# Patient Record
Sex: FEMALE | Race: WHITE | NOT HISPANIC OR LATINO | Employment: OTHER | URBAN - METROPOLITAN AREA
[De-identification: names, ages, dates, MRNs, and addresses within clinical notes are randomized per-mention and may not be internally consistent; named-entity substitution may affect disease eponyms.]

---

## 2017-01-19 DIAGNOSIS — F43.22 ADJUSTMENT DISORDER WITH ANXIOUS MOOD: Primary | ICD-10-CM

## 2017-01-19 NOTE — TELEPHONE ENCOUNTER
clonazerpam     Last Written Prescription Date:  11/14/2016  Last Fill Quantity: 60,   # refills: 0  Last Office Visit with [facility] or [facility] prescribing provider: 9/7/2016  Future Office visit:    Next 5 appointments (look out 90 days)     Jan 23, 2017  4:00 PM   Office Visit with Irish Fernandes MD   [facility] ([address] [id number] [phone]                   Routing refill request to provider for review/approval because:  Drug not on the [facility], [facility] or [facility] refill protocol or controlled substance    Rx for clonazepam faxed to Randi Hinds,

## 2017-01-20 RX ORDER — CLONAZEPAM 0.5 MG/1
0.5 TABLET, ORALLY DISINTEGRATING ORAL 2 TIMES DAILY
Qty: 60 TABLET | Refills: 0 | Status: SHIPPED | OUTPATIENT
Start: 2017-01-20 | End: 2017-01-23

## 2017-01-23 ENCOUNTER — OFFICE VISIT (OUTPATIENT)
Dept: FAMILY MEDICINE | Facility: CLINIC | Age: 57
End: 2017-01-23
Payer: COMMERCIAL

## 2017-01-23 VITALS
HEART RATE: 75 BPM | TEMPERATURE: 98.6 F | DIASTOLIC BLOOD PRESSURE: 85 MMHG | BODY MASS INDEX: 27.82 KG/M2 | HEIGHT: 63 IN | OXYGEN SATURATION: 100 % | WEIGHT: 157 LBS | SYSTOLIC BLOOD PRESSURE: 117 MMHG

## 2017-01-23 DIAGNOSIS — I10 BENIGN ESSENTIAL HYPERTENSION: ICD-10-CM

## 2017-01-23 DIAGNOSIS — E06.3 HYPOTHYROIDISM DUE TO HASHIMOTO'S THYROIDITIS: ICD-10-CM

## 2017-01-23 DIAGNOSIS — F43.22 ADJUSTMENT DISORDER WITH ANXIOUS MOOD: Primary | ICD-10-CM

## 2017-01-23 PROCEDURE — 99213 OFFICE O/P EST LOW 20 MIN: CPT | Performed by: INTERNAL MEDICINE

## 2017-01-23 PROCEDURE — 84443 ASSAY THYROID STIM HORMONE: CPT | Performed by: INTERNAL MEDICINE

## 2017-01-23 PROCEDURE — 36415 COLL VENOUS BLD VENIPUNCTURE: CPT | Performed by: INTERNAL MEDICINE

## 2017-01-23 PROCEDURE — 80048 BASIC METABOLIC PNL TOTAL CA: CPT | Performed by: INTERNAL MEDICINE

## 2017-01-23 RX ORDER — CLONAZEPAM 0.5 MG/1
0.5 TABLET, ORALLY DISINTEGRATING ORAL 2 TIMES DAILY
Qty: 60 TABLET | Refills: 0 | Status: SHIPPED | OUTPATIENT
Start: 2017-02-20 | End: 2017-03-07

## 2017-01-23 NOTE — NURSING NOTE
"Chief Complaint   Patient presents with     Recheck Medication       Initial /93 mmHg  Pulse 73  Temp(Src) 98.6  F (37  C) (Oral)  Ht 5' 3\" (1.6 m)  Wt 157 lb (71.215 kg)  BMI 27.82 kg/m2 Estimated body mass index is 27.82 kg/(m^2) as calculated from the following:    Height as of this encounter: 5' 3\" (1.6 m).    Weight as of this encounter: 157 lb (71.215 kg).  BP completed using cuff size: sara Garcia MA       "

## 2017-01-23 NOTE — PROGRESS NOTES
SUBJECTIVE:                                                    Irina Hanks is a 56 year old female who presents to clinic today for the following health issues:      Medication Followup of klonopin     Taking Medication as prescribed: yes    Side Effects:  None    Medication Helping Symptoms:  yes     Taking the Clonazepam for anxiety symptoms and doing well. It is helping her sleep at night. Feels that her anxiety is much better controlled. Not feeling as worried about what is going to happen financially.       Problem list and histories reviewed & adjusted, as indicated.  Additional history: as documented    Patient Active Problem List   Diagnosis     Hypothyroidism     Absence of menstruation     IBS (irritable bowel syndrome)     Hyperlipidemia LDL goal <130     Hypertension goal BP (blood pressure) < 130/80     Impaired renal function     Overweight (BMI 25.0-29.9)     Anxiety     Postmenopausal symptoms     Inflammatory arthritis     Osteoarthritis of first carpometacarpal joint     Microscopic colitis     Seasonal allergic rhinitis     Vitamin D deficiency     RA (rheumatoid arthritis) (H)     ASCUS favor benign     Shortness of breath     Essential hypertension, benign     Acquired hypothyroidism     Symptomatic menopausal or female climacteric states     Tricuspid regurgitation     S/P myringotomy with insertion of tube     Vasomotor rhinitis     Controlled substance agreement signed     Abdominal pain, right lower quadrant     Psychophysiological insomnia     Past Surgical History   Procedure Laterality Date     C/section, low transverse       twins     Biopsy breast       Colonoscopy  2013     nl, next one due in 5 years     Leep tx, cervical  1990s     normal since that time     Myringotomy, insert tube, combined Left 4/2015     chronic NORM, ETD       Social History   Substance Use Topics     Smoking status: Former Smoker -- 0.26 packs/day for 10 years     Types: Cigarettes     Quit date:  "04/26/1989     Smokeless tobacco: Never Used     Alcohol Use: 0.0 oz/week     0 Standard drinks or equivalent per week      Comment: 2 drinks per day     Family History   Problem Relation Age of Onset     Cancer - colorectal Father      age 50     Cancer - colorectal Brother      age 50     Hypertension Sister      Hypertension Brother      Arthritis Mother      Thyroid Disease Sister      Thyroid Disease Brother      Arthritis Sister      CANCER Sister 53     Uterine     CANCER Sister 50     Uterine           ROS:  Constitutional, HEENT, cardiovascular, pulmonary, gi and gu systems are negative, except as otherwise noted.    OBJECTIVE:                                                    /85 mmHg  Pulse 75  Temp(Src) 98.6  F (37  C) (Oral)  Ht 5' 3\" (1.6 m)  Wt 157 lb (71.215 kg)  BMI 27.82 kg/m2  SpO2 100%  Body mass index is 27.82 kg/(m^2).  GENERAL: healthy, alert and no distress  NECK: no adenopathy, no asymmetry, masses, or scars and thyroid normal to palpation  RESP: lungs clear to auscultation - no rales, rhonchi or wheezes  CV: regular rate and rhythm, normal S1 S2, no S3 or S4, no murmur, click or rub, no peripheral edema and peripheral pulses strong  PSYCH: mentation appears normal, affect normal/bright    Diagnostic Test Results:  none      ASSESSMENT/PLAN:                                                      1. Adjustment disorder with anxious mood  - clonazePAM (KLONOPIN) 0.5 MG ODT tab; Take 1 tablet (0.5 mg) by mouth 2 times daily  Dispense: 60 tablet; Refill: 0    2. Benign essential hypertension  - Basic metabolic panel    3. Hypothyroidism due to Hashimoto's thyroiditis  - TSH with free T4 reflex      Follow up in 6 month(s) or sooner if needed      Irish Fernandes MD  [facility]    "

## 2017-01-24 DIAGNOSIS — I10 BENIGN ESSENTIAL HYPERTENSION: Primary | ICD-10-CM

## 2017-01-24 LAB
ANION GAP SERPL CALCULATED.3IONS-SCNC: 10 MMOL/L (ref 3–14)
BUN SERPL-MCNC: 11 MG/DL (ref 7–30)
CALCIUM SERPL-MCNC: 9 MG/DL (ref 8.5–10.1)
CHLORIDE SERPL-SCNC: 94 MMOL/L (ref 94–109)
CO2 SERPL-SCNC: 26 MMOL/L (ref 20–32)
CREAT SERPL-MCNC: 0.78 MG/DL (ref 0.52–1.04)
GFR SERPL CREATININE-BSD FRML MDRD: 76 ML/MIN/1.7M2
GLUCOSE SERPL-MCNC: 87 MG/DL (ref 70–99)
POTASSIUM SERPL-SCNC: 3.6 MMOL/L (ref 3.4–5.3)
SODIUM SERPL-SCNC: 130 MMOL/L (ref 133–144)
TSH SERPL DL<=0.005 MIU/L-ACNC: 1.56 MU/L (ref 0.4–4)

## 2017-01-24 RX ORDER — LOSARTAN POTASSIUM 100 MG/1
100 TABLET ORAL DAILY
Qty: 30 TABLET | Refills: 1 | Status: SHIPPED | OUTPATIENT
Start: 2017-01-24 | End: 2017-08-14

## 2017-03-07 DIAGNOSIS — F43.22 ADJUSTMENT DISORDER WITH ANXIOUS MOOD: ICD-10-CM

## 2017-03-07 NOTE — TELEPHONE ENCOUNTER
Clonazepam      Last Written Prescription Date:  2/20/17  Last Fill Quantity: 60,   # refills: 0  Last Office Visit with [facility] provider: 1/23/17  Future Office visit:       Routing refill request to provider for review/approval because:  Drug not on the [facility] or [facility] refill protocol or controlled substance    Florence Mcknight CMA

## 2017-03-08 RX ORDER — CLONAZEPAM 0.5 MG/1
TABLET, ORALLY DISINTEGRATING ORAL
Qty: 60 TABLET | Refills: 0 | Status: SHIPPED | OUTPATIENT
Start: 2017-03-20 | End: 2017-05-12

## 2017-03-08 NOTE — TELEPHONE ENCOUNTER
Patient requesting fill too soon. Rx denied. Routing to PCP to deny as RN's are not able to since epic upgrade.   Keyla Hardin RN   [facility] - Triage

## 2017-03-08 NOTE — TELEPHONE ENCOUNTER
Routing to team to inform and assist in scheduling.   Keyla Hardin RN   [facility] - Triage

## 2017-03-08 NOTE — TELEPHONE ENCOUNTER
Refill dated for March 20th. Patient can  if she would like to and bring to her pharmacy but cannot be filled before that date.

## 2017-05-03 ENCOUNTER — TELEPHONE (OUTPATIENT)
Dept: FAMILY MEDICINE | Facility: CLINIC | Age: 57
End: 2017-05-03

## 2017-05-03 NOTE — TELEPHONE ENCOUNTER
Patient calling to schedule appointment. States she has pain under her right rib cage which started a week ago, hurts to bend or breathe. Denies other symptoms, denies injuring the area. Denies sob, cough, wheeze, chest pain, lightheadedness. Advised okay to wait for her appointment on Friday, but if pain becomes severe, or she develops any of the above symptoms to be seen sooner. Patient/ parent verbalized understanding and agrees with plan.    Keyla Hardin RN   [facility] - Triage

## 2017-05-05 ENCOUNTER — OFFICE VISIT (OUTPATIENT)
Dept: FAMILY MEDICINE | Facility: CLINIC | Age: 57
End: 2017-05-05
Payer: COMMERCIAL

## 2017-05-05 VITALS
TEMPERATURE: 97.2 F | OXYGEN SATURATION: 96 % | SYSTOLIC BLOOD PRESSURE: 100 MMHG | HEART RATE: 82 BPM | DIASTOLIC BLOOD PRESSURE: 70 MMHG | BODY MASS INDEX: 27.46 KG/M2 | WEIGHT: 155 LBS

## 2017-05-05 DIAGNOSIS — R07.89 CHEST WALL PAIN: Primary | ICD-10-CM

## 2017-05-05 PROCEDURE — 99213 OFFICE O/P EST LOW 20 MIN: CPT | Performed by: INTERNAL MEDICINE

## 2017-05-05 NOTE — PROGRESS NOTES
SUBJECTIVE:                                                    Irina Hanks is a 57 year old female who presents to clinic today for the following health issues:      ABDOMINAL PAIN     Onset: a week ago     Description:   Character: Sharp  Location: right upper quadrant  Radiation: None    Intensity: moderate    Progression of Symptoms:  constant    Accompanying Signs & Symptoms:  Fever/Chills?: no   Gas/Bloating: no   Nausea: no   Vomitting: no   Diarrhea?: no   Constipation:no   Dysuria or Hematuria: no    History:   Trauma: no   Previous similar pain: no    Previous tests done: none    Precipitating factors:   Does the pain change with:     Food: no      BM: no     Urination: no     Alleviating factors:  Not moving , worse with movement     Therapies Tried and outcome: tylenol, not helpful     LMP:  not applicable     Peg is here with right sided rib pain. It started about a week ago, no injuries or new activities. The pain is there all the time, gets worse with movement, coughing, or deep breath.  Tylenol does not seem to be helping.  She has no associated cough, SOB, nausea, vomiting, change in bowel movements.         Reviewed and updated as needed this visit by clinical staff  Tobacco  Allergies  Meds         ROS:  Const, pulm, GI, , msk reviewed, negative unless noted above.     OBJECTIVE:                                                    /70 (BP Location: Left arm, Patient Position: Chair, Cuff Size: Adult Regular)  Pulse 82  Temp 97.2  F (36.2  C) (Tympanic)  Wt 155 lb (70.3 kg)  SpO2 96%  BMI 27.46 kg/m2  Body mass index is 27.46 kg/(m^2).    Gen: well appearing, pleasant woman, no distress  Pulm: breathing comfortably, CTAB, no wheezes or rales  CV: RRR, normal S1 and S2, no murmurs  Abd: BS present, soft, nontender, nondistended  Chest: mild TTP over lower anterior right ribs. No overlying bruising or rash.       Diagnostic Test Results:  none      ASSESSMENT/PLAN:                                                           1. Chest wall pain  Advised NSAIDs, ice and/or heat, avoid movements that make it worse. If worsening or develops accompanying respiratory or GI symptoms, consider imaging.  She is in agreement with plan.     F/U as needed for persistent or worsening symptoms.         Keely Adams MD  [facility]

## 2017-05-05 NOTE — NURSING NOTE
"Chief Complaint   Patient presents with     Abdominal Pain       Initial /70 (BP Location: Left arm, Patient Position: Chair, Cuff Size: Adult Regular)  Pulse 82  Temp 97.2  F (36.2  C) (Tympanic)  Wt 155 lb (70.3 kg)  SpO2 96%  BMI 27.46 kg/m2 Estimated body mass index is 27.46 kg/(m^2) as calculated from the following:    Height as of 1/23/17: 5' 3\" (1.6 m).    Weight as of this encounter: 155 lb (70.3 kg).  Medication Reconciliation: complete  "

## 2017-05-05 NOTE — MR AVS SNAPSHOT
After Visit Summary   5/5/2017    Irina Hanks    MRN: [id number]           Patient Information     Date Of Birth          1960        Visit Information        Provider Department      5/5/2017 10:40 AM Keely Adams MD [facility] [place]        Today's Diagnoses     Chest wall pain    -  1       Follow-ups after your visit        Who to contact     If you have questions or need follow up information about today's clinic visit or your schedule please contact [facility] [place] directly at [phone].  Normal or non-critical lab and imaging results will be communicated to you by [employer], letter or phone within 4 business days after the clinic has received the results. If you do not hear from us within 7 days, please contact the clinic through [employer] or phone. If you have a critical or abnormal lab result, we will notify you by phone as soon as possible.  Submit refill requests through [employer] or call your pharmacy and they will forward the refill request to us. Please allow 3 business days for your refill to be completed.          Additional Information About Your Visit        MyChart Information     [employer] gives you secure access to your electronic health record. If you see a primary care provider, you can also send messages to your care team and make appointments. If you have questions, please call your primary care clinic.  If you do not have a primary care provider, please call [phone] and they will assist you.        Care EveryWhere ID     This is your Care EveryWhere ID. This could be used by other organizations to access your [place] medical records  [id number]        Your Vitals Were     Pulse Temperature Pulse Oximetry BMI (Body Mass Index)          82 97.2  F (36.2  C) (Tympanic) 96% 27.46 kg/m2         Blood Pressure from Last 3 Encounters:   05/05/17 100/70   01/23/17 117/85   09/07/16 137/87    Weight from Last 3 Encounters:   05/05/17  155 lb (70.3 kg)   01/23/17 157 lb (71.2 kg)   09/07/16 157 lb (71.2 kg)              Today, you had the following     No orders found for display       Primary Care Provider Office Phone # Fax #    Irish Fernandes [phone]       [facility] [place] [address] [place] [address] [address]  [place] [address]        Thank you!     [facility] your care. Our goal is always to provide you with excellent care. Hearing back from our patients is one way we can continue to improve our services. Please take a few minutes to complete the written survey that you may receive in the mail after your visit with us. Thank you!             Your Updated Medication List - Protect others around you: Learn how to safely use, store and throw away your medicines at www.disposemymeds.org.          This list is accurate as of: 5/5/17 11:59 AM.  Always use your most recent med list.                   Brand Name Dispense Instructions for use    CLARITIN 10 MG tablet   Generic drug:  loratadine     90 tablet    Take 1 tablet (10 mg) by mouth daily       clonazePAM 0.5 MG ODT tab    klonoPIN    60 tablet    TAKE 1 TABLET BY MOUTH TWICE DAILY       hydroxychloroquine 200 MG tablet    PLAQUENIL    90 tablet    Take 2 tablets (400 mg) by mouth daily       IMODIUM A-D 2 MG tablet   Generic drug:  loperamide      1 TABLET AS NEEDED       levothyroxine 75 MCG tablet    SYNTHROID/LEVOTHROID    90 tablet    TAKE 1 TABLET BY MOUTH EVERY MORNING       liothyronine 5 MCG tablet    CYTOMEL    180 tablet    TAKE 2 TABLETS BY MOUTH ONCE DAILY       losartan 100 MG tablet    COZAAR    30 tablet    Take 1 tablet (100 mg) by mouth daily       meclizine 25 MG tablet    ANTIVERT    30 tablet    Take 1 tablet (25 mg) by mouth every 6 hours as needed for dizziness       metoprolol 50 MG 24 hr tablet    TOPROL-XL    180 tablet    TAKE 2 TABLETS BY MOUTH ONCE DAILY       Multi-vitamin Tabs tablet    Generic drug:  multivitamin, therapeutic with minerals      1 TABLET DAILY       VITAMIN D3 PO      Take 2,000 Units by mouth

## 2017-05-12 DIAGNOSIS — F43.22 ADJUSTMENT DISORDER WITH ANXIOUS MOOD: ICD-10-CM

## 2017-05-12 RX ORDER — CLONAZEPAM 0.5 MG/1
TABLET, ORALLY DISINTEGRATING ORAL
Qty: 60 TABLET | Refills: 0 | Status: SHIPPED | OUTPATIENT
Start: 2017-05-12 | End: 2017-07-11

## 2017-05-12 NOTE — TELEPHONE ENCOUNTER
Clonazepam      Last Written Prescription Date:  3/20/17  Last Fill Quantity: 60,   # refills: 0  Last Office Visit with [facility], [facility] or [facility] prescribing provider: 5/5/17  Future Office visit:       Routing refill request to provider for review/approval because:  Drug not on the [facility] refill protocol or controlled substance    PHAN Huntley

## 2017-05-30 DIAGNOSIS — E03.9 HYPOTHYROIDISM: ICD-10-CM

## 2017-05-31 DIAGNOSIS — E03.9 ACQUIRED HYPOTHYROIDISM: ICD-10-CM

## 2017-05-31 RX ORDER — LEVOTHYROXINE SODIUM 75 UG/1
TABLET ORAL
Qty: 90 TABLET | Refills: 1 | Status: SHIPPED | OUTPATIENT
Start: 2017-05-31 | End: 2017-11-24

## 2017-05-31 NOTE — TELEPHONE ENCOUNTER
Prescription approved per [facility] Refill Protocol.  Keyla Hardin RN   [facility] - Triage

## 2017-05-31 NOTE — TELEPHONE ENCOUNTER
synthroid      Last Written Prescription Date: 11/18/2016  Last Quantity: 90 # refills: 1  Last Office Visit with [facility] or [facility] prescribing provider: 5/5/2017       TSH   Date Value Ref Range Status   01/23/2017 1.56 0.40 - 4.00 mU/L Final

## 2017-06-01 RX ORDER — LIOTHYRONINE SODIUM 5 UG/1
TABLET ORAL
Qty: 180 TABLET | Refills: 1 | Status: SHIPPED | OUTPATIENT
Start: 2017-06-01 | End: 2017-11-06

## 2017-06-01 NOTE — TELEPHONE ENCOUNTER
Cytomel      Last Written Prescription Date: 12/2/16  Last Quantity: 180, # refills: 1  Last Office Visit with [facility] or [facility] prescribing provider: 5/5/17        TSH   Date Value Ref Range Status   01/23/2017 1.56 0.40 - 4.00 mU/L Final

## 2017-06-01 NOTE — TELEPHONE ENCOUNTER
Prescription approved per [facility] [place]ill Protocol.  Keyla Hardin RN   [facility] - Triage

## 2017-06-05 ENCOUNTER — TELEPHONE (OUTPATIENT)
Dept: FAMILY MEDICINE | Facility: CLINIC | Age: 57
End: 2017-06-05

## 2017-06-05 NOTE — TELEPHONE ENCOUNTER
Pa was sent to us called insurance no PA needed, called [facility] changed the NDC number and it is covered, they will call pt.

## 2017-07-11 DIAGNOSIS — I10 ESSENTIAL HYPERTENSION, BENIGN: ICD-10-CM

## 2017-07-11 DIAGNOSIS — F43.22 ADJUSTMENT DISORDER WITH ANXIOUS MOOD: ICD-10-CM

## 2017-07-11 NOTE — TELEPHONE ENCOUNTER
Hyzaar      Last Written Prescription Date: 1/5/17  Last Fill Quantity: 90, # refills: 1  Last Office Visit with [facility] or [facility] prescribing provider: 5/5/17       Potassium   Date Value Ref Range Status   01/23/2017 3.6 3.4 - 5.3 mmol/L Final     Creatinine   Date Value Ref Range Status   01/23/2017 0.78 0.52 - 1.04 mg/dL Final     BP Readings from Last 3 Encounters:   05/05/17 100/70   01/23/17 117/85   09/07/16 137/87     Klonopin      Last Written Prescription Date:  5/12/17  Last Fill Quantity: 60,   # refills: 0  Last Office Visit with [facility] or [facility] prescribing provider: 5/5/17  Future Office visit:       Routing refill request to provider for review/approval because:  Drug not on the [facility] or [facility] refill protocol or controlled substance      MAXIMILIANO Yu LPN

## 2017-07-12 RX ORDER — LOSARTAN POTASSIUM AND HYDROCHLOROTHIAZIDE 12.5; 1 MG/1; MG/1
TABLET ORAL
Qty: 90 TABLET | Refills: 1 | Status: SHIPPED | OUTPATIENT
Start: 2017-07-12 | End: 2018-03-02

## 2017-07-12 RX ORDER — CLONAZEPAM 0.5 MG/1
0.5 TABLET, ORALLY DISINTEGRATING ORAL DAILY PRN
Qty: 30 TABLET | Refills: 0 | Status: SHIPPED | OUTPATIENT
Start: 2017-07-12 | End: 2017-08-28

## 2017-07-12 NOTE — TELEPHONE ENCOUNTER
Routing to team to inform and assist in scheduling.   Keyla Hardin RN   [facility] - Triage

## 2017-07-12 NOTE — TELEPHONE ENCOUNTER
It's been over 6 months since I've seen Irina.  I will refill #30 today but I need to see her for a med follow up before further refills on the Clonazepam can be given. She should not be taking this every day indefinitely and if she is needing to for her anxiety then we need to find a different medication to control anxiety.

## 2017-07-12 NOTE — TELEPHONE ENCOUNTER
Routing refill request to provider for review/approval because:  Drug not on the [facility] refill protocol   Naomie Ford RN  [facility]  [phone]

## 2017-08-10 DIAGNOSIS — E03.9 ACQUIRED HYPOTHYROIDISM: ICD-10-CM

## 2017-08-10 NOTE — TELEPHONE ENCOUNTER
Cytomel     Last Written Prescription Date: 6/1/17  Last Quantity: 180, # refills: 1  Last Office Visit with [facility] or [facility] prescribing provider: 5/5/17   Next 5 appointments (look out 90 days)     Aug 14, 2017 10:40 AM CDT   Office Visit with Irish Fernandes MD   [facility]    [address] [place] [id number]   [phone]                   TSH   Date Value Ref Range Status   01/23/2017 1.56 0.40 - 4.00 mU/L Final     MAXIMILIANO Yu LPN

## 2017-08-11 RX ORDER — LIOTHYRONINE SODIUM 5 UG/1
TABLET ORAL
Qty: 180 TABLET | Refills: 1 | Status: SHIPPED | OUTPATIENT
Start: 2017-08-11 | End: 2017-08-14

## 2017-08-11 NOTE — TELEPHONE ENCOUNTER
Refill approved through [facility] protocol.  Naomie Ford RN  [facility]  [phone]

## 2017-08-14 ENCOUNTER — OFFICE VISIT (OUTPATIENT)
Dept: FAMILY MEDICINE | Facility: CLINIC | Age: 57
End: 2017-08-14
Payer: COMMERCIAL

## 2017-08-14 VITALS
BODY MASS INDEX: 27.28 KG/M2 | DIASTOLIC BLOOD PRESSURE: 86 MMHG | HEART RATE: 57 BPM | SYSTOLIC BLOOD PRESSURE: 124 MMHG | WEIGHT: 154 LBS | OXYGEN SATURATION: 96 % | TEMPERATURE: 99.1 F

## 2017-08-14 DIAGNOSIS — N95.1 SYMPTOMATIC MENOPAUSAL OR FEMALE CLIMACTERIC STATES: ICD-10-CM

## 2017-08-14 DIAGNOSIS — M05.79 RHEUMATOID ARTHRITIS INVOLVING MULTIPLE SITES WITH POSITIVE RHEUMATOID FACTOR (H): ICD-10-CM

## 2017-08-14 DIAGNOSIS — E03.9 ACQUIRED HYPOTHYROIDISM: ICD-10-CM

## 2017-08-14 DIAGNOSIS — I10 ESSENTIAL HYPERTENSION, BENIGN: ICD-10-CM

## 2017-08-14 DIAGNOSIS — F43.22 ADJUSTMENT DISORDER WITH ANXIOUS MOOD: ICD-10-CM

## 2017-08-14 DIAGNOSIS — Z13.220 SCREENING FOR HYPERLIPIDEMIA: Primary | ICD-10-CM

## 2017-08-14 LAB
ERYTHROCYTE [DISTWIDTH] IN BLOOD BY AUTOMATED COUNT: 11.6 % (ref 10–15)
HCT VFR BLD AUTO: 40.2 % (ref 35–47)
HGB BLD-MCNC: 13.9 G/DL (ref 11.7–15.7)
MCH RBC QN AUTO: 32.4 PG (ref 26.5–33)
MCHC RBC AUTO-ENTMCNC: 34.6 G/DL (ref 31.5–36.5)
MCV RBC AUTO: 94 FL (ref 78–100)
PLATELET # BLD AUTO: 233 10E9/L (ref 150–450)
RBC # BLD AUTO: 4.29 10E12/L (ref 3.8–5.2)
WBC # BLD AUTO: 4.4 10E9/L (ref 4–11)

## 2017-08-14 PROCEDURE — 85027 COMPLETE CBC AUTOMATED: CPT | Performed by: INTERNAL MEDICINE

## 2017-08-14 PROCEDURE — 36415 COLL VENOUS BLD VENIPUNCTURE: CPT | Performed by: INTERNAL MEDICINE

## 2017-08-14 PROCEDURE — 84443 ASSAY THYROID STIM HORMONE: CPT | Performed by: INTERNAL MEDICINE

## 2017-08-14 PROCEDURE — 80061 LIPID PANEL: CPT | Performed by: INTERNAL MEDICINE

## 2017-08-14 PROCEDURE — 99213 OFFICE O/P EST LOW 20 MIN: CPT | Performed by: INTERNAL MEDICINE

## 2017-08-14 RX ORDER — METOPROLOL SUCCINATE 50 MG/1
TABLET, EXTENDED RELEASE ORAL
Qty: 180 TABLET | Refills: 2 | Status: CANCELLED | OUTPATIENT
Start: 2017-08-14

## 2017-08-14 RX ORDER — CLONAZEPAM 0.5 MG/1
0.5 TABLET, ORALLY DISINTEGRATING ORAL DAILY PRN
Qty: 30 TABLET | Refills: 0 | Status: CANCELLED | OUTPATIENT
Start: 2017-08-14

## 2017-08-14 RX ORDER — LIOTHYRONINE SODIUM 5 UG/1
TABLET ORAL
Qty: 180 TABLET | Refills: 1 | Status: CANCELLED | OUTPATIENT
Start: 2017-08-14

## 2017-08-14 RX ORDER — HYDROXYCHLOROQUINE SULFATE 200 MG/1
400 TABLET, FILM COATED ORAL DAILY
Qty: 90 TABLET | Refills: 3 | Status: CANCELLED | OUTPATIENT
Start: 2017-08-14

## 2017-08-14 RX ORDER — LEVOTHYROXINE SODIUM 75 UG/1
75 TABLET ORAL EVERY MORNING
Qty: 90 TABLET | Refills: 1 | Status: CANCELLED | OUTPATIENT
Start: 2017-08-14

## 2017-08-14 NOTE — MR AVS SNAPSHOT
After Visit Summary   8/14/2017    Irina Hanks    MRN: [id number]           Patient Information     Date Of Birth          1960        Visit Information        Provider Department      8/14/2017 10:40 AM Irish Fernandes MD [facility] [place]        Today's Diagnoses     Screening for hyperlipidemia    -  1    Rheumatoid arthritis involving multiple sites with positive rheumatoid factor (H)        Essential hypertension, benign        Acquired hypothyroidism        Adjustment disorder with anxious mood        Symptomatic menopausal or female climacteric states           Follow-ups after your visit        Future tests that were ordered for you today     Open Future Orders        Priority Expected Expires Ordered    DX Hip/Pelvis/Spine Routine  8/14/2018 8/14/2017            Who to contact     If you have questions or need follow up information about today's clinic visit or your schedule please contact [facility] [place] directly at [phone].  Normal or non-critical lab and imaging results will be communicated to you by [employer], letter or phone within 4 business days after the clinic has received the results. If you do not hear from us within 7 days, please contact the clinic through [employer] or phone. If you have a critical or abnormal lab result, we will notify you by phone as soon as possible.  Submit refill requests through [employer] or call your pharmacy and they will forward the refill request to us. Please allow 3 business days for your refill to be completed.          Additional Information About Your Visit        MyChart Information     [employer] gives you secure access to your electronic health record. If you see a primary care provider, you can also send messages to your care team and make appointments. If you have questions, please call your primary care clinic.  If you do not have a primary care provider, please call [phone] and they will assist you.         Care EveryWhere ID     This is your Care EveryWhere ID. This could be used by other organizations to access your [place] medical records  [id number]        Your Vitals Were     Pulse Temperature Pulse Oximetry BMI (Body Mass Index)          57 99.1  F (37.3  C) (Oral) 96% 27.28 kg/m2         Blood Pressure from Last 3 Encounters:   08/14/17 124/86   05/05/17 100/70   01/23/17 117/85    Weight from Last 3 Encounters:   08/14/17 154 lb (69.9 kg)   05/05/17 155 lb (70.3 kg)   01/23/17 157 lb (71.2 kg)              We Performed the Following     CBC with platelets     Lipid panel reflex to direct LDL     TSH with free T4 reflex        Primary Care Provider Office Phone # Fax #    Irish Fernandes [phone] [address]  [place] [address]        Equal Access to Services     [facility]: Hadii aad ku hadasho Soomaali, waaxda luqadaha, qaybta kaalmada adeegyada, waxay floydin hayjohann leon rainey . So [facility] [phone].    ATENCIÓN: Si habla español, tiene a jenkins disposición servicios gratuitos de asistencia lingüística. Llame al [phone].    We comply with applicable federal civil rights laws and [place] laws. We do not discriminate on the basis of race, color, national origin, age, disability sex, sexual orientation or gender identity.            Thank you!     Thank you for choosing [facility] [place]  for your care. Our goal is always to provide you with excellent care. Hearing back from our patients is one way we can continue to improve our services. Please take a few minutes to complete the written survey that you may receive in the mail after your visit with us. Thank you!             Your Updated Medication List - Protect others around you: Learn how to safely use, store and throw away your medicines at www.disposemymeds.org.          This list is accurate as of: 8/14/17 11:24 AM.  Always use your most recent med list.                   Brand  Name Dispense Instructions for use Diagnosis    CLARITIN 10 MG tablet   Generic drug:  loratadine     90 tablet    Take 1 tablet (10 mg) by mouth daily        clonazePAM 0.5 MG ODT tab    klonoPIN    30 tablet    Take 1 tablet (0.5 mg) by mouth daily as needed for anxiety    Adjustment disorder with anxious mood       hydroxychloroquine 200 MG tablet    PLAQUENIL    90 tablet    Take 2 tablets (400 mg) by mouth daily    Rheumatoid arthritis involving multiple sites with positive rheumatoid factor (H)       IMODIUM A-D 2 MG tablet   Generic drug:  loperamide      1 TABLET AS NEEDED        levothyroxine 75 MCG tablet    SYNTHROID/LEVOTHROID    90 tablet    TAKE 1 TABLET BY MOUTH EVERY MORNING    Acquired hypothyroidism       liothyronine 5 MCG tablet    CYTOMEL    180 tablet    TAKE 2 TABLETS BY MOUTH ONCE DAILY    Hypothyroidism       losartan-hydrochlorothiazide 100-12.5 MG per tablet    HYZAAR    90 tablet    TAKE 1 TABLET BY MOUTH DAILY    Essential hypertension, benign       meclizine 25 MG tablet    ANTIVERT    30 tablet    Take 1 tablet (25 mg) by mouth every 6 hours as needed for dizziness    Vertigo       metoprolol 50 MG 24 hr tablet    TOPROL-XL    180 tablet    TAKE 2 TABLETS BY MOUTH ONCE DAILY    Essential hypertension, benign       Multi-vitamin Tabs tablet   Generic drug:  multivitamin, therapeutic with minerals      1 TABLET DAILY        VITAMIN D3 PO      Take 2,000 Units by mouth

## 2017-08-14 NOTE — PROGRESS NOTES
SUBJECTIVE:                                                    Irina Hanks is a 57 year old female who presents to clinic today for the following health issues:      Medication Followup of  All     Taking Medication as prescribed: yes    Side Effects:  None    Medication Helping Symptoms:  yes     1. Anxiety: Has had an emotional situation this past year with their family cabin needing to be sold due to financial constraints. Irina was taking Clonazepam once daily for awhile to help with this. The cabin was formally sold 2 weeks ago and she feels that things have been going better. She has been cutting her Clonazepam in half and not taking it every day.     2. Taking Losartan/HCTZ for hypertension. Has a BP Cuff at home and checks it every once in awhile. Runs in the 120's/80's usually.     3. Hypothyroidism: Last TSH checked in January of 2016.    4. Sees Rheumatology, Leia Grover, annually for Rheumatoid Arthritis. Takes Plaquenil. Has been feeling well.     5. Health Maintenance: Mammogram done in Nov, 2016. Last pap was 9/2014 - next is due in 5 years. Last DEXA 2006.       Problem list and histories reviewed & adjusted, as indicated.  Additional history: as documented    Patient Active Problem List   Diagnosis     Hypothyroidism     Absence of menstruation     IBS (irritable bowel syndrome)     Hyperlipidemia LDL goal <130     Hypertension goal BP (blood pressure) < 130/80     Impaired renal function     Overweight (BMI 25.0-29.9)     Anxiety     Postmenopausal symptoms     Inflammatory arthritis     Osteoarthritis of first carpometacarpal joint     Microscopic colitis     Seasonal allergic rhinitis     Vitamin D deficiency     RA (rheumatoid arthritis) (H)     ASCUS favor benign     Shortness of breath     Essential hypertension, benign     Acquired hypothyroidism     Symptomatic menopausal or female climacteric states     Tricuspid regurgitation     S/P myringotomy with insertion of tube      Vasomotor rhinitis     Controlled substance agreement signed     Abdominal pain, right lower quadrant     Psychophysiological insomnia     Adjustment disorder with anxious mood     Past Surgical History:   Procedure Laterality Date     BIOPSY BREAST       C/SECTION, LOW TRANSVERSE      twins     COLONOSCOPY  2013    nl, next one due in 5 years     LEEP TX, CERVICAL  1990s    normal since that time     MYRINGOTOMY, INSERT TUBE, COMBINED Left 4/2015    chronic NORM, ETD       Social History   Substance Use Topics     Smoking status: Former Smoker     Packs/day: 0.26     Years: 10.00     Types: Cigarettes     Quit date: 4/26/1989     Smokeless tobacco: Never Used     Alcohol use 0.0 oz/week     0 Standard drinks or equivalent per week      Comment: 2 drinks per day     Family History   Problem Relation Age of Onset     Cancer - colorectal Father      age 50     Cancer - colorectal Brother      age 50     Hypertension Sister      Hypertension Brother      Arthritis Mother      Thyroid Disease Sister      Thyroid Disease Brother      Arthritis Sister      CANCER Sister 53     Uterine     CANCER Sister 50     Uterine             Reviewed and updated as needed this visit by clinical staff     Reviewed and updated as needed this visit by Provider         ROS:  Constitutional, HEENT, cardiovascular, pulmonary, GI, , musculoskeletal, neuro, skin, endocrine and psych systems are negative, except as otherwise noted.      OBJECTIVE:   /86  Pulse 57  Temp 99.1  F (37.3  C) (Oral)  Wt 154 lb (69.9 kg)  SpO2 96%  BMI 27.28 kg/m2  Body mass index is 27.28 kg/(m^2).  GENERAL: healthy, alert and no distress  NECK: thyromegaly approximately 2 times normal  RESP: lungs clear to auscultation - no rales, rhonchi or wheezes  CV: regular rate and rhythm, normal S1 S2, no S3 or S4, no murmur, click or rub, no peripheral edema and peripheral pulses strong  MS: no gross musculoskeletal defects noted, no edema  PSYCH: mentation  appears normal, affect normal/bright    Diagnostic Test Results:  none     ASSESSMENT/PLAN:       1. Rheumatoid arthritis involving multiple sites with positive rheumatoid factor (H)  Continue to follow with rheumatology.     2. Essential hypertension, benign  Controlled on current Losartan/HCTZ. Continue for now.     3. Acquired hypothyroidism  Continue Levothyroxine and Liothyronine.  - CBC with platelets  - TSH with free T4 reflex    4. Adjustment disorder with anxious mood  Discussed that as Irina's anxiety symptoms continue to improve I would like her to keep weaning off her Clonazepam since this is not a long term medication. She will keep cutting her tablets in half and I encouraged her to skip more days in between doses.     6. Screening for hyperlipidemia  - Lipid panel reflex to direct LDL    7. Symptomatic menopausal or female climacteric states  Due for a bone scan.   - DX Hip/Pelvis/Spine; Future    Follow up in 6 months to follow up on anxiety, or sooner if needed.       Irish Fernandes MD  [facility] [place]

## 2017-08-15 LAB
CHOLEST SERPL-MCNC: 155 MG/DL
HDLC SERPL-MCNC: 77 MG/DL
LDLC SERPL CALC-MCNC: 57 MG/DL
NONHDLC SERPL-MCNC: 78 MG/DL
TRIGL SERPL-MCNC: 103 MG/DL
TSH SERPL DL<=0.005 MIU/L-ACNC: 0.82 MU/L (ref 0.4–4)

## 2017-08-28 DIAGNOSIS — F43.22 ADJUSTMENT DISORDER WITH ANXIOUS MOOD: ICD-10-CM

## 2017-08-28 RX ORDER — CLONAZEPAM 0.5 MG/1
0.5 TABLET, ORALLY DISINTEGRATING ORAL
Qty: 30 TABLET | Refills: 0 | Status: SHIPPED | OUTPATIENT
Start: 2017-08-28 | End: 2017-10-24

## 2017-08-28 NOTE — TELEPHONE ENCOUNTER
Routing refill request to provider for review/approval because:  Drug not on the [facility] refill protocol     Selam Barreto RN

## 2017-08-28 NOTE — TELEPHONE ENCOUNTER
Klonopin      Last Written Prescription Date: 7/21/17  Last Fill Quantity: 30,  # refills: 0   Last Office Visit with [facility], P or [facility] prescribing provider: 8/14/17                                             MAXIMILIANO Yu LPN

## 2017-09-17 DIAGNOSIS — I10 ESSENTIAL HYPERTENSION, BENIGN: ICD-10-CM

## 2017-09-18 RX ORDER — METOPROLOL SUCCINATE 50 MG/1
TABLET, EXTENDED RELEASE ORAL
Qty: 180 TABLET | Refills: 3 | Status: SHIPPED | OUTPATIENT
Start: 2017-09-18 | End: 2018-09-21

## 2017-09-18 NOTE — TELEPHONE ENCOUNTER
Prescription approved per [facility], [facility] or [facility] refill protocol.  Chelsie Dent RN - Triage  [facility]

## 2017-09-18 NOTE — TELEPHONE ENCOUNTER
Metoprolol   Last Written Prescription Date: 12/8/2016  Last Fill Quantity:180, # refills: 2  Last Office Visit with [facility], P or [facility] prescribing provider:  8/14/2017  Future Office Visit:        BP Readings from Last 3 Encounters:   08/14/17 124/86   05/05/17 100/70   01/23/17 117/85

## 2017-10-06 ENCOUNTER — TRANSFERRED RECORDS (OUTPATIENT)
Dept: HEALTH INFORMATION MANAGEMENT | Facility: CLINIC | Age: 57
End: 2017-10-06

## 2017-10-06 LAB
ALT SERPL-CCNC: 19 IU/L (ref 5–35)
AST SERPL-CCNC: 22 U/L (ref 5–34)
CREAT SERPL-MCNC: 0.6 MG/DL (ref 0.5–1.3)
GFR SERPL CREATININE-BSD FRML MDRD: 108.5 ML/MIN/1.73M2

## 2017-10-24 DIAGNOSIS — F43.22 ADJUSTMENT DISORDER WITH ANXIOUS MOOD: ICD-10-CM

## 2017-10-24 RX ORDER — CLONAZEPAM 0.5 MG/1
TABLET, ORALLY DISINTEGRATING ORAL
Qty: 30 TABLET | Refills: 0 | Status: SHIPPED | OUTPATIENT
Start: 2017-10-24 | End: 2018-01-05

## 2017-10-24 NOTE — TELEPHONE ENCOUNTER
Clonazepam      Last Written Prescription Date:  8/28/17  Last Fill Quantity: 30,   # refills: 0  Future Office visit:       Routing refill request to provider for review/approval because:  Drug not on the [facility], [facility] or [facility] refill protocol or controlled substance    Florence Mcknight CMA

## 2017-11-06 ENCOUNTER — TRANSFERRED RECORDS (OUTPATIENT)
Dept: HEALTH INFORMATION MANAGEMENT | Facility: CLINIC | Age: 57
End: 2017-11-06

## 2017-11-06 DIAGNOSIS — E03.9 HYPOTHYROIDISM: ICD-10-CM

## 2017-11-06 RX ORDER — LIOTHYRONINE SODIUM 5 UG/1
TABLET ORAL
Qty: 180 TABLET | Refills: 2 | Status: SHIPPED | OUTPATIENT
Start: 2017-11-06 | End: 2018-08-01

## 2017-11-06 NOTE — TELEPHONE ENCOUNTER
Requested Prescriptions   Pending Prescriptions Disp Refills     liothyronine (CYTOMEL) 5 MCG tablet [Pharmacy Med Name: LIOTHYRONINE 5MCG TABLETS] 180 tablet 0     Sig: TAKE 2 TABLETS BY MOUTH EVERY DAY    Thyroid Protocol Passed    11/6/2017  4:08 AM       Passed - Patient is 12 years or older       Passed - Recent or future visit with authorizing provider's specialty    Patient had office visit in the last year or has a visit in the next 30 days with authorizing provider.  See chart review.              Passed - Normal TSH on file in past 12 months    Recent Labs   Lab Test  08/14/17   1116   TSH  0.82             Passed - No active pregnancy on record    If patient is pregnant or has had a positive pregnancy test, please check TSH.         Passed - No positive pregnancy test in past 12 months    If patient is pregnant or has had a positive pregnancy test, please check TSH.          Prescription approved per [facility] Refill Protocol.  Keyla Hardin RN   [facility] - Triage

## 2017-11-13 ENCOUNTER — TRANSFERRED RECORDS (OUTPATIENT)
Dept: HEALTH INFORMATION MANAGEMENT | Facility: CLINIC | Age: 57
End: 2017-11-13

## 2017-11-15 ENCOUNTER — HOSPITAL ENCOUNTER (OUTPATIENT)
Dept: MAMMOGRAPHY | Facility: CLINIC | Age: 57
Discharge: HOME | End: 2017-11-15
Attending: INTERNAL MEDICINE | Admitting: INTERNAL MEDICINE
Payer: COMMERCIAL

## 2017-11-15 ENCOUNTER — HOSPITAL ENCOUNTER (OUTPATIENT)
Dept: BONE DENSITY | Facility: CLINIC | Age: 57
End: 2017-11-15
Attending: INTERNAL MEDICINE
Payer: COMMERCIAL

## 2017-11-15 DIAGNOSIS — N95.1 SYMPTOMATIC MENOPAUSAL OR FEMALE CLIMACTERIC STATES: ICD-10-CM

## 2017-11-15 PROCEDURE — 77080 DXA BONE DENSITY AXIAL: CPT

## 2017-11-15 PROCEDURE — G0202 SCR MAMMO BI INCL CAD: HCPCS

## 2017-11-15 PROCEDURE — 77063 BREAST TOMOSYNTHESIS BI: CPT

## 2017-11-24 DIAGNOSIS — E03.9 ACQUIRED HYPOTHYROIDISM: ICD-10-CM

## 2017-11-24 RX ORDER — LEVOTHYROXINE SODIUM 75 UG/1
TABLET ORAL
Qty: 90 TABLET | Refills: 3 | Status: SHIPPED | OUTPATIENT
Start: 2017-11-24 | End: 2018-12-04

## 2018-01-05 DIAGNOSIS — F43.22 ADJUSTMENT DISORDER WITH ANXIOUS MOOD: Primary | ICD-10-CM

## 2018-01-05 DIAGNOSIS — F43.22 ADJUSTMENT DISORDER WITH ANXIOUS MOOD: ICD-10-CM

## 2018-01-05 RX ORDER — CLONAZEPAM 0.5 MG/1
TABLET, ORALLY DISINTEGRATING ORAL
Qty: 30 TABLET | Refills: 0 | Status: SHIPPED | OUTPATIENT
Start: 2018-01-05 | End: 2018-03-12

## 2018-01-05 NOTE — TELEPHONE ENCOUNTER
Requested Prescriptions   Pending Prescriptions Disp Refills     clonazePAM (KLONOPIN) 0.5 MG ODT tab 30 tablet 0    There is no refill protocol information for this order        Naomie Ford RN  [facility]  [phone]

## 2018-01-05 NOTE — TELEPHONE ENCOUNTER
Name of caller: Irina  Relationship of Patient: Self    Reason for Call: Patient called to get a refill on her clonazePAM (KLONOPIN) 0.5 MG ODT tab    Best phone number to reach pt at is: [phone]  Ok to leave a message with medical info? Yes    Pharmacy preferred (if calling for a refill): [facility]    Radha Haage  [facility]-Patient Representative

## 2018-01-05 NOTE — TELEPHONE ENCOUNTER
clonazePAM (KLONOPIN) 0.5 MG ODT tab      Last Written Prescription Date:  1/5/18  Last Fill Quantity: 30,   # refills: 0  Last Office Visit: 8/14/17  Future Office visit:       Routing refill request to provider for review/approval because:  Drug not on the [facility], [facility] or [facility] refill protocol or controlled substance

## 2018-01-08 RX ORDER — CLONAZEPAM 0.5 MG/1
TABLET, ORALLY DISINTEGRATING ORAL
Qty: 30 TABLET | Refills: 0 | OUTPATIENT
Start: 2018-01-08

## 2018-03-02 DIAGNOSIS — I10 ESSENTIAL HYPERTENSION, BENIGN: ICD-10-CM

## 2018-03-05 RX ORDER — LOSARTAN POTASSIUM AND HYDROCHLOROTHIAZIDE 12.5; 1 MG/1; MG/1
TABLET ORAL
Qty: 90 TABLET | Refills: 0 | Status: SHIPPED | OUTPATIENT
Start: 2018-03-05 | End: 2018-06-07

## 2018-03-05 NOTE — TELEPHONE ENCOUNTER
Refill on the medication ordered.  Patient is due for a BMP recheck.  Future lab ordered.  Please have her make a lab only appointment for that.    Rommel Becerra MD  [facility], [place]

## 2018-03-05 NOTE — TELEPHONE ENCOUNTER
"Last Written Prescription Date:  7/12/17  Last Fill Quantity: 90,  # refills: 1   Last office visit: 8/14/2017 with prescribing provider:  Dena   Future Office Visit:      Requested Prescriptions   Pending Prescriptions Disp Refills     losartan-hydrochlorothiazide (HYZAAR) 100-12.5 MG per tablet [Pharmacy Med Name: LOSARTAN/HCTZ 100/12.5MG TABLETS] 90 tablet 0     Sig: TAKE 1 TABLET BY MOUTH DAILY    Angiotensin-II Receptors Failed    3/2/2018  3:58 PM       Failed - Normal serum creatinine on file in past 12 months    Recent Labs   Lab Test  01/23/17   1634   CR  0.78            Failed - Normal serum potassium on file in past 12 months    Recent Labs   Lab Test  01/23/17   1634   POTASSIUM  3.6                   Passed - Blood pressure under 140/90 in past 12 months    BP Readings from Last 3 Encounters:   08/14/17 124/86   05/05/17 100/70   01/23/17 117/85                Passed - Recent (12 mo) or future (30 days) visit within the authorizing provider's specialty    Patient had office visit in the last year or has a visit in the next 30 days with authorizing provider.  See \"Patient Info\" tab in inbasket, or \"Choose Columns\" in Meds & Orders section of the refill encounter.            Passed - Patient is age 18 or older       Passed - No active pregnancy on record       Passed - No positive pregnancy test in past 12 months        Routing refill request to provider for review/approval because:  Labs not current:  Potassium, creatinine    Kyela Hardin RN   [facility] - Triage           "

## 2018-03-08 DIAGNOSIS — I10 ESSENTIAL HYPERTENSION, BENIGN: ICD-10-CM

## 2018-03-08 PROCEDURE — 80048 BASIC METABOLIC PNL TOTAL CA: CPT | Performed by: FAMILY MEDICINE

## 2018-03-08 PROCEDURE — 36415 COLL VENOUS BLD VENIPUNCTURE: CPT | Performed by: FAMILY MEDICINE

## 2018-03-09 LAB
ANION GAP SERPL CALCULATED.3IONS-SCNC: 8 MMOL/L (ref 3–14)
BUN SERPL-MCNC: 13 MG/DL (ref 7–30)
CALCIUM SERPL-MCNC: 9.1 MG/DL (ref 8.5–10.1)
CHLORIDE SERPL-SCNC: 95 MMOL/L (ref 94–109)
CO2 SERPL-SCNC: 28 MMOL/L (ref 20–32)
CREAT SERPL-MCNC: 0.65 MG/DL (ref 0.52–1.04)
GFR SERPL CREATININE-BSD FRML MDRD: >90 ML/MIN/1.7M2
GLUCOSE SERPL-MCNC: 78 MG/DL (ref 70–99)
POTASSIUM SERPL-SCNC: 3.8 MMOL/L (ref 3.4–5.3)
SODIUM SERPL-SCNC: 131 MMOL/L (ref 133–144)

## 2018-03-12 DIAGNOSIS — F43.22 ADJUSTMENT DISORDER WITH ANXIOUS MOOD: ICD-10-CM

## 2018-03-12 RX ORDER — CLONAZEPAM 0.5 MG/1
TABLET, ORALLY DISINTEGRATING ORAL
Qty: 30 TABLET | Refills: 0 | Status: SHIPPED | OUTPATIENT
Start: 2018-03-12 | End: 2018-05-30

## 2018-03-12 NOTE — TELEPHONE ENCOUNTER
clonazePAM (KLONOPIN) 0.5 MG ODT tab      Last Written Prescription Date:  1/5/2018  Last Fill Quantity: 30,   # refills: 0  Last Office Visit: 8/14/2017  Future Office visit:       Routing refill request to provider for review/approval because:  Drug not on the [facility], [facility] or [facility] refill protocol or controlled substance

## 2018-05-30 DIAGNOSIS — F43.22 ADJUSTMENT DISORDER WITH ANXIOUS MOOD: ICD-10-CM

## 2018-05-30 RX ORDER — CLONAZEPAM 0.5 MG/1
TABLET, ORALLY DISINTEGRATING ORAL
Qty: 30 TABLET | Refills: 0 | Status: SHIPPED | OUTPATIENT
Start: 2018-05-30 | End: 2018-07-09

## 2018-05-30 NOTE — TELEPHONE ENCOUNTER
Clonazepam 0.5mg      Last Written Prescription Date:  3/12/18  Last Fill Quantity: 30,   # refills: 0  Last Office Visit: 8/14/17  Future Office visit:       Routing refill request to provider for review/approval because:  Drug not on the [facility], [facility] or [facility] refill protocol or controlled substance    Florence Mcknight CMA

## 2018-06-07 DIAGNOSIS — I10 ESSENTIAL HYPERTENSION, BENIGN: ICD-10-CM

## 2018-06-07 RX ORDER — LOSARTAN POTASSIUM AND HYDROCHLOROTHIAZIDE 12.5; 1 MG/1; MG/1
TABLET ORAL
Qty: 90 TABLET | Refills: 0 | Status: SHIPPED | OUTPATIENT
Start: 2018-06-07 | End: 2018-08-30

## 2018-06-07 NOTE — TELEPHONE ENCOUNTER
"Requested Prescriptions   Pending Prescriptions Disp Refills     losartan-hydrochlorothiazide (HYZAAR) 100-12.5 MG per tablet [Pharmacy Med Name: LOSARTAN/HCTZ 100/12.5MG TABLETS]  Last Written Prescription Date:  3/5/18  Last Fill Quantity: 90,  # refills: 0   Last office visit: 8/14/2017 with prescribing provider:  Dena   Future Office Visit:     90 tablet 0     Sig: TAKE 1 TABLET BY MOUTH DAILY    Angiotensin-II Receptors Passed    6/7/2018  2:51 PM       Passed - Blood pressure under 140/90 in past 12 months    BP Readings from Last 3 Encounters:   08/14/17 124/86   05/05/17 100/70   01/23/17 117/85                Passed - Recent (12 mo) or future (30 days) visit within the authorizing provider's specialty    Patient had office visit in the last 12 months or has a visit in the next 30 days with authorizing provider or within the authorizing provider's specialty.  See \"Patient Info\" tab in inbasket, or \"Choose Columns\" in Meds & Orders section of the refill encounter.           Passed - Patient is age 18 or older       Passed - No active pregnancy on record       Passed - Normal serum creatinine on file in past 12 months    Recent Labs   Lab Test  03/08/18   1533   CR  0.65            Passed - Normal serum potassium on file in past 12 months    Recent Labs   Lab Test  03/08/18   1533   POTASSIUM  3.8                   Passed - No positive pregnancy test in past 12 months          "

## 2018-06-07 NOTE — TELEPHONE ENCOUNTER
Prescription approved per [facility], [facility] or [facility] refill protocol.  Chelsie Dent RN - Triage  [facility]

## 2018-06-11 ENCOUNTER — TELEPHONE (OUTPATIENT)
Dept: FAMILY MEDICINE | Facility: CLINIC | Age: 58
End: 2018-06-11

## 2018-06-11 NOTE — TELEPHONE ENCOUNTER
Reason for Call:  Same Day Appointment, Requested Provider:  Rebecca Fernandes MD    PCP: Irish Fernandes    Reason for visit: ovarian      Duration of symptoms: 1 week    Have you been treated for this in the past? No    Additional comments: Please work her in this week if possible.     Can we leave a detailed message on this number? YES    Phone number patient can be reached at: Cell number on file:    Telephone Information:   Mobile [phone]       Best Time: any    Call taken on 6/11/2018 at 3:53 PM by Jennifer Rodriguez

## 2018-06-11 NOTE — TELEPHONE ENCOUNTER
Spoke with patient who states she has a hx of ovarian cysts and thinks she may have developed more. Offered OV tomorrow 6/12/18 which she scheduled.   Keyla Hardin RN   [facility] - Triage

## 2018-06-12 ENCOUNTER — OFFICE VISIT (OUTPATIENT)
Dept: FAMILY MEDICINE | Facility: CLINIC | Age: 58
End: 2018-06-12
Payer: COMMERCIAL

## 2018-06-12 ENCOUNTER — RADIANT APPOINTMENT (OUTPATIENT)
Dept: GENERAL RADIOLOGY | Facility: CLINIC | Age: 58
End: 2018-06-12
Attending: INTERNAL MEDICINE
Payer: COMMERCIAL

## 2018-06-12 VITALS
BODY MASS INDEX: 27.75 KG/M2 | HEIGHT: 63 IN | SYSTOLIC BLOOD PRESSURE: 130 MMHG | OXYGEN SATURATION: 98 % | HEART RATE: 71 BPM | DIASTOLIC BLOOD PRESSURE: 110 MMHG | TEMPERATURE: 97.7 F | WEIGHT: 156.6 LBS

## 2018-06-12 DIAGNOSIS — R10.32 ABDOMINAL PAIN, LEFT LOWER QUADRANT: ICD-10-CM

## 2018-06-12 DIAGNOSIS — I10 BENIGN ESSENTIAL HYPERTENSION: ICD-10-CM

## 2018-06-12 DIAGNOSIS — E83.19 IRON OVERLOAD: ICD-10-CM

## 2018-06-12 DIAGNOSIS — R10.31 ABDOMINAL PAIN, RIGHT LOWER QUADRANT: Primary | ICD-10-CM

## 2018-06-12 DIAGNOSIS — R10.31 ABDOMINAL PAIN, RIGHT LOWER QUADRANT: ICD-10-CM

## 2018-06-12 DIAGNOSIS — E87.1 HYPONATREMIA: ICD-10-CM

## 2018-06-12 LAB
ALBUMIN UR-MCNC: NEGATIVE MG/DL
APPEARANCE UR: CLEAR
BILIRUB UR QL STRIP: NEGATIVE
COLOR UR AUTO: YELLOW
ERYTHROCYTE [DISTWIDTH] IN BLOOD BY AUTOMATED COUNT: 12.6 % (ref 10–15)
GLUCOSE UR STRIP-MCNC: NEGATIVE MG/DL
HCT VFR BLD AUTO: 40.2 % (ref 35–47)
HGB BLD-MCNC: 13.8 G/DL (ref 11.7–15.7)
HGB UR QL STRIP: NEGATIVE
KETONES UR STRIP-MCNC: NEGATIVE MG/DL
LEUKOCYTE ESTERASE UR QL STRIP: NEGATIVE
MCH RBC QN AUTO: 32.1 PG (ref 26.5–33)
MCHC RBC AUTO-ENTMCNC: 34.3 G/DL (ref 31.5–36.5)
MCV RBC AUTO: 94 FL (ref 78–100)
NITRATE UR QL: NEGATIVE
PH UR STRIP: 6 PH (ref 5–7)
PLATELET # BLD AUTO: 201 10E9/L (ref 150–450)
RBC # BLD AUTO: 4.3 10E12/L (ref 3.8–5.2)
SOURCE: NORMAL
SP GR UR STRIP: 1.01 (ref 1–1.03)
UROBILINOGEN UR STRIP-ACNC: 0.2 EU/DL (ref 0.2–1)
WBC # BLD AUTO: 5.1 10E9/L (ref 4–11)

## 2018-06-12 PROCEDURE — 36415 COLL VENOUS BLD VENIPUNCTURE: CPT | Performed by: INTERNAL MEDICINE

## 2018-06-12 PROCEDURE — 81003 URINALYSIS AUTO W/O SCOPE: CPT | Performed by: INTERNAL MEDICINE

## 2018-06-12 PROCEDURE — 99214 OFFICE O/P EST MOD 30 MIN: CPT | Performed by: INTERNAL MEDICINE

## 2018-06-12 PROCEDURE — 74019 RADEX ABDOMEN 2 VIEWS: CPT | Mod: FY

## 2018-06-12 PROCEDURE — 85027 COMPLETE CBC AUTOMATED: CPT | Performed by: INTERNAL MEDICINE

## 2018-06-12 PROCEDURE — 80053 COMPREHEN METABOLIC PANEL: CPT | Performed by: INTERNAL MEDICINE

## 2018-06-12 NOTE — MR AVS SNAPSHOT
After Visit Summary   6/12/2018    Irina Hanks    MRN: [id number]           Patient Information     Date Of Birth          1960        Visit Information        Provider Department      6/12/2018 2:20 PM Irish Fernandes MD [facility] [place]        Today's Diagnoses     Abdominal pain, right lower quadrant    -  1    Abdominal pain, left lower quadrant        Hyponatremia        Benign essential hypertension           Follow-ups after your visit        Follow-up notes from your care team     Return in about 2 weeks (around 6/26/2018) for BP Recheck - PROVIDER.      Future tests that were ordered for you today     Open Future Orders        Priority Expected Expires Ordered    US Pelvic Complete w Transvaginal STAT  6/12/2019 6/12/2018            Who to contact     If you have questions or need follow up information about today's clinic visit or your schedule please contact [facility] [place] directly at [phone].  Normal or non-critical lab and imaging results will be communicated to you by MyChart, letter or phone within 4 business days after the clinic has received the results. If you do not hear from us within 7 days, please contact the clinic through [facility] or phone. If you have a critical or abnormal lab result, we will notify you by phone as soon as possible.  Submit refill requests through [employer] or call your pharmacy and they will forward the refill request to us. Please allow 3 business days for your refill to be completed.          Additional Information About Your Visit        MyChart Information     [employer] gives you secure access to your electronic health record. If you see a primary care provider, you can also send messages to your care team and make appointments. If you have questions, please call your primary care clinic.  If you do not have a primary care provider, please call [phone] and they will assist you.        Care EveryWhere ID      "This is your Care EveryWhere ID. This could be used by other organizations to access your [facility] medical records  [id number]        Your Vitals Were     Pulse Temperature Height Pulse Oximetry BMI (Body Mass Index)       71 97.7  F (36.5  C) (Tympanic) 5' 3\" (1.6 m) 98% 27.74 kg/m2        Blood Pressure from Last 3 Encounters:   06/12/18 (!) 130/110   08/14/17 124/86   05/05/17 100/70    Weight from Last 3 Encounters:   06/12/18 156 lb 9.6 oz (71 kg)   08/14/17 154 lb (69.9 kg)   05/05/17 155 lb (70.3 kg)              We Performed the Following     *UA reflex to Microscopic and Culture ([place] and [facility] (except [place] and [place])     CBC with platelets     Comprehensive metabolic panel        Primary Care Provider Office Phone # Fax #    Irish Fernandes [phone]        [address]  [place] [address]        Equal Access to Services     [facility]: Hadii aad ku hadasho Soomaali, waaxda luqadaha, qaybta kaalmada adeegyada, waxay amanda hayefren rainey . So [facility] [phone].    ATENCIÓN: Si habla español, tiene a jenkins disposición servicios gratuitos de asistencia lingüística. Llame al [phone].    We comply with applicable federal civil rights laws and [place] laws. We do not discriminate on the basis of race, color, national origin, age, disability, sex, sexual orientation, or gender identity.            Thank you!     Thank you for choosing [facility] [place]  for your care. Our goal is always to provide you with excellent care. Hearing back from our patients is one way we can continue to improve our services. Please take a few minutes to complete the written survey that you may receive in the mail after your visit with us. Thank you!             Your Updated Medication List - Protect others around you: Learn how to safely use, store and throw away your medicines at www.disposemymeds.org.          This list is accurate as of 6/12/18  " 3:56 PM.  Always use your most recent med list.                   Brand Name Dispense Instructions for use Diagnosis    CLARITIN 10 MG tablet   Generic drug:  loratadine     90 tablet    Take 1 tablet (10 mg) by mouth daily        clonazePAM 0.5 MG ODT tab    klonoPIN    30 tablet    DISSOLVE ONE TABLET BY MOUTH NIGHTLY AS NEEDED FOR ANXIETY    Adjustment disorder with anxious mood       hydroxychloroquine 200 MG tablet    PLAQUENIL    90 tablet    Take 2 tablets (400 mg) by mouth daily    Rheumatoid arthritis involving multiple sites with positive rheumatoid factor (H)       IMODIUM A-D 2 MG tablet   Generic drug:  loperamide      1 TABLET AS NEEDED        levothyroxine 75 MCG tablet    SYNTHROID/LEVOTHROID    90 tablet    TAKE 1 TABLET BY MOUTH EVERY MORNING    Acquired hypothyroidism       liothyronine 5 MCG tablet    CYTOMEL    180 tablet    TAKE 2 TABLETS BY MOUTH EVERY DAY    Hypothyroidism       losartan-hydrochlorothiazide 100-12.5 MG per tablet    HYZAAR    90 tablet    TAKE 1 TABLET BY MOUTH DAILY    Essential hypertension, benign       meclizine 25 MG tablet    ANTIVERT    30 tablet    Take 1 tablet (25 mg) by mouth every 6 hours as needed for dizziness    Vertigo       metoprolol succinate 50 MG 24 hr tablet    TOPROL-XL    180 tablet    TAKE 2 TABLETS BY MOUTH EVERY DAY    Essential hypertension, benign       Multi-vitamin Tabs tablet   Generic drug:  multivitamin, therapeutic with minerals      1 TABLET DAILY        TURMERIC PO           VITAMIN D3 PO      Take 2,000 Units by mouth

## 2018-06-12 NOTE — PROGRESS NOTES
Labs concerning for transaminitis.  Additional workup revealed an elevated ferritin and iron saturation index.  This is concerning for iron overload and hemochromatosis.  Referral has been placed to hematology.    SUBJECTIVE:   Irina Hanks is a 58 year old female who presents to clinic today for the following health issues:    Concern - Ovarian cysts  Onset: a week ago    Description:     Pt reported to have ovarian cyst burst on the left and right ride and it has been more consistent. Right side is more sharp and left side is throbbing ain every 5 secends    Intensity: mild    Progression of Symptoms:  Morning is worst. Pain level is 5/10    Accompanying Signs & Symptoms:  None    Previous history of similar problem:   None    Precipitating factors:   Worsened by: None    Alleviating factors:  Improved by: None    Therapies Tried and outcome: pt takes one tylenol    Sharp right sided pain in her RLQ that started yesterday. The pain was intermittent. Today se woke up with pain in the left lower quadrant that is also sharp and happening every 5-10 seconds. Normal bowel movements, no nausea, no fevers. She used to have ovarian cysts so thought that's what this was.    Problem list and histories reviewed & adjusted, as indicated.  Additional history: as documented    Patient Active Problem List   Diagnosis     Hypothyroidism     Absence of menstruation     IBS (irritable bowel syndrome)     Hyperlipidemia LDL goal <130     Hypertension goal BP (blood pressure) < 130/80     Impaired renal function     Overweight (BMI 25.0-29.9)     Anxiety     Postmenopausal symptoms     Inflammatory arthritis     Osteoarthritis of first carpometacarpal joint     Microscopic colitis     Seasonal allergic rhinitis     Vitamin D deficiency     RA (rheumatoid arthritis) (H)     ASCUS favor benign     Shortness of breath     Essential hypertension, benign     Acquired hypothyroidism     Symptomatic menopausal or female climacteric  "states     Tricuspid regurgitation     S/P myringotomy with insertion of tube     Vasomotor rhinitis     Controlled substance agreement signed     Abdominal pain, right lower quadrant     Psychophysiological insomnia     Adjustment disorder with anxious mood     Past Surgical History:   Procedure Laterality Date     BIOPSY BREAST       C/SECTION, LOW TRANSVERSE      twins     COLONOSCOPY  2013    nl, next one due in 5 years     LEEP TX, CERVICAL  1990s    normal since that time     MYRINGOTOMY, INSERT TUBE, COMBINED Left 4/2015    chronic NORM, ETD       Social History   Substance Use Topics     Smoking status: Former Smoker     Packs/day: 0.26     Years: 10.00     Types: Cigarettes     Quit date: 4/26/1989     Smokeless tobacco: Never Used     Alcohol use 0.0 oz/week     0 Standard drinks or equivalent per week      Comment: 2 drinks per day     Family History   Problem Relation Age of Onset     Cancer - colorectal Father      age 50     Cancer - colorectal Brother      age 50     Hypertension Sister      Hypertension Brother      Arthritis Mother      Thyroid Disease Sister      Thyroid Disease Brother      Arthritis Sister      CANCER Sister 53     Uterine     CANCER Sister 50     Uterine           Reviewed and updated as needed this visit by clinical staff  Allergies       Reviewed and updated as needed this visit by Provider         ROS:  Constitutional, HEENT, cardiovascular, pulmonary, gi and gu systems are negative, except as otherwise noted.    OBJECTIVE:     BP (!) 130/110 (BP Location: Left arm)  Pulse 71  Temp 97.7  F (36.5  C) (Tympanic)  Ht 5' 3\" (1.6 m)  Wt 156 lb 9.6 oz (71 kg)  SpO2 98%  BMI 27.74 kg/m2  Body mass index is 27.74 kg/(m^2).  GENERAL: healthy, alert and no distress  RESP: lungs clear to auscultation - no rales, rhonchi or wheezes  CV: regular rate and rhythm, normal S1 S2, no S3 or S4, no murmur, click or rub, no peripheral edema and peripheral pulses strong  ABDOMEN: soft, " mildly tender in both right and left lower quadrants, no hepatosplenomegaly, no masses and bowel sounds normal    Diagnostic Test Results:  Results for orders placed or performed in visit on 06/12/18 (from the past 24 hour(s))   *UA reflex to Microscopic and Culture ([facility] (except [place] and [place])   Result Value Ref Range    Color Urine Yellow     Appearance Urine Clear     Glucose Urine Negative NEG^Negative mg/dL    Bilirubin Urine Negative NEG^Negative    Ketones Urine Negative NEG^Negative mg/dL    Specific Gravity Urine 1.010 1.003 - 1.035    Blood Urine Negative NEG^Negative    pH Urine 6.0 5.0 - 7.0 pH    Protein Albumin Urine Negative NEG^Negative mg/dL    Urobilinogen Urine 0.2 0.2 - 1.0 EU/dL    Nitrite Urine Negative NEG^Negative    Leukocyte Esterase Urine Negative NEG^Negative    Source Midstream Urine    CBC with platelets   Result Value Ref Range    WBC 5.1 4.0 - 11.0 10e9/L    RBC Count 4.30 3.8 - 5.2 10e12/L    Hemoglobin 13.8 11.7 - 15.7 g/dL    Hematocrit 40.2 35.0 - 47.0 %    MCV 94 78 - 100 fl    MCH 32.1 26.5 - 33.0 pg    MCHC 34.3 31.5 - 36.5 g/dL    RDW 12.6 10.0 - 15.0 %    Platelet Count 201 150 - 450 10e9/L     Abdomen x-ray: Normal bowel gas pattern, moderate to large stool burden  ASSESSMENT/PLAN:   59 y/o female with hypertension presents with intermittent right and left lower quadrant pain. Her history does not suggest a particular cause but her x-ray does show a moderate amount of stool, suggesting that this may be gas pain related to constipation. She is worried about her ovaries, though I wouldn't expect ovarian cysts in a post-menopausal woman and her sudden onset of intermittent sharp pain is not typical of ovarian pain. Recommending miralax to clean out her colon over the next 24-48 hours. If no improvement in her pain she can go ahead and schedule her pelvic ultrasound.      1. Abdominal pain, right lower quadrant  - Comprehensive metabolic panel  -  CBC with platelets  - US Pelvic Complete w Transvaginal; Future  - XR Abdomen 2 Views; Future  - *UA reflex to Microscopic and Culture ([facility] (except [place] and [place])    2. Abdominal pain, left lower quadrant  - US Pelvic Complete w Transvaginal; Future  - XR Abdomen 2 Views; Future  - *UA reflex to Microscopic and Culture ([facility] (except [place] and [place])    3. Hyponatremia: last sodium 131 mg/dl. Likely due to HCTZ. Rechecking today but may need alternative BP treatment.     4. Hypertension: BP quite high, though patient is very anxious and in some pain. She just found out she has gluacoma and is very worried about this. Will have her come back for BP check in 2 weeks.     Follow up in 2 weeks, or sooner if needed.    Irish Fernandes MD  [facility] [place]

## 2018-06-14 ENCOUNTER — TELEPHONE (OUTPATIENT)
Dept: FAMILY MEDICINE | Facility: CLINIC | Age: 58
End: 2018-06-14

## 2018-06-14 DIAGNOSIS — R74.01 TRANSAMINITIS: Primary | ICD-10-CM

## 2018-06-14 LAB
ALBUMIN SERPL-MCNC: 4.3 G/DL (ref 3.4–5)
ALP SERPL-CCNC: 78 U/L (ref 40–150)
ALT SERPL W P-5'-P-CCNC: 240 U/L (ref 0–50)
ANION GAP SERPL CALCULATED.3IONS-SCNC: 9 MMOL/L (ref 3–14)
AST SERPL W P-5'-P-CCNC: 136 U/L (ref 0–45)
BILIRUB SERPL-MCNC: 0.8 MG/DL (ref 0.2–1.3)
BUN SERPL-MCNC: 12 MG/DL (ref 7–30)
CALCIUM SERPL-MCNC: 9.3 MG/DL (ref 8.5–10.1)
CHLORIDE SERPL-SCNC: 94 MMOL/L (ref 94–109)
CO2 SERPL-SCNC: 27 MMOL/L (ref 20–32)
CREAT SERPL-MCNC: 0.76 MG/DL (ref 0.52–1.04)
GFR SERPL CREATININE-BSD FRML MDRD: 78 ML/MIN/1.7M2
GLUCOSE SERPL-MCNC: 87 MG/DL (ref 70–99)
POTASSIUM SERPL-SCNC: 3.6 MMOL/L (ref 3.4–5.3)
PROT SERPL-MCNC: 8.6 G/DL (ref 6.8–8.8)
SODIUM SERPL-SCNC: 130 MMOL/L (ref 133–144)

## 2018-06-14 NOTE — TELEPHONE ENCOUNTER
Triage - Please let Peg know that I received her lab results and her liver enzymes are elevated. Her ALT is 240 (Normal is up to 50) and her AST is 136 (normal is up to 45). These were last checked two years ago and were normal. I would like to do some additional laboratory testing and also a liver ultrasound. I've ordered the ultrasound to be done at [facility].     Routing to team to help schedule Ultrasound at [facility] and schedule patient for a lab only appointment.

## 2018-06-14 NOTE — TELEPHONE ENCOUNTER
ODALYSI:  TC did conference call to [facility] with patient on the phone  Patient is scheduled as a read and call for US Abdomen Limited (nothing to eat/drink for 8 hrs)  Monday June 18th @ 9:05 AM    @ 11:15AM for the US Pelvic Complete w Transvaginal (has to drink so much water)  Patient wanted to do them both    Jess REINOSO

## 2018-06-14 NOTE — TELEPHONE ENCOUNTER
Spoke with patient and informed of below. Lab appointment scheduled for tomorrow. Routing to team to assist with [facility] US appointment.   Keyla Hardin RN   [facility] - Triage

## 2018-06-15 ENCOUNTER — TELEPHONE (OUTPATIENT)
Dept: FAMILY MEDICINE | Facility: CLINIC | Age: 58
End: 2018-06-15

## 2018-06-15 DIAGNOSIS — R74.01 TRANSAMINITIS: ICD-10-CM

## 2018-06-15 PROCEDURE — 86708 HEPATITIS A ANTIBODY: CPT | Performed by: INTERNAL MEDICINE

## 2018-06-15 PROCEDURE — 83540 ASSAY OF IRON: CPT | Performed by: INTERNAL MEDICINE

## 2018-06-15 PROCEDURE — 82728 ASSAY OF FERRITIN: CPT | Performed by: INTERNAL MEDICINE

## 2018-06-15 PROCEDURE — 86704 HEP B CORE ANTIBODY TOTAL: CPT | Performed by: INTERNAL MEDICINE

## 2018-06-15 PROCEDURE — 83550 IRON BINDING TEST: CPT | Performed by: INTERNAL MEDICINE

## 2018-06-15 PROCEDURE — 36415 COLL VENOUS BLD VENIPUNCTURE: CPT | Performed by: INTERNAL MEDICINE

## 2018-06-15 PROCEDURE — 86706 HEP B SURFACE ANTIBODY: CPT | Performed by: INTERNAL MEDICINE

## 2018-06-15 PROCEDURE — 87340 HEPATITIS B SURFACE AG IA: CPT | Performed by: INTERNAL MEDICINE

## 2018-06-15 PROCEDURE — 86803 HEPATITIS C AB TEST: CPT | Performed by: INTERNAL MEDICINE

## 2018-06-15 PROCEDURE — 80076 HEPATIC FUNCTION PANEL: CPT | Performed by: INTERNAL MEDICINE

## 2018-06-16 LAB
ALBUMIN SERPL-MCNC: 4 G/DL (ref 3.4–5)
ALP SERPL-CCNC: 82 U/L (ref 40–150)
ALT SERPL W P-5'-P-CCNC: 203 U/L (ref 0–50)
AST SERPL W P-5'-P-CCNC: 114 U/L (ref 0–45)
BILIRUB DIRECT SERPL-MCNC: 0.3 MG/DL (ref 0–0.2)
BILIRUB SERPL-MCNC: 0.9 MG/DL (ref 0.2–1.3)
FERRITIN SERPL-MCNC: 1140 NG/ML (ref 8–252)
IRON SATN MFR SERPL: 66 % (ref 15–46)
IRON SERPL-MCNC: 138 UG/DL (ref 35–180)
PROT SERPL-MCNC: 8.6 G/DL (ref 6.8–8.8)
TIBC SERPL-MCNC: 208 UG/DL (ref 240–430)

## 2018-06-18 ENCOUNTER — TRANSFERRED RECORDS (OUTPATIENT)
Dept: HEALTH INFORMATION MANAGEMENT | Facility: CLINIC | Age: 58
End: 2018-06-18

## 2018-06-18 ENCOUNTER — MYC MEDICAL ADVICE (OUTPATIENT)
Dept: FAMILY MEDICINE | Facility: CLINIC | Age: 58
End: 2018-06-18

## 2018-06-18 ENCOUNTER — TELEPHONE (OUTPATIENT)
Dept: FAMILY MEDICINE | Facility: CLINIC | Age: 58
End: 2018-06-18

## 2018-06-18 DIAGNOSIS — R76.8 HEPATITIS A ANTIBODY POSITIVE: Primary | ICD-10-CM

## 2018-06-18 LAB
HAV IGG SER QL IA: REACTIVE
HBV CORE AB SERPL QL IA: NONREACTIVE
HBV SURFACE AB SERPL IA-ACNC: 0.01 M[IU]/ML
HBV SURFACE AG SERPL QL IA: NONREACTIVE
HCV AB SERPL QL IA: NONREACTIVE

## 2018-06-18 PROCEDURE — 84450 TRANSFERASE (AST) (SGOT): CPT | Performed by: INTERNAL MEDICINE

## 2018-06-18 PROCEDURE — 86708 HEPATITIS A ANTIBODY: CPT | Performed by: INTERNAL MEDICINE

## 2018-06-18 PROCEDURE — 36415 COLL VENOUS BLD VENIPUNCTURE: CPT | Performed by: INTERNAL MEDICINE

## 2018-06-18 PROCEDURE — 84460 ALANINE AMINO (ALT) (SGPT): CPT | Performed by: INTERNAL MEDICINE

## 2018-06-18 PROCEDURE — 86709 HEPATITIS A IGM ANTIBODY: CPT | Performed by: INTERNAL MEDICINE

## 2018-06-18 NOTE — TELEPHONE ENCOUNTER
Spoke with patient regarding her lab results.  There is evidence of iron overload with an elevated ferritin and iron saturation index.  However, patient's hepatitis A antibody has also come back positive indicating the presence of an infection in the past 3-6 months.  She does not recall any GI illness but will come back in today for testing of a hepatitis A antibody IgM.  I have already referred her to hematology for evaluation of iron overload.  She is completing her liver ultrasound today and I will call her with those results.

## 2018-06-18 NOTE — TELEPHONE ENCOUNTER
Results received from [facility] and given to provider to review.  abdominal US, Limited & Pelvis US, Complete and Transvaginal  Jess REINOSO

## 2018-06-19 LAB
ALT SERPL W P-5'-P-CCNC: 179 U/L (ref 0–50)
AST SERPL W P-5'-P-CCNC: 114 U/L (ref 0–45)
HAV IGG SER QL IA: REACTIVE
HAV IGM SERPL QL IA: NONREACTIVE

## 2018-06-20 ENCOUNTER — TELEPHONE (OUTPATIENT)
Dept: ONCOLOGY | Facility: CLINIC | Age: 58
End: 2018-06-20

## 2018-06-20 ENCOUNTER — TELEPHONE (OUTPATIENT)
Dept: FAMILY MEDICINE | Facility: CLINIC | Age: 58
End: 2018-06-20

## 2018-06-20 NOTE — TELEPHONE ENCOUNTER
Patient states that someone called her from the clinic. I could not find who had called her. Patient has read all My Chart messages related to her lab work.   US results have been scanned into patient's chart. Informed the patient that they are normal.    Anything else, please advise.  Carmen Lopez RN

## 2018-06-20 NOTE — TELEPHONE ENCOUNTER
Patient called this afternoon c/o of some stabbing abdominal pain wanting to be advised. The pain is intermittent shooting/stabbing pain that is actually on both sides of abdomen/ flank area without nausea or vomiting. I spoke with Dr. Rossi and have added patient onto his schedule for tomorrow.    Patient appreciated the appointment and is aware of ouir location. Danielle Newman

## 2018-06-21 ENCOUNTER — ONCOLOGY VISIT (OUTPATIENT)
Dept: ONCOLOGY | Facility: CLINIC | Age: 58
End: 2018-06-21
Attending: INTERNAL MEDICINE
Payer: COMMERCIAL

## 2018-06-21 ENCOUNTER — HOSPITAL ENCOUNTER (OUTPATIENT)
Facility: CLINIC | Age: 58
Setting detail: SPECIMEN
Discharge: HOME | End: 2018-06-21
Attending: INTERNAL MEDICINE | Admitting: INTERNAL MEDICINE
Payer: COMMERCIAL

## 2018-06-21 ENCOUNTER — INFUSION THERAPY VISIT (OUTPATIENT)
Dept: INFUSION THERAPY | Facility: CLINIC | Age: 58
End: 2018-06-21
Attending: INTERNAL MEDICINE
Payer: COMMERCIAL

## 2018-06-21 VITALS
WEIGHT: 154.6 LBS | BODY MASS INDEX: 27.39 KG/M2 | SYSTOLIC BLOOD PRESSURE: 129 MMHG | OXYGEN SATURATION: 96 % | DIASTOLIC BLOOD PRESSURE: 90 MMHG | TEMPERATURE: 97.5 F | HEART RATE: 75 BPM | RESPIRATION RATE: 16 BRPM

## 2018-06-21 VITALS
HEART RATE: 70 BPM | TEMPERATURE: 98.2 F | RESPIRATION RATE: 16 BRPM | DIASTOLIC BLOOD PRESSURE: 82 MMHG | SYSTOLIC BLOOD PRESSURE: 111 MMHG

## 2018-06-21 DIAGNOSIS — E83.19 IRON OVERLOAD: Primary | ICD-10-CM

## 2018-06-21 DIAGNOSIS — R10.84 ABDOMINAL PAIN, GENERALIZED: ICD-10-CM

## 2018-06-21 LAB
BASOPHILS # BLD AUTO: 0.1 10E9/L (ref 0–0.2)
BASOPHILS NFR BLD AUTO: 1 %
DIFFERENTIAL METHOD BLD: NORMAL
EOSINOPHIL # BLD AUTO: 0.2 10E9/L (ref 0–0.7)
EOSINOPHIL NFR BLD AUTO: 4.6 %
ERYTHROCYTE [DISTWIDTH] IN BLOOD BY AUTOMATED COUNT: 12.5 % (ref 10–15)
FERRITIN SERPL-MCNC: 1028 NG/ML (ref 8–252)
HCT VFR BLD AUTO: 36 % (ref 35–47)
HGB BLD-MCNC: 12.8 G/DL (ref 11.7–15.7)
IMM GRANULOCYTES # BLD: 0 10E9/L (ref 0–0.4)
IMM GRANULOCYTES NFR BLD: 0.2 %
IRON SATN MFR SERPL: 37 % (ref 15–46)
IRON SERPL-MCNC: 78 UG/DL (ref 35–180)
LYMPHOCYTES # BLD AUTO: 1.7 10E9/L (ref 0.8–5.3)
LYMPHOCYTES NFR BLD AUTO: 32.9 %
MCH RBC QN AUTO: 32.7 PG (ref 26.5–33)
MCHC RBC AUTO-ENTMCNC: 35.6 G/DL (ref 31.5–36.5)
MCV RBC AUTO: 92 FL (ref 78–100)
MONOCYTES # BLD AUTO: 0.8 10E9/L (ref 0–1.3)
MONOCYTES NFR BLD AUTO: 16.1 %
NEUTROPHILS # BLD AUTO: 2.3 10E9/L (ref 1.6–8.3)
NEUTROPHILS NFR BLD AUTO: 45.2 %
NRBC # BLD AUTO: 0 10*3/UL
NRBC BLD AUTO-RTO: 0 /100
PLATELET # BLD AUTO: 204 10E9/L (ref 150–450)
RBC # BLD AUTO: 3.91 10E12/L (ref 3.8–5.2)
RETICS # AUTO: 93.4 10E9/L (ref 25–95)
RETICS/RBC NFR AUTO: 2.4 % (ref 0.5–2)
TIBC SERPL-MCNC: 209 UG/DL (ref 240–430)
WBC # BLD AUTO: 5.2 10E9/L (ref 4–11)

## 2018-06-21 PROCEDURE — G0463 HOSPITAL OUTPT CLINIC VISIT: HCPCS

## 2018-06-21 PROCEDURE — 82728 ASSAY OF FERRITIN: CPT | Performed by: INTERNAL MEDICINE

## 2018-06-21 PROCEDURE — 99195 PHLEBOTOMY: CPT

## 2018-06-21 PROCEDURE — 85045 AUTOMATED RETICULOCYTE COUNT: CPT | Performed by: INTERNAL MEDICINE

## 2018-06-21 PROCEDURE — 81256 HFE GENE: CPT | Performed by: INTERNAL MEDICINE

## 2018-06-21 PROCEDURE — 99204 OFFICE O/P NEW MOD 45 MIN: CPT | Performed by: INTERNAL MEDICINE

## 2018-06-21 PROCEDURE — 85025 COMPLETE CBC W/AUTO DIFF WBC: CPT | Performed by: INTERNAL MEDICINE

## 2018-06-21 PROCEDURE — 83540 ASSAY OF IRON: CPT | Performed by: INTERNAL MEDICINE

## 2018-06-21 PROCEDURE — 83550 IRON BINDING TEST: CPT | Performed by: INTERNAL MEDICINE

## 2018-06-21 ASSESSMENT — PAIN SCALES - GENERAL
PAINLEVEL: NO PAIN (0)
PAINLEVEL: MILD PAIN (2)

## 2018-06-21 NOTE — MR AVS SNAPSHOT
After Visit Summary   6/21/2018    Irina Hanks    MRN: [id number]           Patient Information     Date Of Birth          1960        Visit Information        Provider Department      6/21/2018 11:00 AM Eloisa Rossi MD [facility]        Today's Diagnoses     Iron overload    -  1    Abdominal pain, generalized          Care Instructions    Labs today.-done DT  Phlebotomy of one unit of blood today.  CT abdomen and pelvis.   Scheduled for  6/25/18   Follow up after above.  Scheduled - Rhiannon  Recheck BP.-Done DT    AVS given to patient - Rhiannon          Follow-ups after your visit        Your next 10 appointments already scheduled     Jul 02, 2018 11:00 AM CDT   Level 1 with  INFUSION CHAIR 11   [facility] ([facility])    [facility] [place] [id number]  [place] [address]   [phone]            Jul 16, 2018 11:30 AM CDT   Level 1 with  INFUSION CHAIR 5   [facility] ([facility])    [facility]  [address]  [place] [address]   [phone]            Jul 30, 2018  1:00 PM CDT   Level 1 with  INFUSION CHAIR 11   [facility] ([facility])    [facility]  [address]  [place] [address]   [phone]            Aug 13, 2018  1:00 PM CDT   Level 1 with SH INFUSION CHAIR 1   [facility] ([facility])    [facility]  [address]  [place] [address]   [phone]            Aug 27, 2018  1:40 PM CDT   Return Visit with Eloisa Rossi MD   [facility] ([facility])    [facility]  [address]  [place] [address]   [phone]              Who to contact     If you have questions or need  follow up information about today's clinic visit or your schedule please contact [facility] directly at [phone].  Normal or non-critical lab and imaging results will be communicated to you by BabyBushart, letter or phone within 4 business days after the clinic has received the results. If you do not hear from us within 7 days, please contact the clinic through BabyBushart or phone. If you have a critical or abnormal lab result, we will notify you by phone as soon as possible.  Submit refill requests through EasyCopay or call your pharmacy and they will forward the refill request to us. Please allow 3 business days for your refill to be completed.          Additional Information About Your Visit        BabyBusharWaveDeck Information     EasyCopay gives you secure access to your electronic health record. If you see a primary care provider, you can also send messages to your care team and make appointments. If you have questions, please call your primary care clinic.  If you do not have a primary care provider, please call [phone] and they will assist you.        Care EveryWhere ID     This is your Care EveryWhere ID. This could be used by other organizations to access your [facility] medical records  [id number]        Your Vitals Were     Pulse Temperature Respirations Pulse Oximetry BMI (Body Mass Index)       75 97.5  F (36.4  C) (Oral) 16 96% 27.39 kg/m2        Blood Pressure from Last 3 Encounters:   No data found for BP    Weight from Last 3 Encounters:   No data found for Wt              Today, you had the following     No orders found for display         Today's Medication Changes          These changes are accurate as of 6/21/18 11:59 PM.  If you have any questions, ask your nurse or doctor.               Stop taking these medicines if you haven't already. Please contact your care team if you have questions.     IMODIUM A-D 2 MG tablet   Generic drug:  loperamide   Stopped by:  Eloisa Rossi MD            Multi-vitamin Tabs tablet   Generic drug:  multivitamin, therapeutic with minerals   Stopped by:  Eloisa Rossi MD                    Primary Care Provider Office Phone # Fax #    Irish Fernandes [phone]       [address]  [place] [address]        Equal Access to Services     [facility]: Hadii aad ku hadasho Soomaali, waaxda luqadaha, qaybta kaalmada adeegyada, waxay idiin hayaan adeeg kharash la'aan . So [facility] [phone].    ATENCIÓN: Si habla español, tiene a jenkins disposición servicios gratuitos de asistencia lingüística. Llame al [phone].    We comply with applicable federal civil rights laws and [place] laws. We do not discriminate on the basis of race, color, national origin, age, disability, sex, sexual orientation, or gender identity.            Thank you!     Thank you for choosing [facility]  for your care. Our goal is always to provide you with excellent care. Hearing back from our patients is one way we can continue to improve our services. Please take a few minutes to complete the written survey that you may receive in the mail after your visit with us. Thank you!             Your Updated Medication List - Protect others around you: Learn how to safely use, store and throw away your medicines at www.disposemymeds.org.          This list is accurate as of 6/21/18 11:59 PM.  Always use your most recent med list.                   Brand Name Dispense Instructions for use Diagnosis    CLARITIN 10 MG tablet   Generic drug:  loratadine     90 tablet    Take 1 tablet (10 mg) by mouth daily        clonazePAM 0.5 MG ODT tab    klonoPIN    30 tablet    DISSOLVE ONE TABLET BY MOUTH NIGHTLY AS NEEDED FOR ANXIETY    Adjustment disorder with anxious mood       hydroxychloroquine 200 MG tablet    PLAQUENIL    90 tablet    Take 2 tablets (400 mg) by mouth daily    Rheumatoid arthritis involving multiple sites with positive rheumatoid factor (H)        levothyroxine 75 MCG tablet    SYNTHROID/LEVOTHROID    90 tablet    TAKE 1 TABLET BY MOUTH EVERY MORNING    Acquired hypothyroidism       liothyronine 5 MCG tablet    CYTOMEL    180 tablet    TAKE 2 TABLETS BY MOUTH EVERY DAY    Hypothyroidism       losartan-hydrochlorothiazide 100-12.5 MG per tablet    HYZAAR    90 tablet    TAKE 1 TABLET BY MOUTH DAILY    Essential hypertension, benign       meclizine 25 MG tablet    ANTIVERT    30 tablet    Take 1 tablet (25 mg) by mouth every 6 hours as needed for dizziness    Vertigo       metoprolol succinate 50 MG 24 hr tablet    TOPROL-XL    180 tablet    TAKE 2 TABLETS BY MOUTH EVERY DAY    Essential hypertension, benign       psyllium 0.52 g capsule      Take 1 capsule by mouth daily    Iron overload       TURMERIC PO           VITAMIN D3 PO      Take 2,000 Units by mouth

## 2018-06-21 NOTE — Clinical Note
"    6/21/2018         RE: Irina Hanks  [address]  [place] [address]        Dear Colleague,    Thank you for referring your patient, Irina Hanks, to the [facility]. Please see a copy of my visit note below.    Oncology Rooming Note    June 21, 2018 11:38 AM   Irina Hanks is a 58 year old female who presents for:    Chief Complaint   Patient presents with     Oncology Clinic Visit     Iron Overload      Initial Vitals: BP (!) 126/91 (BP Location: Left arm, Patient Position: Sitting, Cuff Size: Adult Regular)  Pulse 75  Temp 97.5  F (36.4  C) (Oral)  Resp 16  Wt 70.1 kg (154 lb 9.6 oz)  SpO2 96%  BMI 27.39 kg/m2 Estimated body mass index is 27.39 kg/(m^2) as calculated from the following:    Height as of 6/12/18: 1.6 m (5' 3\").    Weight as of this encounter: 70.1 kg (154 lb 9.6 oz). Body surface area is 1.77 meters squared.  Mild Pain (2) Comment: abdominal pain   No LMP recorded. Patient is postmenopausal.  Allergies reviewed: Yes  Medications reviewed: Yes    Medications: Medication refills not needed today.  Pharmacy name entered into Livestream:    [facility] [place] - [address] [id number] - [place] [address] [facility] [address] [place] [address] [address]  [place] - [address] [facility] OF [address] [place] [address]    Clinical concerns: None                    4 minutes for nursing intake (face to face time)     Jenny Hurst MA              Visit Date:   06/21/2018      REQUESTING PHYSICIAN:  Dr. Irish Fernandes MD       REASON FOR CONSULTATION:  Iron overload.      SUBJECTIVE:  Ms. Zhao is a 58-year-old female with multiple medical problems including colitis, rheumatoid arthritis and hypothyroidism. The patient recently was found to have elevated LFT.  That led to multiple investigations and she was found to have iron overload.  The patient has been " referred for iron overload.  Other labs are reviewed and summarized below.   1.  On 2015, CMP was normal with normal LFT.   2.  On 2016, ferritin of 506.   3.  Multiple labs were done on 2018.   -- CBC is normal.   -- CMP revealed elevated AST and ALT.  Normal bilirubin and alkaline phosphatase.  ALT of 214, AST of 136.   4.  Multiple labs were done on 06/15/2018     -- Iron of 138, ferritin of 1140 and saturation of 66%.   -- Hepatitis C antibody negative.   -- Hepatitis B negative.   -- Positive for hepatitis A IgG antibody.  Negative for hepatitis A IgM antibody.     5.  Ultrasound of abdomen and pelvis on 2018 is normal.      The patient has abdominal discomfort.  She points to the lower abdomen bilaterally as the area of discomfort.  Sometimes it goes up also.      The patient says that she never has been diagnosed to have any blood disorder.  She denies taking extra iron.        REVIEW OF SYSTEMS:  No headache.  No dizziness.  No chest pain.  She does get some shortness of breath on exertion.  She has been evaluated by Cardiology and pulmonologist before.  No nausea or vomiting.  No abdominal distention.  She gets intermittent abdominal pain.  No abdominal distention.  No urinary complaint.  Because of colitis is some bowel problem.  No blood in the urine or stool.  No fever, chills or night sweats.      All other review of systems negative.      ALLERGIES:  Reviewed.      MEDICATIONS:  Reviewed.      PAST MEDICAL HISTORY:   1.  Rheumatoid arthritis.   2.  Hypothyroidism.   3.  Microscopic colitis.   4.  Anxiety.   5.  Hypertension.   6.  Osteoarthritis.   7.  Seasonal allergic.   8.  Irritable bowel syndrome.   9.  Hyperlipidemia.   10.   twice.   11.  Ear tube placement.      SOCIAL HISTORY:   -- She drinks alcohol occasionally.   --  She quit smoking 30 years ago.  Before that, smoked occasionally.      FAMILY HISTORY:   -- Father  at age 76.  He had colon cancer.     --  Mother is 98 and living.  She has mild anemia.   --  The patient has 4 brothers and 2 sisters.  No family history of Singh syndrome.  The patient says that she does not have Singh syndrome.  There are multiple cancers in the siblings including colon cancer, kidney cancer and uterine cancer.      PHYSICAL EXAMINATION:   GENERAL:  The patient was alert, oriented x 3.   VITAL SIGNS:  Reviewed.   EYES:  No icterus.   THROAT:  No ulcer or thrush.   NECK:  Supple, no lymphadenopathy or thyromegaly.   AXILLAE:  Axilla no lymphadenopathy.   LUNGS:  Good air entry bilaterally.  No crackles.   HEART:  Regular.  No murmur.   ABDOMEN:  Soft.  Nontender.  No mass.   EXTREMITIES:  No edema.  No calf swelling or tenderness.   SKIN:  No rash.      LABORATORY DATA:  Reviewed.      ASSESSMENT:   1.  A 58-year-old female with iron overload.   2.  Elevated AST and ALT.   3.  Other medical problems including hypothyroidism, rheumatoid arthritis and colitis.      RECOMMENDATIONS:   1.  I had a long discussion with the patient.  Labs were all reviewed.  Iron studies are reviewed.  Iron metabolism was reviewed.  I explained to her that she has iron overload.  She may have hereditary hemochromatosis.      I also told the patient that ferritin can be an inflammatory marker.  It can be elevated in multiple conditions.  She does have rheumatoid arthritis which can also cause elevation.      For further workup, we will get some labs including repeat iron studies, CBC, retic and HFE  gene analysis.      2.  The patient has been having abdominal pain.  For further evaluation, we will get CT abdomen and pelvis.      3.  The patient's LFTs are elevated.  This could be due to iron overload.  Because of that, I am going to get phlebotomy of 1 unit of blood.  She may need further phlebotomy in future depending on labs including HFE gene analysis.        4.  She had multiple questions, which were all answered.  I will see her after the above  investigations are done.      Thanks for the consult.         RACHEL CHAPMAN MD             D: 2018   T: 2018   MT: CAROLINA      Name:     RO GARCÍA   MRN:      [id number]        Account:      [id number]   :      1960           Visit Date:   2018      Document: [id number]       Again, thank you for allowing me to participate in the care of your patient.        Sincerely,        Rachel Chapman MD

## 2018-06-21 NOTE — MR AVS SNAPSHOT
After Visit Summary   6/21/2018    Irina Hanks    MRN: [id number]           Patient Information     Date Of Birth          1960        Visit Information        Provider Department      6/21/2018 1:00 PM  INFUSION CHAIR 6 [facility]         Follow-ups after your visit        Your next 10 appointments already scheduled     Jun 25, 2018 10:00 AM CDT   CT ABDOMEN PELVIS W CONTRAST with SHCT1   [facility] CT ([facility])    [address] [place] [id number] [phone]           Please bring any scans or X-rays taken at other hospitals, if similar tests were done. Also bring a list of your medicines, including vitamins, minerals and over-the-counter drugs. It is safest to leave personal items at home.  Be sure to tell your doctor:   If you have any allergies.   If there s any chance you are pregnant.   If you are breastfeeding.  How to prepare:   Do not eat or drink for 2 hours before your exam. If you need to take medicine, you may take it with small sips of water. (We may ask you to take liquid medicine as well.)   Please wear loose clothing, such as a sweat suit or jogging clothes. Avoid snaps, zippers and other metal. We may ask you to undress and put on a hospital gown.  Please arrive 30 minutes early for your CT. Once in the department you might be asked to drink water 15-20 minutes prior to your exam.  If indicated you may be asked to drink an oral contrast in advance of your CT.  If this is the case, the imaging team will let you know or be in contact with you prior to your appointment  Patients over 70 or patients with diabetes or kidney problems:   If you haven t had a blood test (creatinine test) within the last 30 days, the Cardiologist/Radiologist may require you to get this test prior to your exam.  If you have diabetes:   Continue to take your metformin medication on the day of your exam  If you have any  questions, please call the Imaging Department where you will have your exam.            Jun 27, 2018 10:00 AM CDT   Return Visit with Eloisa Rossi MD   [facility] ([facility])    [facility] [place]  [address] [address] [phone]              Future tests that were ordered for you today     Open Future Orders        Priority Expected Expires Ordered    CT Abdomen Pelvis w Contrast Routine  6/21/2019 6/21/2018            Who to contact     If you have questions or need follow up information about today's clinic visit or your schedule please contact [facility] AND [facility] directly at [phone].  Normal or non-critical lab and imaging results will be communicated to you by Pathflowhart, letter or phone within 4 business days after the clinic has received the results. If you do not hear from us within 7 days, please contact the clinic through Pathflowhart or phone. If you have a critical or abnormal lab result, we will notify you by phone as soon as possible.  Submit refill requests through Guest of a Guest or call your pharmacy and they will forward the refill request to us. Please allow 3 business days for your refill to be completed.          Additional Information About Your Visit        PathflowharBallooning Nest Eggs Information     Guest of a Guest gives you secure access to your electronic health record. If you see a primary care provider, you can also send messages to your care team and make appointments. If you have questions, please call your primary care clinic.  If you do not have a primary care provider, please call [phone] and they will assist you.        Care EveryWhere ID     This is your Care EveryWhere ID. This could be used by other organizations to access your [facility] medical records  [id number]        Your Vitals Were     Pulse Temperature Respirations             70 98.2  F (36.8  C) (Oral) 16          Blood Pressure from Last 3 Encounters:    06/21/18 111/82   06/21/18 129/90   06/12/18 (!) 130/110    Weight from Last 3 Encounters:   06/21/18 70.1 kg (154 lb 9.6 oz)   06/12/18 71 kg (156 lb 9.6 oz)   08/14/17 69.9 kg (154 lb)              Today, you had the following     No orders found for display         Today's Medication Changes          These changes are accurate as of 6/21/18  1:49 PM.  If you have any questions, ask your nurse or doctor.               Stop taking these medicines if you haven't already. Please contact your care team if you have questions.     IMODIUM A-D 2 MG tablet   Generic drug:  loperamide   Stopped by:  Eloisa Rossi MD           Multi-vitamin Tabs tablet   Generic drug:  multivitamin, therapeutic with minerals   Stopped by:  Eloisa Rossi MD                    Primary Care Provider Office Phone # Fax #    Irish Shaista Fernandes [phone]       [address] [place] [facility] [place] [address]        Equal Access to Services     [facility]: Hadii aad ku hadasho Soomaali, waaxda luqadaha, qaybta kaalmada adeegyada, waxay amanda hayefren rainey . So [facility] [phone].    ATENCIÓN: Si habla español, tiene a jenkins disposición servicios gratuitos de asistencia lingüística. Llame al [phone].    We comply with applicable federal civil rights laws and [place] laws. We do not discriminate on the basis of race, color, national origin, age, disability, sex, sexual orientation, or gender identity.            Thank you!     Thank you for choosing [facility] AND [facility]  for your care. Our goal is always to provide you with excellent care. Hearing back from our patients is one way we can continue to improve our services. Please take a few minutes to complete the written survey that you may receive in the mail after your visit with us. Thank you!             Your Updated Medication List - Protect others around you: Learn how to safely use, store and throw away your medicines at  www.disposemymeds.org.          This list is accurate as of 6/21/18  1:49 PM.  Always use your most recent med list.                   Brand Name Dispense Instructions for use Diagnosis    CLARITIN 10 MG tablet   Generic drug:  loratadine     90 tablet    Take 1 tablet (10 mg) by mouth daily        clonazePAM 0.5 MG ODT tab    klonoPIN    30 tablet    DISSOLVE ONE TABLET BY MOUTH NIGHTLY AS NEEDED FOR ANXIETY    Adjustment disorder with anxious mood       hydroxychloroquine 200 MG tablet    PLAQUENIL    90 tablet    Take 2 tablets (400 mg) by mouth daily    Rheumatoid arthritis involving multiple sites with positive rheumatoid factor (H)       levothyroxine 75 MCG tablet    SYNTHROID/LEVOTHROID    90 tablet    TAKE 1 TABLET BY MOUTH EVERY MORNING    Acquired hypothyroidism       liothyronine 5 MCG tablet    CYTOMEL    180 tablet    TAKE 2 TABLETS BY MOUTH EVERY DAY    Hypothyroidism       losartan-hydrochlorothiazide 100-12.5 MG per tablet    HYZAAR    90 tablet    TAKE 1 TABLET BY MOUTH DAILY    Essential hypertension, benign       meclizine 25 MG tablet    ANTIVERT    30 tablet    Take 1 tablet (25 mg) by mouth every 6 hours as needed for dizziness    Vertigo       metoprolol succinate 50 MG 24 hr tablet    TOPROL-XL    180 tablet    TAKE 2 TABLETS BY MOUTH EVERY DAY    Essential hypertension, benign       psyllium 0.52 g capsule      Take 1 capsule by mouth daily    Iron overload       TURMERIC PO           VITAMIN D3 PO      Take 2,000 Units by mouth

## 2018-06-21 NOTE — PROGRESS NOTES
Infusion Nursing Note:  Irina Hanks presents today for labs, phlebotomy.    Patient seen by provider today: Yes: Dr. Rossi   present during visit today: Not Applicable.    Note: N/A.    Intravenous Access:  Labs drawn without difficulty.  Phlebotomy performed with 3 attempts.  500cc whole blood removed from left FA with 17G straight needle.    Treatment Conditions:  Not Applicable.      Post Infusion Assessment:  Patient tolerated infusion without incident.  Site patent and intact, free from redness, edema or discomfort.  Access discontinued per protocol.    Discharge Plan:   Patient declined prescription refills.  Discharge instructions reviewed with: Patient.  Patient and/or family verbalized understanding of discharge instructions and all questions answered.  AVS to patient via TeachBoostT.  Patient will return 6/27/18 for next appointment.   Patient discharged in stable condition accompanied by: self.  Departure Mode: Ambulatory.    Luci Bruno RN

## 2018-06-21 NOTE — PROGRESS NOTES
"Oncology Rooming Note    June 21, 2018 11:38 AM   Irina Hanks is a 58 year old female who presents for:    Chief Complaint   Patient presents with     Oncology Clinic Visit     Iron Overload      Initial Vitals: BP (!) 126/91 (BP Location: Left arm, Patient Position: Sitting, Cuff Size: Adult Regular)  Pulse 75  Temp 97.5  F (36.4  C) (Oral)  Resp 16  Wt 70.1 kg (154 lb 9.6 oz)  SpO2 96%  BMI 27.39 kg/m2 Estimated body mass index is 27.39 kg/(m^2) as calculated from the following:    Height as of 6/12/18: 1.6 m (5' 3\").    Weight as of this encounter: 70.1 kg (154 lb 9.6 oz). Body surface area is 1.77 meters squared.  Mild Pain (2) Comment: abdominal pain   No LMP recorded. Patient is postmenopausal.  Allergies reviewed: Yes  Medications reviewed: Yes    Medications: Medication refills not needed today.  Pharmacy name entered into NEURONIX:    [facility] [place] [address] [id number] - [place] [address] AT [facility] [place] [address] [place] [address] DRUG STORE [id number] - [facility]ILLER - [address] [facility] AT [facility] OF [address] [place] [address]    Clinical concerns: None                    4 minutes for nursing intake (face to face time)     Jenny Hurst MA            "

## 2018-06-21 NOTE — PATIENT INSTRUCTIONS
Labs today.-done DT  Phlebotomy of one unit of blood today.  CT abdomen and pelvis.   Scheduled for  6/25/18   Follow up after above.  Scheduled - Rhiannon  Recheck BP.-Done DT    AVS given to patient - Rhiannon

## 2018-06-22 NOTE — PROGRESS NOTES
Visit Date:   06/21/2018      REQUESTING PHYSICIAN:  Dr. Irish Fernandes MD       REASON FOR CONSULTATION:  Iron overload.      Ms. Zhao is a 58-year-old female with multiple medical problems including colitis, rheumatoid arthritis and hypothyroidism. Patient recently was found to have elevated LFT.  That led to multiple investigations and she was found to have iron overload.  Labs are reviewed and summarized below.   1.  On 11/13/2015, CMP was normal with normal LFT.   2.  On 06/15/2016, ferritin of 506.   3.  Multiple labs were done on 06/12/2018.   -CBC is normal.   -CMP revealed elevated AST and ALT.  Normal bilirubin and alkaline phosphatase.  ALT of 240 and AST of 136.   4.  Multiple labs were done on 06/15/2018     -Iron of 138, ferritin of 1140 and saturation of 66%.   -Hepatitis C antibody negative.   -Hepatitis B negative.   -Positive for hepatitis A IgG antibody.  Negative for hepatitis A IgM antibody.     5.  Ultrasound of abdomen and pelvis on 06/18/2018 is normal.      Patient has abdominal discomfort.  She points to the lower abdomen bilaterally as the area of discomfort.       Patient denies any history of blood disorder.  She denies taking extra iron.        REVIEW OF SYSTEMS:   No headache.  No dizziness.  No chest pain.  She gets some shortness of breath on exertion.  She has been evaluated by Cardiology and pulmonologist.  No nausea or vomiting.  No abdominal distention.  She gets intermittent abdominal pain.  No abdominal distention.  No urinary complaint.  Because of colitis, she has some bowel problem.  No blood in the urine or stool.  No fever, chills or night sweats.      All other review of systems negative.      ALLERGIES:  Reviewed.      MEDICATIONS:  Reviewed.      PAST MEDICAL HISTORY:   1.  Rheumatoid arthritis.   2.  Hypothyroidism.   3.  Microscopic colitis.   4.  Anxiety.   5.  Hypertension.   6.  Osteoarthritis.   7.  Seasonal allergic.   8.  Irritable bowel syndrome.   9.   Hyperlipidemia.   10.   twice.   11.  Ear tube placement.      SOCIAL HISTORY:   -She drinks alcohol occasionally.   -She quit smoking 30 years ago.  Before that, smoked occasionally.      FAMILY HISTORY:   -Father  at age 76.  He had colon cancer.     -Mother is 98 and living.  She has mild anemia.   -Patient has 4 brothers and 2 sisters.  There is family history of Singh syndrome.  Patient says that she does not have Singh syndrome.  There are multiple cancers in the siblings including colon cancer, kidney cancer and uterine cancer.      PHYSICAL EXAMINATION:   GENERAL:  Patient was alert and oriented x 3.   VITAL SIGNS:  Reviewed.   EYES:  No icterus.   THROAT:  No ulcer or thrush.   NECK:  Supple. No lymphadenopathy or thyromegaly.   AXILLAE:  No lymphadenopathy.   LUNGS:  Good air entry bilaterally.  No crackles.   HEART:  Regular.  No murmur.   ABDOMEN:  Soft.  Nontender.  No mass.   EXTREMITIES:  No edema.  No calf swelling or tenderness.   SKIN:  No rash.      LABORATORY DATA:  Reviewed.      ASSESSMENT:   1.  A 58-year-old female with iron overload.   2.  Elevated AST and ALT.   3.  Other medical problems including hypothyroidism, rheumatoid arthritis and colitis.      RECOMMENDATIONS:   1.  I had a long discussion with the patient.  Labs were all reviewed.  Iron studies are reviewed.  Iron metabolism was reviewed.  I explained to her that she has iron overload.  She may have hereditary hemochromatosis. I also told the patient that ferritin can be an inflammatory marker.  It can be elevated in multiple conditions.  She does have rheumatoid arthritis which can also cause elevation.      For further workup, we will get some labs including repeat iron studies, CBC, retic and HFE  gene analysis.      2.  Patient has been having abdominal pain.  For further evaluation, we will get CT abdomen and pelvis.      3.  Patient's LFTs are elevated.  This could be due to iron overload.  Because of that, I am  going to get phlebotomy of 1 unit of blood.  She may need further phlebotomy in future depending on labs including HFE gene analysis.        4.  She had multiple questions, which were all answered.  I will see her after the above investigations are done.      Thanks for the consult.         RACHEL CHAPMAN MD             D: 2018   T: 2018   MT: CAROLINA      Name:     RO GARCÍA   MRN:      [id number]        Account:      [id number]   :      1960           Visit Date:   2018      Document: [id number]

## 2018-06-25 ENCOUNTER — HOSPITAL ENCOUNTER (OUTPATIENT)
Dept: CT IMAGING | Facility: CLINIC | Age: 58
Discharge: HOME | End: 2018-06-25
Attending: INTERNAL MEDICINE | Admitting: INTERNAL MEDICINE
Payer: COMMERCIAL

## 2018-06-25 DIAGNOSIS — R10.84 ABDOMINAL PAIN, GENERALIZED: ICD-10-CM

## 2018-06-25 LAB — COPATH REPORT: NORMAL

## 2018-06-25 PROCEDURE — 40000141 ZZH STATISTIC PERIPHERAL IV START W/O US GUIDANCE

## 2018-06-25 PROCEDURE — 25000125 ZZHC RX 250: Performed by: INTERNAL MEDICINE

## 2018-06-25 PROCEDURE — 74177 CT ABD & PELVIS W/CONTRAST: CPT

## 2018-06-25 PROCEDURE — 25000128 H RX IP 250 OP 636: Performed by: INTERNAL MEDICINE

## 2018-06-25 RX ORDER — IOPAMIDOL 755 MG/ML
76 INJECTION, SOLUTION INTRAVASCULAR ONCE
Status: COMPLETED | OUTPATIENT
Start: 2018-06-25 | End: 2018-06-25

## 2018-06-25 RX ADMIN — SODIUM CHLORIDE 62 ML: 9 INJECTION, SOLUTION INTRAVENOUS at 11:24

## 2018-06-25 RX ADMIN — IOPAMIDOL 76 ML: 755 INJECTION, SOLUTION INTRAVENOUS at 11:23

## 2018-06-26 ENCOUNTER — ONCOLOGY VISIT (OUTPATIENT)
Dept: ONCOLOGY | Facility: CLINIC | Age: 58
End: 2018-06-26
Attending: INTERNAL MEDICINE
Payer: COMMERCIAL

## 2018-06-26 VITALS
OXYGEN SATURATION: 100 % | BODY MASS INDEX: 27.35 KG/M2 | SYSTOLIC BLOOD PRESSURE: 136 MMHG | HEART RATE: 70 BPM | TEMPERATURE: 97.8 F | RESPIRATION RATE: 16 BRPM | DIASTOLIC BLOOD PRESSURE: 86 MMHG | WEIGHT: 154.4 LBS

## 2018-06-26 DIAGNOSIS — E83.19 IRON OVERLOAD: Primary | ICD-10-CM

## 2018-06-26 PROCEDURE — G0463 HOSPITAL OUTPT CLINIC VISIT: HCPCS

## 2018-06-26 PROCEDURE — 99214 OFFICE O/P EST MOD 30 MIN: CPT | Performed by: INTERNAL MEDICINE

## 2018-06-26 ASSESSMENT — PAIN SCALES - GENERAL: PAINLEVEL: NO PAIN (0)

## 2018-06-26 NOTE — PROGRESS NOTES
Visit Date:   06/26/2018     HEMATOLOGY HISTORY: Mrs. Zhao is a female with iron ovreload. Homozygous for H63D mutation.   1.  On 11/13/2015, normal LFT.   2.  On 06/15/2016, ferritin of 506.   3.  Multiple labs were done on 06/12/2018.   -CBC is normal.   -CMP revealed elevated AST and ALT.  Normal bilirubin and alkaline phosphatase.  ALT of 240 and AST of 136.   4.  Multiple labs were done on 06/15/2018     -Iron of 138, ferritin of 1140 and saturation of 66%.   -Hepatitis C antibody negative.   -Hepatitis B negative.   -Positive for hepatitis A IgG antibody.  Negative for hepatitis A IgM antibody.     5.  Ultrasound of abdomen and pelvis on 06/18/2018 is normal.   6. Multiple labs were done on 06/21/2018.   -CBC is normal.   -Iron 78, saturation of 37 and ferritin of 1028.   -HFE gene analysis reveals patient to be homozygous for H63D mutation.   7. CT abdomen and pelvis on 06/25/2018 is normal.  Liver looks normal.   8. Because of elevated LFT, phlebotomy started on 06/21/2018.        SUBJECTIVE:  Mrs. Zhao is here for followup on the result of investigation.  She is a 58-year-old female who was recently seen for iron overload.  She also has elevated AST and ALT and some abdominal pain.  Multiple investigations were ordered.  Because of elevated LFT, patient had phlebotomy on 06/21/2018.   1.  Multiple labs were done on 06/21/2018.   -CBC is normal.   -Iron 78, saturation of 37 and ferritin of 1028.   -HFE gene analysis reveals patient to be homozygous for H63D mutation.   2.  CT abdomen and pelvis on 06/25/2018 is normal.  Liver looks normal.      Patient overall is doing well.  She has no new complaints.  No severe abdominal pain.  No nausea or vomiting.  No jaundice.  She is tolerated phlebotomy well.      ASSESSMENT:   1.  A 58-year-old female with iron overload.   2.  Homozygous for H63D mutation.   3.  Elevated AST and ALT secondary to iron overload.    4.  Other medical problems including  hypothyroidism, rheumatoid arthritis and colitis.      PLAN:   1.  I had a long discussion with the patient and her .  Labs were all reviewed.  CBC is normal.  Her ferritin is elevated but better.  Iron and percent saturation has decreased on its own. HFE gene analysis was reviewed.  She is homozygous for H63D.  This generally does not cause hemochromatosis.  It can lead to iron overload.  I told her that iron overload is secondary to this mutation. CT scan was reviewed.  It essentially looks normal.  Cause of abdominal pain from this is not clear.     2.  Discussed regarding iron overload. Patient has elevated AST and ALT secondary to it.  She got started on phlebotomy.  I told her we will continue phlebotomy every 2 weeks for the next 4 times.  Once her ferritin and LFTs are back to normal, we will do it about every 1-3 months depending on the ferritin level. Patient agreeable for this. I told the patient that she should do well from iron overload.  Main aim is to do phlebectomy to keep her ferritin level between .     3.  We discussed regarding liver biopsy to evaluate for iron overload.  I would not recommend liver biopsy.  We discussed regarding MRI of the liver.  After discussion, the plan is to do MRI of the liver if her LFT does not improve.  If her LFT, ferritin and iron all improves, we do not need to do an MRI of the liver.     4.  Patient had a few questions, which were all answered.  She will continue with phlebotomy.  I will see her back in about 2 months' time.  She will call with any questions or concerns.      Total time spent 25 minutes, most of time was spent in counseling and coordination of care.         RACHEL CHAPMAN MD             D: 2018   T: 2018   MT: KHALIDA      Name:     RO GARCÍA   MRN:      [id number]        Account:      [id number]   :      1960           Visit Date:   2018      Document: [id number]

## 2018-06-26 NOTE — Clinical Note
"    6/26/2018         RE: Irina aHnks  [address]  [place] [address]        Dear Colleague,    Thank you for referring your patient, Irina Hanks, to the [facility]. Please see a copy of my visit note below.    Oncology Rooming Note    June 26, 2018 10:11 AM   Irina Hanks is a 58 year old female who presents for:    Chief Complaint   Patient presents with     Oncology Clinic Visit     Iron overload     Initial Vitals: /86 (BP Location: Left arm, Patient Position: Sitting, Cuff Size: Adult Regular)  Pulse 70  Temp 97.8  F (36.6  C) (Oral)  Resp 16  Wt 70 kg (154 lb 6.4 oz)  SpO2 100%  BMI 27.35 kg/m2 Estimated body mass index is 27.35 kg/(m^2) as calculated from the following:    Height as of 6/12/18: 1.6 m (5' 3\").    Weight as of this encounter: 70 kg (154 lb 6.4 oz). Body surface area is 1.76 meters squared.  No Pain (0) Comment: Data Unavailable   No LMP recorded. Patient is postmenopausal.  Allergies reviewed: Yes  Medications reviewed: Yes    Medications: Medication refills not needed today.  Pharmacy name entered into [employer]:    [facility] [place] - [address] [id number] - [place] - [address] [facility] [place] & [address] DR[facility] STORE [id number]  [facility] MILLER - [address] [facility] OF [address] [place] [address]    Clinical concerns: None                4 minutes for nursing intake (face to face time)     Jenny Hurst AM              Visit Date:   06/26/2018      SUBJECTIVE:  Mrs. Zhao is here for followup on the result of investigation.  She is a 58-year-old female who was recently seen for iron overload.  She also has elevated AST and ALT and some abdominal pain.  Multiple investigations were ordered.  Because of elevated LFT, patient had phlebotomy on 06/21/2018.   1.  Multiple labs were done on 0621/2018.   -- CBC is normal.   -- Iron 78, saturation " of 37 and ferritin of 1028.   -- HFE gene analysis reveals patient to be homozygous for H63D mutation.   2.  CT abdomen and pelvis on 06/25/2018 is normal.  Liver looks normal.      The patient overall is doing well.  She has no new complaints.  No severe abdominal pain.  No nausea or vomiting.  No jaundice.  She is tolerated phlebotomy well.      ASSESSMENT:   1.  A 58-year-old female with iron overload.   2.  Homozygous for H63D mutation.   3.  Elevated AST and ALT secondary to iron overload.  Other medical problems including hypothyroidism, rheumatoid arthritis and colitis.      PLAN:   1.  I had a long discussion with the patient and her .  Labs were all reviewed.  CBC is normal.  Her ferritin is elevated but better.  Iron and percent saturation has decreased on its own.      HFE gene analysis was reviewed.  She is homozygous for H63D.  This generally does not cause hemochromatosis.  It can lead to iron overload.  I told her that iron overload is secondary to this mutation.      CT scan was reviewed.  It essentially looks normal.  Cause of abdominal pain from this is not clear.   2.  Discussed regarding iron overload.  The patient has elevated AST and ALT secondary to it.  She got started on phlebotomy.  I told her we will continue phlebotomy every 2 weeks for the next 4 times.  Once her ferritin and LFTs are back to normal, we will do it about every 1-3 months depending on the ferritin level.  The patient agreeable for this.      I told the patient that she should do well from iron overload.  Main aim is to do phlebectomy to keep her ferritin level between .   3.  We discussed regarding liver biopsy and MRI of the liver to evaluate for iron overload.  I would not recommend liver biopsy.  We discussed regarding MRI of the liver.  After discussion, the plan is to do MRI of the liver if her LFT does not improve.  If her LFT, ferritin and iron all improves, we do not need to do an MRI of the liver.    4.  The patient had a few questions, which were all answered.  She will continue with phlebotomy.  I will see her back in about 2 months' time.  She will call with any questions or concerns.      Total time spent 25 minutes, most of time was spent in counseling and coordination of care.         RACHEL CHAPMAN MD             D: 2018   T: 2018   MT: KHALIDA      Name:     RO GARCÍA   MRN:      [id number]        Account:      [id number]   :      1960           Visit Date:   2018      Document: [id number]       Again, thank you for allowing me to participate in the care of your patient.        Sincerely,        Rachel Chapman MD

## 2018-06-26 NOTE — MR AVS SNAPSHOT
After Visit Summary   6/26/2018    Irina Hanks    MRN: [id number]           Patient Information     Date Of Birth          1960        Visit Information        Provider Department      6/26/2018 10:00 AM Eloisa Rossi MD [facility]        Care Instructions    Phlebotomy every 2 weeks for 4 times starting next week. Labs during each phlebotomy.  Follow up in 2 months.          Follow-ups after your visit        Your next 10 appointments already scheduled     Jul 02, 2018 11:00 AM CDT   Level 1 with SH INFUSION CHAIR 11   [facility] ([facility])    [facility]  [address]  [place] [address]   [phone]            Jul 16, 2018 11:30 AM CDT   Level 1 with SH INFUSION CHAIR 5   [facility] ([facility])    [facility]  [address]  [place] [address]   [phone]            Jul 30, 2018  1:00 PM CDT   Level 1 with SH INFUSION CHAIR 11   [facility] ([facility])    [facility]  [address]  [place] [address]   [phone]            Aug 13, 2018  1:00 PM CDT   Level 1 with SH INFUSION CHAIR 1   [facility] ([facility])    [facility]  [address]  [place] [address]   [phone]            Aug 27, 2018  1:40 PM CDT   Return Visit with Eloisa Rossi MD   [facility] ([facility])    [facility]  [address]  [place] [address]   [phone]              Who to contact     If you have questions or need follow up information about today's clinic visit or your schedule please contact [facility] directly at [phone].  Normal or non-critical lab and imaging results will  be communicated to you by [facility], letter or phone within 4 business days after the clinic has received the results. If you do not hear from us within 7 days, please contact the clinic through [employer] or phone. If you have a critical or abnormal lab result, we will notify you by phone as soon as possible.  Submit refill requests through [employer] or call your pharmacy and they will forward the refill request to us. Please allow 3 business days for your refill to be completed.          Additional Information About Your Visit        [employer] Information     [employer] gives you secure access to your electronic health record. If you see a primary care provider, you can also send messages to your care team and make appointments. If you have questions, please call your primary care clinic.  If you do not have a primary care provider, please call [phone] and they will assist you.        Care EveryWhere ID     This is your Care EveryWhere ID. This could be used by other organizations to access your [place] medical records  [id number]        Your Vitals Were     Pulse Temperature Respirations Pulse Oximetry BMI (Body Mass Index)       70 97.8  F (36.6  C) (Oral) 16 100% 27.35 kg/m2        Blood Pressure from Last 3 Encounters:   06/26/18 136/86   06/21/18 111/82   06/21/18 129/90    Weight from Last 3 Encounters:   06/26/18 70 kg (154 lb 6.4 oz)   06/21/18 70.1 kg (154 lb 9.6 oz)   06/12/18 71 kg (156 lb 9.6 oz)              Today, you had the following     No orders found for display         Today's Medication Changes          These changes are accurate as of 6/26/18 11:12 AM.  If you have any questions, ask your nurse or doctor.               These medicines have changed or have updated prescriptions.        Dose/Directions    psyllium 58.6 % Powd   Commonly known as:  METAMUCIL   This may have changed:  Another medication with the same name was removed. Continue taking this medication, and follow the directions you see  here.        Take by mouth daily   Refills:  0                Primary Care Provider Office Phone # Fax #    Irish Fernandes [phone]       [address]  [place] [address]        Equal Access to Services     DELISA FERNANDES : Hadii aad ku hadadrianao Soomaali, waaxda luqadaha, qaybta kaalmada adeegyada, waxay idiin hayjohann adecarmen boggs laluisrula denice. So [facility] [phone].    ATENCIÓN: Si habla español, tiene a jenkins disposición servicios gratuitos de asistencia lingüística. Llame al [phone].    We comply with applicable federal civil rights laws and [place] laws. We do not discriminate on the basis of race, color, national origin, age, disability, sex, sexual orientation, or gender identity.            Thank you!     Thank you for choosing [facility]  for your care. Our goal is always to provide you with excellent care. Hearing back from our patients is one way we can continue to improve our services. Please take a few minutes to complete the written survey that you may receive in the mail after your visit with us. Thank you!             Your Updated Medication List - Protect others around you: Learn how to safely use, store and throw away your medicines at www.disposemymeds.org.          This list is accurate as of 6/26/18 11:12 AM.  Always use your most recent med list.                   Brand Name Dispense Instructions for use Diagnosis    CLARITIN 10 MG tablet   Generic drug:  loratadine     90 tablet    Take 1 tablet (10 mg) by mouth daily        clonazePAM 0.5 MG ODT tab    klonoPIN    30 tablet    DISSOLVE ONE TABLET BY MOUTH NIGHTLY AS NEEDED FOR ANXIETY    Adjustment disorder with anxious mood       hydroxychloroquine 200 MG tablet    PLAQUENIL    90 tablet    Take 2 tablets (400 mg) by mouth daily    Rheumatoid arthritis involving multiple sites with positive rheumatoid factor (H)       levothyroxine 75 MCG tablet    SYNTHROID/LEVOTHROID    90 tablet    TAKE 1 TABLET  BY MOUTH EVERY MORNING    Acquired hypothyroidism       liothyronine 5 MCG tablet    CYTOMEL    180 tablet    TAKE 2 TABLETS BY MOUTH EVERY DAY    Hypothyroidism       losartan-hydrochlorothiazide 100-12.5 MG per tablet    HYZAAR    90 tablet    TAKE 1 TABLET BY MOUTH DAILY    Essential hypertension, benign       meclizine 25 MG tablet    ANTIVERT    30 tablet    Take 1 tablet (25 mg) by mouth every 6 hours as needed for dizziness    Vertigo       metoprolol succinate 50 MG 24 hr tablet    TOPROL-XL    180 tablet    TAKE 2 TABLETS BY MOUTH EVERY DAY    Essential hypertension, benign       psyllium 58.6 % Powd    METAMUCIL     Take by mouth daily        TURMERIC PO           VITAMIN D3 PO      Take 2,000 Units by mouth

## 2018-06-26 NOTE — PATIENT INSTRUCTIONS
Phlebotomy every 2 weeks for 4 times starting next week. Labs during each phlebotomy.   Scheduled/brandyn   Follow up in 2 months.  Scheduled/brandyn      AVS printed & given to patient/brandyn

## 2018-06-26 NOTE — PROGRESS NOTES
"Oncology Rooming Note    June 26, 2018 10:11 AM   Irina Hanks is a 58 year old female who presents for:    Chief Complaint   Patient presents with     Oncology Clinic Visit     Iron overload     Initial Vitals: /86 (BP Location: Left arm, Patient Position: Sitting, Cuff Size: Adult Regular)  Pulse 70  Temp 97.8  F (36.6  C) (Oral)  Resp 16  Wt 70 kg (154 lb 6.4 oz)  SpO2 100%  BMI 27.35 kg/m2 Estimated body mass index is 27.35 kg/(m^2) as calculated from the following:    Height as of 6/12/18: 1.6 m (5' 3\").    Weight as of this encounter: 70 kg (154 lb 6.4 oz). Body surface area is 1.76 meters squared.  No Pain (0) Comment: Data Unavailable   No LMP recorded. Patient is postmenopausal.  Allergies reviewed: Yes  Medications reviewed: Yes    Medications: Medication refills not needed today.  Pharmacy name entered into [employer]:    [facility] [place] - [address] [id number] - [place] - [address] AT [facility] [place] [address] [place] [address] DRUG STORE [id number] - DEANN MILLER - [address] AT [address]    Clinical concerns: None                4 minutes for nursing intake (face to face time)     Jenny Hurst AM            "

## 2018-06-27 ENCOUNTER — HOSPITAL ENCOUNTER (OUTPATIENT)
Facility: CLINIC | Age: 58
Setting detail: SPECIMEN
End: 2018-06-27
Attending: INTERNAL MEDICINE
Payer: COMMERCIAL

## 2018-07-02 ENCOUNTER — HOSPITAL ENCOUNTER (OUTPATIENT)
Facility: CLINIC | Age: 58
Setting detail: SPECIMEN
Discharge: HOME | End: 2018-07-02
Attending: INTERNAL MEDICINE | Admitting: INTERNAL MEDICINE
Payer: COMMERCIAL

## 2018-07-02 ENCOUNTER — INFUSION THERAPY VISIT (OUTPATIENT)
Dept: INFUSION THERAPY | Facility: CLINIC | Age: 58
End: 2018-07-02
Attending: INTERNAL MEDICINE
Payer: COMMERCIAL

## 2018-07-02 VITALS
HEART RATE: 62 BPM | OXYGEN SATURATION: 100 % | RESPIRATION RATE: 16 BRPM | DIASTOLIC BLOOD PRESSURE: 81 MMHG | TEMPERATURE: 97.9 F | SYSTOLIC BLOOD PRESSURE: 124 MMHG

## 2018-07-02 DIAGNOSIS — E83.19 IRON OVERLOAD: ICD-10-CM

## 2018-07-02 LAB
ALBUMIN SERPL-MCNC: 3.9 G/DL (ref 3.4–5)
ALP SERPL-CCNC: 82 U/L (ref 40–150)
ALT SERPL W P-5'-P-CCNC: 85 U/L (ref 0–50)
AST SERPL W P-5'-P-CCNC: 53 U/L (ref 0–45)
BILIRUB DIRECT SERPL-MCNC: 0.1 MG/DL (ref 0–0.2)
BILIRUB SERPL-MCNC: 0.5 MG/DL (ref 0.2–1.3)
ERYTHROCYTE [DISTWIDTH] IN BLOOD BY AUTOMATED COUNT: 12.8 % (ref 10–15)
FERRITIN SERPL-MCNC: 596 NG/ML (ref 8–252)
HCT VFR BLD AUTO: 32.3 % (ref 35–47)
HGB BLD-MCNC: 11.4 G/DL (ref 11.7–15.7)
MCH RBC QN AUTO: 32.4 PG (ref 26.5–33)
MCHC RBC AUTO-ENTMCNC: 35.3 G/DL (ref 31.5–36.5)
MCV RBC AUTO: 92 FL (ref 78–100)
PLATELET # BLD AUTO: 222 10E9/L (ref 150–450)
PROT SERPL-MCNC: 7.4 G/DL (ref 6.8–8.8)
RBC # BLD AUTO: 3.52 10E12/L (ref 3.8–5.2)
WBC # BLD AUTO: 4.9 10E9/L (ref 4–11)

## 2018-07-02 PROCEDURE — 36415 COLL VENOUS BLD VENIPUNCTURE: CPT

## 2018-07-02 PROCEDURE — 85027 COMPLETE CBC AUTOMATED: CPT | Performed by: INTERNAL MEDICINE

## 2018-07-02 PROCEDURE — 82728 ASSAY OF FERRITIN: CPT | Performed by: INTERNAL MEDICINE

## 2018-07-02 PROCEDURE — 80076 HEPATIC FUNCTION PANEL: CPT | Performed by: INTERNAL MEDICINE

## 2018-07-02 ASSESSMENT — PAIN SCALES - GENERAL: PAINLEVEL: NO PAIN (0)

## 2018-07-02 NOTE — MR AVS SNAPSHOT
After Visit Summary   7/2/2018    Irina Hanks    MRN: [id number]           Patient Information     Date Of Birth          1960        Visit Information        Provider Department      7/2/2018 11:00 AM SH INFUSION CHAIR 11 [facility]        Today's Diagnoses     Iron overload           Follow-ups after your visit        Your next 10 appointments already scheduled     Jul 16, 2018 11:30 AM CDT   Level 1 with SH INFUSION CHAIR 5   [facility] ([facility])    [facility]  [address]  [place] [address]   [phone]            Jul 30, 2018  1:00 PM CDT   Level 1 with SH INFUSION CHAIR 11   [facility] ([facility])    [facility]  [address]  [place] [address]   [phone]            Aug 13, 2018  1:00 PM CDT   Level 1 with SH INFUSION CHAIR 1   [facility] ([facility])    [facility]  [address]  [place] [address]   [phone]            Aug 27, 2018  1:40 PM CDT   Return Visit with Eloisa Rossi MD   [facility] ([facility])    [facility]  [address]  [place] [address]   [phone]              Who to contact     If you have questions or need follow up information about today's clinic visit or your schedule please contact [facility] directly at [phone].  Normal or non-critical lab and imaging results will be communicated to you by MyChart, letter or phone within 4 business days after the clinic has received the results. If you do not hear from us within 7 days, please contact the clinic through MyChart or phone. If you have a critical or abnormal lab result, we will notify you by phone as soon as  possible.  Submit refill requests through [employer] or call your pharmacy and they will forward the refill request to us. Please allow 3 business days for your refill to be completed.          Additional Information About Your Visit        [facility] Information     [facility] gives you secure access to your electronic health record. If you see a primary care provider, you can also send messages to your care team and make appointments. If you have questions, please call your primary care clinic.  If you do not have a primary care provider, please call [phone] and they will assist you.        Care EveryWhere ID     This is your Care EveryWhere ID. This could be used by other organizations to access your [place] medical records  [id number]        Your Vitals Were     Pulse Temperature Respirations Pulse Oximetry          62 97.9  F (36.6  C) (Oral) 16 100%         Blood Pressure from Last 3 Encounters:   07/02/18 124/81   06/26/18 136/86   06/21/18 111/82    Weight from Last 3 Encounters:   06/26/18 70 kg (154 lb 6.4 oz)   06/21/18 70.1 kg (154 lb 9.6 oz)   06/12/18 71 kg (156 lb 9.6 oz)              We Performed the Following     CBC with platelets     Ferritin     Hepatic panel (Albumin, ALT, AST, Bili, Alk Phos, TP)        Primary Care Provider Office Phone # Fax #    Irish Fernandes [phone]       [address]  [place] [address]        Equal Access to Services     [facility] AH: Hadii aad ku hadasho Sojarredali, waaxda luqadaha, qaybta kaalmada adeegyada, jes galdamez. So [facility] [phone].    ATENCIÓN: Si habla español, tiene a jenkins disposición servicios gratuitos de asistencia lingüística. Llame al [phone].    We comply with applicable federal civil rights laws and [place] laws. We do not discriminate on the basis of race, color, national origin, age, disability, sex, sexual orientation, or gender identity.            Thank you!     Thank you for  choosing [facility]  for your care. Our goal is always to provide you with excellent care. Hearing back from our patients is one way we can continue to improve our services. Please take a few minutes to complete the written survey that you may receive in the mail after your visit with us. Thank you!             Your Updated Medication List - Protect others around you: Learn how to safely use, store and throw away your medicines at www.disposemymeds.org.          This list is accurate as of 7/2/18 11:40 AM.  Always use your most recent med list.                   Brand Name Dispense Instructions for use Diagnosis    CLARITIN 10 MG tablet   Generic drug:  loratadine     90 tablet    Take 1 tablet (10 mg) by mouth daily        clonazePAM 0.5 MG ODT tab    klonoPIN    30 tablet    DISSOLVE ONE TABLET BY MOUTH NIGHTLY AS NEEDED FOR ANXIETY    Adjustment disorder with anxious mood       hydroxychloroquine 200 MG tablet    PLAQUENIL    90 tablet    Take 2 tablets (400 mg) by mouth daily    Rheumatoid arthritis involving multiple sites with positive rheumatoid factor (H)       levothyroxine 75 MCG tablet    SYNTHROID/LEVOTHROID    90 tablet    TAKE 1 TABLET BY MOUTH EVERY MORNING    Acquired hypothyroidism       liothyronine 5 MCG tablet    CYTOMEL    180 tablet    TAKE 2 TABLETS BY MOUTH EVERY DAY    Hypothyroidism       losartan-hydrochlorothiazide 100-12.5 MG per tablet    HYZAAR    90 tablet    TAKE 1 TABLET BY MOUTH DAILY    Essential hypertension, benign       meclizine 25 MG tablet    ANTIVERT    30 tablet    Take 1 tablet (25 mg) by mouth every 6 hours as needed for dizziness    Vertigo       metoprolol succinate 50 MG 24 hr tablet    TOPROL-XL    180 tablet    TAKE 2 TABLETS BY MOUTH EVERY DAY    Essential hypertension, benign       psyllium 58.6 % Powd    METAMUCIL     Take by mouth daily    Iron overload       TURMERIC PO           VITAMIN D3 PO      Take 2,000 Units by mouth

## 2018-07-02 NOTE — PROGRESS NOTES
Infusion Nursing Note:  Irina Hanks presents today for labs, possible phlebotomy.    Patient seen by provider today: No   present during visit today: Not Applicable.    Note: No concerns or changes to report.    Intravenous Access:  Labs drawn without difficulty.  Peripheral IV placed.    Treatment Conditions:  Lab Results   Component Value Date    HGB 11.4 07/02/2018     Lab Results   Component Value Date    WBC 4.9 07/02/2018      Lab Results   Component Value Date    ANEU 2.3 06/21/2018     Lab Results   Component Value Date     07/02/2018      Lab Results   Component Value Date     06/12/2018                   Lab Results   Component Value Date    POTASSIUM 3.6 06/12/2018           No results found for: MAG         Lab Results   Component Value Date    CR 0.76 06/12/2018                   Lab Results   Component Value Date    LUCA 9.3 06/12/2018                Lab Results   Component Value Date    BILITOTAL 0.5 07/02/2018           Lab Results   Component Value Date    ALBUMIN 3.9 07/02/2018                    Lab Results   Component Value Date    ALT 85 07/02/2018           Lab Results   Component Value Date    AST 53 07/02/2018       Results reviewed, labs did NOT meet treatment parameters: Hct under 42.  Does NOT meet criteria for phlebotomy today..      Post Infusion Assessment:  Site patent and intact, free from redness, edema or discomfort.  No evidence of extravasations.  Access discontinued per protocol.    Discharge Plan:   Discharge instructions reviewed with: Patient.  Patient and/or family verbalized understanding of discharge instructions and all questions answered.  Copy of AVS reviewed with patient and/or family.  Patient will return 7/16 for next appointment.  Patient discharged in stable condition accompanied by: self.  Departure Mode: Ambulatory.    Hilary Toscano RN

## 2018-07-03 ENCOUNTER — NURSE TRIAGE (OUTPATIENT)
Dept: NURSING | Facility: CLINIC | Age: 58
End: 2018-07-03

## 2018-07-03 NOTE — TELEPHONE ENCOUNTER
Patient reports that she had blood drawn yesterday at her clinic. The needle was inserted in the antecubital space of her left arm. Reports there is a pea-size lump now where the needle was inserted. The area is also bruised. Said the lump has decreased in size since yesterday. No pain at the site. No fever. Patient said she feels fine today. No bleeding or drainage from the site.     Protocol and care advice reviewed.   Caller states understanding of the recommended disposition.  Advised to call back if further questions or concerns.     Mary Ann Resendiz RN/FNA    Additional Information    Negative: Severe difficulty breathing (e.g., struggling for each breath, speaks in single words)    Negative: Shock suspected (e.g., cold/pale/clammy skin, too weak to stand, low BP, rapid pulse)    Negative: Sounds like a life-threatening emergency to the triager    Negative: IV not running or running slowly    Negative: [1] Difficulty breathing AND [2] not severe    Negative: Arm is swollen, new onset (or leg swelling if IV in lower extremity)    Negative: Fever > 100.5 F (38.1 C)    Negative: Patient sounds very sick or weak to the triager    Negative: Pus or cloudy fluid from IV site    Negative: [1] Red streak at IV site AND [2] palpable cord    Negative: [1] Red streak at IV site AND [2] longer than 1 inch (2.5 cm)    Negative: [1] Skin redness AND [2] extends > 1 inch (2.5 cm) from IV site    Negative: Skin swelling at IV site    Negative: [1] Raised bruise at IV site AND [2] size > 2 inches (5 cm) AND [3] expanding    Negative: [1] Pain at IV site AND [2] IV running slowly    Negative: [1] Mild bleeding at IV site AND [2] not stopped after following CARE ADVICE    Negative: [1] Dressing needs to be changed (e.g., wet, soiled, loose, or open to air) AND [2] unable or unwilling to perform dressing change    Negative: [1] Small area of skin redness at IV site (<1 inch or 2.5 cm) AND [2] no fever, swelling    Negative: [1] Pain  at IV site or shooting up arm AND [2] NO redness or swelling AND [3]  IV running normally    Small painless lump at prior IV site  (all triage questions negative)    Protocols used: IV SITE (SKIN) SYMPTOMS-ADULT-AH

## 2018-07-09 DIAGNOSIS — F43.22 ADJUSTMENT DISORDER WITH ANXIOUS MOOD: ICD-10-CM

## 2018-07-09 NOTE — TELEPHONE ENCOUNTER
Requested Prescriptions   Pending Prescriptions Disp Refills     clonazePAM (KLONOPIN) 0.5 MG ODT tab [Pharmacy Med Name: CLONAZEPAM  Last Written Prescription Date:  5/30/18  Last Fill Quantity: 30,  # refills: 0   Last office visit: 6/12/2018 with prescribing provider:  6/12/18   Future Office Visit:   Next 5 appointments (look out 90 days)     Aug 27, 2018  1:40 PM CDT   Return Visit with Eloisa Rossi MD   [facility]    [facility]  [address] [facility] [address]  [facility] [address]   [phone]                  ODT 0.5MG TABLETS] 30 tablet 0     Sig: DISSOLVE 1 TABLET BY MOUTH NIGHTLY AS NEEDED FOR ANIXETY    There is no refill protocol information for this order

## 2018-07-10 RX ORDER — CLONAZEPAM 0.5 MG/1
TABLET, ORALLY DISINTEGRATING ORAL
Qty: 30 TABLET | Refills: 0 | Status: SHIPPED | OUTPATIENT
Start: 2018-07-10 | End: 2018-08-29

## 2018-07-10 NOTE — TELEPHONE ENCOUNTER
Prescription of clonazepam was faxed to [facility]  Date:July 10, 2018  Signature:Jess REINOSO

## 2018-07-15 ENCOUNTER — MYC MEDICAL ADVICE (OUTPATIENT)
Dept: FAMILY MEDICINE | Facility: CLINIC | Age: 58
End: 2018-07-15

## 2018-07-15 DIAGNOSIS — K58.9 IRRITABLE BOWEL SYNDROME, UNSPECIFIED TYPE: ICD-10-CM

## 2018-07-15 DIAGNOSIS — E83.19 IRON OVERLOAD SYNDROME: Primary | ICD-10-CM

## 2018-07-15 DIAGNOSIS — E06.3 HYPOTHYROIDISM DUE TO HASHIMOTO'S THYROIDITIS: ICD-10-CM

## 2018-07-16 ENCOUNTER — INFUSION THERAPY VISIT (OUTPATIENT)
Dept: INFUSION THERAPY | Facility: CLINIC | Age: 58
End: 2018-07-16
Attending: INTERNAL MEDICINE
Payer: COMMERCIAL

## 2018-07-16 ENCOUNTER — HOSPITAL ENCOUNTER (OUTPATIENT)
Facility: CLINIC | Age: 58
Setting detail: SPECIMEN
Discharge: HOME | End: 2018-07-16
Attending: INTERNAL MEDICINE | Admitting: INTERNAL MEDICINE
Payer: COMMERCIAL

## 2018-07-16 VITALS
TEMPERATURE: 97.7 F | RESPIRATION RATE: 16 BRPM | DIASTOLIC BLOOD PRESSURE: 73 MMHG | HEART RATE: 67 BPM | SYSTOLIC BLOOD PRESSURE: 115 MMHG | OXYGEN SATURATION: 97 %

## 2018-07-16 DIAGNOSIS — E83.19 IRON OVERLOAD: Primary | ICD-10-CM

## 2018-07-16 LAB
ALBUMIN SERPL-MCNC: 3.8 G/DL (ref 3.4–5)
ALP SERPL-CCNC: 88 U/L (ref 40–150)
ALT SERPL W P-5'-P-CCNC: 46 U/L (ref 0–50)
AST SERPL W P-5'-P-CCNC: 35 U/L (ref 0–45)
BILIRUB DIRECT SERPL-MCNC: 0.3 MG/DL (ref 0–0.2)
BILIRUB SERPL-MCNC: 1.1 MG/DL (ref 0.2–1.3)
ERYTHROCYTE [DISTWIDTH] IN BLOOD BY AUTOMATED COUNT: 12.5 % (ref 10–15)
FERRITIN SERPL-MCNC: 498 NG/ML (ref 8–252)
HCT VFR BLD AUTO: 36.5 % (ref 35–47)
HGB BLD-MCNC: 13 G/DL (ref 11.7–15.7)
MCH RBC QN AUTO: 32.9 PG (ref 26.5–33)
MCHC RBC AUTO-ENTMCNC: 35.6 G/DL (ref 31.5–36.5)
MCV RBC AUTO: 92 FL (ref 78–100)
PLATELET # BLD AUTO: 188 10E9/L (ref 150–450)
PROT SERPL-MCNC: 8.1 G/DL (ref 6.8–8.8)
RBC # BLD AUTO: 3.95 10E12/L (ref 3.8–5.2)
WBC # BLD AUTO: 3.8 10E9/L (ref 4–11)

## 2018-07-16 PROCEDURE — 82728 ASSAY OF FERRITIN: CPT | Performed by: INTERNAL MEDICINE

## 2018-07-16 PROCEDURE — 99195 PHLEBOTOMY: CPT

## 2018-07-16 PROCEDURE — 80076 HEPATIC FUNCTION PANEL: CPT | Performed by: INTERNAL MEDICINE

## 2018-07-16 PROCEDURE — 85027 COMPLETE CBC AUTOMATED: CPT | Performed by: INTERNAL MEDICINE

## 2018-07-16 ASSESSMENT — PAIN SCALES - GENERAL: PAINLEVEL: NO PAIN (0)

## 2018-07-16 NOTE — PROGRESS NOTES
Jerald Mrs. Zhao,    Your ferritin is 498. This will go down further. Liver blood tests are better.    Eloisa Rossi

## 2018-07-16 NOTE — PATIENT INSTRUCTIONS
Phlebotomy done today per  request and pt. May delay next phlebotomy for 3-4 weeks per her judgment.

## 2018-07-16 NOTE — MR AVS SNAPSHOT
After Visit Summary   7/16/2018    Irina Hanks    MRN: [id number]           Patient Information     Date Of Birth          1960        Visit Information        Provider Department      7/16/2018 11:30 AM  INFUSION CHAIR 5 [facility]        Today's Diagnoses     Iron overload           Follow-ups after your visit        Your next 10 appointments already scheduled     Jul 30, 2018  1:00 PM CDT   Level 1 with  INFUSION CHAIR 11   [facility] ([facility])    [facility]  [address]  [place] [address]   [phone]            Aug 13, 2018  1:00 PM CDT   Level 1 with  INFUSION CHAIR 1   [facility] ([facility])    [facility]  [address]  [place] [address]   [phone]            Aug 27, 2018  1:40 PM CDT   Return Visit with Eloisa Rossi MD   [facility] ([facility])    [facility]  [address]  [place] [address]   [phone]              Who to contact     If you have questions or need follow up information about today's clinic visit or your schedule please contact [facility] [place] directly at [phone].  Normal or non-critical lab and imaging results will be communicated to you by MyChart, letter or phone within 4 business days after the clinic has received the results. If you do not hear from us within 7 days, please contact the clinic through [employer] or phone. If you have a critical or abnormal lab result, we will notify you by phone as soon as possible.  Submit refill requests through [employer] or call your pharmacy and they will forward the refill request to us. Please allow 3 business days for your refill to be completed.          Additional Information About Your Visit        [facility]  Information     [employer] gives you secure access to your electronic health record. If you see a primary care provider, you can also send messages to your care team and make appointments. If you have questions, please call your primary care clinic.  If you do not have a primary care provider, please call [phone] and they will assist you.        Care EveryWhere ID     This is your Care EveryWhere ID. This could be used by other organizations to access your [place] medical records  [id number]        Your Vitals Were     Pulse Temperature Respirations Pulse Oximetry          67 97.7  F (36.5  C) (Oral) 16 97%         Blood Pressure from Last 3 Encounters:   07/16/18 115/73   07/02/18 124/81   06/26/18 136/86    Weight from Last 3 Encounters:   06/26/18 70 kg (154 lb 6.4 oz)   06/21/18 70.1 kg (154 lb 9.6 oz)   06/12/18 71 kg (156 lb 9.6 oz)              We Performed the Following     CBC with platelets     Ferritin     Hepatic panel (Albumin, ALT, AST, Bili, Alk Phos, TP)        Primary Care Provider Office Phone # Fax #    Irish Fernandes [phone]       [address] [place] [address]        Equal Access to Services     [facility] : Hadii aad ku hadasho Soomaali, waaxda luqadaha, qaybta kaalmada adeegyada, waxay idiin hayaan leon khchristiano la'efren ah. So [facility] [phone].    ATENCIÓN: Si habla español, tiene a jenkins disposición servicios gratuitos de asistencia lingüística. Llame al [phone].    We comply with applicable federal civil rights laws and [place] laws. We do not discriminate on the basis of race, color, national origin, age, disability, sex, sexual orientation, or gender identity.            Thank you!     Thank you for choosing [facility]  for your care. Our goal is always to provide you with excellent care. Hearing back from our patients is one way we can continue to improve our services. Please take a few minutes to complete  the written survey that you may receive in the mail after your visit with us. Thank you!             Your Updated Medication List - Protect others around you: Learn how to safely use, store and throw away your medicines at www.disposemymeds.org.          This list is accurate as of 7/16/18  1:04 PM.  Always use your most recent med list.                   Brand Name Dispense Instructions for use Diagnosis    CLARITIN 10 MG tablet   Generic drug:  loratadine     90 tablet    Take 1 tablet (10 mg) by mouth daily        clonazePAM 0.5 MG ODT tab    klonoPIN    30 tablet    DISSOLVE 1 TABLET BY MOUTH NIGHTLY AS NEEDED FOR ANIXETY    Adjustment disorder with anxious mood       hydroxychloroquine 200 MG tablet    PLAQUENIL    90 tablet    Take 2 tablets (400 mg) by mouth daily    Rheumatoid arthritis involving multiple sites with positive rheumatoid factor (H)       levothyroxine 75 MCG tablet    SYNTHROID/LEVOTHROID    90 tablet    TAKE 1 TABLET BY MOUTH EVERY MORNING    Acquired hypothyroidism       liothyronine 5 MCG tablet    CYTOMEL    180 tablet    TAKE 2 TABLETS BY MOUTH EVERY DAY    Hypothyroidism       losartan-hydrochlorothiazide 100-12.5 MG per tablet    HYZAAR    90 tablet    TAKE 1 TABLET BY MOUTH DAILY    Essential hypertension, benign       meclizine 25 MG tablet    ANTIVERT    30 tablet    Take 1 tablet (25 mg) by mouth every 6 hours as needed for dizziness    Vertigo       metoprolol succinate 50 MG 24 hr tablet    TOPROL-XL    180 tablet    TAKE 2 TABLETS BY MOUTH EVERY DAY    Essential hypertension, benign       psyllium 58.6 % Powd    METAMUCIL     Take by mouth daily    Iron overload       TURMERIC PO           VITAMIN D3 PO      Take 2,000 Units by mouth

## 2018-07-16 NOTE — TELEPHONE ENCOUNTER
Please see My Chart message below and advise as appropriate.  Abbi Cortez RN  [employer] Triage

## 2018-07-16 NOTE — PROGRESS NOTES
Infusion Nursing Note:  Irina Hanks presents today for labs, possible phlebotomy.    Patient seen by provider today: No   present during visit today: Not Applicable.    Note: No concerns or changes to report..    Intravenous Access:  Labs drawn without difficulty.  Peripheral IV placed.    Treatment Conditions:  Lab Results   Component Value Date    HGB 13.0 07/16/2018     Lab Results   Component Value Date    WBC 3.8 07/16/2018      Lab Results   Component Value Date    ANEU 2.3 06/21/2018     Lab Results   Component Value Date     07/16/2018      Lab Results   Component Value Date     06/12/2018                   Lab Results   Component Value Date    POTASSIUM 3.6 06/12/2018           No results found for: MAG         Lab Results   Component Value Date    CR 0.76 06/12/2018                   Lab Results   Component Value Date    LUCA 9.3 06/12/2018                Lab Results   Component Value Date    BILITOTAL 1.1 07/16/2018           Lab Results   Component Value Date    ALBUMIN 3.8 07/16/2018                    Lab Results   Component Value Date    ALT 46 07/16/2018           Lab Results   Component Value Date    AST 35 07/16/2018       Does not meet criteria for phlebotomy but  stopped in as said we should proceed today with it regardless of Hct being under 42 criteria and then patient may change interval for next time from 2 weeks to 3-4 weeks for return visit.  18 gauge IV catheter placed and 500 cc (530 gm) blood removed with scale/bag system.      Post Infusion Assessment:  Blood return noted pre and post infusion.  Site patent and intact, free from redness, edema or discomfort.  No evidence of extravasations.  Access discontinued per protocol.    Discharge Plan:   Discharge instructions reviewed with: Patient.  Patient and/or family verbalized understanding of discharge instructions and all questions answered.  Copy of AVS reviewed with patient and/or family.  Patient  will make appt on the way out today  Patient discharged in stable condition accompanied by: self.  Departure Mode: Ambulatory.    Hilary Toscano RN

## 2018-07-30 ENCOUNTER — ALLIED HEALTH/NURSE VISIT (OUTPATIENT)
Dept: EDUCATION SERVICES | Facility: CLINIC | Age: 58
End: 2018-07-30
Payer: COMMERCIAL

## 2018-07-30 DIAGNOSIS — E83.19 IRON OVERLOAD: ICD-10-CM

## 2018-07-30 DIAGNOSIS — E06.3 HYPOTHYROIDISM DUE TO HASHIMOTO'S THYROIDITIS: Primary | ICD-10-CM

## 2018-07-30 DIAGNOSIS — K58.9 IRRITABLE BOWEL SYNDROME, UNSPECIFIED TYPE: ICD-10-CM

## 2018-07-30 PROCEDURE — 97802 MEDICAL NUTRITION INDIV IN: CPT

## 2018-07-30 NOTE — MR AVS SNAPSHOT
After Visit Summary   7/30/2018    Irina Hanks    MRN: [id number]           Patient Information     Date Of Birth          1960        Visit Information        Provider Department      7/30/2018 10:30 AM  DIABETIC ED RESOURCE [place] [facility] [place]        Care Instructions    Aim for 10,000 steps per day.     Try to do the barre class 3 days per week.               Follow-ups after your visit        Your next 10 appointments already scheduled     Aug 06, 2018  2:00 PM CDT   Level 2 with  INFUSION CHAIR 7   [facility] ([facility])    [facility]  [address]  [place] [address]   [phone]            Aug 27, 2018  1:00 PM CDT   Level 2 with  INFUSION CHAIR 7   [facility] ([facility])    [facility]  [address]  [place] [address]   [phone]            Aug 27, 2018  2:00 PM CDT   Return Visit with Eloisa Rossi MD   [facility] ([facility])    [facility]  [address]  [place] [address]   [phone]              Who to contact     If you have questions or need follow up information about today's clinic visit or your schedule please contact [place] [facility] [place] directly at [phone].  Normal or non-critical lab and imaging results will be communicated to you by MyChart, letter or phone within 4 business days after the clinic has received the results. If you do not hear from us within 7 days, please contact the clinic through [employer] or phone. If you have a critical or abnormal lab result, we will notify you by phone as soon as possible.  Submit refill requests through [employer] or call your pharmacy and they will forward the refill request to us. Please allow 3 business days for your refill to be completed.           Additional Information About Your Visit        [employer] Information     [employer] gives you secure access to your electronic health record. If you see a primary care provider, you can also send messages to your care team and make appointments. If you have questions, please call your primary care clinic.  If you do not have a primary care provider, please call [phone] and they will assist you.        Care EveryWhere ID     This is your Care EveryWhere ID. This could be used by other organizations to access your [place] medical records  [id number]         Blood Pressure from Last 3 Encounters:   07/16/18 115/73   07/02/18 124/81   06/26/18 136/86    Weight from Last 3 Encounters:   06/26/18 70 kg (154 lb 6.4 oz)   06/21/18 70.1 kg (154 lb 9.6 oz)   06/12/18 71 kg (156 lb 9.6 oz)              Today, you had the following     No orders found for display       Primary Care Provider Office Phone # Fax #    Irish Fernandes [phone]        [address]  [place] [address]        Equal Access to Services     [facility]: Hadii aad ku hadasho Soomaali, waaxda luqadaha, qaybta kaalmada adeegyada, jes rainey . So [facility] [phone].    ATENCIÓN: Si habla español, tiene a jenkins disposición servicios gratvitoros de asistencia lingüística. [facility] [phone].    We comply with applicable federal civil rights laws and [place] laws. We do not discriminate on the basis of race, color, national origin, age, disability, sex, sexual orientation, or gender identity.            Thank you!     Thank you for choosing [place] [facility] [place]  for your care. Our goal is always to provide you with excellent care. Hearing back from our patients is one way we can continue to improve our services. Please take a few minutes to complete the written survey that you may receive in the mail after your visit with us. Thank you!             Your Updated  Medication List - Protect others around you: Learn how to safely use, store and throw away your medicines at www.disposemymeds.org.          This list is accurate as of 7/30/18 11:15 AM.  Always use your most recent med list.                   Brand Name Dispense Instructions for use Diagnosis    CLARITIN 10 MG tablet   Generic drug:  loratadine     90 tablet    Take 1 tablet (10 mg) by mouth daily        clonazePAM 0.5 MG ODT tab    klonoPIN    30 tablet    DISSOLVE 1 TABLET BY MOUTH NIGHTLY AS NEEDED FOR ANIXETY    Adjustment disorder with anxious mood       hydroxychloroquine 200 MG tablet    PLAQUENIL    90 tablet    Take 2 tablets (400 mg) by mouth daily    Rheumatoid arthritis involving multiple sites with positive rheumatoid factor (H)       levothyroxine 75 MCG tablet    SYNTHROID/LEVOTHROID    90 tablet    TAKE 1 TABLET BY MOUTH EVERY MORNING    Acquired hypothyroidism       liothyronine 5 MCG tablet    CYTOMEL    180 tablet    TAKE 2 TABLETS BY MOUTH EVERY DAY    Hypothyroidism       losartan-hydrochlorothiazide 100-12.5 MG per tablet    HYZAAR    90 tablet    TAKE 1 TABLET BY MOUTH DAILY    Essential hypertension, benign       meclizine 25 MG tablet    ANTIVERT    30 tablet    Take 1 tablet (25 mg) by mouth every 6 hours as needed for dizziness    Vertigo       metoprolol succinate 50 MG 24 hr tablet    TOPROL-XL    180 tablet    TAKE 2 TABLETS BY MOUTH EVERY DAY    Essential hypertension, benign       psyllium 58.6 % Powd    METAMUCIL     Take by mouth daily    Iron overload       TURMERIC PO           VITAMIN D3 PO      Take 2,000 Units by mouth

## 2018-07-30 NOTE — PROGRESS NOTES
Medical Nutrition Therapy  Visit Type:Initial assessment and intervention    Irina Hanks presents today for MNT and education related to hemachromatosis, colitis, RA, HTN.  She is accompanied by self.     ASSESSMENT:   Patient comments/concerns relating to nutrition:  Has been researching iron overload and diet. Is also on weight watchers the past couple months but has been struggling with it the past few weeks as her [place]  [place]. Knows calcium containing foods can help reduce iron absorption but wondering if they are okay in general. Has started back at the gym and doing a class. Would like to work on weight loss and increasing her exercise.     Reports she takes a Turmeric supplement and also Metamucil (already listed in her med list).    NUTRITION HISTORY:  Aims for 24 points per day for Weight Watchers. Struggles with weekends mostly.   Breakfast: 1/2 c cottage cheese and grapefruit  Lunch: usually snacks as listed below  Dinner: 1 c veggies with tilapia with tomatoes and onions  Snacks: string cheese, almond thins with moon de mcgowan or homemade salsa, 1/2 avocado  Beverages: diet iced tea, water, Premier protein drink with coffee, alcohol on occasion    Misses meals? Not usually  Eats out:  Had Mary last week but tried to have small portions    Previous diet education:  No     Food allergies/intolerances: none    EXERCISE: Wants to get back to 10 k steps/d. Is also doing a barre class 2-4 days per week. Would like to aim for 3-4 days/week.     SOCIO/ECONOMIC:   Lives with: spouse    MEDICATIONS:  Current Outpatient Prescriptions   Medication     Cholecalciferol (VITAMIN D3 PO)     clonazePAM (KLONOPIN) 0.5 MG ODT tab     hydroxychloroquine (PLAQUENIL) 200 MG tablet     levothyroxine (SYNTHROID/LEVOTHROID) 75 MCG tablet     liothyronine (CYTOMEL) 5 MCG tablet     loratadine (CLARITIN) 10 MG tablet     losartan-hydrochlorothiazide (HYZAAR) 100-12.5 MG per tablet      meclizine (ANTIVERT) 25 MG tablet     metoprolol (TOPROL-XL) 50 MG 24 hr tablet     psyllium (METAMUCIL) 58.6 % POWD     TURMERIC PO     No current facility-administered medications for this visit.        LABS:  Last Basic Metabolic Panel:  Lab Results   Component Value Date     06/12/2018      Lab Results   Component Value Date    POTASSIUM 3.6 06/12/2018     Lab Results   Component Value Date    CHLORIDE 94 06/12/2018     Lab Results   Component Value Date    LUCA 9.3 06/12/2018     Lab Results   Component Value Date    CO2 27 06/12/2018     Lab Results   Component Value Date    BUN 12 06/12/2018     Lab Results   Component Value Date    CR 0.76 06/12/2018     Lab Results   Component Value Date    GLC 87 06/12/2018       ANTHROPOMETRICS:  Vitals: There were no vitals taken for this visit.  There is no height or weight on file to calculate BMI.      Wt Readings from Last 5 Encounters:   06/26/18 70 kg (154 lb 6.4 oz)   06/21/18 70.1 kg (154 lb 9.6 oz)   06/12/18 71 kg (156 lb 9.6 oz)   08/14/17 69.9 kg (154 lb)   05/05/17 70.3 kg (155 lb)       Weight Change: reports recent weight loss of 8-10# with joining weight watchers but thinks she has re-gained some now    NUTRITION DIAGNOSIS: Food- and nutrition-related knowledge deficit related to no previous diet education, recent dx of iron overload as evidenced by pt with many questions on what to eat for her RA, iron overload, colitis, and weight loss.    NUTRITION INTERVENTION:  Education given to support: general nutrition guidelines, sodium, exercise, fiber, behavior modification and portion control  Educated on what to avoid for iron overload: raw fish, iron supplements, and alcohol. Discussed what can help reduce iron absorption: calcium, tannens in coffee/tea, turmeric.  Also, what can increase iron absorption: vitamin C. Briefly reviewed heme vs non-heme iron (heme is in animal products) and that heme is better absorbed.  She is aware of most of these  and abiding by them. Focused on anti-inflammatory diet guidelines for her RA and discussed how by being less processed this will help reduce her sodium intake for her BP. Educated on having 2/3 her plate of color (fruits and vegetables), 1/3 her plate repairing protein (cottage cheese, cooked fish, less red meat, nuts, etc), and a little healthy fat at each meal (avocado, seeds, nuts, etc). Discussed helpful spices such as turmeric, cloves, nutmeg.  Educated that when she has IBD sx that she should follow a low fiber diet but when she is not having sx it is recommended to follow a higher fiber diet. Discussed that for a low fiber diet she can do well cooked vegetables and fruits or peeled fruits/blended fruits. Showed her some salt free seasoning ideas as she reports her weakness is salt. Answered her questions.     Education Materials Provided: My Plate Planner for Anti-Inflammatory Diet and IBD Nutrition Therapy  Motivational Interviewing    PATIENT'S BEHAVIOR CHANGE GOALS:   See Patient Instructions for patient stated behavior change goals. AVS was printed and given to patient at today's appointment.    MONITOR / EVALUATE:  RD will monitor/evaluate:  Progress toward meeting stated nutrition-related goals  Weight change    FOLLOW-UP:  Follow up with RD as needed.  Call RD with questions/concerns.     AAYUSH Gomez CDE    Time spent in minutes: 50  Encounter: Individual

## 2018-08-01 DIAGNOSIS — E03.9 HYPOTHYROIDISM: ICD-10-CM

## 2018-08-01 DIAGNOSIS — E03.9 HYPOTHYROIDISM: Primary | ICD-10-CM

## 2018-08-01 RX ORDER — LIOTHYRONINE SODIUM 5 UG/1
TABLET ORAL
Qty: 180 TABLET | Refills: 0 | OUTPATIENT
Start: 2018-08-01

## 2018-08-01 RX ORDER — LIOTHYRONINE SODIUM 5 UG/1
TABLET ORAL
Qty: 60 TABLET | Refills: 0 | Status: SHIPPED | OUTPATIENT
Start: 2018-08-01 | End: 2018-08-30

## 2018-08-01 NOTE — TELEPHONE ENCOUNTER
"Requested Prescriptions   Pending Prescriptions Disp Refills     liothyronine (CYTOMEL) 5 MCG tablet  Last Written Prescription Date:  11/6/17  Last Fill Quantity: 180,  # refills: 2   Last office visit: 6/12/2018 with prescribing provider:  Dena   Future Office Visit:   Next 5 appointments (look out 90 days)     Aug 27, 2018  2:00 PM CDT   Return Visit with Eloisa Rossi MD   [facility]    [facility] [place]  [address]  [facility] [address]   [phone]                  180 tablet 2    Thyroid Protocol Passed    8/1/2018 11:58 AM       Passed - Patient is 12 years or older       Passed - Recent (12 mo) or future (30 days) visit within the authorizing provider's specialty    Patient had office visit in the last 12 months or has a visit in the next 30 days with authorizing provider or within the authorizing provider's specialty.  See \"Patient Info\" tab in inbasket, or \"Choose Columns\" in Meds & Orders section of the refill encounter.           Passed - Normal TSH on file in past 12 months    Recent Labs   Lab Test  08/14/17   1116   TSH  0.82             Passed - No active pregnancy on record    If patient is pregnant or has had a positive pregnancy test, please check TSH.         Passed - No positive pregnancy test in past 12 months    If patient is pregnant or has had a positive pregnancy test, please check TSH.            "

## 2018-08-01 NOTE — TELEPHONE ENCOUNTER
A 30 day supply is given, patient is due for an office visit.  Please call to  assist the patient in scheduling an appointment.  Patient was to follow up for a BP check per providers notes from 6/12/2018.    Abbi Cortez RN  [employer] Triage

## 2018-08-01 NOTE — TELEPHONE ENCOUNTER
Duplicate RX script for Cytomel 5mcg filled on 8/1/18 for 60 with 0 refills.    Florence Mcknight CMA

## 2018-08-02 ENCOUNTER — MYC MEDICAL ADVICE (OUTPATIENT)
Dept: ONCOLOGY | Facility: CLINIC | Age: 58
End: 2018-08-02

## 2018-08-06 ENCOUNTER — INFUSION THERAPY VISIT (OUTPATIENT)
Dept: INFUSION THERAPY | Facility: CLINIC | Age: 58
End: 2018-08-06
Attending: INTERNAL MEDICINE
Payer: COMMERCIAL

## 2018-08-06 ENCOUNTER — HOSPITAL ENCOUNTER (OUTPATIENT)
Facility: CLINIC | Age: 58
Setting detail: SPECIMEN
Discharge: HOME | End: 2018-08-06
Attending: INTERNAL MEDICINE | Admitting: INTERNAL MEDICINE
Payer: COMMERCIAL

## 2018-08-06 VITALS
DIASTOLIC BLOOD PRESSURE: 82 MMHG | RESPIRATION RATE: 20 BRPM | SYSTOLIC BLOOD PRESSURE: 121 MMHG | OXYGEN SATURATION: 100 % | TEMPERATURE: 97.7 F | HEART RATE: 68 BPM

## 2018-08-06 DIAGNOSIS — E83.19 IRON OVERLOAD: Primary | ICD-10-CM

## 2018-08-06 LAB
ALBUMIN SERPL-MCNC: 3.9 G/DL (ref 3.4–5)
ALP SERPL-CCNC: 89 U/L (ref 40–150)
ALT SERPL W P-5'-P-CCNC: 37 U/L (ref 0–50)
AST SERPL W P-5'-P-CCNC: 34 U/L (ref 0–45)
BILIRUB DIRECT SERPL-MCNC: 0.2 MG/DL (ref 0–0.2)
BILIRUB SERPL-MCNC: 0.6 MG/DL (ref 0.2–1.3)
ERYTHROCYTE [DISTWIDTH] IN BLOOD BY AUTOMATED COUNT: 12.3 % (ref 10–15)
FERRITIN SERPL-MCNC: 326 NG/ML (ref 8–252)
HCT VFR BLD AUTO: 37.3 % (ref 35–47)
HGB BLD-MCNC: 13.2 G/DL (ref 11.7–15.7)
MCH RBC QN AUTO: 33 PG (ref 26.5–33)
MCHC RBC AUTO-ENTMCNC: 35.4 G/DL (ref 31.5–36.5)
MCV RBC AUTO: 93 FL (ref 78–100)
PLATELET # BLD AUTO: 213 10E9/L (ref 150–450)
PROT SERPL-MCNC: 8.1 G/DL (ref 6.8–8.8)
RBC # BLD AUTO: 4 10E12/L (ref 3.8–5.2)
WBC # BLD AUTO: 4.7 10E9/L (ref 4–11)

## 2018-08-06 PROCEDURE — 85027 COMPLETE CBC AUTOMATED: CPT | Performed by: INTERNAL MEDICINE

## 2018-08-06 PROCEDURE — 82728 ASSAY OF FERRITIN: CPT | Performed by: INTERNAL MEDICINE

## 2018-08-06 PROCEDURE — 99195 PHLEBOTOMY: CPT

## 2018-08-06 PROCEDURE — 80076 HEPATIC FUNCTION PANEL: CPT | Performed by: INTERNAL MEDICINE

## 2018-08-06 ASSESSMENT — PAIN SCALES - GENERAL: PAINLEVEL: NO PAIN (0)

## 2018-08-06 NOTE — MR AVS SNAPSHOT
After Visit Summary   8/6/2018    Irina Hanks    MRN: [id number]           Patient Information     Date Of Birth          1960        Visit Information        Provider Department      8/6/2018 2:00 PM  INFUSION CHAIR 7 [facility]        Today's Diagnoses     Iron overload    -  1       Follow-ups after your visit        Your next 10 appointments already scheduled     Aug 27, 2018  1:00 PM CDT   Level 2 with  INFUSION CHAIR 7   [facility] ([facility])    [facility]  [address]  [place] [address]   [phone]            Aug 27, 2018  2:00 PM CDT   Return Visit with Eloisa Rossi MD   [facility] ([facility])    [facility]  [address]  [place] [address] [phone]              Future tests that were ordered for you today     Open Standing Orders        Priority Remaining Interval Expires Ordered    Phlebotomy therapeutic Routine 83148/70349 DAILY PRN  8/6/2018            Who to contact     If you have questions or need follow up information about today's clinic visit or your schedule please contact [facility] directly at [phone].  Normal or non-critical lab and imaging results will be communicated to you by Contactualhart, letter or phone within 4 business days after the clinic has received the results. If you do not hear from us within 7 days, please contact the clinic through Contactualhart or phone. If you have a critical or abnormal lab result, we will notify you by phone as soon as possible.  Submit refill requests through MiMedia or call your pharmacy and they will forward the refill request to us. Please allow 3 business days for your refill to be completed.          Additional Information About Your Visit        MiMedia Information     MiMedia gives you secure access  to your electronic health record. If you see a primary care provider, you can also send messages to your care team and make appointments. If you have questions, please call your primary care clinic.  If you do not have a primary care provider, please call [phone] and they will assist you.        Care EveryWhere ID     This is your Care EveryWhere ID. This could be used by other organizations to access your [place] medical records  [id number]        Your Vitals Were     Pulse Temperature Respirations Pulse Oximetry          68 97.7  F (36.5  C) (Oral) 20 100%         Blood Pressure from Last 3 Encounters:   08/06/18 121/82   07/16/18 115/73   07/02/18 124/81    Weight from Last 3 Encounters:   06/26/18 70 kg (154 lb 6.4 oz)   06/21/18 70.1 kg (154 lb 9.6 oz)   06/12/18 71 kg (156 lb 9.6 oz)              We Performed the Following     CBC with platelets     Ferritin     Hepatic panel (Albumin, ALT, AST, Bili, Alk Phos, TP)        Primary Care Provider Office Phone # Fax #    Irish Fernandes [phone]        [facility] [address] [facility] [place] [address]        Equal Access to Services     VICTOR MANUEL FERNANDES AH: Hadii aad ku hadasho Soomaali, waaxda luqadaha, qaybta kaalmada adeegyada, waxay idiin hayaan mistieg flakita lanel ah. So [facility] [phone].    ATENCIÓN: Si habla español, tiene a jenkins disposición servicios gratuitos de asistencia lingüística. Llame al [phone].    We comply with applicable federal civil rights laws and [place] laws. We do not discriminate on the basis of race, color, national origin, age, disability, sex, sexual orientation, or gender identity.            Thank you!     Thank you for choosing [facility]  for your care. Our goal is always to provide you with excellent care. Hearing back from our patients is one way we can continue to improve our services. Please take a few minutes to complete the written survey that you may receive in the  mail after your visit with us. Thank you!             Your Updated Medication List - Protect others around you: Learn how to safely use, store and throw away your medicines at www.disposemymeds.org.          This list is accurate as of 8/6/18  3:44 PM.  Always use your most recent med list.                   Brand Name Dispense Instructions for use Diagnosis    CLARITIN 10 MG tablet   Generic drug:  loratadine     90 tablet    Take 1 tablet (10 mg) by mouth daily        clonazePAM 0.5 MG ODT tab    klonoPIN    30 tablet    DISSOLVE 1 TABLET BY MOUTH NIGHTLY AS NEEDED FOR ANIXETY    Adjustment disorder with anxious mood       hydroxychloroquine 200 MG tablet    PLAQUENIL    90 tablet    Take 2 tablets (400 mg) by mouth daily    Rheumatoid arthritis involving multiple sites with positive rheumatoid factor (H)       levothyroxine 75 MCG tablet    SYNTHROID/LEVOTHROID    90 tablet    TAKE 1 TABLET BY MOUTH EVERY MORNING    Acquired hypothyroidism       liothyronine 5 MCG tablet    CYTOMEL    60 tablet    TAKE 2 TABLETS BY MOUTH EVERY DAY    Hypothyroidism       losartan-hydrochlorothiazide 100-12.5 MG per tablet    HYZAAR    90 tablet    TAKE 1 TABLET BY MOUTH DAILY    Essential hypertension, benign       meclizine 25 MG tablet    ANTIVERT    30 tablet    Take 1 tablet (25 mg) by mouth every 6 hours as needed for dizziness    Vertigo       metoprolol succinate 50 MG 24 hr tablet    TOPROL-XL    180 tablet    TAKE 2 TABLETS BY MOUTH EVERY DAY    Essential hypertension, benign       psyllium 58.6 % Powd    METAMUCIL     Take by mouth daily    Iron overload       TURMERIC PO           VITAMIN D3 PO      Take 2,000 Units by mouth

## 2018-08-06 NOTE — PROGRESS NOTES
Infusion Nursing Note:  Irina Hanks presents today for Phlebotomy.    Patient seen by provider today: No   present during visit today: Not Applicable.    Note: VORYENI Rossi/Gloria Cobos RN Ok to proceed with phlebotomy today before lab results return. Patient's last Ferritin was 498 and goal rate is .     Intravenous Access:  Peripheral IV placed.    Treatment Conditions:  Lab Results   Component Value Date    HGB 13.2 08/06/2018     Lab Results   Component Value Date    WBC 4.7 08/06/2018      Lab Results   Component Value Date    ANEU 2.3 06/21/2018     Lab Results   Component Value Date     08/06/2018      Lab Results   Component Value Date     06/12/2018                   Lab Results   Component Value Date    POTASSIUM 3.6 06/12/2018           No results found for: MAG         Lab Results   Component Value Date    CR 0.76 06/12/2018                   Lab Results   Component Value Date    LUCA 9.3 06/12/2018                Lab Results   Component Value Date    BILITOTAL 0.6 08/06/2018           Lab Results   Component Value Date    ALBUMIN 3.9 08/06/2018                    Lab Results   Component Value Date    ALT 37 08/06/2018           Lab Results   Component Value Date    AST 34 08/06/2018     Ferritin today is 326.    Post Infusion Assessment:  Patient tolerated phlebotomy without incident.  Blood return noted pre and post infusion.  Site patent and intact, free from redness, edema or discomfort.  No evidence of extravasations.  Access discontinued per protocol.    Discharge Plan:   Patient declined prescription refills.  Discharge instructions reviewed with: Patient.  Patient verbalized understanding of discharge instructions and all questions answered.  AVS to patient via [email or web address].  Patient will return 8/27/18 for next appointment.   Patient discharged in stable condition accompanied by: self.  Departure Mode: Ambulatory.    Abbi Cobos, PHAN

## 2018-08-07 NOTE — TELEPHONE ENCOUNTER
Spoke to patient on 08/06/2018 when she was here for phlebotomy. Explained to her that she has hereditary hemochromatosis.

## 2018-08-27 ENCOUNTER — HOSPITAL ENCOUNTER (OUTPATIENT)
Facility: CLINIC | Age: 58
Setting detail: SPECIMEN
Discharge: HOME | End: 2018-08-27
Attending: INTERNAL MEDICINE | Admitting: INTERNAL MEDICINE
Payer: COMMERCIAL

## 2018-08-27 ENCOUNTER — ONCOLOGY VISIT (OUTPATIENT)
Dept: ONCOLOGY | Facility: CLINIC | Age: 58
End: 2018-08-27
Attending: INTERNAL MEDICINE
Payer: COMMERCIAL

## 2018-08-27 ENCOUNTER — INFUSION THERAPY VISIT (OUTPATIENT)
Dept: INFUSION THERAPY | Facility: CLINIC | Age: 58
End: 2018-08-27
Attending: INTERNAL MEDICINE
Payer: COMMERCIAL

## 2018-08-27 VITALS
WEIGHT: 159.2 LBS | DIASTOLIC BLOOD PRESSURE: 99 MMHG | BODY MASS INDEX: 28.2 KG/M2 | SYSTOLIC BLOOD PRESSURE: 150 MMHG | TEMPERATURE: 98.3 F | HEART RATE: 68 BPM

## 2018-08-27 VITALS
DIASTOLIC BLOOD PRESSURE: 100 MMHG | RESPIRATION RATE: 16 BRPM | HEART RATE: 64 BPM | SYSTOLIC BLOOD PRESSURE: 158 MMHG | TEMPERATURE: 98.3 F

## 2018-08-27 DIAGNOSIS — E83.110 HEREDITARY HEMOCHROMATOSIS (H): Primary | ICD-10-CM

## 2018-08-27 DIAGNOSIS — E83.19 IRON OVERLOAD: Primary | ICD-10-CM

## 2018-08-27 LAB
ALBUMIN SERPL-MCNC: 4 G/DL (ref 3.4–5)
ALP SERPL-CCNC: 93 U/L (ref 40–150)
ALT SERPL W P-5'-P-CCNC: 31 U/L (ref 0–50)
AST SERPL W P-5'-P-CCNC: 31 U/L (ref 0–45)
BILIRUB DIRECT SERPL-MCNC: 0.2 MG/DL (ref 0–0.2)
BILIRUB SERPL-MCNC: 0.8 MG/DL (ref 0.2–1.3)
ERYTHROCYTE [DISTWIDTH] IN BLOOD BY AUTOMATED COUNT: 12.1 % (ref 10–15)
FERRITIN SERPL-MCNC: 249 NG/ML (ref 8–252)
HCT VFR BLD AUTO: 37.2 % (ref 35–47)
HGB BLD-MCNC: 13.1 G/DL (ref 11.7–15.7)
MCH RBC QN AUTO: 33 PG (ref 26.5–33)
MCHC RBC AUTO-ENTMCNC: 35.2 G/DL (ref 31.5–36.5)
MCV RBC AUTO: 94 FL (ref 78–100)
PLATELET # BLD AUTO: 204 10E9/L (ref 150–450)
PROT SERPL-MCNC: 7.9 G/DL (ref 6.8–8.8)
RBC # BLD AUTO: 3.97 10E12/L (ref 3.8–5.2)
WBC # BLD AUTO: 4.6 10E9/L (ref 4–11)

## 2018-08-27 PROCEDURE — 99195 PHLEBOTOMY: CPT

## 2018-08-27 PROCEDURE — 85027 COMPLETE CBC AUTOMATED: CPT | Performed by: INTERNAL MEDICINE

## 2018-08-27 PROCEDURE — 80076 HEPATIC FUNCTION PANEL: CPT | Performed by: INTERNAL MEDICINE

## 2018-08-27 PROCEDURE — 82728 ASSAY OF FERRITIN: CPT | Performed by: INTERNAL MEDICINE

## 2018-08-27 PROCEDURE — 99214 OFFICE O/P EST MOD 30 MIN: CPT | Performed by: INTERNAL MEDICINE

## 2018-08-27 ASSESSMENT — PAIN SCALES - GENERAL
PAINLEVEL: MILD PAIN (3)
PAINLEVEL: NO PAIN (0)

## 2018-08-27 NOTE — PROGRESS NOTES
Infusion Nursing Note:  Irina Hanks presents today for labs/poss phlebotomy.    Patient seen by provider today: Yes: Dr. Rossi   present during visit today: Not Applicable.    Note: N/A.    Intravenous Access:  Labs drawn without difficulty.  Peripheral IV placed.    Treatment Conditions:  Lab Results   Component Value Date    HGB 13.1 08/27/2018     Lab Results   Component Value Date    WBC 4.6 08/27/2018      Lab Results   Component Value Date    ANEU 2.3 06/21/2018     Lab Results   Component Value Date     08/27/2018      Lab Results   Component Value Date     06/12/2018                   Lab Results   Component Value Date    POTASSIUM 3.6 06/12/2018           No results found for: MAG         Lab Results   Component Value Date    CR 0.76 06/12/2018                   Lab Results   Component Value Date    LUCA 9.3 06/12/2018                Lab Results   Component Value Date    BILITOTAL 0.8 08/27/2018           Lab Results   Component Value Date    ALBUMIN 4.0 08/27/2018                    Lab Results   Component Value Date    ALT 31 08/27/2018           Lab Results   Component Value Date    AST 31 08/27/2018       Results reviewed, labs MET treatment parameters, ok to proceed with treatment.      Post Infusion Assessment:  Patient tolerated phlebotomy without incident.  Site patent and intact, free from redness, edema or discomfort.  No evidence of extravasations.  Access discontinued per protocol.    Discharge Plan:   Copy of AVS reviewed with patient and/or family.  Patient will return as [facility] for next appointment.  Patient discharged in stable condition accompanied by: self.  Departure Mode: Ambulatory.    Rory Rivera RN

## 2018-08-27 NOTE — MR AVS SNAPSHOT
After Visit Summary   8/27/2018    Irina Hanks    MRN: [id number]           Patient Information     Date Of Birth          1960        Visit Information        Provider Department      8/27/2018 1:00 PM  INFUSION CHAIR 7 [facility]        Today's Diagnoses     Iron overload    -  1       Follow-ups after your visit        Your next 10 appointments already scheduled     Sep 24, 2018  1:30 PM CDT   Level 2 with SH INFUSION CHAIR 1   [facility] ([facility])    [facility]  [address]  [place] [address]   [phone]            Oct 22, 2018  1:00 PM CDT   Level 2 with SH INFUSION CHAIR 9   [facility] ([facility])    [facility]  [address]  [place] [address]   [phone]            Nov 19, 2018 12:30 PM CST   Level 2 with SH INFUSION CHAIR 10   [facility] ([facility])    [facility]  [address]  [place] [address]   [phone]            Nov 19, 2018  1:30 PM CST   Return Visit with Eloisa Rossi MD   [facility] ([facility])    [facility]  [address]  [place] [address]   [phone]              Future tests that were ordered for you today     Open Standing Orders        Priority Remaining Interval Expires Ordered    Phlebotomy therapeutic Routine 48226/15055 DAILY PRN  8/27/2018    CBC with platelets Routine 6/6 4 weeks 8/27/2019 8/27/2018    AST Routine 6/6 4 weeks 8/27/2019 8/27/2018    ALT Routine 6/6 4 weeks 8/27/2019 8/27/2018    Ferritin Routine 6/6 4 weeks 8/27/2019 8/27/2018            Who to contact     If you have questions or need follow up information about today's clinic visit or your schedule please contact [facility] directly at [phone].  Normal or non-critical lab and imaging results will be communicated to you by MyChart, letter or phone within 4 business days after the clinic has received the results. If you do not hear from us within 7 days, please contact the clinic through 5 Star Quarterbackhart or phone. If you have a critical or abnormal lab result, we will notify you by phone as soon as possible.  Submit refill requests through InforSense or call your pharmacy and they will forward the refill request to us. Please allow 3 business days for your refill to be completed.          Additional Information About Your Visit        5 Star QuarterbackharAdAdapted Information     InforSense gives you secure access to your electronic health record. If you see a primary care provider, you can also send messages to your care team and make appointments. If you have questions, please call your primary care clinic.  If you do not have a primary care provider, please call [phone] and they will assist you.        Care EveryWhere ID     This is your Care EveryWhere ID. This could be used by other organizations to access your [place] medical records  [id number]        Your Vitals Were     Pulse Temperature Respirations             64 98.3  F (36.8  C) (Oral) 16          Blood Pressure from Last 3 Encounters:   08/27/18 (!) 150/99   08/27/18 (!) 158/100   08/06/18 121/82    Weight from Last 3 Encounters:   08/27/18 72.2 kg (159 lb 3.2 oz)   06/26/18 70 kg (154 lb 6.4 oz)   06/21/18 70.1 kg (154 lb 9.6 oz)              We Performed the Following     CBC with platelets     Ferritin     Hepatic panel (Albumin, ALT, AST, Bili, Alk Phos, TP)        Primary Care Provider Office Phone # Fax #    Irish Fernandes [phone]       [address]  [place] [address]        Equal Access to Services     DELISA FERNANDES AH: Hadii zander lipscombo Soomaali, waaxda luqadaha, qaybta kaalmajanna lawler, jes quintero  flakita rainey ah. So [facility] [phone].    ATENCIÓN: Si mikel larkin, tiene a jenkins disposición servicios gratuitos de asistencia lingüística. Tracy barba [phone].    We comply with applicable federal civil rights laws and [place] laws. We do not discriminate on the basis of race, color, national origin, age, disability, sex, sexual orientation, or gender identity.            Thank you!     Thank you for choosing [facility]  for your care. Our goal is always to provide you with excellent care. Hearing back from our patients is one way we can continue to improve our services. Please take a few minutes to complete the written survey that you may receive in the mail after your visit with us. Thank you!             Your Updated Medication List - Protect others around you: Learn how to safely use, store and throw away your medicines at www.disposemymeds.org.          This list is accurate as of 8/27/18  4:05 PM.  Always use your most recent med list.                   Brand Name Dispense Instructions for use Diagnosis    CLARITIN 10 MG tablet   Generic drug:  loratadine     90 tablet    Take 1 tablet (10 mg) by mouth daily        clonazePAM 0.5 MG ODT tab    klonoPIN    30 tablet    DISSOLVE 1 TABLET BY MOUTH NIGHTLY AS NEEDED FOR ANIXETY    Adjustment disorder with anxious mood       hydroxychloroquine 200 MG tablet    PLAQUENIL    90 tablet    Take 2 tablets (400 mg) by mouth daily    Rheumatoid arthritis involving multiple sites with positive rheumatoid factor (H)       levothyroxine 75 MCG tablet    SYNTHROID/LEVOTHROID    90 tablet    TAKE 1 TABLET BY MOUTH EVERY MORNING    Acquired hypothyroidism       liothyronine 5 MCG tablet    CYTOMEL    60 tablet    TAKE 2 TABLETS BY MOUTH EVERY DAY    Hypothyroidism       losartan-hydrochlorothiazide 100-12.5 MG per tablet    HYZAAR    90 tablet    TAKE 1 TABLET BY MOUTH DAILY    Essential hypertension, benign       meclizine 25 MG tablet     ANTIVERT    30 tablet    Take 1 tablet (25 mg) by mouth every 6 hours as needed for dizziness    Vertigo       metoprolol succinate 50 MG 24 hr tablet    TOPROL-XL    180 tablet    TAKE 2 TABLETS BY MOUTH EVERY DAY    Essential hypertension, benign       psyllium 58.6 % Powd    METAMUCIL     Take by mouth daily    Iron overload       TURMERIC PO           VITAMIN D3 PO      Take 2,000 Units by mouth

## 2018-08-27 NOTE — PROGRESS NOTES
"Oncology Rooming Note    August 27, 2018 1:59 PM   Irina Hanks is a 58 year old female who presents for:    Chief Complaint   Patient presents with     Oncology Clinic Visit     Initial Vitals: BP (!) 150/99  Pulse 68  Temp 98.3  F (36.8  C) (Oral)  Wt 72.2 kg (159 lb 3.2 oz)  BMI 28.2 kg/m2 Estimated body mass index is 28.2 kg/(m^2) as calculated from the following:    Height as of 6/12/18: 1.6 m (5' 3\").    Weight as of this encounter: 72.2 kg (159 lb 3.2 oz). Body surface area is 1.79 meters squared.  No Pain (0) Comment: intermittent abdominal pain   No LMP recorded. Patient is postmenopausal.  Allergies reviewed: Yes  Medications reviewed: Yes    Medications: Medication refills not needed today.  Pharmacy name entered into PinMyPet:    [facility] [place] [address] [id number] [place] [address] [facility] OF [place] [address] [place] [address] DRUG STORE [id number] - DEANN MILLER - [address] [facility] AT [facility] [address]    Clinical concerns: None     2 minutes for nursing intake (face to face time)     Anita Zavala CMA              "

## 2018-08-27 NOTE — PATIENT INSTRUCTIONS
CBC once a month and phlebotomy as needed.  Scheduled/gerry  See me in 3 months with LFT and ferritin.  Scheduled/Gerry  Recheck BP.    AVS given to patient/Gerry  Patient in [facility]

## 2018-08-27 NOTE — MR AVS SNAPSHOT
After Visit Summary   8/27/2018    Irina Hanks    MRN: [id number]           Patient Information     Date Of Birth          1960        Visit Information        Provider Department      8/27/2018 2:00 PM Eloisa Rossi MD [facility]        Today's Diagnoses     Hereditary hemochromatosis (H)    -  1      Care Instructions    CBC once a month and phlebotomy as needed.  See me in 3 months with LFT and ferritin.  Recheck BP.      Patient in [facility] infusion          Follow-ups after your visit        Your next 10 appointments already scheduled     Sep 24, 2018  1:30 PM CDT   Level 2 with SH INFUSION CHAIR 1   [facility]fusion [facility] ([facility])    [facility]  [address]  [place] [address]   [phone]            Oct 22, 2018  1:00 PM CDT   Level 2 with SH INFUSION CHAIR 9   [facility] ([facility])    [facility]  [address]  [place] [address]   [phone]            Nov 19, 2018 12:30 PM CST   Level 2 with SH INFUSION CHAIR 10   [facility] ([facility])    [facility] [place]  [address]  [place] [address]   [phone]            Nov 19, 2018  1:00 PM CST   Return Visit with Eloisa Rossi MD   [facility] ([facility])    [facility]  [address]  [place] [address]   [phone]              Future tests that were ordered for you today     Open Standing Orders        Priority Remaining Interval Expires Ordered    Phlebotomy therapeutic Routine [id number] DAILY PRN  8/27/2018    CBC with platelets Routine 6/6 4 weeks 8/27/2019 8/27/2018    AST Routine 6/6 4 weeks 8/27/2019 8/27/2018    ALT Routine 6/6 4 weeks 8/27/2019 8/27/2018    Ferritin Routine 6/6 4 weeks 8/27/2019  8/27/2018            Who to contact     If you have questions or need follow up information about today's clinic visit or your schedule please contact [facility] directly at [phone].  Normal or non-critical lab and imaging results will be communicated to you by MyChart, letter or phone within 4 business days after the clinic has received the results. If you do not hear from us within 7 days, please contact the clinic through MyChart or phone. If you have a critical or abnormal lab result, we will notify you by phone as soon as possible.  Submit refill requests through [email or web address] or call your pharmacy and they will forward the refill request to us. Please allow 3 business days for your refill to be completed.          Additional Information About Your Visit        [employer] Information     [email or web address] gives you secure access to your electronic health record. If you see a primary care provider, you can also send messages to your care team and make appointments. If you have questions, please call your primary care clinic.  If you do not have a primary care provider, please call [phone] and they will assist you.        Care EveryWhere ID     This is your Care EveryWhere ID. This could be used by other organizations to access your [place] medical records  [id number]        Your Vitals Were     Pulse Temperature BMI (Body Mass Index)             68 98.3  F (36.8  C) (Oral) 28.2 kg/m2          Blood Pressure from Last 3 Encounters:   08/27/18 (!) 150/99   08/27/18 (!) 150/99   08/06/18 121/82    Weight from Last 3 Encounters:   08/27/18 72.2 kg (159 lb 3.2 oz)   06/26/18 70 kg (154 lb 6.4 oz)   06/21/18 70.1 kg (154 lb 9.6 oz)               Primary Care Provider Office Phone # Fax #    Irish Fernandes [phone]       [address]  [place] [address]        Equal Access to Services     VICTOR MANUEL FERNANDES AH: Hadii aad ku hadasho Sorudolph, waaxda luqadaha, qaybta justen lawler,  jes chappellcarmen garnica'aan ah. So [facility] [phone].    ATENCIÓN: Si mikel larkin, tiene a jenkins disposición servicios gratuitos de asistencia lingüística. Tracy barba [phone].    We comply with applicable federal civil rights laws and [place] laws. We do not discriminate on the basis of race, color, national origin, age, disability, sex, sexual orientation, or gender identity.            Thank you!     Thank you for choosing [facility]  for your care. Our goal is always to provide you with excellent care. Hearing back from our patients is one way we can continue to improve our services. Please take a few minutes to complete the written survey that you may receive in the mail after your visit with us. Thank you!             Your Updated Medication List - Protect others around you: Learn how to safely use, store and throw away your medicines at www.disposemymeds.org.          This list is accurate as of 8/27/18  3:13 PM.  Always use your most recent med list.                   Brand Name Dispense Instructions for use Diagnosis    CLARITIN 10 MG tablet   Generic drug:  loratadine     90 tablet    Take 1 tablet (10 mg) by mouth daily        clonazePAM 0.5 MG ODT tab    klonoPIN    30 tablet    DISSOLVE 1 TABLET BY MOUTH NIGHTLY AS NEEDED FOR ANIXETY    Adjustment disorder with anxious mood       hydroxychloroquine 200 MG tablet    PLAQUENIL    90 tablet    Take 2 tablets (400 mg) by mouth daily    Rheumatoid arthritis involving multiple sites with positive rheumatoid factor (H)       levothyroxine 75 MCG tablet    SYNTHROID/LEVOTHROID    90 tablet    TAKE 1 TABLET BY MOUTH EVERY MORNING    Acquired hypothyroidism       liothyronine 5 MCG tablet    CYTOMEL    60 tablet    TAKE 2 TABLETS BY MOUTH EVERY DAY    Hypothyroidism       losartan-hydrochlorothiazide 100-12.5 MG per tablet    HYZAAR    90 tablet    TAKE 1 TABLET BY MOUTH DAILY    Essential hypertension, benign       meclizine 25 MG  tablet    ANTIVERT    30 tablet    Take 1 tablet (25 mg) by mouth every 6 hours as needed for dizziness    Vertigo       metoprolol succinate 50 MG 24 hr tablet    TOPROL-XL    180 tablet    TAKE 2 TABLETS BY MOUTH EVERY DAY    Essential hypertension, benign       psyllium 58.6 % Powd    METAMUCIL     Take by mouth daily    Iron overload       TURMERIC PO           VITAMIN D3 PO      Take 2,000 Units by mouth

## 2018-08-27 NOTE — LETTER
"    8/27/2018         RE: Irina Hanks  [address]  [place] [address]        Dear Colleague,    Thank you for referring your patient, Irina Hanks, to the [facility]. Please see a copy of my visit note below.    Oncology Rooming Note    August 27, 2018 1:59 PM   Irina Hanks is a 58 year old female who presents for:    Chief Complaint   Patient presents with     Oncology Clinic Visit     Initial Vitals: BP (!) 150/99  Pulse 68  Temp 98.3  F (36.8  C) (Oral)  Wt 72.2 kg (159 lb 3.2 oz)  BMI 28.2 kg/m2 Estimated body mass index is 28.2 kg/(m^2) as calculated from the following:    Height as of 6/12/18: 1.6 m (5' 3\").    Weight as of this encounter: 72.2 kg (159 lb 3.2 oz). Body surface area is 1.79 meters squared.  No Pain (0) Comment: intermittent abdominal pain   No LMP recorded. Patient is postmenopausal.  Allergies reviewed: Yes  Medications reviewed: Yes    Medications: Medication refills not needed today.  Pharmacy name entered into [facility]:    [facility] [place] [address] [facility] [address] - [place] - [address] [facility] [place] [address] [facility] [facility] [address] - DEANN MILLER - [address] [facility] OF [address] [place] [address]    Clinical concerns: None     2 minutes for nursing intake (face to face time)     Anita Zavala [facility]                Visit Date:   08/27/2018      SUBJECTIVE:  Ms. Zhao is a 58-year-old female with hereditary hemochromatosis.  She is homozygous for H63D.  She has iron overload.  She is on phlebotomy.  With that, her ferritin and LFTs have improved.  In fact, they are normal today.      Overall, she is doing good.  The patient said that she used to have symptoms of colitis.  Since she has been on phlebotomy that has improved.      REVIEW OF SYSTEMS:  No headache.  No dizziness.  No chest pain.  No difficulty breathing.  No abdominal pain, " nausea or vomiting.  Appetite good.  No urinary or bowel complaints.  No bleeding.  No fever, chills or night sweats.      PHYSICAL EXAMINATION:   GENERAL:  The patient is alert, oriented x 3.   VITAL SIGNS: ECOG PS of 0.    The rest of the system not examined.      LABORATORY DATA:  Reviewed.  CBC is normal.  LFTs normal.  Ferritin has decreased to 249.      ASSESSMENT: A 58-year-old female with hereditary hemochromatosis on phlebotomy.      PLAN:   1.  The patient is doing well with phlebotomy.  Her LFTs have normalized.  Ferritin has been decreasing.  Most importantly, she feels better.  She has abdominal symptoms which are improved.      Hemochromatosis, will continue her on phlebotomy.  Aim is to get her ferritin below 100.  We will try to maintain her ferritin level between .      The patient will get phlebotomy about once a month.  Once ferritin is below 100, will decrease the frequency of phlebotomy.  She is agreeable for it.      2.  Complication of hemochromatosis discussed.  With phlebotomy, she should not get any long-term complication.   3.  The patient had a questions, which were all answered.  I will see her in 3 months' time with labs.  I advised her to call us with any questions or concerns.      Total face-to-face time spent 25 minutes, most of time spent in counseling and coordination of care.         RACHEL CHAPMAN MD             D: 2018   T: 2018   MT: CAROLINA      Name:     RO GARCÍA   MRN:      [id number]        Account:      [id number]   :      1960           Visit Date:   2018      Document: [id number]       Again, thank you for allowing me to participate in the care of your patient.        Sincerely,        Rachel Chapman MD

## 2018-08-28 NOTE — PROGRESS NOTES
Visit Date:   08/27/2018     HEMATOLOGY HISTORY: Mrs. Zhao is a female with hereditary hemochromatosis. Homozygous for H63D mutation.   1.  On 11/13/2015, normal LFT.   2.  On 06/15/2016, ferritin of 506.   3.  Multiple labs were done on 06/12/2018.   -CBC is normal.   -CMP revealed elevated AST and ALT.  Normal bilirubin and alkaline phosphatase.  ALT of 240 and AST of 136.   4.  Multiple labs were done on 06/15/2018     -Iron of 138, ferritin of 1140 and saturation of 66%.   -Hepatitis C antibody negative.   -Hepatitis B negative.   -Positive for hepatitis A IgG antibody.  Negative for hepatitis A IgM antibody.     5.  Ultrasound of abdomen and pelvis on 06/18/2018 is normal.   6. Multiple labs were done on 06/21/2018.   -CBC is normal.   -Iron 78, saturation of 37 and ferritin of 1028.   -HFE gene analysis reveals patient to be homozygous for H63D mutation.   7. CT abdomen and pelvis on 06/25/2018 is normal.  Liver looks normal.   8. Because of elevated LFT, phlebotomy started on 06/21/2018.      SUBJECTIVE:  Ms. Zhao is a 58-year-old female with hereditary hemochromatosis.  She is homozygous for H63D.  She has iron overload.  She is on phlebotomy.  With that, her ferritin and LFTs have improved.  In fact, they are normal today.      Overall, she is doing good.  Patient said that she used to have symptoms of colitis.  Since she has been on phlebotomy that has improved.      REVIEW OF SYSTEMS:  No headache.  No dizziness.  No chest pain.  No difficulty breathing.  No abdominal pain, nausea or vomiting.  Appetite is good.  No urinary or bowel complaints.  No bleeding.  No fever, chills or night sweats.      PHYSICAL EXAMINATION:   GENERAL:  Patient is alert, oriented x 3.   VITAL SIGNS: ECOG PS of 0.    Rest of the system not examined.      LABORATORY DATA:  Reviewed.  CBC is normal.  LFTs normal.  Ferritin has decreased to 249.      ASSESSMENT: A 58-year-old female with hereditary hemochromatosis on  phlebotomy.      PLAN:   1.  Patient is doing well with phlebotomy.  Her LFT has normalized.  Ferritin has been decreasing.  Most importantly, she feels better.  She has abdominal symptoms which are improved.      For hemochromatosis, she will continue her on phlebotomy.  Aim is to get her ferritin below 100.  We will try to maintain her ferritin level between .      Patient will get phlebotomy about once a month.  Once ferritin is below 100, will decrease the frequency of phlebotomy.  She is agreeable for it.      2.  Complications of hemochromatosis discussed.  With regular phlebotomy, she should not get any long-term complication.     3.  Patient had a questions, which were all answered.  I will see her in 3 months' time with labs.  I advised her to call us with any questions or concerns.      Total face-to-face time spent 25 minutes, most of time spent in counseling and coordination of care.         RACHEL CHAPMAN MD             D: 2018   T: 2018   MT: CAROLINA      Name:     RO GARCÍA   MRN:      [id number]        Account:      [id number]   :      1960           Visit Date:   2018      Document: [id number]

## 2018-08-29 ENCOUNTER — TELEPHONE (OUTPATIENT)
Dept: FAMILY MEDICINE | Facility: CLINIC | Age: 58
End: 2018-08-29

## 2018-08-29 DIAGNOSIS — F43.22 ADJUSTMENT DISORDER WITH ANXIOUS MOOD: ICD-10-CM

## 2018-08-29 NOTE — TELEPHONE ENCOUNTER
clonazepam      Last Written Prescription Date:  7/10/18  Last Fill Quantity: 30,   # refills: 0  Last Office Visit: 6/12/18  Future Office visit:    Next 5 appointments (look out 90 days)     Nov 19, 2018  1:30 PM CST   Return Visit with Eloisa Rossi MD   [facility] ([facility])    [facility]  [address] [facility] [address]  [place] [facility] [address]   [phone]                   Routing refill request to provider for review/approval because:  Drug not on the FMG, UMP or  Health refill protocol or controlled substance    RX [place] reviewed:  not reviewed/not due - last done on 7/10/18    [place]:  [email or web address]    Keyla Hardin RN   [facility] - Triage

## 2018-08-30 DIAGNOSIS — I10 ESSENTIAL HYPERTENSION, BENIGN: ICD-10-CM

## 2018-08-30 DIAGNOSIS — E03.4 HYPOTHYROIDISM DUE TO ACQUIRED ATROPHY OF THYROID: ICD-10-CM

## 2018-08-30 RX ORDER — LIOTHYRONINE SODIUM 5 UG/1
TABLET ORAL
Qty: 60 TABLET | Refills: 0 | Status: SHIPPED | OUTPATIENT
Start: 2018-08-30 | End: 2019-01-28

## 2018-08-30 NOTE — TELEPHONE ENCOUNTER
"Requested Prescriptions   Pending Prescriptions Disp Refills     losartan-hydrochlorothiazide (HYZAAR) 100-12.5 MG per tablet 90 tablet 0     Sig: Take 1 tablet by mouth daily  Last Written Prescription Date:  6/7/18  Last Fill Quantity: 90,  # refills: 0   Last office visit: 6/12/2018 with prescribing provider:  Dena  Future Office Visit:   Next 5 appointments (look out 90 days)     Nov 19, 2018  1:30 PM CST   Return Visit with Eloisa Rossi MD   [facility] ([facility])    [facility]  [address]  [facility] [address]   [phone]                     Angiotensin-II Receptors Failed    8/30/2018  2:28 PM       Failed - Blood pressure under 140/90 in past 12 months    BP Readings from Last 3 Encounters:   08/27/18 (!) 150/99   08/27/18 (!) 158/100   08/06/18 121/82                Passed - Recent (12 mo) or future (30 days) visit within the authorizing provider's specialty    Patient had office visit in the last 12 months or has a visit in the next 30 days with authorizing provider or within the authorizing provider's specialty.  See \"Patient Info\" tab in inbasket, or \"Choose Columns\" in Meds & Orders section of the refill encounter.           Passed - Patient is age 18 or older       Passed - No active pregnancy on record       Passed - Normal serum creatinine on file in past 12 months    Recent Labs   Lab Test  06/12/18   1509   CR  0.76            Passed - Normal serum potassium on file in past 12 months    Recent Labs   Lab Test  06/12/18   1509   POTASSIUM  3.6                   Passed - No positive pregnancy test in past 12 months        Routing refill request to provider for review/approval because:  BP >140/90    Patsy Ahumada RN  EP Triage                "

## 2018-08-30 NOTE — TELEPHONE ENCOUNTER
"Requested Prescriptions   Pending Prescriptions Disp Refills     liothyronine (CYTOMEL) 5 MCG tablet [Pharmacy Med Name: LIOTHYRONINE 5MCG TABLETS] 60 tablet 0    Last Written Prescription Date:  8/1/18  Last Fill Quantity: 60,  # refills: 0   Last office visit: 6/12/2018 with prescribing provider:     Future Office Visit:   Next 5 appointments (look out 90 days)     Nov 19, 2018  1:30 PM CST   Return Visit with Eloisa Rossi MD   [facility] ([facility])    [facility]  [address] [facility] [address]  [facility] [address]   [phone]                    Sig: TAKE 2 TABLETS BY MOUTH EVERY DAY    Thyroid Protocol Failed    8/30/2018 10:52 AM       Failed - Normal TSH on file in past 12 months    Recent Labs   Lab Test  08/14/17   1116   TSH  0.82             Passed - Patient is 12 years or older       Passed - Recent (12 mo) or future (30 days) visit within the authorizing provider's specialty    Patient had office visit in the last 12 months or has a visit in the next 30 days with authorizing provider or within the authorizing provider's specialty.  See \"Patient Info\" tab in inbasket, or \"Choose Columns\" in Meds & Orders section of the refill encounter.           Passed - No active pregnancy on record    If patient is pregnant or has had a positive pregnancy test, please check TSH.         Passed - No positive pregnancy test in past 12 months    If patient is pregnant or has had a positive pregnancy test, please check TSH.            "

## 2018-08-30 NOTE — TELEPHONE ENCOUNTER
Routing refill request to provider for review/approval because:  Lulú given x1 and patient did not follow up, please advise  Labs not current:  TSH  Chelsie Dent RN - Triage  [facility]

## 2018-08-30 NOTE — TELEPHONE ENCOUNTER
Orders placed for TSH and 30-day supply given. Notify patient that next time she has labs drawn for hematology she should request that the TSH I just ordered be drawn as well. THx.

## 2018-08-31 ENCOUNTER — TELEPHONE (OUTPATIENT)
Dept: FAMILY MEDICINE | Facility: CLINIC | Age: 58
End: 2018-08-31

## 2018-08-31 RX ORDER — CLONAZEPAM 0.5 MG/1
TABLET, ORALLY DISINTEGRATING ORAL
Qty: 30 TABLET | Refills: 0 | Status: SHIPPED | OUTPATIENT
Start: 2018-08-31 | End: 2018-11-06

## 2018-08-31 RX ORDER — LOSARTAN POTASSIUM AND HYDROCHLOROTHIAZIDE 12.5; 1 MG/1; MG/1
1 TABLET ORAL DAILY
Qty: 90 TABLET | Refills: 1 | Status: SHIPPED | OUTPATIENT
Start: 2018-08-31 | End: 2019-01-28

## 2018-08-31 NOTE — TELEPHONE ENCOUNTER
S/w David at [facility] pharmacy who states he received a denial on our end.    Gave verbal order for clonazepam per med list to pharmacist.    S/w pt and advised of verbal order given to pharmacist and they are working on rx at this time.  Pt will go to pharmacy for medication.    Patsy Ahumada RN  EP Triage

## 2018-08-31 NOTE — TELEPHONE ENCOUNTER
S/w pt and gave md reply below.  Pt states Randi has not gotten the clonazepam rx yet.    Advised rx was faxed to Randi [address] at 1040am today.  Pt will call pharmacy back.    Patsy Ahumada RN  EP Triage

## 2018-08-31 NOTE — TELEPHONE ENCOUNTER
Looks like all these elevated BP's were on the same day (during an infusion visit). Please call to ask Peg to come in for a nurse only BP check to ensure that her blood pressures are still well controlled on this medication. I will refill in the meantime.

## 2018-08-31 NOTE — TELEPHONE ENCOUNTER
S/w who states she forgot to take her hyzaar the morning of her infusion on 8/27.    Has been taking BP at home which has been running around 125/85.    Dr. Fernandes did you still want pt to come back in for nurse visit?    Patsy Ahumada RN  EP Triage

## 2018-08-31 NOTE — TELEPHONE ENCOUNTER
Randi saying fax wont go through due to 'controlled' substance -Randi still does not have -can we try again? And confirm it was received?

## 2018-09-21 DIAGNOSIS — I10 ESSENTIAL HYPERTENSION, BENIGN: ICD-10-CM

## 2018-09-21 RX ORDER — METOPROLOL SUCCINATE 50 MG/1
TABLET, EXTENDED RELEASE ORAL
Qty: 180 TABLET | Refills: 0 | Status: SHIPPED | OUTPATIENT
Start: 2018-09-21 | End: 2018-12-21

## 2018-09-21 NOTE — TELEPHONE ENCOUNTER
"Requested Prescriptions   Pending Prescriptions Disp Refills     metoprolol succinate (TOPROL-XL) 50 MG 24 hr tablet [Pharmacy Med Name: METOPROLOL ER SUCCINATE 50MG TABS] 180 tablet 0    Last Written Prescription Date:  9/18/17  Last Fill Quantity: 180,  # refills: 3   Last office visit: 6/12/2018 with prescribing provider:  YES    Future Office Visit:   Next 5 appointments (look out 90 days)     Nov 19, 2018  1:30 PM CST   Return Visit with Eloisa Rossi MD   [facility] ([facility])    [facility]  [address]  [facility] [address]   [phone]                  Sig: TAKE 2 TABLETS BY MOUTH EVERY DAY    Beta-Blockers Protocol Failed    9/21/2018  4:09 AM       Failed - Blood pressure under 140/90 in past 12 months    BP Readings from Last 3 Encounters:   08/27/18 (!) 150/99   08/27/18 (!) 158/100   08/06/18 121/82                Passed - Patient is age 6 or older       Passed - Recent (12 mo) or future (30 days) visit within the authorizing provider's specialty    Patient had office visit in the last 12 months or has a visit in the next 30 days with authorizing provider or within the authorizing provider's specialty.  See \"Patient Info\" tab in inbasket, or \"Choose Columns\" in Meds & Orders section of the refill encounter.              "

## 2018-09-21 NOTE — TELEPHONE ENCOUNTER
Routing refill request to provider for review/approval because:  bp out of range.  Naomie FordRN BSN  [facility]  [phone]

## 2018-09-25 ENCOUNTER — TRANSFERRED RECORDS (OUTPATIENT)
Dept: HEALTH INFORMATION MANAGEMENT | Facility: CLINIC | Age: 58
End: 2018-09-25

## 2018-09-26 ENCOUNTER — HOSPITAL ENCOUNTER (OUTPATIENT)
Facility: CLINIC | Age: 58
Setting detail: SPECIMEN
Discharge: HOME | End: 2018-09-26
Attending: INTERNAL MEDICINE | Admitting: INTERNAL MEDICINE
Payer: COMMERCIAL

## 2018-09-26 ENCOUNTER — INFUSION THERAPY VISIT (OUTPATIENT)
Dept: INFUSION THERAPY | Facility: CLINIC | Age: 58
End: 2018-09-26
Attending: INTERNAL MEDICINE
Payer: COMMERCIAL

## 2018-09-26 VITALS
DIASTOLIC BLOOD PRESSURE: 82 MMHG | SYSTOLIC BLOOD PRESSURE: 129 MMHG | HEART RATE: 68 BPM | TEMPERATURE: 97.5 F | OXYGEN SATURATION: 96 % | RESPIRATION RATE: 16 BRPM

## 2018-09-26 DIAGNOSIS — E83.110 HEREDITARY HEMOCHROMATOSIS (H): Primary | ICD-10-CM

## 2018-09-26 DIAGNOSIS — E83.19 IRON OVERLOAD: ICD-10-CM

## 2018-09-26 LAB
ALT SERPL W P-5'-P-CCNC: 27 U/L (ref 0–50)
AST SERPL W P-5'-P-CCNC: 37 U/L (ref 0–45)
ERYTHROCYTE [DISTWIDTH] IN BLOOD BY AUTOMATED COUNT: 11.8 % (ref 10–15)
FERRITIN SERPL-MCNC: 200 NG/ML (ref 8–252)
HCT VFR BLD AUTO: 38 % (ref 35–47)
HGB BLD-MCNC: 13.7 G/DL (ref 11.7–15.7)
MCH RBC QN AUTO: 33.2 PG (ref 26.5–33)
MCHC RBC AUTO-ENTMCNC: 36.1 G/DL (ref 31.5–36.5)
MCV RBC AUTO: 92 FL (ref 78–100)
PLATELET # BLD AUTO: 191 10E9/L (ref 150–450)
RBC # BLD AUTO: 4.13 10E12/L (ref 3.8–5.2)
WBC # BLD AUTO: 3.4 10E9/L (ref 4–11)

## 2018-09-26 PROCEDURE — 84460 ALANINE AMINO (ALT) (SGPT): CPT | Performed by: INTERNAL MEDICINE

## 2018-09-26 PROCEDURE — 82728 ASSAY OF FERRITIN: CPT | Performed by: INTERNAL MEDICINE

## 2018-09-26 PROCEDURE — 84450 TRANSFERASE (AST) (SGOT): CPT | Performed by: INTERNAL MEDICINE

## 2018-09-26 PROCEDURE — 99195 PHLEBOTOMY: CPT

## 2018-09-26 PROCEDURE — 85027 COMPLETE CBC AUTOMATED: CPT | Performed by: INTERNAL MEDICINE

## 2018-09-26 PROCEDURE — 36415 COLL VENOUS BLD VENIPUNCTURE: CPT

## 2018-09-26 ASSESSMENT — PAIN SCALES - GENERAL: PAINLEVEL: NO PAIN (0)

## 2018-09-26 NOTE — MR AVS SNAPSHOT
After Visit Summary   9/26/2018    Irina Hanks    MRN: [id number]           Patient Information     Date Of Birth          1960        Visit Information        Provider Department      9/26/2018 10:00 AM  INFUSION CHAIR 18 [facility]        Today's Diagnoses     Hereditary hemochromatosis (H)    -  1    Iron overload           Follow-ups after your visit        Your next 10 appointments already scheduled     Oct 22, 2018  1:00 PM CDT   Level 2 with  INFUSION CHAIR 18   [facility] ([facility])    [facility]  [address]  [place] [address]   [phone]            Nov 19, 2018 12:30 PM CST   Level 2 with  INFUSION CHAIR 10   [facility] ([facility])    [facility]  [address]  [place] [address]   [phone]            Nov 19, 2018  1:30 PM CST   Return Visit with Eloisa Rossi MD   [facility] ([facility])    [facility]  [address]  [place] [address]   [phone]              Future tests that were ordered for you today     Open Standing Orders        Priority Remaining Interval Expires Ordered    Phlebotomy therapeutic Routine 67622/36057 DAILY PRN  9/26/2018            Who to contact     If you have questions or need follow up information about today's clinic visit or your schedule please contact [facility] directly at [phone].  Normal or non-critical lab and imaging results will be communicated to you by MyChart, letter or phone within 4 business days after the clinic has received the results. If you do not hear from us within 7 days, please contact the clinic through MyChart or phone. If you have a critical or abnormal lab result, we will notify you by phone as soon as  possible.  Submit refill requests through [employer] or call your pharmacy and they will forward the refill request to us. Please allow 3 business days for your refill to be completed.          Additional Information About Your Visit        [employer] Information     [employer] gives you secure access to your electronic health record. If you see a primary care provider, you can also send messages to your care team and make appointments. If you have questions, please call your primary care clinic.  If you do not have a primary care provider, please call [phone] and they will assist you.        Care EveryWhere ID     This is your Care EveryWhere ID. This could be used by other organizations to access your [place] medical records  [id number]        Your Vitals Were     Pulse Temperature Respirations Pulse Oximetry          68 97.5  F (36.4  C) (Oral) 16 96%         Blood Pressure from Last 3 Encounters:   09/26/18 129/82   08/27/18 (!) 150/99   08/27/18 (!) 158/100    Weight from Last 3 Encounters:   08/27/18 72.2 kg (159 lb 3.2 oz)   06/26/18 70 kg (154 lb 6.4 oz)   06/21/18 70.1 kg (154 lb 9.6 oz)              We Performed the Following     ALT     AST     CBC with platelets     Ferritin        Primary Care Provider Office Phone # Fax #    Irish Fernandes [phone]       [address] [id number]        Equal Access to Services     [facility]: Hadii aad ku hadasho Soomaali, waaxda luqadaha, qaybta kaalmada adeegyada, jes rainey . So [facility] [phone].    ATENCIÓN: Si habla español, tiene a jenkins disposición servicios gratuitos de asistencia lingüística. Llame al [phone].    We comply with applicable federal civil rights laws and [place] laws. We do not discriminate on the basis of race, color, national origin, age, disability, sex, sexual orientation, or gender identity.            Thank you!     Thank you for choosing [facility]  CLINIC AND INFUSION CENTER  for your care. Our goal is always to provide you with excellent care. Hearing back from our patients is one way we can continue to improve our services. Please take a few minutes to complete the written survey that you may receive in the mail after your visit with us. Thank you!             Your Updated Medication List - Protect others around you: Learn how to safely use, store and throw away your medicines at www.disposemymeds.org.          This list is accurate as of 9/26/18 11:35 AM.  Always use your most recent med list.                   Brand Name Dispense Instructions for use Diagnosis    CLARITIN 10 MG tablet   Generic drug:  loratadine     90 tablet    Take 1 tablet (10 mg) by mouth daily        clonazePAM 0.5 MG ODT tab    klonoPIN    30 tablet    DISSOLVE 1 TABLET BY MOUTH NIGHTLY AS NEEDED FOR ANXIETY    Adjustment disorder with anxious mood       hydroxychloroquine 200 MG tablet    PLAQUENIL    90 tablet    Take 2 tablets (400 mg) by mouth daily    Rheumatoid arthritis involving multiple sites with positive rheumatoid factor (H)       levothyroxine 75 MCG tablet    SYNTHROID/LEVOTHROID    90 tablet    TAKE 1 TABLET BY MOUTH EVERY MORNING    Acquired hypothyroidism       liothyronine 5 MCG tablet    CYTOMEL    60 tablet    TAKE 2 TABLETS BY MOUTH EVERY DAY    Hypothyroidism due to acquired atrophy of thyroid       losartan-hydrochlorothiazide 100-12.5 MG per tablet    HYZAAR    90 tablet    Take 1 tablet by mouth daily    Essential hypertension, benign       meclizine 25 MG tablet    ANTIVERT    30 tablet    Take 1 tablet (25 mg) by mouth every 6 hours as needed for dizziness    Vertigo       metoprolol succinate 50 MG 24 hr tablet    TOPROL-XL    180 tablet    TAKE 2 TABLETS BY MOUTH EVERY DAY    Essential hypertension, benign       psyllium 58.6 % Powd    METAMUCIL     Take by mouth daily    Iron overload       TURMERIC PO           VITAMIN D3 PO      Take 2,000 Units by  mouth

## 2018-09-26 NOTE — PROGRESS NOTES
Infusion Nursing Note:  Irina Hanks presents today for labs, possible phlebotomy.    Patient seen by provider today: No   present during visit today: Not Applicable.    Note: No changes or concerns to report..    Intravenous Access:  Lab draw site left fa, Needle type butterfly, Gauge 23.  Labs drawn without difficulty.    Treatment Conditions:  Lab Results   Component Value Date    HGB 13.7 09/26/2018     Lab Results   Component Value Date    WBC 3.4 09/26/2018      Lab Results   Component Value Date    ANEU 2.3 06/21/2018     Lab Results   Component Value Date     09/26/2018      Lab Results   Component Value Date     06/12/2018                   Lab Results   Component Value Date    POTASSIUM 3.6 06/12/2018           No results found for: MAG         Lab Results   Component Value Date    CR 0.76 06/12/2018                   Lab Results   Component Value Date    LUCA 9.3 06/12/2018                Lab Results   Component Value Date    BILITOTAL 0.8 08/27/2018           Lab Results   Component Value Date    ALBUMIN 4.0 08/27/2018                    Lab Results   Component Value Date    ALT 27 09/26/2018           Lab Results   Component Value Date    AST 37 09/26/2018       Results reviewed, labs MET treatment parameters, ok to proceed with treatment.  17 gauge needle inserted left AC with one attempt. 500 ml (530 gm) blood removed easily with bag/scale method. Tolerated well. I will message MD reg. Orders for next phlebotomy.      Post Infusion Assessment:  Blood return noted pre and post infusion.  Site patent and intact, free from redness, edema or discomfort.  No evidence of extravasations.  Access discontinued per protocol.    Discharge Plan:   Discharge instructions reviewed with: Patient.  Patient and/or family verbalized understanding of discharge instructions and all questions answered.  Copy of AVS reviewed with patient and/or family.  Patient will return 10/22 for next  appointment.  Patient discharged in stable condition accompanied by: self.  Departure Mode: Ambulatory.    Hilary Toscano RN

## 2018-10-02 ENCOUNTER — MYC MEDICAL ADVICE (OUTPATIENT)
Dept: FAMILY MEDICINE | Facility: CLINIC | Age: 58
End: 2018-10-02

## 2018-10-02 DIAGNOSIS — H40.003 GLAUCOMA SUSPECT, BILATERAL: Primary | ICD-10-CM

## 2018-10-02 NOTE — TELEPHONE ENCOUNTER
Please see My Chart message below and advise as appropriate.  Chelsie Dent RN - Triage  [facility]

## 2018-10-02 NOTE — TELEPHONE ENCOUNTER
Message to Dr. Fernandes see my chart message below and advise.  Where did you want pt to be seen: [facility], [facility] or [facility]?    Please clarify.    Patsy Ahumada RN  EP Triage

## 2018-10-03 NOTE — TELEPHONE ENCOUNTER
Called patient had to leave a . Let her information about [facility] and gave her the number  To their locations. She does not need a referral she can just call and schedule an appt. Let my direct number is she would have questions.    Pamela Bonilla  Referral Coordinator

## 2018-10-03 NOTE — TELEPHONE ENCOUNTER
Patient herself requested [facility]. I'm not familiar with them.    Pamela - can you call patient to clarify her request? Thanks.

## 2018-10-23 ENCOUNTER — HOSPITAL ENCOUNTER (OUTPATIENT)
Facility: CLINIC | Age: 58
Setting detail: SPECIMEN
Discharge: HOME | End: 2018-10-23
Attending: INTERNAL MEDICINE | Admitting: INTERNAL MEDICINE
Payer: COMMERCIAL

## 2018-10-23 ENCOUNTER — INFUSION THERAPY VISIT (OUTPATIENT)
Dept: INFUSION THERAPY | Facility: CLINIC | Age: 58
End: 2018-10-23
Attending: INTERNAL MEDICINE
Payer: COMMERCIAL

## 2018-10-23 VITALS
DIASTOLIC BLOOD PRESSURE: 106 MMHG | HEART RATE: 61 BPM | OXYGEN SATURATION: 97 % | SYSTOLIC BLOOD PRESSURE: 164 MMHG | TEMPERATURE: 98.1 F | RESPIRATION RATE: 16 BRPM

## 2018-10-23 DIAGNOSIS — E83.110 HEREDITARY HEMOCHROMATOSIS (H): Primary | ICD-10-CM

## 2018-10-23 DIAGNOSIS — E83.19 IRON OVERLOAD: ICD-10-CM

## 2018-10-23 LAB
ALT SERPL W P-5'-P-CCNC: 21 U/L (ref 0–50)
AST SERPL W P-5'-P-CCNC: 23 U/L (ref 0–45)
ERYTHROCYTE [DISTWIDTH] IN BLOOD BY AUTOMATED COUNT: 12.2 % (ref 10–15)
FERRITIN SERPL-MCNC: 88 NG/ML (ref 8–252)
HCT VFR BLD AUTO: 35 % (ref 35–47)
HGB BLD-MCNC: 11.9 G/DL (ref 11.7–15.7)
MCH RBC QN AUTO: 31.4 PG (ref 26.5–33)
MCHC RBC AUTO-ENTMCNC: 34 G/DL (ref 31.5–36.5)
MCV RBC AUTO: 92 FL (ref 78–100)
PLATELET # BLD AUTO: 209 10E9/L (ref 150–450)
RBC # BLD AUTO: 3.79 10E12/L (ref 3.8–5.2)
WBC # BLD AUTO: 4.7 10E9/L (ref 4–11)

## 2018-10-23 PROCEDURE — 36415 COLL VENOUS BLD VENIPUNCTURE: CPT

## 2018-10-23 PROCEDURE — 82728 ASSAY OF FERRITIN: CPT | Performed by: INTERNAL MEDICINE

## 2018-10-23 PROCEDURE — 84460 ALANINE AMINO (ALT) (SGPT): CPT | Performed by: INTERNAL MEDICINE

## 2018-10-23 PROCEDURE — 84450 TRANSFERASE (AST) (SGOT): CPT | Performed by: INTERNAL MEDICINE

## 2018-10-23 PROCEDURE — 85027 COMPLETE CBC AUTOMATED: CPT | Performed by: INTERNAL MEDICINE

## 2018-10-23 ASSESSMENT — PAIN SCALES - GENERAL: PAINLEVEL: NO PAIN (0)

## 2018-10-23 NOTE — MR AVS SNAPSHOT
After Visit Summary   10/23/2018    Irina Hanks    MRN: [id number]           Patient Information     Date Of Birth          1960        Visit Information        Provider Department      10/23/2018 2:00 PM  INFUSION CHAIR 16 [facility] [place]        Today's Diagnoses     Hereditary hemochromatosis (H)    -  1    Iron overload           Follow-ups after your visit        Your next 10 appointments already scheduled     Nov 19, 2018 11:40 AM CST   MA SCREENING BILATERAL W/ ANA with [id number]   [facility] ([facility])    [address] [place] [address], [address]  [facility] [id number] [phone]           How do I prepare for my exam? (Food and drink instructions) No Food and Drink Restrictions.  How do I prepare for my exam? (Other instructions) Do not use any powder, lotion or deodorant under your arms or on your breast. If you do, we will ask you to remove it before your exam.  What should I wear: Wear comfortable, two-piece clothing.  How long does the exam take: Most scans will take 15 minutes.  What should I bring: Bring any previous mammograms from other facilities or have them mailed to the breast center.  Do I need a :  No  is needed.  What do I need to tell my doctor: If you have any allergies, tell your care team.  What should I do after the exam: No restrictions, You may resume normal activities.  What is this test: This test is an x-ray of the breast to look for breast disease. The breast is pressed between two plates to flatten and spread the tissue. An X-ray is taken of the breast from different angles.  Who should I call with questions: If you have any questions, please call the Imaging Department where you will have your exam. Directions, parking instructions, and other information is available on our website, [email or web address].  Other information about my exam Three-dimensional (3D) mammograms are  "available at [place] locations in [place], [place], [place], [place], [facility], [place], and [place].  Health locations include [place] and the [facility] in [place].  Benefits of 3D mammograms include: * Improved rate of cancer detection * Decreases your chance of having to go back for more tests, which means fewer: * \"False-positive\" results (This means that there is an abnormal area but it isn't cancer.) * Invasive testing procedures, such as a biopsy or surgery * Can provide clearer images of the breast if you have dense breast tissue.  *3D mammography is an optional exam that anyone can have with a 2D mammogram. It doesn't replace or take the place of a 2D mammogram. 2D mammograms remain an effective screening test for all women.  Not all insurance companies cover the cost of a 3D mammogram. Check with your insurance.            Nov 19, 2018 12:30 PM CST   Level 2 with  INFUSION CHAIR 10   [facility] ([facility])    [facility]  [address]  [facility] [address]   [phone]            Nov 19, 2018  1:30 PM CST   Return Visit with Eloisa Rossi MD   [facility] ([facility])    [facility]  [address]  [facility] [address]   [phone]              Future tests that were ordered for you today     Open Standing Orders        Priority Remaining Interval Expires Ordered    Phlebotomy therapeutic Routine 45077/[id number] DAILY PRN  10/23/2018            Who to contact     If you have questions or need follow up information about today's clinic visit or your schedule please contact [facility]ION [place] directly at [phone].  Normal or non-critical lab and imaging results will be communicated to you by MyChart, letter or phone within 4 business days after the clinic has received the results. If you do not hear " from us within 7 days, please contact the clinic through [employer] or phone. If you have a critical or abnormal lab result, we will notify you by phone as soon as possible.  Submit refill requests through [employer] or call your pharmacy and they will forward the refill request to us. Please allow 3 business days for your refill to be completed.          Additional Information About Your Visit        [employer] Information     [employer] gives you secure access to your electronic health record. If you see a primary care provider, you can also send messages to your care team and make appointments. If you have questions, please call your primary care clinic.  If you do not have a primary care provider, please call [phone] and they will assist you.        Care EveryWhere ID     This is your Care EveryWhere ID. This could be used by other organizations to access your [place] medical records  [id number]        Your Vitals Were     Pulse Temperature Respirations Pulse Oximetry          69 98.1  F (36.7  C) (Oral) 16 97%         Blood Pressure from Last 3 Encounters:   10/23/18 (!) 144/101   09/26/18 129/82   08/27/18 (!) 150/99    Weight from Last 3 Encounters:   08/27/18 72.2 kg (159 lb 3.2 oz)   06/26/18 70 kg (154 lb 6.4 oz)   06/21/18 70.1 kg (154 lb 9.6 oz)              We Performed the Following     ALT     AST     CBC with platelets     Ferritin        Primary Care Provider Office Phone # Fax #    Irish Fernandes [phone]       [address] [facility] [address]        Equal Access to Services     [facility] : Hadii aad ku hadasho Soomaali, waaxda luqadaha, qaybta kaalmada adeegyajanna, waxay amanda rainey . So [facility] [phone].    ATENCIÓN: Si habla español, tiene a jenkins disposición servicios gratuitos de asistencia lingüística. Llame al [phone].    We comply with applicable federal civil rights laws and [place] laws. We do not discriminate on the basis of  race, color, national origin, age, disability, sex, sexual orientation, or gender identity.            Thank you!     Thank you for choosing [facility]  for your care. Our goal is always to provide you with excellent care. Hearing back from our patients is one way we can continue to improve our services. Please take a few minutes to complete the written survey that you may receive in the mail after your visit with us. Thank you!             Your Updated Medication List - Protect others around you: Learn how to safely use, store and throw away your medicines at www.disposemymeds.org.          This list is accurate as of 10/23/18  3:51 PM.  Always use your most recent med list.                   Brand Name Dispense Instructions for use Diagnosis    CLARITIN 10 MG tablet   Generic drug:  loratadine     90 tablet    Take 1 tablet (10 mg) by mouth daily        clonazePAM 0.5 MG ODT tab    klonoPIN    30 tablet    DISSOLVE 1 TABLET BY MOUTH NIGHTLY AS NEEDED FOR ANXIETY    Adjustment disorder with anxious mood       hydroxychloroquine 200 MG tablet    PLAQUENIL    90 tablet    Take 2 tablets (400 mg) by mouth daily    Rheumatoid arthritis involving multiple sites with positive rheumatoid factor (H)       levothyroxine 75 MCG tablet    SYNTHROID/LEVOTHROID    90 tablet    TAKE 1 TABLET BY MOUTH EVERY MORNING    Acquired hypothyroidism       liothyronine 5 MCG tablet    CYTOMEL    60 tablet    TAKE 2 TABLETS BY MOUTH EVERY DAY    Hypothyroidism due to acquired atrophy of thyroid       losartan-hydrochlorothiazide 100-12.5 MG per tablet    HYZAAR    90 tablet    Take 1 tablet by mouth daily    Essential hypertension, benign       meclizine 25 MG tablet    ANTIVERT    30 tablet    Take 1 tablet (25 mg) by mouth every 6 hours as needed for dizziness    Vertigo       metoprolol succinate 50 MG 24 hr tablet    TOPROL-XL    180 tablet    TAKE 2 TABLETS BY MOUTH EVERY DAY    Essential hypertension,  benign       psyllium 58.6 % Powd    METAMUCIL     Take by mouth daily    Iron overload       TURMERIC PO           VITAMIN D3 PO      Take 2,000 Units by mouth

## 2018-10-23 NOTE — PROGRESS NOTES
Infusion Nursing Note:  Irina Hanks presents today for labs, possible phlebotomy.    Patient seen by provider today: No   present during visit today: Not Applicable.    Note: No concerns or changes to repor. .BP running high today and patient admits she forgot her BP meds yesterday.    Intravenous Access:  17 gauge phlebotomy needle placed left AC with one attempt.Labs drawn and needle left in place til results back if we proceed or not with phlebotomy.    Treatment Conditions:  Lab Results   Component Value Date    HGB 11.9 10/23/2018     Lab Results   Component Value Date    WBC 4.7 10/23/2018      Lab Results   Component Value Date    ANEU 2.3 06/21/2018     Lab Results   Component Value Date     10/23/2018      Lab Results   Component Value Date     06/12/2018                   Lab Results   Component Value Date    POTASSIUM 3.6 06/12/2018           No results found for: MAG         Lab Results   Component Value Date    CR 0.76 06/12/2018                   Lab Results   Component Value Date    LUCA 9.3 06/12/2018                Lab Results   Component Value Date    BILITOTAL 0.8 08/27/2018           Lab Results   Component Value Date    ALBUMIN 4.0 08/27/2018                    Lab Results   Component Value Date    ALT 21 10/23/2018           Lab Results   Component Value Date    AST 23 10/23/2018       Results reviewed, labs did NOT meet treatment parameters: Labs do not meet criteria but pt. Would like to proceed if possible as she says she is a hard stick.  I contacted Dr. Rossi with lab results and he is ok to proceed with phlebotomy today,  but patient may start increasing interval to every 5-6 weeks per her discretion. 500 ml (530 gm) blood removed easily with bag/scale method.      Post Infusion Assessment:  Blood return noted pre and post infusion.  Site patent and intact, free from redness, edema or discomfort.  No evidence of extravasations.  Access discontinued per  protocol.  Says she feels fine. Denies lightheadedness or problems. BP still high and pt. Will recheck at home again and maybe take second dose of BP med tonight if still high.    Discharge Plan:   Discharge instructions reviewed with: Patient.  Patient and/or family verbalized understanding of discharge instructions and all questions answered.  Copy of AVS reviewed with patient and/or family.  Patient will change return appt. On the way out today.   Patient discharged in stable condition accompanied by: self.  Departure Mode: Ambulatory.    Hilary Toscano RN

## 2018-10-24 ENCOUNTER — TRANSFERRED RECORDS (OUTPATIENT)
Dept: HEALTH INFORMATION MANAGEMENT | Facility: CLINIC | Age: 58
End: 2018-10-24

## 2018-10-24 ENCOUNTER — TELEPHONE (OUTPATIENT)
Dept: ONCOLOGY | Facility: CLINIC | Age: 58
End: 2018-10-24

## 2018-10-24 NOTE — TELEPHONE ENCOUNTER
I received a call from the patient with an update that she had forgotten to take her blood pressure medications for 3 days and her blood pressure yesterday was notable elevated.     Patient has taken her medications and rechecked her blood pressure this morning and it is B/P 117/81 HR 65.    Danielle Newman

## 2018-11-05 ENCOUNTER — TRANSFERRED RECORDS (OUTPATIENT)
Dept: HEALTH INFORMATION MANAGEMENT | Facility: CLINIC | Age: 58
End: 2018-11-05

## 2018-11-06 DIAGNOSIS — F43.22 ADJUSTMENT DISORDER WITH ANXIOUS MOOD: ICD-10-CM

## 2018-11-06 RX ORDER — CLONAZEPAM 0.5 MG/1
TABLET, ORALLY DISINTEGRATING ORAL
Qty: 30 TABLET | Refills: 0 | Status: SHIPPED | OUTPATIENT
Start: 2018-11-06 | End: 2018-12-21

## 2018-11-06 NOTE — TELEPHONE ENCOUNTER
clonazepam      Last Written Prescription Date:  8/31/18  Last Fill Quantity: 30,   # refills: 0  Last Office Visit: 6/12/18  Future Office visit:    Next 5 appointments (look out 90 days)     Nov 19, 2018  1:30 PM CST   Return Visit with Eloisa Rossi MD   [facility] ([facility])    [facility]  [address]  [facility] [address]   [phone]            Dec 04, 2018  1:20 PM CST   Return Visit with Eloisa Rossi MD   [facility] ([facility])    [facility]  [address]  [facility] [address]   [phone]                   Routing refill request to provider for review/approval because:  Drug not on the FMG, UMP or  Health refill protocol or controlled substance    [place] reviewed:  not reviewed/not due - last done on 7/10/18    [place]:  [email or web address]    Keyla Hardin RN   [facility] - Triage

## 2018-11-19 ENCOUNTER — INFUSION THERAPY VISIT (OUTPATIENT)
Dept: INFUSION THERAPY | Facility: CLINIC | Age: 58
End: 2018-11-19
Attending: INTERNAL MEDICINE
Payer: COMMERCIAL

## 2018-11-19 ENCOUNTER — HOSPITAL ENCOUNTER (OUTPATIENT)
Facility: CLINIC | Age: 58
Setting detail: SPECIMEN
End: 2018-11-19
Attending: INTERNAL MEDICINE
Payer: COMMERCIAL

## 2018-11-19 VITALS
HEART RATE: 73 BPM | OXYGEN SATURATION: 99 % | TEMPERATURE: 97.3 F | DIASTOLIC BLOOD PRESSURE: 79 MMHG | SYSTOLIC BLOOD PRESSURE: 115 MMHG | RESPIRATION RATE: 16 BRPM

## 2018-11-19 DIAGNOSIS — E83.19 IRON OVERLOAD: ICD-10-CM

## 2018-11-19 DIAGNOSIS — E83.110 HEREDITARY HEMOCHROMATOSIS (H): Primary | ICD-10-CM

## 2018-11-19 LAB
ALT SERPL W P-5'-P-CCNC: 24 U/L (ref 0–50)
AST SERPL W P-5'-P-CCNC: 22 U/L (ref 0–45)
ERYTHROCYTE [DISTWIDTH] IN BLOOD BY AUTOMATED COUNT: 12.6 % (ref 10–15)
FERRITIN SERPL-MCNC: 78 NG/ML (ref 8–252)
HCT VFR BLD AUTO: 37.3 % (ref 35–47)
HGB BLD-MCNC: 13.3 G/DL (ref 11.7–15.7)
MCH RBC QN AUTO: 32.2 PG (ref 26.5–33)
MCHC RBC AUTO-ENTMCNC: 35.7 G/DL (ref 31.5–36.5)
MCV RBC AUTO: 90 FL (ref 78–100)
PLATELET # BLD AUTO: 227 10E9/L (ref 150–450)
RBC # BLD AUTO: 4.13 10E12/L (ref 3.8–5.2)
WBC # BLD AUTO: 6 10E9/L (ref 4–11)

## 2018-11-19 PROCEDURE — 36415 COLL VENOUS BLD VENIPUNCTURE: CPT

## 2018-11-19 PROCEDURE — 82728 ASSAY OF FERRITIN: CPT | Performed by: INTERNAL MEDICINE

## 2018-11-19 PROCEDURE — 99195 PHLEBOTOMY: CPT

## 2018-11-19 PROCEDURE — 84460 ALANINE AMINO (ALT) (SGPT): CPT | Performed by: INTERNAL MEDICINE

## 2018-11-19 PROCEDURE — 84450 TRANSFERASE (AST) (SGOT): CPT | Performed by: INTERNAL MEDICINE

## 2018-11-19 PROCEDURE — 85027 COMPLETE CBC AUTOMATED: CPT | Performed by: INTERNAL MEDICINE

## 2018-11-19 ASSESSMENT — PAIN SCALES - GENERAL: PAINLEVEL: NO PAIN (0)

## 2018-11-19 NOTE — MR AVS SNAPSHOT
After Visit Summary   11/19/2018    Irina Hanks    MRN: [id number]           Patient Information     Date Of Birth          1960        Visit Information        Provider Department      11/19/2018 3:30 PM  INFUSION CHAIR 1 [facility]        Today's Diagnoses     Hereditary hemochromatosis (H)    -  1    Iron overload           Follow-ups after your visit        Your next 10 appointments already scheduled     Nov 20, 2018  1:30 PM CST   MA SCREENING BILATERAL W/ ANA with SHBCMA2   [facility] ([facility])    [address] [place] [address], [address]  [facility] [address] [phone]           How do I prepare for my exam? (Food and drink instructions) No Food and Drink Restrictions.  How do I prepare for my exam? (Other instructions) Do not use any powder, lotion or deodorant under your arms or on your breast. If you do, we will ask you to remove it before your exam.  What should I wear: Wear comfortable, two-piece clothing.  How long does the exam take: Most scans will take 15 minutes.  What should I bring: Bring any previous mammograms from other facilities or have them mailed to the breast center.  Do I need a :  No  is needed.  What do I need to tell my doctor: If you have any allergies, tell your care team.  What should I do after the exam: No restrictions, You may resume normal activities.  What is this test: This test is an x-ray of the breast to look for breast disease. The breast is pressed between two plates to flatten and spread the tissue. An X-ray is taken of the breast from different angles.  Who should I call with questions: If you have any questions, please call the Imaging Department where you will have your exam. Directions, parking instructions, and other information is available on our website, [email or web address].  Other information about my exam Three-dimensional (3D) mammograms are  "available at [place] locations in [place], [place], [place], [place], [facility], [place], and [place].  Health locations include [place] and the [facility] in [place].  Benefits of 3D mammograms include: * Improved rate of cancer detection * Decreases your chance of having to go back for more tests, which means fewer: * \"False-positive\" results (This means that there is an abnormal area but it isn't cancer.) * Invasive testing procedures, such as a biopsy or surgery * Can provide clearer images of the breast if you have dense breast tissue.  *3D mammography is an optional exam that anyone can have with a 2D mammogram. It doesn't replace or take the place of a 2D mammogram. 2D mammograms remain an effective screening test for all women.  Not all insurance companies cover the cost of a 3D mammogram. Check with your insurance.              Future tests that were ordered for you today     Open Standing Orders        Priority Remaining Interval Expires Ordered    Phlebotomy therapeutic Routine [id number] DAILY PRN  11/19/2018            Who to contact     If you have questions or need follow up information about today's clinic visit or your schedule please contact [facility] directly at [phone].  Normal or non-critical lab and imaging results will be communicated to you by [employer], letter or phone within 4 business days after the clinic has received the results. If you do not hear from us within 7 days, please contact the clinic through [employer] or phone. If you have a critical or abnormal lab result, we will notify you by phone as soon as possible.  Submit refill requests through [employer] or call your pharmacy and they will forward the refill request to us. Please allow 3 business days for your refill to be completed.          Additional Information About Your Visit        [employer] Information     [employer] gives you secure access to your electronic " health record. If you see a primary care provider, you can also send messages to your care team and make appointments. If you have questions, please call your primary care clinic.  If you do not have a primary care provider, please call [phone] and they will assist you.        Care EveryWhere ID     This is your Care EveryWhere ID. This could be used by other organizations to access your [place] medical records  [id number]        Your Vitals Were     Pulse Temperature Respirations Pulse Oximetry          73 97.3  F (36.3  C) (Oral) 16 99%         Blood Pressure from Last 3 Encounters:   11/19/18 115/79   10/23/18 (!) 164/106   09/26/18 129/82    Weight from Last 3 Encounters:   08/27/18 72.2 kg (159 lb 3.2 oz)   06/26/18 70 kg (154 lb 6.4 oz)   06/21/18 70.1 kg (154 lb 9.6 oz)              We Performed the Following     ALT     AST     CBC with platelets     Ferritin        Primary Care Provider Office Phone # Fax #    Irish Fernandes [phone]       [address] [place] [id number]        Equal Access to Services     [facility] : Hadii aad ku hadasho Soomaali, waaxda luqadaha, qaybta kaalmada adeegyada, jes carrillon leon rainey . So [facility] [phone].    ATENCIÓN: Si habla español, tiene a jenkins disposición servicios gratuitos de asistencia lingüística. [facility] [phone].    We comply with applicable federal civil rights laws and [place] laws. We do not discriminate on the basis of race, color, national origin, age, disability, sex, sexual orientation, or gender identity.            Thank you!     Thank you for choosing [facility]  for your care. Our goal is always to provide you with excellent care. Hearing back from our patients is one way we can continue to improve our services. Please take a few minutes to complete the written survey that you may receive in the mail after your visit with us. Thank you!              Your Updated Medication List - Protect others around you: Learn how to safely use, store and throw away your medicines at www.disposemymeds.org.          This list is accurate as of 11/19/18  4:40 PM.  Always use your most recent med list.                   Brand Name Dispense Instructions for use Diagnosis    CLARITIN 10 MG tablet   Generic drug:  loratadine     90 tablet    Take 1 tablet (10 mg) by mouth daily        clonazePAM 0.5 MG ODT tab    klonoPIN    30 tablet    DISSOLVE 1 TABLET BY MOUTH NIGHTLY AS NEEDED FOR ANXIETY    Adjustment disorder with anxious mood       hydroxychloroquine 200 MG tablet    PLAQUENIL    90 tablet    Take 2 tablets (400 mg) by mouth daily    Rheumatoid arthritis involving multiple sites with positive rheumatoid factor (H)       levothyroxine 75 MCG tablet    SYNTHROID/LEVOTHROID    90 tablet    TAKE 1 TABLET BY MOUTH EVERY MORNING    Acquired hypothyroidism       liothyronine 5 MCG tablet    CYTOMEL    60 tablet    TAKE 2 TABLETS BY MOUTH EVERY DAY    Hypothyroidism due to acquired atrophy of thyroid       losartan-hydrochlorothiazide 100-12.5 MG per tablet    HYZAAR    90 tablet    Take 1 tablet by mouth daily    Essential hypertension, benign       meclizine 25 MG tablet    ANTIVERT    30 tablet    Take 1 tablet (25 mg) by mouth every 6 hours as needed for dizziness    Vertigo       metoprolol succinate 50 MG 24 hr tablet    TOPROL-XL    180 tablet    TAKE 2 TABLETS BY MOUTH EVERY DAY    Essential hypertension, benign       psyllium 58.6 % Powd    METAMUCIL     Take by mouth daily    Iron overload       TURMERIC PO           VITAMIN D3 PO      Take 2,000 Units by mouth

## 2018-11-19 NOTE — PROGRESS NOTES
Dear Ms. Hanks,    Your blood tests are good.    Please, call me with any questions.    Eloisa Rossi

## 2018-11-19 NOTE — PROGRESS NOTES
Infusion Nursing Note:  Irina Hanks presents today for labs, possible phlebotomy  Patient seen by provider today: No   present during visit today: Not Applicable.    Note: NO concerns or changes to report..    Intravenous Access:  Lab draw site right hand, Needle type butterfly, Gauge 23.  Labs drawn without difficulty.    Treatment Conditions:  Lab Results   Component Value Date    HGB 13.3 11/19/2018     Lab Results   Component Value Date    WBC 6.0 11/19/2018      Lab Results   Component Value Date    ANEU 2.3 06/21/2018     Lab Results   Component Value Date     11/19/2018      Lab Results   Component Value Date     06/12/2018                   Lab Results   Component Value Date    POTASSIUM 3.6 06/12/2018           No results found for: MAG         Lab Results   Component Value Date    CR 0.76 06/12/2018                   Lab Results   Component Value Date    LUCA 9.3 06/12/2018                Lab Results   Component Value Date    BILITOTAL 0.8 08/27/2018           Lab Results   Component Value Date    ALBUMIN 4.0 08/27/2018                    Lab Results   Component Value Date    ALT 24 11/19/2018           Lab Results   Component Value Date    AST 22 11/19/2018       Results reviewed, labs MET treatment parameters, ok to proceed with treatment.  Meets criteria for phlebotomy.  17 gauge needle placed with one attempt left AC. 500 ml (530 gm) removed easily.    Post Infusion Assessment:  Patient tolerated phlebotomy without incident.  Site patent and intact, free from redness, edema or discomfort.    Discharge Plan:   Patient and/or family verbalized understanding of discharge instructions and all questions answered.  AVS to patient via [facility] [employer] [facility].  Patient discharged in stable condition accompanied by: self.  Departure Mode: Ambulatory.    Hilary Toscano RN

## 2018-11-20 ENCOUNTER — HOSPITAL ENCOUNTER (OUTPATIENT)
Facility: CLINIC | Age: 58
Setting detail: SPECIMEN
Discharge: HOME | End: 2018-11-20
Attending: INTERNAL MEDICINE | Admitting: INTERNAL MEDICINE
Payer: COMMERCIAL

## 2018-11-26 ENCOUNTER — HOSPITAL ENCOUNTER (OUTPATIENT)
Dept: MAMMOGRAPHY | Facility: CLINIC | Age: 58
Discharge: HOME | End: 2018-11-26
Attending: INTERNAL MEDICINE | Admitting: INTERNAL MEDICINE
Payer: COMMERCIAL

## 2018-11-26 DIAGNOSIS — Z12.39 BREAST CANCER SCREENING: ICD-10-CM

## 2018-11-26 PROCEDURE — 77063 BREAST TOMOSYNTHESIS BI: CPT

## 2018-12-05 DIAGNOSIS — E03.9 ACQUIRED HYPOTHYROIDISM: ICD-10-CM

## 2018-12-05 RX ORDER — LEVOTHYROXINE SODIUM 75 UG/1
TABLET ORAL
Qty: 90 TABLET | Refills: 0 | OUTPATIENT
Start: 2018-12-05

## 2018-12-05 NOTE — TELEPHONE ENCOUNTER
Patient requesting 90 day supply, this is not appropriate - patient needs OV. 30 day supply previously sent.   Keyla Hardin RN   [facility] - Triage

## 2018-12-05 NOTE — TELEPHONE ENCOUNTER
"Requested Prescriptions   Pending Prescriptions Disp Refills     levothyroxine (SYNTHROID/LEVOTHROID) 75 MCG tablet [Pharmacy Med Name: LEVOTHYROXINE 0.075MG (75MCG) TABS]  Last Written Prescription Date:  12-5-2018  Last Fill Quantity: 30 tablet,  # refills: 0   Last office visit: 6/12/2018 with prescribing provider:     Future Office Visit:   Next 5 appointments (look out 90 days)     Dec 18, 2018  2:40 PM CST   Return Visit with Eloisa Rossi MD   [facility] ([facility])    [facility]  [address]  [place] [address]   [phone]                  90 tablet 0     Sig: TAKE 1 TABLET BY MOUTH EVERY MORNING    Thyroid Protocol Failed    12/5/2018 10:22 AM       Failed - Normal TSH on file in past 12 months    Recent Labs   Lab Test  08/14/17   1116   TSH  0.82             Passed - Patient is 12 years or older       Passed - Recent (12 mo) or future (30 days) visit within the authorizing provider's specialty    Patient had office visit in the last 12 months or has a visit in the next 30 days with authorizing provider or within the authorizing provider's specialty.  See \"Patient Info\" tab in inbasket, or \"Choose Columns\" in Meds & Orders section of the refill encounter.             Passed - No active pregnancy on record    If patient is pregnant or has had a positive pregnancy test, please check TSH.         Passed - No positive pregnancy test in past 12 months    If patient is pregnant or has had a positive pregnancy test, please check TSH.            "

## 2018-12-14 ENCOUNTER — TRANSFERRED RECORDS (OUTPATIENT)
Dept: HEALTH INFORMATION MANAGEMENT | Facility: CLINIC | Age: 58
End: 2018-12-14

## 2018-12-17 NOTE — TELEPHONE ENCOUNTER
PT called back and scheduled for 01/09 with Dr. Dena Liu  Patient Representative - [facility]

## 2018-12-18 ENCOUNTER — ONCOLOGY VISIT (OUTPATIENT)
Dept: ONCOLOGY | Facility: CLINIC | Age: 58
End: 2018-12-18
Attending: INTERNAL MEDICINE
Payer: COMMERCIAL

## 2018-12-18 ENCOUNTER — INFUSION THERAPY VISIT (OUTPATIENT)
Dept: INFUSION THERAPY | Facility: CLINIC | Age: 58
End: 2018-12-18
Attending: INTERNAL MEDICINE
Payer: COMMERCIAL

## 2018-12-18 ENCOUNTER — HOSPITAL ENCOUNTER (OUTPATIENT)
Facility: CLINIC | Age: 58
Setting detail: SPECIMEN
Discharge: HOME | End: 2018-12-18
Attending: INTERNAL MEDICINE | Admitting: INTERNAL MEDICINE
Payer: COMMERCIAL

## 2018-12-18 VITALS
HEIGHT: 63 IN | OXYGEN SATURATION: 99 % | HEART RATE: 60 BPM | TEMPERATURE: 97.5 F | SYSTOLIC BLOOD PRESSURE: 136 MMHG | BODY MASS INDEX: 28.2 KG/M2 | DIASTOLIC BLOOD PRESSURE: 86 MMHG

## 2018-12-18 DIAGNOSIS — E83.110 HEREDITARY HEMOCHROMATOSIS (H): Primary | ICD-10-CM

## 2018-12-18 DIAGNOSIS — B00.1 COLD SORE: ICD-10-CM

## 2018-12-18 DIAGNOSIS — E03.4 HYPOTHYROIDISM DUE TO ACQUIRED ATROPHY OF THYROID: ICD-10-CM

## 2018-12-18 DIAGNOSIS — E83.19 IRON OVERLOAD: Primary | ICD-10-CM

## 2018-12-18 DIAGNOSIS — E83.110 HEREDITARY HEMOCHROMATOSIS (H): ICD-10-CM

## 2018-12-18 LAB
ALT SERPL W P-5'-P-CCNC: 24 U/L (ref 0–50)
AST SERPL W P-5'-P-CCNC: 28 U/L (ref 0–45)
ERYTHROCYTE [DISTWIDTH] IN BLOOD BY AUTOMATED COUNT: 12.1 % (ref 10–15)
FERRITIN SERPL-MCNC: 53 NG/ML (ref 8–252)
HCT VFR BLD AUTO: 36 % (ref 35–47)
HGB BLD-MCNC: 12.7 G/DL (ref 11.7–15.7)
MCH RBC QN AUTO: 31.8 PG (ref 26.5–33)
MCHC RBC AUTO-ENTMCNC: 35.3 G/DL (ref 31.5–36.5)
MCV RBC AUTO: 90 FL (ref 78–100)
PLATELET # BLD AUTO: 259 10E9/L (ref 150–450)
RBC # BLD AUTO: 3.99 10E12/L (ref 3.8–5.2)
TSH SERPL DL<=0.005 MIU/L-ACNC: 0.75 MU/L (ref 0.4–4)
WBC # BLD AUTO: 4.6 10E9/L (ref 4–11)

## 2018-12-18 PROCEDURE — 99214 OFFICE O/P EST MOD 30 MIN: CPT | Performed by: INTERNAL MEDICINE

## 2018-12-18 PROCEDURE — 84443 ASSAY THYROID STIM HORMONE: CPT | Performed by: INTERNAL MEDICINE

## 2018-12-18 PROCEDURE — 84450 TRANSFERASE (AST) (SGOT): CPT | Performed by: INTERNAL MEDICINE

## 2018-12-18 PROCEDURE — 82728 ASSAY OF FERRITIN: CPT | Performed by: INTERNAL MEDICINE

## 2018-12-18 PROCEDURE — 85027 COMPLETE CBC AUTOMATED: CPT | Performed by: INTERNAL MEDICINE

## 2018-12-18 PROCEDURE — G0463 HOSPITAL OUTPT CLINIC VISIT: HCPCS

## 2018-12-18 PROCEDURE — 99195 PHLEBOTOMY: CPT

## 2018-12-18 PROCEDURE — 84460 ALANINE AMINO (ALT) (SGPT): CPT | Performed by: INTERNAL MEDICINE

## 2018-12-18 RX ORDER — VALACYCLOVIR HYDROCHLORIDE 1 G/1
1000 TABLET, FILM COATED ORAL 2 TIMES DAILY
Qty: 14 TABLET | Refills: 0 | Status: SHIPPED | OUTPATIENT
Start: 2018-12-18 | End: 2019-03-12

## 2018-12-18 NOTE — LETTER
12/18/2018         RE: Irina Hanks  [address]  [place] [address]        Dear Colleague,    Thank you for referring your patient, Irina Hanks, to the [facility]. Please see a copy of my visit note below.    Visit Date:   12/18/2018      SUBJECTIVE:  Ms. Zhao is a 58-year-old female with heredity hemochromatosis.  She is homozygous for H63D.  The patient has iron overload.  Had elevated LFTs.  Because of that, she has been getting phlebotomy.  With phlebotomy, her liver function tests have all normalized.      The patient's main problem is a sensitive mouth.  Since beginning of 11/2018, she has been having some oral ulcer.  Her mouth has been very sensitive.  She has been evaluated by ENT and dermatologist.  She has tried multiple things that are not helping.  She has been treated with valacyclovir for 1 day.  She has used Nystatin.  She is on a steroid oral cream.  In spite of all this, her pain has not improved.  Because of sensitive mouth, she is not able to eat much.      No headache.  No dizziness.  No upper respiratory infection.  No chest pain or difficulty breathing.  No abdominal pain, nausea or vomiting.  No urinary or bowel complaints.  No bleeding.  No fever or chills.  The patient says that otherwise she has been doing well.  She has rheumatoid arthritis.  It is under control.  She is on Plaquenil.      The patient denies taking any new medication recently.      PHYSICAL EXAMINATION:   GENERAL:  She is alert and oriented x 3.   VITAL SIGNS:  Reviewed.  ECOG PS of zero.   THROAT:  There is no ulcer or thrush now.  There is some superficial ulcer on her lips.  Oral mucosa is mildly dry.     SKIN:  No rash.      LABORATORY DATA:  Reviewed.      ASSESSMENT:   1.  A 58-year-old female with hereditary hemochromatosis, on phlebotomy.  Her ferritin has been decreasing nicely.   2.  Oral ulcer and throat pain secondary to it.     3.  Other medical problems  including rheumatoid arthritis.      PLAN:   1.  Labs were all reviewed with her.  The patient is doing very well from iron overload.  Her ferritin is around 50.  She will get a phlebotomy today.  We will recheck her labs in 3 months.  The aim is to keep her ferritin between 50 and 100.  I will see her in 6 months' time for followup.   2.  Discussed regarding oral ulcer.  Her mouth is very sensitive.  The reason for this is not clear.  I told the patient that one reason could be the recurrent viral infection.  I am going to treat her with Valtrex 1 gram twice a day for 7 days.  I told the patient to follow up with ENT.  She may need a biopsy.   3.  She had a few questions, which were all answered.  I will see her in 6 months.      Total face-to-face time spent was 25 minutes.  Most of time was spent in counseling and coordination of care.         RACHEL CHAPMAN MD             D: 2018   T: 2018   MT:       Name:     RO GARCÍA   MRN:      [id number]        Account:      [id number]   :      1960           Visit Date:   2018      Document: [id number]       Again, thank you for allowing me to participate in the care of your patient.        Sincerely,        Rachel Chapman MD

## 2018-12-18 NOTE — PATIENT INSTRUCTIONS
Labs in 3 months. Scheduled/Tia  See me in 6 months with labs. Scheduled/ Tia  Sent staff message-AG        Avs printed and send to patient / Tia

## 2018-12-18 NOTE — PROGRESS NOTES
Infusion Nursing Note:  Irina Hanks presents today for poss phlebotomy.    Patient seen by provider today: No   present during visit today: Not Applicable.    Note: phlebotomy 500ccs Blood cells removed today    Intravenous Access:  Peripheral IV placed.    Treatment Conditions:  Lab Results   Component Value Date    HGB 12.7 12/18/2018     Lab Results   Component Value Date    WBC 4.6 12/18/2018      Lab Results   Component Value Date    ANEU 2.3 06/21/2018     Lab Results   Component Value Date     12/18/2018      Results reviewed, labs MET treatment parameters, ok to proceed with treatment.      Post Infusion Assessment:  Patient tolerated infusion without incident.  Blood return noted pre and post infusion.  Site patent and intact, free from redness, edema or discomfort.  No evidence of extravasations.  Access discontinued per protocol.    Discharge Plan:   Discharge instructions reviewed with: Patient.  Patient and/or family verbalized understanding of discharge instructions and all questions answered.  AVS to patient via [employer].  Patient will return in 3-4 months  for next appointment.   Patient discharged in stable condition accompanied by: self.  Departure Mode: Ambulatory.    Declan Corrales RN

## 2018-12-20 NOTE — PROGRESS NOTES
Visit Date:   12/18/2018     HEMATOLOGY HISTORY: Ms. Zhao is a female with hereditary hemochromatosis. Homozygous for H63D mutation.   1.  On 11/13/2015, normal LFT.   2.  On 06/15/2016, ferritin of 506.   3.  Multiple labs were done on 06/12/2018.   -CBC is normal.   -CMP revealed elevated AST and ALT.  Normal bilirubin and alkaline phosphatase.  ALT of 240 and AST of 136.   4.  Multiple labs were done on 06/15/2018     -Iron of 138, ferritin of 1140 and saturation of 66%.   -Hepatitis C antibody negative.   -Hepatitis B negative.   -Positive for hepatitis A IgG antibody.  Negative for hepatitis A IgM antibody.     5.  Ultrasound of abdomen and pelvis on 06/18/2018 is normal.   6. Multiple labs were done on 06/21/2018.   -CBC is normal.   -Iron 78, saturation of 37 and ferritin of 1028.   -HFE gene analysis reveals patient to be homozygous for H63D mutation.   7. CT abdomen and pelvis on 06/25/2018 is normal.  Liver looks normal.   8. Because of elevated LFT, phlebotomy started on 06/21/2018.      SUBJECTIVE:  Ms. Zhao is a 58-year-old female with heredity hemochromatosis.  She is homozygous for H63D.  Patient has iron overload. She had elevated LFT.  Because of that, she has been getting phlebotomy.  With phlebotomy, her liver function tests have all normalized.      Patient's main problem is sensitive mouth.  Since beginning of 11/2018, she has been having some oral ulcer.  Her mouth has been very sensitive.  She has been evaluated by ENT and dermatologist.  She has tried multiple things that are not helping.  She has been treated with valacyclovir for 1 day.  She has used Nystatin.  She is on a steroid oral cream.  In spite of all this, her pain has not improved.  Because of sensitive mouth, she is not able to eat much.      No headache.  No dizziness.  No upper respiratory infection.  No chest pain or difficulty breathing.  No abdominal pain, nausea or vomiting.  No urinary or bowel complaints.  No  bleeding.  No fever or chills. She has rheumatoid arthritis.  It is under control.  She is on Plaquenil.      PHYSICAL EXAMINATION:   GENERAL:  She is alert and oriented x 3.   VITAL SIGNS:  Reviewed.  ECOG PS of 0.   THROAT:  There is no thrush now.  There is some superficial ulcer on her lips.  Oral mucosa is mildly dry.     SKIN:  No rash.      LABORATORY DATA:  Reviewed.      ASSESSMENT:   1.  A 58-year-old female with hereditary hemochromatosis on phlebotomy.  Her ferritin has been decreasing.   2.  Oral ulcer and throat pain.     3.  Other medical problems including rheumatoid arthritis.      PLAN:   1.  Labs were all reviewed with her.  Her ferritin is 53.  She will get a phlebotomy today.  We will recheck her labs in 3 months. Aim is to keep her ferritin between 50 and 100.  I will see her in 6 months' time for followup.   2.  Discussed regarding oral ulcer.  Her mouth is very sensitive.  Reason for this is not clear.  I told the patient that one reason could be the recurrent viral infection.  I am going to treat her with Valtrex 1 gram twice a day for 7 days.  I told the patient to follow up with ENT.  She may need a biopsy.   3.  She had a few questions, which were all answered.  I will see her in 6 months.      Total face-to-face time spent was 25 minutes.  Most of time was spent in counseling and coordination of care.         RACHEL CHAPMAN MD             D: 2018   T: 2018   MT: CHARLA      Name:     RO GARCÍA   MRN:      [id number]        Account:      [id number]   :      1960           Visit Date:   2018      Document: [id number]

## 2018-12-21 ENCOUNTER — TELEPHONE (OUTPATIENT)
Dept: ONCOLOGY | Facility: CLINIC | Age: 58
End: 2018-12-21

## 2018-12-21 ENCOUNTER — MYC REFILL (OUTPATIENT)
Dept: FAMILY MEDICINE | Facility: CLINIC | Age: 58
End: 2018-12-21

## 2018-12-21 DIAGNOSIS — F43.22 ADJUSTMENT DISORDER WITH ANXIOUS MOOD: ICD-10-CM

## 2018-12-21 DIAGNOSIS — E03.9 ACQUIRED HYPOTHYROIDISM: ICD-10-CM

## 2018-12-21 DIAGNOSIS — I10 ESSENTIAL HYPERTENSION, BENIGN: ICD-10-CM

## 2018-12-21 RX ORDER — CLONAZEPAM 0.5 MG/1
TABLET, ORALLY DISINTEGRATING ORAL
Qty: 30 TABLET | Refills: 0 | Status: SHIPPED | OUTPATIENT
Start: 2018-12-21 | End: 2019-02-07

## 2018-12-21 RX ORDER — METOPROLOL SUCCINATE 50 MG/1
TABLET, EXTENDED RELEASE ORAL
Qty: 180 TABLET | Refills: 3 | Status: SHIPPED | OUTPATIENT
Start: 2018-12-21 | End: 2019-02-04

## 2018-12-21 NOTE — TELEPHONE ENCOUNTER
Metoprolol - Prescription approved per [facility] Refill Protocol.    Cytomel last refilled 8/1/18 #30 rf 0.  12/18/18 labs normal.  Carmen Lopez RN

## 2018-12-21 NOTE — TELEPHONE ENCOUNTER
Reason for Call:  Medication or medication refill:    Do you use a [facility]?  Name of the pharmacy and phone number for the current request:  [address] - [phone]    Name of the medication requested:   clonazePAM (KLONOPIN) 0.5 MG ODT tab 30 tablet         Other request: na    Can we leave a detailed message on this number? YES    Phone number patient can be reached at: Home number on file [phone] (home)    Best Time: anytime,  Pt states she is out of meds  Would like asap,  Pt has appt scheduled for 1-9 w/ Dena    Call taken on 12/21/2018 at 8:50 AM by Lida Bean

## 2018-12-21 NOTE — TELEPHONE ENCOUNTER
"Patient called concerned that she is feeling weak after her phlebotomy. She is wondering if it is the phlebotomy and if \"too much\" was taken. She states it's the first time that she has really felt this way after a phlebotomy.     We discussed other symptoms she is having and didn't  have any except occasional dizziness. I informed her that we phlebotomize a controlled amount (500mls) and that her labs are similar to previous phlebotomy. She is concerned that she is too anemic. I informed her even if she rechecked the labs we wouldn't give a unit of blood for a hgb of 11.5 or really anything above 8.    So we discussed other possibilities is she coming down with a virus that is causing weakness, is it her blood pressure. She stated she would monitor her blood pressure at home. She is on  Metoprolol, this medication is not new. WE also discussed that she has cold sores in her mouth and just started acyclovir. She has been on this before and she didn't feel this way while taking the medication.     Patient agreed to rest, drink fluids, monitor blood pressure and to call if symptoms persist or if she develops new symptoms such as fever, sore throat, nausea, or vomiting.    Will route message to follow up as needed.  Danielle Newman    "

## 2018-12-21 NOTE — TELEPHONE ENCOUNTER
Clonazepam       Last Written Prescription Date:  12/21/18  Last Fill Quantity: 30,   # refills: 0  Last Office Visit: 6/12/18 [place]  Future Office visit:    Next 5 appointments (look out 90 days)    Jan 09, 2019  1:40 PM CST  Office Visit with Irish Fernandes MD  [facility] [address] [place] [address]  [phone]           Routing refill request to provider for review/approval because:  Drug not on the [facility], [facility] or  Health refill protocol or controlled substance    MD filled med on 12/21/18.  Routing to TC fax to pharmacy.    Patsy VIERA RN  EP Triage

## 2018-12-21 NOTE — TELEPHONE ENCOUNTER
clonazePAM (KLONOPIN) 0.5 MG ODT tab      Last Written Prescription Date:  11/6/18  Last Fill Quantity: 30,   # refills: 0  Last Office Visit: 6/12/18  Future Office visit:    Next 5 appointments (look out 90 days)    Jan 09, 2019  1:40 PM CST  Office Visit with Irish Fernandes MD  [facility] [address] [place] [id number]  [phone]           Routing refill request to provider for review/approval because:  Drug not on the [facility], [facility] or [facility] refill protocol or controlled substance     I have reviewed and confirmed nurses' notes...

## 2018-12-21 NOTE — TELEPHONE ENCOUNTER
"Requested Prescriptions   Pending Prescriptions Disp Refills     liothyronine (CYTOMEL) 5 MCG tablet [Pharmacy Med Name: LIOTHYRONINE 5MCG TABLETS]  Last Written Prescription Date:  12-5-2018  Last Fill Quantity: 30 tablet,  # refills: 0   Last office visit: 6/12/2018 with prescribing provider:     Future Office Visit:   Next 5 appointments (look out 90 days)    Jan 09, 2019  1:40 PM CST  Office Visit with Irish Fernandes [place]  [facility] [place] [address]  [phone]          180 tablet 0     Sig: TAKE 2 TABLETS BY MOUTH EVERY DAY    Thyroid Protocol Passed - 12/21/2018 12:48 PM       Passed - Patient is 12 years or older       Passed - Recent (12 mo) or future (30 days) visit within the authorizing provider's specialty    Patient had office visit in the last 12 months or has a visit in the next 30 days with authorizing provider or within the authorizing provider's specialty.  See \"Patient Info\" tab in inbasket, or \"Choose Columns\" in Meds & Orders section of the refill encounter.             Passed - Normal TSH on file in past 12 months    Recent Labs   Lab Test 12/18/18  1340   TSH 0.75             Passed - No active pregnancy on record    If patient is pregnant or has had a positive pregnancy test, please check TSH.         Passed - No positive pregnancy test in past 12 months    If patient is pregnant or has had a positive pregnancy test, please check TSH.                  metoprolol succinate ER (TOPROL-XL) 50 MG 24 hr tablet [Pharmacy Med Name: METOPROLOL ER SUCCINATE 50MG TABS]  Last Written Prescription Date:  9-  Last Fill Quantity: 180 tablet,  # refills: 0   Last office visit: 6/12/2018 with prescribing provider:     Future Office Visit:   Next 5 appointments (look out 90 days)    Jan 09, 2019  1:40 PM CST  Office Visit with Irish Fernandes MD  [facility] [place] [address] "[facility] [address]  [phone]          180 tablet 0     Sig: TAKE 2 TABLETS BY MOUTH EVERY DAY    Beta-Blockers Protocol Passed - 12/21/2018 12:48 PM       Passed - Blood pressure under 140/90 in past 12 months    BP Readings from Last 3 Encounters:   12/18/18 136/86   11/19/18 115/79   10/23/18 (!) 164/106                Passed - Patient is age 6 or older       Passed - Recent (12 mo) or future (30 days) visit within the authorizing provider's specialty    Patient had office visit in the last 12 months or has a visit in the next 30 days with authorizing provider or within the authorizing provider's specialty.  See \"Patient Info\" tab in inbasket, or \"Choose Columns\" in Meds & Orders section of the refill encounter.                "

## 2018-12-23 RX ORDER — LIOTHYRONINE SODIUM 5 UG/1
TABLET ORAL
Qty: 180 TABLET | Refills: 1 | Status: SHIPPED | OUTPATIENT
Start: 2018-12-23 | End: 2019-07-03

## 2018-12-28 DIAGNOSIS — E03.9 ACQUIRED HYPOTHYROIDISM: ICD-10-CM

## 2018-12-28 RX ORDER — LEVOTHYROXINE SODIUM 75 UG/1
TABLET ORAL
Qty: 30 TABLET | Refills: 11 | Status: SHIPPED | OUTPATIENT
Start: 2018-12-28 | End: 2020-01-06

## 2018-12-28 NOTE — TELEPHONE ENCOUNTER
"Requested Prescriptions   Pending Prescriptions Disp Refills     levothyroxine (SYNTHROID/LEVOTHROID) 75 MCG tablet [Pharmacy Med Name: LEVOTHYROXINE 0.075MG (75MCG) TABS] 30 tablet 0    Last Written Prescription Date:  12/5/18  Last Fill Quantity: 30,  # refills: 0   Last office visit: 6/12/2018 with prescribing provider:     Future Office Visit:   Next 5 appointments (look out 90 days)    Jan 09, 2019  1:40 PM CST  Office Visit with Irish Fernandes [place]  [facility] [place] [address] [facility] [address]  [phone]   Mar 18, 2019 11:15 AM CDT  Return Visit with  LAB DRAW 1  [facility] ([facility]) [facility] [place]  [address]  [facility] [address]  [phone]          Sig: TAKE 1 TABLET BY MOUTH EVERY MORNING    Thyroid Protocol Passed - 12/28/2018  1:14 PM       Passed - Patient is 12 years or older       Passed - Recent (12 mo) or future (30 days) visit within the authorizing provider's specialty    Patient had office visit in the last 12 months or has a visit in the next 30 days with authorizing provider or within the authorizing provider's specialty.  See \"Patient Info\" tab in inbasket, or \"Choose Columns\" in Meds & Orders section of the refill encounter.             Passed - Normal TSH on file in past 12 months    Recent Labs   Lab Test 12/18/18  1340   TSH 0.75             Passed - No active pregnancy on record    If patient is pregnant or has had a positive pregnancy test, please check TSH.         Passed - No positive pregnancy test in past 12 months    If patient is pregnant or has had a positive pregnancy test, please check TSH.            "

## 2018-12-31 RX ORDER — CLONAZEPAM 0.5 MG/1
TABLET, ORALLY DISINTEGRATING ORAL
Qty: 30 TABLET | Refills: 0 | Status: CANCELLED | OUTPATIENT
Start: 2018-12-31

## 2019-01-08 ENCOUNTER — TRANSFERRED RECORDS (OUTPATIENT)
Dept: HEALTH INFORMATION MANAGEMENT | Facility: CLINIC | Age: 59
End: 2019-01-08

## 2019-01-11 ENCOUNTER — TRANSFERRED RECORDS (OUTPATIENT)
Dept: HEALTH INFORMATION MANAGEMENT | Facility: CLINIC | Age: 59
End: 2019-01-11

## 2019-01-21 PROBLEM — L43.9 LICHEN PLANUS: Status: ACTIVE | Noted: 2019-01-21

## 2019-01-28 ENCOUNTER — OFFICE VISIT (OUTPATIENT)
Dept: FAMILY MEDICINE | Facility: CLINIC | Age: 59
End: 2019-01-28
Payer: COMMERCIAL

## 2019-01-28 VITALS
HEART RATE: 72 BPM | BODY MASS INDEX: 27.29 KG/M2 | WEIGHT: 154 LBS | TEMPERATURE: 97 F | HEIGHT: 63 IN | SYSTOLIC BLOOD PRESSURE: 110 MMHG | OXYGEN SATURATION: 98 % | DIASTOLIC BLOOD PRESSURE: 80 MMHG

## 2019-01-28 DIAGNOSIS — R87.610 ASCUS OF CERVIX WITH NEGATIVE HIGH RISK HPV: ICD-10-CM

## 2019-01-28 DIAGNOSIS — F51.04 PSYCHOPHYSIOLOGICAL INSOMNIA: ICD-10-CM

## 2019-01-28 DIAGNOSIS — E83.110 HEREDITARY HEMOCHROMATOSIS (H): ICD-10-CM

## 2019-01-28 DIAGNOSIS — Z79.899 CONTROLLED SUBSTANCE AGREEMENT SIGNED: ICD-10-CM

## 2019-01-28 DIAGNOSIS — Z12.11 SPECIAL SCREENING FOR MALIGNANT NEOPLASMS, COLON: ICD-10-CM

## 2019-01-28 DIAGNOSIS — F41.9 ANXIETY: ICD-10-CM

## 2019-01-28 DIAGNOSIS — E55.9 VITAMIN D DEFICIENCY: ICD-10-CM

## 2019-01-28 DIAGNOSIS — E03.9 ACQUIRED HYPOTHYROIDISM: ICD-10-CM

## 2019-01-28 DIAGNOSIS — L43.9 LICHEN PLANUS: ICD-10-CM

## 2019-01-28 DIAGNOSIS — M06.9 RHEUMATOID ARTHRITIS INVOLVING MULTIPLE SITES, UNSPECIFIED RHEUMATOID FACTOR PRESENCE: ICD-10-CM

## 2019-01-28 DIAGNOSIS — Z00.00 ANNUAL VISIT FOR GENERAL ADULT MEDICAL EXAMINATION WITHOUT ABNORMAL FINDINGS: Primary | ICD-10-CM

## 2019-01-28 DIAGNOSIS — I10 BENIGN ESSENTIAL HYPERTENSION: ICD-10-CM

## 2019-01-28 DIAGNOSIS — I10 ESSENTIAL HYPERTENSION, BENIGN: ICD-10-CM

## 2019-01-28 DIAGNOSIS — R06.09 DYSPNEA ON EXERTION: ICD-10-CM

## 2019-01-28 LAB — LIPASE SERPL-CCNC: 196 U/L (ref 73–393)

## 2019-01-28 PROCEDURE — 82947 ASSAY GLUCOSE BLOOD QUANT: CPT | Performed by: INTERNAL MEDICINE

## 2019-01-28 PROCEDURE — 82728 ASSAY OF FERRITIN: CPT | Performed by: INTERNAL MEDICINE

## 2019-01-28 PROCEDURE — 99396 PREV VISIT EST AGE 40-64: CPT | Performed by: INTERNAL MEDICINE

## 2019-01-28 PROCEDURE — 83690 ASSAY OF LIPASE: CPT | Performed by: INTERNAL MEDICINE

## 2019-01-28 PROCEDURE — 82043 UR ALBUMIN QUANTITATIVE: CPT | Performed by: INTERNAL MEDICINE

## 2019-01-28 PROCEDURE — 80061 LIPID PANEL: CPT | Performed by: INTERNAL MEDICINE

## 2019-01-28 PROCEDURE — 83550 IRON BINDING TEST: CPT | Performed by: INTERNAL MEDICINE

## 2019-01-28 PROCEDURE — 99213 OFFICE O/P EST LOW 20 MIN: CPT | Mod: 25 | Performed by: INTERNAL MEDICINE

## 2019-01-28 PROCEDURE — 83540 ASSAY OF IRON: CPT | Performed by: INTERNAL MEDICINE

## 2019-01-28 PROCEDURE — 36415 COLL VENOUS BLD VENIPUNCTURE: CPT | Performed by: INTERNAL MEDICINE

## 2019-01-28 RX ORDER — LOSARTAN POTASSIUM AND HYDROCHLOROTHIAZIDE 12.5; 1 MG/1; MG/1
1 TABLET ORAL DAILY
Qty: 90 TABLET | Refills: 1 | Status: SHIPPED | OUTPATIENT
Start: 2019-01-28 | End: 2019-09-04

## 2019-01-28 RX ORDER — NYSTATIN 100000/ML
500000 SUSPENSION, ORAL (FINAL DOSE FORM) ORAL 4 TIMES DAILY
Qty: 200 ML | Refills: 0 | Status: SHIPPED | OUTPATIENT
Start: 2019-01-28 | End: 2019-08-08

## 2019-01-28 RX ORDER — NYSTATIN 100000/ML
500000 SUSPENSION, ORAL (FINAL DOSE FORM) ORAL 4 TIMES DAILY
Qty: 200 ML | Refills: 0 | Status: SHIPPED | OUTPATIENT
Start: 2019-01-28 | End: 2019-01-28

## 2019-01-28 ASSESSMENT — MIFFLIN-ST. JEOR: SCORE: 1237.67

## 2019-01-28 NOTE — PROGRESS NOTES
SUBJECTIVE:   CC: Irina Hanks is an 58 year old woman who presents for preventive health visit.     Healthy Habits:    Do you get at least three servings of calcium containing foods daily (dairy, green leafy vegetables, etc.)? yes    Amount of exercise or daily activities, outside of work: 2 day(s) per week    Problems taking medications regularly No    Medication side effects: No    Have you had an eye exam in the past two years? yes    Do you see a dentist twice per year? yes    Do you have sleep apnea, excessive snoring or daytime drowsiness?no      Cherise has been experiencing dyspnea on exertion for many years. In fact, she was referred to pulmonology back in  and ended up with a stress test at that time which was normal. Last year she saw Pulmonology again and underwent pulmonary function testing which was normal. She was referred for a chest CT at that time which showed multiple small mediastinal lymph nodes.     Today she feels that her shortness of breath has worsened. She denies orthopnea, PND, or chest pain.     Cherise has been seeing hematology for a recent diagnosis of hereditary hemochromatosis. She was initially getting weekly phlebotomies but now is down to every 2 months. She is nervous about going so long and would like her ferritin, iron, and liver function tests checked today.     Recently diagnosed with lichen planus by an ENT       Today's PHQ-2 Score:   PHQ-2 (  [employer]) 2018   Q1: Little interest or pleasure in doing things 0 0   Q2: Feeling down, depressed or hopeless 0 0   PHQ-2 Score 0 0       Abuse: Current or Past(Physical, Sexual or Emotional)- No  Do you feel safe in your environment? Yes    Social History     Tobacco Use     Smoking status: Former Smoker     Packs/day: 0.26     Years: 10.00     Pack years: 2.60     Types: Cigarettes     Last attempt to quit: 1989     Years since quittin.7     Smokeless tobacco: Never Used   Substance Use Topics      Alcohol use: Yes     Alcohol/week: 0.0 oz     Comment: 2 drinks per day     If you drink alcohol do you typically have >3 drinks per day or >7 drinks per week? No                     Reviewed orders with patient.  Reviewed health maintenance and updated orders accordingly - Yes  BP Readings from Last 3 Encounters:   01/28/19 110/80   12/18/18 136/86   11/19/18 115/79    Wt Readings from Last 3 Encounters:   01/28/19 69.9 kg (154 lb)   08/27/18 72.2 kg (159 lb 3.2 oz)   06/26/18 70 kg (154 lb 6.4 oz)                  Recent Labs   Lab Test 12/18/18  1340 11/19/18  1537 10/23/18  1427  06/12/18  1509 03/08/18  1533  08/14/17  1116  11/13/15  1108 09/29/14  1032   LDL  --   --   --   --   --   --   --  57  --  87 75   HDL  --   --   --   --   --   --   --  77  --  63 66   TRIG  --   --   --   --   --   --   --  103  --  90 110   ALT 24 24 21   < > 240*  --    < >  --   --  29 25   CR  --   --   --   --  0.76 0.65   < >  --    < > 0.70 0.66   GFRESTIMATED  --   --   --   --  78 >90   < >  --    < > 86 >90  Non  GFR Calc     GFRESTBLACK  --   --   --   --  >90 >90  --   --    < > >90   GFR Calc   >90   GFR Calc     POTASSIUM  --   --   --   --  3.6 3.8  --   --    < > 3.6 3.9   TSH 0.75  --   --   --   --   --   --  0.82   < > 0.88 1.52    < > = values in this interval not displayed.        Mammogram Screening: Patient over age 50, mutual decision to screen reflected in health maintenance.    Pertinent mammograms are reviewed under the imaging tab.  History of abnormal Pap smear: NO - age 30-65 PAP every 5 years with negative HPV co-testing recommended  PAP / HPV 9/29/2014 9/4/2012 9/16/2011   PAP ASC-US(A) NIL NIL     Reviewed and updated as needed this visit by clinical staff  Tobacco  Allergies  Meds  Med Hx  Surg Hx  Fam Hx  Soc Hx        Reviewed and updated as needed this visit by Provider        ROS:   ROS: 10 point ROS neg other than the symptoms noted above  "in the HPI.    OBJECTIVE:   /80   Pulse 72   Temp 97  F (36.1  C) (Tympanic)   Ht 1.6 m (5' 3\")   Wt 69.9 kg (154 lb)   SpO2 98%   Breastfeeding? No   BMI 27.28 kg/m    EXAM:  GENERAL: healthy, alert and no distress  EYES: Eyes grossly normal to inspection, PERRL and conjunctivae and sclerae normal  HENT: ear canals and TM's normal, nose and mouth without ulcers or lesions  NECK: no adenopathy, no asymmetry, masses, or scars and thyroid normal to palpation  RESP: lungs clear to auscultation - no rales, rhonchi or wheezes  BREAST: normal without masses, tenderness or nipple discharge and no palpable axillary masses or adenopathy  CV: regular rate and rhythm, normal S1 S2, no S3 or S4, no murmur, click or rub, no peripheral edema and peripheral pulses strong  ABDOMEN: soft, nontender, no hepatosplenomegaly, no masses and bowel sounds normal  MS: no gross musculoskeletal defects noted, no edema  SKIN: no suspicious lesions or rashes  NEURO: Normal strength and tone, mentation intact and speech normal  PSYCH: mentation appears normal, affect normal/bright    Diagnostic Test Results:  Results for orders placed or performed in visit on 01/28/19 (from the past 24 hour(s))   Lipase   Result Value Ref Range    Lipase 196 73 - 393 U/L       ASSESSMENT/PLAN:   1. Annual visit for general adult medical examination without abnormal findings    2. Hereditary hemochromatosis (H)  Following now with hematology. Liver function tests have normalized and ferritin levels low.   - Lipid Profile  - Glucose  - Lipase  - Hepatic panel; Future  - Iron and iron binding capacity  - Ferritin    3. Rheumatoid arthritis involving multiple sites, unspecified rheumatoid factor presence (H)  Plaquenil per rheumatology.     4. Anxiety  Peg uses Clonazepam daily as needed. She tries to not use this daily but does struggle with falling to sleep without it.     5. Vitamin D deficiency    6. Acquired hypothyroidism  TSH today.     7. " "Controlled substance agreement signed  Contract renewed today.     8. Psychophysiological insomnia  Clonazepam.    9. Lichen planus  - nystatin (MYCOSTATIN) 569859 UNIT/ML suspension; Take 5 mLs (500,000 Units) by mouth 4 times daily  Dispense: 200 mL; Refill: 0    10. ASCUS of cervix with negative high risk HPV  Due for pap in September 11. Dyspnea on exertion  These symptoms have been ongoing for many years and Peg has had work up including a stress test, Chest CT, and PFT's. However, her recent diagnosis of HH makes her concerned for pulmonary involvement. I do think it reasonable to repeat testing with an echocardiogram and a chest CT. She had mediastinal lymphadenopathy on her Chest CT from November of 2017. I would like to evaluate this to see if there has been any change.   - CT Chest w/o Contrast; Future  - Echocardiogram Complete; Future    12. Benign essential hypertension  Well controlled.  - Albumin Random Urine Quantitative with Creat Ratio    13. Special screening for malignant neoplasms, colon  - GASTROENTEROLOGY ADULT REF PROCEDURE ONLY    14. Essential hypertension, benign  - losartan-hydrochlorothiazide (HYZAAR) 100-12.5 MG tablet; Take 1 tablet by mouth daily  Dispense: 90 tablet; Refill: 1    COUNSELING:   Reviewed preventive health counseling, as reflected in patient instructions       Regular exercise       Healthy diet/nutrition       Vision screening       Hearing screening       Osteoporosis Prevention/Bone Health       Colon cancer screening    BP Readings from Last 1 Encounters:   12/18/18 136/86     Estimated body mass index is 28.2 kg/m  as calculated from the following:    Height as of 12/18/18: 1.6 m (5' 3\").    Weight as of 8/27/18: 72.2 kg (159 lb 3.2 oz).           reports that she quit smoking about 29 years ago. Her smoking use included cigarettes. She has a 2.60 pack-year smoking history. she has never used smokeless tobacco.      Counseling Resources:  ATP IV " Guidelines  Pooled Cohorts Equation Calculator  Breast Cancer Risk Calculator  FRAX Risk Assessment  ICSI Preventive Guidelines  Dietary Guidelines for Americans, 2010  AT Internet's MyPlate  ASA Prophylaxis  Lung CA Screening    Irish Fernandes MD  [facility]

## 2019-01-28 NOTE — NURSING NOTE
"Chief Complaint   Patient presents with     Physical     Recheck Medication       Initial /80   Pulse 72   Temp 97  F (36.1  C) (Tympanic)   Ht 1.6 m (5' 3\")   Wt 69.9 kg (154 lb)   SpO2 98%   Breastfeeding? No   BMI 27.28 kg/m   Estimated body mass index is 27.28 kg/m  as calculated from the following:    Height as of this encounter: 1.6 m (5' 3\").    Weight as of this encounter: 69.9 kg (154 lb).  BP completed using cuff size: regular    Health Maintenance Due   Topic Date Due     HIV SCREEN (SYSTEM ASSIGNED)  05/04/1976     ZOSTER IMMUNIZATION (1 of 2) 05/04/2008     MICROALBUMIN Q1 YEAR  10/24/2012     WELLNESS VISIT Q1 YR  11/13/2016     ADVANCE DIRECTIVE PLANNING Q5 YRS  08/27/2018     INFLUENZA VACCINE (1) 09/01/2018     COLONOSCOPY Q5 YR  12/11/2018         Bridgette Rodriguez MA 1/28/19        "

## 2019-01-28 NOTE — LETTER
[facility]  [place]19    Patient: Irina Hanks  YOB: 1960  Medical Record Number: [id number]  CSN: [id number]                                                                              Non-opioid Controlled Substance Agreement    I understand that my care provider has prescribed a controlled substance to help manage my condition(s). I am taking this medicine to help me function or work. I know this is strong medicine, and that it can cause serious side effects. Controlled substances can be sedating, addicting and may cause a dependency on the drug. They can affect my ability to drive or think, and cause depression. They need to be taken exactly as prescribed. Combining controlled substances with certain medicines or chemicals (such as cocaine, sedatives and tranquilizers, sleeping pills, meth) can be dangerous or even fatal. Also, if I stop controlled substances suddenly, I may have severe withdrawal symptoms.  If not helpful, I may be asked to stop them.    The risks, benefits, and side effects of these medicine(s) were explained to me. I agree that:    1. I will take part in other treatments as advised by my care team. This may be psychiatry or counseling, physical therapy, behavioral therapy, group treatment or a referral to a pain clinic. I will reduce or stop my medicine when my care team tells me to do so.  2. I will take my medicines as prescribed. I will not change the dose or schedule unless my care team tells me to. There will be no refills if I  run out early.   I may be contactedwithout warning and asked to complete a urine drug test or pill count at any time.   3. I will keep all my appointments, and understand this is part of the monitoring of controlled substances. My care team may require an office visit for EVERY controlled substance refill. If I miss appointments or don t follow instructions, my care team may stop my medicine.  4. I will not ask other  providers to prescribe controlled substances, and I will not accept controlled substances from other people. If I need another prescribed controlled substance for a new reason, I will tell my care team within 1 business day.  5. I will use one pharmacy to fill all of my controlled substance prescriptions, and it is up to me to make sure that I do not run out of my medicines on weekends or holidays. If my care team is willing to refill my controlled substance prescription without a visit, I must request refills only during office hours, refills may take up to 3 days to process, and it may take up to 5 to 7 days for my medicine to be mailed and ready at my pharmacy. Prescriptions will not be mailed anywhere except my pharmacy.    6. I am responsible for my prescriptions. If the medicine/prescription is lost or stolen, it will not be replaced. I also agree not to share controlled substance medicines with anyone.              [facility]  01/28/19  Patient:  Irina Hanks  YOB: 1960  Medical Record Number: [id number]  [facility]: [id number]    7. I agree to not use ANY illegal or recreational drugs. This includes marijuana, cocaine, bath salts or other drugs. I agree not to use alcohol unless my care team says I may. I agree to give urine samples whenever asked. If I don t give a urine sample, the care team may stop my medicine.    8. If I enroll in the [place] Medical Marijuana program, I will tell my care team. I will also sign an agreement to share my medical records with my care team.    9. I will bring in my list of medicines (or my medicine bottles) each time I come to the clinic.   10. I will tell my care team right away if I become pregnant or have a new medical problem treated outside of my regular clinic.  11. I understand that this medicine can affect my thinking and judgment. It may be unsafe for me to drive, use machinery and do dangerous tasks. I will not do any of these things  until I know how the medicine affects me. If my dose changes, I will wait to see how it affects me. I will contact my care team if I have concerns about medicine side effects.    I understand that if I do not follow any of the conditions above, my prescriptions or treatment may be stopped.      I agree that my provider, clinic care team, and pharmacy may work with any city, state or federal law enforcement agency that investigates the misuse, sale, or other diversion of my controlled medicine. I will allow my provider to discuss my care with or share a copy of this agreement with any other treating provider, pharmacy or emergency room where I receive care. I agree to give up (waive) any right of privacy or confidentiality with respect to these consents.   I have read this agreement and have asked questions about anything I did not understand.    ____________________________________________________    ____________  ________  Patient signature                                                         Date      Time    ____________________________________________________     ____________  ________  Witness                                                          Date      Time    ____________________________________________________  Provider signature

## 2019-01-29 ENCOUNTER — TELEPHONE (OUTPATIENT)
Dept: FAMILY MEDICINE | Facility: CLINIC | Age: 59
End: 2019-01-29

## 2019-01-29 LAB
CHOLEST SERPL-MCNC: 197 MG/DL
CREAT UR-MCNC: 55 MG/DL
FERRITIN SERPL-MCNC: 46 NG/ML (ref 8–252)
GLUCOSE SERPL-MCNC: 87 MG/DL (ref 70–99)
HDLC SERPL-MCNC: 93 MG/DL
IRON SATN MFR SERPL: 41 % (ref 15–46)
IRON SERPL-MCNC: 133 UG/DL (ref 35–180)
LDLC SERPL CALC-MCNC: 83 MG/DL
MICROALBUMIN UR-MCNC: <5 MG/L
MICROALBUMIN/CREAT UR: NORMAL MG/G CR (ref 0–25)
NONHDLC SERPL-MCNC: 104 MG/DL
TIBC SERPL-MCNC: 321 UG/DL (ref 240–430)
TRIGL SERPL-MCNC: 105 MG/DL

## 2019-01-29 NOTE — TELEPHONE ENCOUNTER
Pt saw her Rheumatologist today and they are wondering if the metoprolol er 50 mg is causing her sob.  Pt states she has been on the metoprolol for awhile.  Trying to figure things out.    Pharmacy pended.    Pt can be reached at [phone] ok to leave message.    Patsy VIERA RN  EP Triage

## 2019-01-30 NOTE — TELEPHONE ENCOUNTER
This would be unlikely. I still want to repeat the CT of the chest and also the echocardiogram that was ordered at our visit on Monday. We can go from there.

## 2019-01-30 NOTE — TELEPHONE ENCOUNTER
Detailed message left with info from provider and return number to call with any questions or concerns.    Patsy VIERA RN  EP Triage

## 2019-01-31 ENCOUNTER — HOSPITAL ENCOUNTER (OUTPATIENT)
Dept: CARDIOLOGY | Facility: CLINIC | Age: 59
End: 2019-01-31
Attending: INTERNAL MEDICINE
Payer: COMMERCIAL

## 2019-01-31 ENCOUNTER — HOSPITAL ENCOUNTER (OUTPATIENT)
Dept: CARDIOLOGY | Facility: CLINIC | Age: 59
Discharge: HOME | End: 2019-01-31
Attending: INTERNAL MEDICINE | Admitting: INTERNAL MEDICINE
Payer: COMMERCIAL

## 2019-01-31 DIAGNOSIS — R06.09 DYSPNEA ON EXERTION: ICD-10-CM

## 2019-01-31 PROCEDURE — 71250 CT THORAX DX C-: CPT

## 2019-01-31 PROCEDURE — 93306 TTE W/DOPPLER COMPLETE: CPT

## 2019-01-31 PROCEDURE — 93306 TTE W/DOPPLER COMPLETE: CPT | Mod: 26 | Performed by: INTERNAL MEDICINE

## 2019-02-04 ENCOUNTER — MYC MEDICAL ADVICE (OUTPATIENT)
Dept: FAMILY MEDICINE | Facility: CLINIC | Age: 59
End: 2019-02-04

## 2019-02-04 DIAGNOSIS — I10 BENIGN ESSENTIAL HYPERTENSION: Primary | ICD-10-CM

## 2019-02-04 RX ORDER — AMLODIPINE BESYLATE 5 MG/1
5 TABLET ORAL DAILY
Qty: 90 TABLET | Refills: 3 | Status: SHIPPED | OUTPATIENT
Start: 2019-02-04 | End: 2019-08-08

## 2019-02-06 ENCOUNTER — TRANSFERRED RECORDS (OUTPATIENT)
Dept: HEALTH INFORMATION MANAGEMENT | Facility: CLINIC | Age: 59
End: 2019-02-06

## 2019-02-06 ENCOUNTER — MEDICAL CORRESPONDENCE (OUTPATIENT)
Dept: HEALTH INFORMATION MANAGEMENT | Facility: CLINIC | Age: 59
End: 2019-02-06

## 2019-02-07 ENCOUNTER — TELEPHONE (OUTPATIENT)
Dept: ONCOLOGY | Facility: CLINIC | Age: 59
End: 2019-02-07

## 2019-02-07 ENCOUNTER — MYC REFILL (OUTPATIENT)
Dept: FAMILY MEDICINE | Facility: CLINIC | Age: 59
End: 2019-02-07

## 2019-02-07 DIAGNOSIS — F43.22 ADJUSTMENT DISORDER WITH ANXIOUS MOOD: ICD-10-CM

## 2019-02-07 RX ORDER — CLONAZEPAM 0.5 MG/1
TABLET, ORALLY DISINTEGRATING ORAL
Qty: 30 TABLET | Refills: 0 | Status: SHIPPED | OUTPATIENT
Start: 2019-02-07 | End: 2019-03-25

## 2019-02-07 RX ORDER — CLONAZEPAM 0.5 MG/1
TABLET, ORALLY DISINTEGRATING ORAL
Qty: 30 TABLET | Refills: 0 | Status: CANCELLED | OUTPATIENT
Start: 2019-02-07

## 2019-02-07 NOTE — TELEPHONE ENCOUNTER
clonazePAM (KLONOPIN) 0.5 MG ODT    Last Written Prescription Date:  12/21/18  Last Fill Quantity: 30,  # refills: 0   Last office visit: 1/28/2019 with prescribing provider:  Dena    Future Office Visit:   Next 5 appointments (look out 90 days)    Feb 14, 2019 11:00 AM CST  Return Visit with  LAB DRAW 1  [facility] ([facility]) [facility]  [address] [facility] [address]  [facility] [address] [phone]           Requested Prescriptions   Pending Prescriptions Disp Refills     clonazePAM (KLONOPIN) 0.5 MG ODT 30 tablet 0     Sig: DISSOLVE 1 TABLET BY MOUTH NIGHTLY AS NEEDED FOR ANXIETY    There is no refill protocol information for this order        Routing refill request to provider for review/approval because:  Drug not on the [facility] refill protocol     Miranda GARCIAN, RN   [facility]

## 2019-02-07 NOTE — TELEPHONE ENCOUNTER
Patient called and asked if Rivka could call her back. She was not sure she needs the lab/possible phlebotomy on 2/12/19.

## 2019-02-08 ENCOUNTER — TRANSFERRED RECORDS (OUTPATIENT)
Dept: MULTI SPECIALTY CLINIC | Facility: CLINIC | Age: 59
End: 2019-02-08

## 2019-02-08 NOTE — TELEPHONE ENCOUNTER
clonazePAM (KLONOPIN) 0.5 MG ODT    Last Written Prescription Date:  2/7/19  Last Fill Quantity: 30,  # refills: 0   Last office visit: 1/28/2019 with prescribing provider:  Marleni ace    Future Office Visit:   Next 5 appointments (look out 90 days)    Feb 12, 2019  1:30 PM CST  Return Visit with  LAB DRAW 1  [facility] ([facility]) [facility]  [address] [facility] [address]  [facility] [address] [phone]           Requested Prescriptions   Pending Prescriptions Disp Refills     clonazePAM (KLONOPIN) 0.5 MG ODT 30 tablet 0     Sig: DISSOLVE 1 TABLET BY MOUTH NIGHTLY AS NEEDED FOR ANXIETY    There is no refill protocol information for this order        Rx denied patient was just given refill yesterday 2/7/19.    Miranda Mcgrath BSN, RN   [facility]

## 2019-02-11 ENCOUNTER — TELEPHONE (OUTPATIENT)
Dept: ONCOLOGY | Facility: CLINIC | Age: 59
End: 2019-02-11

## 2019-02-11 NOTE — TELEPHONE ENCOUNTER
Contacted patient on her Lipperhey message that she sent to Dr. Rossi regarding her labs/phlebotomy. Consulted with Dr. Rossi who reviewed labs that she had drawn 1/28/19 at her primary care clinic. Per Dr. Rossi patient does not need a phlebotomy based on her labs from 1/28/19. Called Peg and she is aware that her labs and phlebotomy will be canceled for 2/12/19 and rescheduled to 3/12/19 per Dr. Rossi.Patient questioned Lichen Planus in her mouth and writer instructed her to call her primary care physician.  Adilene Judge RN,BSN,OCN

## 2019-02-20 ENCOUNTER — TELEPHONE (OUTPATIENT)
Dept: FAMILY MEDICINE | Facility: CLINIC | Age: 59
End: 2019-02-20

## 2019-02-20 DIAGNOSIS — I10 HYPERTENSION GOAL BP (BLOOD PRESSURE) < 130/80: Primary | ICD-10-CM

## 2019-02-20 NOTE — TELEPHONE ENCOUNTER
Started amlodipine past week. Patient states that she is down in [place]. Ever since started amlodipine her feet and ankles have been swollen.   She went off amlodipine 2 days and feet and ankles are better.   124/82, pulse 92    Has old BP med with her - metoprolol. Should she go back to that?     Please advise. Triage to call the patient back.  Carmen Lopez RN

## 2019-02-21 RX ORDER — METOPROLOL SUCCINATE 50 MG/1
50 TABLET, EXTENDED RELEASE ORAL DAILY
Qty: 7 TABLET | Refills: 3 | Status: SHIPPED | OUTPATIENT
Start: 2019-02-21 | End: 2020-01-17

## 2019-02-21 NOTE — TELEPHONE ENCOUNTER
Patient was notified with information noted by provider and agreed with plan.  Patient stated she forgot medication, short supply sent to local pharmacy in [place].    JOSE LoganN, RN  [employer] Triage

## 2019-02-23 DIAGNOSIS — I10 ESSENTIAL HYPERTENSION, BENIGN: ICD-10-CM

## 2019-02-25 RX ORDER — LOSARTAN POTASSIUM AND HYDROCHLOROTHIAZIDE 12.5; 1 MG/1; MG/1
1 TABLET ORAL DAILY
Qty: 90 TABLET | Refills: 0 | OUTPATIENT
Start: 2019-02-25

## 2019-02-25 NOTE — TELEPHONE ENCOUNTER
"Requested Prescriptions   Pending Prescriptions Disp Refills     losartan-hydrochlorothiazide (HYZAAR) 100-12.5 MG tablet [Pharmacy Med Name: LOSARTAN/HCTZ 100/12.5MG TABLETS]  Last Written Prescription Date:  1/28/19  Last Fill Quantity: 90,  # refills: 1   Last office visit: 1/28/2019 with prescribing provider:  Dena   Future Office Visit:   Next 5 appointments (look out 90 days)    Mar 12, 2019  1:30 PM CDT  Return Visit with  LAB DRAW 1  [facility] ([facility]) [facility] [place]  [address]  [place] [address]  [phone]          90 tablet 0     Sig: TAKE 1 TABLET BY MOUTH DAILY    Angiotensin-II Receptors Passed - 2/23/2019  4:08 AM       Passed - Blood pressure under 140/90 in past 12 months    BP Readings from Last 3 Encounters:   01/28/19 110/80   12/18/18 136/86   11/19/18 115/79                Passed - Recent (12 mo) or future (30 days) visit within the authorizing provider's specialty    Patient had office visit in the last 12 months or has a visit in the next 30 days with authorizing provider or within the authorizing provider's specialty.  See \"Patient Info\" tab in inbasket, or \"Choose Columns\" in Meds & Orders section of the refill encounter.             Passed - Medication is active on med list       Passed - Patient is age 18 or older       Passed - No active pregnancy on record       Passed - Normal serum creatinine on file in past 12 months    Recent Labs   Lab Test 06/12/18  1509   CR 0.76            Passed - Normal serum potassium on file in past 12 months    Recent Labs   Lab Test 06/12/18  1509   POTASSIUM 3.6                   Passed - No positive pregnancy test in past 12 months          "

## 2019-03-12 ENCOUNTER — HOSPITAL ENCOUNTER (OUTPATIENT)
Facility: CLINIC | Age: 59
Setting detail: SPECIMEN
Discharge: HOME | End: 2019-03-12
Attending: INTERNAL MEDICINE | Admitting: INTERNAL MEDICINE
Payer: COMMERCIAL

## 2019-03-12 ENCOUNTER — INFUSION THERAPY VISIT (OUTPATIENT)
Dept: INFUSION THERAPY | Facility: CLINIC | Age: 59
End: 2019-03-12
Attending: INTERNAL MEDICINE
Payer: COMMERCIAL

## 2019-03-12 VITALS
TEMPERATURE: 97.5 F | HEART RATE: 66 BPM | DIASTOLIC BLOOD PRESSURE: 86 MMHG | SYSTOLIC BLOOD PRESSURE: 128 MMHG | RESPIRATION RATE: 16 BRPM | OXYGEN SATURATION: 98 %

## 2019-03-12 DIAGNOSIS — L43.9 LICHEN PLANUS: ICD-10-CM

## 2019-03-12 DIAGNOSIS — E83.110 HEREDITARY HEMOCHROMATOSIS (H): ICD-10-CM

## 2019-03-12 DIAGNOSIS — E83.19 IRON OVERLOAD: ICD-10-CM

## 2019-03-12 DIAGNOSIS — L43.9 LICHEN PLANUS: Primary | ICD-10-CM

## 2019-03-12 DIAGNOSIS — E83.110 HEREDITARY HEMOCHROMATOSIS (H): Primary | ICD-10-CM

## 2019-03-12 LAB
ALT SERPL W P-5'-P-CCNC: 25 U/L (ref 0–50)
AST SERPL W P-5'-P-CCNC: 26 U/L (ref 0–45)
ERYTHROCYTE [DISTWIDTH] IN BLOOD BY AUTOMATED COUNT: 12.9 % (ref 10–15)
FERRITIN SERPL-MCNC: 58 NG/ML (ref 8–252)
HCT VFR BLD AUTO: 38.7 % (ref 35–47)
HGB BLD-MCNC: 13.9 G/DL (ref 11.7–15.7)
MCH RBC QN AUTO: 32.4 PG (ref 26.5–33)
MCHC RBC AUTO-ENTMCNC: 35.9 G/DL (ref 31.5–36.5)
MCV RBC AUTO: 90 FL (ref 78–100)
PLATELET # BLD AUTO: 228 10E9/L (ref 150–450)
RBC # BLD AUTO: 4.29 10E12/L (ref 3.8–5.2)
WBC # BLD AUTO: 6.1 10E9/L (ref 4–11)

## 2019-03-12 PROCEDURE — 82728 ASSAY OF FERRITIN: CPT | Performed by: INTERNAL MEDICINE

## 2019-03-12 PROCEDURE — 83516 IMMUNOASSAY NONANTIBODY: CPT | Performed by: INTERNAL MEDICINE

## 2019-03-12 PROCEDURE — 85027 COMPLETE CBC AUTOMATED: CPT | Performed by: INTERNAL MEDICINE

## 2019-03-12 PROCEDURE — 83516 IMMUNOASSAY NONANTIBODY: CPT | Mod: 91 | Performed by: INTERNAL MEDICINE

## 2019-03-12 PROCEDURE — 99195 PHLEBOTOMY: CPT

## 2019-03-12 PROCEDURE — 84460 ALANINE AMINO (ALT) (SGPT): CPT | Performed by: INTERNAL MEDICINE

## 2019-03-12 PROCEDURE — 84450 TRANSFERASE (AST) (SGOT): CPT | Performed by: INTERNAL MEDICINE

## 2019-03-12 NOTE — PROGRESS NOTES
Infusion Nursing Note:  Irina Hanks presents today for phlebotomy.    Patient seen by provider today: No   present during visit today: Not Applicable.    Note:  Phlebotomy 530cc removed.    Intravenous Access:  Labs drawn without difficulty.  Peripheral IV placed.    Treatment Conditions:  Lab Results   Component Value Date    HGB 13.9 03/12/2019     Lab Results   Component Value Date    WBC 6.1 03/12/2019      Lab Results   Component Value Date    ANEU 2.3 06/21/2018     Lab Results   Component Value Date     03/12/2019   Ferritin 58   Results reviewed, labs MET treatment parameters, ok to proceed with treatment..      Post Infusion Assessment:  Access discontinued per protocol.  VSS, pt tolerated phlebotomy well.    Discharge Plan:   Discharge instructions reviewed with: Patient.  Patient and/or family verbalized understanding of discharge instructions and all questions answered.  AVS to patient via [facility].  Patient will return 6/17/19 for next appointment. Patient discharged in stable condition accompanied by: self.  Departure Mode: Ambulatory.    Kathryn Murphy RN

## 2019-03-12 NOTE — RESULT ENCOUNTER NOTE
Dear Ms. Hanks,    Blood test are good.    Please, call me with any questions.    Eloisa Rossi

## 2019-03-13 LAB
TTG IGA SER-ACNC: 1 U/ML
TTG IGG SER-ACNC: 1 U/ML

## 2019-03-25 DIAGNOSIS — F43.22 ADJUSTMENT DISORDER WITH ANXIOUS MOOD: ICD-10-CM

## 2019-03-25 RX ORDER — CLONAZEPAM 0.5 MG/1
TABLET, ORALLY DISINTEGRATING ORAL
Qty: 30 TABLET | Refills: 0 | Status: SHIPPED | OUTPATIENT
Start: 2019-03-25 | End: 2019-05-08

## 2019-03-25 NOTE — TELEPHONE ENCOUNTER
Requested Prescriptions   Pending Prescriptions Disp Refills     clonazePAM (KLONOPIN) 0.5 MG ODT [Pharmacy Med Name: CLONAZEPAM ODT 0.5MG TABLETS] 30 tablet 0     Sig: DISSOLVE 1 TABLET BY MOUTH NIGHLY AS NEEDED FOR ANXIETY    There is no refill protocol information for this order   Last Written Prescription Date:  2/7/2019  Last Fill Quantity: 30,  # refills: 0   Last office visit: 1/28/2019 with prescribing provider:  SHAZIA Fernandes  Future Office Visit:   Next 5 appointments (look out 90 days)    Jun 17, 2019 10:40 AM CDT  Return Visit with Eloisa Rossi MD  [facility] ([facility]) [facility]  [address] [facility] [address]  [facility] [address]  [phone]

## 2019-03-25 NOTE — TELEPHONE ENCOUNTER
RX signed by Dr. Fernandes and faxed to      [id number] - [place], [place] - [address] AT [facility] OF [place] [address]      .Radha MCCRAY    [facility]

## 2019-04-16 ENCOUNTER — TRANSFERRED RECORDS (OUTPATIENT)
Dept: HEALTH INFORMATION MANAGEMENT | Facility: CLINIC | Age: 59
End: 2019-04-16

## 2019-05-07 DIAGNOSIS — F43.22 ADJUSTMENT DISORDER WITH ANXIOUS MOOD: ICD-10-CM

## 2019-05-08 RX ORDER — CLONAZEPAM 0.5 MG/1
TABLET, ORALLY DISINTEGRATING ORAL
Qty: 30 TABLET | Refills: 0 | Status: SHIPPED | OUTPATIENT
Start: 2019-05-08 | End: 2019-06-27

## 2019-06-04 ENCOUNTER — TRANSFERRED RECORDS (OUTPATIENT)
Dept: HEALTH INFORMATION MANAGEMENT | Facility: CLINIC | Age: 59
End: 2019-06-04

## 2019-06-11 ENCOUNTER — TELEPHONE (OUTPATIENT)
Dept: FAMILY MEDICINE | Facility: CLINIC | Age: 59
End: 2019-06-11

## 2019-06-11 NOTE — TELEPHONE ENCOUNTER
Sunday awoke with UTI. Was started on Nitrofurantoin.    States that she feels worse since starting Nitrofurantoin. Feels like nauseated, tired, aching all over.     Supposed to take through Thursday.    UTI symptoms better. Pain and bleeding gone. Still having urgency.     Patient was prescribed Nitrofurantion 100 mg BID fpr 5 days (started Sunday).     Asking Dr. Fernandes if she could have a different antibiotic or finish the Nitrofurantion.     Advised the patient to check with Urgent Care ([facility] in [place]) since they would have the culture results.    Patient will call back if UC is unable to help.   Carmen Lopez RN

## 2019-06-11 NOTE — TELEPHONE ENCOUNTER
Patient called back to report that she spoke with [facility]. She was informed that her urine ulture has not grown anything. Informed that it was maybe not a good catch. She was informed to   stop medication (Nitrofurantoin).  Patient scheduled for follow up tomorrow 10 am.  Advised to drink fluids. If fever or back pain to seek immediate medical attention.  Advised of 24/7 nurse line.  Carmen Lopez RN

## 2019-06-18 ENCOUNTER — ONCOLOGY VISIT (OUTPATIENT)
Dept: ONCOLOGY | Facility: CLINIC | Age: 59
End: 2019-06-18
Attending: INTERNAL MEDICINE
Payer: COMMERCIAL

## 2019-06-18 ENCOUNTER — HOSPITAL ENCOUNTER (OUTPATIENT)
Facility: CLINIC | Age: 59
Setting detail: SPECIMEN
Discharge: HOME | End: 2019-06-18
Attending: INTERNAL MEDICINE | Admitting: INTERNAL MEDICINE
Payer: COMMERCIAL

## 2019-06-18 ENCOUNTER — INFUSION THERAPY VISIT (OUTPATIENT)
Dept: INFUSION THERAPY | Facility: CLINIC | Age: 59
End: 2019-06-18
Attending: INTERNAL MEDICINE
Payer: COMMERCIAL

## 2019-06-18 VITALS
OXYGEN SATURATION: 98 % | WEIGHT: 152.8 LBS | HEIGHT: 63 IN | HEART RATE: 73 BPM | BODY MASS INDEX: 27.07 KG/M2 | SYSTOLIC BLOOD PRESSURE: 128 MMHG | DIASTOLIC BLOOD PRESSURE: 88 MMHG | RESPIRATION RATE: 16 BRPM | TEMPERATURE: 97.3 F

## 2019-06-18 VITALS
HEART RATE: 73 BPM | SYSTOLIC BLOOD PRESSURE: 121 MMHG | TEMPERATURE: 97.3 F | DIASTOLIC BLOOD PRESSURE: 84 MMHG | RESPIRATION RATE: 16 BRPM

## 2019-06-18 DIAGNOSIS — E83.110 HEREDITARY HEMOCHROMATOSIS (H): Primary | ICD-10-CM

## 2019-06-18 DIAGNOSIS — E83.19 IRON OVERLOAD: ICD-10-CM

## 2019-06-18 LAB
ALBUMIN SERPL-MCNC: 4 G/DL (ref 3.4–5)
ALP SERPL-CCNC: 88 U/L (ref 40–150)
ALT SERPL W P-5'-P-CCNC: 24 U/L (ref 0–50)
AST SERPL W P-5'-P-CCNC: 20 U/L (ref 0–45)
BILIRUB DIRECT SERPL-MCNC: 0.2 MG/DL (ref 0–0.2)
BILIRUB SERPL-MCNC: 0.9 MG/DL (ref 0.2–1.3)
ERYTHROCYTE [DISTWIDTH] IN BLOOD BY AUTOMATED COUNT: 12.3 % (ref 10–15)
FERRITIN SERPL-MCNC: 92 NG/ML (ref 8–252)
HCT VFR BLD AUTO: 37.9 % (ref 35–47)
HGB BLD-MCNC: 13.6 G/DL (ref 11.7–15.7)
MCH RBC QN AUTO: 31.5 PG (ref 26.5–33)
MCHC RBC AUTO-ENTMCNC: 35.9 G/DL (ref 31.5–36.5)
MCV RBC AUTO: 88 FL (ref 78–100)
PLATELET # BLD AUTO: 263 10E9/L (ref 150–450)
PROT SERPL-MCNC: 8.2 G/DL (ref 6.8–8.8)
RBC # BLD AUTO: 4.32 10E12/L (ref 3.8–5.2)
WBC # BLD AUTO: 3.6 10E9/L (ref 4–11)

## 2019-06-18 PROCEDURE — 82728 ASSAY OF FERRITIN: CPT | Performed by: INTERNAL MEDICINE

## 2019-06-18 PROCEDURE — 80076 HEPATIC FUNCTION PANEL: CPT | Performed by: INTERNAL MEDICINE

## 2019-06-18 PROCEDURE — 85027 COMPLETE CBC AUTOMATED: CPT | Performed by: INTERNAL MEDICINE

## 2019-06-18 PROCEDURE — 99214 OFFICE O/P EST MOD 30 MIN: CPT | Performed by: INTERNAL MEDICINE

## 2019-06-18 ASSESSMENT — MIFFLIN-ST. JEOR: SCORE: 1227.23

## 2019-06-18 ASSESSMENT — PAIN SCALES - GENERAL
PAINLEVEL: NO PAIN (0)
PAINLEVEL: NO PAIN (0)

## 2019-06-18 NOTE — PROGRESS NOTES
Visit Date:   06/18/2019     HEMATOLOGY HISTORY: Ms. Zhao is a female with hereditary hemochromatosis. Homozygous for H63D mutation.   1.  On 11/13/2015, normal LFT.   2.  On 06/15/2016, ferritin of 506.   3.  On 06/12/2018.   -CBC is normal.   -Elevated AST and ALT.  Normal bilirubin and alkaline phosphatase.  ALT of 240 and AST of 136.   4.  On 06/15/2018     -Iron of 138, ferritin of 1140 and saturation of 66%.   -Hepatitis C antibody negative.   -Hepatitis B negative.   -Positive for hepatitis A IgG antibody.  Negative for hepatitis A IgM antibody.     5.  Ultrasound of abdomen and pelvis on 06/18/2018 is normal.   6. On 06/21/2018.   -CBC is normal.   -Iron 78, saturation of 37 and ferritin of 1028.   -HFE gene analysis reveals patient to be homozygous for H63D mutation.   7. CT abdomen and pelvis on 06/25/2018 is normal.  Liver looks normal.   8. Because of elevated LFT, phlebotomy started on 06/21/2018.      SUBJECTIVE:  Ms. Zhao is a 59-year-old female with hereditary hemochromatosis.  She gets phlebotomy.  Her LFTs and ferritin used to be high.  They have been normal with phlebotomy.      The patient had sensitiveness in mouth and throat.  She was seen by ENT and had a biopsy done.  She has lichen planus.      The patient recently had UTI.  INITIALLY, SHE WAS ON NITROFURANTOIN; SHE HAD REACTION and later was on cephalexin.  She has completed the antibiotics and UTI symptoms have resolved.      REVIEW OF SYSTEMS:  No headache.  No dizziness.  No chest pain or difficulty breathing.  No abdominal pain, nausea or vomiting.  Appetite is good.  No urinary or bowel complaints.  No bleeding.  No fever, chills or night sweats.      PHYSICAL EXAMINATION:   GENERAL:  She is alert, oriented x3. ECOG PS of 1.   The rest of the systems not examined.      LABORATORY DATA:  Reviewed.      ASSESSMENT:   1.  A 59-year-old female with hereditary hemochromatosis on phlebotomy.   2.  Lichen planus.   3.  Recent  urinary tract infection.   4.  Mild leukopenia secondary to recent UTI and antibiotics.      PLAN:   1.  I discussed regarding hemochromatosis.  Labs from today reveal normal hemoglobin and hematocrit and LFTs.  Her ferritin is 92; in March was 58 and in January was 46.    The patient will have phlebotomy done today.  I told her that our aim is to keep the ferritin around 50 or below.  She is agreeable for it. At this time, we plan to do phlebotomy every 3 months.    2.  Her white count is mildly low secondary to recent UTI and antibiotics.  We will recheck a CBC in 3 months.   3.  She will continue to follow up with ENT regarding lichen planus.   4.  She had a few questions, which were all answered.  I told the patient that she should do very well from hemochromatosis as long as she maintains on phlebotomy.  With phlebotomy, we will keep her ferritin under control and she should not get any complications.      Total time spent was 25 minutes, more than 50% of the time was spent in counseling and coordination of care.         RACHEL CHAPMAN MD             D: 2019   T: 2019   MT: CHANDLER      Name:     RO GARCÍA   MRN:      [id number]        Account:      [id number]   :      1960           Visit Date:   2019      Document: [id number]

## 2019-06-18 NOTE — PROGRESS NOTES
"Oncology Rooming Note    June 18, 2019 10:25 AM   Irina Hanks is a 59 year old female who presents for:    Chief Complaint   Patient presents with     Oncology Clinic Visit     Initial Vitals: /88   Pulse 73   Temp 97.3  F (36.3  C) (Oral)   Resp 16   Ht 1.6 m (5' 3\")   Wt 69.3 kg (152 lb 12.8 oz)   SpO2 98%   BMI 27.07 kg/m   Estimated body mass index is 27.07 kg/m  as calculated from the following:    Height as of this encounter: 1.6 m (5' 3\").    Weight as of this encounter: 69.3 kg (152 lb 12.8 oz). Body surface area is 1.76 meters squared.  No Pain (0) Comment: Data Unavailable   No LMP recorded. Patient is postmenopausal.  Allergies reviewed: Yes  Medications reviewed: Yes    Medications: Medication refills not needed today.  Pharmacy name entered into [employer]:      [id number] - [place], [place] - [address] AT [facility] OF [place] [address]    Clinical concerns: no      Tanesha Ram CMA            "

## 2019-06-18 NOTE — PATIENT INSTRUCTIONS
Labs and possible phlebotomy in 3 months. See Rishi Kaufman on that day.  See me in 6 months with labs.      Patient back in [facility]

## 2019-06-18 NOTE — PROGRESS NOTES
Infusion Nursing Note:  Irina Hanks presents today for labs/phlebotomy.    Patient seen by provider today: Yes: Dr Rossi   present during visit today: Not Applicable.    Note: N/A.    Intravenous Access:  Peripheral IV placed.    Treatment Conditions:  Lab Results   Component Value Date    HGB 13.6 06/18/2019     Lab Results   Component Value Date    WBC 3.6 06/18/2019      Lab Results   Component Value Date    ANEU 2.3 06/21/2018     Lab Results   Component Value Date     06/18/2019      Results reviewed, labs MET treatment parameters, ok to proceed with treatment.      Post Infusion Assessment:  Patient tolerated phlebotomy without incident. 500ml blood removed as ordered  Site patent and intact, free from redness, edema or discomfort.  Access discontinued per protocol.       Discharge Plan:   Discharge instructions reviewed with: Patient.  Patient and/or family verbalized understanding of discharge instructions and all questions answered.  Copy of AVS reviewed with patient and/or family.  Patient will return 3 months for next appointment.  Patient discharged in stable condition accompanied by: self.  Departure Mode: Ambulatory.    Irish De La Rosa RN

## 2019-06-18 NOTE — LETTER
"    6/18/2019         RE: Irina Hanks  [address]  [place] [address]        Dear Colleague,    Thank you for referring your patient, Irina Hanks, to the [facility]. Please see a copy of my visit note below.    Oncology Rooming Note    June 18, 2019 10:25 AM   Irina Hanks is a 59 year old female who presents for:    Chief Complaint   Patient presents with     Oncology Clinic Visit     Initial Vitals: /88   Pulse 73   Temp 97.3  F (36.3  C) (Oral)   Resp 16   Ht 1.6 m (5' 3\")   Wt 69.3 kg (152 lb 12.8 oz)   SpO2 98%   BMI 27.07 kg/m    Estimated body mass index is 27.07 kg/m  as calculated from the following:    Height as of this encounter: 1.6 m (5' 3\").    Weight as of this encounter: 69.3 kg (152 lb 12.8 oz). Body surface area is 1.76 meters squared.  No Pain (0) Comment: Data Unavailable   No LMP recorded. Patient is postmenopausal.  Allergies reviewed: Yes  Medications reviewed: Yes    Medications: Medication refills not needed today.  Pharmacy name entered into [employer]:      [id number] - [place], [place] - [address] AT [facility] OF [place] [address] [address]    Clinical concerns: no      Tanesha Ram, [facility]              Visit Date:   06/18/2019     HEMATOLOGY HISTORY: Ms. Zhao is a female with hereditary hemochromatosis. Homozygous for H63D mutation.   1.  On 11/13/2015, normal LFT.   2.  On 06/15/2016, ferritin of 506.   3.  On 06/12/2018.   -CBC is normal.   -Elevated AST and ALT.  Normal bilirubin and alkaline phosphatase.  ALT of 240 and AST of 136.   4.  On 06/15/2018     -Iron of 138, ferritin of 1140 and saturation of 66%.   -Hepatitis C antibody negative.   -Hepatitis B negative.   -Positive for hepatitis A IgG antibody.  Negative for hepatitis A IgM antibody.     5.  Ultrasound of abdomen and pelvis on 06/18/2018 is normal.   6. On 06/21/2018.   -CBC is normal.   -Iron 78, saturation of 37 and ferritin of 1028.   -HFE " gene analysis reveals patient to be homozygous for H63D mutation.   7. CT abdomen and pelvis on 06/25/2018 is normal.  Liver looks normal.   8. Because of elevated LFT, phlebotomy started on 06/21/2018.      SUBJECTIVE:  Ms. Zhao is a 59-year-old female with hereditary hemochromatosis.  She gets phlebotomy.  Her LFTs and ferritin used to be high.  They have been normal with phlebotomy.      The patient had sensitiveness in mouth and throat.  She was seen by ENT and had a biopsy done.  She has lichen planus.      The patient recently had UTI.  INITIALLY, SHE WAS ON NITROFURANTOIN; SHE HAD REACTION and later was on cephalexin.  She has completed the antibiotics and UTI symptoms have resolved.      REVIEW OF SYSTEMS:  No headache.  No dizziness.  No chest pain or difficulty breathing.  No abdominal pain, nausea or vomiting.  Appetite is good.  No urinary or bowel complaints.  No bleeding.  No fever, chills or night sweats.      PHYSICAL EXAMINATION:   GENERAL:  She is alert, oriented x3. ECOG PS of 1.   The rest of the systems not examined.      LABORATORY DATA:  Reviewed.      ASSESSMENT:   1.  A 59-year-old female with hereditary hemochromatosis on phlebotomy.   2.  Lichen planus.   3.  Recent urinary tract infection.   4.  Mild leukopenia secondary to recent UTI and antibiotics.      PLAN:   1.  I discussed regarding hemochromatosis.  Labs from today reveal normal hemoglobin and hematocrit and LFTs.  Her ferritin is 92; in March was 58 and in January was 46.    The patient will have phlebotomy done today.  I told her that our aim is to keep the ferritin around 50 or below.  She is agreeable for it. At this time, we plan to do phlebotomy every 3 months.    2.  Her white count is mildly low secondary to recent UTI and antibiotics.  We will recheck a CBC in 3 months.   3.  She will continue to follow up with ENT regarding lichen planus.   4.  She had a few questions, which were all answered.  I told the patient  that she should do very well from hemochromatosis as long as she maintains on phlebotomy.  With phlebotomy, we will keep her ferritin under control and she should not get any complications.      Total time spent was 25 minutes, more than 50% of the time was spent in counseling and coordination of care.         RACHEL CHAPMAN MD             D: 2019   T: 2019   MT: CHANDLER      Name:     RO GARCÍA   MRN:      [id number]        Account:      [id number]   :      1960           Visit Date:   2019      Document: [id number]        Again, thank you for allowing me to participate in the care of your patient.        Sincerely,        Rachel Chapman MD

## 2019-06-27 DIAGNOSIS — F43.22 ADJUSTMENT DISORDER WITH ANXIOUS MOOD: ICD-10-CM

## 2019-06-27 NOTE — TELEPHONE ENCOUNTER
clonazePAM (KLONOPIN) 0.5 MG ODT      Last Written Prescription Date:  5/8/19  Last Fill Quantity: 30 tablet,   # refills: 0  Last Office Visit: 1/28/19  Future Office visit:    Next 5 appointments (look out 90 days)    Sep 17, 2019 10:10 AM CDT  Return Visit with  LAB DRAW 1  [facility] ([facility]) [facility]  [address]  [facility] [address]  [phone]   Sep 17, 2019 12:30 PM CDT  Return Visit with ARLENE Gibson CNP  [facility] ([facility]) [facility]  [address]  [facility] [address]  [phone]           Routing refill request to provider for review/approval because:  Drug not on the [facility], [facility] or [facility] refill protocol or controlled substance

## 2019-06-28 NOTE — TELEPHONE ENCOUNTER
Routing refill request to provider for review/approval because:  Drug not on the [facility] refill protocol     Soledad Virgen RN, BSN  [facility]

## 2019-06-30 RX ORDER — CLONAZEPAM 0.5 MG/1
TABLET, ORALLY DISINTEGRATING ORAL
Qty: 30 TABLET | Refills: 0 | Status: SHIPPED | OUTPATIENT
Start: 2019-06-30 | End: 2019-08-12

## 2019-07-02 ENCOUNTER — TELEPHONE (OUTPATIENT)
Dept: FAMILY MEDICINE | Facility: CLINIC | Age: 59
End: 2019-07-02

## 2019-07-02 ENCOUNTER — OFFICE VISIT (OUTPATIENT)
Dept: FAMILY MEDICINE | Facility: CLINIC | Age: 59
End: 2019-07-02
Payer: COMMERCIAL

## 2019-07-02 VITALS
TEMPERATURE: 97.9 F | BODY MASS INDEX: 26.57 KG/M2 | SYSTOLIC BLOOD PRESSURE: 100 MMHG | WEIGHT: 150 LBS | DIASTOLIC BLOOD PRESSURE: 72 MMHG | HEART RATE: 72 BPM | OXYGEN SATURATION: 98 %

## 2019-07-02 DIAGNOSIS — N30.01 ACUTE CYSTITIS WITH HEMATURIA: Primary | ICD-10-CM

## 2019-07-02 DIAGNOSIS — R82.90 NONSPECIFIC FINDING ON EXAMINATION OF URINE: ICD-10-CM

## 2019-07-02 DIAGNOSIS — R42 DIZZINESS: ICD-10-CM

## 2019-07-02 DIAGNOSIS — Z87.440 RECENT URINARY TRACT INFECTION: ICD-10-CM

## 2019-07-02 DIAGNOSIS — R25.1 SHAKINESS: ICD-10-CM

## 2019-07-02 DIAGNOSIS — E83.110 HEREDITARY HEMOCHROMATOSIS (H): ICD-10-CM

## 2019-07-02 LAB
ALBUMIN UR-MCNC: NEGATIVE MG/DL
APPEARANCE UR: ABNORMAL
BACTERIA #/AREA URNS HPF: ABNORMAL /HPF
BASOPHILS # BLD AUTO: 0 10E9/L (ref 0–0.2)
BASOPHILS NFR BLD AUTO: 0.4 %
BILIRUB UR QL STRIP: NEGATIVE
COLOR UR AUTO: YELLOW
DIFFERENTIAL METHOD BLD: NORMAL
EOSINOPHIL # BLD AUTO: 0.2 10E9/L (ref 0–0.7)
EOSINOPHIL NFR BLD AUTO: 3.7 %
ERYTHROCYTE [DISTWIDTH] IN BLOOD BY AUTOMATED COUNT: 12.4 % (ref 10–15)
GLUCOSE UR STRIP-MCNC: NEGATIVE MG/DL
HCT VFR BLD AUTO: 36.1 % (ref 35–47)
HGB BLD-MCNC: 12.9 G/DL (ref 11.7–15.7)
HGB UR QL STRIP: ABNORMAL
KETONES UR STRIP-MCNC: NEGATIVE MG/DL
LEUKOCYTE ESTERASE UR QL STRIP: ABNORMAL
LYMPHOCYTES # BLD AUTO: 0.8 10E9/L (ref 0.8–5.3)
LYMPHOCYTES NFR BLD AUTO: 16.5 %
MCH RBC QN AUTO: 31.5 PG (ref 26.5–33)
MCHC RBC AUTO-ENTMCNC: 35.7 G/DL (ref 31.5–36.5)
MCV RBC AUTO: 88 FL (ref 78–100)
MONOCYTES # BLD AUTO: 0.7 10E9/L (ref 0–1.3)
MONOCYTES NFR BLD AUTO: 15.3 %
NEUTROPHILS # BLD AUTO: 3.1 10E9/L (ref 1.6–8.3)
NEUTROPHILS NFR BLD AUTO: 64.1 %
NITRATE UR QL: NEGATIVE
NON-SQ EPI CELLS #/AREA URNS LPF: ABNORMAL /LPF
PH UR STRIP: 7.5 PH (ref 5–7)
PLATELET # BLD AUTO: 257 10E9/L (ref 150–450)
RBC # BLD AUTO: 4.1 10E12/L (ref 3.8–5.2)
RBC #/AREA URNS AUTO: ABNORMAL /HPF
SOURCE: ABNORMAL
SP GR UR STRIP: 1.01 (ref 1–1.03)
UROBILINOGEN UR STRIP-ACNC: 0.2 EU/DL (ref 0.2–1)
WBC # BLD AUTO: 4.9 10E9/L (ref 4–11)
WBC #/AREA URNS AUTO: >100 /HPF

## 2019-07-02 PROCEDURE — 83550 IRON BINDING TEST: CPT | Performed by: NURSE PRACTITIONER

## 2019-07-02 PROCEDURE — 82728 ASSAY OF FERRITIN: CPT | Performed by: NURSE PRACTITIONER

## 2019-07-02 PROCEDURE — 87186 SC STD MICRODIL/AGAR DIL: CPT | Performed by: NURSE PRACTITIONER

## 2019-07-02 PROCEDURE — 36415 COLL VENOUS BLD VENIPUNCTURE: CPT | Performed by: NURSE PRACTITIONER

## 2019-07-02 PROCEDURE — 83540 ASSAY OF IRON: CPT | Performed by: NURSE PRACTITIONER

## 2019-07-02 PROCEDURE — 82947 ASSAY GLUCOSE BLOOD QUANT: CPT | Performed by: NURSE PRACTITIONER

## 2019-07-02 PROCEDURE — 85025 COMPLETE CBC W/AUTO DIFF WBC: CPT | Performed by: NURSE PRACTITIONER

## 2019-07-02 PROCEDURE — 87086 URINE CULTURE/COLONY COUNT: CPT | Performed by: NURSE PRACTITIONER

## 2019-07-02 PROCEDURE — 87088 URINE BACTERIA CULTURE: CPT | Performed by: NURSE PRACTITIONER

## 2019-07-02 PROCEDURE — 81001 URINALYSIS AUTO W/SCOPE: CPT | Performed by: NURSE PRACTITIONER

## 2019-07-02 PROCEDURE — 99214 OFFICE O/P EST MOD 30 MIN: CPT | Performed by: NURSE PRACTITIONER

## 2019-07-02 RX ORDER — CIPROFLOXACIN 250 MG/1
250 TABLET, FILM COATED ORAL 2 TIMES DAILY
Qty: 10 TABLET | Refills: 0 | Status: SHIPPED | OUTPATIENT
Start: 2019-07-02 | End: 2019-08-08

## 2019-07-02 NOTE — TELEPHONE ENCOUNTER
Reason for Call:  Other call back    Detailed comments: pt was seen today by Fahad Tatum and she said he is starting her on something for her UTI. She just has some questions for clarification. Please call patient when able. Thank you.    Phone Number Patient can be reached at: Cell number on file:    Telephone Information:   Mobile [phone]       Best Time: any    Can we leave a detailed message on this number? YES    Call taken on 7/2/2019 at 2:04 PM by Pastora Douglas

## 2019-07-02 NOTE — PROGRESS NOTES
Subjective     Irina Hanks is a 59 year old female who presents to clinic today for the following health issues:    HPI   Concern - Pt states that since she had labs drawn on June 18 she has felt lethargic, lack of appetite, nausea  and dizziness. Is fasting today     HPI: Peg presents today with the complaint of two weeks worth of unpleasant symptoms (nausea, lightheadedness, dizziness, decreased appetite). She notes that the only foods that sound appealing to her are sweets / simple carbohydrates. She undergoes phlebotomy treatments every three months (for the past year) for hereditary hemochromatosis. Last treatment session was 6/18 (just before onset of her symptoms). She suspects possible over-treatment as her ferritin was in the 70s just prior. She has never experienced symptoms like this post-phlebotomy. She denies fever, chills, night sweats, and other constitutional symptoms. She had a UTI on 6/9, but she feels like these symptoms have resolved with antibiotic treatment. She also has RA, colitis, hypothyroidism, and lichen planus.      Patient Active Problem List   Diagnosis     Hypothyroidism     Absence of menstruation     IBS (irritable bowel syndrome)     Hyperlipidemia LDL goal <130     Hypertension goal BP (blood pressure) < 130/80     Impaired renal function     Overweight (BMI 25.0-29.9)     Anxiety     Postmenopausal symptoms     Inflammatory arthritis     Osteoarthritis of first carpometacarpal joint     Microscopic colitis     Seasonal allergic rhinitis     Vitamin D deficiency     RA (rheumatoid arthritis) (H)     ASCUS of cervix with negative high risk HPV     Shortness of breath     Benign essential hypertension     Acquired hypothyroidism     Symptomatic menopausal or female climacteric states     Tricuspid regurgitation     S/P myringotomy with insertion of tube     Vasomotor rhinitis     Controlled substance agreement signed     Abdominal pain, right lower quadrant      Psychophysiological insomnia     Adjustment disorder with anxious mood     Hyponatremia     Iron overload     Iron overload syndrome     Hereditary hemochromatosis (H)     Lichen planus     Past Surgical History:   Procedure Laterality Date     BIOPSY BREAST       C/SECTION, LOW TRANSVERSE      twins     COLONOSCOPY      nl, next one due in 5 years     LEEP TX, CERVICAL  1990s    normal since that time     MYRINGOTOMY, INSERT TUBE, COMBINED Left 2015    chronic NORM, ETD       Social History     Tobacco Use     Smoking status: Former Smoker     Packs/day: 0.26     Years: 10.00     Pack years: 2.60     Types: Cigarettes     Last attempt to quit: 1989     Years since quittin.2     Smokeless tobacco: Never Used   Substance Use Topics     Alcohol use: Yes     Alcohol/week: 0.0 oz     Comment: 2 drinks per day     Family History   Problem Relation Age of Onset     Cancer - colorectal Father         age 50     Arthritis Mother      Cancer - colorectal Brother         age 50     Hypertension Sister      Hypertension Brother      Thyroid Disease Sister      Thyroid Disease Brother      Arthritis Sister      Cancer Sister 53        Uterine     Cancer Sister 50        Uterine             Reviewed and updated as needed this visit by Provider  Tobacco  Allergies  Meds  Problems  Med Hx  Surg Hx  Fam Hx         Review of Systems   ROS COMP: Constitutional, HEENT, cardiovascular, GI, , musculoskeletal, neuro systems are negative, except as otherwise noted.      Objective    /72 (BP Location: Left arm, Patient Position: Chair, Cuff Size: Adult Regular)   Pulse 72   Temp 97.9  F (36.6  C) (Tympanic)   Wt 68 kg (150 lb)   SpO2 98%   BMI 26.57 kg/m    Body mass index is 26.57 kg/m .  Physical Exam   GENERAL: healthy, alert and no distress  HENT: ear canals and TM's normal, nose and mouth without ulcers or lesions  NECK: no adenopathy, no asymmetry, masses, or scars and thyroid normal to  palpation  RESP: lungs clear to auscultation - no rales, rhonchi or wheezes  CV: regular rate and rhythm, normal S1 S2, no S3 or S4, no murmur, click or rub, no peripheral edema and peripheral pulses strong  ABDOMEN: soft, nontender, no hepatosplenomegaly, no masses and bowel sounds normal  NEURO: Normal strength and tone, mentation intact and speech normal  BACK: no CVA tenderness, no paralumbar tenderness    Diagnostic Test Results:  Results for orders placed or performed in visit on 07/02/19 (from the past 24 hour(s))   *UA reflex to Microscopic and Culture ([place] [facility] (except [place] and [place])   Result Value Ref Range    Color Urine Yellow     Appearance Urine Slightly Cloudy     Glucose Urine Negative NEG^Negative mg/dL    Bilirubin Urine Negative NEG^Negative    Ketones Urine Negative NEG^Negative mg/dL    Specific Gravity Urine 1.015 1.003 - 1.035    Blood Urine Small (A) NEG^Negative    pH Urine 7.5 (H) 5.0 - 7.0 pH    Protein Albumin Urine Negative NEG^Negative mg/dL    Urobilinogen Urine 0.2 0.2 - 1.0 EU/dL    Nitrite Urine Negative NEG^Negative    Leukocyte Esterase Urine Large (A) NEG^Negative    Source Midstream Urine    Urine Microscopic   Result Value Ref Range    WBC Urine >100 (A) OTO5^0 - 5 /HPF    RBC Urine 2-5 (A) OTO2^O - 2 /HPF    Squamous Epithelial /LPF Urine Few FEW^Few /LPF    Bacteria Urine Moderate (A) NEG^Negative /HPF   CBC with platelets and differential   Result Value Ref Range    WBC 4.9 4.0 - 11.0 10e9/L    RBC Count 4.10 3.8 - 5.2 10e12/L    Hemoglobin 12.9 11.7 - 15.7 g/dL    Hematocrit 36.1 35.0 - 47.0 %    MCV 88 78 - 100 fl    MCH 31.5 26.5 - 33.0 pg    MCHC 35.7 31.5 - 36.5 g/dL    RDW 12.4 10.0 - 15.0 %    Platelet Count 257 150 - 450 10e9/L    % Neutrophils 64.1 %    % Lymphocytes 16.5 %    % Monocytes 15.3 %    % Eosinophils 3.7 %    % Basophils 0.4 %    Absolute Neutrophil 3.1 1.6 - 8.3 10e9/L    Absolute Lymphocytes 0.8 0.8 - 5.3 10e9/L    Absolute  Monocytes 0.7 0.0 - 1.3 10e9/L    Absolute Eosinophils 0.2 0.0 - 0.7 10e9/L    Absolute Basophils 0.0 0.0 - 0.2 10e9/L    Diff Method Automated Method            Assessment & Plan     Irina was seen today for fatigue. Unclear, exactly, what may be driving these symptoms. Repeat UA seems to demonstrate ongoing UTI with hematuria. Will treat with ciprofloxacin (doesn't tolerate nitrofurantoin, allergy to sulfa, failure of Keflex) for 5 days with close follow up planned. This could be causing some of her systemic complaints. It is also entirely possible that her symptoms could be due to low iron after phlebotomy treatment on 6/18. The fact that she is progressively improving seems to corroborate this (slow recovery of iron stores). CBC with differential is unremarkable, but I also ordered ferritin and TIBC studies. I will follow up with her when I have these results. No red flags to prompt transfer to inpatient environment or to prompt urgent imaging or other diagnostics. We will be in close touch in the coming days. Discussed reasons to call or return to clinic. Peg acknowledges and demonstrates understanding of circumstances under which care should be sought urgently or emergently. Follow up as discussed. Discussed risks, benefits, alternatives, potential side effects, and proper administration of new medication / treatment. Agrees with plan of care. All questions answered.     Diagnoses and all orders for this visit:    Acute cystitis with hematuria  -     ciprofloxacin (CIPRO) 250 MG tablet; Take 1 tablet (250 mg) by mouth 2 times daily for 5 days    Recent urinary tract infection  -     *UA reflex to Microscopic and Culture ([place] [facility] (except [place] and [place])    Nonspecific finding on examination of urine  -     Urine Culture Aerobic Bacterial    Hereditary hemochromatosis (H)  -     CBC with platelets and differential  -     Ferritin  -     Iron and iron binding capacity  -     Urine  "Microscopic    Dizziness  -     Glucose    Shakiness  -     Glucose       BMI:   Estimated body mass index is 26.57 kg/m  as calculated from the following:    Height as of 6/18/19: 1.6 m (5' 3\").    Weight as of this encounter: 68 kg (150 lb).   Weight management plan: Discussed healthy diet and exercise guidelines      See Patient Instructions    Return in about 3 days (around 7/5/2019) for persistent or worsening symptoms.    Ismael Tatum NP  [facility] [place]      "

## 2019-07-02 NOTE — TELEPHONE ENCOUNTER
Pt calling states she had a uti in June and was on a medication keflex for 5 days which did not clear up her symptoms.  Wondering if that is a normal course of treatment for that medication?  How many days of the new med has been ordered?    Advised a reason the keflex may not have worked is the bacteria may have been resistant to it.  Advised the new medication cipro that Fahad ordered is to be taken for 5 days.  Will call pt once the urine culture comes back if cipro is not the correct medication to be on.      Pt states understanding.    Patsy VIERA RN  EP Triage

## 2019-07-03 ENCOUNTER — PATIENT OUTREACH (OUTPATIENT)
Dept: ONCOLOGY | Facility: CLINIC | Age: 59
End: 2019-07-03

## 2019-07-03 DIAGNOSIS — E03.9 ACQUIRED HYPOTHYROIDISM: ICD-10-CM

## 2019-07-03 LAB
FERRITIN SERPL-MCNC: 32 NG/ML (ref 8–252)
GLUCOSE SERPL-MCNC: 77 MG/DL (ref 70–99)
IRON SATN MFR SERPL: 37 % (ref 15–46)
IRON SERPL-MCNC: 118 UG/DL (ref 35–180)
TIBC SERPL-MCNC: 315 UG/DL (ref 240–430)

## 2019-07-03 RX ORDER — LIOTHYRONINE SODIUM 5 UG/1
TABLET ORAL
Qty: 180 TABLET | Refills: 1 | Status: SHIPPED | OUTPATIENT
Start: 2019-07-03 | End: 2020-01-06

## 2019-07-03 NOTE — PROGRESS NOTES
I received a message that patient  Has not been feeling well. UTI's are recurrent, light headed, dizzy, and poor appetite.    I reviewed chart and patient saw PCP on 7/2 labs indicated a Ferritin of 32,  Another antibiotic was prescribed called Cipro.     I called patient and we discussed the above concerns. She states that her blood pressure was a bit lower yesterday then her normal and that she was advised to call here with her Ferritin levels. Patient started CIiro yesterday and it is agreeing with her she is eating and feeling better. I advised a good probiotic with being on another antibiotic., increase water intake, change positions slowly.    Since the last 24 hours patient has noticed improvement we agreed that I would check on patient on Friday 7/5.    Danielle Newman

## 2019-07-03 NOTE — TELEPHONE ENCOUNTER
"Requested Prescriptions   Pending Prescriptions Disp Refills     liothyronine (CYTOMEL) 5 MCG tablet [Pharmacy Med Name: LIOTHYRONINE 5MCG TABLETS] 180 tablet 0     Sig: TAKE 2 TABLETS BY MOUTH EVERY DAY       Thyroid Protocol Passed - 7/3/2019  9:40 AM        Passed - Patient is 12 years or older        Passed - Recent (12 mo) or future (30 days) visit within the authorizing provider's specialty     Patient had office visit in the last 12 months or has a visit in the next 30 days with authorizing provider or within the authorizing provider's specialty.  See \"Patient Info\" tab in inbasket, or \"Choose Columns\" in Meds & Orders section of the refill encounter.              Passed - Medication is active on med list        Passed - Normal TSH on file in past 12 months     Recent Labs   Lab Test 12/18/18  1340   TSH 0.75              Passed - No active pregnancy on record     If patient is pregnant or has had a positive pregnancy test, please check TSH.          Passed - No positive pregnancy test in past 12 months     If patient is pregnant or has had a positive pregnancy test, please check TSH.          liothyronine (CYTOMEL) 5 MCG tablet 180 tablet 1 12/23/2018       Last Written Prescription Date:  12/23/2018  Last Fill Quantity: 180,  # refills: 1   Last office visit: 7/2/2019 with prescribing provider:  Dr. Tatum   Future Office Visit: Unknown   Next 5 appointments (look out 90 days)    Sep 17, 2019 10:10 AM CDT  Return Visit with  LAB DRAW 1  [facility]  [address]  [facility] [address]  [phone]   Sep 17, 2019 12:30 PM CDT  Return Visit with ARLENE Gibson CNP  [facility]  [address]  [facility] [id number] [phone]           "

## 2019-07-04 ENCOUNTER — NURSE TRIAGE (OUTPATIENT)
Dept: NURSING | Facility: CLINIC | Age: 59
End: 2019-07-04

## 2019-07-04 ENCOUNTER — TELEPHONE (OUTPATIENT)
Dept: FAMILY MEDICINE | Facility: CLINIC | Age: 59
End: 2019-07-04

## 2019-07-04 DIAGNOSIS — N39.0 URINARY TRACT INFECTION: Primary | ICD-10-CM

## 2019-07-04 LAB
BACTERIA SPEC CULT: ABNORMAL
SPECIMEN SOURCE: ABNORMAL

## 2019-07-04 NOTE — TELEPHONE ENCOUNTER
Patient phones back, stating the pharmacy at the [facility] in [place] is closed today.  The [facility] in [place] pharmacy is open today.     Disp Refills Start End PANCHITO   amoxicillin-clavulanate (AUGMENTIN) 875-125 MG tablet 10 tablet 0 7/4/2019  No   Sig - Route: Take 1 tablet by mouth 2 times daily - Oral   Sent to pharmacy as: amoxicillin-clavulanate (AUGMENTIN) 875-125 MG tablet   Class: E-Prescribe   Order: [id number]   E-Prescribing Status: Receipt confirmed by pharmacy (7/4/2019 11:48 AM CDT)     Betina Wynn RN / [facility]

## 2019-07-04 NOTE — TELEPHONE ENCOUNTER
"Patient was in urgent care 6-9-19 and diagnosed with UTI, they put her on Nitrofurantoin, but that made made her sick.  She was changed to Keflex - started feeling better and symptoms seemed to resolve for a couple weeks.    Startedthis week., she started feeling fatigued, light-headed, and run down; \" I starting crashing this week\" and was diagnosed with a UTI.  They gave her Cipro, but, she is getting headaches, \"I feel like my head is going to explode.\"  Rates that pain as 9/10 within 1-2 hours of taking the antibiotic.  She felt dizzy/ lightheaded and the pounding headache.  RX with aleve.  Still has a headache now 6/10.    Cannot take sulfa drugs    No fever    HX hemochromatosis    PCP is Dr. Fernandes at [facility].  Dr. Mejia is paged at  8:30 AM via Smart web.  She inquires if the sensitivities are pending.  Phoned [facility] microbiology lab to confirm that sensitivities are pending; they will be available after midnight tonight or in the early morning.      In the meantime, Dr. Mejia orders:    Augmentin 875/125 1 tab po bid for 10 tablets until the cultures are back     Disp Refills Start End PANCHITO   amoxicillin-clavulanate (AUGMENTIN) 875-125 MG tablet 10 tablet 0 7/4/2019 7/9/2019 --   Sig - Route: Take 1 tablet by mouth 2 times daily for 10 doses - Oral   Sent to pharmacy as: amoxicillin-clavulanate (AUGMENTIN) 875-125 MG tablet   Class: E-Prescribe       Dr. Mejia also advised that the patient call the [place] clinic tomorrow for follow-up.  She advises that the patient be prepared for diarrhea and take yogrt with active cultures or Align or culturelle probiotic daily.    Phoned patient back and she agrees to this plan.      Routed message to clinic to follow up with patient.    Protocol and care advice reviewed  Caller states understanding of the recommended disposition  Advised to call back if further questions or concerns    Betina Wynn RN / [place] [facility]    Additional Information    Negative: Shock suspected (e.g., cold/pale/clammy skin, too weak to stand, low BP, rapid pulse)    Negative: Sounds like a life-threatening emergency to the triager    Negative: Urinary tract infection suspected, but not taking antibiotics    Negative: [1] Unable to urinate (or only a few drops) > 4 hours AND     [2] bladder feels very full (e.g., palpable bladder or strong urge to urinate)    Negative: Passing pure blood or large blood clots (i.e., size > a dime)  (Exceptions: flecks, small strands, or pinkish-red color)    Negative: Patient sounds very sick or weak to the triager    Negative: [1] SEVERE pain (e.g., excruciating) AND [2] no improvement 2 hours after pain medications    Negative: [1] Fever > 100.0 F (37.8 C) AND [2] new onset since starting antibiotics    Negative: [1] Side (flank) or lower back pain AND [2] new onset since starting antibiotics    Negative: [1] Taking antibiotic > 24 hours for UTI AND [2] flank or lower back pain worsening    Negative: [1] Vomiting 2 or more times AND [2] interferes with taking oral antibiotic    Negative: [1] Taking antibiotic > 24 hours for UTI (urinary tract or bladder infection) AND [2] fever persists    Negative: [1] Taking antibiotic > 72 hours (3 days) for UTI AND [2] painful urination or frequency not improved    Negative: [1] Taking antibiotic > 72 hours (3 days) for UTI AND [2] flank or lower back pain not improved    Negative: Diabetes mellitus or weak immune system (e.g., HIV positive, cancer chemotherapy, transplant patient)    Negative: Sickle cell disease    Negative: [1] Taking antibiotic < 24 hours for UTI AND [2] fever persists    Negative: [1] Taking antibiotic < 72 hours (3 days) for UTI AND [2] painful urination or frequency not improved    Negative: [1] Taking antibiotic < 72 hours (3 days) for UTI AND [2] flank or lower back pain not improved    Protocols used: URINARY TRACT INFECTION ON ANTIBIOTIC  FOLLOW-UP CALL - [facility]

## 2019-07-04 NOTE — TELEPHONE ENCOUNTER
"Clinic Action Needed:  Yes, please follow up with patient regarding UC results/antibiotic    [facility] Triage Call  Presenting Problem:    Patient was in urgent care 6-9-19 and diagnosed with UTI, they put her on Nitrofurantoin, but that made made her sick.  She was changed to Keflex - started feeling better and symptoms seemed to resolve for a couple weeks.    Startedthis week., she started feeling fatigued, light-headed, and run down; I   'starting crashing this week\" and was diagnosed with a UTI.  They gave her Cipro, but, she is getting headaches, \"I feel like my head is going to explode.\"  Rates that pain as 9/10 within 1-2 hours of taking the antibiotic.  She felt dizzy/ lightheaded and the pounding headache.  RX with aleve.  Still has a headache now 6/10.    Cannot take sulfa drugs    No fever    HX hemochromatosis    PCP is Dr. Fernandes at [facility].  Dr. Mejia is paged at  8:30 AM via Smart web.  She inquires if the sensitivities are pending.  Phoned [facility] microbiology lab to confirm that sensitivities are pending; they will be available after midnight tonight or in the early morning.      In the meantime, Dr. Mejia orders:    Augmentin 875/125 1 tab po bid for 10 tablets until the cultures are back    amoxicillin-clavulanate (AUGMENTIN) 875-125 MG tablet 10 tablet 0 7/4/2019 7/9/2019 --   Sig - Route: Take 1 tablet by mouth 2 times daily for 10 doses - Oral   Sent to pharmacy as: amoxicillin-clavulanate (AUGMENTIN) 875-125 MG tablet   Class: E-Prescribe       Dr. Mejia also advised that the paitent call the [facility] tomorrow for follow-up.  She advises that the patient be prepared for diarrhea and take yogrt with active cultures or Align or culturelle probiotic daily.    Phoned patient back and she agrees to this plan.      Routed to:  Dr. Fernandes / Nurse Cristel Wynn RN / [facility] [facility]        "

## 2019-07-15 ENCOUNTER — TELEPHONE (OUTPATIENT)
Dept: FAMILY MEDICINE | Facility: CLINIC | Age: 59
End: 2019-07-15

## 2019-07-15 DIAGNOSIS — B95.2 ENTEROCOCCUS UTI: Primary | ICD-10-CM

## 2019-07-15 DIAGNOSIS — N39.0 ENTEROCOCCUS UTI: Primary | ICD-10-CM

## 2019-07-15 RX ORDER — GRANULES FOR ORAL 3 G/1
3 POWDER ORAL ONCE
Qty: 1 PACKET | Refills: 0 | Status: SHIPPED | OUTPATIENT
Start: 2019-07-15 | End: 2019-08-08

## 2019-07-15 NOTE — TELEPHONE ENCOUNTER
Her last urine culture grew enterococcus which is also sensitive to a few antibiotics - Nitrofurantoin is one of these but it sounds like Peg got sick when taking this? She was Cipro which would not have worked and looks like this was switched to Augmentin. The Augmentin theoretically should have worked but may not have the best urinary penetration. Keflex won't work for Enterococcus either.     There is a drug called Fosfomycin that should work against enterococcus. This would be an option for her. The benefit with this is it is a one-time 3 gram dose. I am going to send this over and recommend we try it. If still no improvement then would consider referral to Urology.

## 2019-07-15 NOTE — TELEPHONE ENCOUNTER
S/w pt and gave Dr. Fernandes's message.  Pt would like rx to go to [facility] [address].  S/w pharmacist at [facility] who states they will have to order fosfomycin and will get medication tomorrow late morning.  Cost will be $60.00 for the one time dosing.  Aleena Bryan does not have medication either and would have to order.      Left message for pt about ordering rx and pharmacy change and cost of med. If questions to call clinic.    Patsy VIERA RN  EP Triage

## 2019-07-15 NOTE — TELEPHONE ENCOUNTER
Reason for Call:  Peg said she was diagnosed with a UTI on 6/9/19. She has been on 4 anti-biotics and now after taking a home test it shows she still has an infection.    She would like a call back from triage or Dr Fernandes to discuss what to do next.    Best phone number to reach pt at is: [phone]  Ok to leave a message with medical info? yes    Laurie Mayes  Patient Representative

## 2019-07-16 ENCOUNTER — TELEPHONE (OUTPATIENT)
Dept: FAMILY MEDICINE | Facility: CLINIC | Age: 59
End: 2019-07-16

## 2019-07-16 DIAGNOSIS — R30.0 DYSURIA: Primary | ICD-10-CM

## 2019-07-16 NOTE — TELEPHONE ENCOUNTER
Reason for Call:  Other call back    Detailed comments: Pt is now thinking she would like to have a lab appt first before getting another medication.  Please call pt to discuss    Phone Number Patient can be reached at: Home number on file [phone] (home)    Best Time: anytime    Can we leave a detailed message on this number? YES    Call taken on 7/16/2019 at 9:35 AM by Lida Bean

## 2019-07-17 ENCOUNTER — MYC MEDICAL ADVICE (OUTPATIENT)
Dept: FAMILY MEDICINE | Facility: CLINIC | Age: 59
End: 2019-07-17

## 2019-07-17 DIAGNOSIS — R30.0 DYSURIA: ICD-10-CM

## 2019-07-17 LAB
ALBUMIN UR-MCNC: NEGATIVE MG/DL
APPEARANCE UR: CLEAR
BACTERIA #/AREA URNS HPF: ABNORMAL /HPF
BILIRUB UR QL STRIP: NEGATIVE
COLOR UR AUTO: YELLOW
GLUCOSE UR STRIP-MCNC: NEGATIVE MG/DL
HGB UR QL STRIP: NEGATIVE
KETONES UR STRIP-MCNC: NEGATIVE MG/DL
LEUKOCYTE ESTERASE UR QL STRIP: NEGATIVE
NITRATE UR QL: NEGATIVE
NON-SQ EPI CELLS #/AREA URNS LPF: ABNORMAL /LPF
PH UR STRIP: 6 PH (ref 5–7)
RBC #/AREA URNS AUTO: ABNORMAL /HPF
SOURCE: ABNORMAL
SP GR UR STRIP: <=1.005 (ref 1–1.03)
UROBILINOGEN UR STRIP-ACNC: 0.2 EU/DL (ref 0.2–1)
WBC #/AREA URNS AUTO: ABNORMAL /HPF

## 2019-07-17 PROCEDURE — 81001 URINALYSIS AUTO W/SCOPE: CPT | Performed by: INTERNAL MEDICINE

## 2019-07-17 NOTE — TELEPHONE ENCOUNTER
Please see [employer] message and advise.   Thank you.    Sola Anthony RN, BSN  [facility] [place]

## 2019-07-17 NOTE — TELEPHONE ENCOUNTER
Called patient to let them know the UA was negative. Informed patient that MD Fernandes is sending for a culture and we will get back to patient once the culture has resulted. Informed patient that in the mean time MD Fernandes does not recommend Tx based on the UA. Patient/parent verbalized understanding and agrees with plan.     Sola Anthony RN, BSN  [facility] [place]

## 2019-07-17 NOTE — TELEPHONE ENCOUNTER
UA negative. Will send for a culture but I would not recommend treatment based on this urinalysis.

## 2019-07-22 NOTE — TELEPHONE ENCOUNTER
Patient calling to check on status of urine cx. Per chart review, UC was never done. Patient is frustrated by this. She is currently asymptomatic but is out of town and cannot leave another sample. Is hoping she doesn't get sick while she is out of town. Would like me to pass onto PCP that the UC that was ordered was never ran.     Keyla Hardin RN   [facility] - Triage

## 2019-08-08 ENCOUNTER — OFFICE VISIT (OUTPATIENT)
Dept: FAMILY MEDICINE | Facility: CLINIC | Age: 59
End: 2019-08-08
Payer: COMMERCIAL

## 2019-08-08 VITALS
HEART RATE: 67 BPM | HEIGHT: 63 IN | BODY MASS INDEX: 26.05 KG/M2 | DIASTOLIC BLOOD PRESSURE: 72 MMHG | SYSTOLIC BLOOD PRESSURE: 124 MMHG | WEIGHT: 147 LBS | OXYGEN SATURATION: 98 % | TEMPERATURE: 97 F

## 2019-08-08 DIAGNOSIS — R31.9 URINARY TRACT INFECTION WITH HEMATURIA, SITE UNSPECIFIED: ICD-10-CM

## 2019-08-08 DIAGNOSIS — R82.90 NONSPECIFIC FINDING ON EXAMINATION OF URINE: ICD-10-CM

## 2019-08-08 DIAGNOSIS — N39.0 URINARY TRACT INFECTION WITH HEMATURIA, SITE UNSPECIFIED: ICD-10-CM

## 2019-08-08 DIAGNOSIS — R30.0 DYSURIA: Primary | ICD-10-CM

## 2019-08-08 LAB
ALBUMIN UR-MCNC: NEGATIVE MG/DL
APPEARANCE UR: CLEAR
BACTERIA #/AREA URNS HPF: ABNORMAL /HPF
BILIRUB UR QL STRIP: NEGATIVE
COLOR UR AUTO: YELLOW
GLUCOSE UR STRIP-MCNC: NEGATIVE MG/DL
HGB UR QL STRIP: ABNORMAL
KETONES UR STRIP-MCNC: NEGATIVE MG/DL
LEUKOCYTE ESTERASE UR QL STRIP: ABNORMAL
NITRATE UR QL: NEGATIVE
NON-SQ EPI CELLS #/AREA URNS LPF: ABNORMAL /LPF
PH UR STRIP: 5.5 PH (ref 5–7)
RBC #/AREA URNS AUTO: ABNORMAL /HPF
SOURCE: ABNORMAL
SP GR UR STRIP: <=1.005 (ref 1–1.03)
UROBILINOGEN UR STRIP-ACNC: 0.2 EU/DL (ref 0.2–1)
WBC #/AREA URNS AUTO: ABNORMAL /HPF

## 2019-08-08 PROCEDURE — 87086 URINE CULTURE/COLONY COUNT: CPT | Performed by: FAMILY MEDICINE

## 2019-08-08 PROCEDURE — 99213 OFFICE O/P EST LOW 20 MIN: CPT | Performed by: FAMILY MEDICINE

## 2019-08-08 PROCEDURE — 81001 URINALYSIS AUTO W/SCOPE: CPT | Performed by: FAMILY MEDICINE

## 2019-08-08 ASSESSMENT — MIFFLIN-ST. JEOR: SCORE: 1200.92

## 2019-08-08 NOTE — PATIENT INSTRUCTIONS
Patient Education     Understanding Urinary Tract Infections (UTIs)  Most UTIs are caused by bacteria, although they may also be caused by viruses or fungi. Bacteria from the bowel are the most common source of infection. The infection may start because of any of the following:    Sexual activity. During sex, bacteria can travel from the penis, vagina, or rectum into the urethra.     Bacteria on the skin outside the rectum may travel into the urethra. This is more common in women since the rectum and urethra are closer to each other than in men. Wiping from front to back after using the toilet and keeping the area clean can help prevent germs from getting to the urethra.    Blockage of urine flow through the urinary tract. If urine sits too long, germs may start to grow out of control.      Parts of the urinary tract  The infection can occur in any part of the urinary tract.    The kidneys collect and store urine.    The ureters carry urine from the kidneys to the bladder.    The bladder holds urine until you are ready to let it out.    The urethra carries urine from the bladder out of the body. It is shorter in women, so bacteria can move through it more easily. The urethra is longer in men, so a UTI is less likely to reach the bladder or kidneys in men.  Date Last Reviewed: 1/1/2017 2000-2018 [employer]. [address] [place] [address], [place] [address]. All rights reserved. This information is not intended as a substitute for professional medical care. Always follow your healthcare professional's instructions.

## 2019-08-08 NOTE — PROGRESS NOTES
Subjective     Irina Hanks is a 59 year old female who presents to clinic today for the following health issues:    HPI   URINARY TRACT SYMPTOMS      Duration: x yesterday recurrent since June, Pcp is  Dena, -advised her to get referral to  Urologist next time around    Description  dysuria, frequency and urgency, since last night     Intensity:  mild, moderate    Accompanying signs and symptoms: fatigue, decreased appetite   Fever/chills: no   Flank pain no   Nausea and vomiting: no   Vaginal symptoms: none  Abdominal/Pelvic Pain: no     History  History of frequent UTI's: YES  History of kidney stones: no   Sexually Active: no   Possibility of pregnancy: No    Precipitating or alleviating factors: None    Therapies tried and outcome: pyridium  , azo     PROBLEMS TO ADD ON...    Patient Active Problem List   Diagnosis     Hypothyroidism     Absence of menstruation     IBS (irritable bowel syndrome)     Hyperlipidemia LDL goal <130     Hypertension goal BP (blood pressure) < 130/80     Impaired renal function     Overweight (BMI 25.0-29.9)     Anxiety     Postmenopausal symptoms     Inflammatory arthritis     Osteoarthritis of first carpometacarpal joint     Microscopic colitis     Seasonal allergic rhinitis     Vitamin D deficiency     RA (rheumatoid arthritis) (H)     ASCUS of cervix with negative high risk HPV     Shortness of breath     Benign essential hypertension     Acquired hypothyroidism     Symptomatic menopausal or female climacteric states     Tricuspid regurgitation     S/P myringotomy with insertion of tube     Vasomotor rhinitis     Controlled substance agreement signed     Abdominal pain, right lower quadrant     Psychophysiological insomnia     Adjustment disorder with anxious mood     Hyponatremia     Iron overload     Iron overload syndrome     Hereditary hemochromatosis (H)     Lichen planus     Past Surgical History:   Procedure Laterality Date     BIOPSY BREAST       C/SECTION, LOW  TRANSVERSE      twins     COLONOSCOPY      nl, next one due in 5 years     LEEP TX, CERVICAL  1990s    normal since that time     MYRINGOTOMY, INSERT TUBE, COMBINED Left 2015    chronic NORM, ETD       Social History     Tobacco Use     Smoking status: Former Smoker     Packs/day: 0.26     Years: 10.00     Pack years: 2.60     Types: Cigarettes     Last attempt to quit: 1989     Years since quittin.3     Smokeless tobacco: Never Used   Substance Use Topics     Alcohol use: Yes     Alcohol/week: 0.0 oz     Comment: 2 drinks per day     Family History   Problem Relation Age of Onset     Cancer - colorectal Father         age 50     Arthritis Mother      Cancer - colorectal Brother         age 50     Hypertension Sister      Hypertension Brother      Thyroid Disease Sister      Thyroid Disease Brother      Arthritis Sister      Cancer Sister 53        Uterine     Cancer Sister 50        Uterine           Reviewed and updated as needed this visit by Provider         Review of Systems   ROS COMP: Constitutional, HEENT, cardiovascular, pulmonary, GI, , musculoskeletal, neuro, skin, endocrine and psych systems are negative, except as otherwise noted.      Objective    There were no vitals taken for this visit.  There is no height or weight on file to calculate BMI.  Physical Exam   GENERAL: healthy, alert and no distress  NECK: no adenopathy, no asymmetry, masses, or scars and thyroid normal to palpation  RESP: lungs clear to auscultation - no rales, rhonchi or wheezes  CV: regular rate and rhythm, normal S1 S2, no S3 or S4, no murmur, click or rub, no peripheral edema and peripheral pulses strong  ABDOMEN: soft, nontender, no hepatosplenomegaly, no masses and bowel sounds normal    Diagnostic Test Results:  Labs reviewed in Epic  Urinalysis - abnormal         Assessment & Plan     (R30.0) Dysuria  (primary encounter diagnosis)  Comment:   Plan: *UA reflex to Microscopic and Culture ([facility] (except [place] and         [place]), Urine Microscopic,         amoxicillin-clavulanate (AUGMENTIN) 875-125 MG         tablet, *UA reflex to Microscopic and Culture         ([facility] (except [place]        and [place]), Urine Culture Aerobic Bacterial            (R82.90) Nonspecific finding on examination of urine  Comment:   Plan: Urine Culture Aerobic Bacterial,         amoxicillin-clavulanate (AUGMENTIN) 875-125 MG         tablet, UROLOGY ADULT REFERRAL, *UA reflex to         Microscopic and Culture ([facility] (except [place] and [place]), Urine        Culture Aerobic Bacterial            (N39.0,  R31.9) Urinary tract infection with hematuria, site unspecified  Comment: recurrent uti x 2-3 months   Plan: amoxicillin-clavulanate (AUGMENTIN) 875-125 MG         tablet, UROLOGY ADULT REFERRAL, *UA reflex to         Microscopic and Culture ([facility] (except [place] and [place]), Urine        Culture Aerobic Bacterial            UA suggest urinary tract infection. Urine culture pending. In the meantime start treatment. . Talked about pushing fluids , hydration etc. She will follow up if no improvement or problem. Also given referral to see the urologist because of recurrent problem.       Cares and  treatment discussed. follow up if problem   Patient expressed understanding and agreement with treatment plan. All patient's questions were answered, will let me know if has more later.  Medications: Rx's: Reviewed the potential side effects/complications of medications prescribed.     Jessica Eduardo MD  [facility]

## 2019-08-09 LAB
BACTERIA SPEC CULT: NORMAL
SPECIMEN SOURCE: NORMAL

## 2019-08-12 DIAGNOSIS — F43.22 ADJUSTMENT DISORDER WITH ANXIOUS MOOD: ICD-10-CM

## 2019-08-12 RX ORDER — CLONAZEPAM 0.5 MG/1
TABLET, ORALLY DISINTEGRATING ORAL
Qty: 30 TABLET | Refills: 0 | Status: SHIPPED | OUTPATIENT
Start: 2019-08-12 | End: 2019-09-20

## 2019-08-12 NOTE — TELEPHONE ENCOUNTER
Controlled Substance Refill Request for clonazePAM (KLONOPIN) 0.5 MG ODT  Problem List Complete:  No     PROVIDER TO CONSIDER COMPLETION OF PROBLEM LIST AND OVERVIEW/CONTROLLED SUBSTANCE AGREEMENT    Last Written Prescription Date:  6-  Last Fill Quantity: 30 tablet,   # refills: 0    THE MOST RECENT OFFICE VISIT MUST BE WITHIN THE PAST 3 MONTHS. AT LEAST ONE FACE TO FACE VISIT MUST OCCUR EVERY 6 MONTHS. ADDITIONAL VISITS CAN BE VIRTUAL.  (THIS STATEMENT SHOULD BE DELETED.)    Last Office Visit with OLGA primary care provider: 8-8-2019  [facility]  Future Office visit:   Next 5 appointments (look out 90 days)    Sep 17, 2019 10:10 AM CDT  Return Visit with  LAB DRAW 1  [facility]  [address]  [place] [address]  [phone]   Sep 17, 2019 12:30 PM CDT  Return Visit with ARLENE Gibson CNP  [facility]  [address]  [place] [address]  [phone]          Controlled substance agreement:   Encounter-Level CSA - 07/27/2015:    Controlled Substance Agreement - Scan on 8/5/2015 11:27 AM: Controlled Substance Agreement 7/27/15 (below)       Patient-Level CSA:    There are no patient-level csa.         Last Urine Drug Screen: No results found for: CDAUT, No results found for: COMDAT, No results found for: THC13, PCP13, COC13, MAMP13, OPI13, AMP13, BZO13, TCA13, MTD13, BAR13, OXY13, PPX13, BUP13     Processing:  Patient will  in clinic     [email or web address]       checked in past 3 months?  No, route to RN

## 2019-08-21 DIAGNOSIS — R31.9 URINARY TRACT INFECTION WITH HEMATURIA, SITE UNSPECIFIED: ICD-10-CM

## 2019-08-21 DIAGNOSIS — R82.90 NONSPECIFIC FINDING ON EXAMINATION OF URINE: ICD-10-CM

## 2019-08-21 DIAGNOSIS — R30.0 DYSURIA: ICD-10-CM

## 2019-08-21 DIAGNOSIS — N39.0 URINARY TRACT INFECTION WITH HEMATURIA, SITE UNSPECIFIED: ICD-10-CM

## 2019-08-21 LAB
ALBUMIN UR-MCNC: NEGATIVE MG/DL
APPEARANCE UR: CLEAR
BILIRUB UR QL STRIP: NEGATIVE
COLOR UR AUTO: YELLOW
GLUCOSE UR STRIP-MCNC: NEGATIVE MG/DL
HGB UR QL STRIP: NEGATIVE
KETONES UR STRIP-MCNC: NEGATIVE MG/DL
LEUKOCYTE ESTERASE UR QL STRIP: NEGATIVE
NITRATE UR QL: NEGATIVE
PH UR STRIP: 7 PH (ref 5–7)
SOURCE: NORMAL
SP GR UR STRIP: 1.02 (ref 1–1.03)
UROBILINOGEN UR STRIP-ACNC: 0.2 EU/DL (ref 0.2–1)

## 2019-08-21 PROCEDURE — 81003 URINALYSIS AUTO W/O SCOPE: CPT | Performed by: FAMILY MEDICINE

## 2019-08-21 PROCEDURE — 87086 URINE CULTURE/COLONY COUNT: CPT | Performed by: FAMILY MEDICINE

## 2019-08-22 ENCOUNTER — TELEPHONE (OUTPATIENT)
Dept: FAMILY MEDICINE | Facility: CLINIC | Age: 59
End: 2019-08-22

## 2019-08-22 LAB
BACTERIA SPEC CULT: NO GROWTH
SPECIMEN SOURCE: NORMAL

## 2019-08-22 NOTE — TELEPHONE ENCOUNTER
Patient calling with UA and culture results, results have not been read yet.     Routing to provider to advise. Thank you.    Sola Anthony RN, BSN  [facility]

## 2019-08-22 NOTE — TELEPHONE ENCOUNTER
Reason for Call:  Request for results:    Name of test or procedure: UA macroscopic and urine culture aerobic bacterial    Date of test of procedure: [id number]    Location of the test or procedure: [facility]    OK to leave the result message on voice mail or with a family member? YES    Phone number Patient can be reached at:  Cell number on file:    Telephone Information:   Mobile [phone]     Call taken on 8/22/2019 at 9:26 AM by Chioma De Los Santos

## 2019-08-22 NOTE — TELEPHONE ENCOUNTER
Left message on named identified VM that lab results are normal. Advised to call to speak with triage nurse if she has questions or concerns. Carmen Lopez RN

## 2019-09-04 DIAGNOSIS — I10 ESSENTIAL HYPERTENSION, BENIGN: ICD-10-CM

## 2019-09-04 RX ORDER — LOSARTAN POTASSIUM AND HYDROCHLOROTHIAZIDE 12.5; 1 MG/1; MG/1
1 TABLET ORAL DAILY
Qty: 90 TABLET | Refills: 0 | Status: SHIPPED | OUTPATIENT
Start: 2019-09-04 | End: 2019-12-04

## 2019-09-04 NOTE — TELEPHONE ENCOUNTER
Routing refill request to provider for review/approval because:  Labs not current:  SHAZIA Gaspar  Please address at upcoming OV on 9/11/2019.    JOSE DialN, RN  [employer] [facility]

## 2019-09-04 NOTE — TELEPHONE ENCOUNTER
"Requested Prescriptions   Pending Prescriptions Disp Refills     losartan-hydrochlorothiazide (HYZAAR) 100-12.5 MG tablet [Pharmacy Med Name: LOSARTAN/HCTZ 100/12.5MG TABLETS] 90 tablet 0     Sig: TAKE 1 TABLET BY MOUTH DAILY  Last Written Prescription Date:  1/28/19  Last Fill Quantity: 90,  # refills: 1   Last office visit: 8/8/2019 with prescribing provider:  Jayce   Future Office Visit:   Next 5 appointments (look out 90 days)    Sep 11, 2019 10:00 AM CDT  PHYSICAL with Irish Fernandes MD  [facility] [place] [address] [address]  [phone]   Sep 17, 2019 10:10 AM CDT  Return Visit with YANIQUE LAB DRAW 1  [facility]  [address]  [facility] [address]  [phone]   Sep 17, 2019 12:30 PM CDT  Return Visit with ARLENE Gibson CNP  [facility]  [address]  [facility] [address]  [phone]                Angiotensin-II Receptors Failed - 9/4/2019 12:32 PM        Failed - Normal serum creatinine on file in past 12 months     Recent Labs   Lab Test 06/12/18  1509   CR 0.76             Failed - Normal serum potassium on file in past 12 months     Recent Labs   Lab Test 06/12/18  1509   POTASSIUM 3.6                    Passed - Last blood pressure under 140/90 in past 12 months     BP Readings from Last 3 Encounters:   08/08/19 124/72   07/02/19 100/72   06/18/19 128/88                 Passed - Recent (12 mo) or future (30 days) visit within the authorizing provider's specialty     Patient had office visit in the last 12 months or has a visit in the next 30 days with authorizing provider or within the authorizing provider's specialty.  See \"Patient Info\" tab in inbasket, or \"Choose Columns\" in Meds & Orders section of the refill encounter.              " Passed - Medication is active on med list        Passed - Patient is age 18 or older        Passed - No active pregnancy on record        Passed - No positive pregnancy test in past 12 months

## 2019-09-09 ASSESSMENT — ENCOUNTER SYMPTOMS
HOT FLASHES: 0
BACK PAIN: 1
NIGHT SWEATS: 0
HEARTBURN: 0
DIFFICULTY URINATING: 0
MUSCLE CRAMPS: 0
POLYPHAGIA: 0
VOMITING: 0
DIARRHEA: 1
WEIGHT GAIN: 0
NECK PAIN: 0
BOWEL INCONTINENCE: 0
CHILLS: 0
JAUNDICE: 0
DECREASED LIBIDO: 0
WEIGHT LOSS: 1
DECREASED APPETITE: 0
FEVER: 0
ALTERED TEMPERATURE REGULATION: 0
POLYDIPSIA: 0
MUSCLE WEAKNESS: 0
NAUSEA: 0
ABDOMINAL PAIN: 1
FLANK PAIN: 0
HEMATURIA: 1
DYSURIA: 1
HALLUCINATIONS: 0
FATIGUE: 0
INCREASED ENERGY: 0
BLOATING: 0
STIFFNESS: 0
ARTHRALGIAS: 0
JOINT SWELLING: 0
MYALGIAS: 0
BLOOD IN STOOL: 0
RECTAL PAIN: 0

## 2019-09-10 ENCOUNTER — TELEPHONE (OUTPATIENT)
Dept: UROLOGY | Facility: CLINIC | Age: 59
End: 2019-09-10

## 2019-09-10 ENCOUNTER — OFFICE VISIT (OUTPATIENT)
Dept: UROLOGY | Facility: CLINIC | Age: 59
End: 2019-09-10
Attending: FAMILY MEDICINE
Payer: COMMERCIAL

## 2019-09-10 VITALS
SYSTOLIC BLOOD PRESSURE: 110 MMHG | BODY MASS INDEX: 26.22 KG/M2 | HEART RATE: 69 BPM | HEIGHT: 63 IN | DIASTOLIC BLOOD PRESSURE: 60 MMHG | OXYGEN SATURATION: 99 % | WEIGHT: 148 LBS

## 2019-09-10 DIAGNOSIS — N30.01 ACUTE CYSTITIS WITH HEMATURIA: ICD-10-CM

## 2019-09-10 DIAGNOSIS — R39.15 URINARY URGENCY: ICD-10-CM

## 2019-09-10 DIAGNOSIS — R31.9 HEMATURIA: Primary | ICD-10-CM

## 2019-09-10 DIAGNOSIS — R31.0 GROSS HEMATURIA: Primary | ICD-10-CM

## 2019-09-10 DIAGNOSIS — R35.0 URINARY FREQUENCY: ICD-10-CM

## 2019-09-10 DIAGNOSIS — N39.0 URINARY TRACT INFECTION: Primary | ICD-10-CM

## 2019-09-10 LAB
ALBUMIN UR-MCNC: 30 MG/DL
APPEARANCE UR: CLEAR
BILIRUB UR QL STRIP: ABNORMAL
COLOR UR AUTO: YELLOW
GLUCOSE UR STRIP-MCNC: NEGATIVE MG/DL
HGB UR QL STRIP: NEGATIVE
KETONES UR STRIP-MCNC: ABNORMAL MG/DL
LEUKOCYTE ESTERASE UR QL STRIP: NEGATIVE
NITRATE UR QL: NEGATIVE
PH UR STRIP: 5.5 PH (ref 5–7)
RESIDUAL VOLUME (RV) (EXTERNAL): 27
SOURCE: ABNORMAL
SP GR UR STRIP: >1.03 (ref 1–1.03)
UROBILINOGEN UR STRIP-ACNC: 0.2 EU/DL (ref 0.2–1)

## 2019-09-10 PROCEDURE — 99204 OFFICE O/P NEW MOD 45 MIN: CPT | Mod: 25 | Performed by: PHYSICIAN ASSISTANT

## 2019-09-10 PROCEDURE — 51798 US URINE CAPACITY MEASURE: CPT | Performed by: PHYSICIAN ASSISTANT

## 2019-09-10 PROCEDURE — 81003 URINALYSIS AUTO W/O SCOPE: CPT | Performed by: PHYSICIAN ASSISTANT

## 2019-09-10 PROCEDURE — 88112 CYTOPATH CELL ENHANCE TECH: CPT | Performed by: PHYSICIAN ASSISTANT

## 2019-09-10 RX ORDER — ESTRADIOL 0.1 MG/G
CREAM VAGINAL
Qty: 42.5 G | Refills: 3 | Status: SHIPPED | OUTPATIENT
Start: 2019-09-10 | End: 2019-09-10

## 2019-09-10 ASSESSMENT — MIFFLIN-ST. JEOR: SCORE: 1205.45

## 2019-09-10 ASSESSMENT — PAIN SCALES - GENERAL: PAINLEVEL: NO PAIN (0)

## 2019-09-10 NOTE — TELEPHONE ENCOUNTER
Called Irina back and gave gave her the information re: [facility]. She would like to go ahead with this. Will send Rx for estriol cream to [facility].      Health Call Center    Phone Message    May a detailed message be left on voicemail: yes    Reason for Call: Other: Patient was seen by Xenia Estrada today.  Patient was prescribed estradiol (ESTRACE VAGINAL) 0.1 MG/GM vaginal cream and was told by Xenia that if the prescription is more than $40.00 to call and let her know and she will mail her something out. The prescription is $75.00..       Action Taken: Message routed to:  Other: Urology Aleena

## 2019-09-10 NOTE — NURSING NOTE
Chief Complaint   Patient presents with     UTI     Patient is here for frequent urinary tract infections, and hematuria.        PVR today is 27 ml.   Patient states she is getting up at night to urinate 2-3 times on average.     Norma Malave, CMA

## 2019-09-10 NOTE — PATIENT INSTRUCTIONS
-Urinalysis and micro analysis.   - Urine cytology to look for abnormal cells.  - CT scan: Please call [phone] to schedule this [facility]   - Cystoscopy with the  urologist to evaluate the interior of the bladder. Follow up as recommended by the urologist.    * can try estrogen cream to the urethra to help with symptoms.   Estrogen cream three times a week near urethra (pea-sized amount): If >$40, let me know and we can get via a compound pharmacy.

## 2019-09-10 NOTE — LETTER
9/10/2019       RE: Irina Hanks  [address]  [place] [address]     Dear Colleague,    Thank you for referring your patient, Irina Hanks, to the [facility] [place] [facility]. Please see a copy of my visit note below.    CC: Hematuria.    HPI: It is a pleasure to see Ms. Irina Hanks, a 59 year old female, asked to be seen in consultation by Dr. Eduardo for evaluation of UTI symptoms.  6/9/19 noted gross hematuria. UC was neg.  UA x 2 also showing micro hematuria (last one on 8/8/19 noting increased RBCs/HPF).  She has UTI-like symptoms including frequency, urgency.      Hx of hemochromatosis, gets phlebotomy q 3 months.      She currently feels well and denies any dysuria, pyuria, hesitancy, intermittency, feelings of incomplete emptying, or any recent history of urinary tract infections or stones.    Hematuria Risk Factors:  Age >40: Yes     Smoking history: Yes, quit in 1989  Occupational exposure to chemicals or dyes (ie, benzenes, aromatic amines): no  History of urologic disorder or disease: no  History of irritative voiding symptoms: no  History of urinary tract infection: no  Analgesic abuse: no  History of pelvic irradiation: no    Past Medical History:   Diagnosis Date     ASCUS favor benign 09/2014    3 yr co-test     Essential hypertension, benign      Impaired renal function      Inflammatory arthritis     Managed by Rheumatology  - q 6 mos     Microscopic colitis      Osteoarthritis of first carpometacarpal joint     bilateral     Other specified acquired hypothyroidism      Seasonal allergic rhinitis      Shortness of breath      Symptomatic menopausal or female climacteric states     age 43, on HRT     Tricuspid regurgitation     +1-2 TR noted on 12/22/14 Echo     Past Surgical History:   Procedure Laterality Date     BIOPSY BREAST       C/SECTION, LOW TRANSVERSE      twins     COLONOSCOPY   2013    nl, next one due in 5 years     LEEP TX, CERVICAL  1990s    normal since that time     MYRINGOTOMY, INSERT TUBE, COMBINED Left 4/2015    chronic NORM, ETD     Current Outpatient Medications   Medication Sig Dispense Refill     Cholecalciferol (VITAMIN D PO) Take 5,000 Units by mouth       clonazePAM (KLONOPIN) 0.5 MG ODT DISSOLVE 1 TABLET ON THE TONGUE EVERY NIGHT AS NEEDED FOR ANXIETY 30 tablet 0     hydroxychloroquine (PLAQUENIL) 200 MG tablet Take 2 tablets (400 mg) by mouth daily 90 tablet 3     levothyroxine (SYNTHROID/LEVOTHROID) 75 MCG tablet TAKE 1 TABLET BY MOUTH EVERY MORNING 30 tablet 11     liothyronine (CYTOMEL) 5 MCG tablet TAKE 2 TABLETS BY MOUTH EVERY  tablet 1     loratadine (CLARITIN) 10 MG tablet Take 1 tablet (10 mg) by mouth daily 90 tablet 3     losartan-hydrochlorothiazide (HYZAAR) 100-12.5 MG tablet TAKE 1 TABLET BY MOUTH DAILY 90 tablet 0     meclizine (ANTIVERT) 25 MG tablet Take 1 tablet (25 mg) by mouth every 6 hours as needed for dizziness 30 tablet 1     metoprolol succinate ER (TOPROL-XL) 50 MG 24 hr tablet Take 1 tablet (50 mg) by mouth daily 7 tablet 3     psyllium (METAMUCIL) 58.6 % POWD Take by mouth daily       TURMERIC PO        Allergies   Allergen Reactions     Ace Inhibitors Cough     lisinopril     Sulfa Drugs      FAMILY HISTORY: There is reported history of genitourinary carcinoma (brother kidney cancer).  There is no history of urolithiasis.      Social History     Socioeconomic History     Marital status:      Spouse name: Bill     Number of children: 3     Years of education: Not on file     Highest education level: Not on file   Occupational History     Occupation: homemaker     Employer: NONE    Social Needs     Financial resource strain: Not on file     Food insecurity:     Worry: Not on file     Inability: Not on file     Transportation needs:     Medical: Not on file     Non-medical: Not on file   Tobacco Use     Smoking status: Former Smoker      "Packs/day: 0.26     Years: 10.00     Pack years: 2.60     Types: Cigarettes     Last attempt to quit: 1989     Years since quittin.3     Smokeless tobacco: Never Used   Substance and Sexual Activity     Alcohol use: Yes     Alcohol/week: 0.0 oz     Comment: 2 drinks per day     Drug use: No     Sexual activity: Yes     Partners: Male   Lifestyle     Physical activity:     Days per week: Not on file     Minutes per session: Not on file     Stress: Not on file   Relationships     Social connections:     Talks on phone: Not on file     Gets together: Not on file     Attends Baptism service: Not on file     Active member of club or organization: Not on file     Attends meetings of clubs or organizations: Not on file     Relationship status: Not on file     Intimate partner violence:     Fear of current or ex partner: Not on file     Emotionally abused: Not on file     Physically abused: Not on file     Forced sexual activity: Not on file   Other Topics Concern      Service No     Blood Transfusions No     Caffeine Concern Yes     Comment: 1 cup/day      Occupational Exposure No     Hobby Hazards No     Sleep Concern No     Stress Concern No     Weight Concern No     Special Diet Yes     Comment: low melyssa and low glucose      Back Care No     Exercise Yes     Comment: 3 x week, walking     Bike Helmet Yes     Seat Belt Yes     Self-Exams No   Social History Narrative     Not on file       ROS: A comprehensive 14 point ROS was obtained and negative except for that outlined above in the HPI.    PHYSICAL EXAM:   /60 (BP Location: Right arm, Patient Position: Sitting, Cuff Size: Adult Regular)   Pulse 69   Ht 1.6 m (5' 3\")   Wt 67.1 kg (148 lb)   SpO2 99%   BMI 26.22 kg/m       PSYCH: NAD  EYES: EOMI  MOUTH: MMM  NECK: Supple, no notable adenopathy  RESP: Unlabored breathing  CARDIAC: No LE edema  SKIN: Warm, no rashes  ABD: soft, Nontender  NEURO: AAO x3    Orders Only on 2019   Component " Date Value Ref Range Status     Specimen Description 08/21/2019 Midstream Urine   Final     Culture Micro 08/21/2019 No growth   Final     Color Urine 08/21/2019 Yellow   Final     Appearance Urine 08/21/2019 Clear   Final     Glucose Urine 08/21/2019 Negative  NEG^Negative mg/dL Final     Bilirubin Urine 08/21/2019 Negative  NEG^Negative Final     Ketones Urine 08/21/2019 Negative  NEG^Negative mg/dL Final     Specific Gravity Urine 08/21/2019 1.020  1.003 - 1.035 Final     Blood Urine 08/21/2019 Negative  NEG^Negative Final     pH Urine 08/21/2019 7.0  5.0 - 7.0 pH Final     Protein Albumin Urine 08/21/2019 Negative  NEG^Negative mg/dL Final     Urobilinogen Urine 08/21/2019 0.2  0.2 - 1.0 EU/dL Final     Nitrite Urine 08/21/2019 Negative  NEG^Negative Final     Leukocyte Esterase Urine 08/21/2019 Negative  NEG^Negative Final     Source 08/21/2019 Midstream Urine   Final       IMAGING: CT ABDOMEN AND PELVIS WITH CONTRAST   6/25/2018 11:42 AM      HISTORY:  Abdominal pain, generalized.     TECHNIQUE:   76 mL Isovue-370. Radiation dose for this scan was  reduced using automated exposure control, adjustment of the mA and/or  kV according to patient size, or iterative reconstruction technique.     COMPARISON: 8/28/2008.     FINDINGS:  Liver, gallbladder, spleen, pancreas, adrenal glands, and  right kidney appear normal. Left kidney is unremarkable other than a  simple cyst. No abdominal or retroperitoneal adenopathy. The appendix  appears normal. No bowel obstruction, free air, or ascites. No pelvic  lymphadenopathy, free fluid, or mass.                                                                      IMPRESSION: Unremarkable CT of the abdomen and pelvis. No cause for  pain demonstrated.       ASSESSMENT and PLAN:    59 year old female with gross and micro hematuria and urgency.  The differential diagnosis at this point includes stone disease, infection, vaginal contaminant, urothelial malignancy, renal disorder  versus another yet unknown diagnosis.    At this time, recommend proceeding with comprehensive hematuria evaluation to include:  - Urinalysis.   - Urine cytology to look for cells concerning for malignancy.  - CT urogram for upper tract imaging.  - Cystoscopy with the first available urologist to evaluate the interior of the bladder. Follow up for hematuria as recommended by urologist performing cystoscopic evaluation.  -Estrogen cream three times a week near urethra (pea-sized amount): If >$40, she will let me know and we can get via a compound pharmacy.    Thank you for allowing me to participate in Ms. Hanks's care. I will keep you updated of her progress, but please do not hesitate to contact me with any questions.    Xenia Estrada PA-C  [facility]  30 minutes were spent with the patient today, > 50% in counseling and coordination of care of hematuria.

## 2019-09-11 ENCOUNTER — OFFICE VISIT (OUTPATIENT)
Dept: FAMILY MEDICINE | Facility: CLINIC | Age: 59
End: 2019-09-11
Payer: COMMERCIAL

## 2019-09-11 VITALS
HEART RATE: 73 BPM | HEIGHT: 63 IN | OXYGEN SATURATION: 98 % | WEIGHT: 146 LBS | SYSTOLIC BLOOD PRESSURE: 108 MMHG | TEMPERATURE: 97.6 F | BODY MASS INDEX: 25.87 KG/M2 | DIASTOLIC BLOOD PRESSURE: 81 MMHG

## 2019-09-11 DIAGNOSIS — Z00.00 ROUTINE HISTORY AND PHYSICAL EXAMINATION OF ADULT: Primary | ICD-10-CM

## 2019-09-11 DIAGNOSIS — Z23 NEED FOR PROPHYLACTIC VACCINATION AND INOCULATION AGAINST INFLUENZA: ICD-10-CM

## 2019-09-11 DIAGNOSIS — I10 HYPERTENSION GOAL BP (BLOOD PRESSURE) < 130/80: ICD-10-CM

## 2019-09-11 DIAGNOSIS — E78.5 HYPERLIPIDEMIA LDL GOAL <130: ICD-10-CM

## 2019-09-11 DIAGNOSIS — L43.9 LICHEN PLANUS: ICD-10-CM

## 2019-09-11 DIAGNOSIS — E83.110 HEREDITARY HEMOCHROMATOSIS (H): ICD-10-CM

## 2019-09-11 DIAGNOSIS — E03.9 ACQUIRED HYPOTHYROIDISM: ICD-10-CM

## 2019-09-11 LAB — COPATH REPORT: NORMAL

## 2019-09-11 PROCEDURE — 99396 PREV VISIT EST AGE 40-64: CPT | Mod: 25 | Performed by: INTERNAL MEDICINE

## 2019-09-11 PROCEDURE — 90471 IMMUNIZATION ADMIN: CPT | Performed by: INTERNAL MEDICINE

## 2019-09-11 PROCEDURE — 90686 IIV4 VACC NO PRSV 0.5 ML IM: CPT | Performed by: INTERNAL MEDICINE

## 2019-09-11 PROCEDURE — G0145 SCR C/V CYTO,THINLAYER,RESCR: HCPCS | Performed by: INTERNAL MEDICINE

## 2019-09-11 PROCEDURE — 87624 HPV HI-RISK TYP POOLED RSLT: CPT | Performed by: INTERNAL MEDICINE

## 2019-09-11 ASSESSMENT — MIFFLIN-ST. JEOR: SCORE: 1196.38

## 2019-09-11 NOTE — PATIENT INSTRUCTIONS
Try 1000 mcg of Vitamin B12 daily.      Preventive Health Recommendations  Female Ages 50 - 64    Yearly exam: See your health care provider every year in order to  o Review health changes.   o Discuss preventive care.    o Review your medicines if your doctor has prescribed any.      Get a Pap test every three years (unless you have an abnormal result and your provider advises testing more often).    If you get Pap tests with HPV test, you only need to test every 5 years, unless you have an abnormal result.     You do not need a Pap test if your uterus was removed (hysterectomy) and you have not had cancer.    You should be tested each year for STDs (sexually transmitted diseases) if you're at risk.     Have a mammogram every 1 to 2 years.    Have a colonoscopy at age 50, or have a yearly FIT test (stool test). These exams screen for colon cancer.      Have a cholesterol test every 5 years, or more often if advised.    Have a diabetes test (fasting glucose) every three years. If you are at risk for diabetes, you should have this test more often.     If you are at risk for osteoporosis (brittle bone disease), think about having a bone density scan (DEXA).    Shots: Get a flu shot each year. Get a tetanus shot every 10 years.    Nutrition:     Eat at least 5 servings of fruits and vegetables each day.    Eat whole-grain bread, whole-wheat pasta and brown rice instead of white grains and rice.    Get adequate Calcium and Vitamin D.     Lifestyle    Exercise at least 150 minutes a week (30 minutes a day, 5 days a week). This will help you control your weight and prevent disease.    Limit alcohol to one drink per day.    No smoking.     Wear sunscreen to prevent skin cancer.     See your dentist every six months for an exam and cleaning.    See your eye doctor every 1 to 2 years.

## 2019-09-11 NOTE — PROGRESS NOTES
SUBJECTIVE:   CC: Irina Hanks is an 59 year old woman who presents for preventive health visit.     Healthy Habits:    Do you get at least three servings of calcium containing foods daily (dairy, green leafy vegetables, etc.)? yes    Amount of exercise or daily activities, outside of work:  Two to three times a week     Problems taking medications regularly No    Medication side effects: No    Have you had an eye exam in the past two years? yes    Do you see a dentist twice per year? yes    Do you have sleep apnea, excessive snoring or daytime drowsiness?no     Peg has had recurrent hematuria so went to see Urology and has an appointment for a cystoscopy and CT urogram.   Still seeing hematology for her hemochromatosis. Has an appointment next week. And this will have been 3 months since her last phlebotomy.     Colonoscopy completed on 2019 at [facility]. Results normal. Every 5 year screening.     Lichen planus on her tongue is acting up. Burning her tongue.        Today's PHQ-2 Score:   PHQ-2 (  [employer]) 9/10/2019 2019   Q1: Little interest or pleasure in doing things 0 0   Q2: Feeling down, depressed or hopeless 0 0   PHQ-2 Score 0 0       Abuse: Current or Past(Physical, Sexual or Emotional)- No  Do you feel safe in your environment? Yes    Social History     Tobacco Use     Smoking status: Former Smoker     Packs/day: 0.26     Years: 10.00     Pack years: 2.60     Types: Cigarettes     Last attempt to quit: 1989     Years since quittin.3     Smokeless tobacco: Never Used   Substance Use Topics     Alcohol use: Yes     Alcohol/week: 0.0 oz     Comment: 2 drinks per day     If you drink alcohol do you typically have >3 drinks per day or >7 drinks per week? No                     Reviewed orders with patient.  Reviewed health maintenance and updated orders accordingly - Yes  BP Readings from Last 3 Encounters:   19 108/81   09/10/19 110/60   19 124/72    Wt Readings  "from Last 3 Encounters:   09/11/19 66.2 kg (146 lb)   09/10/19 67.1 kg (148 lb)   08/08/19 66.7 kg (147 lb)                    Mammogram Screening: Patient over age 50, mutual decision to screen reflected in health maintenance.    Pertinent mammograms are reviewed under the imaging tab.  History of abnormal Pap smear: NO - age 30-65 PAP every 5 years with negative HPV co-testing recommended  PAP / HPV 9/29/2014 9/4/2012 9/16/2011   PAP ASC-US(A) NIL NIL     Reviewed and updated as needed this visit by clinical staff  Tobacco  Allergies         Reviewed and updated as needed this visit by Provider            ROS:   ROS: 10 point ROS neg other than the symptoms noted above in the HPI.      OBJECTIVE:   /81   Pulse 73   Temp 97.6  F (36.4  C) (Tympanic)   Ht 1.6 m (5' 3\")   Wt 66.2 kg (146 lb)   SpO2 98%   BMI 25.86 kg/m    EXAM:  GENERAL: healthy, alert and no distress  EYES: Eyes grossly normal to inspection, PERRL and conjunctivae and sclerae normal  HENT: ear canals and TM's normal, nose and mouth without ulcers or lesions  NECK: no adenopathy, no asymmetry, masses, or scars and thyroid normal to palpation  RESP: lungs clear to auscultation - no rales, rhonchi or wheezes  BREAST: normal without masses, tenderness or nipple discharge and no palpable axillary masses or adenopathy  CV: regular rate and rhythm, normal S1 S2, no S3 or S4, no murmur, click or rub, no peripheral edema and peripheral pulses strong  ABDOMEN: soft, nontender, no hepatosplenomegaly, no masses and bowel sounds normal   (female): normal female external genitalia, normal urethral meatus, vaginal mucosa pink, moist, well rugated, and normal cervix/adnexa/uterus without masses or discharge  MS: no gross musculoskeletal defects noted, no edema  SKIN: no suspicious lesions or rashes  NEURO: Normal strength and tone, mentation intact and speech normal  PSYCH: mentation appears normal, affect normal/bright    Diagnostic Test " "Results:  Labs reviewed in Epic    ASSESSMENT/PLAN:   1. Routine history and physical examination of adult  - Pap imaged thin layer screen with HPV - recommended age 30 - 65 years (select HPV order below)  - HPV High Risk Types DNA Cervical  - **Glucose FUTURE 2mo; Future    2. Hereditary hemochromatosis (H)  Following with hematology; receiving phlebotomy. Liver function tests have returned to normal.    3. Hyperlipidemia LDL goal <130  - Lipid panel reflex to direct LDL Fasting; Future    4. Hypertension goal BP (blood pressure) < 130/80  Well controlled.     6. Acquired hypothyroidism  Due for labs.   - TSH with free T4 reflex; Future    7. Lichen planus  - magic mouthwash suspension (diphenhydrAMINE, lidocaine, aluminum-magnesium & simethicone); Swish and swallow 10 mLs in mouth every 6 hours as needed for mouth sores  Dispense: 120 mL; Refill: 1  - Vitamin B12; Future    8. Need for prophylactic vaccination and inoculation against influenza  - HC FLU VAC PRESRV FREE QUAD SPLIT VIR > 6 MONTHS IM [05793]  - Vaccine Administration, Initial [23927]    COUNSELING:   Reviewed preventive health counseling, as reflected in patient instructions       Regular exercise       Healthy diet/nutrition       Vision screening       Hearing screening       Osteoporosis Prevention/Bone Health       Colon cancer screening    Estimated body mass index is 25.86 kg/m  as calculated from the following:    Height as of this encounter: 1.6 m (5' 3\").    Weight as of this encounter: 66.2 kg (146 lb).         reports that she quit smoking about 30 years ago. Her smoking use included cigarettes. She has a 2.60 pack-year smoking history. She has never used smokeless tobacco.      Counseling Resources:  ATP IV Guidelines  Pooled Cohorts Equation Calculator  Breast Cancer Risk Calculator  FRAX Risk Assessment  ICSI Preventive Guidelines  Dietary Guidelines for Americans, 2010  USDA's MyPlate  ASA Prophylaxis  Lung CA Screening    Irish Noonan " MD Dena  [facility]

## 2019-09-16 LAB
COPATH REPORT: NORMAL
PAP: NORMAL

## 2019-09-17 ENCOUNTER — TRANSFERRED RECORDS (OUTPATIENT)
Dept: HEALTH INFORMATION MANAGEMENT | Facility: CLINIC | Age: 59
End: 2019-09-17

## 2019-09-17 ENCOUNTER — HOSPITAL ENCOUNTER (OUTPATIENT)
Dept: LAB | Facility: CLINIC | Age: 59
End: 2019-09-17
Attending: INTERNAL MEDICINE
Payer: COMMERCIAL

## 2019-09-17 ENCOUNTER — HOSPITAL ENCOUNTER (OUTPATIENT)
Dept: CT IMAGING | Facility: CLINIC | Age: 59
Discharge: HOME | End: 2019-09-17
Attending: PHYSICIAN ASSISTANT | Admitting: PHYSICIAN ASSISTANT
Payer: COMMERCIAL

## 2019-09-17 ENCOUNTER — INFUSION THERAPY VISIT (OUTPATIENT)
Dept: INFUSION THERAPY | Facility: CLINIC | Age: 59
End: 2019-09-17
Attending: NURSE PRACTITIONER
Payer: COMMERCIAL

## 2019-09-17 ENCOUNTER — ONCOLOGY VISIT (OUTPATIENT)
Dept: ONCOLOGY | Facility: CLINIC | Age: 59
End: 2019-09-17
Attending: NURSE PRACTITIONER
Payer: COMMERCIAL

## 2019-09-17 VITALS
DIASTOLIC BLOOD PRESSURE: 92 MMHG | TEMPERATURE: 97.5 F | OXYGEN SATURATION: 99 % | HEART RATE: 64 BPM | SYSTOLIC BLOOD PRESSURE: 138 MMHG | RESPIRATION RATE: 20 BRPM

## 2019-09-17 VITALS
SYSTOLIC BLOOD PRESSURE: 138 MMHG | DIASTOLIC BLOOD PRESSURE: 92 MMHG | HEART RATE: 64 BPM | BODY MASS INDEX: 26.5 KG/M2 | TEMPERATURE: 97.5 F | RESPIRATION RATE: 20 BRPM | WEIGHT: 149.6 LBS | OXYGEN SATURATION: 99 %

## 2019-09-17 DIAGNOSIS — R31.0 GROSS HEMATURIA: ICD-10-CM

## 2019-09-17 DIAGNOSIS — E03.9 ACQUIRED HYPOTHYROIDISM: ICD-10-CM

## 2019-09-17 DIAGNOSIS — E83.19 IRON OVERLOAD: Primary | ICD-10-CM

## 2019-09-17 DIAGNOSIS — L43.9 LICHEN PLANUS: ICD-10-CM

## 2019-09-17 DIAGNOSIS — E78.5 HYPERLIPIDEMIA LDL GOAL <130: ICD-10-CM

## 2019-09-17 DIAGNOSIS — E83.110 HEREDITARY HEMOCHROMATOSIS (H): ICD-10-CM

## 2019-09-17 DIAGNOSIS — E83.110 HEREDITARY HEMOCHROMATOSIS (H): Primary | ICD-10-CM

## 2019-09-17 LAB
ALT SERPL W P-5'-P-CCNC: 16 U/L (ref 0–50)
AST SERPL W P-5'-P-CCNC: 21 U/L (ref 0–45)
BASOPHILS # BLD AUTO: 0.1 10E9/L (ref 0–0.2)
BASOPHILS NFR BLD AUTO: 1.7 %
CHOLEST SERPL-MCNC: 125 MG/DL
DIFFERENTIAL METHOD BLD: NORMAL
EOSINOPHIL # BLD AUTO: 0 10E9/L (ref 0–0.7)
EOSINOPHIL NFR BLD AUTO: 0 %
ERYTHROCYTE [DISTWIDTH] IN BLOOD BY AUTOMATED COUNT: 12.3 % (ref 10–15)
FERRITIN SERPL-MCNC: 45 NG/ML (ref 8–252)
FINAL DIAGNOSIS: NORMAL
HCT VFR BLD AUTO: 35.6 % (ref 35–47)
HDLC SERPL-MCNC: 61 MG/DL
HGB BLD-MCNC: 12.5 G/DL (ref 11.7–15.7)
HPV HR 12 DNA CVX QL NAA+PROBE: NEGATIVE
HPV16 DNA SPEC QL NAA+PROBE: NEGATIVE
HPV18 DNA SPEC QL NAA+PROBE: NEGATIVE
IMM GRANULOCYTES # BLD: 0 10E9/L (ref 0–0.4)
IMM GRANULOCYTES NFR BLD: 0.7 %
LDLC SERPL CALC-MCNC: 43 MG/DL
LYMPHOCYTES # BLD AUTO: 1.4 10E9/L (ref 0.8–5.3)
LYMPHOCYTES NFR BLD AUTO: 34.2 %
MCH RBC QN AUTO: 30.8 PG (ref 26.5–33)
MCHC RBC AUTO-ENTMCNC: 35.1 G/DL (ref 31.5–36.5)
MCV RBC AUTO: 88 FL (ref 78–100)
MONOCYTES # BLD AUTO: 0.6 10E9/L (ref 0–1.3)
MONOCYTES NFR BLD AUTO: 14.5 %
NEUTROPHILS # BLD AUTO: 2 10E9/L (ref 1.6–8.3)
NEUTROPHILS NFR BLD AUTO: 48.9 %
NONHDLC SERPL-MCNC: 64 MG/DL
NRBC # BLD AUTO: 0 10*3/UL
NRBC BLD AUTO-RTO: 0 /100
PLATELET # BLD AUTO: 233 10E9/L (ref 150–450)
RBC # BLD AUTO: 4.06 10E12/L (ref 3.8–5.2)
SPECIMEN DESCRIPTION: NORMAL
SPECIMEN SOURCE CVX/VAG CYTO: NORMAL
TRIGL SERPL-MCNC: 105 MG/DL
TSH SERPL DL<=0.005 MIU/L-ACNC: 1.41 MU/L (ref 0.4–4)
VIT B12 SERPL-MCNC: 652 PG/ML (ref 193–986)
WBC # BLD AUTO: 4.1 10E9/L (ref 4–11)

## 2019-09-17 PROCEDURE — 74178 CT ABD&PLV WO CNTR FLWD CNTR: CPT

## 2019-09-17 PROCEDURE — 82607 VITAMIN B-12: CPT | Performed by: NURSE PRACTITIONER

## 2019-09-17 PROCEDURE — 84443 ASSAY THYROID STIM HORMONE: CPT | Performed by: NURSE PRACTITIONER

## 2019-09-17 PROCEDURE — 99213 OFFICE O/P EST LOW 20 MIN: CPT | Performed by: NURSE PRACTITIONER

## 2019-09-17 PROCEDURE — 25000128 H RX IP 250 OP 636: Performed by: PHYSICIAN ASSISTANT

## 2019-09-17 PROCEDURE — 82728 ASSAY OF FERRITIN: CPT | Performed by: NURSE PRACTITIONER

## 2019-09-17 PROCEDURE — 80061 LIPID PANEL: CPT | Performed by: NURSE PRACTITIONER

## 2019-09-17 PROCEDURE — 84450 TRANSFERASE (AST) (SGOT): CPT | Performed by: NURSE PRACTITIONER

## 2019-09-17 PROCEDURE — 25000125 ZZHC RX 250: Performed by: PHYSICIAN ASSISTANT

## 2019-09-17 PROCEDURE — 85025 COMPLETE CBC W/AUTO DIFF WBC: CPT | Performed by: NURSE PRACTITIONER

## 2019-09-17 PROCEDURE — G0463 HOSPITAL OUTPT CLINIC VISIT: HCPCS

## 2019-09-17 PROCEDURE — 84460 ALANINE AMINO (ALT) (SGPT): CPT | Performed by: NURSE PRACTITIONER

## 2019-09-17 RX ORDER — IOPAMIDOL 755 MG/ML
100 INJECTION, SOLUTION INTRAVASCULAR ONCE
Status: COMPLETED | OUTPATIENT
Start: 2019-09-17 | End: 2019-09-17

## 2019-09-17 RX ADMIN — SODIUM CHLORIDE 60 ML: 9 INJECTION, SOLUTION INTRAVENOUS at 08:43

## 2019-09-17 RX ADMIN — IOPAMIDOL 100 ML: 755 INJECTION, SOLUTION INTRAVENOUS at 08:43

## 2019-09-17 ASSESSMENT — PAIN SCALES - GENERAL
PAINLEVEL: NO PAIN (0)
PAINLEVEL: NO PAIN (0)

## 2019-09-17 NOTE — PROGRESS NOTES
Infusion Nursing Note:  Irina Hanks presents today for possible phlebotomy.    Patient seen by provider today: Yes: Rishi Kaufman NP   present during visit today: Not Applicable.    Note: LEONA Kaufman NP/Gloria Cobos RN 9/17/19 9:45am Pt does not need phlebotomy today as Ferritin is only 45.    Intravenous Access:  Peripheral IV placed previously at imaging appt.    Treatment Conditions:  Lab Results   Component Value Date    HGB 12.5 09/17/2019     Lab Results   Component Value Date    WBC 4.1 09/17/2019      Lab Results   Component Value Date    ANEU 2.0 09/17/2019     Lab Results   Component Value Date     09/17/2019          Post Infusion Assessment:  Patient did not require phlebotomy today.       Discharge Plan:   Patient declined prescription refills.  Discharge instructions reviewed with: Patient.  Patient verbalized understanding of discharge instructions and all questions answered.  AVS to patient via [employer].  Patient will return 12/18/19 for next appointment.   Patient discharged in stable condition accompanied by: self.  Departure Mode: Ambulatory.    Abbi Cobos RN

## 2019-09-17 NOTE — PROGRESS NOTES
Oncology/Hematology Visit Note  Sep 17, 2019    Reason for Visit: follow up of   Hereditary hemochromatosis  Homozygous for H63D mutation  -Patient receiving phlebotomy      Interval History:  Overall patient feels well.  She denies fever chills sweats denies cough no shortness of breath denies abdominal pain denies bleeding denies nausea vomiting.  Patient denies edema or chest pain    Review of Systems:  14 point ROS of systems including Constitutional, Eyes, Respiratory, Cardiovascular, Gastroenterology, Genitourinary, Integumentary, Muscularskeletal, Psychiatric were all negative except for pertinent positives noted in my HPI.      Current Outpatient Medications   Medication Sig Dispense Refill     Cholecalciferol (VITAMIN D PO) Take 5,000 Units by mouth       clonazePAM (KLONOPIN) 0.5 MG ODT DISSOLVE 1 TABLET ON THE TONGUE EVERY NIGHT AS NEEDED FOR ANXIETY 30 tablet 0     COMPOUNDED NON-CONTROLLED SUBSTANCE (CMPD RX) - PHARMACY TO MIX COMPOUNDED MEDICATION Estriol 0.1% cream. Apply pea-sized amount to urethral meatus and vaginal opening Monday, Wednesday, Friday at bedtime. 30 g 4     hydroxychloroquine (PLAQUENIL) 200 MG tablet Take 2 tablets (400 mg) by mouth daily 90 tablet 3     levothyroxine (SYNTHROID/LEVOTHROID) 75 MCG tablet TAKE 1 TABLET BY MOUTH EVERY MORNING 30 tablet 11     liothyronine (CYTOMEL) 5 MCG tablet TAKE 2 TABLETS BY MOUTH EVERY  tablet 1     loratadine (CLARITIN) 10 MG tablet Take 1 tablet (10 mg) by mouth daily 90 tablet 3     losartan-hydrochlorothiazide (HYZAAR) 100-12.5 MG tablet TAKE 1 TABLET BY MOUTH DAILY 90 tablet 0     magic mouthwash suspension (diphenhydrAMINE, lidocaine, aluminum-magnesium & simethicone) Swish and swallow 10 mLs in mouth every 6 hours as needed for mouth sores 120 mL 1     meclizine (ANTIVERT) 25 MG tablet Take 1 tablet (25 mg) by mouth every 6 hours as needed for dizziness 30 tablet 1     metoprolol succinate ER (TOPROL-XL) 50 MG 24 hr tablet Take 1  tablet (50 mg) by mouth daily 7 tablet 3     psyllium (METAMUCIL) 58.6 % POWD Take by mouth daily       TURMERIC PO          Physical Examination:  General: The patient is a pleasant female in no acute distress.  BP (!) 138/92   Pulse 64   Temp 97.5  F (36.4  C) (Oral)   Resp 20   Wt 67.9 kg (149 lb 9.6 oz)   SpO2 99%   BMI 26.50 kg/m    HEENT: EOMI, PERRL. Sclerae are anicteric. Oral mucosa is pink and moist with no lesions or thrush.   Lymph: Neck is supple with no lymphadenopathy in the cervical or supraclavicular areas.   Heart: Regular rate and rhythm.   Lungs: Clear to auscultation bilaterally.   GI: Bowel sounds present, soft, nontender with no palpable hepatosplenomegaly or masses.   Extremities: No lower extremity edema noted bilaterally.   Skin: No rashes, petechiae, or bruising noted on exposed skin.    Laboratory Data:  Results for orders placed or performed during the hospital encounter of 09/17/19 (from the past 24 hour(s))   CBC with platelets differential   Result Value Ref Range    WBC 4.1 4.0 - 11.0 10e9/L    RBC Count 4.06 3.8 - 5.2 10e12/L    Hemoglobin 12.5 11.7 - 15.7 g/dL    Hematocrit 35.6 35.0 - 47.0 %    MCV 88 78 - 100 fl    MCH 30.8 26.5 - 33.0 pg    MCHC 35.1 31.5 - 36.5 g/dL    RDW 12.3 10.0 - 15.0 %    Platelet Count 233 150 - 450 10e9/L    Diff Method Automated Method     % Neutrophils 48.9 %    % Lymphocytes 34.2 %    % Monocytes 14.5 %    % Eosinophils 0.0 %    % Basophils 1.7 %    % Immature Granulocytes 0.7 %    Nucleated RBCs 0 0 /100    Absolute Neutrophil 2.0 1.6 - 8.3 10e9/L    Absolute Lymphocytes 1.4 0.8 - 5.3 10e9/L    Absolute Monocytes 0.6 0.0 - 1.3 10e9/L    Absolute Eosinophils 0.0 0.0 - 0.7 10e9/L    Absolute Basophils 0.1 0.0 - 0.2 10e9/L    Abs Immature Granulocytes 0.0 0 - 0.4 10e9/L    Absolute Nucleated RBC 0.0    ALT   Result Value Ref Range    ALT 16 0 - 50 U/L   AST   Result Value Ref Range    AST 21 0 - 45 U/L   Ferritin   Result Value Ref Range     Ferritin 45 8 - 252 ng/mL   Vitamin B12   Result Value Ref Range    Vitamin B12 652 193 - 986 pg/mL   TSH with free T4 reflex   Result Value Ref Range    TSH 1.41 0.40 - 4.00 mU/L   Lipid panel reflex to direct LDL Fasting   Result Value Ref Range    Cholesterol 125 <200 mg/dL    Triglycerides 105 <150 mg/dL    HDL Cholesterol 61 >49 mg/dL    LDL Cholesterol Calculated 43 <100 mg/dL    Non HDL Cholesterol 64 <130 mg/dL         Assessment and Plan:      This is a 59-year-old female with      ereditary hemochromatosis  Homozygous for H63D mutation  -Patient receiving phlebotomy  -Labs reviewed ferritin less than 50  Need for phlebotomy today  -Patient anxious to wait for 3 months for another phlebotomy we will schedule phlebotomy and labs in 2 months       ARLENE Gibson CNP  Hem/Onc   [facility]     Chart documentation with Dragon Voice recognition Software. Although reviewed after completion, some words and grammatical errors may remain.

## 2019-09-17 NOTE — LETTER
"    9/17/2019         RE: Irina Hanks  [address]  [place] [address]        Dear Colleague,    Thank you for referring your patient, Irina Hanks, to the [facility]. Please see a copy of my visit note below.    Oncology Rooming Note    September 17, 2019 11:34 AM   Irina Hanks is a 59 year old female who presents for:    Chief Complaint   Patient presents with     Oncology Clinic Visit     Initial Vitals: BP (!) 138/92   Pulse 64   Temp 97.5  F (36.4  C) (Oral)   Resp 20   SpO2 99%  Estimated body mass index is 25.86 kg/m  as calculated from the following:    Height as of 9/11/19: 1.6 m (5' 3\").    Weight as of 9/11/19: 66.2 kg (146 lb). There is no height or weight on file to calculate BSA.  No Pain (0) Comment: Data Unavailable   No LMP recorded. Patient is postmenopausal.  Allergies reviewed: Yes  Medications reviewed: Yes    Medications: Medication refills not needed today.  Pharmacy name entered into EPIC:    [facility] [place] [address] [id number] - [place], [place] - [address] AT [facility] OF [place] & [address] DRUG STORE #[id number] - [place], [place] - [address] [facility] AT [facility] OF [facility]ck[facility] & [id number] - [place], [place] - [address] [facility] #[id number] - [place] - [address] AT [address] [place] [address] [facility]  [facility] - [place], [place] - [address]    Clinical concerns: no      Tanesha Ram CMA              Oncology/Hematology Visit Note  Sep 17, 2019    Reason for Visit: follow up of   Hereditary hemochromatosis  Homozygous for H63D mutation  -Patient receiving phlebotomy      Interval History:  Overall patient feels well.  She denies fever chills sweats denies cough no shortness of breath denies abdominal pain denies bleeding denies nausea vomiting.  Patient denies edema or " chest pain    Review of Systems:  14 point ROS of systems including Constitutional, Eyes, Respiratory, Cardiovascular, Gastroenterology, Genitourinary, Integumentary, Muscularskeletal, Psychiatric were all negative except for pertinent positives noted in my HPI.      Current Outpatient Medications   Medication Sig Dispense Refill     Cholecalciferol (VITAMIN D PO) Take 5,000 Units by mouth       clonazePAM (KLONOPIN) 0.5 MG ODT DISSOLVE 1 TABLET ON THE TONGUE EVERY NIGHT AS NEEDED FOR ANXIETY 30 tablet 0     COMPOUNDED NON-CONTROLLED SUBSTANCE (CMPD RX) - PHARMACY TO MIX COMPOUNDED MEDICATION Estriol 0.1% cream. Apply pea-sized amount to urethral meatus and vaginal opening Monday, Wednesday, Friday at bedtime. 30 g 4     hydroxychloroquine (PLAQUENIL) 200 MG tablet Take 2 tablets (400 mg) by mouth daily 90 tablet 3     levothyroxine (SYNTHROID/LEVOTHROID) 75 MCG tablet TAKE 1 TABLET BY MOUTH EVERY MORNING 30 tablet 11     liothyronine (CYTOMEL) 5 MCG tablet TAKE 2 TABLETS BY MOUTH EVERY  tablet 1     loratadine (CLARITIN) 10 MG tablet Take 1 tablet (10 mg) by mouth daily 90 tablet 3     losartan-hydrochlorothiazide (HYZAAR) 100-12.5 MG tablet TAKE 1 TABLET BY MOUTH DAILY 90 tablet 0     magic mouthwash suspension (diphenhydrAMINE, lidocaine, aluminum-magnesium & simethicone) Swish and swallow 10 mLs in mouth every 6 hours as needed for mouth sores 120 mL 1     meclizine (ANTIVERT) 25 MG tablet Take 1 tablet (25 mg) by mouth every 6 hours as needed for dizziness 30 tablet 1     metoprolol succinate ER (TOPROL-XL) 50 MG 24 hr tablet Take 1 tablet (50 mg) by mouth daily 7 tablet 3     psyllium (METAMUCIL) 58.6 % POWD Take by mouth daily       TURMERIC PO          Physical Examination:  General: The patient is a pleasant female in no acute distress.  BP (!) 138/92   Pulse 64   Temp 97.5  F (36.4  C) (Oral)   Resp 20   Wt 67.9 kg (149 lb 9.6 oz)   SpO2 99%   BMI 26.50 kg/m     HEENT: EOMI, PERRL. Sclerae are  anicteric. Oral mucosa is pink and moist with no lesions or thrush.   Lymph: Neck is supple with no lymphadenopathy in the cervical or supraclavicular areas.   Heart: Regular rate and rhythm.   Lungs: Clear to auscultation bilaterally.   GI: Bowel sounds present, soft, nontender with no palpable hepatosplenomegaly or masses.   Extremities: No lower extremity edema noted bilaterally.   Skin: No rashes, petechiae, or bruising noted on exposed skin.    Laboratory Data:  Results for orders placed or performed during the hospital encounter of 09/17/19 (from the past 24 hour(s))   CBC with platelets differential   Result Value Ref Range    WBC 4.1 4.0 - 11.0 10e9/L    RBC Count 4.06 3.8 - 5.2 10e12/L    Hemoglobin 12.5 11.7 - 15.7 g/dL    Hematocrit 35.6 35.0 - 47.0 %    MCV 88 78 - 100 fl    MCH 30.8 26.5 - 33.0 pg    MCHC 35.1 31.5 - 36.5 g/dL    RDW 12.3 10.0 - 15.0 %    Platelet Count 233 150 - 450 10e9/L    Diff Method Automated Method     % Neutrophils 48.9 %    % Lymphocytes 34.2 %    % Monocytes 14.5 %    % Eosinophils 0.0 %    % Basophils 1.7 %    % Immature Granulocytes 0.7 %    Nucleated RBCs 0 0 /100    Absolute Neutrophil 2.0 1.6 - 8.3 10e9/L    Absolute Lymphocytes 1.4 0.8 - 5.3 10e9/L    Absolute Monocytes 0.6 0.0 - 1.3 10e9/L    Absolute Eosinophils 0.0 0.0 - 0.7 10e9/L    Absolute Basophils 0.1 0.0 - 0.2 10e9/L    Abs Immature Granulocytes 0.0 0 - 0.4 10e9/L    Absolute Nucleated RBC 0.0    ALT   Result Value Ref Range    ALT 16 0 - 50 U/L   AST   Result Value Ref Range    AST 21 0 - 45 U/L   Ferritin   Result Value Ref Range    Ferritin 45 8 - 252 ng/mL   Vitamin B12   Result Value Ref Range    Vitamin B12 652 193 - 986 pg/mL   TSH with free T4 reflex   Result Value Ref Range    TSH 1.41 0.40 - 4.00 mU/L   Lipid panel reflex to direct LDL Fasting   Result Value Ref Range    Cholesterol 125 <200 mg/dL    Triglycerides 105 <150 mg/dL    HDL Cholesterol 61 >49 mg/dL    LDL Cholesterol Calculated 43 <100  mg/dL    Non HDL Cholesterol 64 <130 mg/dL         Assessment and Plan:      This is a 59-year-old female with      ereditary hemochromatosis  Homozygous for H63D mutation  -Patient receiving phlebotomy  -Labs reviewed ferritin less than 50  Need for phlebotomy today  -Patient anxious to wait for 3 months for another phlebotomy we will schedule phlebotomy and labs in 2 months       ARLENE Gibson CNP  Hem/Onc   [facility]     Chart documentation with Dragon Voice recognition Software. Although reviewed after completion, some words and grammatical errors may remain.          Again, thank you for allowing me to participate in the care of your patient.        Sincerely,        ARLENE Gibson CNP

## 2019-09-17 NOTE — PROGRESS NOTES
"Oncology Rooming Note    September 17, 2019 11:34 AM   Irina Hanks is a 59 year old female who presents for:    Chief Complaint   Patient presents with     Oncology Clinic Visit     Initial Vitals: BP (!) 138/92   Pulse 64   Temp 97.5  F (36.4  C) (Oral)   Resp 20   SpO2 99%  Estimated body mass index is 25.86 kg/m  as calculated from the following:    Height as of 9/11/19: 1.6 m (5' 3\").    Weight as of 9/11/19: 66.2 kg (146 lb). There is no height or weight on file to calculate BSA.  No Pain (0) Comment: Data Unavailable   No LMP recorded. Patient is postmenopausal.  Allergies reviewed: Yes  Medications reviewed: Yes    Medications: Medication refills not needed today.  Pharmacy name entered into EPIC:    [facility] [place] - [address] [facility] #[id number] - [place] - [address] AT [facility] OF [place] & [address]  [facility] #[id number] - [place] - [address] [facility] AT [facility] OF [facility] & [id number] - [place], [place] - [address] [facility] #[id number] - [place], [place] - [address] AT [address] [place] [address] & [facility]  [facility] - [place], [place] - [address]    Clinical concerns: no      Tanesha Ram CMA            "

## 2019-09-20 DIAGNOSIS — F43.22 ADJUSTMENT DISORDER WITH ANXIOUS MOOD: ICD-10-CM

## 2019-09-20 RX ORDER — CLONAZEPAM 0.5 MG/1
TABLET, ORALLY DISINTEGRATING ORAL
Qty: 30 TABLET | Refills: 0 | Status: SHIPPED | OUTPATIENT
Start: 2019-09-20 | End: 2019-10-23

## 2019-09-20 NOTE — TELEPHONE ENCOUNTER
Controlled Substance Refill Request for clonazepam 0.5 mg  Problem List Complete:  No     PROVIDER TO CONSIDER COMPLETION OF PROBLEM LIST AND OVERVIEW/CONTROLLED SUBSTANCE AGREEMENT    Last Written Prescription Date:  8/12/19  Last Fill Quantity: 30,   # refills: 0    THE MOST RECENT OFFICE VISIT MUST BE WITHIN THE PAST 3 MONTHS. AT LEAST ONE FACE TO FACE VISIT MUST OCCUR EVERY 6 MONTHS. ADDITIONAL VISITS CAN BE VIRTUAL.  (THIS STATEMENT SHOULD BE DELETED.)    Last Office Visit with [facility] primary care provider: 9/11/19 Dena    Future Office visit:   Next 5 appointments (look out 90 days)    Nov 19, 2019  1:30 PM CST  Return Visit with  LAB DRAW 1  [facility]  [address]  [place] [address]  [phone]   Nov 19, 2019  2:20 PM CST  Return Visit with Eloisa Rossi MD  [facility]  [address]  [facility] [address]  [phone]          Controlled substance agreement:   Encounter-Level CSA - 07/27/2015:    Controlled Substance Agreement - Scan on 8/5/2015 11:27 AM: Controlled Substance Agreement 7/27/15 (below)       Patient-Level CSA:    There are no patient-level csa.         Last Urine Drug Screen: No results found for: CDAUT, No results found for: COMDAT, No results found for: THC13, PCP13, COC13, MAMP13, OPI13, AMP13, BZO13, TCA13, MTD13, BAR13, OXY13, PPX13, BUP13     Processing:  [facility] [address]     [email or web address]       checked in past 3 months?  No, route to RN     [place] reviewed:  reviewed- no concerns on 9/20/19    [place]:  [email or web address]    Patsy ALMONTE Triage

## 2019-10-23 DIAGNOSIS — F43.22 ADJUSTMENT DISORDER WITH ANXIOUS MOOD: ICD-10-CM

## 2019-10-23 NOTE — TELEPHONE ENCOUNTER
Requested Prescriptions   Pending Prescriptions Disp Refills     clonazePAM (KLONOPIN) 0.5 MG ODT [Pharmacy Med Name: CLONAZEPAM ODT 0.5MG TABLETS]  Last Written Prescription Date:  09/20/2019  Last Fill Quantity: 30 tablet,  # refills: 0   Last Office Visit: 9/11/2019 Irish Fernandes MD   Future Office Visit:    Next 5 appointments (look out 90 days)    Nov 19, 2019  1:30 PM CST  Return Visit with  LAB DRAW 1  [facility]) [facility] [place] [id number] [facility] [address]  [facility] [address]  [phone]   Nov 19, 2019  2:20 PM CST  Return Visit with Eloisa Rossi MD  [facility]  [address]  [facility] [address]  [phone]          30 tablet 0     Sig: DISSOLVE 1 TABLET ON THE TONGUE EVERY NIGHT AS NEEDED FOR ANXIETY       There is no refill protocol information for this order     Routing refill request to provider for review/approval because:  Drug not on the [facility], [facility] or [facility] refill protocol or controlled substance

## 2019-10-23 NOTE — TELEPHONE ENCOUNTER
Routing refill request to provider for review/approval because:  Drug not on the [facility] refill protocol '      Soledad Virgen RN, BSN  [place] Triage

## 2019-10-24 RX ORDER — CLONAZEPAM 0.5 MG/1
TABLET, ORALLY DISINTEGRATING ORAL
Qty: 30 TABLET | Refills: 0 | Status: SHIPPED | OUTPATIENT
Start: 2019-10-24 | End: 2019-12-01

## 2019-10-29 ENCOUNTER — OFFICE VISIT (OUTPATIENT)
Dept: UROLOGY | Facility: CLINIC | Age: 59
End: 2019-10-29
Payer: COMMERCIAL

## 2019-10-29 VITALS
WEIGHT: 150 LBS | SYSTOLIC BLOOD PRESSURE: 128 MMHG | HEART RATE: 60 BPM | OXYGEN SATURATION: 97 % | HEIGHT: 63 IN | BODY MASS INDEX: 26.58 KG/M2 | DIASTOLIC BLOOD PRESSURE: 80 MMHG

## 2019-10-29 DIAGNOSIS — R31.0 GROSS HEMATURIA: ICD-10-CM

## 2019-10-29 DIAGNOSIS — R31.9 HEMATURIA, UNSPECIFIED TYPE: Primary | ICD-10-CM

## 2019-10-29 LAB
ALBUMIN UR-MCNC: NEGATIVE MG/DL
APPEARANCE UR: CLEAR
BILIRUB UR QL STRIP: NEGATIVE
COLOR UR AUTO: YELLOW
GLUCOSE UR STRIP-MCNC: NEGATIVE MG/DL
HGB UR QL STRIP: NEGATIVE
KETONES UR STRIP-MCNC: NEGATIVE MG/DL
LEUKOCYTE ESTERASE UR QL STRIP: NEGATIVE
NITRATE UR QL: NEGATIVE
PH UR STRIP: 7 PH (ref 5–7)
RBC #/AREA URNS AUTO: 0 /HPF (ref 0–2)
SOURCE: NORMAL
SP GR UR STRIP: 1.01 (ref 1–1.03)
UROBILINOGEN UR STRIP-ACNC: 0.2 EU/DL (ref 0.2–1)
WBC #/AREA URNS AUTO: 0 /HPF (ref 0–5)

## 2019-10-29 PROCEDURE — 52000 CYSTOURETHROSCOPY: CPT | Performed by: UROLOGY

## 2019-10-29 PROCEDURE — 88112 CYTOPATH CELL ENHANCE TECH: CPT | Performed by: UROLOGY

## 2019-10-29 PROCEDURE — 81001 URINALYSIS AUTO W/SCOPE: CPT | Performed by: UROLOGY

## 2019-10-29 RX ORDER — LIDOCAINE HYDROCHLORIDE 20 MG/ML
JELLY TOPICAL ONCE
Status: COMPLETED | OUTPATIENT
Start: 2019-10-29 | End: 2019-10-29

## 2019-10-29 RX ADMIN — LIDOCAINE HYDROCHLORIDE: 20 JELLY TOPICAL at 09:15

## 2019-10-29 ASSESSMENT — PAIN SCALES - GENERAL: PAINLEVEL: NO PAIN (0)

## 2019-10-29 ASSESSMENT — MIFFLIN-ST. JEOR: SCORE: 1214.53

## 2019-10-29 NOTE — LETTER
10/29/2019       RE: Irina Hanks  [address]  [place] [address]     Dear Colleague,    Thank you for referring your patient, Irina Hanks, to the [facility] [place] at [facility]. Please see a copy of my visit note below.    CYSTO FEMALE    Discussed with patient the alternatives, risks, and procedure. Questions were answered. Informed consent was obtained for cystoscopy.    October 29, 2019 CYSTOSCOPY:  The patient was brought to the procedures room where she was placed in the supine position.  She was prepped and draped in a sterile fashion. The urethral was stenotic and required dilation to 20 Latvian in order to accommodate the cystoscope.  A #16-Latvian flexible cystoscope was introduced into the urinary bladder.  The anterior urethra, membranous urethra, and bladder neck were negative.   Within the urinary bladder, there was no evidence of stones, tumors, or growths.  The ureteral orifices were well visualized bilaterally and found to have clear efflux of urine.   The patient had grade1 trabeculation.  On retroflex view, no lesions were seen.    CT scan and cytology were negative   Start topical estrogen   Follow up with JESUS Trujillo in three months         Again, thank you for allowing me to participate in the care of your patient.      Sincerely,    Odessa Zaldivar MD

## 2019-10-29 NOTE — PROGRESS NOTES
CYSTO FEMALE    Discussed with patient the alternatives, risks, and procedure. Questions were answered. Informed consent was obtained for cystoscopy.    October 29, 2019 CYSTOSCOPY:  The patient was brought to the procedures room where she was placed in the supine position.  She was prepped and draped in a sterile fashion. The urethral was stenotic and required dilation to 20 Indonesian in order to accommodate the cystoscope.  A #16-Indonesian flexible cystoscope was introduced into the urinary bladder.  The anterior urethra, membranous urethra, and bladder neck were negative.   Within the urinary bladder, there was no evidence of stones, tumors, or growths.  The ureteral orifices were well visualized bilaterally and found to have clear efflux of urine.   The patient had grade1 trabeculation.  On retroflex view, no lesions were seen.    CT scan and cytology were negative   Start topical estrogen   Follow up with JESUS Trujillo in three months

## 2019-10-29 NOTE — NURSING NOTE
Chief Complaint   Patient presents with     Cystoscopy     patient is here for cysto due to hematuria.       Prior to the start of the procedure and with procedural staff participation, I verbally confirmed the patient s identity using two indicators, relevant allergies, that the procedure was appropriate and matched the consent or emergent situation, and that the correct equipment/implants were available. Immediately prior to starting the procedure I conducted the Time Out with the procedural staff and re-confirmed the patient s name, procedure, and site/side. I have wiped the patient off with the povidone-Iodine solution, draped them,  used Lidocaine hydrochloride jelly, and instilled sterile water into the bladder. (The Joint Commission universal protocol was followed.)  Yes    Sedation (Moderate or Deep): None    5mL 2% lidocaine hydrochloride Urojet instilled into urethra.    NDC# [address]-47  Lot #: [id number]  Expiration Date:  06/2021    Norma Malave CMA

## 2019-10-29 NOTE — PATIENT INSTRUCTIONS
"AFTER YOUR CYSTOSCOPY  ?  ?  You have just completed a cystoscopy, or \"cysto\", which allowed your physician to learn more about your bladder (or to remove a stent placed after surgery). We suggest that you continue to avoid caffeine, fruit juice, and alcohol for the next 24 hours, however, you are encouraged to return to your normal activities.  ?  ?  A few things that are considered normal after your cystoscopy:  ?  * small amount of bleeding (or spotting) that clears within the next 24 hours  ?  * slight burning sensation with urination  ?  * sensation of needing to void (urinate) more frequently  ?  * the feeling of \"air\" in your urine  ?  * mild discomfort that is relieved with Tylenol    * bladder spasms  ?  ?  ?  Please contact our office promptly if you:  ?  * develop a fever above 101 degrees  ?  * are unable to urinate  ?  * develop bright red blood that does not stop  ?  * experience severe pain or swelling  ?  ?  ?  And of course, please contact our office with any concerns or questions [phone]  ?    AFTER YOUR CYSTOSCOPY        You have just completed a cystoscopy, or \"cysto\", which allowed your physician to learn more about your bladder (or to remove a stent placed after surgery). We suggest that you continue to avoid caffeine, fruit juice, and alcohol for the next 24 hours, however, you are encouraged to return to your normal activities.         A few things that are considered normal after your cystoscopy:     * Small amount of bleeding (or spotting) that clears within the next 24 hours     * Slight burning sensation with urination     * Sensation to of needing to avoid more frequently     * The feeling of \"air\" in your urine     * Mild discomfort that is relieved with Tylenol        Please contact our office promptly if you:     * Develop a fever above 101 degrees     * Are unable to urinate     * Develop bright red blood that does not stop     * Severe pain or swelling         Please contact " our office with any concerns or questions @[facility].

## 2019-10-30 LAB — COPATH REPORT: NORMAL

## 2019-11-19 ENCOUNTER — TELEPHONE (OUTPATIENT)
Dept: FAMILY MEDICINE | Facility: CLINIC | Age: 59
End: 2019-11-19

## 2019-11-19 ENCOUNTER — OFFICE VISIT (OUTPATIENT)
Dept: FAMILY MEDICINE | Facility: CLINIC | Age: 59
End: 2019-11-19
Payer: COMMERCIAL

## 2019-11-19 VITALS
OXYGEN SATURATION: 98 % | BODY MASS INDEX: 25.86 KG/M2 | SYSTOLIC BLOOD PRESSURE: 102 MMHG | WEIGHT: 146 LBS | DIASTOLIC BLOOD PRESSURE: 80 MMHG | TEMPERATURE: 97.7 F | HEART RATE: 79 BPM

## 2019-11-19 DIAGNOSIS — I95.9 HYPOTENSION, UNSPECIFIED HYPOTENSION TYPE: Primary | ICD-10-CM

## 2019-11-19 PROCEDURE — 99214 OFFICE O/P EST MOD 30 MIN: CPT | Performed by: FAMILY MEDICINE

## 2019-11-19 RX ORDER — IPRATROPIUM BROMIDE 21 UG/1
2 SPRAY, METERED NASAL DAILY
COMMUNITY

## 2019-11-19 NOTE — PROGRESS NOTES
Subjective     Irina Hanks is a 59 year old female who presents to clinic today for the following health issues:    HPI   Concern -   Onset: x couple of weeks    Description:   Has been sick for the last couple of weeks and has taken a zpak.  Symptoms have improved but she still continues to cough.       Then this weekend started to feel dizzy and took her bp and it was very low.  She is noted systolic blood pressure in the 60s and 90s and 100s.   She is currently on Hyzaar and metoprolol.  She took both medications today as usual.    She tells that recently at the [employer] also gave her gabapentin and prednisone which she never took.  She only took the Tessalon Perles as needed and finished a Z-Lalo.  She tells that she is been eating fine.  Hydrating well.  No nausea vomiting or diarrhea reported.  No other sick contacts.    Reports that she is feeling a little better today.  She is not dizzy.      Patient Active Problem List   Diagnosis     Hypothyroidism     Absence of menstruation     IBS (irritable bowel syndrome)     Hyperlipidemia LDL goal <130     Hypertension goal BP (blood pressure) < 130/80     Impaired renal function     Overweight (BMI 25.0-29.9)     Anxiety     Postmenopausal symptoms     Inflammatory arthritis     Osteoarthritis of first carpometacarpal joint     Microscopic colitis     Seasonal allergic rhinitis     Vitamin D deficiency     RA (rheumatoid arthritis) (H)     ASCUS of cervix with negative high risk HPV     Shortness of breath     Benign essential hypertension     Acquired hypothyroidism     Symptomatic menopausal or female climacteric states     Tricuspid regurgitation     S/P myringotomy with insertion of tube     Vasomotor rhinitis     Controlled substance agreement signed     Abdominal pain, right lower quadrant     Psychophysiological insomnia     Adjustment disorder with anxious mood     Hyponatremia     Iron overload     Iron overload syndrome     Hereditary  hemochromatosis (H)     Lichen planus     Past Surgical History:   Procedure Laterality Date     BIOPSY BREAST       C/SECTION, LOW TRANSVERSE      twins     COLONOSCOPY      nl, next one due in 5 years     LEEP TX, CERVICAL  1990s    normal since that time     MYRINGOTOMY, INSERT TUBE, COMBINED Left 2015    chronic NORM, ETD       Social History     Tobacco Use     Smoking status: Former Smoker     Packs/day: 0.26     Years: 10.00     Pack years: 2.60     Types: Cigarettes     Last attempt to quit: 1989     Years since quittin.5     Smokeless tobacco: Never Used   Substance Use Topics     Alcohol use: Yes     Alcohol/week: 0.0 standard drinks     Comment: 2 drinks per day     Family History   Problem Relation Age of Onset     Cancer - colorectal Father         age 50     Arthritis Mother      Cancer - colorectal Brother         age 50     Hypertension Sister      Hypertension Brother      Thyroid Disease Sister      Thyroid Disease Brother      Arthritis Sister      Cancer Sister 53        Uterine     Cancer Sister 50        Uterine         Current Outpatient Medications   Medication Sig Dispense Refill     Cholecalciferol (VITAMIN D PO) Take 5,000 Units by mouth       clonazePAM (KLONOPIN) 0.5 MG ODT DISSOLVE 1 TABLET ON THE TONGUE EVERY NIGHT AS NEEDED FOR ANXIETY 30 tablet 0     COMPOUNDED NON-CONTROLLED SUBSTANCE (CMPD RX) - PHARMACY TO MIX COMPOUNDED MEDICATION Estriol 0.1% cream. Apply pea-sized amount to urethral meatus and vaginal opening Monday, Wednesday, Friday at bedtime. 30 g 4     hydroxychloroquine (PLAQUENIL) 200 MG tablet Take 2 tablets (400 mg) by mouth daily 90 tablet 3     ipratropium (ATROVENT) 0.03 % nasal spray Spray 2 sprays into both nostrils every 12 hours       levothyroxine (SYNTHROID/LEVOTHROID) 75 MCG tablet TAKE 1 TABLET BY MOUTH EVERY MORNING 30 tablet 11     liothyronine (CYTOMEL) 5 MCG tablet TAKE 2 TABLETS BY MOUTH EVERY  tablet 1     loratadine (CLARITIN) 10  MG tablet Take 1 tablet (10 mg) by mouth daily 90 tablet 3     losartan-hydrochlorothiazide (HYZAAR) 100-12.5 MG tablet TAKE 1 TABLET BY MOUTH DAILY 90 tablet 0     magic mouthwash suspension (diphenhydrAMINE, lidocaine, aluminum-magnesium & simethicone) Swish and swallow 10 mLs in mouth every 6 hours as needed for mouth sores 120 mL 1     metoprolol succinate ER (TOPROL-XL) 50 MG 24 hr tablet Take 1 tablet (50 mg) by mouth daily 7 tablet 3     psyllium (METAMUCIL) 58.6 % POWD Take by mouth daily       TURMERIC PO        Allergies   Allergen Reactions     Ace Inhibitors Cough     lisinopril     Sulfa Drugs          Reviewed and updated as needed this visit by Provider         Review of Systems   ROS COMP: CONSTITUTIONAL: NEGATIVE for fever, chills, change in weight  CV: NEGATIVE for chest pain, palpitations or peripheral edema      Objective    /80   Pulse 79   Temp 97.7  F (36.5  C) (Tympanic)   Wt 66.2 kg (146 lb)   SpO2 98%   BMI 25.86 kg/m    Body mass index is 25.86 kg/m .  Physical Exam   GENERAL: healthy, alert and no distress  HENT: ear canals and TM's normal, nose and mouth without ulcers or lesions  NECK: no adenopathy, no asymmetry, masses, or scars and thyroid normal to palpation  RESP: lungs clear to auscultation - no rales, rhonchi or wheezes  CV: regular rate and rhythm, normal S1 S2, no S3 or S4, no murmur, click or rub, no peripheral edema and peripheral pulses strong  ABDOMEN: soft, nontender, no hepatosplenomegaly, no masses and bowel sounds normal            Assessment & Plan     1. Hypotension, unspecified hypotension type  Blood pressure has been low for the last few days.  Patient has been taking her blood pressure lowering medications regularly.  She is feeling a bit better today.  She is not dizzy.  Recommending to increase hydration.  Eat foods with salt in it,  Which will elevate blood pressure.   Continue to monitor blood pressure regularly.  If blood pressure continues to drop  "later today, instructed to go to the ER.  Instructed to check blood pressure before taking medications tomorrow.  Blood pressure is noted to be low, slow blood pressure medication dose.  She is instructed to call us if she has any questions.    Records from [facility] requested.  I am not sure why gabapentin was ordered for the patient.  Patient never took that or the prednisone.         BMI:   Estimated body mass index is 25.86 kg/m  as calculated from the following:    Height as of 10/29/19: 1.6 m (5' 3\").    Weight as of this encounter: 66.2 kg (146 lb).       Rommel Becerra MD  [facility]      "

## 2019-11-19 NOTE — TELEPHONE ENCOUNTER
Called patient to triage for today's OV note stating - blood pressure very low and dizziness.     States she has been sick with a sinus infection x 1.5 weeks. Took a zpak which finished on Saturday. Since Saturday she reports she has had some increased positional dizziness. States she has a home BP cuff and has gotten the following results - 61/68, 87/68, 94/60, 102/78, 103/76, 95/73, 100/69.     I advised her that minimally the first reading is an error and question the reliability of her machine. Some of the later readings are in line with what her BP's have been in clinic. Regardless, I advised her to bring the cuff with her to todays OV to compare with the manual reading.     She notes that her dizziness is not severe and she has not had tunnel vision or felt faint. She states it comes and goes but is present when changing positions. I advised her to change positions slowly and to make sure she has something to hold onto when she is standing. Also advised her to push fluids as she currently has a URI which could be making her more dehydrated. Patient will come in for today's scheduled OV.    Keyla Hardin RN   [facility] - Triage

## 2019-11-20 ENCOUNTER — TELEPHONE (OUTPATIENT)
Dept: FAMILY MEDICINE | Facility: CLINIC | Age: 59
End: 2019-11-20

## 2019-11-20 NOTE — TELEPHONE ENCOUNTER
I do not think she has vertigo I think she has dizziness/lightheadedness because of low blood pressure.  Meclizine can make cause drowsiness and fatigue.  I would recommend not using meclizine.  I reviewed the med list, and I would recommend not using the Klonopin either.    Hold off on using blood pressure medication until the blood pressure improve.  If any worsening noted, please go to the ER.  Rommel Becerra MD,MD  [facility], [facility]

## 2019-11-20 NOTE — TELEPHONE ENCOUNTER
Patient given message from Dr. Becerra. Patient verbalizes understanding and agrees with plan. Patient states that she has not been taking clonazepam and she will continue to not take. Carmen Lopez RN

## 2019-11-20 NOTE — TELEPHONE ENCOUNTER
LOV 11/19/19  Patient did not take any high blood pressure medication today.   Did take Aleve.   Just took BP: 99/75, 2 hours ago: 104/77.Patient reports still so light headed and dizzy.   Has history of vertigo. Reminds her of vertigo in past.   Used meclizine in the past and it has helped.   Requesting rx for meclizine.   Pharmacy pended.  Please advise. Triage to call the patient back.  Carmen Lopez RN

## 2019-11-27 ENCOUNTER — INFUSION THERAPY VISIT (OUTPATIENT)
Dept: INFUSION THERAPY | Facility: CLINIC | Age: 59
End: 2019-11-27
Attending: INTERNAL MEDICINE
Payer: COMMERCIAL

## 2019-11-27 ENCOUNTER — HOSPITAL ENCOUNTER (OUTPATIENT)
Facility: CLINIC | Age: 59
Setting detail: SPECIMEN
End: 2019-11-27
Attending: INTERNAL MEDICINE
Payer: COMMERCIAL

## 2019-11-27 ENCOUNTER — ONCOLOGY VISIT (OUTPATIENT)
Dept: ONCOLOGY | Facility: CLINIC | Age: 59
End: 2019-11-27
Attending: INTERNAL MEDICINE
Payer: COMMERCIAL

## 2019-11-27 ENCOUNTER — HOSPITAL ENCOUNTER (OUTPATIENT)
Dept: MAMMOGRAPHY | Facility: CLINIC | Age: 59
Discharge: HOME | End: 2019-11-27
Attending: INTERNAL MEDICINE | Admitting: INTERNAL MEDICINE
Payer: COMMERCIAL

## 2019-11-27 VITALS
WEIGHT: 150 LBS | HEART RATE: 78 BPM | TEMPERATURE: 98.2 F | HEIGHT: 63 IN | OXYGEN SATURATION: 96 % | DIASTOLIC BLOOD PRESSURE: 96 MMHG | RESPIRATION RATE: 16 BRPM | SYSTOLIC BLOOD PRESSURE: 145 MMHG | BODY MASS INDEX: 26.58 KG/M2

## 2019-11-27 VITALS
DIASTOLIC BLOOD PRESSURE: 99 MMHG | OXYGEN SATURATION: 99 % | HEART RATE: 71 BPM | RESPIRATION RATE: 18 BRPM | SYSTOLIC BLOOD PRESSURE: 147 MMHG

## 2019-11-27 DIAGNOSIS — E83.110 HEREDITARY HEMOCHROMATOSIS (H): ICD-10-CM

## 2019-11-27 DIAGNOSIS — Z12.31 VISIT FOR SCREENING MAMMOGRAM: ICD-10-CM

## 2019-11-27 DIAGNOSIS — E83.110 HEREDITARY HEMOCHROMATOSIS (H): Primary | ICD-10-CM

## 2019-11-27 DIAGNOSIS — E83.19 IRON OVERLOAD: Primary | ICD-10-CM

## 2019-11-27 LAB
ALT SERPL W P-5'-P-CCNC: 18 U/L (ref 0–50)
AST SERPL W P-5'-P-CCNC: 22 U/L (ref 0–45)
ERYTHROCYTE [DISTWIDTH] IN BLOOD BY AUTOMATED COUNT: 13 % (ref 10–15)
FERRITIN SERPL-MCNC: 97 NG/ML (ref 8–252)
HCT VFR BLD AUTO: 35.3 % (ref 35–47)
HGB BLD-MCNC: 12.3 G/DL (ref 11.7–15.7)
MCH RBC QN AUTO: 31.6 PG (ref 26.5–33)
MCHC RBC AUTO-ENTMCNC: 34.8 G/DL (ref 31.5–36.5)
MCV RBC AUTO: 91 FL (ref 78–100)
PLATELET # BLD AUTO: 299 10E9/L (ref 150–450)
RBC # BLD AUTO: 3.89 10E12/L (ref 3.8–5.2)
WBC # BLD AUTO: 3.8 10E9/L (ref 4–11)

## 2019-11-27 PROCEDURE — 77063 BREAST TOMOSYNTHESIS BI: CPT

## 2019-11-27 PROCEDURE — 99214 OFFICE O/P EST MOD 30 MIN: CPT | Performed by: INTERNAL MEDICINE

## 2019-11-27 PROCEDURE — 84450 TRANSFERASE (AST) (SGOT): CPT | Performed by: INTERNAL MEDICINE

## 2019-11-27 PROCEDURE — 82728 ASSAY OF FERRITIN: CPT | Performed by: INTERNAL MEDICINE

## 2019-11-27 PROCEDURE — G0463 HOSPITAL OUTPT CLINIC VISIT: HCPCS

## 2019-11-27 PROCEDURE — 85027 COMPLETE CBC AUTOMATED: CPT | Performed by: INTERNAL MEDICINE

## 2019-11-27 PROCEDURE — 84460 ALANINE AMINO (ALT) (SGPT): CPT | Performed by: INTERNAL MEDICINE

## 2019-11-27 PROCEDURE — 99195 PHLEBOTOMY: CPT

## 2019-11-27 ASSESSMENT — MIFFLIN-ST. JEOR: SCORE: 1214.53

## 2019-11-27 ASSESSMENT — PAIN SCALES - GENERAL: PAINLEVEL: NO PAIN (0)

## 2019-11-27 NOTE — PROGRESS NOTES
Infusion Nursing Note:  Irina Hanks presents today for phlebotomy.    Patient seen by provider today: Yes: Dr. Rossi    Note: Patient's parameters changed in therapy plan to preform phlebotomies for ferritin levels  >50. Patient's Ferritin at 97 so met parameters.  22 stephan iv inserted prior to phlebotomy appointment for labs and kept in place for phlebotomy. After attempting to release patient's blood for over 30 minutes and only getting 300 ml of whole blood, it was decided by this RN and patient to stop the phlebotomy for today. Patient drank about 250 cc of fluid during procedure.      Patient's BP slightly elevated today prior to and post phlebotomy. Per patient report, she has been checking her blood pressures at home and has been weaning off on her blood pressure meds.  This RN suggested patient monitor her BP more closely at home and call her primary MD for help with better managing her medications.  Patient verbalized understanding of the plan.    Intravenous Access:  Peripheral IV placed.      Treatment Conditions:  Lab Results   Component Value Date    HGB 12.3 11/27/2019     Lab Results   Component Value Date    WBC 3.8 11/27/2019      Lab Results   Component Value Date    ANEU 2.0 09/17/2019     Lab Results   Component Value Date     11/27/2019          Post Infusion Assessment:  Patient tolerated infusion without incident.  Site patent and intact, free from redness, edema or discomfort.  No evidence of extravasations.  Access discontinued per protocol.    Discharge Plan:   Patient declined prescription refills.  Discharge instructions reviewed with: Patient.  Patient and/or family verbalized understanding of discharge instructions and all questions answered.  AVS to patient via [email or web address].  Patient will return 3/10/20 for next appointment.   Patient discharged in stable condition accompanied by: self.  Departure Mode: Ambulatory.    Esha Schwartz, RN, RN

## 2019-11-27 NOTE — PATIENT INSTRUCTIONS
1. Labs every 3 months and phlebotomy as needed.  -Phlebotomy today.  2. See me in 12 months with labs.  3. Recheck BP.        Patient in [facility]

## 2019-11-27 NOTE — PROGRESS NOTES
Infusion Nursing Note:  Irinalulu Huber Clair presents today for peripheral labs.    Patient seen by provider today: Yes: Flo   present during visit today: Not Applicable.    Note: Pt request IV be placed for labs and possible phlebotomy.    Intravenous Access:  Labs drawn without difficulty.  Peripheral IV placed.    Luci Bruno RN

## 2019-11-28 NOTE — PROGRESS NOTES
Visit Date:   11/27/2019     HEMATOLOGY HISTORY: Ms. Zhao is a female with hereditary hemochromatosis. Homozygous for H63D mutation.   1.  On 11/13/2015, normal LFT.   2.  On 06/15/2016, ferritin of 506.   3.  On 06/12/2018.   -CBC is normal.   -Elevated AST and ALT.  Normal bilirubin and alkaline phosphatase.  ALT of 240 and AST of 136.   4.  On 06/15/2018     -Iron of 138, ferritin of 1140 and saturation of 66%.   -Hepatitis C antibody negative.   -Hepatitis B negative.   -Positive for hepatitis A IgG antibody.  Negative for hepatitis A IgM antibody.     5.  Ultrasound of abdomen and pelvis on 06/18/2018 is normal.   6. On 06/21/2018.   -CBC is normal.   -Iron 78, saturation of 37 and ferritin of 1028.   -HFE gene analysis reveals patient to be homozygous for H63D mutation.   7. CT abdomen and pelvis on 06/25/2018 is normal.  Liver looks normal.   8. Because of elevated LFT, phlebotomy started on 06/21/2018.      SUBJECTIVE:  Ms. Zhao is a 59-year-old female with hereditary hemochromatosis.  She periodically gets phlebotomy.      The patient also has lichen planus.  She is doing well from it.      Overall, she is doing good.  She recently had viral infection which has resolved.      No headache.  No dizziness.  No chest pain.  No shortness of breath.  No abdominal pain, nausea, or vomiting.  Appetite overall has been good.  No urinary or bowel complaints.  No bleeding.  No fever, chills or night sweats.      PHYSICAL EXAMINATION:   GENERAL:  Alert and oriented x 3.   VITAL SIGNS:  Reviewed.  ECOG PS of 0.     EYES:  No icterus.   THROAT:  No ulcer. No thrush.   NECK:  Supple. No lymphadenopathy. No thyromegaly.   AXILLAE:  No lymphadenopathy.   LUNGS:  Good air entry bilaterally.  No crackles or wheezing.   HEART:  Regular.  No murmur.   GI:  Soft.  Nontender. No mass.   EXTREMITIES:  No pedal edema.  No calf swelling or tenderness.   SKIN:  No rash.      LABORATORY DATA:  Reviewed.      ASSESSMENT:   1.   A 59-year-old female with hereditary hemochromatosis.   2.  Mild intermittent leukopenia.   3.  Lichen planus.   4.  Recent URI which has resolved.      PLAN:   1.  Labs were all reviewed.  I explained to her ferritin is close to 100.  Our aim is to keep it under 50.  She will get phlebotomy of 1 unit of blood today.  For hemochromatosis, she will have labs checked every 3 months.  Phlebotomy will be done if ferritin is above 50.  She is agreeable for this plan.  I told the patient that she will do very well from hemochromatosis as she is on phlebotomy and maintaining her ferritin around 50.  She should not get any complication.   2.  She has mild intermittent leukopenia.  That will be monitored with CBC every 3 months.   3.  I will see her in a year.  Advised her to see a physician sooner if she has any lump, unexplained pain, weight loss, worsening weakness, shortness of breath, bleeding, or any other concerns.      TOTAL FACE TO FACE TIME SPENT:  25 minutes, more than 50% of the time spent in counseling and coordination of care.         RACHEL CHAPMAN MD             D: 2019   T: 2019   MT: JUAN      Name:     RO GARCÍA   MRN:      [id number]        Account:      [id number]   :      1960           Visit Date:   2019      Document: [id number]

## 2019-12-01 DIAGNOSIS — F43.22 ADJUSTMENT DISORDER WITH ANXIOUS MOOD: ICD-10-CM

## 2019-12-02 RX ORDER — CLONAZEPAM 0.5 MG/1
TABLET, ORALLY DISINTEGRATING ORAL
Qty: 30 TABLET | Refills: 0 | Status: SHIPPED | OUTPATIENT
Start: 2019-12-02 | End: 2020-01-20

## 2019-12-02 NOTE — TELEPHONE ENCOUNTER
clonazepam      Last Written Prescription Date:  10/24/19  Last Fill Quantity: 30,   # refills: 0  Last Office Visit: 11/19/19  Future Office visit:       Routing refill request to provider for review/approval because:  Drug not on the [facility], [facility] or [facility] refill protocol or controlled substance    RX [place]) reviewed:  not reviewed/not due - last done on 9/20/19    [place]  [email or web address]    Keyla Hardin RN   [facility] - Triage

## 2019-12-04 DIAGNOSIS — I10 ESSENTIAL HYPERTENSION, BENIGN: ICD-10-CM

## 2019-12-05 RX ORDER — LOSARTAN POTASSIUM AND HYDROCHLOROTHIAZIDE 12.5; 1 MG/1; MG/1
1 TABLET ORAL DAILY
Qty: 90 TABLET | Refills: 0 | Status: SHIPPED | OUTPATIENT
Start: 2019-12-05 | End: 2020-03-02

## 2019-12-05 NOTE — TELEPHONE ENCOUNTER
"Requested Prescriptions   Pending Prescriptions Disp Refills     losartan-hydrochlorothiazide (HYZAAR) 100-12.5 MG tablet [Pharmacy Med Name:  Last Written Prescription Date:  9-4-2019  Last Fill Quantity: 90 tablet,  # refills: 0   Last office visit: 11/19/2019 with prescribing provider:     Future Office Visit:     LOSARTAN/HCTZ 100/12.5MG TABLETS] 90 tablet 0     Sig: TAKE 1 TABLET BY MOUTH DAILY       Angiotensin-II Receptors Failed - 12/4/2019 10:56 PM        Failed - Last blood pressure under 140/90 in past 12 months     BP Readings from Last 3 Encounters:   11/27/19 (!) 147/99   11/27/19 (!) 145/96   11/19/19 102/80                 Failed - Normal serum creatinine on file in past 12 months     Recent Labs   Lab Test 06/12/18  1509   CR 0.76             Failed - Normal serum potassium on file in past 12 months     Recent Labs   Lab Test 06/12/18  1509   POTASSIUM 3.6                    Passed - Recent (12 mo) or future (30 days) visit within the authorizing provider's specialty     Patient has had an office visit with the authorizing provider or a provider within the authorizing providers department within the previous 12 mos or has a future within next 30 days. See \"Patient Info\" tab in inbasket, or \"Choose Columns\" in Meds & Orders section of the refill encounter.              Passed - Medication is active on med list        Passed - Patient is age 18 or older        Passed - No active pregnancy on record        Passed - No positive pregnancy test in past 12 months          "

## 2019-12-05 NOTE — TELEPHONE ENCOUNTER
Routing refill request to provider for review/approval because:  Labs out of range:  BP  Labs not current:  CR, K+    Sola Anthony RN, BSN  [facility] [place]

## 2020-01-05 DIAGNOSIS — E03.9 ACQUIRED HYPOTHYROIDISM: ICD-10-CM

## 2020-01-06 DIAGNOSIS — E03.9 ACQUIRED HYPOTHYROIDISM: ICD-10-CM

## 2020-01-06 RX ORDER — LEVOTHYROXINE SODIUM 75 UG/1
TABLET ORAL
Qty: 30 TABLET | Refills: 3 | Status: SHIPPED | OUTPATIENT
Start: 2020-01-06 | End: 2020-01-06

## 2020-01-06 RX ORDER — LEVOTHYROXINE SODIUM 75 UG/1
TABLET ORAL
Qty: 90 TABLET | Refills: 0 | Status: SHIPPED | OUTPATIENT
Start: 2020-01-06 | End: 2020-04-01

## 2020-01-06 RX ORDER — LIOTHYRONINE SODIUM 5 UG/1
TABLET ORAL
Qty: 180 TABLET | Refills: 0 | Status: SHIPPED | OUTPATIENT
Start: 2020-01-06 | End: 2020-04-01

## 2020-01-06 NOTE — TELEPHONE ENCOUNTER
"Requested Prescriptions   Pending Prescriptions Disp Refills     levothyroxine (SYNTHROID/LEVOTHROID) 75 MCG tablet [Pharmacy Med Name: LEVOTHYROXINE 0.075MG (75MCG) TABS] 90 tablet      Sig: TAKE 1 TABLET BY MOUTH EVERY MORNING   Last Written Prescription Date:  1/6/2020  Last Fill Quantity: 30 Tablet,  # refills: 3   Last office visit: 11/19/2019 with prescribing provider:  ÁNGEL Becerra   Future Office Visit:   Next 5 appointments (look out 90 days)    Mar 10, 2020  1:10 PM CDT  Return Visit with SH LAB DRAW 1  [facility] ([facility]) [facility]  [address]  [facility] [address]  [phone]             Thyroid Protocol Passed - 1/6/2020  1:15 PM        Passed - Patient is 12 years or older        Passed - Recent (12 mo) or future (30 days) visit within the authorizing provider's specialty     Patient has had an office visit with the authorizing provider or a provider within the authorizing providers department within the previous 12 mos or has a future within next 30 days. See \"Patient Info\" tab in inbasket, or \"Choose Columns\" in Meds & Orders section of the refill encounter.              Passed - Medication is active on med list        Passed - Normal TSH on file in past 12 months     Recent Labs   Lab Test 09/17/19  0835   TSH 1.41              Passed - No active pregnancy on record     If patient is pregnant or has had a positive pregnancy test, please check TSH.          Passed - No positive pregnancy test in past 12 months     If patient is pregnant or has had a positive pregnancy test, please check TSH.            "

## 2020-01-06 NOTE — TELEPHONE ENCOUNTER
"Requested Prescriptions   Pending Prescriptions Disp Refills     levothyroxine (SYNTHROID/LEVOTHROID) 75 MCG tablet [Pharmacy Med Name:  Last Written Prescription Date:  12/28/18  Last Fill Quantity: 30,  # refills: 11   Last Office Visit: 11/19/2019   Future Office Visit:    Next 5 appointments (look out 90 days)    Mar 10, 2020  1:10 PM CDT  Return Visit with  LAB DRAW 1  [facility] [place] ([facility]) [facility] [place]  [address]  [place] [address]  [phone]          LEVOTHYROXINE 0.075MG (75MCG) TABS] 30 tablet 11     Sig: TAKE 1 TABLET BY MOUTH EVERY MORNING       Thyroid Protocol Passed - 1/5/2020  6:12 PM        Passed - Patient is 12 years or older        Passed - Recent (12 mo) or future (30 days) visit within the authorizing provider's specialty     Patient has had an office visit with the authorizing provider or a provider within the authorizing providers department within the previous 12 mos or has a future within next 30 days. See \"Patient Info\" tab in inbasket, or \"Choose Columns\" in Meds & Orders section of the refill encounter.              Passed - Medication is active on med list        Passed - Normal TSH on file in past 12 months     Recent Labs   Lab Test 09/17/19  0835   TSH 1.41            Passed - No active pregnancy on record     If patient is pregnant or has had a positive pregnancy test, please check TSH.        Passed - No positive pregnancy test in past 12 months     If patient is pregnant or has had a positive pregnancy test, please check TSH.        liothyronine (CYTOMEL) 5 MCG tablet [Pharmacy Med Name: LIOTHYRONINE  Last Written Prescription Date:  7/3/19  Last Fill Quantity: 180,  # refills: 1   Last Office Visit: 11/19/2019   Future Office Visit:    Next 5 appointments (look out 90 days)    Mar 10, 2020  1:10 PM CDT  Return Visit with  LAB DRAW 1  [facility] ([facility]" "[facility]) [facility] [place]  [address]  [place] [address] [phone]          5MCG TABLETS] 180 tablet 1     Sig: TAKE 2 TABLETS BY MOUTH EVERY DAY       Thyroid Protocol Passed - 1/5/2020  6:12 PM        Passed - Patient is 12 years or older        Passed - Recent (12 mo) or future (30 days) visit within the authorizing provider's specialty     Patient has had an office visit with the authorizing provider or a provider within the authorizing providers department within the previous 12 mos or has a future within next 30 days. See \"Patient Info\" tab in inbasket, or \"Choose Columns\" in Meds & Orders section of the refill encounter.            Passed - Medication is active on med list        Passed - Normal TSH on file in past 12 months     Recent Labs   Lab Test 09/17/19  0835   TSH 1.41            Passed - No active pregnancy on record     If patient is pregnant or has had a positive pregnancy test, please check TSH.        Passed - No positive pregnancy test in past 12 months     If patient is pregnant or has had a positive pregnancy test, please check TSH.          "

## 2020-01-06 NOTE — TELEPHONE ENCOUNTER
Prescription approved per [facility] Refill Protocol.      Patsy VIERA RN  EP Triage

## 2020-01-16 DIAGNOSIS — I10 HYPERTENSION GOAL BP (BLOOD PRESSURE) < 130/80: ICD-10-CM

## 2020-01-16 NOTE — TELEPHONE ENCOUNTER
"Last Written Prescription Date:  2/21/19  Last Fill Quantity: 7 tablets,  # refills: 3   Last office visit: 11/19/2019 with prescribing provider:  Hernan   Future Office Visit:   Next 5 appointments (look out 90 days)    Mar 10, 2020  1:10 PM CDT  Return Visit with  LAB DRAW 1  [facility] ([facility]) [facility] [place]  [address]  [place] [id number] [phone]           Requested Prescriptions   Pending Prescriptions Disp Refills     metoprolol succinate ER (TOPROL-XL) 50 MG 24 hr tablet [Pharmacy Med Name: METOPROLOL ER SUCCINATE 50MG TABS] 180 tablet      Sig: TAKE 2 TABLETS BY MOUTH EVERY DAY       Beta-Blockers Protocol Failed - 1/16/2020  1:30 PM        Failed - Blood pressure under 140/90 in past 12 months     BP Readings from Last 3 Encounters:   11/27/19 (!) 147/99   11/27/19 (!) 145/96   11/19/19 102/80                 Passed - Patient is age 6 or older        Passed - Recent (12 mo) or future (30 days) visit within the authorizing provider's specialty     Patient has had an office visit with the authorizing provider or a provider within the authorizing providers department within the previous 12 mos or has a future within next 30 days. See \"Patient Info\" tab in inbasket, or \"Choose Columns\" in Meds & Orders section of the refill encounter.              Passed - Medication is active on med list          "

## 2020-01-17 RX ORDER — METOPROLOL SUCCINATE 50 MG/1
TABLET, EXTENDED RELEASE ORAL
Qty: 180 TABLET | Refills: 1 | Status: SHIPPED | OUTPATIENT
Start: 2020-01-17 | End: 2020-07-22

## 2020-01-19 DIAGNOSIS — F43.22 ADJUSTMENT DISORDER WITH ANXIOUS MOOD: ICD-10-CM

## 2020-01-20 RX ORDER — CLONAZEPAM 0.5 MG/1
TABLET, ORALLY DISINTEGRATING ORAL
Qty: 30 TABLET | Refills: 0 | Status: SHIPPED | OUTPATIENT
Start: 2020-01-20 | End: 2020-02-26

## 2020-01-20 NOTE — TELEPHONE ENCOUNTER
[place] reviewed:  not reviewed/not due - last done on 9/20/19    [place] profile:  [email or web address]    Sola Anthony RN, BSN  [facility]

## 2020-01-20 NOTE — TELEPHONE ENCOUNTER
Requested Prescriptions   Pending Prescriptions Disp Refills     clonazePAM (KLONOPIN) 0.5 MG ODT [Pharmacy Med Name: CLONAZEPAM ODT 0.5MG TABLETS] 30 tablet      Sig: DISSOLVE 1 TABLET ON THE TONGUE EVERY NIGHT AS NEEDED FOR ANXIETY       There is no refill protocol information for this order        clonazePAM (KLONOPIN) 0.5 MG ODT 30 tablet 0 12/2/2019       Last Written Prescription Date:  12/2/2019  Last Fill Quantity: 30,  # refills: 0   Last office visit: 11/19/2019 with prescribing provider:  Dr. Becerra   Future Office Visit: Unknown   Next 5 appointments (look out 90 days)    Mar 10, 2020  1:10 PM CDT  Return Visit with  LAB DRAW 1  [facility] ([facility]) [facility] [place]  [address]  [place] [address]  [phone]

## 2020-01-23 ENCOUNTER — INFUSION THERAPY VISIT (OUTPATIENT)
Dept: INFUSION THERAPY | Facility: CLINIC | Age: 60
End: 2020-01-23
Attending: INTERNAL MEDICINE
Payer: COMMERCIAL

## 2020-01-23 ENCOUNTER — NURSE TRIAGE (OUTPATIENT)
Dept: NURSING | Facility: CLINIC | Age: 60
End: 2020-01-23

## 2020-01-23 ENCOUNTER — HOSPITAL ENCOUNTER (OUTPATIENT)
Facility: CLINIC | Age: 60
Setting detail: SPECIMEN
Discharge: HOME | End: 2020-01-23
Attending: INTERNAL MEDICINE | Admitting: INTERNAL MEDICINE
Payer: COMMERCIAL

## 2020-01-23 VITALS
HEART RATE: 73 BPM | OXYGEN SATURATION: 98 % | RESPIRATION RATE: 16 BRPM | TEMPERATURE: 97.4 F | SYSTOLIC BLOOD PRESSURE: 112 MMHG | DIASTOLIC BLOOD PRESSURE: 79 MMHG

## 2020-01-23 DIAGNOSIS — E83.19 IRON OVERLOAD: Primary | ICD-10-CM

## 2020-01-23 DIAGNOSIS — E83.110 HEREDITARY HEMOCHROMATOSIS (H): ICD-10-CM

## 2020-01-23 LAB
ALT SERPL W P-5'-P-CCNC: 23 U/L (ref 0–50)
AST SERPL W P-5'-P-CCNC: 18 U/L (ref 0–45)
ERYTHROCYTE [DISTWIDTH] IN BLOOD BY AUTOMATED COUNT: 12.1 % (ref 10–15)
FERRITIN SERPL-MCNC: 107 NG/ML (ref 8–252)
HCT VFR BLD AUTO: 37.3 % (ref 35–47)
HGB BLD-MCNC: 13.2 G/DL (ref 11.7–15.7)
MCH RBC QN AUTO: 32.1 PG (ref 26.5–33)
MCHC RBC AUTO-ENTMCNC: 35.4 G/DL (ref 31.5–36.5)
MCV RBC AUTO: 91 FL (ref 78–100)
PLATELET # BLD AUTO: 264 10E9/L (ref 150–450)
RBC # BLD AUTO: 4.11 10E12/L (ref 3.8–5.2)
WBC # BLD AUTO: 6.3 10E9/L (ref 4–11)

## 2020-01-23 PROCEDURE — 85027 COMPLETE CBC AUTOMATED: CPT | Performed by: INTERNAL MEDICINE

## 2020-01-23 PROCEDURE — 36415 COLL VENOUS BLD VENIPUNCTURE: CPT

## 2020-01-23 PROCEDURE — 99195 PHLEBOTOMY: CPT

## 2020-01-23 PROCEDURE — 84460 ALANINE AMINO (ALT) (SGPT): CPT | Performed by: INTERNAL MEDICINE

## 2020-01-23 PROCEDURE — 82728 ASSAY OF FERRITIN: CPT | Performed by: INTERNAL MEDICINE

## 2020-01-23 PROCEDURE — 84450 TRANSFERASE (AST) (SGOT): CPT | Performed by: INTERNAL MEDICINE

## 2020-01-23 ASSESSMENT — PAIN SCALES - GENERAL: PAINLEVEL: NO PAIN (0)

## 2020-01-23 NOTE — PROGRESS NOTES
Medical Assistant Note:  Irina Hanks presents today for BLOOD DRAW.    Patient seen by provider today: No.   present during visit today: Not Applicable.    Concerns: No Concerns.    Procedure:  Lab draw site: rac, Needle type: bf, Gauge: 23.    Post Assessment:  Labs drawn without difficulty: Yes.    Discharge Plan:  Departure Mode: Ambulatory.    Face to Face Time: 5 min  [facility] 3[facility]..    Viki Arroyo, CMA

## 2020-01-24 NOTE — TELEPHONE ENCOUNTER
Patient reports blood draw today.  She came home and arm was still bleeding.  She did apply a different bandage on it.  FNA had patient take off the bandage and it is no longer bleeding.  Patient reports some bruising around the puncture ranjeet.  Reviewed care advice with caller per RN triage protocol.  FNA advised to call back with any concerns.   Caller verbalized understanding.        Additional Information    Negative: Shock suspected (e.g., cold/pale/clammy skin, too weak to stand, low BP, rapid pulse)    Negative: Sounds like a life-threatening emergency to the triager    Negative: Bruises with fever    Negative: Tiny bruises (spots or dots) of unknown cause    Negative: Bruise(s) of forehead or head    Negative: Bruise(s) of face or jaw    Negative: Followed an injury, and triager doesn't know which injury guideline to use first    Negative: Post-operative bruising    Negative: [1] Major bleeding (e.g., actively dripping or spurting) AND [2] can't be stopped    Negative: Amputation    Negative: Shock suspected (e.g., cold/pale/clammy skin, too weak to stand, low BP, rapid pulse)    Negative: Sounds like a life-threatening emergency to the triager    Negative: [1] Animal bite AND [2] broken skin    Negative: [1] Human bite AND [2] broken skin    Negative: Injury is a puncture wound    Negative: Foreign body in skin (e.g., splinter, sliver)    Negative: Bruises not from an injury    Negative: Wound looks infected    Negative: Electrical burn    Negative: Chemical burn    Negative: Thermal burn    Negative: [1] Bleeding AND [2] won't stop after 10 minutes of direct pressure (using correct technique)    Negative: Skin is split open or gaping  (or length > 1/2 inch or 12 mm on the skin, 1/4 inch or 6 mm on the face)    Negative: [1] Deep cut AND [2] can see bone or tendons    Negative: [1] Dirt in the wound AND [2] not removed with 15 minutes of scrubbing    Negative: Skin loss involves more than 10% of surface area  (Note: the palm of the hand = 1%)    Negative: High pressure injection injury (e.g., from paint gun, usually work-related)    Negative: Wound causes numbness (i.e., loss of sensation)    Negative: Wound causes weakness (i.e., decreased ability to move hand, finger, toe)    Negative: Sounds like a serious injury to the triager    Negative: [1] SEVERE pain AND [2] not improved 2 hours after pain medicine/ice packs    Negative: [1] Looks infected AND [2] large red area or streak (>2 inches or 5 cm)    Negative: [1] Fever AND [2] bright red area or streak    Negative: Suspicious history for the injury    Negative: [1] Raised bruise AND [2] size > 2 inches (5 cm) AND [3] expanding    Negative: [1] Looks infected (spreading redness, pus) AND [2] no fever    Negative: [1] Last tetanus shot > 5 years ago AND [2] DIRTY cut or scrape    Negative: [1] Last tetanus shot > 10 years ago AND [2] CLEAN cut or scrape    Negative: No prior tetanus shots    Negative: [1] After 14 days AND [2] wound isn't healed    Negative: [1] Has diabetes (diabetes mellitus) AND [2] wound on foot    Minor cut or scratch    Protocols used: BRUISES-A-, SKIN INJURY-A-AH

## 2020-01-27 ENCOUNTER — OFFICE VISIT (OUTPATIENT)
Dept: FAMILY MEDICINE | Facility: CLINIC | Age: 60
End: 2020-01-27
Payer: COMMERCIAL

## 2020-01-27 VITALS
BODY MASS INDEX: 25.87 KG/M2 | SYSTOLIC BLOOD PRESSURE: 98 MMHG | HEIGHT: 63 IN | TEMPERATURE: 97.8 F | DIASTOLIC BLOOD PRESSURE: 62 MMHG | HEART RATE: 76 BPM | WEIGHT: 146 LBS | OXYGEN SATURATION: 97 %

## 2020-01-27 DIAGNOSIS — R22.1 LOCALIZED SWELLING, MASS AND LUMP, NECK: Primary | ICD-10-CM

## 2020-01-27 PROCEDURE — 99213 OFFICE O/P EST LOW 20 MIN: CPT | Performed by: PHYSICIAN ASSISTANT

## 2020-01-27 RX ORDER — DOXYCYCLINE 100 MG/1
CAPSULE ORAL
COMMUNITY
Start: 2020-01-24 | End: 2020-11-24

## 2020-01-27 ASSESSMENT — MIFFLIN-ST. JEOR: SCORE: 1196.38

## 2020-01-27 NOTE — PROGRESS NOTES
Subjective     Irina Hanks is a 59 year old female who presents to clinic today for the following health issues:    HPI   Concern - swelling on both sides of neck  Onset: unsure for how long    Description:   Swelling on both sides of neck    Intensity: mild    Progression of Symptoms:  same    Accompanying Signs & Symptoms:  No pain    Previous history of similar problem:       Precipitating factors:   Worsened by:     Alleviating factors:  Improved by:     Therapies Tried and outcome:       Peg has a hx of hemachromatosis and RA presents to the clinic with bilateral neck fullness and swelling.  This area of her neck is not painful or red but she noticed it when looking in the mirror a few days ago.  She is unsure how long this has been present.  She is unsure if this is baseline for her.  She is not having any symptoms.      Patient Active Problem List   Diagnosis     Hypothyroidism     Absence of menstruation     IBS (irritable bowel syndrome)     Hyperlipidemia LDL goal <130     Hypertension goal BP (blood pressure) < 130/80     Impaired renal function     Overweight (BMI 25.0-29.9)     Anxiety     Postmenopausal symptoms     Inflammatory arthritis     Osteoarthritis of first carpometacarpal joint     Microscopic colitis     Seasonal allergic rhinitis     Vitamin D deficiency     RA (rheumatoid arthritis) (H)     ASCUS of cervix with negative high risk HPV     Shortness of breath     Benign essential hypertension     Acquired hypothyroidism     Symptomatic menopausal or female climacteric states     Tricuspid regurgitation     S/P myringotomy with insertion of tube     Vasomotor rhinitis     Controlled substance agreement signed     Abdominal pain, right lower quadrant     Psychophysiological insomnia     Adjustment disorder with anxious mood     Hyponatremia     Iron overload     Iron overload syndrome     Hereditary hemochromatosis (H)     Lichen planus     Past Surgical History:   Procedure Laterality  Date     BIOPSY BREAST       C/SECTION, LOW TRANSVERSE      twins     COLONOSCOPY      nl, next one due in 5 years     LEEP TX, CERVICAL  1990s    normal since that time     MYRINGOTOMY, INSERT TUBE, COMBINED Left 2015    chronic NORM, ETD       Social History     Tobacco Use     Smoking status: Former Smoker     Packs/day: 0.26     Years: 10.00     Pack years: 2.60     Types: Cigarettes     Last attempt to quit: 1989     Years since quittin.7     Smokeless tobacco: Never Used   Substance Use Topics     Alcohol use: Yes     Alcohol/week: 0.0 standard drinks     Comment: 2 drinks per day     Family History   Problem Relation Age of Onset     Cancer - colorectal Father         age 50     Arthritis Mother      Cancer - colorectal Brother         age 50     Hypertension Sister      Hypertension Brother      Thyroid Disease Sister      Thyroid Disease Brother      Arthritis Sister      Cancer Sister 53        Uterine     Cancer Sister 50        Uterine         Current Outpatient Medications   Medication Sig Dispense Refill     Cholecalciferol (VITAMIN D PO) Take 5,000 Units by mouth       clonazePAM (KLONOPIN) 0.5 MG ODT DISSOLVE 1 TABLET ON THE TONGUE EVERY NIGHT AS NEEDED FOR ANXIETY 30 tablet 0     COMPOUNDED NON-CONTROLLED SUBSTANCE (CMPD RX) - PHARMACY TO MIX COMPOUNDED MEDICATION Estriol 0.1% cream. Apply pea-sized amount to urethral meatus and vaginal opening Monday, Wednesday, Friday at bedtime. 30 g 4     hydroxychloroquine (PLAQUENIL) 200 MG tablet Take 2 tablets (400 mg) by mouth daily 90 tablet 3     ipratropium (ATROVENT) 0.03 % nasal spray Spray 2 sprays into both nostrils every 12 hours       levothyroxine (SYNTHROID/LEVOTHROID) 75 MCG tablet TAKE 1 TABLET BY MOUTH EVERY MORNING 90 tablet 0     liothyronine (CYTOMEL) 5 MCG tablet TAKE 2 TABLETS BY MOUTH EVERY  tablet 0     loratadine (CLARITIN) 10 MG tablet Take 1 tablet (10 mg) by mouth daily 90 tablet 3      "losartan-hydrochlorothiazide (HYZAAR) 100-12.5 MG tablet TAKE 1 TABLET BY MOUTH DAILY 90 tablet 0     magic mouthwash suspension (diphenhydrAMINE, lidocaine, aluminum-magnesium & simethicone) Swish and swallow 10 mLs in mouth every 6 hours as needed for mouth sores 120 mL 1     metoprolol succinate ER (TOPROL-XL) 50 MG 24 hr tablet TAKE 2 TABLETS BY MOUTH EVERY  tablet 1     psyllium (METAMUCIL) 58.6 % POWD Take by mouth daily       TURMERIC PO        doxycycline hyclate (VIBRAMYCIN) 100 MG capsule        Allergies   Allergen Reactions     Ace Inhibitors Cough     lisinopril     Sulfa Drugs          Reviewed and updated as needed this visit by Provider         Review of Systems   ROS COMP: Constitutional, HEENT, cardiovascular, pulmonary, gi and gu systems are negative, except as otherwise noted.      Objective    BP 98/62   Pulse 76   Temp 97.8  F (36.6  C) (Tympanic)   Ht 1.6 m (5' 3\")   Wt 66.2 kg (146 lb)   SpO2 97%   BMI 25.86 kg/m    Body mass index is 25.86 kg/m .  Physical Exam   GENERAL: healthy, alert and no distress  NECK: no adenopathy, no asymmetry, masses, or scars and thyroid normal to palpation, symmetric soft tissue prominence of the SCM muscles bilaterally, no TTP along these musles, FROM of neck without pain    Diagnostic Test Results:  Labs reviewed in Epic        Assessment & Plan     1. Localized swelling, mass and lump, neck  Unclear etiology of her symptoms, this may be be soft tissue change but she is unsure if this is new or old. Will get US to assess these areas for reassurance  - US Head Neck Soft Tissue; Future       Follow up as above    No follow-ups on file.    Paul Santana PA-C  [facility]      "

## 2020-01-28 ENCOUNTER — HOSPITAL ENCOUNTER (OUTPATIENT)
Dept: ULTRASOUND IMAGING | Facility: CLINIC | Age: 60
Discharge: HOME | End: 2020-01-28
Attending: PHYSICIAN ASSISTANT | Admitting: PHYSICIAN ASSISTANT
Payer: COMMERCIAL

## 2020-01-28 DIAGNOSIS — R22.1 LOCALIZED SWELLING, MASS AND LUMP, NECK: ICD-10-CM

## 2020-01-28 PROCEDURE — 76536 US EXAM OF HEAD AND NECK: CPT

## 2020-01-30 NOTE — RESULT ENCOUNTER NOTE
Irina-  Here are your recent results.     Your neck ultrasound is normal appearing and does not show any masses or unusual inflammation. At this time, I would monitor this.  If you are noticing more swelling or other symptoms such as fatigue or weight gain, please let me know.       If you have any questions please do not hesitate to contact our office via phone ([phone]) or you may send me a message via [employer] by clicking the contact my Care Team link.      It was a pleasure meeting you and participating in your care!    Thank you,    Paul Santana MPH, PA-C  [address] [place] [address] [phone]

## 2020-02-03 ENCOUNTER — TELEPHONE (OUTPATIENT)
Dept: FAMILY MEDICINE | Facility: CLINIC | Age: 60
End: 2020-02-03

## 2020-02-03 NOTE — TELEPHONE ENCOUNTER
S/w pt and advised the clonopin should not be any trouble filling as long as pt provides the pharmacy she will be using in [place].      Pt states understanding and just didn't know because this medication was a different class of drug.    Patsy VIERA RN  EP Triage

## 2020-02-03 NOTE — TELEPHONE ENCOUNTER
Reason for Call:  Patient states her Klonopin prescription will be coming due sometime in February.  Patient will be in [place] until mid March and is wanting to make sure Dr Fernandes will be able to fill this for her there.  Please advise.    Best phone number to reach pt at is: [phone]  Ok to leave a message with medical info? yes    Alee Hare

## 2020-02-26 DIAGNOSIS — F43.22 ADJUSTMENT DISORDER WITH ANXIOUS MOOD: ICD-10-CM

## 2020-02-26 RX ORDER — CLONAZEPAM 0.5 MG/1
TABLET, ORALLY DISINTEGRATING ORAL
Qty: 30 TABLET | Refills: 0 | Status: SHIPPED | OUTPATIENT
Start: 2020-02-26 | End: 2020-03-25

## 2020-02-26 NOTE — TELEPHONE ENCOUNTER
Reason for Call:  Medication or medication refill:    Do you use a [facility]?  Name of the pharmacy and phone number for the current request:  [address], [place]    Name of the medication requested: clonazepam    Other request: Pt needs new rx sent to [facility] in [place]    Can we leave a detailed message on this number? YES    Phone number patient can be reached at: Home number on file [phone] (home)    Best Time:     Call taken on 2/26/2020 at 10:31 AM by Leia Duran

## 2020-02-26 NOTE — TELEPHONE ENCOUNTER
Last Written Prescription Date:  1/20/2020  Last Fill Quantity: 30,  # refills: 0   Last office visit: 1/27/2020 with prescribing provider:     Future Office Visit:   Next 5 appointments (look out 90 days)    Mar 10, 2020  1:10 PM CDT  Return Visit with  LAB DRAW 1  [facility] ([facility]) [facility] [place]  [address]  [facility] [id number] [phone]         Requested Prescriptions   Pending Prescriptions Disp Refills     clonazePAM (KLONOPIN) 0.5 MG ODT 30 tablet 0       There is no refill protocol information for this order        Routing refill request to provider for review/approval because:  Drug not on the [facility] refill protocol       RX monitoring program ([place]) reviewed:  reviewed- recommend provider review, placed in PCP inbox.     [place] profile:  [email or web address]    Sola Anthony RN, BSN  [facility]

## 2020-02-29 DIAGNOSIS — I10 ESSENTIAL HYPERTENSION, BENIGN: ICD-10-CM

## 2020-03-02 RX ORDER — LOSARTAN POTASSIUM AND HYDROCHLOROTHIAZIDE 12.5; 1 MG/1; MG/1
1 TABLET ORAL DAILY
Qty: 90 TABLET | Refills: 0 | Status: SHIPPED | OUTPATIENT
Start: 2020-03-02 | End: 2020-06-11

## 2020-03-02 NOTE — TELEPHONE ENCOUNTER
"Requested Prescriptions   Pending Prescriptions Disp Refills     losartan-hydrochlorothiazide (HYZAAR) 100-12.5 MG tablet [Pharmacy Med Name: LOSARTAN/HCTZ 100/12.5MG TABLETS] 90 tablet 0     Sig: TAKE 1 TABLET BY MOUTH DAILY   Last Written Prescription Date:  12/5/2019  Last Fill Quantity: 90 tablet,  # refills: 0   Last office visit: 1/27/2020 with prescribing provider:  LELAND Santana   Future Office Visit:   Next 5 appointments (look out 90 days)    Mar 10, 2020  1:10 PM CDT  Return Visit with  LAB DRAW 1  [facility] ([facility]) [facility] [place]  [address]  [facility] [id number] [phone]             Angiotensin-II Receptors Failed - 2/29/2020  4:09 AM        Failed - Normal serum creatinine on file in past 12 months     Recent Labs   Lab Test 06/12/18  1509   CR 0.76             Failed - Normal serum potassium on file in past 12 months     Recent Labs   Lab Test 06/12/18  1509   POTASSIUM 3.6                    Passed - Last blood pressure under 140/90 in past 12 months     BP Readings from Last 3 Encounters:   01/27/20 98/62   01/23/20 112/79   11/27/19 (!) 147/99                 Passed - Recent (12 mo) or future (30 days) visit within the authorizing provider's specialty     Patient has had an office visit with the authorizing provider or a provider within the authorizing providers department within the previous 12 mos or has a future within next 30 days. See \"Patient Info\" tab in inbasket, or \"Choose Columns\" in Meds & Orders section of the refill encounter.              Passed - Medication is active on med list        Passed - Patient is age 18 or older        Passed - No active pregnancy on record        Passed - No positive pregnancy test in past 12 months          "

## 2020-03-02 NOTE — TELEPHONE ENCOUNTER
Routing refill request to provider for review/approval because:  Labs not current:  - patient does have upcomming lab appointment for other lab

## 2020-03-10 ENCOUNTER — PATIENT OUTREACH (OUTPATIENT)
Dept: ONCOLOGY | Facility: CLINIC | Age: 60
End: 2020-03-10

## 2020-03-10 ENCOUNTER — HOSPITAL ENCOUNTER (OUTPATIENT)
Facility: CLINIC | Age: 60
Setting detail: SPECIMEN
Discharge: HOME | End: 2020-03-10
Attending: INTERNAL MEDICINE | Admitting: INTERNAL MEDICINE
Payer: COMMERCIAL

## 2020-03-10 ENCOUNTER — INFUSION THERAPY VISIT (OUTPATIENT)
Dept: INFUSION THERAPY | Facility: CLINIC | Age: 60
End: 2020-03-10
Attending: INTERNAL MEDICINE
Payer: COMMERCIAL

## 2020-03-10 DIAGNOSIS — E83.110 HEREDITARY HEMOCHROMATOSIS (H): ICD-10-CM

## 2020-03-10 LAB
ALT SERPL W P-5'-P-CCNC: 18 U/L (ref 0–50)
AST SERPL W P-5'-P-CCNC: 16 U/L (ref 0–45)
ERYTHROCYTE [DISTWIDTH] IN BLOOD BY AUTOMATED COUNT: 12.9 % (ref 10–15)
FERRITIN SERPL-MCNC: 20 NG/ML (ref 8–252)
HCT VFR BLD AUTO: 38.4 % (ref 35–47)
HGB BLD-MCNC: 13.2 G/DL (ref 11.7–15.7)
MCH RBC QN AUTO: 31.7 PG (ref 26.5–33)
MCHC RBC AUTO-ENTMCNC: 34.4 G/DL (ref 31.5–36.5)
MCV RBC AUTO: 92 FL (ref 78–100)
PLATELET # BLD AUTO: 249 10E9/L (ref 150–450)
RBC # BLD AUTO: 4.16 10E12/L (ref 3.8–5.2)
WBC # BLD AUTO: 3.7 10E9/L (ref 4–11)

## 2020-03-10 PROCEDURE — 84460 ALANINE AMINO (ALT) (SGPT): CPT | Performed by: INTERNAL MEDICINE

## 2020-03-10 PROCEDURE — 84450 TRANSFERASE (AST) (SGOT): CPT | Performed by: INTERNAL MEDICINE

## 2020-03-10 PROCEDURE — 82728 ASSAY OF FERRITIN: CPT | Performed by: INTERNAL MEDICINE

## 2020-03-10 PROCEDURE — 36415 COLL VENOUS BLD VENIPUNCTURE: CPT

## 2020-03-10 PROCEDURE — 85027 COMPLETE CBC AUTOMATED: CPT | Performed by: INTERNAL MEDICINE

## 2020-03-10 NOTE — PROGRESS NOTES
Medical Assistant Note:  Irina Hanks presents today for blood draw.    Patient seen by provider today: No.   present during visit today: Not Applicable.    Concerns: No Concerns.    Procedure:  Lab draw site: Left Hand, Needle type: bf, Gauge: 23.    Post Assessment:  Labs drawn without difficulty: Yes.    Discharge Plan:  Departure Mode: Ambulatory.    Face to Face Time: 5 min  .    Viki Arroyo, CMA

## 2020-03-10 NOTE — PROGRESS NOTES
Patient waited for labs results. Results printed off, reviewed, and given to patient.     Danielle Newman RN

## 2020-03-11 NOTE — RESULT ENCOUNTER NOTE
Dear Ms. Hanks,    Blood tests are good except for mildly low WBC. Will monitor it.    Please, call me with any questions.    Eloisa Rossi MD

## 2020-03-25 DIAGNOSIS — F43.22 ADJUSTMENT DISORDER WITH ANXIOUS MOOD: ICD-10-CM

## 2020-03-25 RX ORDER — CLONAZEPAM 0.5 MG/1
TABLET, ORALLY DISINTEGRATING ORAL
Qty: 30 TABLET | Refills: 0 | Status: SHIPPED | OUTPATIENT
Start: 2020-03-25 | End: 2020-04-27

## 2020-03-25 NOTE — TELEPHONE ENCOUNTER
clonazePAM (KLONOPIN) 0.5 MG ODT       Last Written Prescription Date:  2/26/20  Last Fill Quantity: 30,   # refills: 0  Last Office Visit: 1/27/20  Future Office visit:    Next 5 appointments (look out 90 days)    Jun 02, 2020  1:10 PM CDT  Return Visit with  LAB DRAW 1  [facility] ([facility]) [facility]  [address]  [facility] [address]  [phone]           Routing refill request to provider for review/approval because:  Drug not on the [facility] or [facility] refill protocol or controlled substance

## 2020-04-01 DIAGNOSIS — E03.9 ACQUIRED HYPOTHYROIDISM: ICD-10-CM

## 2020-04-01 RX ORDER — LIOTHYRONINE SODIUM 5 UG/1
TABLET ORAL
Qty: 180 TABLET | Refills: 1 | Status: SHIPPED | OUTPATIENT
Start: 2020-04-01 | End: 2020-10-12

## 2020-04-01 RX ORDER — LEVOTHYROXINE SODIUM 75 UG/1
TABLET ORAL
Qty: 90 TABLET | Refills: 1 | Status: SHIPPED | OUTPATIENT
Start: 2020-04-01 | End: 2020-10-12

## 2020-04-01 NOTE — TELEPHONE ENCOUNTER
"    Requested Prescriptions   Pending Prescriptions Disp Refills     liothyronine (CYTOMEL) 5 MCG tablet [Pharmacy Med Name: LIOTHYRONINE 5MCG TABLETS] 180 tablet 0     Sig: TAKE 2 TABLETS BY MOUTH EVERY DAY       Thyroid Protocol Passed - 4/1/2020 12:04 PM        Passed - Patient is 12 years or older        Passed - Recent (12 mo) or future (30 days) visit within the authorizing provider's specialty     Patient has had an office visit with the authorizing provider or a provider within the authorizing providers department within the previous 12 mos or has a future within next 30 days. See \"Patient Info\" tab in inbasket, or \"Choose Columns\" in Meds & Orders section of the refill encounter.              Passed - Medication is active on med list        Passed - Normal TSH on file in past 12 months     Recent Labs   Lab Test 09/17/19  0835   TSH 1.41              Passed - No active pregnancy on record     If patient is pregnant or has had a positive pregnancy test, please check TSH.          Passed - No positive pregnancy test in past 12 months     If patient is pregnant or has had a positive pregnancy test, please check TSH.             levothyroxine (SYNTHROID/LEVOTHROID) 75 MCG tablet [Pharmacy Med Name: LEVOTHYROXINE 0.075MG (75MCG) TABS] 90 tablet 0     Sig: TAKE 1 TABLET BY MOUTH EVERY MORNING       Thyroid Protocol Passed - 4/1/2020 12:04 PM        Passed - Patient is 12 years or older        Passed - Recent (12 mo) or future (30 days) visit within the authorizing provider's specialty     Patient has had an office visit with the authorizing provider or a provider within the authorizing providers department within the previous 12 mos or has a future within next 30 days. See \"Patient Info\" tab in inbasket, or \"Choose Columns\" in Meds & Orders section of the refill encounter.              Passed - Medication is active on med list        Passed - Normal TSH on file in past 12 months     Recent Labs   Lab Test " 09/17/19  0835   TSH 1.41              Passed - No active pregnancy on record     If patient is pregnant or has had a positive pregnancy test, please check TSH.          Passed - No positive pregnancy test in past 12 months     If patient is pregnant or has had a positive pregnancy test, please check TSH.

## 2020-04-27 DIAGNOSIS — F43.22 ADJUSTMENT DISORDER WITH ANXIOUS MOOD: ICD-10-CM

## 2020-04-27 RX ORDER — CLONAZEPAM 0.5 MG/1
TABLET, ORALLY DISINTEGRATING ORAL
Qty: 30 TABLET | Refills: 0 | Status: SHIPPED | OUTPATIENT
Start: 2020-04-27 | End: 2020-05-26

## 2020-04-27 NOTE — TELEPHONE ENCOUNTER
clonazepam      Last Written Prescription Date:  3/25/20  Last Fill Quantity: 30,   # refills: 0  Last Office Visit: 1/27/20  Future Office visit:    Next 5 appointments (look out 90 days)    Jun 02, 2020  1:10 PM CDT  Return Visit with  LAB DRAW 1  [facility] ([facility]) [facility]  [address]  [facility] [address]  [phone]           Routing refill request to provider for review/approval because:  Drug not on the [facility], P or [facility] refill protocol or controlled substance    RX monitoring program ([place]NPMP) reviewed:  not reviewed/not due - last done on 2/26/20    MNPMP profile:  [email or web address]    Keyla Hardin RN   [facility] - Triage

## 2020-05-15 ENCOUNTER — TELEPHONE (OUTPATIENT)
Dept: FAMILY MEDICINE | Facility: CLINIC | Age: 60
End: 2020-05-15

## 2020-05-15 NOTE — TELEPHONE ENCOUNTER
Pt calling has been having itchy scalp and hair loss.  Wondering if should have iron or thyroid levels checked.  Scheduled appt with rheumatologist for 5/26 and is thinking will wait for that appt because rheumatologist may order other labs also.    Patsy VIERA RN  EP Triage

## 2020-05-15 NOTE — TELEPHONE ENCOUNTER
Reason for Call:  Other call back    Detailed comments: Pt is wanting to have lab work done.  Pt states is having hair loss and itchy scalp.  Also maybe is wanting iron checked.     Phone Number Patient can be reached at: Home number on file [phone] (home)    Best Time: anytime    Can we leave a detailed message on this number? YES    Call taken on 5/15/2020 at 9:27 AM by Lida Bean

## 2020-05-25 DIAGNOSIS — F43.22 ADJUSTMENT DISORDER WITH ANXIOUS MOOD: ICD-10-CM

## 2020-05-26 ENCOUNTER — TRANSFERRED RECORDS (OUTPATIENT)
Dept: HEALTH INFORMATION MANAGEMENT | Facility: CLINIC | Age: 60
End: 2020-05-26

## 2020-05-26 LAB
ALT SERPL-CCNC: 19 U/L (ref 6–29)
AST SERPL-CCNC: 21 U/L (ref 10–35)
CREAT SERPL-MCNC: 0.85 MG/DL (ref 0.5–1.3)

## 2020-05-26 RX ORDER — CLONAZEPAM 0.5 MG/1
TABLET, ORALLY DISINTEGRATING ORAL
Qty: 30 TABLET | Refills: 0 | Status: SHIPPED | OUTPATIENT
Start: 2020-05-26 | End: 2020-06-24

## 2020-05-26 NOTE — TELEPHONE ENCOUNTER
Last Written Prescription Date:  4/27/2020  Last Fill Quantity: 30,  # refills: 0   Last office visit: 1/27/2020 with prescribing provider:    Future Office Visit:   Next 5 appointments (look out 90 days)    Jun 02, 2020  1:10 PM CDT  Return Visit with  LAB DRAW 1  [facility] ([facility]) [facility] [place]  [address]  [facility] [id number] [phone]             Requested Prescriptions   Pending Prescriptions Disp Refills     clonazePAM (KLONOPIN) 0.5 MG ODT [Pharmacy Med Name: CLONAZEPAM ODT 0.5MG TABLETS] 30 tablet      Sig: DISSOLVE 1 TABLET ON THE TONGUE EVERY NIGHT AS NEEDED FOR ANXIETY       There is no refill protocol information for this order        Routing refill request to provider for review/approval because:  Drug not on the [facility] refill protocol         RX monitoring program (MNPMP) reviewed:  not reviewed/not due - last done on 2/26/2020    MNPMP profile:  [email or web address]    Sola Anthony RN, BSN  [facility] [place]

## 2020-05-27 ENCOUNTER — TELEPHONE (OUTPATIENT)
Dept: FAMILY MEDICINE | Facility: CLINIC | Age: 60
End: 2020-05-27

## 2020-05-27 NOTE — TELEPHONE ENCOUNTER
Pt calling states she has another uti.  This is the 2nd UTI in 2 weeks.  Is currently at  and wanting to know what were the names of the medications she was on for utis last summer.    Advised pt she was on macrobid which made her sick.  Then she tried cipro, fosfomycin and augmentin.    Pt states understanding.    Patsy VIERA RN  EP Triage

## 2020-05-27 NOTE — TELEPHONE ENCOUNTER
Reason for call:  Patient reporting a symptom    Symptom or request: Peg is calling saying she keeps having off and on UTIs. She said she keeps going to urgent care and they put her on a med which did not help at all. She said she went back and they put her on another one which did help. She is wondering why she keeps getting these.    Have you been treated for this before? Yes, at urgent cares    Phone Number patient can be reached at:  Cell number on file:    Telephone Information:   Mobile [phone]     Best Time:  Anytime    Can we leave a detailed message on this number:  YES    Call taken on 5/27/2020 at 12:26 PM by Chioma De Los Santos

## 2020-06-02 ENCOUNTER — HOSPITAL ENCOUNTER (OUTPATIENT)
Facility: CLINIC | Age: 60
Setting detail: SPECIMEN
End: 2020-06-02
Attending: INTERNAL MEDICINE
Payer: COMMERCIAL

## 2020-06-02 ENCOUNTER — INFUSION THERAPY VISIT (OUTPATIENT)
Dept: INFUSION THERAPY | Facility: CLINIC | Age: 60
End: 2020-06-02
Attending: INTERNAL MEDICINE
Payer: COMMERCIAL

## 2020-06-02 ENCOUNTER — TELEPHONE (OUTPATIENT)
Dept: FAMILY MEDICINE | Facility: CLINIC | Age: 60
End: 2020-06-02

## 2020-06-02 DIAGNOSIS — E83.19 IRON OVERLOAD: ICD-10-CM

## 2020-06-02 DIAGNOSIS — E03.9 ACQUIRED HYPOTHYROIDISM: Primary | ICD-10-CM

## 2020-06-02 DIAGNOSIS — E83.110 HEREDITARY HEMOCHROMATOSIS (H): Primary | ICD-10-CM

## 2020-06-02 PROCEDURE — 99195 PHLEBOTOMY: CPT

## 2020-06-02 NOTE — TELEPHONE ENCOUNTER
Cancer center is calling about these orders again - she is waiting for them     Please review and advise     Soledad Virgen RN, BSN  [place] Triage

## 2020-06-02 NOTE — TELEPHONE ENCOUNTER
Reason for Call: Request for an order or referral:    Order or referral being requested: Thyroid check     Date needed: as soon as possible    Has the patient been seen by the PCP for this problem? YES    Additional comments: per pt she has laure at [facility] [place] 2 pm today and she is wondering if Thyroid lab order can be fax there so she can do it at the same time.(per pt it is also required by Hematologist)    Fax # [phone]    If you have any question contact pt, pt advised it might be a short notice     Phone number Patient can be reached at:  Cell number on file:    Telephone Information:   Mobile [phone]       Best Time:  Anytime     Can we leave a detailed message on this number?  YES    Call taken on 6/2/2020 at 12:50 PM by NÉSTOR CABRAL

## 2020-06-02 NOTE — PROGRESS NOTES
Infusion Nursing Note:  Irina Hanks presents today for phlebotomy.    Patient seen by provider today: No   present during visit today: Not Applicable.    Note: patient stayed in infusion 20 minutes post phlebotomy.  500ccs blood removed for ferritin level of 81.    Intravenous Access:  Peripheral IV placed 18g for lab draw and phlebotomy.    Treatment Conditions:  Results reviewed, labs MET treatment parameters, ok to proceed with treatment.      Post Infusion Assessment:  Patient tolerated procedure without incident.  Access discontinued per protocol.       Discharge Plan:   Discharge instructions reviewed with: Patient.  Patient and/or family verbalized understanding of discharge instructions and all questions answered.  Copy of AVS reviewed with patient and/or family. Patient discharged in stable condition accompanied by: self.  Departure Mode: Ambulatory.    Declan Corrales RN

## 2020-06-11 DIAGNOSIS — I10 ESSENTIAL HYPERTENSION, BENIGN: ICD-10-CM

## 2020-06-11 RX ORDER — LOSARTAN POTASSIUM AND HYDROCHLOROTHIAZIDE 12.5; 1 MG/1; MG/1
1 TABLET ORAL DAILY
Qty: 90 TABLET | Refills: 0 | Status: SHIPPED | OUTPATIENT
Start: 2020-06-11 | End: 2020-09-10

## 2020-06-11 NOTE — TELEPHONE ENCOUNTER
Left message for patient to call back and speak with TC.    .Radha MCCRAY    [facility] [place]

## 2020-06-11 NOTE — TELEPHONE ENCOUNTER
Routing refill request to provider for review/approval because:  Labs not current:  K+     Potassium   Date Value Ref Range Status   06/12/2018 3.6 3.4 - 5.3 mmol/L Final       Sola Anthony RN, BSN  [facility]

## 2020-06-11 NOTE — TELEPHONE ENCOUNTER
Patient is due for labs. Does she have anything scheduled with her hematologist? If so I can add labs to this. Otherwise, I would recommend coming in for labs within the next 1-2 months. I will refill today as well.

## 2020-06-17 NOTE — TELEPHONE ENCOUNTER
Patient states that she had a lot of blood work done by her Rheumatologist and sent the results to Hemotologist TC will contact clinic to have labs sent over. Patient states they did not draw for her TSH if Dr. Fernandes still wants that completed.    .Radha MCCRAY    [facility] [place]

## 2020-06-17 NOTE — TELEPHONE ENCOUNTER
Called Dr. Rossi's office in [place] at [phone] and left a vm requesting patients most recent labs be faxed to the  EP office.    .Radha MCCRAY    [facility] [place]

## 2020-06-24 DIAGNOSIS — F43.22 ADJUSTMENT DISORDER WITH ANXIOUS MOOD: ICD-10-CM

## 2020-06-24 RX ORDER — CLONAZEPAM 0.5 MG/1
TABLET, ORALLY DISINTEGRATING ORAL
Qty: 30 TABLET | Refills: 0 | Status: SHIPPED | OUTPATIENT
Start: 2020-06-24 | End: 2020-07-22

## 2020-06-24 NOTE — TELEPHONE ENCOUNTER
Last Written Prescription Date:  5/26/2020  Last Fill Quantity: 30,  # refills: 0   Last office visit: 1/27/2020 with prescribing provider:    Future Office Visit:   Next 5 appointments (look out 90 days)    Sep 01, 2020  1:30 PM CDT  Return Visit with  LAB DRAW 1  [facility] ([facility]) [facility] [place]  [address]  [facility] [id number] [phone]           Requested Prescriptions   Pending Prescriptions Disp Refills     clonazePAM (KLONOPIN) 0.5 MG ODT [Pharmacy Med Name: CLONAZEPAM ODT 0.5MG TABLETS] 30 tablet      Sig: DISSOLVE 1 TABLET ON THE TONGUE EVERY NIGHT AS NEEDED FOR ANXIETY       There is no refill protocol information for this order        Routing refill request to provider for review/approval because:  Drug not on the [facility] refill protocol       RX monitoring program (MNPMP) reviewed:  not reviewed/not due - last done on 2/26/2020    MNPMP profile:  [email or web address]    Sola Anthony RN, BSN  [facility] [place]

## 2020-07-22 DIAGNOSIS — I10 HYPERTENSION GOAL BP (BLOOD PRESSURE) < 130/80: ICD-10-CM

## 2020-07-22 DIAGNOSIS — F43.22 ADJUSTMENT DISORDER WITH ANXIOUS MOOD: ICD-10-CM

## 2020-07-22 RX ORDER — METOPROLOL SUCCINATE 50 MG/1
TABLET, EXTENDED RELEASE ORAL
Qty: 180 TABLET | Refills: 1 | Status: SHIPPED | OUTPATIENT
Start: 2020-07-22 | End: 2021-01-05

## 2020-07-22 RX ORDER — CLONAZEPAM 0.5 MG/1
TABLET, ORALLY DISINTEGRATING ORAL
Qty: 30 TABLET | Refills: 0 | Status: SHIPPED | OUTPATIENT
Start: 2020-07-22 | End: 2020-08-20

## 2020-07-22 NOTE — TELEPHONE ENCOUNTER
Routing refill request to provider for review/approval because:  Drug not on the [facility] refill protocol   RN unable to access  under this provider.    JOSE DialN, RN  [facility] Triage

## 2020-08-20 DIAGNOSIS — F43.22 ADJUSTMENT DISORDER WITH ANXIOUS MOOD: ICD-10-CM

## 2020-08-20 RX ORDER — CLONAZEPAM 0.5 MG/1
TABLET, ORALLY DISINTEGRATING ORAL
Qty: 30 TABLET | Refills: 0 | Status: SHIPPED | OUTPATIENT
Start: 2020-08-20 | End: 2020-09-16

## 2020-08-20 NOTE — TELEPHONE ENCOUNTER
Clonazepam 0.5 mg      Last Written Prescription Date:  7/22/20  Last Fill Quantity: 30,   # refills: 0  Last Office Visit: 1/27/20 Ra[place]  Future Office visit:    Next 5 appointments (look out 90 days)    Sep 01, 2020  1:30 PM CDT  Return Visit with  LAB DRAW 1  [facility] ([facility]) [facility]  [address]  [facility] [address]  [phone]           Routing refill request to provider for review/approval because:  Drug not on the [facility], P or [facility] refill protocol or controlled substance    RX monitoring program (MNPMP) reviewed:  not reviewed/not due - last done on 2/26/20    MNPMP profile:  [email or web address]    Patsy VIERA RN  EP Triage

## 2020-09-01 ENCOUNTER — INFUSION THERAPY VISIT (OUTPATIENT)
Dept: INFUSION THERAPY | Facility: CLINIC | Age: 60
End: 2020-09-01
Attending: INTERNAL MEDICINE
Payer: COMMERCIAL

## 2020-09-01 ENCOUNTER — TELEPHONE (OUTPATIENT)
Dept: ONCOLOGY | Facility: CLINIC | Age: 60
End: 2020-09-01

## 2020-09-01 ENCOUNTER — HOSPITAL ENCOUNTER (OUTPATIENT)
Facility: CLINIC | Age: 60
Setting detail: SPECIMEN
Discharge: HOME | End: 2020-09-01
Attending: INTERNAL MEDICINE | Admitting: INTERNAL MEDICINE
Payer: COMMERCIAL

## 2020-09-01 DIAGNOSIS — E83.110 HEREDITARY HEMOCHROMATOSIS (H): ICD-10-CM

## 2020-09-01 LAB
ALT SERPL W P-5'-P-CCNC: 18 U/L (ref 0–50)
AST SERPL W P-5'-P-CCNC: 18 U/L (ref 0–45)
ERYTHROCYTE [DISTWIDTH] IN BLOOD BY AUTOMATED COUNT: 12.1 % (ref 10–15)
FERRITIN SERPL-MCNC: 45 NG/ML (ref 8–252)
HCT VFR BLD AUTO: 36.4 % (ref 35–47)
HGB BLD-MCNC: 12.9 G/DL (ref 11.7–15.7)
MCH RBC QN AUTO: 32.5 PG (ref 26.5–33)
MCHC RBC AUTO-ENTMCNC: 35.4 G/DL (ref 31.5–36.5)
MCV RBC AUTO: 92 FL (ref 78–100)
PLATELET # BLD AUTO: 322 10E9/L (ref 150–450)
RBC # BLD AUTO: 3.97 10E12/L (ref 3.8–5.2)
WBC # BLD AUTO: 5.4 10E9/L (ref 4–11)

## 2020-09-01 PROCEDURE — 36415 COLL VENOUS BLD VENIPUNCTURE: CPT

## 2020-09-01 PROCEDURE — 84460 ALANINE AMINO (ALT) (SGPT): CPT | Performed by: INTERNAL MEDICINE

## 2020-09-01 PROCEDURE — 84450 TRANSFERASE (AST) (SGOT): CPT | Performed by: INTERNAL MEDICINE

## 2020-09-01 PROCEDURE — 82728 ASSAY OF FERRITIN: CPT | Performed by: INTERNAL MEDICINE

## 2020-09-01 PROCEDURE — 85027 COMPLETE CBC AUTOMATED: CPT | Performed by: INTERNAL MEDICINE

## 2020-09-01 NOTE — PROGRESS NOTES
Medical Assistant Note:  Irina Hanks presents today for blood draw.    Patient seen by provider today: No.   present during visit today: Not Applicable.    Concerns: No Concerns.    Procedure:  Lab draw site: lac, Needle type: bf, Gauge: 23.    Post Assessment:  Labs drawn without difficulty: Yes.    Discharge Plan:  Departure Mode: Ambulatory.    Face to Face Time: 5 min  .    Viki Arroyo, CMA

## 2020-09-02 NOTE — TELEPHONE ENCOUNTER
Called Peg, reviewed lab results (Ferritin, AST/ALT, and Hgb).  She verbalized understanding and denied questions.    Next follow-up scheduled for November.    JOSE HuertasN, RN, OCN  Oncology Care Coordinator  [facility]

## 2020-09-08 DIAGNOSIS — I10 ESSENTIAL HYPERTENSION, BENIGN: ICD-10-CM

## 2020-09-09 NOTE — TELEPHONE ENCOUNTER
Routing refill request to provider for review/approval because:  Labs not current:  Cr and K+    Patsy VIERA RN  EP Triage

## 2020-09-10 RX ORDER — LOSARTAN POTASSIUM AND HYDROCHLOROTHIAZIDE 12.5; 1 MG/1; MG/1
1 TABLET ORAL DAILY
Qty: 90 TABLET | Refills: 0 | Status: SHIPPED | OUTPATIENT
Start: 2020-09-10 | End: 2020-10-15

## 2020-09-16 DIAGNOSIS — F43.22 ADJUSTMENT DISORDER WITH ANXIOUS MOOD: ICD-10-CM

## 2020-09-16 RX ORDER — CLONAZEPAM 0.5 MG/1
TABLET, ORALLY DISINTEGRATING ORAL
Qty: 30 TABLET | Refills: 0 | Status: SHIPPED | OUTPATIENT
Start: 2020-09-16 | End: 2020-10-15

## 2020-09-16 NOTE — TELEPHONE ENCOUNTER
Controlled Substance Refill Request for Clonazepam 0.5 mg  Problem List Complete:  No     PROVIDER TO CONSIDER COMPLETION OF PROBLEM LIST AND OVERVIEW/CONTROLLED SUBSTANCE AGREEMENT    Last Written Prescription Date:  8/20/2020  Last Fill Quantity: 30,   # refills: 0    THE MOST RECENT OFFICE VISIT MUST BE WITHIN THE PAST 3 MONTHS. AT LEAST ONE FACE TO FACE VISIT MUST OCCUR EVERY 6 MONTHS. ADDITIONAL VISITS CAN BE VIRTUAL.  (THIS STATEMENT SHOULD BE DELETED.)    Last Office Visit with [facility] primary care provider: 1/27/2020    Future Office visit:   Next 5 appointments (look out 90 days)    Nov 24, 2020  1:10 PM CST  Return Visit with  LAB DRAW 1  [facility] ([facility]) [facility]  [address]  [facility] [address]  [phone]   Nov 24, 2020  2:00 PM CST  Return Visit with Eloisa Rossi MD  [facility] ([facility]) [address], [address]  [facility] [address]  [phone]          Controlled substance agreement:   Encounter-Level CSA - 07/27/2015:    Controlled Substance Agreement - Scan on 8/5/2015 11:27 AM: Controlled Substance Agreement 7/27/15     Patient-Level CSA:    There are no patient-level csa.         Last Urine Drug Screen: No results found for: CDAUT, No results found for: COMDAT, No results found for: THC13, PCP13, COC13, MAMP13, OPI13, AMP13, BZO13, TCA13, MTD13, BAR13, OXY13, PPX13, BUP13     Processing:  Rx to be electronically transmitted to pharmacy by provider      [email or web address]       checked in past 3 months?  No, route to RN RN unable to access  under this provider.  Routing refill request to provider for review/approval because:  Drug not on the [facility] refill protocol         SIM Dial, RN  [facility] Triage

## 2020-09-18 ENCOUNTER — OFFICE VISIT (OUTPATIENT)
Dept: FAMILY MEDICINE | Facility: CLINIC | Age: 60
End: 2020-09-18
Payer: COMMERCIAL

## 2020-09-18 VITALS
OXYGEN SATURATION: 99 % | DIASTOLIC BLOOD PRESSURE: 72 MMHG | RESPIRATION RATE: 15 BRPM | BODY MASS INDEX: 26.82 KG/M2 | TEMPERATURE: 97 F | HEIGHT: 63 IN | WEIGHT: 151.4 LBS | SYSTOLIC BLOOD PRESSURE: 126 MMHG | HEART RATE: 68 BPM

## 2020-09-18 DIAGNOSIS — R22.1 NECK MASS: Primary | ICD-10-CM

## 2020-09-18 PROCEDURE — 99213 OFFICE O/P EST LOW 20 MIN: CPT | Performed by: FAMILY MEDICINE

## 2020-09-18 ASSESSMENT — MIFFLIN-ST. JEOR: SCORE: 1215.88

## 2020-09-18 NOTE — PROGRESS NOTES
"Subjective     Irina Hanks is a 60 year old female who presents to clinic today for the following health issues:    HPI       Concern - Mass  Onset: last  Looked at in January   Description: mass on right side is worse but is on both sides   Intensity: n/a   Progression of Symptoms:  worsening  Accompanying Signs & Symptoms: n/a   Previous history of similar problem: Last January   Precipitating factors:        Worsened by: n/a   Alleviating factors:        Improved by: n/a   Therapies tried and outcome:  none       Review of Systems   Constitutional, HEENT, cardiovascular, pulmonary, gi and gu systems are negative, except as otherwise noted.      Objective    /72   Pulse 68   Temp 97  F (36.1  C) (Tympanic)   Resp 15   Ht 1.6 m (5' 3\")   Wt 68.7 kg (151 lb 6.4 oz)   SpO2 99%   BMI 26.82 kg/m    Body mass index is 26.82 kg/m .  Physical Exam   GENERAL: healthy, alert and no distress  NECK: no adenopathy, no asymmetry, masses, or scars and thyroid normal to palpation  RESP: lungs clear to auscultation - no rales, rhonchi or wheezes  CV: regular rate and rhythm, normal S1 S2, no S3 or S4, no murmur, click or rub, no peripheral edema and peripheral pulses strong  ABDOMEN: soft, nontender, no hepatosplenomegaly, no masses and bowel sounds normal  MS: no gross musculoskeletal defects noted, no edema  SKIN: prominent contour on bilateral supraclavicular area without palpable mass,             Assessment & Plan     Neck mass  Was seen by provider earlier this year and did US, found no abnormal finding,   Has no metabolic nor genetic condition for the physical feature  Has no underlying health condition concerning mass on neck   Will have her to check neck CT for further evaluation   - CT Soft Tissue Neck w Contrast; Future     BMI:   Estimated body mass index is 26.82 kg/m  as calculated from the following:    Height as of this encounter: 1.6 m (5' 3\").    Weight as of this encounter: 68.7 kg (151 lb " 6.4 oz).           FUTURE APPOINTMENTS:       - Follow-up visit after CT done     No follow-ups on file.    Stanislaw Dupree MD  [facility]

## 2020-09-21 ENCOUNTER — TRANSFERRED RECORDS (OUTPATIENT)
Dept: HEALTH INFORMATION MANAGEMENT | Facility: CLINIC | Age: 60
End: 2020-09-21

## 2020-09-22 ENCOUNTER — HOSPITAL ENCOUNTER (OUTPATIENT)
Dept: CT IMAGING | Facility: CLINIC | Age: 60
Discharge: HOME | End: 2020-09-22
Attending: FAMILY MEDICINE | Admitting: FAMILY MEDICINE
Payer: COMMERCIAL

## 2020-09-22 DIAGNOSIS — R22.1 NECK MASS: ICD-10-CM

## 2020-09-22 LAB
CREAT BLD-MCNC: 0.9 MG/DL (ref 0.52–1.04)
GFR SERPL CREATININE-BSD FRML MDRD: 63 ML/MIN/{1.73_M2}

## 2020-09-22 PROCEDURE — 70491 CT SOFT TISSUE NECK W/DYE: CPT

## 2020-09-22 PROCEDURE — 82565 ASSAY OF CREATININE: CPT

## 2020-09-22 PROCEDURE — 25000128 H RX IP 250 OP 636: Performed by: FAMILY MEDICINE

## 2020-09-22 PROCEDURE — 25000125 ZZHC RX 250: Performed by: FAMILY MEDICINE

## 2020-09-22 RX ORDER — IOPAMIDOL 755 MG/ML
100 INJECTION, SOLUTION INTRAVASCULAR ONCE
Status: COMPLETED | OUTPATIENT
Start: 2020-09-22 | End: 2020-09-22

## 2020-09-22 RX ADMIN — IOPAMIDOL 100 ML: 755 INJECTION, SOLUTION INTRAVENOUS at 11:27

## 2020-09-22 RX ADMIN — SODIUM CHLORIDE 80 ML: 9 INJECTION, SOLUTION INTRAVENOUS at 11:27

## 2020-10-01 ENCOUNTER — TELEPHONE (OUTPATIENT)
Dept: FAMILY MEDICINE | Facility: CLINIC | Age: 60
End: 2020-10-01

## 2020-10-01 DIAGNOSIS — I10 BENIGN ESSENTIAL HYPERTENSION: ICD-10-CM

## 2020-10-01 DIAGNOSIS — E53.8 VITAMIN B12 DEFICIENCY (NON ANEMIC): ICD-10-CM

## 2020-10-01 DIAGNOSIS — E83.110 HEREDITARY HEMOCHROMATOSIS (H): ICD-10-CM

## 2020-10-01 DIAGNOSIS — E03.9 ACQUIRED HYPOTHYROIDISM: Primary | ICD-10-CM

## 2020-10-01 NOTE — TELEPHONE ENCOUNTER
Spoke with the pt and she would also like to have her vitamin B levels checked. Lab appt scheduled for 10/14  Cindy Hinds,

## 2020-10-01 NOTE — TELEPHONE ENCOUNTER
Reason for Call: Request for an order or referral:    Order or referral being requested: Thyroid test    Date needed: anytime     Has the patient been seen by the PCP for this problem? YES    Additional comments: patient feels that she should be tested for her thyroid can she just have a test or does she need to be seen please    Phone number Patient can be reached at:  Cell number on file:    Telephone Information:   Mobile [phone]       Best Time:  anytime    Can we leave a detailed message on this number?  YES    Call taken on 10/1/2020 at 11:23 AM by Nurys Wiggins

## 2020-10-01 NOTE — TELEPHONE ENCOUNTER
Routing to provider for review and advise as appropriate.  Patient's last TSH w/free T4 was 9/17/2019 and patient is on cytomel.  Lab pended.    JOSE DialN, RN  [facility] Triage

## 2020-10-02 ENCOUNTER — TRANSFERRED RECORDS (OUTPATIENT)
Dept: HEALTH INFORMATION MANAGEMENT | Facility: CLINIC | Age: 60
End: 2020-10-02

## 2020-10-11 DIAGNOSIS — E03.9 ACQUIRED HYPOTHYROIDISM: ICD-10-CM

## 2020-10-12 RX ORDER — LEVOTHYROXINE SODIUM 75 UG/1
TABLET ORAL
Qty: 90 TABLET | Refills: 1 | Status: SHIPPED | OUTPATIENT
Start: 2020-10-12 | End: 2021-04-19

## 2020-10-12 RX ORDER — LIOTHYRONINE SODIUM 5 UG/1
TABLET ORAL
Qty: 180 TABLET | Refills: 1 | Status: SHIPPED | OUTPATIENT
Start: 2020-10-12 | End: 2021-04-19

## 2020-10-12 NOTE — TELEPHONE ENCOUNTER
Routing refill request to provider for review/approval because:  Labs not current:  TSH    JOSE DialN, RN  [facility] Triage

## 2020-10-14 DIAGNOSIS — E53.8 VITAMIN B12 DEFICIENCY (NON ANEMIC): ICD-10-CM

## 2020-10-14 DIAGNOSIS — E03.9 ACQUIRED HYPOTHYROIDISM: ICD-10-CM

## 2020-10-14 DIAGNOSIS — E83.110 HEREDITARY HEMOCHROMATOSIS (H): ICD-10-CM

## 2020-10-14 DIAGNOSIS — I10 BENIGN ESSENTIAL HYPERTENSION: ICD-10-CM

## 2020-10-14 LAB
ANION GAP SERPL CALCULATED.3IONS-SCNC: 7 MMOL/L (ref 3–14)
BUN SERPL-MCNC: 9 MG/DL (ref 7–30)
CALCIUM SERPL-MCNC: 8.9 MG/DL (ref 8.5–10.1)
CHLORIDE SERPL-SCNC: 93 MMOL/L (ref 94–109)
CHOLEST SERPL-MCNC: 181 MG/DL
CO2 SERPL-SCNC: 27 MMOL/L (ref 20–32)
CREAT SERPL-MCNC: 0.83 MG/DL (ref 0.52–1.04)
CREAT UR-MCNC: 65 MG/DL
GFR SERPL CREATININE-BSD FRML MDRD: 76 ML/MIN/{1.73_M2}
GLUCOSE SERPL-MCNC: 78 MG/DL (ref 70–99)
HDLC SERPL-MCNC: 73 MG/DL
LDLC SERPL CALC-MCNC: 69 MG/DL
MICROALBUMIN UR-MCNC: 7 MG/L
MICROALBUMIN/CREAT UR: 10.09 MG/G CR (ref 0–25)
NONHDLC SERPL-MCNC: 108 MG/DL
POTASSIUM SERPL-SCNC: 3.7 MMOL/L (ref 3.4–5.3)
SODIUM SERPL-SCNC: 127 MMOL/L (ref 133–144)
TRIGL SERPL-MCNC: 195 MG/DL
TSH SERPL DL<=0.005 MIU/L-ACNC: 1.71 MU/L (ref 0.4–4)
VIT B12 SERPL-MCNC: 703 PG/ML (ref 193–986)

## 2020-10-14 PROCEDURE — 80061 LIPID PANEL: CPT | Performed by: INTERNAL MEDICINE

## 2020-10-14 PROCEDURE — 36415 COLL VENOUS BLD VENIPUNCTURE: CPT | Performed by: INTERNAL MEDICINE

## 2020-10-14 PROCEDURE — 80048 BASIC METABOLIC PNL TOTAL CA: CPT | Performed by: INTERNAL MEDICINE

## 2020-10-14 PROCEDURE — 84443 ASSAY THYROID STIM HORMONE: CPT | Performed by: INTERNAL MEDICINE

## 2020-10-14 PROCEDURE — 82607 VITAMIN B-12: CPT | Performed by: INTERNAL MEDICINE

## 2020-10-14 PROCEDURE — 82043 UR ALBUMIN QUANTITATIVE: CPT | Performed by: INTERNAL MEDICINE

## 2020-10-15 DIAGNOSIS — F43.22 ADJUSTMENT DISORDER WITH ANXIOUS MOOD: ICD-10-CM

## 2020-10-15 DIAGNOSIS — I10 BENIGN ESSENTIAL HYPERTENSION: Primary | ICD-10-CM

## 2020-10-15 RX ORDER — LOSARTAN POTASSIUM 100 MG/1
100 TABLET ORAL DAILY
Qty: 90 TABLET | Refills: 0 | Status: SHIPPED | OUTPATIENT
Start: 2020-10-15 | End: 2021-01-05

## 2020-10-15 RX ORDER — CLONAZEPAM 0.5 MG/1
TABLET, ORALLY DISINTEGRATING ORAL
Qty: 30 TABLET | Refills: 0 | Status: SHIPPED | OUTPATIENT
Start: 2020-10-15 | End: 2020-11-12

## 2020-10-15 NOTE — TELEPHONE ENCOUNTER
Clonazepam 0.5 mg      Last Written Prescription Date:  9/16/20  Last Fill Quantity: 30,   # refills: 0  Last Office Visit: 9/18/20 Dup[place]  Future Office visit:    Next 5 appointments (look out 90 days)    Nov 24, 2020  1:10 PM  Return Visit with  LAB DRAW 1  [facility]) [facility]  [address]  [facility] [address]  [phone]   Nov 24, 2020  2:00 PM  Return Visit with Eloisa Rossi MD  [facility]) [address]  [facility] [address]  [phone]           Routing refill request to provider for review/approval because:  Drug not on the [facility], [facility] or [facility] refill protocol or controlled substance  RX monitoring program (MNPMP) reviewed:  not reviewed/not due - last done on 9/16/20    MNPMP profile:  [email or web address]    Patsy VIERA RN  EP Triage

## 2020-10-16 ENCOUNTER — MYC MEDICAL ADVICE (OUTPATIENT)
Dept: FAMILY MEDICINE | Facility: CLINIC | Age: 60
End: 2020-10-16

## 2020-10-30 ENCOUNTER — VIRTUAL VISIT (OUTPATIENT)
Dept: FAMILY MEDICINE | Facility: CLINIC | Age: 60
End: 2020-10-30
Payer: COMMERCIAL

## 2020-10-30 ENCOUNTER — NURSE TRIAGE (OUTPATIENT)
Dept: NURSING | Facility: CLINIC | Age: 60
End: 2020-10-30

## 2020-10-30 DIAGNOSIS — R11.2 NAUSEA AND VOMITING, INTRACTABILITY OF VOMITING NOT SPECIFIED, UNSPECIFIED VOMITING TYPE: ICD-10-CM

## 2020-10-30 DIAGNOSIS — K52.9 ACUTE GASTROENTERITIS: Primary | ICD-10-CM

## 2020-10-30 PROCEDURE — 99213 OFFICE O/P EST LOW 20 MIN: CPT | Mod: TEL | Performed by: INTERNAL MEDICINE

## 2020-10-30 RX ORDER — ONDANSETRON 4 MG/1
4 TABLET, FILM COATED ORAL EVERY 8 HOURS PRN
Qty: 12 TABLET | Refills: 0 | Status: SHIPPED | OUTPATIENT
Start: 2020-10-30 | End: 2021-01-05

## 2020-10-30 RX ORDER — ANTIARTHRITIC COMBINATION NO.2 900 MG
1 TABLET ORAL DAILY
COMMUNITY

## 2020-10-30 NOTE — PROGRESS NOTES
"Irina Hanks is a 60 year old female who is being evaluated via a billable telephone visit.      The patient has been notified of following:     \"This telephone visit will be conducted via a call between you and your physician/provider. We have found that certain health care needs can be provided without the need for a physical exam.  This service lets us provide the care you need with a short phone conversation.  If a prescription is necessary we can send it directly to your pharmacy.  If lab work is needed we can place an order for that and you can then stop by our lab to have the test done at a later time.    Telephone visits are billed at different rates depending on your insurance coverage. During this emergency period, for some insurers they may be billed the same as an in-person visit.  Please reach out to your insurance provider with any questions.    If during the course of the call the physician/provider feels a telephone visit is not appropriate, you will not be charged for this service.\"    Patient has given verbal consent for Telephone visit?  Yes    What phone number would you like to be contacted at? [phone]    How would you like to obtain your AVS? Deb Lyon     Irina Hanks is a 60 year old female who presents via phone visit today for the following health issues:    HPI     Acute Illness  Acute illness concerns: Nausea, dizziness, vomiting.  Dr. Fernandes is monitoring her BP, and yesterday it was 157/104, and today 156/91.  Onset/Duration: 2 days  Symptoms:  Fever: no  Chills/Sweats: YES- chills  Headache (location?): YES  Sinus Pressure: no  Conjunctivitis:  no  Ear Pain: no  Rhinorrhea: no  Congestion: no  Sore Throat: no  Cough: YES  Wheeze: no  Decreased Appetite: YES  Nausea: YES  Vomiting: YES  Diarrhea: YES  Dysuria/Freq.: no  Dysuria or Hematuria: no  Fatigue/Achiness: YES  Sick/Strep Exposure: no  Therapies tried and outcome: Emetrol, and Imodium    Peg got home " "[place]. After she got home got \"hit\" with nausea, headache, lightheadedness, diarrhea.  She had numerous episodes of vomiting and diarrhea.  She stayed mostly in bed until early this afternoon.  Thankfully now the nausea is improved and the vomiting and diarrhea seems to have subsided.  She tried over-the-counter Emetrol for the nausea, but that made her gag.  Her  bought her some Gatorade, but she does not really like that either.  Certainly does not have an appetite.    As far as she knows her 2 friends are not sick, but she has not talked to them since she got home.  She did order [employer] yesterday, but sounds like she only ate a couple of bites before deciding she did not want it.  No other out of the ordinary food.  No known sick contacts.    She has a history of hyponatremia, last blood work on 10/14 showed a sodium of 127.  Her hydrochlorothiazide was stopped at that time and exchanged for losartan.  She is concerned that the symptoms might be due to hyponatremia.      Review of Systems   Constitutional, HEENT, cardiovascular, pulmonary, gi and gu systems are negative, except as otherwise noted.       Objective          Vitals:  No vitals were obtained today due to virtual visit.    healthy, alert and no distress  PSYCH: Alert and oriented times 3; coherent speech, normal   rate and volume, able to articulate logical thoughts, able   to abstract reason, no tangential thoughts, no hallucinations   or delusions  Her affect is normal  RESP: No cough, no audible wheezing, able to talk in full sentences  Remainder of exam unable to be completed due to telephone visits            Assessment/Plan:    Assessment & Plan     Acute gastroenteritis  Acute onset of symptoms with improvement in < 24hrs. Do not suspect COVID.  She is concerned about her sodium level, since that has been low previously.  She was taken off hydrochlorothiazide a couple weeks ago and switched to " losartan, so presumably her sodium going into this illness was more in the normal range.  Recommended that since the vomiting and diarrhea have slowed significantly if she is able to keep down fluids, it would be okay to keep her lab appointment that was already scheduled next week.  She can do broth or juice in addition to Gatorade if she does not like that.  If she starts to have more vomiting and/or diarrhea and is unable to keep up with fluids over the weekend, recommended she go to the ER.    Nausea and vomiting, intractability of vomiting not specified, unspecified vomiting type  - ondansetron (ZOFRAN) 4 MG tablet; Take 1 tablet (4 mg) by mouth every 8 hours as needed for nausea            Return in about 1 week (around 11/6/2020) for Lab Work.    Keely Adams MD  [facility]    Phone call duration:  11 minutes

## 2020-10-30 NOTE — TELEPHONE ENCOUNTER
"Pt calling back after Virtual Visit today. Pt states she currently has a fever which she did not have at time of visit. Oral temp within past 15 minutes 101.0. Pt reports \"still very dizzy and lightheaded\". Pt reports she does have some immunosuppression. Pt had visit today for vomiting/diarrhea. Pt reports she has been drinking Gatorade and eating saltines.    Advised pt to be seen within the next four hours per protocol. Continue with Gatorade and saltines, perhaps add some juice.     Pt verbalizes understanding and agrees to plan.      Additional Information    [1] Fever > 101 F (38.3 C) AND [2] age > 60    Protocols used: VOMITING-A-AH      "

## 2020-10-31 ENCOUNTER — NURSE TRIAGE (OUTPATIENT)
Dept: NURSING | Facility: CLINIC | Age: 60
End: 2020-10-31

## 2020-10-31 NOTE — TELEPHONE ENCOUNTER
Peg is calling and spoke with MD Adams yesterday about symptoms.  Last night a fever of 101 started and diarrhea and nausea and faintness.  Today Peg is requesting to get tested for covid 19.  FNA advised to go to on care and Peg agreed.  Denies chest pain.    COVID 19 Nurse Triage Plan/Patient Instructions    Please be aware that novel coronavirus (COVID-19) may be circulating in the community. If you develop symptoms such as fever, cough, or SOB or if you have concerns about the presence of another infection including coronavirus (COVID-19), please contact your health care provider or visit [email or web address].     Disposition/Instructions    Virtual Visit with provider recommended. Reference Visit Selection Guide.    Thank you for taking steps to prevent the spread of this virus.  o Limit your contact with others.  o Wear a simple mask to cover your cough.  o Wash your hands well and often.    Resources    [facility]: About COVID-19: [email or web address]    CDC: What to Do If You're Sick: www.cdc.gov/coronavirus/2019-ncov/about/steps-when-sick.html    CDC: Ending Home Isolation: www.cdc.gov/coronavirus/2019-ncov/hcp/disposition-in-home-patients.html     CDC: Caring for Someone: www.cdc.gov/coronavirus/2019-ncov/if-you-are-sick/care-for-someone.html     [facility]: Interim Guidance for Hospital Discharge to Home: www.health.[facility].mn.us/diseases/coronavirus/hcp/hospdischarge.pdf    [facility] clinical trials (COVID-19 research studies): clinicalaffairs.[facility]-clinical-trials     Below are the COVID-19 hotlines at the [place] Department of Health ([facility]). Interpreters are available.   o For health questions: Call 997-450-2846 or 1-170.863.6920 (7 a.m. to 7 p.m.)  o For questions about schools and childcare: Call 937-965-2403 or 1-171.501.1219 (7 a.m. to 7 p.m.)                         Reason for Disposition    [1] COVID-19 infection suspected by caller or triager AND [2] mild symptoms  (cough, fever, or others) AND [3] no complications or SOB    Additional Information    Negative: SEVERE difficulty breathing (e.g., struggling for each breath, speaks in single words)    Negative: Difficult to awaken or acting confused (e.g., disoriented, slurred speech)    Negative: Bluish (or gray) lips or face now    Negative: Shock suspected (e.g., cold/pale/clammy skin, too weak to stand, low BP, rapid pulse)    Negative: Sounds like a life-threatening emergency to the triager    Negative: Patient sounds very sick or weak to the triager    Negative: MILD difficulty breathing (e.g., minimal/no SOB at rest, SOB with walking, pulse <100)    Negative: Chest pain or pressure    Negative: Fever > 103 F (39.4 C)    Negative: [1] Fever > 101 F (38.3 C) AND [2] age > 60    Negative: [1] Fever > 100.0 F (37.8 C) AND [2] bedridden (e.g., nursing home patient, CVA, chronic illness, recovering from surgery)    Negative: HIGH RISK patient (e.g., age > 64 years, diabetes, heart or lung disease, weak immune system) (Exception: Has already been evaluated by healthcare provider and has no new or worsening symptoms)    Negative: Fever present > 3 days (72 hours)    Negative: [1] Fever returns after gone for over 24 hours AND [2] symptoms worse or not improved    Negative: [1] Continuous (nonstop) coughing interferes with work or school AND [2] no improvement using cough treatment per protocol    Protocols used: CORONAVIRUS (COVID-19) DIAGNOSED OR [id number]-HR 8.4.20

## 2020-11-01 ENCOUNTER — VIRTUAL VISIT (OUTPATIENT)
Dept: FAMILY MEDICINE | Facility: OTHER | Age: 60
End: 2020-11-01

## 2020-11-01 NOTE — PROGRESS NOTES
"Date: 2020 15:02:14  Clinician: Agustina Dobson  Clinician NPI: [id number]  Patient: Irina Zhao  Patient : 1960  Patient Address: [address], [place] [address]  Patient Phone: [phone]  Visit Protocol: URI  Patient Summary:  Irina is a 60 year old ( : 1960 ) female who initiated a [facility] Visit for COVID-19 (Coronavirus) evaluation and screening. When asked the question \"Please sign me up to receive news, health information and promotions from [facility].\", Irina responded \"No\".    Irina states her symptoms started suddenly 3-4 days ago.   Her symptoms consist of a headache, a cough, nausea, myalgia, chills, malaise, a sore throat, diarrhea, and vomiting. She is experiencing mild difficulty breathing with activities but can speak normally in full sentences. Irina also feels feverish.   Symptom details     Cough: Irina coughs a few times an hour and her cough is more bothersome at night. Phlegm does not come into her throat when she coughs. She does not believe her cough is caused by post-nasal drip.     Sore throat: Irina reports having mild throat pain (1-3 on a 10 point pain scale), does not have exudate on her tonsils, and can swallow liquids. The lymph nodes in her neck are not enlarged. A rash has not appeared on the skin since the sore throat started.     Temperature: Her current temperature is 99 degrees Fahrenheit.     Headache: She states the headache is moderate (4-6 on a 10 point pain scale).      Irina denies having ear pain, wheezing, enlarged lymph nodes, nasal congestion, facial pain or pressure, teeth pain, ageusia, anosmia, and rhinitis. She also denies having recent facial or sinus surgery in the past 60 days and double sickening (worsening symptoms after initial improvement).   Precipitating events  Irina is not sure if she has been exposed to someone with strep throat. She has not recently been exposed to someone with influenza. " Irina has been in close contact with the following high risk individuals: immunocompromised people, adults 65 or older, and people with asthma, heart disease or diabetes.   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 In the past 14 days, Irina has worked or volunteered at an assisted living. Additional job details as reported by the patient (free text): I work at [employer] and help take care of my 100 year old mother and I have 5 autoimmune diseases.   In the past 14 days, she has not lived in a congregate living setting.   Irina has not had a close contact with a laboratory-confirmed COVID-19 patient within 14 days of symptom onset.    Since December 2019, Irina has not been diagnosed with lab-confirmed COVID-19 test and has had upper respiratory infection (URI) or influenza-like illness.      Date(s) of previous URI or influenza-like illness (free-text): Related to in the last 4 days. Not sure if it is flu or covid     Symptoms Irina experienced during previous URI or influenza-like illness as reported by the patient (free-text): Fever,vomiting, headache ,dizziness , diarrhea        Pertinent medical history  Irina had 2 sinus infections within the past year.   Irina has taken an antibiotic medication in the past month. Antibiotic details as reported by the patient (free text): Ondansetron 4 mg for nausea along with my daily antibiotics   Irina typically gets a yeast infection when she takes antibiotics. She has used fluconazole (Diflucan) to treat previous yeast infections. 2 doses of fluconazole (Diflucan) has typically been needed for symptoms to resolve in the past.  Irina does not need a return to work/school note.   Weight: 150 lbs   Irina does not smoke or use smokeless tobacco.   Additional information as reported by the patient (free text): Please note  that I will not be returning to work at the [employer] or to my Mothers care untill I get a covid test. I also have my Mother in laws  to go to on  but need to know I am negative first.  Thank you!  Cherise Zhao   Weight: 150 lbs  Reason for repeat visit for the same protocol within 24 hours:  Because it would not let me proceed as it was asking for a picture of a rash, which I do not have.   I just want a doctor to call me to set up an apt for a covid test.   I believe I am a valid candidate!    And have many reasons to get tested. As it is noted in my [facility] visit  minutes ago.   Thank you, Cherise Zhao. [phone]  See the History of referred by protocol and completed visits section for details on previous visits (visits currently in queue to be diagnosed will not appear in this section).    MEDICATIONS: clobetasol propionate (bulk), ipratropium bromide inhalation, liothyronine oral, levothyroxine oral, losartan oral, hydroxychloroquine oral, metoprolol succinate oral, ALLERGIES: NKDA  Clinician Response:  Dear Irina,   Your symptoms show that you may have coronavirus (COVID-19). This illness can cause fever, cough and trouble breathing. Many people get a mild case and get better on their own. Some people can get very sick.  What should I do?  We would like to test you for this virus.   1. Please call [phone] to schedule your visit. Explain that you were referred by [facility] to have a COVID-19 test. Be ready to share your [facility] visit ID number.  The following will serve as your written order for this COVID Test, ordered by me, for the indication of suspected COVID [Z20.828]: The test will be ordered in [employer], our electronic health record, after you are scheduled. It will show as ordered and authorized by Mario Khan MD.  Order: COVID-19 (Coronavirus) PCR for SYMPTOMATIC testing from [facility].   2. When it's time for your COVID test:  Stay at least 6 feet away from others. (If someone will  "drive you to your test, stay in the backseat, as far away from the  as you can.)   Cover your mouth and nose with a mask, tissue or washcloth.  Go straight to the testing site. Don't make any stops on the way there or back.      3.Starting now: Stay home and away from others (self-isolate) until:   You've had no fever---and no medicine that reduces fever---for one full day (24 hours). And...   Your other symptoms have gotten better. For example, your cough or breathing has improved. And...   At least 10 days have passed since your symptoms started.       During this time, don't leave the house except for testing or medical care.   Stay in your own room, even for meals. Use your own bathroom if you can.   Stay away from others in your home. No hugging, kissing or shaking hands. No visitors.  Don't go to work, school or anywhere else.    Clean \"high touch\" surfaces often (doorknobs, counters, handles, etc.). Use a household cleaning spray or wipes. You'll find a full list of  on the EPA website: www.epa.gov/pesticide-registration/list-n-disinfectants-use-against-sars-cov-2.   Cover your mouth and nose with a mask, tissue or washcloth to avoid spreading germs.  Wash your hands and face often. Use soap and water.  Caregivers in these groups are at risk for severe illness due to COVID-19:  o People 65 years and older  o People who live in a nursing home or long-term care facility  o People with chronic disease (lung, heart, cancer, diabetes, kidney, liver, immunologic)  o People who have a weakened immune system, including those who:   Are in cancer treatment  Take medicine that weakens the immune system, such as corticosteroids  Had a bone marrow or organ transplant  Have an immune deficiency  Have poorly controlled HIV or AIDS  Are obese (body mass index of 40 or higher)  Smoke regularly   o Caregivers should wear gloves while washing dishes, handling laundry and cleaning bedrooms and bathrooms.  o Use " caution when washing and drying laundry: Don't shake dirty laundry, and use the warmest water setting that you can.  o For more tips, go to www.cdc.gov/coronavirus/2019-ncov/downloads/10Things.pdf.       How can I take care of myself?   Get lots of rest. Drink extra fluids (unless a doctor has told you not to).   Take Tylenol (acetaminophen) for fever or pain. If you have liver or kidney problems, ask your family doctor if it's okay to take Tylenol.   Adults can take either:    650 mg (two 325 mg pills) every 4 to 6 hours, or...   1,000 mg (two 500 mg pills) every 8 hours as needed.    Note: Don't take more than 3,000 mg in one day. Acetaminophen is found in many medicines (both prescribed and over-the-counter medicines). Read all labels to be sure you don't take too much.   For children, check the Tylenol bottle for the right dose. The dose is based on the child's age or weight.    If you have other health problems (like cancer, heart failure, an organ transplant or severe kidney disease): Call your specialty clinic if you don't feel better in the next 2 days.       Know when to call 911. Emergency warning signs include:    Trouble breathing or shortness of breath Pain or pressure in the chest that doesn't go away Feeling confused like you haven't felt before, or not being able to wake up Bluish-colored lips or face.  Where can I get more information?   [facility] -- About COVID-19: [email or web address]   CDC -- What to Do If You're Sick: www.cdc.gov/coronavirus/2019-ncov/about/steps-when-sick.html   CDC -- Ending Home Isolation: www.cdc.gov/coronavirus/2019-ncov/hcp/disposition-in-home-patients.html   CDC -- Caring for Someone: www.cdc.gov/coronavirus/2019-ncov/if-you-are-sick/care-for-someone.html   [facility] -- Interim Guidance for Hospital Discharge to Home: www.health.[facility].mn.us/diseases/coronavirus/hcp/hospdischarge.pdf   [facility] clinical trials (COVID-19 research studies):  clinicalaffairs.[facility]-clinical-trials    Below are the COVID-19 hotlines at the [place] Department of Health ([facility]). Interpreters are available.    For health questions: Call 085-516-2375 or 1-784.590.8065 (7 a.m. to 7 p.m.) For questions about schools and childcare: Call 072-202-2775 or 1-482.918.5451 (7 a.m. to 7 p.m.)    Diagnosis: Contact with and (suspected) exposure to other viral communicable diseases  Diagnosis ICD: Z20.828

## 2020-11-12 DIAGNOSIS — F43.22 ADJUSTMENT DISORDER WITH ANXIOUS MOOD: ICD-10-CM

## 2020-11-12 RX ORDER — CLONAZEPAM 0.5 MG/1
TABLET, ORALLY DISINTEGRATING ORAL
Qty: 30 TABLET | Refills: 5 | Status: SHIPPED | OUTPATIENT
Start: 2020-11-12 | End: 2021-04-05

## 2020-11-12 NOTE — TELEPHONE ENCOUNTER
Clonazepam 0.5 mg      Last Written Prescription Date:  10/15/20  Last Fill Quantity: 30,   # refills: 0  Last Office Visit: 10/30/20 virtual D[place]  Future Office visit:    Next 5 appointments (look out 90 days)    Nov 24, 2020  1:10 PM  Return Visit with  LAB DRAW 1  [facility]) [facility]  [address]  [facility] [address]  [phone]   Nov 24, 2020  2:00 PM  Return Visit with Eloisa Rossi MD  [facility]) [address], [address]  [facility] [address]  [phone]           Routing refill request to provider for review/approval because:  Drug not on the [facility], [facility] or [facility] refill protocol or controlled substance    [place] ([place] reviewed:  not reviewed/not due - last done on 9/16/20    [place]:  [email or web address]    Patsy VIERA RN  EP Triage

## 2020-11-18 DIAGNOSIS — I10 BENIGN ESSENTIAL HYPERTENSION: ICD-10-CM

## 2020-11-18 PROCEDURE — 84295 ASSAY OF SERUM SODIUM: CPT | Performed by: INTERNAL MEDICINE

## 2020-11-18 PROCEDURE — 36415 COLL VENOUS BLD VENIPUNCTURE: CPT | Performed by: INTERNAL MEDICINE

## 2020-11-19 LAB — SODIUM SERPL-SCNC: 131 MMOL/L (ref 133–144)

## 2020-11-24 ENCOUNTER — HOSPITAL ENCOUNTER (OUTPATIENT)
Facility: CLINIC | Age: 60
Setting detail: SPECIMEN
Discharge: HOME | End: 2020-11-24
Attending: INTERNAL MEDICINE | Admitting: INTERNAL MEDICINE
Payer: COMMERCIAL

## 2020-11-24 ENCOUNTER — INFUSION THERAPY VISIT (OUTPATIENT)
Dept: INFUSION THERAPY | Facility: CLINIC | Age: 60
End: 2020-11-24
Attending: INTERNAL MEDICINE
Payer: COMMERCIAL

## 2020-11-24 ENCOUNTER — ONCOLOGY VISIT (OUTPATIENT)
Dept: ONCOLOGY | Facility: CLINIC | Age: 60
End: 2020-11-24
Attending: INTERNAL MEDICINE
Payer: COMMERCIAL

## 2020-11-24 VITALS — OXYGEN SATURATION: 99 % | SYSTOLIC BLOOD PRESSURE: 157 MMHG | DIASTOLIC BLOOD PRESSURE: 92 MMHG | HEART RATE: 75 BPM

## 2020-11-24 VITALS — SYSTOLIC BLOOD PRESSURE: 148 MMHG | DIASTOLIC BLOOD PRESSURE: 96 MMHG

## 2020-11-24 DIAGNOSIS — E83.19 IRON OVERLOAD: Primary | ICD-10-CM

## 2020-11-24 DIAGNOSIS — E83.110 HEREDITARY HEMOCHROMATOSIS (H): ICD-10-CM

## 2020-11-24 DIAGNOSIS — E83.110 HEREDITARY HEMOCHROMATOSIS (H): Primary | ICD-10-CM

## 2020-11-24 LAB
ALT SERPL W P-5'-P-CCNC: 21 U/L (ref 0–50)
AST SERPL W P-5'-P-CCNC: 19 U/L (ref 0–45)
ERYTHROCYTE [DISTWIDTH] IN BLOOD BY AUTOMATED COUNT: 12.6 % (ref 10–15)
FERRITIN SERPL-MCNC: 98 NG/ML (ref 8–252)
HCT VFR BLD AUTO: 36.4 % (ref 35–47)
HGB BLD-MCNC: 12.8 G/DL (ref 11.7–15.7)
MCH RBC QN AUTO: 33.2 PG (ref 26.5–33)
MCHC RBC AUTO-ENTMCNC: 35.2 G/DL (ref 31.5–36.5)
MCV RBC AUTO: 94 FL (ref 78–100)
PLATELET # BLD AUTO: 276 10E9/L (ref 150–450)
RBC # BLD AUTO: 3.86 10E12/L (ref 3.8–5.2)
WBC # BLD AUTO: 6.2 10E9/L (ref 4–11)

## 2020-11-24 PROCEDURE — 82728 ASSAY OF FERRITIN: CPT | Performed by: INTERNAL MEDICINE

## 2020-11-24 PROCEDURE — 85027 COMPLETE CBC AUTOMATED: CPT | Performed by: INTERNAL MEDICINE

## 2020-11-24 PROCEDURE — 84450 TRANSFERASE (AST) (SGOT): CPT | Performed by: INTERNAL MEDICINE

## 2020-11-24 PROCEDURE — 99213 OFFICE O/P EST LOW 20 MIN: CPT | Performed by: INTERNAL MEDICINE

## 2020-11-24 PROCEDURE — 36415 COLL VENOUS BLD VENIPUNCTURE: CPT

## 2020-11-24 PROCEDURE — 84460 ALANINE AMINO (ALT) (SGPT): CPT | Performed by: INTERNAL MEDICINE

## 2020-11-24 PROCEDURE — G0463 HOSPITAL OUTPT CLINIC VISIT: HCPCS

## 2020-11-24 RX ORDER — METOPROLOL SUCCINATE 50 MG/1
TABLET, EXTENDED RELEASE ORAL EVERY 24 HOURS
COMMUNITY
End: 2020-11-24

## 2020-11-24 RX ORDER — CLOBETASOL PROPIONATE 0.5 MG/ML
SOLUTION TOPICAL DAILY
Status: ON HOLD | COMMUNITY
Start: 2020-11-18 | End: 2023-10-21

## 2020-11-24 ASSESSMENT — MIFFLIN-ST. JEOR: SCORE: 1215.88

## 2020-11-24 ASSESSMENT — PAIN SCALES - GENERAL: PAINLEVEL: NO PAIN (0)

## 2020-11-24 NOTE — PROGRESS NOTES
Infusion Nursing Note:  Irina Hanks presents today for Phlebotomy.    Patient seen by provider today: Yes: Dr. Rossi   present during visit today: Not Applicable.    Note: Had Phlebotomy with 300 ml off.    Intravenous Access:  N/A.    Treatment Conditions:  Lab Results   Component Value Date    HGB 12.8 11/24/2020     Lab Results   Component Value Date    WBC 6.2 11/24/2020      Lab Results   Component Value Date    ANEU 2.0 09/17/2019     Lab Results   Component Value Date     11/24/2020      Lab Results   Component Value Date     11/18/2020                   Lab Results   Component Value Date    POTASSIUM 3.7 10/14/2020           No results found for: MAG         Lab Results   Component Value Date    CR 0.83 10/14/2020                   Lab Results   Component Value Date    LUCA 8.9 10/14/2020                Lab Results   Component Value Date    BILITOTAL 0.9 06/18/2019           Lab Results   Component Value Date    ALBUMIN 4.0 06/18/2019                    Lab Results   Component Value Date    ALT 21 11/24/2020           Lab Results   Component Value Date    AST 19 11/24/2020           Post Infusion Assessment:  Patient tolerated phlebotomy without incident.  Site patent and intact, free from redness, edema or discomfort.  No evidence of extravasations.  Access discontinued per protocol.       Discharge Plan:   Patient declined prescription refills.  Discharge instructions reviewed with: Patient.  Patient and/or family verbalized understanding of discharge instructions and all questions answered.  AVS to patient via [employer].  Patient will return 11/30 for next appointment.   Patient discharged in stable condition accompanied by: self.  Departure Mode: Ambulatory.    Dante Hernandez RN

## 2020-11-24 NOTE — PROGRESS NOTES
"Oncology Rooming Note    November 24, 2020 1:26 PM   Irina Hanks is a 60 year old female who presents for:    No chief complaint on file.    Initial Vitals: There were no vitals taken for this visit. Estimated body mass index is 26.82 kg/m  as calculated from the following:    Height as of 9/18/20: 1.6 m (5' 3\").    Weight as of 9/18/20: 68.7 kg (151 lb 6.4 oz). There is no height or weight on file to calculate BSA.  Data Unavailable Comment: Data Unavailable   No LMP recorded. Patient is postmenopausal.  Allergies reviewed: Yes  Medications reviewed: Yes    Medications: Medication refills not needed today.  Pharmacy name entered into [facility]:    [facility] [place] - [address] [facility][id number] - [place] [address] [facility] [place] [address] [facility] [address]  [id number] - [place] - [address] [facility] [address] [facility] [address][id number] - [place], [place] - [address] [id number] - [place], [place] - [address] AT [address]  [facility] - [place], [place] - [address]  [id number] [place] - [address] AT [address]    Clinical concerns: No       Irina Vee MA              "

## 2020-11-24 NOTE — PATIENT INSTRUCTIONS
1. Labs every 3 months and phlebotomy as needed.  2. See me in 1 year with labs.  3. Recheck BP. Rechecked MG    Patient in [facility].  Please call to schedule.

## 2020-11-24 NOTE — LETTER
"    11/24/2020         RE: Irina Hanks  [address]  [place] [address]        Dear Colleague,    Thank you for referring your patient, Irina Hanks, to the [facility] [place]. Please see a copy of my visit note below.    Oncology Rooming Note    November 24, 2020 1:26 PM   Irina Hanks is a 60 year old female who presents for:    No chief complaint on file.    Initial Vitals: There were no vitals taken for this visit. Estimated body mass index is 26.82 kg/m  as calculated from the following:    Height as of 9/18/20: 1.6 m (5' 3\").    Weight as of 9/18/20: 68.7 kg (151 lb 6.4 oz). There is no height or weight on file to calculate BSA.  Data Unavailable Comment: Data Unavailable   No LMP recorded. Patient is postmenopausal.  Allergies reviewed: Yes  Medications reviewed: Yes    Medications: Medication refills not needed today.  Pharmacy name entered into [employer]:    [facility] [place] - [address] [facility] #[id number] - [place] - [address] [facility] [address] [place] [address] [facility] [address]  [facility][id number] - [place] - [address] [facility] [address] [facility] OF [address] [place] [address] [address]0729 - [place], [place] - [address] [id number] - [place], [place] - [address] AT [address]  [place] - [place], [place] - [address]  [id number] - [place], [place] - [address] AT [address] [place] [address]    Clinical concerns: No       Irina Vee MA                Visit Date:   11/24/2020     HEMATOLOGY HISTORY: Ms. Zhao is a female with hereditary hemochromatosis. Homozygous for H63D mutation.   1.  On 06/15/2016, ferritin of 506.   -  On 06/12/2018,  ALT of 240 and AST of 136.   2.  On 06/15/2018, Iron of 138, ferritin of 1140 and saturation of 66%.   3.  Ultrasound of abdomen and " pelvis on 2018 is normal.   4. On 2018, HFE gene analysis reveals patient to be homozygous for H63D mutation.   5. CT abdomen and pelvis on 2018 is normal.  Liver looks normal.   6. Because of elevated LFT, phlebotomy started on 2018.      SUBJECTIVE:  Ms. 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  The patient gets phlebotomy when ferritin is above 50.      The patient has lichen planus.  She has been having lesions on the scalp.  She follows up with dermatologist.  That has been bothering her.      She has mild fatigue. No headache.  No dizziness.  No chest pain. No shortness of breath.  No nausea or vomiting.  No bleeding.  No fever or chills.      All other review of systems negative.      PHYSICAL EXAMINATION:   GENERAL:  She is alert, oriented x 3.   VITAL SIGNS:  Reviewed.  She is not in distress.   The rest of systems not examined.      LABORATORY DATA:  Reviewed.      ASSESSMENT:   1.  Hereditary hemochromatosis.   2.  Lichen planus.     PLAN:  1.  The patient is doing well from iron overload.  The patient's ferritin is 98.  She will get phlebotomy.   2.  CBC will be checked every 3 months, and phlebotomy will be done for ferritin above 50.   3.  I will see her in 1 year's time.  I advised her to call us with any questions or concerns.   4.  Her blood pressure is elevated.  She is asymptomatic from it.  We will recheck it before she leaves.         RACHEL CHAPMAN MD             D: 2020   T: 2020   MT: JENSEN      Name:     RO GARCÍA   MRN:      [id number]        Account:      [id number]   :      1960           Visit Date:   2020      Document: [id number]      This office note has been dictated.          Again, thank you for allowing me to participate in the care of your patient.        Sincerely,        Rachel Chapman MD

## 2020-11-24 NOTE — PROGRESS NOTES
Medical Assistant Note:  Irina Hanks presents today for blood draw.    Patient seen by provider today: Yes: sadia.   present during visit today: Not Applicable.    Concerns: No Concerns.    Procedure:  Lab draw site: left hand, Needle type: bf, Gauge: 23.    Post Assessment:  Labs drawn without difficulty: Yes.    Discharge Plan:  Departure Mode: Ambulatory.    Face to Face Time: 5 min  .    Viki Arroyo CMA

## 2020-11-25 ENCOUNTER — TELEPHONE (OUTPATIENT)
Dept: ONCOLOGY | Facility: CLINIC | Age: 60
End: 2020-11-25

## 2020-11-25 NOTE — TELEPHONE ENCOUNTER
Patient called to inform clinic that she will have labs drawn at her primary [facility] in 3 months.

## 2020-11-27 ENCOUNTER — TELEPHONE (OUTPATIENT)
Dept: ONCOLOGY | Facility: CLINIC | Age: 60
End: 2020-11-27

## 2020-11-27 NOTE — TELEPHONE ENCOUNTER
Message left for patient to schedule return visits as per Dr Rossi 11-24-20 discharge notes:  1. Labs every 3 months and phlebotomy as needed.  2. See me in 1 year with labs.

## 2020-11-28 NOTE — PROGRESS NOTES
Visit Date:   2020     HEMATOLOGY HISTORY: Ms. Zhao is a female with hereditary hemochromatosis. Homozygous for H63D mutation.   1.  On 06/15/2016, ferritin of 506.   -  On 2018,  ALT of 240 and AST of 136.   2.  On 06/15/2018, Iron of 138, ferritin of 1140 and saturation of 66%.   3.  Ultrasound of abdomen and pelvis on 2018 is normal.   4. On 2018, HFE gene analysis reveals patient to be homozygous for H63D mutation.   5. CT abdomen and pelvis on 2018 is normal.  Liver looks normal.   6. Because of elevated LFT, phlebotomy started on 2018.      SUBJECTIVE:  Ms. 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  The patient gets phlebotomy when ferritin is above 50.      The patient has lichen planus.  She has been having lesions on the scalp.  She follows up with dermatologist.  That has been bothering her.      She has mild fatigue. No headache.  No dizziness.  No chest pain. No shortness of breath.  No nausea or vomiting.  No bleeding.  No fever or chills.      All other review of systems negative.      PHYSICAL EXAMINATION:   GENERAL:  She is alert, oriented x 3.   VITAL SIGNS:  Reviewed.  She is not in distress.   The rest of systems not examined.      LABORATORY DATA:  Reviewed.      ASSESSMENT:   1.  Hereditary hemochromatosis.   2.  Lichen planus.     PLAN:  1.  The patient is doing well from iron overload.  The patient's ferritin is 98.  She will get phlebotomy.   2.  CBC will be checked every 3 months, and phlebotomy will be done for ferritin above 50.   3.  I will see her in 1 year's time.  I advised her to call us with any questions or concerns.   4.  Her blood pressure is elevated.  She is asymptomatic from it.  We will recheck it before she leaves.         RACHEL CHAPMAN MD             D: 2020   T: 2020   MT: JENSEN      Name:     RO GARCÍA   MRN:      [id number]        Account:      [id number]   :       1960           Visit Date:   11/24/2020      Document: [id number]

## 2020-11-30 ENCOUNTER — HOSPITAL ENCOUNTER (OUTPATIENT)
Dept: MAMMOGRAPHY | Facility: CLINIC | Age: 60
Discharge: HOME | End: 2020-11-30
Attending: INTERNAL MEDICINE | Admitting: INTERNAL MEDICINE
Payer: COMMERCIAL

## 2020-11-30 ENCOUNTER — PATIENT OUTREACH (OUTPATIENT)
Dept: ONCOLOGY | Facility: CLINIC | Age: 60
End: 2020-11-30

## 2020-11-30 DIAGNOSIS — Z12.31 VISIT FOR SCREENING MAMMOGRAM: ICD-10-CM

## 2020-11-30 PROCEDURE — 77063 BREAST TOMOSYNTHESIS BI: CPT

## 2020-11-30 NOTE — PROGRESS NOTES
Writer received a call that patient would like to start ASA because she feels her blood is so thick.    Dr. Rossi to advise.    Dr. Rossi aware of patient's wishes and said it was ok for patient to start a baby ASA, but it was not indicated.     Writer to call patient in the morning.     Danielle Newman RN

## 2021-01-05 ENCOUNTER — VIRTUAL VISIT (OUTPATIENT)
Dept: FAMILY MEDICINE | Facility: CLINIC | Age: 61
End: 2021-01-05
Payer: COMMERCIAL

## 2021-01-05 DIAGNOSIS — E83.110 HEREDITARY HEMOCHROMATOSIS (H): ICD-10-CM

## 2021-01-05 DIAGNOSIS — M06.9 RHEUMATOID ARTHRITIS INVOLVING MULTIPLE SITES, UNSPECIFIED WHETHER RHEUMATOID FACTOR PRESENT (H): ICD-10-CM

## 2021-01-05 DIAGNOSIS — N93.9 VAGINAL BLEEDING: ICD-10-CM

## 2021-01-05 DIAGNOSIS — N93.9 VAGINAL BLEEDING: Primary | ICD-10-CM

## 2021-01-05 DIAGNOSIS — K64.4 EXTERNAL HEMORRHOIDS: ICD-10-CM

## 2021-01-05 DIAGNOSIS — I10 BENIGN ESSENTIAL HYPERTENSION: ICD-10-CM

## 2021-01-05 LAB
ALBUMIN UR-MCNC: NEGATIVE MG/DL
APPEARANCE UR: CLEAR
BILIRUB UR QL STRIP: NEGATIVE
COLOR UR AUTO: YELLOW
GLUCOSE UR STRIP-MCNC: NEGATIVE MG/DL
HGB UR QL STRIP: NEGATIVE
KETONES UR STRIP-MCNC: NEGATIVE MG/DL
LEUKOCYTE ESTERASE UR QL STRIP: ABNORMAL
NITRATE UR QL: NEGATIVE
NON-SQ EPI CELLS #/AREA URNS LPF: ABNORMAL /LPF
PH UR STRIP: 7 PH (ref 5–7)
RBC #/AREA URNS AUTO: ABNORMAL /HPF
SOURCE: ABNORMAL
SP GR UR STRIP: 1.01 (ref 1–1.03)
UROBILINOGEN UR STRIP-ACNC: 0.2 EU/DL (ref 0.2–1)
WBC #/AREA URNS AUTO: ABNORMAL /HPF

## 2021-01-05 PROCEDURE — 81001 URINALYSIS AUTO W/SCOPE: CPT | Performed by: INTERNAL MEDICINE

## 2021-01-05 PROCEDURE — 99214 OFFICE O/P EST MOD 30 MIN: CPT | Mod: TEL | Performed by: INTERNAL MEDICINE

## 2021-01-05 RX ORDER — ASPIRIN 81 MG/1
81 TABLET ORAL DAILY
COMMUNITY
End: 2022-12-30

## 2021-01-05 RX ORDER — HYDROCORTISONE ACETATE 25 MG/1
25 SUPPOSITORY RECTAL 2 TIMES DAILY
Qty: 48 SUPPOSITORY | Refills: 1 | Status: SHIPPED | OUTPATIENT
Start: 2021-01-05 | End: 2021-08-13

## 2021-01-05 RX ORDER — LOSARTAN POTASSIUM 100 MG/1
100 TABLET ORAL DAILY
Qty: 90 TABLET | Refills: 3 | Status: SHIPPED | OUTPATIENT
Start: 2021-01-05 | End: 2021-06-15

## 2021-01-05 RX ORDER — METOPROLOL SUCCINATE 50 MG/1
TABLET, EXTENDED RELEASE ORAL
Qty: 180 TABLET | Refills: 3 | Status: ON HOLD | OUTPATIENT
Start: 2021-01-05 | End: 2021-08-15

## 2021-01-05 NOTE — PROGRESS NOTES
Irina Hanks is a 60 year old female who is being evaluated via a billable telephone visit.      What phone number would you like to be contacted at?   How would you like to obtain your AVS? MyChart  Assessment & Plan     Vaginal bleeding  I cannot say for sure if her bleeding episode the other day was vaginal or from the bladder. Her urine does not have any blood in it which makes me think it is coming vaginally. I have ordered a Transvaginal Pelvic Ultrasound for her to evaluate her endometrium more closely.   - UA with Microscopic reflex to Culture; Future  - Transvaginal Pelvic US    External hemorrhoids  Rectal bleeding sounds like hemorrhoids but Peg does have a significant family history of colon cancer. She is getting colonoscopies every 5 years and last one was 3 years ago. I have recommend we try treatment for her hemorrhoids but if no improvement in her rectal bleeding in the next 4 weeks I will refer her for a colonoscopy.   - hydrocortisone (ANUSOL-HC) 25 MG suppository; Place 1 suppository (25 mg) rectally 2 times daily    Benign essential hypertension  At goal. Refill given.   - metoprolol succinate ER (TOPROL-XL) 50 MG 24 hr tablet; TAKE 2 TABLETS BY MOUTH EVERY DAY  - losartan (COZAAR) 100 MG tablet; Take 1 tablet (100 mg) by mouth daily    Rheumatoid arthritis involving multiple sites, unspecified whether rheumatoid factor present (H)  Continue Plaquenil per rheumatology.     Hereditary hemochromatosis (H)  Regular phlebotomy, most recently every 6 months.       Review of external note from Hematology  Review of prior external note(s) from - Outside records from [facility] and dermatology      26 minutes spent on the date of the encounter doing chart review, history and exam, documentation and further activities as noted above        Return today (on 1/5/2021) for Lab Work - Not fasting.    Irish Fernandes MD  [facility] [place]     Irina Hanks is a 60  year old female who presents to clinic today for the following health issues     HPI       Hemorrhoids  Onset/Duration: over a week   Description:   Stormy-anal lump: no  Pain: YES lower right back pain   Itching: no  Accompanying Signs & Symptoms:  Blood in stool: YES  Changes in stool pattern: no  History:   Any previous GI studies done:none  Family History of colon cancer: YES father and 4 brothers   Precipitating factors:   None  Alleviating factors:  None  Therapies tried and outcome: preparation H    Peg describes bright red bleeding per rectum for the past 10 days. Last night she had a large amount of bright blood after urinating (she did not have a bowel movement so seems to her that it came from her vaginal area or bladder). Peg has had a number of bladder infections over the years. She has been noticing that she is urinating very frequently and having a fair amount of urgency.     ADDITIONAL PROBLEMS:    Diagnosed with a torn gluteus medius this year based on an MRI. She is going back to [facility] to do another evaluation because she is still unable to walk without a cane.     Lichen Planus has extended to her scalp so she has lost a lot of hair this past year. Has been getting numerous cortisone shots from dermatology.     Hereditary Hemochromatosis: Recently started Aspirin 81 mg. Going in for phlebotomy every 3-6 months. Hematocrit around 36.    Rheumatoid Arthritis: Doing well. Still following with rheumatology. On plaquenil. No recent flare ups.       Review of Systems   Constitutional, HEENT, cardiovascular, pulmonary, GI, , musculoskeletal, neuro, skin, endocrine and psych systems are negative, except as otherwise noted.      Objective           Vitals:  No vitals were obtained today due to virtual visit.    Physical Exam   healthy, alert and no distress  PSYCH: Alert and oriented times 3; coherent speech, normal   rate and volume, able to articulate logical thoughts, able   to abstract reason, no  tangential thoughts, no hallucinations   or delusions  Her affect is normal  RESP: No cough, no audible wheezing, able to talk in full sentences  Remainder of exam unable to be completed due to telephone visits    Results for orders placed or performed in visit on 01/05/21   UA with Microscopic reflex to Culture     Status: Abnormal    Specimen: Midstream Urine   Result Value Ref Range    Color Urine Yellow     Appearance Urine Clear     Glucose Urine Negative NEG^Negative mg/dL    Bilirubin Urine Negative NEG^Negative    Ketones Urine Negative NEG^Negative mg/dL    Specific Gravity Urine 1.015 1.003 - 1.035    pH Urine 7.0 5.0 - 7.0 pH    Protein Albumin Urine Negative NEG^Negative mg/dL    Urobilinogen Urine 0.2 0.2 - 1.0 EU/dL    Nitrite Urine Negative NEG^Negative    Blood Urine Negative NEG^Negative    Leukocyte Esterase Urine Trace (A) NEG^Negative    Source Midstream Urine     WBC Urine 0 - 5 OTO5^0 - 5 /HPF    RBC Urine O - 2 OTO2^O - 2 /HPF    Squamous Epithelial /LPF Urine Few FEW^Few /LPF               Phone call duration: 21 minutes

## 2021-01-06 DIAGNOSIS — N95.0 POST-MENOPAUSAL BLEEDING: Primary | ICD-10-CM

## 2021-01-11 ENCOUNTER — HOSPITAL ENCOUNTER (OUTPATIENT)
Dept: ULTRASOUND IMAGING | Facility: CLINIC | Age: 61
Discharge: HOME | End: 2021-01-11
Attending: INTERNAL MEDICINE | Admitting: INTERNAL MEDICINE
Payer: COMMERCIAL

## 2021-01-11 DIAGNOSIS — N95.0 POST-MENOPAUSAL BLEEDING: ICD-10-CM

## 2021-01-11 PROCEDURE — 76830 TRANSVAGINAL US NON-OB: CPT

## 2021-01-15 ENCOUNTER — HEALTH MAINTENANCE LETTER (OUTPATIENT)
Age: 61
End: 2021-01-15

## 2021-01-15 ENCOUNTER — TELEPHONE (OUTPATIENT)
Dept: FAMILY MEDICINE | Facility: CLINIC | Age: 61
End: 2021-01-15

## 2021-01-15 DIAGNOSIS — Z20.822 EXPOSURE TO 2019 NOVEL CORONAVIRUS: ICD-10-CM

## 2021-01-15 DIAGNOSIS — Z20.822 EXPOSURE TO COVID-19 VIRUS: Primary | ICD-10-CM

## 2021-01-15 NOTE — TELEPHONE ENCOUNTER
Patient states that her adult children had COVID following the [place] holiday. They were home visiting. Patient states that she has been tested 2-3 times for the COVID 19 virus and has not had any symptoms. It has been over 14 days since onset of symptoms for her .  Patient is requesting serology testing. States that she has 5 auto immune diseases.  She can't believe that she did not get it.    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 Yes or No.  No     2) Do you currently have a cough, fever, body aches, shortness of breath, or difficulty breathing? Yes or No    no    3) Have you been exposed to (or come into close contact with) someone who tested POSITIVE for COVID-19 or someone who had a possible case of COVID-19?   -If yes, was this a confirmed case or possible exposure?   - How many days ago were you exposed?  12/27, about 21 days ago     Carmen Lopez RN

## 2021-01-19 DIAGNOSIS — Z20.822 EXPOSURE TO 2019 NOVEL CORONAVIRUS: ICD-10-CM

## 2021-01-19 PROCEDURE — 86769 SARS-COV-2 COVID-19 ANTIBODY: CPT | Performed by: INTERNAL MEDICINE

## 2021-01-19 PROCEDURE — 36415 COLL VENOUS BLD VENIPUNCTURE: CPT | Performed by: INTERNAL MEDICINE

## 2021-01-21 LAB
SARS-COV-2 AB PNL SERPL IA: NEGATIVE
SARS-COV-2 IGG SERPL IA-ACNC: NORMAL

## 2021-01-29 ENCOUNTER — TRANSFERRED RECORDS (OUTPATIENT)
Dept: HEALTH INFORMATION MANAGEMENT | Facility: CLINIC | Age: 61
End: 2021-01-29

## 2021-02-04 ENCOUNTER — MYC MEDICAL ADVICE (OUTPATIENT)
Dept: FAMILY MEDICINE | Facility: CLINIC | Age: 61
End: 2021-02-04

## 2021-02-04 DIAGNOSIS — L60.0 INGROWN TOENAIL: Primary | ICD-10-CM

## 2021-02-09 ENCOUNTER — OFFICE VISIT (OUTPATIENT)
Dept: INFUSION THERAPY | Facility: CLINIC | Age: 61
End: 2021-02-09
Attending: INTERNAL MEDICINE
Payer: COMMERCIAL

## 2021-02-09 ENCOUNTER — HOSPITAL ENCOUNTER (OUTPATIENT)
Facility: CLINIC | Age: 61
Setting detail: SPECIMEN
Discharge: HOME | End: 2021-02-09
Attending: INTERNAL MEDICINE | Admitting: INTERNAL MEDICINE
Payer: COMMERCIAL

## 2021-02-09 DIAGNOSIS — E83.110 HEREDITARY HEMOCHROMATOSIS (H): ICD-10-CM

## 2021-02-09 LAB
ALBUMIN SERPL-MCNC: 3.9 G/DL (ref 3.4–5)
ALP SERPL-CCNC: 90 U/L (ref 40–150)
ALT SERPL W P-5'-P-CCNC: 27 U/L (ref 0–50)
AST SERPL W P-5'-P-CCNC: 23 U/L (ref 0–45)
BILIRUB DIRECT SERPL-MCNC: 0.1 MG/DL (ref 0–0.2)
BILIRUB SERPL-MCNC: 0.3 MG/DL (ref 0.2–1.3)
ERYTHROCYTE [DISTWIDTH] IN BLOOD BY AUTOMATED COUNT: 11.6 % (ref 10–15)
FERRITIN SERPL-MCNC: 44 NG/ML (ref 8–252)
HCT VFR BLD AUTO: 38.6 % (ref 35–47)
HGB BLD-MCNC: 13.5 G/DL (ref 11.7–15.7)
MCH RBC QN AUTO: 32.2 PG (ref 26.5–33)
MCHC RBC AUTO-ENTMCNC: 35 G/DL (ref 31.5–36.5)
MCV RBC AUTO: 92 FL (ref 78–100)
PLATELET # BLD AUTO: 258 10E9/L (ref 150–450)
PROT SERPL-MCNC: 8.2 G/DL (ref 6.8–8.8)
RBC # BLD AUTO: 4.19 10E12/L (ref 3.8–5.2)
WBC # BLD AUTO: 4.2 10E9/L (ref 4–11)

## 2021-02-09 PROCEDURE — 80076 HEPATIC FUNCTION PANEL: CPT | Performed by: INTERNAL MEDICINE

## 2021-02-09 PROCEDURE — 36415 COLL VENOUS BLD VENIPUNCTURE: CPT

## 2021-02-09 PROCEDURE — 85027 COMPLETE CBC AUTOMATED: CPT | Performed by: INTERNAL MEDICINE

## 2021-02-09 PROCEDURE — 82728 ASSAY OF FERRITIN: CPT | Performed by: INTERNAL MEDICINE

## 2021-02-09 NOTE — LETTER
2/9/2021         RE: Irina Hanks  [address]  [place] [address]        Dear Colleague,    Thank you for referring your patient, Irina Hanks, to the [facility]. Please see a copy of my visit note below.    Medical Assistant Note:  Irina Hanks presents today for blood draw.    Patient seen by provider today: No.   present during visit today: Not Applicable.    Concerns: No Concerns.    Procedure:  Lab draw site: right hand, Needle type: bf, Gauge: 21.    Post Assessment:  Labs drawn without difficulty: Yes.    Discharge Plan:  Departure Mode: Ambulatory.    Face to Face Time: 5 min.    Viki Arroyo [facility]              Again, thank you for allowing me to participate in the care of your patient.        Sincerely,         Lab Draw

## 2021-02-09 NOTE — PROGRESS NOTES
Medical Assistant Note:  Irina Hanks presents today for blood draw.    Patient seen by provider today: No.   present during visit today: Not Applicable.    Concerns: No Concerns.    Procedure:  Lab draw site: right hand, Needle type: bf, Gauge: 21.    Post Assessment:  Labs drawn without difficulty: Yes.    Discharge Plan:  Departure Mode: Ambulatory.    Face to Face Time: 5 min.    Viki Arroyo, CMA

## 2021-02-10 NOTE — RESULT ENCOUNTER NOTE
Dear Ms. Hanks,    Blood tests are good.    Please, call me with any questions.    Eloisa Rossi MD

## 2021-03-15 ENCOUNTER — NURSE TRIAGE (OUTPATIENT)
Dept: FAMILY MEDICINE | Facility: CLINIC | Age: 61
End: 2021-03-15

## 2021-03-15 DIAGNOSIS — Z80.0 FAMILY HISTORY OF COLON CANCER: ICD-10-CM

## 2021-03-15 DIAGNOSIS — K62.5 RECTAL BLEEDING: Primary | ICD-10-CM

## 2021-03-15 NOTE — TELEPHONE ENCOUNTER
Patient sent the following Smart[employer] message so called the patient to triage.     Jerald Fernandes. I am continuing to have very red bloody stools in spite  of using the suppositories.   I definitely feel I need to be seen at [facility].  It is most likely due to hemorrhoids, but I may need a colonoscopy.    I will be back in [place] next week.    I can call & set something up but not sure if I needed a referral or if you have a recommendation.  I prefer a female.     Thanks, Peg    Patient states that the toilet water turns bright red with her daily bowel movement. States that she has been using the suppositories since recommended by Dr. Fernandes during her telephone visit. States that for February they suppositories were helping and she was not having bleeding. States that she has been having bright red blood with each bowel movement. States that she has 1 BM most days. Metamucil has really been helping with that. Patient did start a baby aspirin a day back in January.     Patient denies weakness, dizziness, N/V. Infrequently has a loose stool cue to her history of colitis.    Patient is in [place] and will be returning to [place] one week from today. Patient does not think that she needs to go to the ED due to no other symptoms.  Prefers to wait for follow up of symptoms until she returns to [place].     Advised patient to see emergency care for any worsening of symptoms. Routing to Dr. Fernandes for second triage. Please route back if patient to go to ED.     Reason for Disposition    Bloody, black, or tarry bowel movements    Additional Information    Negative: Passed out (i.e., fainted, collapsed and was not responding)    Negative: Shock suspected (e.g., cold/pale/clammy skin, too weak to stand, low BP, rapid pulse)    Negative: Vomiting red blood or black (coffee ground) material    Negative: Sounds like a life-threatening emergency to the triager    Negative: SEVERE rectal bleeding (large blood clots; on and off, or constant bleeding)     "Negative: SEVERE dizziness (e.g., unable to stand, requires support to walk, feels like passing out now)    Negative: MODERATE rectal bleeding (small blood clots, passing blood without stool, or toilet water turns red) more than once a day    Negative: Diarrhea is the main symptom    Rectal symptoms    Answer Assessment - Initial Assessment Questions  1. APPEARANCE of BLOOD: \"What color is it?\" \"Is it passed separately, on the surface of the stool, or mixed in with the stool?\"       Bright blood with every BM, in the toilet water so thinks that it is on the surface of the stool  2. AMOUNT: \"How much blood was passed?\"       Has blood with every bowel movement. No clots, just bright red water. No bleeding after BM.  3. FREQUENCY: \"How many times has blood been passed with the stools?\"       Has been using rectal suppositories. Has used a box and a half of suppositories. Had phone visit about the beginning of February.   4. ONSET: \"When was the blood first seen in the stools?\" (Days or weeks)       First part of February first start of this amount of bleeding. Has had colonoscopies. Patient has family history of colon cancer.   5. DIARRHEA: \"Is there also some diarrhea?\" If so, ask: \"How many diarrhea stools were passed in past 24 hours?\"       Patient states that she has colitis. Metamucil helps some days, but other days colitis kicks in and need Imodium.   6. CONSTIPATION: \"Do you have constipation?\" If so, \"How bad is it?\"      Generally not  7. RECURRENT SYMPTOMS: \"Have you had blood in your stools before?\" If so, ask: \"When was the last time?\" and \"What happened that time?\"       This Jan/Feb is the first time to this extreme.  8. BLOOD THINNERS: \"Do you take any blood thinners?\" (e.g., Coumadin/warfarin, Pradaxa/dabigatran, aspirin)      Recently started baby aspirin due to hemachromatosis.   9. OTHER SYMPTOMS: \"Do you have any other symptoms?\"  (e.g., abdominal pain, vomiting, dizziness, fever)      Some " "nausea. Patient recently had double UTI. No fever, some dizziness. Feel fine now. Vaginal cream was causing side effects.   10. PREGNANCY: \"Is there any chance you are pregnant?\" \"When was your last menstrual period?\"        NA    Protocols used: RECTAL BLEEDING-A-[place]    Carmen Lopez RN    "

## 2021-03-15 NOTE — TELEPHONE ENCOUNTER
Patient given message from Dr. Fernandes. Referral information sent to the patient through [employer].  Patient agrees to be seen in [place] if any new or worsening symptoms. Carmen Lopez RN

## 2021-03-15 NOTE — TELEPHONE ENCOUNTER
I've placed a referral for [facility] for colonoscopy. Peg may need to call there herself to schedule this.

## 2021-03-23 ENCOUNTER — TELEPHONE (OUTPATIENT)
Dept: FAMILY MEDICINE | Facility: CLINIC | Age: 61
End: 2021-03-23

## 2021-03-23 NOTE — TELEPHONE ENCOUNTER
Pt was diagnosed with Influenza B last week on March 11. She would like to sign up for the COVID vaccine but wondering how long she should wait. Please advise. TC call pt back at [phone].  Cindy Hinds,

## 2021-03-25 NOTE — TELEPHONE ENCOUNTER
As long as the patient is afebrile and feels recovered I don't see any reason to delay the COVID vaccine.

## 2021-03-25 NOTE — TELEPHONE ENCOUNTER
S/w pt and gave Dr. Fernandes's message below.    Pt states understanding.    Patsy VIERA RN  EP Triage

## 2021-03-31 ENCOUNTER — TELEPHONE (OUTPATIENT)
Dept: FAMILY MEDICINE | Facility: CLINIC | Age: 61
End: 2021-03-31

## 2021-03-31 ENCOUNTER — TRANSFERRED RECORDS (OUTPATIENT)
Dept: HEALTH INFORMATION MANAGEMENT | Facility: CLINIC | Age: 61
End: 2021-03-31

## 2021-03-31 DIAGNOSIS — K62.5 RECTAL BLEEDING: Primary | ICD-10-CM

## 2021-03-31 NOTE — TELEPHONE ENCOUNTER
Dr Fernandes-You did an order for patient to [facility] for a colonoscopy. You put on the order it was for screening. Patient had scheduled the appt before she got the order and she told them she had rectal bleeding and Hemochromatosis. They need the order change to Diagnostic not screening. Please enter a new order and I will fax to [facility]. On the order were is says procedure It needs to say diagnostic not screening. Lena Alberts from [facility] is calling to get this change. Thanks    Pamela Bonilla  Referral Coordiantor

## 2021-03-31 NOTE — TELEPHONE ENCOUNTER
New referral faxed to [facility].    Pamela Bonilla  Referral Coordiantor

## 2021-04-05 DIAGNOSIS — F43.22 ADJUSTMENT DISORDER WITH ANXIOUS MOOD: ICD-10-CM

## 2021-04-05 NOTE — TELEPHONE ENCOUNTER
Reason for Call:  Medication or medication refill:    Do you use a [facility]?  Name of the pharmacy and phone number for the current request:     [id number] - [place], [place] - [address] AT [facility] [address] [place]    Name of the medication requested: clonazePAM (KLONOPIN) 0.5 MG ODT     Other request:     Can we leave a detailed message on this number? YES    Phone number patient can be reached at: Cell number on file:    Telephone Information:   Mobile [phone]     Best Time: any    Call taken on 4/5/2021 at 3:53 PM by Lor Basurto

## 2021-04-06 ENCOUNTER — MYC MEDICAL ADVICE (OUTPATIENT)
Dept: FAMILY MEDICINE | Facility: CLINIC | Age: 61
End: 2021-04-06

## 2021-04-06 RX ORDER — CLONAZEPAM 0.5 MG/1
TABLET, ORALLY DISINTEGRATING ORAL
Qty: 30 TABLET | Refills: 5 | Status: SHIPPED | OUTPATIENT
Start: 2021-04-06 | End: 2021-09-07

## 2021-04-06 NOTE — TELEPHONE ENCOUNTER
Controlled Substance Refill Request forCLONAZEPAM 0.5mg   Problem List Complete:  No     PROVIDER TO CONSIDER COMPLETION OF PROBLEM LIST AND OVERVIEW/CONTROLLED SUBSTANCE AGREEMENT    Last Written Prescription Date:  11-12-20  Last Fill Quantity: 30,   # refills: 5      Last Office Visit with [facility] primary care provider: 1-5-21    Future Office visit:     Controlled substance agreement:   Encounter-Level CSA - 07/27/2015:    Controlled Substance Agreement - Scan on 8/5/2015 11:27 AM: Controlled Substance Agreement 7/27/15     Patient-Level CSA:    There are no patient-level csa.         Last Urine Drug Screen: No results found for: CDAUT, No results found for: COMDAT, No results found for: THC13, PCP13, COC13, MAMP13, OPI13, AMP13, BZO13, TCA13, MTD13, BAR13, OXY13, PPX13, BUP13     Processing:  Rx to be electronically transmitted to pharmacy by provider      [email or web address]       checked in past 3 months?  No, route to RN    Griselda De Dios RN

## 2021-04-14 ENCOUNTER — TRANSFERRED RECORDS (OUTPATIENT)
Dept: HEALTH INFORMATION MANAGEMENT | Facility: CLINIC | Age: 61
End: 2021-04-14

## 2021-04-18 DIAGNOSIS — E03.9 ACQUIRED HYPOTHYROIDISM: ICD-10-CM

## 2021-04-19 RX ORDER — LEVOTHYROXINE SODIUM 75 UG/1
TABLET ORAL
Qty: 90 TABLET | Refills: 2 | Status: SHIPPED | OUTPATIENT
Start: 2021-04-19 | End: 2022-01-17

## 2021-04-19 RX ORDER — LIOTHYRONINE SODIUM 5 UG/1
TABLET ORAL
Qty: 180 TABLET | Refills: 2 | Status: SHIPPED | OUTPATIENT
Start: 2021-04-19 | End: 2022-01-17

## 2021-05-11 ENCOUNTER — INFUSION THERAPY VISIT (OUTPATIENT)
Dept: INFUSION THERAPY | Facility: CLINIC | Age: 61
End: 2021-05-11
Attending: INTERNAL MEDICINE
Payer: COMMERCIAL

## 2021-05-11 ENCOUNTER — HOSPITAL ENCOUNTER (OUTPATIENT)
Facility: CLINIC | Age: 61
Setting detail: SPECIMEN
Discharge: HOME | End: 2021-05-11
Attending: INTERNAL MEDICINE | Admitting: INTERNAL MEDICINE
Payer: COMMERCIAL

## 2021-05-11 VITALS — SYSTOLIC BLOOD PRESSURE: 134 MMHG | HEART RATE: 71 BPM | DIASTOLIC BLOOD PRESSURE: 93 MMHG | TEMPERATURE: 98.1 F

## 2021-05-11 DIAGNOSIS — E83.19 IRON OVERLOAD: Primary | ICD-10-CM

## 2021-05-11 DIAGNOSIS — E83.110 HEREDITARY HEMOCHROMATOSIS (H): Primary | ICD-10-CM

## 2021-05-11 DIAGNOSIS — E83.19 IRON OVERLOAD: ICD-10-CM

## 2021-05-11 LAB
ALBUMIN SERPL-MCNC: 3.6 G/DL (ref 3.4–5)
ALP SERPL-CCNC: 72 U/L (ref 40–150)
ALT SERPL W P-5'-P-CCNC: 28 U/L (ref 0–50)
AST SERPL W P-5'-P-CCNC: 21 U/L (ref 0–45)
BILIRUB DIRECT SERPL-MCNC: 0.2 MG/DL (ref 0–0.2)
BILIRUB SERPL-MCNC: 0.8 MG/DL (ref 0.2–1.3)
ERYTHROCYTE [DISTWIDTH] IN BLOOD BY AUTOMATED COUNT: 14.1 % (ref 10–15)
FERRITIN SERPL-MCNC: 59 NG/ML (ref 8–252)
HCT VFR BLD AUTO: 36.1 % (ref 35–47)
HGB BLD-MCNC: 11.8 G/DL (ref 11.7–15.7)
MCH RBC QN AUTO: 31 PG (ref 26.5–33)
MCHC RBC AUTO-ENTMCNC: 32.7 G/DL (ref 31.5–36.5)
MCV RBC AUTO: 95 FL (ref 78–100)
PLATELET # BLD AUTO: 217 10E9/L (ref 150–450)
PROT SERPL-MCNC: 7.5 G/DL (ref 6.8–8.8)
RBC # BLD AUTO: 3.81 10E12/L (ref 3.8–5.2)
WBC # BLD AUTO: 7.8 10E9/L (ref 4–11)

## 2021-05-11 PROCEDURE — 85027 COMPLETE CBC AUTOMATED: CPT | Performed by: INTERNAL MEDICINE

## 2021-05-11 PROCEDURE — 36415 COLL VENOUS BLD VENIPUNCTURE: CPT

## 2021-05-11 PROCEDURE — 80076 HEPATIC FUNCTION PANEL: CPT | Performed by: INTERNAL MEDICINE

## 2021-05-11 PROCEDURE — 82728 ASSAY OF FERRITIN: CPT | Performed by: INTERNAL MEDICINE

## 2021-05-11 PROCEDURE — 99195 PHLEBOTOMY: CPT

## 2021-05-11 RX ORDER — GABAPENTIN 100 MG/1
100 CAPSULE ORAL 3 TIMES DAILY
COMMUNITY
Start: 2021-04-19 | End: 2022-06-08

## 2021-05-11 RX ORDER — BUDESONIDE 3 MG/1
CAPSULE, COATED PELLETS ORAL
COMMUNITY
Start: 2021-04-01 | End: 2021-08-13

## 2021-05-11 NOTE — PROGRESS NOTES
Infusion Nursing Note:  Irina Hanks presents today for phlebotomy.    Patient seen by provider today: No   present during visit today: Not Applicable.    Note: N/A.  Patient did not meet criteria for an asymptomatic covid-19 PCR test in infusion today. Patient  accepted/declined the covid-19 test.    Intravenous Access:  17g phlebotomy needle.    Treatment Conditions:  Ferritin 59.      Post Infusion Assessment:  Patient tolerated 500cc of whole blood removed from L AC. Observed for 15 min and drank 300 ml of water .       Discharge Plan:   Discharge instructions reviewed with: Patient.  Patient and/or family verbalized understanding of discharge instructions and all questions answered.  Patient discharged in stable condition accompanied by: self.  Departure Mode: Ambulatory.    Giulia Cisse RN

## 2021-05-24 ENCOUNTER — TRANSFERRED RECORDS (OUTPATIENT)
Dept: HEALTH INFORMATION MANAGEMENT | Facility: CLINIC | Age: 61
End: 2021-05-24

## 2021-06-14 ENCOUNTER — TELEPHONE (OUTPATIENT)
Dept: FAMILY MEDICINE | Facility: CLINIC | Age: 61
End: 2021-06-14

## 2021-06-14 NOTE — TELEPHONE ENCOUNTER
S/w pt who states she did see the neck scan results via my chart and scheduled an appt with Dr. Britton for tomorrow morning.  States her neck is still swollen and people notice it.    Pt does not have any further questions for the nurse at this time.    Patsy VIERA RN  EP Triage

## 2021-06-14 NOTE — TELEPHONE ENCOUNTER
Pt called wondering about scan of neck from 9/22/2020 she is wondering about results and can not seem to find in the chart she is hoping someone can discuss the lumps in her lymph nodes and if she should proceed. Ph. [phone] and it is okay to leave detailed message.   Philly Magaña

## 2021-06-15 ENCOUNTER — OFFICE VISIT (OUTPATIENT)
Dept: FAMILY MEDICINE | Facility: CLINIC | Age: 61
End: 2021-06-15
Payer: COMMERCIAL

## 2021-06-15 VITALS
DIASTOLIC BLOOD PRESSURE: 90 MMHG | HEART RATE: 82 BPM | WEIGHT: 149.4 LBS | OXYGEN SATURATION: 95 % | SYSTOLIC BLOOD PRESSURE: 136 MMHG | BODY MASS INDEX: 26.47 KG/M2 | TEMPERATURE: 97.7 F

## 2021-06-15 DIAGNOSIS — E83.110 HEREDITARY HEMOCHROMATOSIS (H): ICD-10-CM

## 2021-06-15 DIAGNOSIS — E55.9 VITAMIN D DEFICIENCY: ICD-10-CM

## 2021-06-15 DIAGNOSIS — Z79.899 CHRONIC PRESCRIPTION BENZODIAZEPINE USE: ICD-10-CM

## 2021-06-15 DIAGNOSIS — Z23 NEED FOR VACCINATION: ICD-10-CM

## 2021-06-15 DIAGNOSIS — K52.832 LYMPHOCYTIC COLITIS: ICD-10-CM

## 2021-06-15 DIAGNOSIS — I10 BENIGN ESSENTIAL HYPERTENSION: ICD-10-CM

## 2021-06-15 DIAGNOSIS — E87.1 HYPONATREMIA: ICD-10-CM

## 2021-06-15 DIAGNOSIS — F43.22 ADJUSTMENT DISORDER WITH ANXIOUS MOOD: Primary | ICD-10-CM

## 2021-06-15 DIAGNOSIS — M06.9 RHEUMATOID ARTHRITIS INVOLVING MULTIPLE SITES, UNSPECIFIED WHETHER RHEUMATOID FACTOR PRESENT (H): ICD-10-CM

## 2021-06-15 PROBLEM — M19.90 DJD (DEGENERATIVE JOINT DISEASE): Status: ACTIVE | Noted: 2021-04-14

## 2021-06-15 PROBLEM — S73.191D ACETABULAR LABRUM TEAR, RIGHT, SUBSEQUENT ENCOUNTER: Status: ACTIVE | Noted: 2021-04-14

## 2021-06-15 LAB
ANION GAP SERPL CALCULATED.3IONS-SCNC: 5 MMOL/L (ref 3–14)
BUN SERPL-MCNC: 10 MG/DL (ref 7–30)
CALCIUM SERPL-MCNC: 9.6 MG/DL (ref 8.5–10.1)
CHLORIDE SERPL-SCNC: 98 MMOL/L (ref 94–109)
CO2 SERPL-SCNC: 27 MMOL/L (ref 20–32)
CREAT SERPL-MCNC: 0.87 MG/DL (ref 0.52–1.04)
DEPRECATED CALCIDIOL+CALCIFEROL SERPL-MC: 51 UG/L (ref 20–75)
GFR SERPL CREATININE-BSD FRML MDRD: 70 ML/MIN/{1.73_M2}
GLUCOSE SERPL-MCNC: 81 MG/DL (ref 70–99)
POTASSIUM SERPL-SCNC: 3.9 MMOL/L (ref 3.4–5.3)
SODIUM SERPL-SCNC: 130 MMOL/L (ref 133–144)

## 2021-06-15 PROCEDURE — 82306 VITAMIN D 25 HYDROXY: CPT | Performed by: INTERNAL MEDICINE

## 2021-06-15 PROCEDURE — 90714 TD VACC NO PRESV 7 YRS+ IM: CPT | Performed by: INTERNAL MEDICINE

## 2021-06-15 PROCEDURE — 36415 COLL VENOUS BLD VENIPUNCTURE: CPT | Performed by: INTERNAL MEDICINE

## 2021-06-15 PROCEDURE — 99215 OFFICE O/P EST HI 40 MIN: CPT | Mod: 25 | Performed by: INTERNAL MEDICINE

## 2021-06-15 PROCEDURE — 90471 IMMUNIZATION ADMIN: CPT | Performed by: INTERNAL MEDICINE

## 2021-06-15 PROCEDURE — 80048 BASIC METABOLIC PNL TOTAL CA: CPT | Performed by: INTERNAL MEDICINE

## 2021-06-15 RX ORDER — VALACYCLOVIR HYDROCHLORIDE 1 G/1
TABLET, FILM COATED ORAL
COMMUNITY
End: 2021-08-13

## 2021-06-15 RX ORDER — NYSTATIN 100000/ML
SUSPENSION, ORAL (FINAL DOSE FORM) ORAL
COMMUNITY
End: 2021-08-13

## 2021-06-15 RX ORDER — METRONIDAZOLE 7.5 MG/G
GEL VAGINAL
COMMUNITY
Start: 2021-03-09 | End: 2021-08-13

## 2021-06-15 RX ORDER — INFLUENZA A VIRUS A/VICTORIA/2454/2019 IVR-207 (H1N1) ANTIGEN (PROPIOLACTONE INACTIVATED), INFLUENZA A VIRUS A/HONG KONG/2671/2019 IVR-208 (H3N2) ANTIGEN (PROPIOLACTONE INACTIVATED), INFLUENZA B VIRUS B/VICTORIA/705/2018 BVR-11 ANTIGEN (PROPIOLACTONE INACTIVATED), INFLUENZA B VIRUS B/PHUKET/3073/2013 BVR-1B ANTIGEN (PROPIOLACTONE INACTIVATED) 15; 15; 15; 15 UG/.5ML; UG/.5ML; UG/.5ML; UG/.5ML
INJECTION, SUSPENSION INTRAMUSCULAR
COMMUNITY
Start: 2020-10-13 | End: 2021-08-13

## 2021-06-15 RX ORDER — LOSARTAN POTASSIUM AND HYDROCHLOROTHIAZIDE 25; 100 MG/1; MG/1
TABLET ORAL
COMMUNITY
End: 2021-07-23

## 2021-06-15 RX ORDER — TRIAMCINOLONE ACETONIDE 0.1 %
PASTE (GRAM) DENTAL
COMMUNITY
Start: 2020-10-02 | End: 2021-08-13

## 2021-06-15 RX ORDER — ONDANSETRON 4 MG/1
TABLET, FILM COATED ORAL
COMMUNITY
Start: 2020-10-30 | End: 2021-08-13

## 2021-06-15 RX ORDER — SUCRALFATE ORAL 1 G/10ML
SUSPENSION ORAL
COMMUNITY
End: 2021-08-13

## 2021-06-15 ASSESSMENT — PAIN SCALES - GENERAL: PAINLEVEL: NO PAIN (0)

## 2021-06-15 NOTE — PATIENT INSTRUCTIONS
As discussed, your neck CT scan was negative for any masses and its subcutaneous fat. Whenever you decide on options of cosmetic surgery we can plan on it.     Placed referral to psychiatry as per shared decision for getting this Klonipin prescription addressed as you were not tried on any non controlled medications before you started getting Benzo prescriptions from previous providers.       ===============================          Patient Education     Neck Clock (Flexibility)    1. Lie on your back on the floor, with your knees bent and your feet flat on the floor. Place a rolled-up towel or neck roll under your neck.  2. Close your eyes and imagine a clock face. With your nose, slowly trace the outer edge of the clock in a clockwise direction. Move your neck smoothly. Don t force your head or neck.  3. Repeat 5 times, or as instructed.  4. Then switch to a counterclockwise direction, and repeat the exercise 5 times, or as instructed.  Challenge yourself   You can also do this exercise while sitting at your work desk. Sit up straight with your back supported firmly against your chair. You can do the exercise several times throughout your day.   Tip: Don't shrug your shoulders during this exercise.   [employer] last reviewed this educational content on 3/1/2020    6204-9425 [employer]. All rights reserved. This information is not intended as a substitute for professional medical care. Always follow your healthcare professional's instructions.

## 2021-06-15 NOTE — PROGRESS NOTES
Assessment and Plan  1. Adjustment disorder with anxious mood  2. Chronic prescription benzodiazepine use  Stable, she has been getting Klonipin for more than 3 years as per the patient. Never tried on alternative medications. Pt optingto transfer care to me. Discussed on CSA and need for psychiatry before we continue the Benzo indefinitely which she understood and agreed.  - Pt gets refills of Clonazepam 30 tabs / month as per  check last gotten refill on 6/4/21.    - MENTAL HEALTH REFERRAL  - Adult; Psychiatry; Psychiatry; [facility]: Psychiatry Clinic [phone]; We will contact you to schedule the appointment or please call with any questions    3. Vitamin D deficiency  - Vitamin D Deficiency    4. Hyponatremia  - Basic metabolic panel    5. Need for vaccination  - TD PRESERV FREE, IM (7+ YRS)    6. Hereditary hemochromatosis (H)  Stable, regular phlebotomies at infusion center and following Oncology dr.Birendra mccullough. Labs as managed by Oncology.Last CBC in 5/2021 was normal.     7. Benign essential hypertension  Uncontrolled, explained on changes of dosages needed which pt states she is unaware of the right medications she is taking She will mychart message to us before further changes. All risks understood.      8. Rheumatoid arthritis involving multiple sites, unspecified whether rheumatoid factor present (H)  Pt does have risk factors of RA on plaquenil following Rheumatology. Ongoing Rt hop isues -  Pt follows Orthopedics for Rt gluteus medius tear on MRI,Rt hip OA with RA risk factors  -  last seen [facility] on 4/14/21 and was recommended Rt hip arthroplasty.  - Pt was also seeing [facility] pain clinic on 2/2021 with Rt hip intra articular injection.     9. Lymphocytic colitis  Recent Colonoscopy records from [facility] in 3/2021 reviwed POSITIVE for lymphocytic colitis and Internal hemorrhoids, on Entocort 2 weeks taper as managed by GI on the last note reviewed on 5/24/21.     Over 60 minutes spent on reviewing  patient chart,  face to face encounter, greater than 50% time spent with plan/cordination of care and documentation as above in my A/P.          Patient Instructions   As discussed, your neck CT scan was negative for any masses and its subcutaneous fat. Whenever you decide on options of cosmetic surgery we can plan on it.     Placed referral to psychiatry as per shared decision for getting this Klonipin prescription addressed as you were not tried on any non controlled medications before you started getting Benzo prescriptions from previous providers.       ===============================          Patient Education     Neck Clock (Flexibility)    1. Lie on your back on the floor, with your knees bent and your feet flat on the floor. Place a rolled-up towel or neck roll under your neck.  2. Close your eyes and imagine a clock face. With your nose, slowly trace the outer edge of the clock in a clockwise direction. Move your neck smoothly. Don t force your head or neck.  3. Repeat 5 times, or as instructed.  4. Then switch to a counterclockwise direction, and repeat the exercise 5 times, or as instructed.  Challenge yourself   You can also do this exercise while sitting at your work desk. Sit up straight with your back supported firmly against your chair. You can do the exercise several times throughout your day.   Tip: Don't shrug your shoulders during this exercise.   [employer] last reviewed this educational content on 3/1/2020    3110-6189 [employer]. All rights reserved. This information is not intended as a substitute for professional medical care. Always follow your healthcare professional's instructions.               Return in about 3 months (around 9/15/2021), or if symptoms worsen or fail to improve, for BP Recheck, Preventative Visit.    Mercedez Britton MD  [facility] ROMULO Luong is a 61 year old who presents for the following health issues     HPI      Patient here for a consult on her neck  Swelling on both sides of neck  Feels firm  Ongoing about 8 months to 1 year  Seems to get bigger then smaller  hasent gone away since it started    Pt is new to me, last seen by  on 1/5/21 for virtual visit due to vaginal bleeding and was worked up by transvaginal ultrasound which was normal and it was hemorrhoidal bleeding.   - Pt has recent Imaging of CT scan done for Neck mass -  in 9/2020 was benign with only mild sinusitis. Pt is coming for follow up of this stating that her neck swellings are noticeable   - Discussed most part of the appointment that its benign.        Allergies   Allergen Reactions     Codeine Nausea and Nausea and Vomiting     Nitrofurantoin Unknown and Other (See Comments)     Other reaction(s): Gastrointestinal       Ace Inhibitors Cough     lisinopril  lisinopril     Sulfa Drugs      Sulfur         Past Medical History:   Diagnosis Date     ASCUS favor benign 09/2014    3 yr co-test     Essential hypertension, benign      Impaired renal function      Inflammatory arthritis     Managed by Rheumatology  - q 6 mos     Microscopic colitis      Osteoarthritis of first carpometacarpal joint     bilateral     Other specified acquired hypothyroidism      Seasonal allergic rhinitis      Shortness of breath      Spider veins      Symptomatic menopausal or female climacteric states     age 43, on HRT     Tricuspid regurgitation     +1-2 TR noted on 12/22/14 Echo       Past Surgical History:   Procedure Laterality Date     BIOPSY BREAST       C/SECTION, LOW TRANSVERSE      twins     COLONOSCOPY  2013    nl, next one due in 5 years     LEEP TX, CERVICAL  1990s    normal since that time     MYRINGOTOMY, INSERT TUBE, COMBINED Left 4/2015    chronic NORM, ETD       Family History   Problem Relation Age of Onset     Cancer - colorectal Father         age 50     Arthritis Mother      Cancer - colorectal Brother         age 50     Hypertension Sister       Hypertension Brother      Thyroid Disease Sister      Thyroid Disease Brother      Kidney Cancer Brother      Arthritis Sister      Cancer Sister 53        Uterine     Cancer Sister 50        Uterine     Testicular cancer Nephew        Social History     Tobacco Use     Smoking status: Former Smoker     Packs/day: 0.26     Years: 10.00     Pack years: 2.60     Types: Cigarettes     Quit date: 1989     Years since quittin.1     Smokeless tobacco: Never Used   Substance Use Topics     Alcohol use: Yes     Alcohol/week: 0.0 standard drinks     Comment: 2 drinks per day        Current Outpatient Medications   Medication     aspirin 81 MG EC tablet     Biotin 5000 MCG TABS     budesonide (ENTOCORT EC) 3 MG EC capsule     Cholecalciferol (VITAMIN D PO)     clobetasol (TEMOVATE) 0.05 % external solution     clonazePAM (KLONOPIN) 0.5 MG ODT     COMPOUNDED NON-CONTROLLED SUBSTANCE (CMPD RX) - PHARMACY TO MIX COMPOUNDED MEDICATION     gabapentin (NEURONTIN) 100 MG capsule     hydrocortisone (ANUSOL-HC) 25 MG suppository     hydroxychloroquine (PLAQUENIL) 200 MG tablet     ipratropium (ATROVENT) 0.03 % nasal spray     levothyroxine (SYNTHROID/LEVOTHROID) 75 MCG tablet     liothyronine (CYTOMEL) 5 MCG tablet     loratadine (CLARITIN) 10 MG tablet     metoprolol succinate ER (TOPROL-XL) 50 MG 24 hr tablet     Omega-3 Fatty Acids (FISH OIL ADULT GUMMIES PO)     psyllium (METAMUCIL) 58.6 % POWD     triamcinolone (KENALOG) 0.1 % paste     TURMERIC PO     AFLURIA QUADRIVALENT 0.5 ML injection     losartan-hydrochlorothiazide (HYZAAR) 100-25 MG tablet     metroNIDAZOLE (METROGEL) 0.75 % vaginal gel     nystatin (MYCOSTATIN) 394137 UNIT/ML suspension     ondansetron (ZOFRAN) 4 MG tablet     sucralfate (CARAFATE) 1 GM/10ML suspension     valACYclovir (VALTREX) 1000 mg tablet     No current facility-administered medications for this visit.         Review of Systems   Constitutional, HEENT, cardiovascular, pulmonary, GI, ,  musculoskeletal, neuro, skin, endocrine and psych systems are negative, except as otherwise noted.      Objective    BP (!) 136/90 (Cuff Size: Adult Regular)   Pulse 82   Temp 97.7  F (36.5  C) (Tympanic)   Wt 67.8 kg (149 lb 6.4 oz)   SpO2 95%   BMI (P) 26.47 kg/m    Body mass index is 26.47 kg/m  (pended).  Physical Exam   GENERAL: healthy, alert and no distress  NECK: no adenopathy, no asymmetry, masses, or scars and thyroid normal to palpation. POSITIVE for supraclavicular pad of fats on bilateral root of the neck.   RESP: lungs clear to auscultation - no rales, rhonchi or wheezes  CV: regular rate and rhythm, normal S1 S2, no S3 or S4, no murmur, click or rub, no peripheral edema and peripheral pulses strong  ABDOMEN: soft, nontender, no hepatosplenomegaly, no masses and bowel sounds normal  MS: no gross musculoskeletal defects noted, no edema

## 2021-06-16 ENCOUNTER — TELEPHONE (OUTPATIENT)
Dept: FAMILY MEDICINE | Facility: CLINIC | Age: 61
End: 2021-06-16

## 2021-06-16 NOTE — TELEPHONE ENCOUNTER
----- Message from Mercedez Britton MD sent at 6/15/2021 10:54 PM CDT -----  Your Sodium levels remaining stable at baseline as in the past though remaining mildly low.   Vitamin D levels are within normal limts.   Mercedez Britton MD on 6/15/2021 at 10:54 PM

## 2021-06-16 NOTE — TELEPHONE ENCOUNTER
Detailed message left with info from provider and return number to call with any questions or concerns.  Advised message is viewable via my chart also    Patsy VIERA RN  EP Triage

## 2021-07-23 DIAGNOSIS — I10 BENIGN ESSENTIAL HYPERTENSION: ICD-10-CM

## 2021-07-23 DIAGNOSIS — I10 BENIGN ESSENTIAL HYPERTENSION: Primary | ICD-10-CM

## 2021-07-23 RX ORDER — LOSARTAN POTASSIUM AND HYDROCHLOROTHIAZIDE 25; 100 MG/1; MG/1
TABLET ORAL
Qty: 90 TABLET | Refills: 0 | Status: ON HOLD | OUTPATIENT
Start: 2021-07-23 | End: 2021-08-15

## 2021-07-23 RX ORDER — LOSARTAN POTASSIUM 100 MG/1
TABLET ORAL
Qty: 90 TABLET | Refills: 0 | OUTPATIENT
Start: 2021-07-23

## 2021-07-23 NOTE — TELEPHONE ENCOUNTER
PCP, new medication stated on 6- office visit Losartan-hydrochlorothiazide.  There is no sig for this RX.  Could you please provide the directions and send the new RX to the pharmacy?    Medication is pended.     Georgina Stone RN  [facility]

## 2021-07-23 NOTE — TELEPHONE ENCOUNTER
Routing refill request to provider for review/approval because:  Drug not active on patient's medication list  Medication is reported/historical for lisinopril/hydrochlorothiazide    Patsy VIERA RN  EP Triage

## 2021-08-11 ENCOUNTER — TELEPHONE (OUTPATIENT)
Dept: FAMILY MEDICINE | Facility: CLINIC | Age: 61
End: 2021-08-11

## 2021-08-11 NOTE — TELEPHONE ENCOUNTER
Pt was in recently and saw Dr. Britton. She recently had her dosage of BP medication change and ever since the dosage was changed she has been having headaches. She is wondering if there is a correlation between the two. Please advise if appropriate.   Thank you,   Philly Magaña

## 2021-08-11 NOTE — TELEPHONE ENCOUNTER
S/w pt who states she has been having headaches since the change in medication.  States pt was started on losartan/hctz by Dr. Britton.  Pt is not sure if the headaches are related to med change or her eyes.  States she has an eye appointment on Thursday 8/19 and is being fit in.    Scheduled appt with Dr. Britton for Thursday 8/12 at 2:40 for headaches.    Patsy VIERA RN  EP Triage

## 2021-08-12 ENCOUNTER — OFFICE VISIT (OUTPATIENT)
Dept: FAMILY MEDICINE | Facility: CLINIC | Age: 61
End: 2021-08-12
Payer: COMMERCIAL

## 2021-08-12 DIAGNOSIS — I10 ESSENTIAL HYPERTENSION: ICD-10-CM

## 2021-08-12 DIAGNOSIS — Z20.822 ENCOUNTER FOR LABORATORY TESTING FOR COVID-19 VIRUS: ICD-10-CM

## 2021-08-12 DIAGNOSIS — E83.110 HEREDITARY HEMOCHROMATOSIS (H): ICD-10-CM

## 2021-08-12 DIAGNOSIS — E87.1 HYPONATREMIA: ICD-10-CM

## 2021-08-12 DIAGNOSIS — G44.209 TENSION HEADACHE: Primary | ICD-10-CM

## 2021-08-12 DIAGNOSIS — M16.11 OSTEOARTHRITIS OF RIGHT HIP, UNSPECIFIED OSTEOARTHRITIS TYPE: ICD-10-CM

## 2021-08-12 PROCEDURE — 99214 OFFICE O/P EST MOD 30 MIN: CPT | Performed by: INTERNAL MEDICINE

## 2021-08-13 ENCOUNTER — TELEPHONE (OUTPATIENT)
Dept: FAMILY MEDICINE | Facility: CLINIC | Age: 61
End: 2021-08-13

## 2021-08-13 ENCOUNTER — LAB (OUTPATIENT)
Dept: INFUSION THERAPY | Facility: CLINIC | Age: 61
End: 2021-08-13
Attending: INTERNAL MEDICINE
Payer: COMMERCIAL

## 2021-08-13 ENCOUNTER — HOSPITAL ENCOUNTER (OUTPATIENT)
Facility: CLINIC | Age: 61
Discharge: HOME | End: 2021-08-13
Attending: INTERNAL MEDICINE | Admitting: INTERNAL MEDICINE
Payer: COMMERCIAL

## 2021-08-13 ENCOUNTER — HOSPITAL ENCOUNTER (INPATIENT)
Facility: CLINIC | Age: 61
LOS: 2 days | Discharge: HOME | DRG: 641 | End: 2021-08-15
Attending: EMERGENCY MEDICINE | Admitting: INTERNAL MEDICINE
Payer: COMMERCIAL

## 2021-08-13 ENCOUNTER — TELEPHONE (OUTPATIENT)
Dept: ONCOLOGY | Facility: CLINIC | Age: 61
End: 2021-08-13

## 2021-08-13 VITALS
HEART RATE: 62 BPM | DIASTOLIC BLOOD PRESSURE: 79 MMHG | RESPIRATION RATE: 16 BRPM | TEMPERATURE: 98.4 F | SYSTOLIC BLOOD PRESSURE: 121 MMHG

## 2021-08-13 VITALS
WEIGHT: 153 LBS | BODY MASS INDEX: 27.11 KG/M2 | HEART RATE: 66 BPM | HEIGHT: 63 IN | RESPIRATION RATE: 16 BRPM | DIASTOLIC BLOOD PRESSURE: 82 MMHG | SYSTOLIC BLOOD PRESSURE: 126 MMHG | TEMPERATURE: 95.4 F

## 2021-08-13 DIAGNOSIS — E87.1 HYPONATREMIA: ICD-10-CM

## 2021-08-13 DIAGNOSIS — E87.6 HYPOKALEMIA: ICD-10-CM

## 2021-08-13 DIAGNOSIS — E83.19 IRON OVERLOAD: ICD-10-CM

## 2021-08-13 DIAGNOSIS — Z20.822 ENCOUNTER FOR LABORATORY TESTING FOR COVID-19 VIRUS: Primary | ICD-10-CM

## 2021-08-13 DIAGNOSIS — E83.110 HEREDITARY HEMOCHROMATOSIS (H): ICD-10-CM

## 2021-08-13 DIAGNOSIS — E83.19 IRON OVERLOAD SYNDROME: Primary | ICD-10-CM

## 2021-08-13 LAB
ALBUMIN SERPL-MCNC: 3.9 G/DL (ref 3.4–5)
ALBUMIN UR-MCNC: NEGATIVE MG/DL
ALP SERPL-CCNC: 96 U/L (ref 40–150)
ALT SERPL W P-5'-P-CCNC: 22 U/L (ref 0–50)
ANION GAP SERPL CALCULATED.3IONS-SCNC: 7 MMOL/L (ref 3–14)
ANION GAP SERPL CALCULATED.3IONS-SCNC: 8 MMOL/L (ref 3–14)
APPEARANCE UR: CLEAR
AST SERPL W P-5'-P-CCNC: 22 U/L (ref 0–45)
ATRIAL RATE - MUSE: 65 BPM
BASOPHILS # BLD MANUAL: 0 10E3/UL (ref 0–0.2)
BASOPHILS NFR BLD MANUAL: 0 %
BILIRUB DIRECT SERPL-MCNC: 0.2 MG/DL (ref 0–0.2)
BILIRUB SERPL-MCNC: 0.7 MG/DL (ref 0.2–1.3)
BILIRUB UR QL STRIP: NEGATIVE
BUN SERPL-MCNC: 13 MG/DL (ref 7–30)
BUN SERPL-MCNC: 16 MG/DL (ref 7–30)
CALCIUM SERPL-MCNC: 8.7 MG/DL (ref 8.5–10.1)
CALCIUM SERPL-MCNC: 9 MG/DL (ref 8.5–10.1)
CHLORIDE BLD-SCNC: 81 MMOL/L (ref 94–109)
CHLORIDE BLD-SCNC: 84 MMOL/L (ref 94–109)
CO2 SERPL-SCNC: 27 MMOL/L (ref 20–32)
CO2 SERPL-SCNC: 28 MMOL/L (ref 20–32)
COLOR UR AUTO: ABNORMAL
CREAT SERPL-MCNC: 0.91 MG/DL (ref 0.52–1.04)
CREAT SERPL-MCNC: 0.94 MG/DL (ref 0.52–1.04)
CREAT UR-MCNC: 55 MG/DL
DIASTOLIC BLOOD PRESSURE - MUSE: NORMAL MMHG
EOSINOPHIL # BLD MANUAL: 0.1 10E3/UL (ref 0–0.7)
EOSINOPHIL NFR BLD MANUAL: 1 %
ERYTHROCYTE [DISTWIDTH] IN BLOOD BY AUTOMATED COUNT: 11.3 % (ref 10–15)
ERYTHROCYTE [DISTWIDTH] IN BLOOD BY AUTOMATED COUNT: 11.4 % (ref 10–15)
FERRITIN SERPL-MCNC: 107 NG/ML (ref 8–252)
GFR SERPL CREATININE-BSD FRML MDRD: 65 ML/MIN/1.73M2
GFR SERPL CREATININE-BSD FRML MDRD: 67 ML/MIN/1.73M2
GLUCOSE BLD-MCNC: 136 MG/DL (ref 70–99)
GLUCOSE BLD-MCNC: 80 MG/DL (ref 70–99)
GLUCOSE UR STRIP-MCNC: NEGATIVE MG/DL
HCT VFR BLD AUTO: 32.5 % (ref 35–47)
HCT VFR BLD AUTO: 35.5 % (ref 35–47)
HGB BLD-MCNC: 11.9 G/DL (ref 11.7–15.7)
HGB BLD-MCNC: 12.8 G/DL (ref 11.7–15.7)
HGB UR QL STRIP: NEGATIVE
INTERPRETATION ECG - MUSE: NORMAL
KETONES UR STRIP-MCNC: NEGATIVE MG/DL
LEUKOCYTE ESTERASE UR QL STRIP: NEGATIVE
LYMPHOCYTES # BLD MANUAL: 1 10E3/UL (ref 0.8–5.3)
LYMPHOCYTES NFR BLD MANUAL: 12 %
MAGNESIUM SERPL-MCNC: 1.6 MG/DL (ref 1.6–2.3)
MCH RBC QN AUTO: 30 PG (ref 26.5–33)
MCH RBC QN AUTO: 30.4 PG (ref 26.5–33)
MCHC RBC AUTO-ENTMCNC: 36.1 G/DL (ref 31.5–36.5)
MCHC RBC AUTO-ENTMCNC: 36.6 G/DL (ref 31.5–36.5)
MCV RBC AUTO: 83 FL (ref 78–100)
MCV RBC AUTO: 83 FL (ref 78–100)
MONOCYTES # BLD MANUAL: 0.7 10E3/UL (ref 0–1.3)
MONOCYTES NFR BLD MANUAL: 9 %
MUCOUS THREADS #/AREA URNS LPF: PRESENT /LPF
NEUTROPHILS # BLD MANUAL: 6.4 10E3/UL (ref 1.6–8.3)
NEUTROPHILS NFR BLD MANUAL: 78 %
NITRATE UR QL: NEGATIVE
P AXIS - MUSE: 38 DEGREES
PH UR STRIP: 6.5 [PH] (ref 5–7)
PLAT MORPH BLD: NORMAL
PLATELET # BLD AUTO: 282 10E3/UL (ref 150–450)
PLATELET # BLD AUTO: 305 10E3/UL (ref 150–450)
POTASSIUM BLD-SCNC: 3 MMOL/L (ref 3.4–5.3)
POTASSIUM BLD-SCNC: 3.1 MMOL/L (ref 3.4–5.3)
PR INTERVAL - MUSE: 184 MS
PROT SERPL-MCNC: 7.8 G/DL (ref 6.8–8.8)
QRS DURATION - MUSE: 82 MS
QT - MUSE: 450 MS
QTC - MUSE: 468 MS
R AXIS - MUSE: 50 DEGREES
RBC # BLD AUTO: 3.91 10E6/UL (ref 3.8–5.2)
RBC # BLD AUTO: 4.27 10E6/UL (ref 3.8–5.2)
RBC MORPH BLD: NORMAL
RBC URINE: 0 /HPF
SARS-COV-2 RNA RESP QL NAA+PROBE: NEGATIVE
SODIUM SERPL-SCNC: 117 MMOL/L (ref 133–144)
SODIUM SERPL-SCNC: 118 MMOL/L (ref 133–144)
SODIUM UR-SCNC: 31 MMOL/L
SP GR UR STRIP: 1.01 (ref 1–1.03)
SYSTOLIC BLOOD PRESSURE - MUSE: NORMAL MMHG
T AXIS - MUSE: 34 DEGREES
UROBILINOGEN UR STRIP-MCNC: NORMAL MG/DL
VENTRICULAR RATE- MUSE: 65 BPM
WBC # BLD AUTO: 5.4 10E3/UL (ref 4–11)
WBC # BLD AUTO: 8.2 10E3/UL (ref 4–11)
WBC URINE: 0 /HPF

## 2021-08-13 PROCEDURE — 85027 COMPLETE CBC AUTOMATED: CPT | Performed by: INTERNAL MEDICINE

## 2021-08-13 PROCEDURE — 84300 ASSAY OF URINE SODIUM: CPT | Performed by: EMERGENCY MEDICINE

## 2021-08-13 PROCEDURE — 86769 SARS-COV-2 COVID-19 ANTIBODY: CPT | Performed by: INTERNAL MEDICINE

## 2021-08-13 PROCEDURE — 99223 1ST HOSP IP/OBS HIGH 75: CPT | Mod: AI | Performed by: INTERNAL MEDICINE

## 2021-08-13 PROCEDURE — 99195 PHLEBOTOMY: CPT

## 2021-08-13 PROCEDURE — 93005 ELECTROCARDIOGRAM TRACING: CPT

## 2021-08-13 PROCEDURE — 96366 THER/PROPH/DIAG IV INF ADDON: CPT

## 2021-08-13 PROCEDURE — C9803 HOPD COVID-19 SPEC COLLECT: HCPCS

## 2021-08-13 PROCEDURE — 99285 EMERGENCY DEPT VISIT HI MDM: CPT | Mod: 25

## 2021-08-13 PROCEDURE — 258N000003 HC RX IP 258 OP 636: Performed by: EMERGENCY MEDICINE

## 2021-08-13 PROCEDURE — 36415 COLL VENOUS BLD VENIPUNCTURE: CPT | Performed by: EMERGENCY MEDICINE

## 2021-08-13 PROCEDURE — 120N000001 HC R&B MED SURG/OB

## 2021-08-13 PROCEDURE — 83735 ASSAY OF MAGNESIUM: CPT | Performed by: EMERGENCY MEDICINE

## 2021-08-13 PROCEDURE — 82728 ASSAY OF FERRITIN: CPT | Performed by: INTERNAL MEDICINE

## 2021-08-13 PROCEDURE — 250N000011 HC RX IP 250 OP 636: Performed by: EMERGENCY MEDICINE

## 2021-08-13 PROCEDURE — 85027 COMPLETE CBC AUTOMATED: CPT | Performed by: EMERGENCY MEDICINE

## 2021-08-13 PROCEDURE — 36415 COLL VENOUS BLD VENIPUNCTURE: CPT | Performed by: INTERNAL MEDICINE

## 2021-08-13 PROCEDURE — 82248 BILIRUBIN DIRECT: CPT | Performed by: INTERNAL MEDICINE

## 2021-08-13 PROCEDURE — 87635 SARS-COV-2 COVID-19 AMP PRB: CPT | Performed by: EMERGENCY MEDICINE

## 2021-08-13 PROCEDURE — 250N000013 HC RX MED GY IP 250 OP 250 PS 637: Performed by: EMERGENCY MEDICINE

## 2021-08-13 PROCEDURE — 96365 THER/PROPH/DIAG IV INF INIT: CPT

## 2021-08-13 PROCEDURE — 82570 ASSAY OF URINE CREATININE: CPT | Performed by: EMERGENCY MEDICINE

## 2021-08-13 PROCEDURE — 81001 URINALYSIS AUTO W/SCOPE: CPT | Performed by: EMERGENCY MEDICINE

## 2021-08-13 RX ORDER — POTASSIUM CHLORIDE 1500 MG/1
40 TABLET, EXTENDED RELEASE ORAL ONCE
Status: COMPLETED | OUTPATIENT
Start: 2021-08-13 | End: 2021-08-13

## 2021-08-13 RX ORDER — MAGNESIUM SULFATE HEPTAHYDRATE 40 MG/ML
2 INJECTION, SOLUTION INTRAVENOUS ONCE
Status: COMPLETED | OUTPATIENT
Start: 2021-08-13 | End: 2021-08-14

## 2021-08-13 RX ORDER — SODIUM CHLORIDE 9 MG/ML
INJECTION, SOLUTION INTRAVENOUS ONCE
Status: DISCONTINUED | OUTPATIENT
Start: 2021-08-13 | End: 2021-08-14

## 2021-08-13 RX ADMIN — POTASSIUM CHLORIDE 40 MEQ: 1500 TABLET, EXTENDED RELEASE ORAL at 21:36

## 2021-08-13 RX ADMIN — MAGNESIUM SULFATE HEPTAHYDRATE 2 G: 40 INJECTION, SOLUTION INTRAVENOUS at 21:37

## 2021-08-13 ASSESSMENT — ENCOUNTER SYMPTOMS
ABDOMINAL PAIN: 0
FATIGUE: 1
FEVER: 0
LIGHT-HEADEDNESS: 1
NAUSEA: 1
VOMITING: 1
HEADACHES: 1

## 2021-08-13 ASSESSMENT — PAIN SCALES - GENERAL
PAINLEVEL: NO PAIN (0)
PAINLEVEL: NO PAIN (0)

## 2021-08-13 ASSESSMENT — MIFFLIN-ST. JEOR
SCORE: 1204.53
SCORE: 1218.13

## 2021-08-13 NOTE — ED PROVIDER NOTES
History   Chief Complaint:  Abnormal Labs      HPI   Irina Hanks is a 61 year old female with history of hereditary hemochromatosis, hypertension, and rheumatoid arthritis who presents for evaluation of abnormal labs. Recently the patient has felt lightheaded and fatigued with frequent headaches, and today she had labs drawn notably for a low sodium of 118 and a low potassium of 3.1. Due to concern for these abnormal labs the patient was sent into the ED for evaluation. Here in the ED she also had one episode of vomiting prior to evaluation. She notes that she started Losartan-hydrochlorothiazide on . She notes that she has had some occasionally low sodiums in the past but has never had it be this low before. She denies any chest pain or abdominal pain.     Labs 2021:   Chemistry: Sodium 118 low, Potassium 3.1 low, Chloride 84 low, o/w WNL (Creatinine 0.94)      Review of Systems   Constitutional: Positive for fatigue. Negative for fever.   Cardiovascular: Negative for chest pain.   Gastrointestinal: Positive for nausea and vomiting. Negative for abdominal pain.   Neurological: Positive for light-headedness and headaches.   All other systems reviewed and are negative.    Allergies:  Codeine  Nitrofurantoin  Ace Inhibitors  Sulfa Drugs     Medications:  Afluria quadrivalent   Aspirin   Excedrin   Entocort   Klonopin   Gabapentin  Plaquenil   Levothyroxine   Cytomel   Claritin   Hyzaar   Toprol-XL   Zofran   Carafate   Valtrex     Past Medical History:    Hypertension   Impaired renal function  Inflammatory arthritis   Hypothyroidism    Tricuspid regurgitation   Hereditary hemochromatosis   Degenerative joint disease   Iron overload syndrome   Rheumatoid arthritis   Anxiety  Irritable bowel syndrome   Hyperlipidemia     Past Surgical History:    Breast biopsy   section  Colonoscopy  Leep tx cervical   Myringotomy, insert tube, combined      Family History:    Colorectal cancer - Father and  "brother   Arthritis - Mother and sister   Uterine cancer - Sister   Hypertension - Brother and sister   Thyroid disease - Brother and sister   Kidney cancer - Brother     Social History:  The patient presents to the ED alone.     Physical Exam     Patient Vitals for the past 24 hrs:   BP Temp Temp src Pulse Resp SpO2 Height Weight   08/13/21 2215 -- -- -- 60 12 93 % -- --   08/13/21 2200 -- -- -- 64 13 99 % -- --   08/13/21 2145 -- -- -- 63 13 97 % -- --   08/13/21 2130 134/83 -- -- 61 11 100 % -- --   08/13/21 2115 -- -- -- 63 14 -- -- --   08/13/21 2100 -- -- -- 63 11 -- -- --   08/13/21 2045 -- -- -- 62 12 -- -- --   08/13/21 2030 -- -- -- 63 13 -- -- --   08/13/21 2015 -- -- -- 65 12 -- -- --   08/13/21 2000 -- -- -- 64 16 99 % -- --   08/13/21 1945 -- -- -- 59 9 99 % -- --   08/13/21 1930 111/79 -- -- 62 13 (!) 88 % -- --   08/13/21 1915 -- -- -- 59 10 99 % -- --   08/13/21 1900 109/75 -- -- 60 14 92 % -- --   08/13/21 1845 120/83 -- -- 61 9 98 % -- --   08/13/21 1830 (!) 69/44 -- -- 61 28 100 % -- --   08/13/21 1829 122/75 -- -- 61 -- 99 % -- --   08/13/21 1827 120/75 -- -- 62 -- 100 % -- --   08/13/21 1755 91/60 97.5  F (36.4  C) Oral 65 18 100 % 1.6 m (5' 3\") 68 kg (150 lb)       Physical Exam  General: alert, pleasant  HENT: mucous membranes moist  CV: regular rate, regular rhythm  Resp: normal effort, clear throughout, no crackles or wheezing  GI: abdomen soft and nontender, no guarding  MSK: no bony tenderness  Skin: appropriately warm and dry  Neuro: alert, clear speech, oriented  Psych: normal mood and affect     Emergency Department Course   ECG  ECG taken at 18:14:06, ECG read at 1822  Normal sinus rhythm, Septal infarct age undetermined, Abnormal ECG   Rate 65 bpm. MI interval 184 ms. QRS duration 82 ms. QT/QTc 450/468 ms. P-R-T axes 38 50 34.     Laboratory:   CBC: WBC 8.2, HGB 11.9,    BMP: Sodium 117 low, Potassium 3.0 low, Chloride 81 low, Glucose 136 high, o/w WNL (Creatinine 0.91) "   Magnesium: 1.6   UA with Microscopic reflex to Culture: Mucous present, o/w WNL   Sodium random urine: 31   Asymptomatic COVID19 Virus PCR by nasopharyngeal swab: Negative       Emergency Department Course:  Reviewed:  I reviewed nursing notes, vitals and past medical history    Assessments:  1835: I obtained history and examined the patient as noted above.      Consults:   2200: I spoke with Dr. Rosales of the hospitalist service regarding patient's presentation, findings, and plan of care.     Interventions:  2136 Potassium chloride ER 40 mEq PO  2137 Magnesium sulfate 2 g IV    mL/hr IV     Disposition:  The patient was admitted to the hospital under the care of Dr. Rosales.       Impression & Plan   Medical Decision Making:  Irina Hanks is a 61 year old female with a history of hemochromatosis, hypertension, and rheumatoid arthritis who presents today with hyponatremia and hypokalemia. On exam, the patient is alert and responsive, though states that her mentation seems slowed from baseline. She is having headache and generally feeling unwell. Otherwise, here in the ED vitals have been normal except for one isolated blood pressure in which she was hypotensive. EKG shows a normal QT interval. Repeat labs confirm hyponatremia, mild hypokalemia, and mild hypomagnesemia. This is likely related to starting hydrochlorothiazide 7/23/21. She was started on gentle fluids with normal saline and potassium and magnesium were repleted. She will be admitted for continued slow correction of electrolytes. The patient agrees with this plan.      Covid-19  Irina aHnks was evaluated during a global COVID-19 pandemic, which necessitated consideration that the patient might be at risk for infection with the SARS-CoV-2 virus that causes COVID-19.   Applicable protocols for evaluation were followed during the patient's care.   COVID-19 was considered as part of the patient's evaluation. The plan for testing is:  a  test was obtained during this visit.     Diagnosis:    ICD-10-CM   1. Hyponatremia  E87.1   2. Hypokalemia  E87.6         Scribe Disclosure:  I, Tapan Guzman, am serving as a scribe at 6:35 PM on 8/13/2021 to document services personally performed by Myriam Palomino MD based on my observations and the provider's statements to me.         Myriam Palomino MD  08/13/21 3255

## 2021-08-13 NOTE — PATIENT INSTRUCTIONS
As discussed your symptoms of headache is tension headache , medication sent to your pharmacy. Please keep up your appointment also with your Ophthalmology so that if any changes in glasses can be done.    Keep up your appointment with Hematology on management of your hemochromatosis and as needed Phlebotomies.   Will need to get the records from [facility] regarding your upcoming Total Hip Arthroplasty which you will need to coordinate the timing of phlebotomies just before surgery to be avoided.     ===================    Patient Education     Migraine Headache   A migraine headache is an often severe type of headache. It's different from other types of headaches in that symptoms other than pain occur with the it. For instance, a classic migraine headache means visual symptoms (or aura) such as flashes of light, blind spots or other vision changes, warns you a headache is coming on. Nausea and vomiting, lightheadedness, sensitivity to light or sound, and other visual disturbances are common migraine symptoms. The pain may last from a few hours to several days. It's not clear why migraines occur, but certain factors called triggers can raise the risk of having a migraine attack. A migraine may be triggered by emotional stress or depression, or by hormone changes during the menstrual cycle. Other triggers include certain birth control pills, overuse of migraine medicines, alcohol or caffeine, foods with tyramine such as aged cheese and wine, eyestrain, weather changes, missed meals, or too little or too much sleep.  Home care  Follow these tips when taking care of yourself at home:    Don t drive yourself home if you were given pain medicine for your headache or are having visual symptoms. Instead, have someone else drive you home. Try to sleep when you get home. You should feel much better when you wake up.    Cold can help ease migraine symptoms. Put an ice pack wrapped in a thin towel on your forehead or at the base of  your skull. Put heat on the back of your neck to help ease any neck spasm.    Drink only clear liquids or eat a light diet until your symptoms get better. This will help you prevent nausea and vomiting.  How to prevent migraines  Pay attention to what seems to trigger your headache. Try to stay away from the triggers when you can. If you have headaches often, consider keeping a headache diary. In it, write down what you were doing, feeling, or eating in the hours before each headache. Show this to your healthcare provider to help find the cause of your headaches.  If stress seems to be a trigger for your headaches, figure out what is causing stress in your life. Learn new ways to handle your stress. Ideas include regular exercise, biofeedback, self-hypnosis, yoga, and meditation. Talk with your healthcare provider to find out more information about managing stress. Many books and digital media are also available on this subject.  Tyramine is a substance found in many foods. It can trigger a migraine in some people. These foods contain tyramine:    Chocolate    Yogurt    All cheeses, but especially aged cheeses    Smoked or pickled fish and meat, including herring, caviar, bologna, pepperoni, and salami    Liver    Avocados    Bananas    Figs    Raisins    Red wine  Try staying away from these foods for 1 to 2 months to see if you have fewer headaches.  How to treat future headaches    Take time out at the first sign of a headache, if possible. Find a quiet, dark, comfortable place to sit or lie down. Let yourself relax or sleep.    Put an ice pack wrapped in a thin towel on your forehead or on the area of greatest pain. A heating pad and massage may help if you are having a muscle spasm and tightness in your neck.    If you have been prescribed a medicine to stop a migraine headache, use this at the first warning sign of the headache for best results. First signs may be an aura or pain.    If you have been  prescribed a medicine to prevent the headaches, it's important to take the medicine as directed. Many of these medicines may take a few weeks to start preventing headaches, so it's important to not give up on them right away. If you continue to have just as many headaches after taking these medicines for a while, talk with your doctor to see if the dose needs to be changed or if a different medicine is advised.    If you need to take medicine often for your migraine, talk with your healthcare provider about other ways to prevent your headaches.    Follow-up care  Follow up with your healthcare provider, or as advised. Talk with your provider if you have frequent headaches. He or she can figure out a treatment plan. Ask if you can have medicine to take at home the next time you get a bad headache. This may keep you from having to visit the emergency department in the future. You may need to see a headache specialist (neurologist) if you continue to have headaches.  When to seek medical advice  Call your healthcare provider right away if any of these occur:    Your head pain gets worse, or doesn t get better within 24 hours    You can t keep liquids down (repeated vomiting)    Pain in your sinuses, ears, or throat    Fever of 100.4  F (38  C) or higher, or as directed by your healthcare provider    Stiff neck    Extreme drowsiness, confusion, or fainting    Dizziness, or dizziness with spinning sensation (vertigo)    Weakness or trouble feeling in an arm or leg, or on one side of your face    Trouble talking or seeing  [employer] last reviewed this educational content on 9/1/2019 2000-2021 [employer]. All rights reserved. This information is not intended as a substitute for professional medical care. Always follow your healthcare professional's instructions.

## 2021-08-13 NOTE — TELEPHONE ENCOUNTER
DATE:  8/13/2021   TIME OF RECEIPT FROM LAB:  0403 PM   LAB TEST:  Sodium   LAB VALUE:  118   RESULTS GIVEN WITH READ-BACK to: Lab 0404PM   TIME LAB VALUE REPORTED TO PROVIDER:   0405PM  , sent critical  Urgent staff message to Dr. Britton. Also called clinic at [phone]  Spoke with triage RN  Griselda who will relay information to Dr. Britton.     Adilene Judge RN,BSN,OCN

## 2021-08-13 NOTE — PROGRESS NOTES
Assessment and Plan  1. Tension headache  New problem, with location on the vertex 8/10 severity which is not responding to Ibuprofen. Pt attributes this to Shingrix vaccine two weeks back which I do not suspect for so long and unlikely. Will give Excedrin migraine and do further recommendations if no improvement. AVS given with instructions.  - aspirin-acetaminophen-caffeine (EXCEDRIN MIGRAINE) 250-250-65 MG tablet; Take 1 tablet by mouth every 8 hours as needed for headaches  Dispense: 30 tablet; Refill: 1    2. Essential hypertension  Well controlled, continue current Losartan/HCTZ.and the headache will check labs and do further recommendations.      3. Hereditary hemochromatosis (H)  Stable, requiring phlebotomies Q 6 monthly currently compared to previous Q3 monthly. Has upcoming labs by hematology to decide on the     4. Osteoarthritis of right hip, unspecified osteoarthritis type  Uncontrolled, pt does follows [facility] and plans of [facility] in 10/2021. Discussed on follow up with Oncology to plan regarding phlebotomy decisions and timing of surgery to be taken into consideration.     5. Hyponatremia  - Basic metabolic panel  (Ca, Cl, CO2, Creat, Gluc, K, Na, BUN); Future    6. Encounter for laboratory testing for COVID-19 virus  - COVID-19 Mina RBD Jahaira & Titer Reflex; Future       Patient Instructions   As discussed your symptoms of headache is tension headache , medication sent to your pharmacy. Please keep up your appointment also with your Ophthalmology so that if any changes in glasses can be done.    Keep up your appointment with Hematology on management of your hemochromatosis and as needed Phlebotomies.   Will need to get the records from [facility] regarding your upcoming Total Hip Arthroplasty which you will need to coordinate the timing of phlebotomies just before surgery to be avoided.     ===================    Patient Education     Migraine Headache   A migraine headache is an often severe type of headache.  It's different from other types of headaches in that symptoms other than pain occur with the it. For instance, a classic migraine headache means visual symptoms (or aura) such as flashes of light, blind spots or other vision changes, warns you a headache is coming on. Nausea and vomiting, lightheadedness, sensitivity to light or sound, and other visual disturbances are common migraine symptoms. The pain may last from a few hours to several days. It's not clear why migraines occur, but certain factors called triggers can raise the risk of having a migraine attack. A migraine may be triggered by emotional stress or depression, or by hormone changes during the menstrual cycle. Other triggers include certain birth control pills, overuse of migraine medicines, alcohol or caffeine, foods with tyramine such as aged cheese and wine, eyestrain, weather changes, missed meals, or too little or too much sleep.  Home care  Follow these tips when taking care of yourself at home:    Don t drive yourself home if you were given pain medicine for your headache or are having visual symptoms. Instead, have someone else drive you home. Try to sleep when you get home. You should feel much better when you wake up.    Cold can help ease migraine symptoms. Put an ice pack wrapped in a thin towel on your forehead or at the base of your skull. Put heat on the back of your neck to help ease any neck spasm.    Drink only clear liquids or eat a light diet until your symptoms get better. This will help you prevent nausea and vomiting.  How to prevent migraines  Pay attention to what seems to trigger your headache. Try to stay away from the triggers when you can. If you have headaches often, consider keeping a headache diary. In it, write down what you were doing, feeling, or eating in the hours before each headache. Show this to your healthcare provider to help find the cause of your headaches.  If stress seems to be a trigger for your headaches,  figure out what is causing stress in your life. Learn new ways to handle your stress. Ideas include regular exercise, biofeedback, self-hypnosis, yoga, and meditation. Talk with your healthcare provider to find out more information about managing stress. Many books and digital media are also available on this subject.  Tyramine is a substance found in many foods. It can trigger a migraine in some people. These foods contain tyramine:    Chocolate    Yogurt    All cheeses, but especially aged cheeses    Smoked or pickled fish and meat, including herring, caviar, bologna, pepperoni, and salami    Liver    Avocados    Bananas    Figs    Raisins    Red wine  Try staying away from these foods for 1 to 2 months to see if you have fewer headaches.  How to treat future headaches    Take time out at the first sign of a headache, if possible. Find a quiet, dark, comfortable place to sit or lie down. Let yourself relax or sleep.    Put an ice pack wrapped in a thin towel on your forehead or on the area of greatest pain. A heating pad and massage may help if you are having a muscle spasm and tightness in your neck.    If you have been prescribed a medicine to stop a migraine headache, use this at the first warning sign of the headache for best results. First signs may be an aura or pain.    If you have been prescribed a medicine to prevent the headaches, it's important to take the medicine as directed. Many of these medicines may take a few weeks to start preventing headaches, so it's important to not give up on them right away. If you continue to have just as many headaches after taking these medicines for a while, talk with your doctor to see if the dose needs to be changed or if a different medicine is advised.    If you need to take medicine often for your migraine, talk with your healthcare provider about other ways to prevent your headaches.    Follow-up care  Follow up with your healthcare provider, or as advised. Talk  with your provider if you have frequent headaches. He or she can figure out a treatment plan. Ask if you can have medicine to take at home the next time you get a bad headache. This may keep you from having to visit the emergency department in the future. You may need to see a headache specialist (neurologist) if you continue to have headaches.  When to seek medical advice  Call your healthcare provider right away if any of these occur:    Your head pain gets worse, or doesn t get better within 24 hours    You can t keep liquids down (repeated vomiting)    Pain in your sinuses, ears, or throat    Fever of 100.4  F (38  C) or higher, or as directed by your healthcare provider    Stiff neck    Extreme drowsiness, confusion, or fainting    Dizziness, or dizziness with spinning sensation (vertigo)    Weakness or trouble feeling in an arm or leg, or on one side of your face    Trouble talking or seeing  [email or web address] last reviewed this educational content on 9/1/2019 2000-2021 [employer]. All rights reserved. This information is not intended as a substitute for professional medical care. Always follow your healthcare professional's instructions.                 Return in about 3 months (around 11/12/2021), or if symptoms worsen or fail to improve, for Preventative Visit.    Mercedez Britton MD  [facility] ROMULO Luong is a 61 year old who presents for the following health issues     HPI     Patient presents with:  Headache  Also requesting for Covid antibody testing.        Allergies   Allergen Reactions     Codeine Nausea and Nausea and Vomiting     Nitrofurantoin Unknown and Other (See Comments)     Other reaction(s): Gastrointestinal       Ace Inhibitors Cough     lisinopril  lisinopril     Sulfa Drugs      Sulfur         Past Medical History:   Diagnosis Date     ASCUS favor benign 09/2014    3 yr co-test     Essential hypertension, benign      Impaired renal function       Inflammatory arthritis     Managed by Rheumatology  - q 6 mos     Microscopic colitis      Osteoarthritis of first carpometacarpal joint     bilateral     Other specified acquired hypothyroidism      Seasonal allergic rhinitis      Shortness of breath      Spider veins      Symptomatic menopausal or female climacteric states     age 43, on HRT     Tricuspid regurgitation     +1-2 TR noted on 14 Echo       Past Surgical History:   Procedure Laterality Date     BIOPSY BREAST       C/SECTION, LOW TRANSVERSE      twins     COLONOSCOPY      nl, next one due in 5 years     LEEP TX, CERVICAL  1990s    normal since that time     MYRINGOTOMY, INSERT TUBE, COMBINED Left 2015    chronic NORM, ETD       Family History   Problem Relation Age of Onset     Cancer - colorectal Father         age 50     Arthritis Mother      Cancer - colorectal Brother         age 50     Hypertension Sister      Hypertension Brother      Thyroid Disease Sister      Thyroid Disease Brother      Kidney Cancer Brother      Arthritis Sister      Cancer Sister 53        Uterine     Cancer Sister 50        Uterine     Testicular cancer Nephew        Social History     Tobacco Use     Smoking status: Former Smoker     Packs/day: 0.26     Years: 10.00     Pack years: 2.60     Types: Cigarettes     Quit date: 1989     Years since quittin.3     Smokeless tobacco: Never Used   Substance Use Topics     Alcohol use: Yes     Alcohol/week: 0.0 standard drinks     Comment: 2 drinks per day        Current Outpatient Medications   Medication     AFLURIA QUADRIVALENT 0.5 ML injection     aspirin 81 MG EC tablet     aspirin-acetaminophen-caffeine (EXCEDRIN MIGRAINE) 250-250-65 MG tablet     Biotin 5000 MCG TABS     budesonide (ENTOCORT EC) 3 MG EC capsule     Cholecalciferol (VITAMIN D PO)     clobetasol (TEMOVATE) 0.05 % external solution     clonazePAM (KLONOPIN) 0.5 MG ODT     COMPOUNDED NON-CONTROLLED SUBSTANCE (CMPD RX) - PHARMACY TO MIX  "COMPOUNDED MEDICATION     gabapentin (NEURONTIN) 100 MG capsule     hydrocortisone (ANUSOL-HC) 25 MG suppository     hydroxychloroquine (PLAQUENIL) 200 MG tablet     ipratropium (ATROVENT) 0.03 % nasal spray     levothyroxine (SYNTHROID/LEVOTHROID) 75 MCG tablet     liothyronine (CYTOMEL) 5 MCG tablet     loratadine (CLARITIN) 10 MG tablet     losartan-hydrochlorothiazide (HYZAAR) 100-25 MG tablet     metoprolol succinate ER (TOPROL-XL) 50 MG 24 hr tablet     metroNIDAZOLE (METROGEL) 0.75 % vaginal gel     nystatin (MYCOSTATIN) 597238 UNIT/ML suspension     Omega-3 Fatty Acids (FISH OIL ADULT GUMMIES PO)     ondansetron (ZOFRAN) 4 MG tablet     psyllium (METAMUCIL) 58.6 % POWD     sucralfate (CARAFATE) 1 GM/10ML suspension     triamcinolone (KENALOG) 0.1 % paste     TURMERIC PO     valACYclovir (VALTREX) 1000 mg tablet     No current facility-administered medications for this visit.        Review of Systems   Constitutional, HEENT, cardiovascular, pulmonary, GI, , musculoskeletal, neuro, skin, endocrine and psych systems are negative, except as otherwise noted.      Objective    /82   Pulse 66   Temp (!) 95.4  F (35.2  C)   Resp 16   Ht 1.6 m (5' 3\")   Wt 69.4 kg (153 lb)   BMI 27.10 kg/m    Body mass index is 27.1 kg/m .  Physical Exam   GENERAL: healthy, alert and no distress  NECK: no adenopathy, no asymmetry, masses, or scars and thyroid normal to palpation  RESP: lungs clear to auscultation - no rales, rhonchi or wheezes  CV: regular rate and rhythm, normal S1 S2, no S3 or S4, no murmur, click or rub, no peripheral edema and peripheral pulses strong  ABDOMEN: soft, nontender, no hepatosplenomegaly, no masses and bowel sounds normal  MS: no gross musculoskeletal defects noted, no edema  NEURO : CN 2-12 intact, no motor or sensory deficits.   "

## 2021-08-13 NOTE — PROGRESS NOTES
Medical Assistant Note:  Irina Hanks presents today for Blood Draw.    Patient seen by provider today: No.   present during visit today: Not Applicable.    Concerns: No Concerns.    Procedure:  Lab draw site: Right Hand, Needle type: bf, Gauge: 23.    Post Assessment:  Labs drawn without difficulty: Yes.    Discharge Plan:  Departure Mode: Ambulatory.    Face to Face Time: 5 min.    Irina Vee MA

## 2021-08-13 NOTE — TELEPHONE ENCOUNTER
Pt called     Had OV yesterday     Had a Shingles vaccine last week and since then has  had excruciating headaches, states she reported this to PCP     Tried Excedrin migraine/didn't touch her headache. Got 3.5 hours of sleep last night     States PCP was going to Rx a prescription strength if no improvement. Pt does not want to wait until Monday     Pharmacy is [facility] on [address] in [place]     Headache is about the same 8/10.     States she cannot wait over the weekend, asking to get a prescription strength medication     Detailed message okay on call back     Sosa ANDRES RN

## 2021-08-13 NOTE — PROGRESS NOTES
Infusion Nursing Note:  Irina Hanks presents today for therapeutic phlebotomy.    Patient seen by provider today: No   present during visit today: Not Applicable.    Note: 535g blood removed without difficulty. Pt to ER on advisement of Dr Britton  Due to low sodium. Pt stable here throughout. Phleb order finished. Msg sent to Flo regarding this.      Intravenous Access:  Phleb draw site right AC, Needle type straight, Gauge 17.5. Blood drawn without difficulty.    Treatment Conditions:  Lab Results   Component Value Date    HGB 12.8 08/13/2021    HGB 11.8 05/11/2021     Lab Results   Component Value Date    WBC 5.4 08/13/2021    WBC 7.8 05/11/2021      Lab Results   Component Value Date    ANEU 2.0 09/17/2019     Lab Results   Component Value Date     08/13/2021     05/11/2021      Lab Results   Component Value Date     08/13/2021     06/15/2021                   Lab Results   Component Value Date    POTASSIUM 3.1 08/13/2021    POTASSIUM 3.9 06/15/2021           No results found for: MAG         Lab Results   Component Value Date    CR 0.94 08/13/2021    CR 0.87 06/15/2021                   Lab Results   Component Value Date    LUCA 9.0 08/13/2021    LUCA 9.6 06/15/2021                Lab Results   Component Value Date    BILITOTAL 0.7 08/13/2021    BILITOTAL 0.8 05/11/2021           Lab Results   Component Value Date    ALBUMIN 3.9 08/13/2021    ALBUMIN 3.6 05/11/2021                    Lab Results   Component Value Date    ALT 22 08/13/2021    ALT 28 05/11/2021           Lab Results   Component Value Date    AST 22 08/13/2021    AST 21 05/11/2021       Results reviewed, labs MET treatment parameters, ok to proceed with treatment.      Post Infusion Assessment:  Patient tolerated infusion without incident.  Site patent and intact, free from redness, edema or discomfort.  No evidence of extravasations.  Access discontinued per protocol.       Discharge Plan:   Patient  and/or family verbalized understanding of discharge instructions and all questions answered.  AVS to patient via D[employer].  Patient has no further appointments. Order completed. Patient discharged in stable condition accompanied by: self.  Departure Mode: Ambulatory.      Florence Rose RN

## 2021-08-13 NOTE — TELEPHONE ENCOUNTER
Called and spoke to the patient , regarding Abnormally low sodium at 118. Discussed with the patient that this needs to be repleted gradually and monitored as inpatient which she understood and agreed to go to ER. All complications explained and risks discussed.     Mercedez Britton MD on 8/13/2021 at 4:35 PM

## 2021-08-14 DIAGNOSIS — E83.19 IRON OVERLOAD: ICD-10-CM

## 2021-08-14 DIAGNOSIS — E83.110 HEREDITARY HEMOCHROMATOSIS (H): Primary | ICD-10-CM

## 2021-08-14 LAB
MAGNESIUM SERPL-MCNC: 2.1 MG/DL (ref 1.6–2.3)
POTASSIUM BLD-SCNC: 3 MMOL/L (ref 3.4–5.3)
POTASSIUM BLD-SCNC: 4.1 MMOL/L (ref 3.4–5.3)
SARS-COV-2 AB SERPL IA-ACNC: 163 U/ML
SARS-COV-2 AB SERPL QL IA: POSITIVE
SODIUM SERPL-SCNC: 122 MMOL/L (ref 133–144)
SODIUM SERPL-SCNC: 125 MMOL/L (ref 133–144)
SODIUM SERPL-SCNC: 126 MMOL/L (ref 133–144)
SODIUM SERPL-SCNC: 127 MMOL/L (ref 133–144)

## 2021-08-14 PROCEDURE — 250N000013 HC RX MED GY IP 250 OP 250 PS 637: Performed by: INTERNAL MEDICINE

## 2021-08-14 PROCEDURE — 99233 SBSQ HOSP IP/OBS HIGH 50: CPT | Performed by: INTERNAL MEDICINE

## 2021-08-14 PROCEDURE — 120N000001 HC R&B MED SURG/OB

## 2021-08-14 PROCEDURE — 84295 ASSAY OF SERUM SODIUM: CPT | Performed by: INTERNAL MEDICINE

## 2021-08-14 PROCEDURE — 258N000001 HC RX 258: Performed by: INTERNAL MEDICINE

## 2021-08-14 PROCEDURE — 258N000003 HC RX IP 258 OP 636: Performed by: INTERNAL MEDICINE

## 2021-08-14 PROCEDURE — 36415 COLL VENOUS BLD VENIPUNCTURE: CPT | Performed by: INTERNAL MEDICINE

## 2021-08-14 PROCEDURE — 83735 ASSAY OF MAGNESIUM: CPT | Performed by: INTERNAL MEDICINE

## 2021-08-14 PROCEDURE — 84132 ASSAY OF SERUM POTASSIUM: CPT | Performed by: INTERNAL MEDICINE

## 2021-08-14 RX ORDER — CLONAZEPAM 0.5 MG/1
0.5 TABLET, ORALLY DISINTEGRATING ORAL
Status: DISCONTINUED | OUTPATIENT
Start: 2021-08-14 | End: 2021-08-14 | Stop reason: CLARIF

## 2021-08-14 RX ORDER — SODIUM CHLORIDE 9 MG/ML
INJECTION, SOLUTION INTRAVENOUS CONTINUOUS
Status: DISCONTINUED | OUTPATIENT
Start: 2021-08-14 | End: 2021-08-14

## 2021-08-14 RX ORDER — GABAPENTIN 100 MG/1
100 CAPSULE ORAL 3 TIMES DAILY
Status: DISCONTINUED | OUTPATIENT
Start: 2021-08-14 | End: 2021-08-15 | Stop reason: HOSPADM

## 2021-08-14 RX ORDER — POTASSIUM CHLORIDE 7.45 MG/ML
10 INJECTION INTRAVENOUS
Status: DISCONTINUED | OUTPATIENT
Start: 2021-08-14 | End: 2021-08-14

## 2021-08-14 RX ORDER — IPRATROPIUM BROMIDE 21 UG/1
2 SPRAY, METERED NASAL DAILY
Status: DISCONTINUED | OUTPATIENT
Start: 2021-08-14 | End: 2021-08-15 | Stop reason: HOSPADM

## 2021-08-14 RX ORDER — HEPARIN SODIUM,PORCINE 10 UNIT/ML
5 VIAL (ML) INTRAVENOUS
Status: CANCELLED | OUTPATIENT
Start: 2021-08-14

## 2021-08-14 RX ORDER — POTASSIUM CHLORIDE 1500 MG/1
20 TABLET, EXTENDED RELEASE ORAL ONCE
Status: DISCONTINUED | OUTPATIENT
Start: 2021-08-14 | End: 2021-08-15 | Stop reason: HOSPADM

## 2021-08-14 RX ORDER — LIOTHYRONINE SODIUM 5 UG/1
10 TABLET ORAL DAILY
Status: DISCONTINUED | OUTPATIENT
Start: 2021-08-14 | End: 2021-08-15 | Stop reason: HOSPADM

## 2021-08-14 RX ORDER — LEVOTHYROXINE SODIUM 75 UG/1
75 TABLET ORAL
Status: DISCONTINUED | OUTPATIENT
Start: 2021-08-14 | End: 2021-08-15 | Stop reason: HOSPADM

## 2021-08-14 RX ORDER — ONDANSETRON 4 MG/1
4 TABLET, ORALLY DISINTEGRATING ORAL EVERY 6 HOURS PRN
Status: DISCONTINUED | OUTPATIENT
Start: 2021-08-14 | End: 2021-08-15 | Stop reason: HOSPADM

## 2021-08-14 RX ORDER — HYDROXYCHLOROQUINE SULFATE 200 MG/1
400 TABLET, FILM COATED ORAL DAILY
Status: DISCONTINUED | OUTPATIENT
Start: 2021-08-14 | End: 2021-08-15 | Stop reason: HOSPADM

## 2021-08-14 RX ORDER — LORATADINE 10 MG/1
10 TABLET ORAL DAILY
Status: DISCONTINUED | OUTPATIENT
Start: 2021-08-14 | End: 2021-08-15 | Stop reason: HOSPADM

## 2021-08-14 RX ORDER — PROCHLORPERAZINE MALEATE 10 MG
10 TABLET ORAL EVERY 6 HOURS PRN
Status: DISCONTINUED | OUTPATIENT
Start: 2021-08-14 | End: 2021-08-15 | Stop reason: HOSPADM

## 2021-08-14 RX ORDER — ONDANSETRON 2 MG/ML
4 INJECTION INTRAMUSCULAR; INTRAVENOUS EVERY 6 HOURS PRN
Status: DISCONTINUED | OUTPATIENT
Start: 2021-08-14 | End: 2021-08-15 | Stop reason: HOSPADM

## 2021-08-14 RX ORDER — HEPARIN SODIUM (PORCINE) LOCK FLUSH IV SOLN 100 UNIT/ML 100 UNIT/ML
5 SOLUTION INTRAVENOUS
Status: CANCELLED | OUTPATIENT
Start: 2021-08-14

## 2021-08-14 RX ORDER — ACETAMINOPHEN 650 MG/1
650 SUPPOSITORY RECTAL EVERY 6 HOURS PRN
Status: DISCONTINUED | OUTPATIENT
Start: 2021-08-14 | End: 2021-08-15 | Stop reason: HOSPADM

## 2021-08-14 RX ORDER — POTASSIUM CHLORIDE 1500 MG/1
40 TABLET, EXTENDED RELEASE ORAL ONCE
Status: COMPLETED | OUTPATIENT
Start: 2021-08-14 | End: 2021-08-14

## 2021-08-14 RX ORDER — CLOBETASOL PROPIONATE 0.5 MG/ML
SOLUTION TOPICAL DAILY
Status: DISCONTINUED | OUTPATIENT
Start: 2021-08-14 | End: 2021-08-15 | Stop reason: HOSPADM

## 2021-08-14 RX ORDER — ACETAMINOPHEN 325 MG/1
650 TABLET ORAL EVERY 6 HOURS PRN
Status: DISCONTINUED | OUTPATIENT
Start: 2021-08-14 | End: 2021-08-15 | Stop reason: HOSPADM

## 2021-08-14 RX ORDER — CLONAZEPAM 0.5 MG/1
0.5 TABLET ORAL
Status: DISCONTINUED | OUTPATIENT
Start: 2021-08-14 | End: 2021-08-15 | Stop reason: HOSPADM

## 2021-08-14 RX ORDER — LIDOCAINE 40 MG/G
CREAM TOPICAL
Status: DISCONTINUED | OUTPATIENT
Start: 2021-08-14 | End: 2021-08-15 | Stop reason: HOSPADM

## 2021-08-14 RX ORDER — PROCHLORPERAZINE 25 MG
25 SUPPOSITORY, RECTAL RECTAL EVERY 12 HOURS PRN
Status: DISCONTINUED | OUTPATIENT
Start: 2021-08-14 | End: 2021-08-15 | Stop reason: HOSPADM

## 2021-08-14 RX ADMIN — Medication 1 MG: at 03:30

## 2021-08-14 RX ADMIN — Medication 125 MCG: at 13:02

## 2021-08-14 RX ADMIN — HYDROXYCHLOROQUINE SULFATE 400 MG: 200 TABLET ORAL at 13:02

## 2021-08-14 RX ADMIN — LEVOTHYROXINE SODIUM 75 MCG: 75 TABLET ORAL at 13:02

## 2021-08-14 RX ADMIN — LORATADINE 10 MG: 10 TABLET ORAL at 13:02

## 2021-08-14 RX ADMIN — CLOBETASOL PROPIONATE: 0.5 SOLUTION TOPICAL at 13:01

## 2021-08-14 RX ADMIN — LIOTHYRONINE SODIUM 10 MCG: 5 TABLET ORAL at 13:02

## 2021-08-14 RX ADMIN — DEXTROSE AND SODIUM CHLORIDE: 5; 450 INJECTION, SOLUTION INTRAVENOUS at 17:39

## 2021-08-14 RX ADMIN — ACETAMINOPHEN 650 MG: 325 TABLET, FILM COATED ORAL at 17:46

## 2021-08-14 RX ADMIN — CLONAZEPAM 0.5 MG: 0.5 TABLET ORAL at 22:47

## 2021-08-14 RX ADMIN — GABAPENTIN 100 MG: 100 CAPSULE ORAL at 22:47

## 2021-08-14 RX ADMIN — GABAPENTIN 100 MG: 100 CAPSULE ORAL at 17:39

## 2021-08-14 RX ADMIN — POTASSIUM CHLORIDE 40 MEQ: 1500 TABLET, EXTENDED RELEASE ORAL at 01:46

## 2021-08-14 RX ADMIN — IPRATROPIUM BROMIDE 2 SPRAY: 21 SPRAY, METERED NASAL at 13:01

## 2021-08-14 RX ADMIN — SODIUM CHLORIDE: 9 INJECTION, SOLUTION INTRAVENOUS at 06:16

## 2021-08-14 ASSESSMENT — ACTIVITIES OF DAILY LIVING (ADL)
EQUIPMENT_CURRENTLY_USED_AT_HOME: CANE, STRAIGHT
ADLS_ACUITY_SCORE: 14
ADLS_ACUITY_SCORE: 14
WEAR_GLASSES_OR_BLIND: NO
WHICH_OF_THE_ABOVE_FUNCTIONAL_RISKS_HAD_A_RECENT_ONSET_OR_CHANGE?: AMBULATION
ADLS_ACUITY_SCORE: 14
DIFFICULTY_COMMUNICATING: NO
CONCENTRATING,_REMEMBERING_OR_MAKING_DECISIONS_DIFFICULTY: NO
DOING_ERRANDS_INDEPENDENTLY_DIFFICULTY: NO
FALL_HISTORY_WITHIN_LAST_SIX_MONTHS: NO
WALKING_OR_CLIMBING_STAIRS_DIFFICULTY: NO
HEARING_DIFFICULTY_OR_DEAF: NO
DIFFICULTY_EATING/SWALLOWING: NO
DRESSING/BATHING_DIFFICULTY: NO
ADLS_ACUITY_SCORE: 14
PATIENT_/_FAMILY_COMMUNICATION_STYLE: SPOKEN LANGUAGE (ENGLISH OR BILINGUAL)
ADLS_ACUITY_SCORE: 14
TOILETING_ISSUES: NO

## 2021-08-14 ASSESSMENT — MIFFLIN-ST. JEOR: SCORE: 1207.7

## 2021-08-14 NOTE — ED NOTES
[facility]  ED Nurse Handoff Report    ED Chief complaint: Abnormal Labs      ED Diagnosis:   Final diagnoses:   Hyponatremia   Hypokalemia       Code Status: Full Code    Allergies:   Allergies   Allergen Reactions     Codeine Nausea and Nausea and Vomiting     Nitrofurantoin Unknown and Other (See Comments)     Other reaction(s): Gastrointestinal       Ace Inhibitors Cough     lisinopril  lisinopril     Sulfa Drugs      Sulfur        Patient Story:  Patient states not feeling well, some lightheadedness which has resolved.  Sent from clinic for eval of hyponatremia & hypokalemia.  Started on hydrochlorothiazide on 07/23/21.     Focused Assessment:  IV fluids at 125 ml/hr.  Patient is very difficult IV start (IV attempted 5x in ED)  IV via US to left upper arm which has been working well.     Treatments and/or interventions provided:  /ml/hr.  Potassium 40 meq PO.  Magnesium 2 g IV   Patient's response to treatments and/or interventions:     To be done/followed up on inpatient unit:      Does this patient have any cognitive concerns?: Alert & oriented x4    Activity level - Baseline/Home:  Independent  Activity Level - Current:   Stand with Assist    Patient's Preferred language: English   Needed?: No    Isolation: None  Infection: Not Applicable  Patient tested for COVID 19 prior to admission: YES  Bariatric?: No    Vital Signs:   Vitals:    08/13/21 2130 08/13/21 2145 08/13/21 2200 08/13/21 2215   BP: 134/83      Pulse: 61 63 64 60   Resp: 11 13 13 12   Temp:       TempSrc:       SpO2: 100% 97% 99% 93%   Weight:       Height:           Cardiac Rhythm:     Was the PSS-3 completed:   Yes  What interventions are required if any?               Family Comments:   OBS brochure/video discussed/provided to patient/family: Yes              Name of person given brochure if not patient:               Relationship to patient:     For the majority of the shift this patient's behavior was  Green.   Behavioral interventions performed were .    ED NURSE PHONE NUMBER:  *[phone]

## 2021-08-14 NOTE — PLAN OF CARE
A&O x4. VSS on RA. C/o mild headache in evening. PRN Tylenol to manage. Up SBA. Regular diet. Tolerating well. PIV infusing D5 1/2NS @75ml/hr. LS clear. BS active, + Flatus. Voiding AUO in BR. Progressing toward plan of care.

## 2021-08-14 NOTE — H&P
[facility]  History and Physical  Hospitalist       Date of Admission:  8/13/2021    Assessment & Plan   Irina Hanks is a delightful 61 year old female with hemochromatosis, rheumatoid arthritis, hypertension, dyslipidemia, irritable bowel syndrome, anxiety, hypothyroidism who was admitted on 8/13/2021 with hyponatremia, hypokalemia likely secondary to hydrochlorothiazide medication side effect.    Symptomatic Hyponatremia   Hypochloremia  Hypokalemia  Sodium 118 with associated fatigue, mental cloudiness, nausea, vomiting.  This summer she was started on hydrochlorothiazide-losartan combination pill.  This is known to cause electrolyte abnormalities in some patients.  This is the most likely culprit of her low sodium, chloride, potassium findings. About 2 months ago her sodium was 130.   -admit to inpatient  -normal saline 125 ml/hr => decreased to 75 ml/per hour when latest sodium check showed sodium 122  -Q 4 hour sodium checks   -goal correction is 4-6 mEq/L in 24 hours. Max 8 mEq/L.     Hypertension  Dyslipidemia  Managed PTA with hydrochlorothiazide-losartan, metoprolol, fish oil, aspirin.   -discontinue hydrochlorothiazide  -resume losartan, metoprolol when needed    Chronic, stable problems:  Hypothyroidism: Resume PTA levothyroxine and cytomel once verified  Depression/anxiety: Resume PTA klonopin prn  Hemochromatosis     Asymptomatic Rule Out of COVID-19 infection  This patient was evaluated during a global COVID-19 pandemic, which necessitated consideration that the patient might be at risk for infection with the SARS-CoV-2 virus that causes COVID-19. Applicable protocols for evaluation were followed during the patient's care. Low suspicion for infection.   -follow-up COVID-19 PCR test result =>Negative    DVT prophylaxis: Pneumatic Compression Devices and Ambulate every shift  Code Status: Full    Disposition: Expected discharge in 2 days pending correction and  stabilization of electrolytes. If possible, she would like to discharge early on Sunday or even late on Saturday if sodium trend is stable. She is hosting an engagement party for her daughter!    Luci Rosales MD  Text Page    ~~~~~~~~~~~~~~~~~~~~~~~~~~~~~~~~~~~~~~~~~~~~~~~~~~~~~  Primary Care Physician   Mercedez Britton    Chief Complaint   Nausea, fatigue. Abnormal labs.     History is obtained from the patient and medical records.    History of Present Illness   Irina Hanks is a very pleasant 61 year old female with hemochromatosis, rheumatoid arthritis, hypertension, dyslipidemia, irritable bowel syndrome, anxiety, hypothyroidism who presents with fatigue and nausea. She was in clinic earlier today and had labs drawn which showed sodium 118 and potassium 3.0 so was called at home and told to come to the ED. Reports she feels that her thinking is rather clouded and she has felt increasingly fatigued and nauseated for the past 1-2 weeks. Earlier in the summer she was started on a combination antihypertensive with hydrochlorothiazide and losartan. She does not take any potassium supplement. Ongoing nausea and mild confusion in the ED. Case reviewed with Dr. Palomino from the Emergency Department. Labs and available imaging. EKG reviewed.     Past Medical History    I have reviewed this patient's medical history and updated it with pertinent information if needed.   Past Medical History:   Diagnosis Date     ASCUS favor benign 09/2014    3 yr co-test     Essential hypertension, benign      Impaired renal function      Inflammatory arthritis     Managed by Rheumatology  - q 6 mos     Microscopic colitis      Osteoarthritis of first carpometacarpal joint     bilateral     Other specified acquired hypothyroidism      Seasonal allergic rhinitis      Shortness of breath      Spider veins      Symptomatic menopausal or female climacteric states     age 43, on HRT     Tricuspid regurgitation     +1-2 TR noted on  12/22/14 Echo     Past Surgical History   I have reviewed this patient's surgical history and updated it with pertinent information if needed.  Past Surgical History:   Procedure Laterality Date     BIOPSY BREAST       C/SECTION, LOW TRANSVERSE      twins     COLONOSCOPY  2013    nl, next one due in 5 years     LEEP TX, CERVICAL  1990s    normal since that time     MYRINGOTOMY, INSERT TUBE, COMBINED Left 4/2015    chronic NORM, ETD     Prior to Admission Medications   Prior to Admission Medications   Prescriptions Last Dose Informant Patient Reported? Taking?   Biotin 5000 MCG TABS 8/13/2021 at Unknown time  Yes Yes   Sig: Take 1 tablet by mouth daily    Omega-3 Fatty Acids (FISH OIL ADULT GUMMIES PO) 8/13/2021 at Unknown time  Yes Yes   Sig: Take 1 tablet by mouth daily    TURMERIC PO 8/13/2021 at Unknown time  Yes Yes   Sig: Take 1 tablet by mouth daily    Vitamin D3 (CHOLECALCIFEROL) 125 MCG (5000 UT) tablet 8/13/2021 at Unknown time  Yes Yes   Sig: Take 1 tablet by mouth daily   aspirin 81 MG EC tablet 8/13/2021 at Unknown time  Yes Yes   Sig: Take 81 mg by mouth daily   aspirin-acetaminophen-caffeine (EXCEDRIN MIGRAINE) 250-250-65 MG tablet 8/13/2021 at 1200  No Yes   Sig: Take 1 tablet by mouth every 8 hours as needed for headaches   clobetasol (TEMOVATE) 0.05 % external solution 8/12/2021 at Unknown time  Yes Yes   Sig: Apply topically daily To scalp   clonazePAM (KLONOPIN) 0.5 MG ODT Past Week at prn hs  No Yes   Sig: DISSOLVE 1 TABLET ON THE TONGUE EVERY NIGHT AS NEEDED FOR ANXIETY   gabapentin (NEURONTIN) 100 MG capsule 8/13/2021 at x1  Yes Yes   Sig: Take 100 mg by mouth 3 times daily    hydroxychloroquine (PLAQUENIL) 200 MG tablet 8/13/2021 at Unknown time  No Yes   Sig: Take 2 tablets (400 mg) by mouth daily   ipratropium (ATROVENT) 0.03 % nasal spray 8/13/2021 at Unknown time  Yes Yes   Sig: Spray 2 sprays into both nostrils daily    levothyroxine (SYNTHROID/LEVOTHROID) 75 MCG tablet 8/13/2021 at  Unknown time  No Yes   Sig: TAKE 1 TABLET BY MOUTH EVERY MORNING   liothyronine (CYTOMEL) 5 MCG tablet 8/13/2021 at Unknown time  No Yes   Sig: TAKE 2 TABLETS BY MOUTH EVERY DAY   loratadine (CLARITIN) 10 MG tablet 8/13/2021 at Unknown time  Yes Yes   Sig: Take 1 tablet (10 mg) by mouth daily   losartan-hydrochlorothiazide (HYZAAR) 100-25 MG tablet 8/13/2021 at Unknown time  No Yes   Sig: Take 1 tab PO qd   metoprolol succinate ER (TOPROL-XL) 50 MG 24 hr tablet 8/13/2021 at Unknown time  No Yes   Sig: TAKE 2 TABLETS BY MOUTH EVERY DAY      Facility-Administered Medications: None     Allergies   Allergies   Allergen Reactions     Codeine Nausea and Nausea and Vomiting     Nitrofurantoin Unknown and Other (See Comments)     Other reaction(s): Gastrointestinal       Ace Inhibitors Cough     lisinopril  lisinopril     Sulfa Drugs      Sulfur      Social History   I have reviewed this patient's social history and updated it with pertinent information if needed. Irina Hanks  reports that she quit smoking about 32 years ago. Her smoking use included cigarettes. She has a 2.60 pack-year smoking history. She has never used smokeless tobacco. She reports current alcohol use. She reports that she does not use drugs.    Family History   I have reviewed this patient's family history and updated it with pertinent information if needed.   Family History   Problem Relation Age of Onset     Cancer - colorectal Father         age 50     Arthritis Mother      Cancer - colorectal Brother         age 50     Hypertension Sister      Hypertension Brother      Thyroid Disease Sister      Thyroid Disease Brother      Kidney Cancer Brother      Arthritis Sister      Cancer Sister 53        Uterine     Cancer Sister 50        Uterine     Testicular cancer Nephew      Review of Systems   The 10 point Review of Systems is negative other than noted in the HPI or here.    Physical Exam   Temp: 97.5  F (36.4  C) Temp src: Oral BP: 109/75  Pulse: 60   Resp: 14 SpO2: 99 % O2 Device: None (Room air)    Vital Signs with Ranges  Temp:  [95.4  F (35.2  C)-98.4  F (36.9  C)] 97.5  F (36.4  C)  Pulse:  [] 60  Resp:  [9-28] 14  BP: ()/() 109/75  SpO2:  [92 %-100 %] 99 %  150 lbs 0 oz    Constitutional: Alert, NAD, pleasant and interactive  Eyes: PERRL, sclerae anicteric  HEENT: mmm, atraumatic  Respiratory: Lungs CTAB, no wheezes or crackles  Cardiovascular: RRR, no murmurs  no LE edema  GI: soft, non-tender, nondistended  Skin/Integument: warm, dry, no acute rashes  Genitourinary: not examined  Musc: KAPOOR, normal limb strength x 4  Neuro: AOx3, no focal deficits, no tremors  Psych:   affect, not anxious, not confused      Data   Data reviewed today:  I personally reviewed:  Recent Labs   Lab 08/13/21  2019 08/13/21  1458 08/13/21  1457   WBC 8.2  --  5.4   HGB 11.9  --  12.8   MCV 83  --  83     --  305   * 118*  --    POTASSIUM 3.0* 3.1*  --    CHLORIDE 81* 84*  --    CO2 28 27  --    BUN 16 13  --    CR 0.91 0.94  --    ANIONGAP 8 7  --    LUCA 8.7 9.0  --    * 80  --    ALBUMIN  --   --  3.9   PROTTOTAL  --   --  7.8   BILITOTAL  --   --  0.7   ALKPHOS  --   --  96   ALT  --   --  22   AST  --   --  22       Imaging:  No results found for this or any previous visit (from the past 24 hour(s)).

## 2021-08-14 NOTE — PROGRESS NOTES
Admission    Patient arrives to room 601-2 via cart from ED.  Care plan note: oriented, stable on feet and pleasant. Walked to bed and denies all sense of dizziness, nausea, and pain however due to admitting dx and low BP report- she will stay SBA for risk of seizure with low sodium and hypotension.    Inpatient nursing criteria listed below were met:    Did you put disposition on whiteboard and in sticky note: NA  PCD's Documented: No  Skin issues/needs documented :NA  Isolation education started/completed NA  Patient allergies verified with patient: Yes  Verified completion of [place] Risk Assessment Tool:  No  Verified completion of Guardianship screening tool: No  Fall Prevention: Care plan updated, Education given and documented Yes  Care Plan initiated: Yes  Home medications documented in belongings flowsheet: NA  Patient belongings documented in belongings flowsheet: Yes  Reminder note (belongings/ medications) placed in discharge instructions:NA  Admission profile/ required documentation complete: Yes  Bedside Report Letter given and explained to patient No  Visitor Designated? yes  If patient is a 72 hour hold/Commitment are belongings removed from room and locked up? NA

## 2021-08-14 NOTE — TELEPHONE ENCOUNTER
Called and spoke to patient, cause of headaches being her electrolye imbalance and currently hospitalized for critical ab value of low sodium as recommended to go to ER by me.     Thank you,  Mercedez Britton MD on 8/14/2021 at 2:26 PM

## 2021-08-14 NOTE — PROGRESS NOTES
SUMMARY: Hyponatremia, Hypokalemia  DATE & TIME: 8/14/2021 2356-1104  Cognitive Concerns/ Orientation : alert/oriented x4    BEHAVIOR & AGGRESSION TOOL COLOR: green   ABNL VS/O2: VSS, room air  MOBILITY: SBA, using IV pole during ambulation  PAIN MANAGMENT: Denies  DIET: Regular  BOWEL/BLADDER: Up to bathroom, continent  ABNL LAB/BG: Sodium 122 (IV fluids); Potassium 3.0 (replaced with oral Potassium with recheck in am); Covid negative  DRAIN/DEVICES: L PIV infusing Normal Saline at 75 mL/hour  SKIN: WNL  TESTS/PROCEDURES: n/a  D/C DATE: Pending improvement in electrolytes, 2-3 days per MD note  OTHER IMPORTANT INFO: Pt prefers oral potassium replacement versus IV replacement (pain with infusion)

## 2021-08-14 NOTE — TELEPHONE ENCOUNTER
Taken care immediately when patient was getting her phlebotomy at infusion center at that time and she headed to ER soon after that. Currently on inpatient care.     Thank you,  Mercedez Britton MD on 8/14/2021 at 6:51 PM

## 2021-08-14 NOTE — PHARMACY-ADMISSION MEDICATION HISTORY
Pharmacy Medication History  Admission medication history interview status for the 8/13/2021  admission is complete. See EPIC admission navigator for prior to admission medications     Location of Interview: Patient room  Medication history sources: Patient and Surescripts    Significant changes made to the medication list:  None    In the past week, patient estimated taking medication this percent of the time: greater than 90%    Additional medication history information:   Patient had previously been on losartan, this was changed to losartan-hydrochlorothiazide within the last month.     Medication reconciliation completed by provider prior to medication history? No    Time spent in this activity: 15 minutes     Prior to Admission medications    Medication Sig Last Dose Taking? Auth Provider   aspirin 81 MG EC tablet Take 81 mg by mouth daily 8/13/2021 at Unknown time Yes Reported, Patient   aspirin-acetaminophen-caffeine (EXCEDRIN MIGRAINE) 250-250-65 MG tablet Take 1 tablet by mouth every 8 hours as needed for headaches 8/13/2021 at 1200 Yes Mercedez Britton MD   Biotin 5000 MCG TABS Take 1 tablet by mouth daily  8/13/2021 at Unknown time Yes Reported, Patient   clobetasol (TEMOVATE) 0.05 % external solution Apply topically daily To scalp 8/12/2021 at Unknown time Yes Reported, Patient   clonazePAM (KLONOPIN) 0.5 MG ODT DISSOLVE 1 TABLET ON THE TONGUE EVERY NIGHT AS NEEDED FOR ANXIETY Past Week at prn hs Yes Irish Fernandes MD   gabapentin (NEURONTIN) 100 MG capsule Take 100 mg by mouth 3 times daily  8/13/2021 at x1 Yes Reported, Patient   hydroxychloroquine (PLAQUENIL) 200 MG tablet Take 2 tablets (400 mg) by mouth daily 8/13/2021 at Unknown time Yes Irish Fernandes MD   ipratropium (ATROVENT) 0.03 % nasal spray Spray 2 sprays into both nostrils daily  8/13/2021 at Unknown time Yes Rommel Becerra MD   levothyroxine (SYNTHROID/LEVOTHROID) 75 MCG tablet TAKE 1 TABLET BY MOUTH EVERY MORNING 8/13/2021 at  Unknown time Yes Irish Fernandes MD   liothyronine (CYTOMEL) 5 MCG tablet TAKE 2 TABLETS BY MOUTH EVERY DAY 8/13/2021 at Unknown time Yes Irish Fernandes MD   losartan-hydrochlorothiazide (HYZAAR) 100-25 MG tablet Take 1 tab PO qd 8/13/2021 at Unknown time Yes Ml Merrill PA-C   metoprolol succinate ER (TOPROL-XL) 50 MG 24 hr tablet TAKE 2 TABLETS BY MOUTH EVERY DAY 8/13/2021 at Unknown time Yes Irish Fernandes MD   Omega-3 Fatty Acids (FISH OIL ADULT GUMMIES PO) Take 1 tablet by mouth daily  8/13/2021 at Unknown time Yes Reported, Patient   TURMERIC PO Take 1 tablet by mouth daily  8/13/2021 at Unknown time Yes Reported, Patient   Vitamin D3 (CHOLECALCIFEROL) 125 MCG (5000 UT) tablet Take 1 tablet by mouth daily 8/13/2021 at Unknown time Yes Unknown, Entered By History   loratadine (CLARITIN) 10 MG tablet Take 1 tablet (10 mg) by mouth daily 8/13/2021 at Unknown time  Sosa Neville PA-C       The information provided in this note is only as accurate as the sources available at the time of update(s)

## 2021-08-14 NOTE — PROGRESS NOTES
Paged on-call Dr Rosales. 601-2- Peg S. - sodium raising quickly- from 117 to 122, do you want to reduce the NS running at 125ml/hr right now?-Darshan Camacho RN **[phone]

## 2021-08-14 NOTE — PROGRESS NOTES
RECEIVING UNIT ED HANDOFF REVIEW    ED Nurse Handoff Report was reviewed by: Nurys Maradiaga RN on August 14, 2021 at 12:36 AM

## 2021-08-14 NOTE — PROGRESS NOTES
[facility]    Internal Medicine Hospitalist Progress Note  08/14/2021  I evaluated patient on the above date.    David Jarvis Jr., MD  [phone] (p)  Text Page        Assessment & Plan New actions/orders today (08/14/2021) are underlined.    Irina Hanks is a delightful 61 year old female with history including hemochromatosis, rheumatoid arthritis, hypertension, dyslipidemia, irritable bowel syndrome, anxiety and hypothyroidism who was admitted on 8/13/2021 with hyponatremia and hypokalemia likely secondary to hydrochlorothiazide medication side effect.    Symptomatic hyponatremia, suspect related to diuretic.  Hypochloremia and hypokalemia, suspect related to diuretic.  On 8/13, presented with sodium 118 with associated fatigue, mental cloudiness, nausea, vomiting. Of note, this summer she was started on hydrochlorothiazide-losartan combination pill; about 2 months ago her sodium was 130. Started on NS on admit; rate adjusted to avoid overly rapid correction.  Recent Labs   Lab 08/14/21  1023 08/14/21  0638 08/14/21  0212 08/13/21  2019 08/13/21  1458   * 125* 122* 117* 118*   - Continue NS at 75 ml/hr.  - Continue to hold PTA HCTZ.  - Continue q4h sodium checks.  - Goal correction is 4-6 mEq/L in 24 hours. Max 8 mEq/L.     ADDENDUM 16:27:  Follow-up sodium 126 --> change IVF's from NS to D5 1/2 NS at 75 ml/hr.    Hypertension (benign essential).  [PTA: losartan-HCTZ 100-25 mg daily; metoprolol XL 50 mg daily.]  PTA HCTZ held on admit as above. BP's OK off meds.  - Discontinue hydrochlorothiazide as above.  - Continue to hold losartan and metoprolol.    Chronic, stable problems:  Hypothyroidism.  - Continue PTA levothyroxine and liothyronine.    Depression/anxiety.  - Continue PRN clonazepam.    RA.  - Continue hydroxychloroquine.  - Follow-up outpatient.    Hemochromatosis.  - Follow-up outpatient.    DLD.  - Continue fish oil.    COVID-19 testing.  COVID-19 PCR Results   "  COVID-19 PCR Results 12/31/20 8/13/21   COVID-19 Virus by PCR (External Result) Not Detected    SARS CoV2 PCR  Negative      Comments are available for some flowsheets but are not being displayed.         COVID-19 Antibody Results, Testing for Immunity    COVID-19 Antibody Results, Testing for Immunity   No data to display.             Diet: Combination Diet Regular Diet Adult    Prophylaxis: PCD's, ambulation.   Stone Catheter: Not present  Code Status: Full Code    Disposition Plan   Expected discharge: tomorrow recommended to prior living arrangement once sodium levels improved.  Entered: David Jarvis MD 08/14/2021, 11:21 AM         Interval History   Feeling much better.  Tolerating diet.    -Data reviewed today: I reviewed all new labs and imaging over the last 24 hours. I personally reviewed no images or EKG's today.    Physical Exam    , Blood pressure 109/72, pulse 66, temperature 97.9  F (36.6  C), temperature source Oral, resp. rate 16, height 1.6 m (5' 3\"), weight 68.4 kg (150 lb 11.2 oz), SpO2 98 %, not currently breastfeeding.  Vitals:    08/13/21 1755 08/14/21 0100   Weight: 68 kg (150 lb) 68.4 kg (150 lb 11.2 oz)     Vital Signs with Ranges  Temp:  [97.5  F (36.4  C)-98.4  F (36.9  C)] 97.9  F (36.6  C)  Pulse:  [] 66  Resp:  [9-28] 16  BP: ()/() 109/72  SpO2:  [88 %-100 %] 98 %  Patient Vitals for the past 24 hrs:   BP Temp Temp src Pulse Resp SpO2 Height Weight   08/14/21 0742 109/72 97.9  F (36.6  C) Oral 66 16 98 % -- --   08/14/21 0100 109/74 98  F (36.7  C) Oral 65 15 100 % -- 68.4 kg (150 lb 11.2 oz)   08/14/21 0045 -- -- -- 68 14 97 % -- --   08/14/21 0030 -- -- -- 70 15 97 % -- --   08/14/21 0015 110/73 -- -- 67 15 100 % -- --   08/14/21 0000 -- -- -- 65 14 97 % -- --   08/13/21 2345 -- -- -- 65 12 96 % -- --   08/13/21 2330 -- -- -- 68 15 97 % -- --   08/13/21 2315 -- -- -- 63 12 97 % -- --   08/13/21 2300 -- -- -- 61 13 96 % -- --   08/13/21 2245 -- -- -- 61 12 " "98 % -- --   08/13/21 2215 -- -- -- 60 12 93 % -- --   08/13/21 2200 -- -- -- 64 13 99 % -- --   08/13/21 2145 -- -- -- 63 13 97 % -- --   08/13/21 2130 134/83 -- -- 61 11 100 % -- --   08/13/21 2115 -- -- -- 63 14 -- -- --   08/13/21 2100 -- -- -- 63 11 -- -- --   08/13/21 2045 -- -- -- 62 12 -- -- --   08/13/21 2030 -- -- -- 63 13 -- -- --   08/13/21 2015 -- -- -- 65 12 -- -- --   08/13/21 2000 -- -- -- 64 16 99 % -- --   08/13/21 1945 -- -- -- 59 9 99 % -- --   08/13/21 1930 111/79 -- -- 62 13 (!) 88 % -- --   08/13/21 1915 -- -- -- 59 10 99 % -- --   08/13/21 1900 109/75 -- -- 60 14 92 % -- --   08/13/21 1845 120/83 -- -- 61 9 98 % -- --   08/13/21 1830 (!) 69/44 -- -- 61 28 100 % -- --   08/13/21 1829 122/75 -- -- 61 -- 99 % -- --   08/13/21 1827 120/75 -- -- 62 -- 100 % -- --   08/13/21 1755 91/60 97.5  F (36.4  C) Oral 65 18 100 % 1.6 m (5' 3\") 68 kg (150 lb)     I/O's Last 24 hours  I/O last 3 completed shifts:  In: 953 [I.V.:953]  Out: -     Constitutional: Awake, alert, pleasant.  Respiratory: Diminished in bases. No crackles or wheezes.  Cardiovascular: RRR, no m/r/g.  GI: Soft, nt, nd, +BS.  Skin/Integumen:   Other:        Data   Recent Labs   Lab 08/14/21  1023 08/14/21  0638 08/14/21  0212 08/13/21 2019 08/13/21  1458 08/13/21  1457   WBC  --   --   --  8.2  --  5.4   HGB  --   --   --  11.9  --  12.8   MCV  --   --   --  83  --  83   PLT  --   --   --  282  --  305   * 125* 122* 117* 118*  --    POTASSIUM  --  4.1 3.0* 3.0* 3.1*  --    CHLORIDE  --   --   --  81* 84*  --    CO2  --   --   --  28 27  --    BUN  --   --   --  16 13  --    CR  --   --   --  0.91 0.94  --    ANIONGAP  --   --   --  8 7  --    LUCA  --   --   --  8.7 9.0  --    GLC  --   --   --  136* 80  --    ALBUMIN  --   --   --   --   --  3.9   PROTTOTAL  --   --   --   --   --  7.8   BILITOTAL  --   --   --   --   --  0.7   ALKPHOS  --   --   --   --   --  96   ALT  --   --   --   --   --  22   AST  --   --   --   --   --  22 "     Recent Labs   Lab Test 08/13/21  2019 08/13/21  1458 06/15/21  0850 10/14/20  1008 07/02/19  1048   * 80 81 78 77     Recent Labs   Lab 08/13/21  1457   ALLAN 107         No results found for this or any previous visit (from the past 24 hour(s)).    Medications   All medications were reviewed.    sodium chloride 75 mL/hr at 08/14/21 0616       potassium chloride  20 mEq Oral Once     sodium chloride (PF)  3 mL Intracatheter Q8H     acetaminophen **OR** acetaminophen, lidocaine 4%, lidocaine (buffered or not buffered), melatonin, ondansetron **OR** ondansetron, prochlorperazine **OR** prochlorperazine **OR** prochlorperazine, sodium chloride (PF)

## 2021-08-15 VITALS
SYSTOLIC BLOOD PRESSURE: 122 MMHG | TEMPERATURE: 97.7 F | RESPIRATION RATE: 18 BRPM | DIASTOLIC BLOOD PRESSURE: 83 MMHG | BODY MASS INDEX: 26.7 KG/M2 | WEIGHT: 150.7 LBS | HEART RATE: 70 BPM | HEIGHT: 63 IN | OXYGEN SATURATION: 98 %

## 2021-08-15 LAB
MAGNESIUM SERPL-MCNC: 1.6 MG/DL (ref 1.6–2.3)
POTASSIUM BLD-SCNC: 4.1 MMOL/L (ref 3.4–5.3)
SODIUM SERPL-SCNC: 127 MMOL/L (ref 133–144)
SODIUM SERPL-SCNC: 129 MMOL/L (ref 133–144)

## 2021-08-15 PROCEDURE — 258N000001 HC RX 258: Performed by: INTERNAL MEDICINE

## 2021-08-15 PROCEDURE — 84295 ASSAY OF SERUM SODIUM: CPT | Performed by: INTERNAL MEDICINE

## 2021-08-15 PROCEDURE — 250N000013 HC RX MED GY IP 250 OP 250 PS 637: Performed by: INTERNAL MEDICINE

## 2021-08-15 PROCEDURE — 83735 ASSAY OF MAGNESIUM: CPT | Performed by: INTERNAL MEDICINE

## 2021-08-15 PROCEDURE — 99238 HOSP IP/OBS DSCHRG MGMT 30/<: CPT | Performed by: INTERNAL MEDICINE

## 2021-08-15 PROCEDURE — 36415 COLL VENOUS BLD VENIPUNCTURE: CPT | Performed by: INTERNAL MEDICINE

## 2021-08-15 PROCEDURE — 84132 ASSAY OF SERUM POTASSIUM: CPT | Performed by: INTERNAL MEDICINE

## 2021-08-15 RX ADMIN — LEVOTHYROXINE SODIUM 75 MCG: 75 TABLET ORAL at 08:00

## 2021-08-15 RX ADMIN — ACETAMINOPHEN 650 MG: 325 TABLET, FILM COATED ORAL at 05:42

## 2021-08-15 RX ADMIN — IPRATROPIUM BROMIDE 2 SPRAY: 21 SPRAY, METERED NASAL at 08:04

## 2021-08-15 RX ADMIN — HYDROXYCHLOROQUINE SULFATE 400 MG: 200 TABLET ORAL at 08:00

## 2021-08-15 RX ADMIN — DEXTROSE AND SODIUM CHLORIDE: 5; 450 INJECTION, SOLUTION INTRAVENOUS at 05:45

## 2021-08-15 RX ADMIN — CLOBETASOL PROPIONATE: 0.5 SOLUTION TOPICAL at 08:03

## 2021-08-15 RX ADMIN — LORATADINE 10 MG: 10 TABLET ORAL at 08:00

## 2021-08-15 RX ADMIN — Medication 125 MCG: at 08:00

## 2021-08-15 RX ADMIN — LIOTHYRONINE SODIUM 10 MCG: 5 TABLET ORAL at 08:00

## 2021-08-15 RX ADMIN — GABAPENTIN 100 MG: 100 CAPSULE ORAL at 08:00

## 2021-08-15 ASSESSMENT — ACTIVITIES OF DAILY LIVING (ADL)
ADLS_ACUITY_SCORE: 14

## 2021-08-15 NOTE — PLAN OF CARE
SUMMARY: Hyponatremia, Hypokalemia  DATE & TIME: 8/14/2021 5260 - 4799  Cognitive Concerns/ Orientation : alert/oriented x4    BEHAVIOR & AGGRESSION TOOL COLOR: green  ABNL VS/O2: VSS, room air  MOBILITY: SBA  PAIN MANAGMENT: PRN APAP for headache  DIET: Regular  BOWEL/BLADDER: Up to bathroom, continent  ABNL LAB/BG: Na 127/125/127/129, K 4.1  SKIN: WNL  TESTS/PROCEDURES: n/a  D/C DATE: Pending improvement in electrolytes, likely this AM  OTHER IMPORTANT INFO: IV infusing D5 1/2NS @ 75/hr

## 2021-08-15 NOTE — PROGRESS NOTES
[facility]    Internal Medicine Hospitalist Progress Note  08/15/2021  I evaluated patient on the above date.    David Jarvis Jr., MD  [phone] (p)  Text Page        Assessment & Plan New actions/orders today (08/15/2021) are underlined.    Irina Hanks is a delightful 61 year old female with history including hemochromatosis, rheumatoid arthritis, hypertension, dyslipidemia, irritable bowel syndrome, anxiety and hypothyroidism who was admitted on 8/13/2021 with hyponatremia and hypokalemia likely secondary to hydrochlorothiazide medication side effect.    Symptomatic hyponatremia, suspect related to diuretic.  Hypochloremia and hypokalemia, suspect related to diuretic.  On 8/13, presented with sodium 118 with associated fatigue, mental cloudiness, nausea, vomiting. Of note, this summer she was started on hydrochlorothiazide-losartan combination pill; about 2 months ago her sodium was 130. Started on NS on admit; rate adjusted to avoid overly rapid correction. Fluids changed to D5 1/2 NS late afternoon 8/14.  Recent Labs   Lab 08/15/21  0536 08/15/21  0139 08/14/21  2231 08/14/21  1937 08/14/21  1422 08/14/21  1023   * 127* 125* 127* 126* 125*   - Continue D5 1/2 NS at 75 ml/hr.  - Continue to hold PTA HCTZ.  - Continue q4h sodium checks.  - Goal correction is 4-6 mEq/L in 24 hours. Max 8 mEq/L.     Hypertension (benign essential).  [PTA: losartan-HCTZ 100-25 mg daily; metoprolol XL 50 mg daily.]  PTA HCTZ held on admit as above. BP's OK off meds.  - Discontinue hydrochlorothiazide as above.  - Continue to hold losartan and metoprolol; follow-up closely with PCP.    Chronic, stable problems:  Hypothyroidism.  - Continue PTA levothyroxine and liothyronine.    Depression/anxiety.  - Continue PRN clonazepam.    RA.  - Continue hydroxychloroquine.  - Follow-up outpatient.    Hemochromatosis.  - Follow-up outpatient.    DLD.  - Continue fish oil.    COVID-19 testing.  COVID-19  "PCR Results    COVID-19 PCR Results 12/31/20 8/13/21   COVID-19 Virus by PCR (External Result) Not Detected    SARS CoV2 PCR  Negative      Comments are available for some flowsheets but are not being displayed.         COVID-19 Antibody Results, Testing for Immunity    COVID-19 Antibody Results, Testing for Immunity   No data to display.             Diet: Combination Diet Regular Diet Adult    Prophylaxis: PCD's, ambulation.   Stone Catheter: Not present  Code Status: Full Code    Disposition Plan   Expected discharge: today recommended to prior living arrangement.  Entered: David Jarvis MD 08/15/2021, 7:13 AM         Interval History   Doing much better.  Slept relatively well.    -Data reviewed today: I reviewed all new labs and imaging over the last 24 hours. I personally reviewed no images or EKG's today.    Physical Exam    , Blood pressure 99/62, pulse 75, temperature 98.1  F (36.7  C), temperature source Oral, resp. rate 18, height 1.6 m (5' 3\"), weight 68.4 kg (150 lb 11.2 oz), SpO2 99 %, not currently breastfeeding.  Vitals:    08/13/21 1755 08/14/21 0100   Weight: 68 kg (150 lb) 68.4 kg (150 lb 11.2 oz)     Vital Signs with Ranges  Temp:  [97.7  F (36.5  C)-98.1  F (36.7  C)] 98.1  F (36.7  C)  Pulse:  [66-98] 75  Resp:  [16-18] 18  BP: ()/(62-72) 99/62  SpO2:  [98 %-100 %] 99 %  Patient Vitals for the past 24 hrs:   BP Temp Temp src Pulse Resp SpO2   08/14/21 2247 99/62 98.1  F (36.7  C) Oral 75 18 99 %   08/14/21 1540 107/68 97.7  F (36.5  C) Oral 98 16 100 %   08/14/21 0742 109/72 97.9  F (36.6  C) Oral 66 16 98 %     I/O's Last 24 hours  I/O last 3 completed shifts:  In: 120 [P.O.:120]  Out: -     Constitutional: Awake, alert, pleasant, conversant.  Respiratory: Diminished in bases. No crackles or wheezes.  Cardiovascular: RRR, no m/r/g.  GI: Soft, nt, nd, +BS.  Skin/Integumen:   Other:        Data   Recent Labs   Lab 08/15/21  0536 08/15/21  0139 08/14/21  2231 08/14/21  0638 " 08/14/21 0212 08/13/21 2019 08/13/21  1458 08/13/21  1457   WBC  --   --   --   --   --  8.2  --  5.4   HGB  --   --   --   --   --  11.9  --  12.8   MCV  --   --   --   --   --  83  --  83   PLT  --   --   --   --   --  282  --  305   * 127* 125* 125* 122* 117* 118*  --    POTASSIUM 4.1  --   --  4.1 3.0* 3.0* 3.1*  --    CHLORIDE  --   --   --   --   --  81* 84*  --    CO2  --   --   --   --   --  28 27  --    BUN  --   --   --   --   --  16 13  --    CR  --   --   --   --   --  0.91 0.94  --    ANIONGAP  --   --   --   --   --  8 7  --    LUCA  --   --   --   --   --  8.7 9.0  --    GLC  --   --   --   --   --  136* 80  --    ALBUMIN  --   --   --   --   --   --   --  3.9   PROTTOTAL  --   --   --   --   --   --   --  7.8   BILITOTAL  --   --   --   --   --   --   --  0.7   ALKPHOS  --   --   --   --   --   --   --  96   ALT  --   --   --   --   --   --   --  22   AST  --   --   --   --   --   --   --  22     Recent Labs   Lab Test 08/13/21 2019 08/13/21  1458 06/15/21  0850 10/14/20  1008 07/02/19  1048   * 80 81 78 77     Recent Labs   Lab 08/13/21  1457   ALLAN 107         No results found for this or any previous visit (from the past 24 hour(s)).    Medications   All medications were reviewed.    dextrose 5% and 0.45% NaCl 75 mL/hr at 08/15/21 0545       clobetasol   Topical Daily     gabapentin  100 mg Oral TID     hydroxychloroquine  400 mg Oral Daily     ipratropium  2 spray Both Nostrils Daily     levothyroxine  75 mcg Oral QAM AC     liothyronine  10 mcg Oral Daily     loratadine  10 mg Oral Daily     potassium chloride  20 mEq Oral Once     sodium chloride (PF)  3 mL Intracatheter Q8H     Vitamin D3  125 mcg Oral Daily     acetaminophen **OR** acetaminophen, clonazePAM, lidocaine 4%, lidocaine (buffered or not buffered), melatonin, ondansetron **OR** ondansetron, prochlorperazine **OR** prochlorperazine **OR** prochlorperazine, sodium chloride (PF)

## 2021-08-15 NOTE — PLAN OF CARE
Discharge    Patient discharged to home   Care plan note: Patient alert/orient X4, up independently/cane in room/hallway. Lungs clear on RA, vss. Denied pain, Na 129. Patient discharging to home, went over discharge instructions & patient was able to read back instructions, no questions. Follow up appointment to be made by patient per her request.    Listed belongings gathered and returned to patient. Yes  Belongings returned to patient from security/pharmacy Yes  Care Plan and Patient education resolved: Yes  Prescriptions if needed, hard copies sent with patient  NA  Home and hospital acquired medications returned to patient: NA  Medication Bin checked and emptied on discharge Yes  Follow up appointment made for patient: No, patient will make.

## 2021-08-15 NOTE — DISCHARGE SUMMARY
[facility]  Discharge Summary        Irina Hanks MRN# [id number]   YOB: 1960 Age: 61 year old     Date of Admission: 8/13/2021  Date of Discharge: 8/15/2021  Admitting Physician: Luci Rosales MD  Discharge Physician: David Jarvis MD     Primary Provider: Mercedez Britton  Primary Care Physician Phone Number: [phone]         Discharge Diagnoses:   1. Symptomatic hyponatremia, suspect related to diuretic.  2. Hypochloremia and hypokalemia, suspect related to diuretic.        Other Chronic Medical Problems:      1. RA.  2. Hemochromatosis.  3. Hypertension (benign essential).  4. Hypothyroidism.  5. DLD.  6. Depression/anxiety.       Allergies:         Allergies   Allergen Reactions     Codeine Nausea and Nausea and Vomiting     Nitrofurantoin Unknown and Other (See Comments)     Other reaction(s): Gastrointestinal       Ace Inhibitors Cough     lisinopril  lisinopril     Sulfa Drugs      Sulfur            Discharge Medications:        Current Discharge Medication List      CONTINUE these medications which have NOT CHANGED    Details   aspirin 81 MG EC tablet Take 81 mg by mouth daily      aspirin-acetaminophen-caffeine (EXCEDRIN MIGRAINE) 250-250-65 MG tablet Take 1 tablet by mouth every 8 hours as needed for headaches  Qty: 30 tablet, Refills: 1    Associated Diagnoses: Tension headache      Biotin 5000 MCG TABS Take 1 tablet by mouth daily       clobetasol (TEMOVATE) 0.05 % external solution Apply topically daily To scalp      clonazePAM (KLONOPIN) 0.5 MG ODT DISSOLVE 1 TABLET ON THE TONGUE EVERY NIGHT AS NEEDED FOR ANXIETY  Qty: 30 tablet, Refills: 5    Associated Diagnoses: Adjustment disorder with anxious mood      gabapentin (NEURONTIN) 100 MG capsule Take 100 mg by mouth 3 times daily       hydroxychloroquine (PLAQUENIL) 200 MG tablet Take 2 tablets (400 mg) by mouth daily  Qty: 90 tablet, Refills: 3    Associated Diagnoses: Rheumatoid  arthritis involving multiple sites with positive rheumatoid factor (H)      ipratropium (ATROVENT) 0.03 % nasal spray Spray 2 sprays into both nostrils daily       levothyroxine (SYNTHROID/LEVOTHROID) 75 MCG tablet TAKE 1 TABLET BY MOUTH EVERY MORNING  Qty: 90 tablet, Refills: 2    Associated Diagnoses: Acquired hypothyroidism      liothyronine (CYTOMEL) 5 MCG tablet TAKE 2 TABLETS BY MOUTH EVERY DAY  Qty: 180 tablet, Refills: 2    Associated Diagnoses: Acquired hypothyroidism      loratadine (CLARITIN) 10 MG tablet Take 1 tablet (10 mg) by mouth daily  Qty: 90 tablet, Refills: 3      Omega-3 Fatty Acids (FISH OIL ADULT GUMMIES PO) Take 1 tablet by mouth daily       TURMERIC PO Take 1 tablet by mouth daily       Vitamin D3 (CHOLECALCIFEROL) 125 MCG (5000 UT) tablet Take 1 tablet by mouth daily         STOP taking these medications       losartan-hydrochlorothiazide (HYZAAR) 100-25 MG tablet Comments:   Reason for Stopping:         metoprolol succinate ER (TOPROL-XL) 50 MG 24 hr tablet Comments:   Reason for Stopping:                   Discharge Instructions and Follow-Up:      Discharge Orders      Reason for your hospital stay    Hyponatremia suspect from diuretic.     Activity    Your activity upon discharge: activity as tolerated     Follow-up and recommended labs and tests    Follow up with primary care provider, Mercedez Britton or associate on 8/16/2021 for hospital follow- up.  The following labs/tests are recommended: BMP.     Diet    Follow this diet upon discharge: Orders Placed This Encounter      Combination Diet Regular Diet Adult             Consultations This Hospital Stay:      N/A.        Admission History:      Please see the H&P by Luci Rosales MD on 8/13/2021 for complete details. Briefly, Irina Hanks is a delightful 61 year old female with history including hemochromatosis, rheumatoid arthritis, hypertension, dyslipidemia, irritable bowel syndrome, anxiety and hypothyroidism  who was admitted on 8/13/2021 with hyponatremia and hypokalemia likely secondary to hydrochlorothiazide medication side effect.        Problem Oriented Hospital Course:        Symptomatic hyponatremia, suspect related to diuretic.  Hypochloremia and hypokalemia, suspect related to diuretic.  On 8/13, presented with sodium 118 with associated fatigue, mental cloudiness, nausea, vomiting. Of note, this summer she was started on hydrochlorothiazide-losartan combination pill; about 2 months ago her sodium was 130. Started on NS on admit; rate adjusted to avoid overly rapid correction. Fluids changed to D5 1/2 NS late afternoon 8/14.  Recent Labs   Lab 08/15/21  0536 08/15/21  0139 08/14/21  2231 08/14/21  1937 08/14/21  1422 08/14/21  1023   * 127* 125* 127* 126* 125*   - Stop HCTZ.  - Follow-up with PCP closely.    Hypertension (benign essential).  [PTA: losartan-HCTZ 100-25 mg daily; metoprolol XL 50 mg daily.]  PTA HCTZ held on admit as above. BP's OK off meds.  - Discontinue HCTZ as above.  - Continue to hold losartan and metoprolol; follow-up closely with PCP.    Chronic, stable problems:  Hypothyroidism.  - Continue PTA levothyroxine and liothyronine.    Depression/anxiety.  - Continue PRN clonazepam.    RA.  - Continue hydroxychloroquine.  - Follow-up outpatient.    Hemochromatosis.  - Follow-up outpatient.    DLD.  - Continue fish oil.    COVID-19 testing.  COVID-19 PCR Results    COVID-19 PCR Results 12/31/20 8/13/21   COVID-19 Virus by PCR (External Result) Not Detected    SARS CoV2 PCR  Negative      Comments are available for some flowsheets but are not being displayed.         COVID-19 Antibody Results, Testing for Immunity    COVID-19 Antibody Results, Testing for Immunity   No data to display.                   Pending Results:        Unresulted Labs Ordered in the Past 30 Days of this Admission     No orders found from 7/14/2021 to 8/14/2021.                Discharge Disposition:      Discharged to  home.        Discharge Time:      Less than 30 minutes.        Key Imaging Studies, Lab Findings and Procedures/Surgeries:        Results for orders placed or performed during the hospital encounter of 01/11/21   US Pelvic Complete with Transvaginal    Narrative    US PELVIC COMPLETE WITH TRANSVAGINAL 1/11/2021 10:51 AM     HISTORY: Post-menopausal bleeding  COMPARISON: None  TECHNIQUE: Transabdominal scans were performed. Endovaginal ultrasound  was performed to better visualize the adnexa.    FINDINGS:    UTERUS: 4 x 3 x 3 cm. Normal in size and position with no masses.    ENDOMETRIUM: 3 mm. Normal smooth endometrium.    RIGHT OVARY: 1.8 x 0.7 x 0.4 cm. Normal with flow demonstrated.    LEFT OVARY: 1.7 x 1 x 1 cm. Normal with flow demonstrated.    No significant free fluid.      Impression    IMPRESSION:  1.  Normal pelvic ultrasound.        LANI MARTINEZ MD

## 2021-08-16 ENCOUNTER — TELEPHONE (OUTPATIENT)
Dept: FAMILY MEDICINE | Facility: CLINIC | Age: 61
End: 2021-08-16

## 2021-08-16 ENCOUNTER — PATIENT OUTREACH (OUTPATIENT)
Dept: FAMILY MEDICINE | Facility: CLINIC | Age: 61
End: 2021-08-16

## 2021-08-16 NOTE — TELEPHONE ENCOUNTER
S/w pt and gave Dr. Britton's reply below about lab results.    Scheduled for hospital follow up on Tuesday 8/17 at 2:20 with Dr. Britton.    Patsy VIERA RN  EP Triage

## 2021-08-16 NOTE — TELEPHONE ENCOUNTER
----- Message from Mercedez Britton MD sent at 8/14/2021  8:27 PM CDT -----  Your COVID antibody titer is positive depicting that you have immunity for COVID. No concerns.     Regarding low sodium , please see telephone note separately.   Mercedez Britton MD on 8/14/2021 at 8:26 PM

## 2021-08-16 NOTE — TELEPHONE ENCOUNTER
"Hospital/TCU/ED for chronic condition Discharge Protocol    \"Hi, my name is Patsy Ahumada RN, a registered nurse, and I am calling from [facility].  I am calling to follow up and see how things are going for you after your recent emergency visit/hospital/TCU stay.\"    Tell me how you are doing now that you are home?\" I am tired.      Discharge Instructions    \"Let's review your discharge instructions.  What is/are the follow-up recommendations?  Pt. Response: to follow up with Dr. Britton    \"Has an appointment with your primary care provider been scheduled?\"   scheduled for tuesday 8/17     \"When you see the provider, I would recommend that you bring your medications with you.\"    Medications    \"Tell me what changed about your medicines when you discharged?\"    Changes to chronic meds?    2 or more - [facility] MTM referral needed    \"What questions do you have about your medications?\"    I would like to know what blood pressure medication I should be taking? - Questions cannot be easily answered - Epic MTM referral needed     New diagnoses of heart failure, COPD, diabetes, or MI?    No              Post Discharge Medication Reconciliation Status: discharge medications reconciled, continue medications without change.    Was MTM referral placed (*Make sure to put transitions as reason for referral)?   No    Call Summary    \"What questions or concerns do you have about your recent visit and your follow-up care?\"     none    \"If you have questions or things don't continue to improve, we encourage you contact us through the main clinic number (give number).  Even if the clinic is not open, triage nurses are available 24/7 to help you.     We would like you to know that our clinic has extended hours (provide information).  We also have urgent care (provide details on closest location and hours/contact info)\"      \"Thank you for your time and take care!\"               "

## 2021-08-17 ENCOUNTER — OFFICE VISIT (OUTPATIENT)
Dept: FAMILY MEDICINE | Facility: CLINIC | Age: 61
End: 2021-08-17
Payer: COMMERCIAL

## 2021-08-17 VITALS
HEART RATE: 97 BPM | RESPIRATION RATE: 16 BRPM | OXYGEN SATURATION: 98 % | TEMPERATURE: 97.9 F | WEIGHT: 154 LBS | SYSTOLIC BLOOD PRESSURE: 122 MMHG | HEIGHT: 63 IN | BODY MASS INDEX: 27.29 KG/M2 | DIASTOLIC BLOOD PRESSURE: 80 MMHG

## 2021-08-17 DIAGNOSIS — I10 ESSENTIAL HYPERTENSION: ICD-10-CM

## 2021-08-17 DIAGNOSIS — G44.209 TENSION HEADACHE: ICD-10-CM

## 2021-08-17 DIAGNOSIS — E87.1 HYPONATREMIA: Primary | ICD-10-CM

## 2021-08-17 PROCEDURE — 99214 OFFICE O/P EST MOD 30 MIN: CPT | Performed by: INTERNAL MEDICINE

## 2021-08-17 PROCEDURE — 36415 COLL VENOUS BLD VENIPUNCTURE: CPT | Performed by: INTERNAL MEDICINE

## 2021-08-17 PROCEDURE — 80048 BASIC METABOLIC PNL TOTAL CA: CPT | Performed by: INTERNAL MEDICINE

## 2021-08-17 ASSESSMENT — MIFFLIN-ST. JEOR: SCORE: 1222.67

## 2021-08-17 ASSESSMENT — PAIN SCALES - GENERAL: PAINLEVEL: SEVERE PAIN (7)

## 2021-08-17 NOTE — PROGRESS NOTES
Assessment and Plan  1. Hyponatremia  Recent hospitalization , for symptomatic hyponatremia related to diuretic. Na was 118 in her OV with me on 8/12/21 and I have recommended to go to ER immediately at that time. Her Na >>> 129 on last check 8/15/21 . Her USG pelvis for postmenopausal bleeding was normal. - Basic metabolic panel  (Ca, Cl, CO2, Creat, Gluc, K, Na, BUN); Future  - Basic metabolic panel  (Ca, Cl, CO2, Creat, Gluc, K, Na, BUN)    2. Tension headache  Ongoing problem, which pt attributes possibly her eye exam which is due at this time. Emphasized  to keep up her upcoming Ophthalmology appointment. If Sodium levels are normalized in the check today, will treat this as tension headache and to take as needed Tylenol.      3. Essential hypertension  Well controlled. HCTZ stopped ,BP remaining normal Off meds. To hold off on Losartan and Metoprolol and follow up closely. Pt states that she has tolerated hydrochlorothiazide in the past well without any issues and has this low sodium worsened this time. I do see Urine lytes were done in recent hospitalization. Will recheck if needed in next labs as Pt was still on diuretic at that time which could be false results.        Patient Instructions   As discussed , will hold off on all the BP medications given normal blood pressures inspite of not on any BP meds since last 4 days.     Will make sure , that your Sodium levels are normalized at this time. For your tension headaches if sodium is remaining normal you will continue Tylenol as needed and Excedrin if no relief.     ================================    Patient Education     Discharge Instructions for Hyponatremia  You were diagnosed with hyponatremia. This means your blood level of sodium (salt) is too low. Salt is needed for the body and brain to work. Very low blood levels of sodium can be fatal. Symptoms can include headache, confusion, severe tiredness (fatigue), muscle cramps, hallucinations, seizures,  and coma. You have been treated to raise your blood levels of sodium. These instructions will help you care for yourself at home as you have been instructed.   Home care    Limit your intake of fluids. Drink only the amounts directed by your healthcare provider.    Ask your provider what you should use to replace fluids if you are throwing up.    Keep all follow-up appointments. Your provider needs to watch your condition closely.  To help prevent hyponatremia:    Take all medicines exactly as directed. Certain medicines can lower blood sodium levels.    If you have done something that makes you sweat a lot, drink fluids that contain salt and other electrolytes.     Tell all healthcare providers what medicines you take. Mention all prescription and over-the-counter medicines, vitamins, supplements, and herbs.    Have your sodium levels checked often. This is vital if you take a medicine that helps your body get rid of water (diuretic).  Follow-up  Follow up with your healthcare provider, or as advised.   When to call your healthcare provider  Call your provider right away if you have any of the following:    Severe tiredness    Fainting    Dizziness    Loss of appetite    Nausea or vomiting    Confusion or forgetfulness    Muscle spasms, cramping, or twitching    Seizures    Walking abnormally  [employer] last reviewed this educational content on 2/1/2021 2000-2021 [employer]. All rights reserved. This information is not intended as a substitute for professional medical care. Always follow your healthcare professional's instructions.                 Return in about 4 weeks (around 9/14/2021), or if symptoms worsen or fail to improve, for Preventative Visit.    Mercedez Britton MD  [facility] [place]        Sonali Luong is a 61 year old who presents for the following health issues         HPI       Hospital Follow-up Visit:    Hospital/Nursing Home/IP Rehab Facility: [facility]  [facility]  Date of Admission: 08/13/2021  Date of Discharge: 08/15/2021  Reason(s) for Admission:  1. Symptomatic hyponatremia, suspect related to diuretic.  2. Hypochloremia and hypokalemia, suspect related to diuretic.      Was your hospitalization related to COVID-19? No   Problems taking medications regularly:  None  Medication changes since discharge: None  Problems adhering to non-medication therapy:  None    Summary of hospitalization:  [facility] discharge summary reviewed  Diagnostic Tests/Treatments reviewed.  Follow up needed: none  Other Healthcare Providers Involved in Patient s Care:         None  Update since discharge: stable.       Post Discharge Medication Reconciliation: discharge medications reconciled, continue medications without change.  Plan of care communicated with patient                 Allergies   Allergen Reactions     Codeine Nausea and Nausea and Vomiting     Nitrofurantoin Unknown and Other (See Comments)     Other reaction(s): Gastrointestinal       Ace Inhibitors Cough     lisinopril  lisinopril     Hctz [Hydrochlorothiazide]      Severe Hyponatremia less than 120     Sulfa Drugs      Sulfur         Past Medical History:   Diagnosis Date     ASCUS favor benign 09/2014    3 yr co-test     Essential hypertension, benign      Impaired renal function      Inflammatory arthritis     Managed by Rheumatology  - q 6 mos     Microscopic colitis      Osteoarthritis of first carpometacarpal joint     bilateral     Other specified acquired hypothyroidism      Seasonal allergic rhinitis      Shortness of breath      Spider veins      Symptomatic menopausal or female climacteric states     age 43, on HRT     Tricuspid regurgitation     +1-2 TR noted on 12/22/14 Echo       Past Surgical History:   Procedure Laterality Date     BIOPSY BREAST       C/SECTION, LOW TRANSVERSE      twins     COLONOSCOPY  2013    nl, next one due in 5 years     LEEP TX, CERVICAL  1990s  "   normal since that time     MYRINGOTOMY, INSERT TUBE, COMBINED Left 2015    chronic NORM, ETD       Family History   Problem Relation Age of Onset     Cancer - colorectal Father         age 50     Arthritis Mother      Cancer - colorectal Brother         age 50     Hypertension Sister      Hypertension Brother      Thyroid Disease Sister      Thyroid Disease Brother      Kidney Cancer Brother      Arthritis Sister      Cancer Sister 53        Uterine     Cancer Sister 50        Uterine     Testicular cancer Nephew        Social History     Tobacco Use     Smoking status: Former Smoker     Packs/day: 0.26     Years: 10.00     Pack years: 2.60     Types: Cigarettes     Quit date: 1989     Years since quittin.3     Smokeless tobacco: Never Used   Substance Use Topics     Alcohol use: Yes     Alcohol/week: 0.0 standard drinks     Comment: 2 drinks per day        Current Outpatient Medications   Medication     aspirin 81 MG EC tablet     Biotin 5000 MCG TABS     clonazePAM (KLONOPIN) 0.5 MG ODT     gabapentin (NEURONTIN) 100 MG capsule     hydroxychloroquine (PLAQUENIL) 200 MG tablet     ipratropium (ATROVENT) 0.03 % nasal spray     levothyroxine (SYNTHROID/LEVOTHROID) 75 MCG tablet     liothyronine (CYTOMEL) 5 MCG tablet     loratadine (CLARITIN) 10 MG tablet     Omega-3 Fatty Acids (FISH OIL ADULT GUMMIES PO)     TURMERIC PO     Vitamin D3 (CHOLECALCIFEROL) 125 MCG (5000 UT) tablet     aspirin-acetaminophen-caffeine (EXCEDRIN MIGRAINE) 250-250-65 MG tablet     clobetasol (TEMOVATE) 0.05 % external solution     No current facility-administered medications for this visit.        Review of Systems   Constitutional, HEENT, cardiovascular, pulmonary, GI, , musculoskeletal, neuro, skin, endocrine and psych systems are negative, except as otherwise noted.      Objective    /80   Pulse 97   Temp 97.9  F (36.6  C) (Tympanic)   Resp 16   Ht 1.6 m (5' 3\")   Wt 69.9 kg (154 lb)   SpO2 98%   BMI 27.28 " kg/m    Body mass index is 27.28 kg/m .  Physical Exam   GENERAL: healthy, alert and no distress  Eyes : PERRLA , EOMI  NECK: no adenopathy, no asymmetry, masses, or scars and thyroid normal to palpation  RESP: lungs clear to auscultation - no rales, rhonchi or wheezes  CV: regular rate and rhythm, normal S1 S2, no S3 or S4, no murmur, click or rub, no peripheral edema and peripheral pulses strong  ABDOMEN: soft, nontender, no hepatosplenomegaly, no masses and bowel sounds normal  MS: no gross musculoskeletal defects noted, no edema

## 2021-08-17 NOTE — PATIENT INSTRUCTIONS
As discussed , will hold off on all the BP medications given normal blood pressures inspite of not on any BP meds since last 4 days.     Will make sure , that your Sodium levels are normalized at this time. For your tension headaches if sodium is remaining normal you will continue Tylenol as needed and Excedrin if no relief.     ================================    Patient Education     Discharge Instructions for Hyponatremia  You were diagnosed with hyponatremia. This means your blood level of sodium (salt) is too low. Salt is needed for the body and brain to work. Very low blood levels of sodium can be fatal. Symptoms can include headache, confusion, severe tiredness (fatigue), muscle cramps, hallucinations, seizures, and coma. You have been treated to raise your blood levels of sodium. These instructions will help you care for yourself at home as you have been instructed.   Home care    Limit your intake of fluids. Drink only the amounts directed by your healthcare provider.    Ask your provider what you should use to replace fluids if you are throwing up.    Keep all follow-up appointments. Your provider needs to watch your condition closely.  To help prevent hyponatremia:    Take all medicines exactly as directed. Certain medicines can lower blood sodium levels.    If you have done something that makes you sweat a lot, drink fluids that contain salt and other electrolytes.     Tell all healthcare providers what medicines you take. Mention all prescription and over-the-counter medicines, vitamins, supplements, and herbs.    Have your sodium levels checked often. This is vital if you take a medicine that helps your body get rid of water (diuretic).  Follow-up  Follow up with your healthcare provider, or as advised.   When to call your healthcare provider  Call your provider right away if you have any of the following:    Severe tiredness    Fainting    Dizziness    Loss of appetite    Nausea or  vomiting    Confusion or forgetfulness    Muscle spasms, cramping, or twitching    Seizures    Walking abnormally  [employer] last reviewed this educational content on 2/1/2021 2000-2021 [employer]. All rights reserved. This information is not intended as a substitute for professional medical care. Always follow your healthcare professional's instructions.

## 2021-08-18 LAB
ANION GAP SERPL CALCULATED.3IONS-SCNC: 4 MMOL/L (ref 3–14)
BUN SERPL-MCNC: 10 MG/DL (ref 7–30)
CALCIUM SERPL-MCNC: 9.2 MG/DL (ref 8.5–10.1)
CHLORIDE BLD-SCNC: 100 MMOL/L (ref 94–109)
CO2 SERPL-SCNC: 26 MMOL/L (ref 20–32)
CREAT SERPL-MCNC: 0.87 MG/DL (ref 0.52–1.04)
GFR SERPL CREATININE-BSD FRML MDRD: 71 ML/MIN/1.73M2
GLUCOSE BLD-MCNC: 85 MG/DL (ref 70–99)
POTASSIUM BLD-SCNC: 4.3 MMOL/L (ref 3.4–5.3)
SODIUM SERPL-SCNC: 130 MMOL/L (ref 133–144)

## 2021-08-19 ENCOUNTER — TELEPHONE (OUTPATIENT)
Dept: FAMILY MEDICINE | Facility: CLINIC | Age: 61
End: 2021-08-19

## 2021-08-19 NOTE — TELEPHONE ENCOUNTER
----- Message from Mercedez Britton MD sent at 8/18/2021  8:53 PM CDT -----  Good news! Your Sodium levels are improved and at your baseline as in the past. You may take Tylenol and Excedrin migraine which is for your tension headaches and not just low sodium causing it.     Please let me know if you have any questions.  Mercedez Britton MD on 8/18/2021 at 8:53 PM

## 2021-09-07 ENCOUNTER — VIRTUAL VISIT (OUTPATIENT)
Dept: PSYCHIATRY | Facility: CLINIC | Age: 61
End: 2021-09-07
Attending: INTERNAL MEDICINE
Payer: COMMERCIAL

## 2021-09-07 ENCOUNTER — VIRTUAL VISIT (OUTPATIENT)
Dept: BEHAVIORAL HEALTH | Facility: CLINIC | Age: 61
End: 2021-09-07
Payer: COMMERCIAL

## 2021-09-07 DIAGNOSIS — E87.1 HYPONATREMIA: ICD-10-CM

## 2021-09-07 DIAGNOSIS — F41.9 ANXIETY DISORDER, UNSPECIFIED TYPE: Primary | ICD-10-CM

## 2021-09-07 DIAGNOSIS — F41.1 GENERALIZED ANXIETY DISORDER: Primary | ICD-10-CM

## 2021-09-07 DIAGNOSIS — F43.22 ADJUSTMENT DISORDER WITH ANXIOUS MOOD: ICD-10-CM

## 2021-09-07 PROCEDURE — 99204 OFFICE O/P NEW MOD 45 MIN: CPT | Mod: GT | Performed by: NURSE PRACTITIONER

## 2021-09-07 PROCEDURE — 90791 PSYCH DIAGNOSTIC EVALUATION: CPT | Mod: GT | Performed by: SOCIAL WORKER

## 2021-09-07 RX ORDER — CLONAZEPAM 0.5 MG/1
TABLET, ORALLY DISINTEGRATING ORAL
Qty: 30 TABLET | Refills: 2 | Status: SHIPPED | OUTPATIENT
Start: 2021-09-07 | End: 2021-10-02

## 2021-09-07 ASSESSMENT — ANXIETY QUESTIONNAIRES
GAD7 TOTAL SCORE: 2
8. IF YOU CHECKED OFF ANY PROBLEMS, HOW DIFFICULT HAVE THESE MADE IT FOR YOU TO DO YOUR WORK, TAKE CARE OF THINGS AT HOME, OR GET ALONG WITH OTHER PEOPLE?: NOT DIFFICULT AT ALL
7. FEELING AFRAID AS IF SOMETHING AWFUL MIGHT HAPPEN: NOT AT ALL
5. BEING SO RESTLESS THAT IT IS HARD TO SIT STILL: NOT AT ALL
7. FEELING AFRAID AS IF SOMETHING AWFUL MIGHT HAPPEN: NOT AT ALL
1. FEELING NERVOUS, ANXIOUS, OR ON EDGE: SEVERAL DAYS
GAD7 TOTAL SCORE: 2
3. WORRYING TOO MUCH ABOUT DIFFERENT THINGS: SEVERAL DAYS
2. NOT BEING ABLE TO STOP OR CONTROL WORRYING: NOT AT ALL
6. BECOMING EASILY ANNOYED OR IRRITABLE: NOT AT ALL
GAD7 TOTAL SCORE: 2
4. TROUBLE RELAXING: NOT AT ALL

## 2021-09-07 ASSESSMENT — COLUMBIA-SUICIDE SEVERITY RATING SCALE - C-SSRS
6. HAVE YOU EVER DONE ANYTHING, STARTED TO DO ANYTHING, OR PREPARED TO DO ANYTHING TO END YOUR LIFE?: NO
3. HAVE YOU BEEN THINKING ABOUT HOW YOU MIGHT KILL YOURSELF?: NO
5. HAVE YOU STARTED TO WORK OUT OR WORKED OUT THE DETAILS OF HOW TO KILL YOURSELF? DO YOU INTEND TO CARRY OUT THIS PLAN?: NO
TOTAL  NUMBER OF INTERRUPTED ATTEMPTS PAST 3 MONTHS: NO
1. IN THE PAST MONTH, HAVE YOU WISHED YOU WERE DEAD OR WISHED YOU COULD GO TO SLEEP AND NOT WAKE UP?: NO
1. IN THE PAST MONTH, HAVE YOU WISHED YOU WERE DEAD OR WISHED YOU COULD GO TO SLEEP AND NOT WAKE UP?: NO
2. HAVE YOU ACTUALLY HAD ANY THOUGHTS OF KILLING YOURSELF?: NO
2. HAVE YOU ACTUALLY HAD ANY THOUGHTS OF KILLING YOURSELF LIFETIME?: NO
4. HAVE YOU HAD THESE THOUGHTS AND HAD SOME INTENTION OF ACTING ON THEM?: NO
5. HAVE YOU STARTED TO WORK OUT OR WORKED OUT THE DETAILS OF HOW TO KILL YOURSELF? DO YOU INTEND TO CARRY OUT THIS PLAN?: NO
4. HAVE YOU HAD THESE THOUGHTS AND HAD SOME INTENTION OF ACTING ON THEM?: NO
TOTAL  NUMBER OF ABORTED OR SELF INTERRUPTED ATTEMPTS PAST LIFETIME: NO
ATTEMPT PAST THREE MONTHS: NO
TOTAL  NUMBER OF INTERRUPTED ATTEMPTS LIFETIME: NO
TOTAL  NUMBER OF ABORTED OR SELF INTERRUPTED ATTEMPTS PAST 3 MONTHS: NO
6. HAVE YOU EVER DONE ANYTHING, STARTED TO DO ANYTHING, OR PREPARED TO DO ANYTHING TO END YOUR LIFE?: NO
ATTEMPT LIFETIME: NO

## 2021-09-07 ASSESSMENT — ENCOUNTER SYMPTOMS
DIZZINESS: 0
DYSURIA: 0
HEADACHES: 0
HEMATOCHEZIA: 0
JOINT SWELLING: 0
ABDOMINAL PAIN: 0
FEVER: 0
WEAKNESS: 0
HEMATURIA: 0
PALPITATIONS: 0
MYALGIAS: 0
DIARRHEA: 0
NAUSEA: 0
NERVOUS/ANXIOUS: 1
SHORTNESS OF BREATH: 0
ARTHRALGIAS: 0
EYE PAIN: 0
COUGH: 0
PARESTHESIAS: 0
HEARTBURN: 0
CHILLS: 0
FREQUENCY: 0
BREAST MASS: 0
SORE THROAT: 0
CONSTIPATION: 0

## 2021-09-07 ASSESSMENT — PATIENT HEALTH QUESTIONNAIRE - PHQ9
10. IF YOU CHECKED OFF ANY PROBLEMS, HOW DIFFICULT HAVE THESE PROBLEMS MADE IT FOR YOU TO DO YOUR WORK, TAKE CARE OF THINGS AT HOME, OR GET ALONG WITH OTHER PEOPLE: NOT DIFFICULT AT ALL
SUM OF ALL RESPONSES TO PHQ QUESTIONS 1-9: 3
SUM OF ALL RESPONSES TO PHQ QUESTIONS 1-9: 3

## 2021-09-07 NOTE — PROGRESS NOTES
"    PSYCHIATRIC DIAGNOSTIC ASSESSMENT      Name:  Irina Hanks  : 1960    Peg is a 61 year old who is being evaluated via a billable video visit.      How would you like to obtain your AVS? MyChart  If the video visit is dropped, the invitation should be resent by: Text to cell phone: [phone]  Will anyone else be joining your video visit? No     Telemedicine Visit: The patient's condition can be safely assessed and treated via synchronous audio and visual telemedicine encounter.      Reason for Telemedicine Visit: COVID 19 pandemic and the social and physical recommendations by the CDC and [facility].      Originating Site (Patient Location): Patient's home    Distant Site (Provider Location): Provider Remote Setting    Consent:  The patient/guardian has verbally consented to: the potential risks and benefits of telemedicine (video visit or phone) versus in person care; bill my insurance or make self-payment for services provided; and responsibility for payment of non-covered services.     Mode of Communication:  OrdrIt video platform     As the provider I attest to compliance with applicable laws and regulations related to telemedicine.    IDENTIFICATION   Patient prefers to be called: \"Peg\"  Referred by: Mercedez Britton, [facility]   Patient Care Team:  Mercedez Britton MD as PCP - General (Internal Medicine)  Odessa Ga RD as Registered Dietitian (Dietitian, Registered)  Eloisa Rossi MD as Assigned Cancer Care Provider  Mercedez Britton MD as Assigned PCP  Therapist: none    History was provided by patient who were  good historian(s).    Patient attended the session alone    RECORDS AVAILABLE FOR REVIEW: EHR records through [employer] .  In addition, reviewed the assessment completed by Soledad Maciel [facility], dated today                                                 CHIEF COMPLAINT   Patient is a 61 year old,  White Not  or  female  who presents " "for initial psychiatric evaluation. Referred by their Primary Care Provider: Mercedez Britton MD to the [facility] ([facility]) for evaluation of anxiety.  Our psychiatry providers act as a specialty service for Primary Care Providers in the [facility] who seek to optimize medications for unstable patients.  Once medications have been optimized, our providers discharge the patient back to the referring Primary Care Provider for ongoing medication management.  This type of system allows our providers to serve a high volume of patients.      HISTORY OF PRESENT ILLNESS   Per [facility], Soledad Maciel, during today's team-based visit:  \"The reason for seeking services at this time is: \"Continuing medication\". Saw new PCP who wants to review for clonezapam specifically. Use varies according to life stresses. Previous PCP had her on a use agreement on/off for about 5 years.  The problem(s) began 15. Current stress: mother   after being in hospice for a year, Peg was hospitalized because her sodium and potassium were very low and her daughter just got engaged. She has a complex medical hx.\"    Reports past reported diagnosis of anxiety and has used the clonazepam off/on over the last 5 years.  No indication of early refills or misuse of medication noted.  Complex medical history is impacting clinical presentation.  She has increase the use of the clonazepam to approximately 5/7 days a week in the last year due to psychosocial stressors and exacerbations in her autoimmune disease.  Typically taking the 0.5 mg before bed to help calm her mind and be able to sleep     PSYCHIATRIC HISTORY:   Previous psychiatry: denies   Previous therapist: none  History of Psychiatric Hospitalizations:   - Inpatient: None  - IOP/PHP/Day treatment: denies   History of Suicidal Ideation: denies   History of Suicide Attempts:  Denies     History of Self-injurious Behavior: Denies a history of " SIB.    History of Violence/Aggression:  Denies   History of Commitment? Denies   Electroconvulsive Therapy (ECT) or Transcranial Magnetic Stimulation (TMS): denies   PharmacogenomicTesting (such as GeneSight): denies     PSYCHIATRIC REVIEW OF SYSTEMS:   Sleep: states adequate          Depression:  Denies vegetative symptoms of depression.  PHQ9 score is minimal.   PHQ-9 SCORE 7/27/2015 9/7/2016 9/7/2021   PHQ-9 Total Score 3 - -   PHQ-9 Total Score MyChart - - 3 (Minimal depression)   PHQ-9 Total Score - 6 3       Last PHQ-9 9/7/2021   1.  Little interest or pleasure in doing things 0   2.  Feeling down, depressed, or hopeless 1   3.  Trouble falling or staying asleep, or sleeping too much 1   4.  Feeling tired or having little energy 0   5.  Poor appetite or overeating 0   6.  Feeling bad about yourself 0   7.  Trouble concentrating 1   8.  Moving slowly or restless 0   Q9: Thoughts of better off dead/self-harm past 2 weeks 0   PHQ-9 Total Score 3   Difficulty at work, home, or with people -   PHQ9 score is 3 indicating minimal depression.  Suicidal ideation:  Denies  Anxiety: Feels the current medication regimen is effective in targeting anxiety and able to use can use nonpharmacological interventions for residual symptoms   MARIEL-7 SCORE 7/30/2015 9/7/2016 9/7/2021   Total Score 5 - -   Total Score - - 2 (minimal anxiety)   Total Score - 12 2     MARIEL-7   [employer], 2002; Used with Permission) 7/27/2015 9/7/2016 9/7/2021   1. Feeling nervous, anxious, or on edge - 3 1   2. Not being able to stop or control worrying - 2 0   3. Worrying too much about different things - 2 1   4. Trouble relaxing - 2 0   5. Being so restless that it is hard to sit still - 2 0   6. Becoming easily annoyed or irritable - 1 0   7. Feeling afraid, as if something awful might happen - 0 0   MARIEL-7 Total Score - 12 2   If you checked any problems, how difficult have they made it for you to do your work, take care of things at home, or get  along with other people? - Not difficult at all -   Panic: None endorsed   Social anxiety: None endorsed   PTSD: Denies experiencing or witnessing an event considered traumatic.    OCD: Denies hx of obsessions or compulsions irresistible urges to do things repeatedly such as counting, washing hands, checking, etc. Denies hoarding.  No current symptoms  Specific fears: None endorsed   Mood lability:  Could not elicit true manic symptoms, extended periods of decreased need for sleep, extreme high level of energy, or grandiosity. Denies any symptoms consistent with hypomania.     Psychosis: Denies thought disturbance symptoms or hx of AH, VH, TH, or OH. and Denies having periods of feeling others were plotting to harm them, people reading their mind, reading others mind, receiving special messages from TV, computer, etc.   ADD / ADHD: Denies previous dx of ADHD prior to age 10.     Autism symptoms:  None endorsed    Eating Disorder:  Denies concerns with weight or body image beyond normal concern.  Denies restricting or purging behaviors or excessive exercise for weight control.    All other ROS negative.     A 12-item WHODAS 2.0 assessment was completed by the patient today and recorded in EPIC.    WHODAS 2.0 Total Score 9/3/2021   Total Score 25   Total Score MyChart 25       FAMILY, MEDICAL, SURGICAL HISTORY REVIEWED.  MEDICATION HAVE BEEN REVIEWED AND ARE CURRENT TO THE BEST OF MY KNOWLEDGE AND ABILITY.   , 3 adult children  Working part time    MEDICATIONS                                                                                                Current Outpatient Medications   Medication Sig     aspirin 81 MG EC tablet Take 81 mg by mouth daily     aspirin-acetaminophen-caffeine (EXCEDRIN MIGRAINE) 250-250-65 MG tablet Take 1 tablet by mouth every 8 hours as needed for headaches     Biotin 5000 MCG TABS Take 1 tablet by mouth daily      clobetasol (TEMOVATE) 0.05 % external solution Apply topically  "daily To scalp     clonazePAM (KLONOPIN) 0.5 MG ODT DISSOLVE 1 TABLET ON THE TONGUE EVERY NIGHT AS NEEDED FOR ANXIETY     gabapentin (NEURONTIN) 100 MG capsule Take 100 mg by mouth 3 times daily      hydroxychloroquine (PLAQUENIL) 200 MG tablet Take 2 tablets (400 mg) by mouth daily     ipratropium (ATROVENT) 0.03 % nasal spray Spray 2 sprays into both nostrils daily      levothyroxine (SYNTHROID/LEVOTHROID) 75 MCG tablet TAKE 1 TABLET BY MOUTH EVERY MORNING     liothyronine (CYTOMEL) 5 MCG tablet TAKE 2 TABLETS BY MOUTH EVERY DAY     loratadine (CLARITIN) 10 MG tablet Take 1 tablet (10 mg) by mouth daily     Omega-3 Fatty Acids (FISH OIL ADULT GUMMIES PO) Take 1 tablet by mouth daily      TURMERIC PO Take 1 tablet by mouth daily      Vitamin D3 (CHOLECALCIFEROL) 125 MCG (5000 UT) tablet Take 1 tablet by mouth daily     No current facility-administered medications for this visit.     DRUG MONITORING:  [place] Prescription Monitoring Program evaluating controlled substances in the last year in [place]:  [place] Pres[place] [] was checked today:  indicates clonazepam 0.5 mg consistently every month #30 for the year..    CURRENT MEDICATION SIDE EFFECTS REPORTED:  Denies    NOTES ABOUT CURRENT PSYCHOTROPIC MEDICATIONS:   Gabapentin recently started  Clonazepam 0.5 mg as needed daily, taking 5/7 days at night      VITALS   There were no vitals taken for this visit.     BP Readings from Last 1 Encounters:   08/17/21 122/80     Pulse Readings from Last 1 Encounters:   08/17/21 97     Wt Readings from Last 1 Encounters:   08/17/21 69.9 kg (154 lb)     Ht Readings from Last 1 Encounters:   08/17/21 1.6 m (5' 3\")     Estimated body mass index is 27.28 kg/m  as calculated from the following:    Height as of 8/17/21: 1.6 m (5' 3\").    Weight as of 8/17/21: 69.9 kg (154 lb).      MEDICAL / SURGICAL HISTORY      Past Medical History:    Hypertension   Impaired renal function  Inflammatory arthritis   Hypothyroidism "    Tricuspid regurgitation   Hereditary hemochromatosis   Degenerative joint disease   Iron overload syndrome   Rheumatoid arthritis   Anxiety  Irritable bowel syndrome   Hyperlipidemia      Past Surgical History:    Breast biopsy   section  Colonoscopy  Leep tx cervical   Myringotomy, insert tube, combined        Seizures or Head Injury: Denies history of head injury. Denies history of seizures.  History of cardiac disease, rheumatic fever, fainting or dizziness, especially with exercise, seizures, chest pain or shortness of breath with exercise, unexplained change in exercise tolerance, palpitations, high blood pressure, or heart murmur?   No    SURGICAL:  Past Surgical History:   Procedure Laterality Date     BIOPSY BREAST       C/SECTION, LOW TRANSVERSE      twins     COLONOSCOPY      nl, next one due in 5 years     LEEP TX, CERVICAL  1990s    normal since that time     MYRINGOTOMY, INSERT TUBE, COMBINED Left 2015    chronic NORM, ETD        LABS & IMAGING                                                                                                                  Recent Labs   Lab Test 21   WBC 8.2   HGB 11.9   HCT 32.5*   MCV 83      ANEU 6.4     Recent Labs   Lab Test 21  1511 08/15/21  0536 21  2019 21  1458 21  1457   * 129*   < >  --   --    POTASSIUM 4.3 4.1   < >  --   --    CHLORIDE 100  --   --   --   --    CO2 26  --   --   --   --    GLC 85  --   --   --   --    LUCA 9.2  --   --   --   --    MAG  --  1.6  --    < >  --    BUN 10  --   --   --   --    CR 0.87  --   --   --   --    GFRESTIMATED 71  --   --   --   --    ALBUMIN  --   --   --   --  3.9   PROTTOTAL  --   --   --   --  7.8   AST  --   --   --   --  22   ALT  --   --   --   --  22   ALKPHOS  --   --   --   --  96   BILITOTAL  --   --   --   --  0.7    < > = values in this interval not displayed.     Recent Labs   Lab Test 10/14/20  1008   CHOL 181   LDL 69   HDL 73   TRIG 195*      Recent Labs   Lab Test 10/14/20  1008   TSH 1.71     25 OH Vit D2   Date Value Ref Range Status   09/14/2010 <5 ug/L Final     25 OH Vit D3   Date Value Ref Range Status   09/14/2010 41 ug/L Final     25 OH Vit D total   Date Value Ref Range Status   09/14/2010  30 - 75 ug/L Final    <46  Season, race, dietary intake, and treatment affect the concentration of   25-hydroxy-Vitamin D. Values may decrease during winter months and increase   during summer months. Values less than 30 ug/L may indicate Vitamin D   deficiency.        ALLERGY & IMMUNIZATIONS       Allergies   Allergen Reactions     Codeine Nausea and Nausea and Vomiting     Nitrofurantoin Unknown and Other (See Comments)     Other reaction(s): Gastrointestinal       Ace Inhibitors Cough     lisinopril  lisinopril     Hctz [Hydrochlorothiazide]      Severe Hyponatremia less than 120     Sulfa Drugs      Sulfur        FAMILY MEDICAL HISTORY:     Family History     Problem (# of Occurrences) Relation (Name,Age of Onset)    Arthritis (2) Mother, Sister    Cancer (2) Sister (53): Uterine, Sister (50): Uterine    Cancer - colorectal (2) Father: age 50, Brother: age 50    Hypertension (2) Sister, Brother    Kidney Cancer (1) Brother    Testicular cancer (1) Nephew    Thyroid Disease (2) Sister, Brother          Family history of sudden or unexplained death or an event requiring resuscitation in children or young adults, cardiac arrhythmias (eg, Grecia-Parkinson-White syndrome), long QT syndrome, catecholaminergic paroxysmal ventricular tachycardia, Brugada syndrome, arrhythmogenic right ventricular dysplasia, hypertrophic cardiomyopathy, dilated cardiomyopathy, or Marfan syndrome?  No    FAMILY PSYCHIATRIC HISTORY:   Maternal: negative family history of diagnosed psychiatric illnes.     Paternal: negative family history of diagnosed psychiatric illnes.     Siblings: denies   Substance use history in family:  Denies     SIGNIFICANT SOCIAL/FAMILY HISTORY:                                            Patient reported they grew up in [place]  .  They were raised by biological parents  . Both parents , 14 children total Parents were always together.  Patient reported that their childhood was fun.  Patient described their current relationships with family of origin as good .   Highest education level was college graduate.    Service: denies   Employment status: part time    Trauma history: Denies}  ACES (Adverse Childhood Experiences):  None.  Grew up in an intact home with all basic needs being met    LEGAL:  Denies       SUBSTANCE USE HISTORY    Tobacco use: denies   Current alcohol:  Denies      Current substance use: denies   Past use alcohol/substance use: denies     Based on the clinical interview, there  are not indications of drug or alcohol abuse. Continue to monitor.      MEDICAL REVIEW OF SYSTEMS:   Ten system review was completed with pertinent positives noted above    MENTAL STATUS EXAM:   General/Constitutional:  Appearance:   awake, alert, adequately groomed, appeared stated age and no apparent distress  Attitude:    cooperative   Eye Contact:  good  Musculoskeletal:  Psychomotor Behavior:  no evidence of tardive dyskinesia, dystonia, or tics from the head up  Psychiatric:  Speech:  clear, coherent, regular rate, rhythm, and volume,   No pressure speech noted.  Associations:  no loose associations  Thought Process:   logical, linear and goal oriented  Thought Content:    No evidence of suicidal ideation or homicidal ideation, no evidence of psychotic thought, no auditory hallucinations present and no visual hallucinations present  Mood:  good  Affect:  full range/stable (normal variation of emotions during exam) and was congruent to speech content.  Insight:  good  Judgment:  intact, adequate for safety  Impulse Control:  intact  Neurological:  Oriented to:  person, place, time, and situation  Attention Span and Concentration:  normal    Language:  intact     Recent and Remote Memory:  Intact to interview. Not formally assessed. No amnesia.    Fund of Knowledge: appropriate         SAFETY   Feels safe in home: Yes   Suicidal ideation: Denies  History of suicide attempts:  No   Hx of impulsivity: No   Hope for the future: present   Hx of Command hallucinations or current psychosis: No  History of Self-injurious behaviors: No Current:  No  Family member  by suicide:  No    SAFETY ASSESSMENT:   Based on all available evidence including the factors cited above, overall Risk for harm is low and is appropriate for outpatient level of care.   Recommended that patient call 911 or go to the local ED should there be a change in any of these risk factors.     LANGUAGE OR COMMUNICATION BARRIERS   Are there language or communication issues or need for modification in treatment? No   Are there ethnic, cultural or Congregational factors that may be relevant for therapy? No  Client identified their preferred language to be fluent English in conversational context  Does the client need the assistance of an  or other support involved in therapy? No    DSM 5 DIAGNOSIS:   300.09 (F41.8) Other Specified Anxiety Disorder     MEETS CRITERIA PER DSM 5 AS FOLLOWS:  Does not meet criteria for an anxiety disorder. Patients presents with symptoms of anxiety that cause clinically significant distress or impairment in social, occupational, or other important areas of functioning predominate but does not meet the full criteria for any of the disorders in the anxiety disorders diagnostic class. At this time the presentation there is insufficient information to make a more specific diagnosis    MEDICAL COMORBIDITY IMPACTING CLINICAL PICTURE: multiple medical comorbidities. Recently inpnt for low Na at 118    ASSESSMENT AND PLAN    Irina Hanks is a 61 year old White Not  or  female presenting for psychiatric evaluation and medication management through the  [facility].  Information is obtained from patient and available records.  Reports history of anxiety.  Denies prior psychiatric hospitalizations. No history of suicidal thoughts or attempts. No history of self-injurious behaviors. No identified genetic load for psychiatric illnesses. Grew up in an intact home with all basic needs being met.       Problem List Items Addressed This Visit        Endocrine    Hyponatremia       Behavioral     Patient is not currently on psychiatric medications with the exception of clonazepam low dose, 0.5 mg as needed, daily.  She has been on this dose for over 5 years.  Discussed Risks/Side Effects/Benefits of benzodiazepine and goal not to increase or take daily.  Currently taking 5/7 days a week at night.  No symptoms that would warrant an SSRI and in addition, recent drop in Na requiring slow correction in patient.  selective serotonin reuptake inhibitor medications can have a side effect of hyponatremia.  Continue current dose of clonazepam.  3 months provided and will return to PCP after that.  Information on benzodiazepine use sent to patient via [employer]          Anxiety disorder, unspecified type - Primary         CONSULTS/REFERRALS:   None at this time  Coordinate care with therapist as needed    MEDICAL:   None at this time  Coordinate care with PCP (Mercedez Britton) as needed    FOLLOW UP: Schedule an appointment with PCP for further ongoing care, medication management and future refills of medications  Call the psychiatric nurse line with medication questions or concerns at [phone] or [phone]  Follow up with primary care provider as planned or for acute medical concerns.    PSYCHOEDUCATION:  Medication side effects and alternatives reviewed. Health promotion activities recommended and reviewed today. All questions addressed. Education and counseling completed regarding risks and benefits of medications and psychotherapy options.   Consent provided by patient/guardian  Call the psychiatric nurse line with medication questions or concerns at [phone].  MyChart may be used to communicate with your provider, but this is not intended to be used for emergencies.  BENZODIAZEPINE:  discussion on how benzos work and the need to use them short term due to potential of anxiety getting.  This is a controlled substance with risk for abuse, need to keep in a safe keep place and cannot replace lost scripts.    Medlineplus.gov is information for patients.  It is run by the [employer] and it contains information about all disorders, diseases and all medications.      COMMUNITY RESOURCES:    CRISIS NUMBERS: Provided in AVS 2021  National Suicide Prevention Lifeline: 4-288-282-TALK (303-173-6206)  [employer]resources for a list of additional resources (SOS)            [facility] - [phone]   Urgent Care Adult Mental Ogdvtg-459-866-7900 mobile unit/  crisis line  [facility] -[phone]   COPE  [facility] Mobile Team -[phone] (adults)/ [phone] (child)  Poison Control Center - 1-273.695.8291    OR  go to nearest ER  Crisis Text Line for any crisis  send this-   To: 421814   [facility] ([facility]  [phone]  National Suicide Prevention Lifeline: 983.264.1779 (TTY: 673.189.3911). Call anytime for help.  (www.suicidepreventionlifeline.org)  National [place] on Mental Illness (www.leanne.org): 587-502-9808 or 867-307-7309.   Mental Health Association (www.mentalhealth.org): 990.950.7186 or 762-023-6411.  [place] Crisis Text Line: Text [place] to 900261  Suicide LifeLine Chat: suicidePhatNoise.org/chat    ADMINISTRATIVE BILLIN min spent interviewing patient, reviewing referral documents, obtaining and reviewing outside records, communication with other health specialists, and preparing this report on today's date  Video/Phone Start Time:  1:55  Video/Phone End Time: 2:32    Patient Status:  Our psychiatry providers act as a specialty service for Primary Care Providers in the [facility] that seek to optimize medications for unstable patients.  Once medications have been optimized, our providers discharge the patient back to the referring Primary Care Provider for ongoing medication management.  This type of system allows our providers to serve a high volume of patients. At this time  Patient has been stabilized and will be returned to PCP for ongoing care, medication prescribing and future medication refills.    3 months of medications fills provided.  Follow up with @PCP@ in about   3 months. Patient can be re-referred back to this service for further consultation in the future if needed but a new referral will need to be placed.    Signed:   Charmaine White, MSN, APRN, FMHNP-[facility] ([facility])   Chart documentation done in part with Dragon Voice Recognition software.  Although reviewed after completion, some word and grammatical errors may remain.

## 2021-09-07 NOTE — PROGRESS NOTES
"[facility]  [facility]    PATIENT'S NAME: Irina Hanks   PREFERRED NAME: Cherise  PRONOUNS:       MRN: [id number]  : 1960  ADDRESS: [facility] [address] [place] [address]  ACCT. NUMBER:  [id number]  DATE OF SERVICE: 21  START TIME: 130 pm  END TIME: 157pm  PREFERRED PHONE: [phone]  May we leave a program related message: Yes  SERVICE MODALITY:  Video Visit:      Provider verified identity through the following two step process.  Patient provided:  Patient photo    Telemedicine Visit: The patient's condition can be safely assessed and treated via synchronous audio and visual telemedicine encounter.      Reason for Telemedicine Visit: Services only offered telehealth    Originating Site (Patient Location): Patient's home    Distant Site (Provider Location): Provider Remote Setting- Home Office    Consent:  The patient/guardian has verbally consented to: the potential risks and benefits of telemedicine (video visit) versus in person care; bill my insurance or make self-payment for services provided; and responsibility for payment of non-covered services.     Patient would like the video invitation sent by:  My Chart    Mode of Communication:  Video Conference via [employer]    As the provider I attest to compliance with applicable laws and regulations related to telemedicine.    UNIVERSAL ADULT Mental Health DIAGNOSTIC ASSESSMENT    Identifying Information:  Patient is a 61 year old, .  The pronoun use throughout this assessment reflects the patient's chosen pronoun.  [facility]  Patient attended the session alone.     Chief Complaint:   The reason for seeking services at this time is: \"Continuing medication\". Saw new PCP who wants to review for clonezapam specifically. Use varies according to life stresses. Previous PCP had her on a use agreement on/off for " about 5 years.  The problem(s) began 15. Current stress: mother   after being in hospice for a year, Peg was hospitalized because her sodium and potassium were very low and her daughter just got engaged. She has a complex medical hx.     Most important: get refill of clonazepam.     Patient has attempted to resolve these concerns in the past through medication but no hx of counseling.    Social/Family History:  Patient reported they grew up in [facility]  .  They were raised by biological parents  . Both parents , 14 children total Parents were always together.  Patient reported that their childhood was fun.  Patient described their current relationships with family of origin as good .     The patient describes their cultural background as .  Cultural influences and impact on patient's life structure, values, norms, and healthcare: None.  Contextual influences on patient's health include: Health- Seeking Factors complex medical hx.    These factors will be addressed in the Preliminary Treatment plan. Patient identified their preferred language to be English. Patient reported they does not need the assistance of an  or other support involved in therapy.     Patient reported had no significant delays in developmental tasks.   Patient's highest education level was college graduate  .  Patient identified the following learning problems: none reported.  Modifications will not be used to assist communication in therapy.  Patient reports they are  able to understand written materials.    Patient reported the following relationship history  x1.  Patient's current relationship status is  for 33 years.   Patient identified their sexual orientation as heterosexual.  Patient reported having 3 child(antwan). Patient identified siblings;friends;spouse as part of their support system.  Patient identified the quality of these relationships as stable and meaningful,  .       Patient's current living/housing situation involves staying in own home/apartment.  The immediate members of family and household include Bill Zhao, 62, good relationship and they report that housing is stable. She has 3 children.    Patient is currently employed part time.   Patient reports their finances are obtained through employment;spouse. Patient does not identify finances as a current stressor.      Patient reported that they have not been involved with the legal system.   . Patient does not report being under probation/ parole/ jurisdiction. They are not under any current court jurisdiction. .    Patient's Strengths and Limitations:  Patient identified the following strengths or resources that will help them succeed in treatment: family support, insight, intelligence and motivation. Things that may interfere with the patient's success in treatment include: none identified.     Personal and Family Medical History:  Patient does not report a family history of mental health concerns.  Patient reports family history includes Arthritis in her mother and sister; Cancer (age of onset: 50) in her sister; Cancer (age of onset: 53) in her sister; Cancer - colorectal in her brother and father; Hypertension in her brother and sister; Kidney Cancer in her brother; Testicular cancer in her nephew; Thyroid Disease in her brother and sister..     Patient does not report Mental Health Diagnosis or Treatment.      Patient has had a physical exam to rule out medical causes for current symptoms.  Date of last physical exam was within the past year. Client was encouraged to follow up with PCP if symptoms were to develop. The patient has a [facility] Provider, who is named Mercedez Britton..  Patient reports the following current medical concerns: RA, hypothyroid, high blood pressure, hemochromatosis, autoimmune disorder.  Patient reports pain concerns including NA.  Patient does not want help  addressing pain concerns..   There are not significant appetite / nutritional concerns / weight changes.   Patient does not report a history of head injury / trauma / cognitive impairment.      Patient reports current meds as:   Outpatient Medications Marked as Taking for the 9/7/21 encounter (Virtual Visit) with Soledad Maciel LICSW   Medication Sig     aspirin 81 MG EC tablet Take 81 mg by mouth daily     aspirin-acetaminophen-caffeine (EXCEDRIN MIGRAINE) 250-250-65 MG tablet Take 1 tablet by mouth every 8 hours as needed for headaches     Biotin 5000 MCG TABS Take 1 tablet by mouth daily      clobetasol (TEMOVATE) 0.05 % external solution Apply topically daily To scalp     gabapentin (NEURONTIN) 100 MG capsule Take 100 mg by mouth 3 times daily      hydroxychloroquine (PLAQUENIL) 200 MG tablet Take 2 tablets (400 mg) by mouth daily     ipratropium (ATROVENT) 0.03 % nasal spray Spray 2 sprays into both nostrils daily      levothyroxine (SYNTHROID/LEVOTHROID) 75 MCG tablet TAKE 1 TABLET BY MOUTH EVERY MORNING     liothyronine (CYTOMEL) 5 MCG tablet TAKE 2 TABLETS BY MOUTH EVERY DAY     loratadine (CLARITIN) 10 MG tablet Take 1 tablet (10 mg) by mouth daily     Omega-3 Fatty Acids (FISH OIL ADULT GUMMIES PO) Take 1 tablet by mouth daily      TURMERIC PO Take 1 tablet by mouth daily      Vitamin D3 (CHOLECALCIFEROL) 125 MCG (5000 UT) tablet Take 1 tablet by mouth daily     [DISCONTINUED] clonazePAM (KLONOPIN) 0.5 MG ODT DISSOLVE 1 TABLET ON THE TONGUE EVERY NIGHT AS NEEDED FOR ANXIETY       Medication Adherence:  Patient reports taking.  taking prescribed medications as prescribed.    Patient Allergies:    Allergies   Allergen Reactions     Codeine Nausea and Nausea and Vomiting     Nitrofurantoin Unknown and Other (See Comments)     Other reaction(s): Gastrointestinal       Ace Inhibitors Cough     lisinopril  lisinopril     Hctz [Hydrochlorothiazide]      Severe Hyponatremia less than 120     Sulfa Drugs       Sulfur        Medical History:    Past Medical History:   Diagnosis Date     ASCUS favor benign 09/2014    3 yr co-test     Essential hypertension, benign      Impaired renal function      Inflammatory arthritis     Managed by Rheumatology  - q 6 mos     Microscopic colitis      Osteoarthritis of first carpometacarpal joint     bilateral     Other specified acquired hypothyroidism      Seasonal allergic rhinitis      Shortness of breath      Spider veins      Symptomatic menopausal or female climacteric states     age 43, on HRT     Tricuspid regurgitation     +1-2 TR noted on 12/22/14 Echo         Current Mental Status Exam:   Appearance:  Appropriate    Eye Contact:  Good   Psychomotor:  Normal       Gait / station:  no problem  Attitude / Demeanor: Cooperative   Speech      Rate / Production: Normal/ Responsive      Volume:  Normal  volume      Language:  intact  Mood:   Anxious   Affect:   Appropriate    Thought Content: Clear   Thought Process: Coherent  Logical       Associations: No loosening of associations  Insight:   Good   Judgment:  Intact   Orientation:  All  Attention/concentration: Good    Rating Scales:    PHQ9:    PHQ-9 SCORE 7/27/2015 9/7/2016 9/7/2021   PHQ-9 Total Score 3 - -   PHQ-9 Total Score MyChart - - 3 (Minimal depression)   PHQ-9 Total Score - 6 3       GAD7:    MARIEL-7 SCORE 7/30/2015 9/7/2016 9/7/2021   Total Score 5 - -   Total Score - - 2 (minimal anxiety)   Total Score - 12 2     CGI:     First:Considering your total clinical experience with this particular patient population, how severe are the patient's symptoms at this time?: 3 (9/7/2021  3:03 PM)      Most recentNo data recorded    Substance Use:  Patient did not report a family history of substance use concerns; see medical history section for details.  Patient has not received chemical dependency treatment in the past.  Patient has not ever been to detox.      Patient is not currently receiving any chemical dependency treatment.            Substance History of use Age of first use Date of last use     Pattern and duration of use (include amounts and frequency)   Alcohol currently use   18 09/01/21 REPORTS SUBSTANCE USE: N/A   Cannabis   never used     REPORTS SUBSTANCE USE: N/A     Amphetamines   never used     REPORTS SUBSTANCE USE: N/A   Cocaine/crack    never used       REPORTS SUBSTANCE USE: N/A   Hallucinogens never used         REPORTS SUBSTANCE USE: N/A   Inhalants never used         REPORTS SUBSTANCE USE: N/A   Heroin never used         REPORTS SUBSTANCE USE: N/A   Other Opiates never used     REPORTS SUBSTANCE USE: N/A   Benzodiazepine   currently use 53 09/01/21 REPORTS SUBSTANCE USE: N/A   Barbiturates never used     REPORTS SUBSTANCE USE: N/A   Over the counter meds used in the past 48 03/18/21 REPORTS SUBSTANCE USE: N/A   Caffeine currently use 8   REPORTS SUBSTANCE USE: N/A   Nicotine  used in the past Sept. 1977 09/03/87 REPORTS SUBSTANCE USE: N/A   Other substances not listed above:  Identify:  never used     REPORTS SUBSTANCE USE: N/A     Patient reported the following problems as a result of their substance use: no problems, not applicable.     CAGE- AID:    CAGE-AID Total Score 9/7/2021 9/7/2021   Total Score 0 0   Total Score MyChart - 0 (A total score of 2 or greater is considered clinically significant)       Substance Use: No symptoms    Based on the negative CAGE score and clinical interview there  are not indications of drug or alcohol abuse.      Significant Losses / Trauma / Abuse / Neglect Issues:   Patient did not  serve in the .  There are indications or report of significant loss, trauma, abuse or neglect issues related to: death of mother.  Concerns for possible neglect are not present.     Safety Assessment:   Current Safety Concerns:  Coke Suicide Severity Rating Scale (Lifetime/Recent)  Coke Suicide Severity Rating (Lifetime/Recent) 9/7/2021   1. Wish to be Dead (Lifetime) No   1. Wish to  be Dead (Recent) No   2. Non-Specific Active Suicidal Thoughts (Lifetime) No   2. Non-Specific Active Suicidal Thoughts (Recent) No   3. Active Suicidal Ideation with any Methods (Not Plan) Without Intent to Act (Lifetime) No   3. Active Suicidal Ideation with any Methods (Not Plan) Without Intent to Act (Recent) No   4. Active Suicidal Ideation with Some Intent to Act, Without Specific Plan (Lifetime) No   4. Active Suicidal Ideation with Some Intent to Act, Without Specific Plan (Recent) No   5. Active Suicidal Ideation with Specific Plan and Intent (Lifetime) No   5. Active Suicidal Ideation with Specific Plan and Intent (Recent) No   Most Severe Ideation Rating (Lifetime) NA   Frequency (Lifetime) NA   Duration (Lifetime) NA   Controllability (Lifetime) NA   Protective Factors  (Lifetime) NA   Reasons for Ideation (Lifetime) NA   Most Severe Ideation Rating (Past Month) NA   Frequency (Past Month) NA   Duration (Past Month) NA   Controllability (Past Month) NA   Protective Factors (Past Month) NA   Reasons for Ideation (Past Month) NA   Actual Attempt (Lifetime) No   Actual Attempt (Past 3 Months) No   Has subject engaged in non-suicidal self-injurious behavior? (Lifetime) No   Has subject engaged in non-suicidal self-injurious behavior? (Past 3 Months) No   Interrupted Attempts (Lifetime) No   Interrupted Attempts (Past 3 Months) No   Aborted or Self-Interrupted Attempt (Lifetime) No   Aborted or Self-Interrupted Attempt (Past 3 Months) No   Preparatory Acts or Behavior (Lifetime) No   Preparatory Acts or Behavior (Past 3 Months) No   Most Recent Attempt Actual Lethality Code NA   Most Lethal Attempt Actual Lethality Code NA   Initial/First Attempt Actual Lethality Code NA     Patient denies current homicidal ideation and behaviors.  Patient denies current self-injurious ideation and behaviors.    Patient denied risk behaviors associated with substance use.  Patient denies any high risk behaviors associated  with mental health symptoms.  Patient reports the following current concerns for their personal safety: None.  Patient reports there are not firearms in the house.           History of Safety Concerns:  Patient denied a history of homicidal ideation.     Patient denied a history of personal safety concerns.    Patient denied a history of assaultive behaviors.    Patient denied a history of sexual assault behaviors.     Patient denied a history of risk behaviors associated with substance use.  Patient denies any history of high risk behaviors associated with mental health symptoms.  Patient reports the following protective factors: forward or future oriented thinking;dedication to family or friends;safe and stable environment;regular sleep;effectively controls impulses;regular physical activity;sense of belonging;purpose;secure attachment;help seeking behaviors when distressed;abstinence from substances;adherence with prescribed medication;agreement to use safety plan;living with other people;daily obligations;structured day;effective problem solving skills;commitment to well being;sense of meaning;positive social skills;healthy fear of risky behaviors or pain;financial stability;strong sense of self worth or esteem;sense of personal control or determination;access to a variety of clinical interventions and pets    Risk Plan:  See Recommendations for Safety and Risk Management Plan    Review of Symptoms per patient report:  Depression: Change in sleep and Irritability  Zena:  No Symptoms  Psychosis: No Symptoms  Anxiety: Irritability  Panic:  No symptoms  Post Traumatic Stress Disorder:  No Symptoms   Eating Disorder: No Symptoms  ADD / ADHD:  No symptoms  Conduct Disorder: No symptoms  Autism Spectrum Disorder: No symptoms  Obsessive Compulsive Disorder: No Symptoms    Patient reports the following compulsive behaviors and treatment history: none.      Diagnostic Criteria:   A. Excessive anxiety and worry about a  number of events or activities (such as work or school performance).   B. The person finds it difficult to control the worry.  C. Select 3 or more symptoms (required for diagnosis). Only one item is required in children.   - Restlessness or feeling keyed up or on edge.    - Difficulty concentrating or mind going blank.    - Irritability.    - Sleep disturbance (difficulty falling or staying asleep, or restless unsatisfying sleep).   D. The focus of the anxiety and worry is not confined to features of an Axis I disorder.  E. The anxiety, worry, or physical symptoms cause clinically significant distress or impairment in social, occupational, or other important areas of functioning.   F. The disturbance is not due to the direct physiological effects of a substance (e.g., a drug of abuse, a medication) or a general medical condition (e.g., hyperthyroidism) and does not occur exclusively during a Mood Disorder, a Psychotic Disorder, or a Pervasive Developmental Disorder.    Functional Status:  Patient reports the following functional impairments: relationship(s).     WHODAS:   WHODAS 2.0 Total Score 9/3/2021   Total Score 25   Total Score MyChart 25     Nonprogrammatic care:  Patient is requesting basic services to address current mental health concerns.    Clinical Summary:  1. Reason for assessment: anxiety  .  2. Psychosocial, Cultural and Contextual Factors: recent death of mother, ongoing health issues  .  3. Principal DSM5 Diagnoses  (Sustained by DSM5 Criteria Listed Above):   300.02 (F41.1) Generalized Anxiety Disorder.  4. Other Diagnoses that is relevant to services:   NA  5. Provisional Diagnosis:  300.02 (F41.1) Generalized Anxiety Disorder as evidenced by ROS .  6. Prognosis: Return to Normal Functioning.  7. Likely consequences of symptoms if not treated: worsening symptoms.  8. Client strengths include:  educated, empathetic, insightful, intelligent, motivated and support of family, friends and providers .      Recommendations:     1. Plan for Safety and Risk Management:   Recommended that patient call 911 or go to the local ED should there be a change in any of these risk factors..          Report to child / adult protection services was NA.     2. Patient's identified mental health concerns with a cultural influence will be addressed by NA.     3. Initial Treatment will focus on:    Anxiety - MARIEL.     4. Resources/Service Plan:    services are not indicated.   Modifications to assist communication are not indicated.   Additional disability accommodations are not indicated.      5. Collaboration:   Collaboration / coordination of treatment will be initiated with the following  support professionals: communicated with Charmaine White, MSN, APRN, CNP, FMHNP-BC, [facility]      6.  Referrals:   The following referral(s) will be initiated: none. Next Scheduled Appointment: TBD.     A Release of Information has been obtained for the following: NA.    7. ИРИНА:    ИРИНА:  Discussed the general effects of drugs and alcohol on health and well-being. Provider gave patient printed information about the effects of chemical use on their health and well being. Recommendations:  NA .     8. Records:   These were reviewed at time of assessment.   Information in this assessment was obtained from the medical record and  provided by patient who is a good historian.    Patient will have open access to their mental health medical record.      Provider Name/ Credentials:  Soledad [facility]  September 7, 2021

## 2021-09-07 NOTE — ASSESSMENT & PLAN NOTE
Patient is not currently on psychiatric medications with the exception of clonazepam low dose, 0.5 mg as needed, daily.  She has been on this dose for over 5 years.  Discussed Risks/Side Effects/Benefits of benzodiazepine and goal not to increase or take daily.  Currently taking 5/7 days a week at night.  No symptoms that would warrant an SSRI and in addition, recent drop in Na requiring slow correction in patient.  selective serotonin reuptake inhibitor medications can have a side effect of hyponatremia.  Continue current dose of clonazepam.  3 months provided and will return to PCP after that.  Information on benzodiazepine use sent to patient via [employer]

## 2021-09-08 ASSESSMENT — PATIENT HEALTH QUESTIONNAIRE - PHQ9: SUM OF ALL RESPONSES TO PHQ QUESTIONS 1-9: 3

## 2021-09-08 ASSESSMENT — ANXIETY QUESTIONNAIRES: GAD7 TOTAL SCORE: 2

## 2021-09-14 ENCOUNTER — OFFICE VISIT (OUTPATIENT)
Dept: FAMILY MEDICINE | Facility: CLINIC | Age: 61
End: 2021-09-14
Payer: COMMERCIAL

## 2021-09-14 VITALS
BODY MASS INDEX: 26.75 KG/M2 | OXYGEN SATURATION: 98 % | DIASTOLIC BLOOD PRESSURE: 82 MMHG | HEART RATE: 89 BPM | WEIGHT: 151 LBS | TEMPERATURE: 96.9 F | SYSTOLIC BLOOD PRESSURE: 138 MMHG

## 2021-09-14 DIAGNOSIS — F41.9 ANXIETY DISORDER, UNSPECIFIED TYPE: ICD-10-CM

## 2021-09-14 DIAGNOSIS — Z23 NEED FOR PROPHYLACTIC VACCINATION AND INOCULATION AGAINST INFLUENZA: ICD-10-CM

## 2021-09-14 DIAGNOSIS — M06.9 RHEUMATOID ARTHRITIS INVOLVING MULTIPLE SITES, UNSPECIFIED WHETHER RHEUMATOID FACTOR PRESENT (H): ICD-10-CM

## 2021-09-14 DIAGNOSIS — E87.1 HYPONATREMIA: ICD-10-CM

## 2021-09-14 DIAGNOSIS — E03.9 ACQUIRED HYPOTHYROIDISM: ICD-10-CM

## 2021-09-14 DIAGNOSIS — S73.191D ACETABULAR LABRUM TEAR, RIGHT, SUBSEQUENT ENCOUNTER: ICD-10-CM

## 2021-09-14 DIAGNOSIS — Z00.00 ROUTINE GENERAL MEDICAL EXAMINATION AT A HEALTH CARE FACILITY: Primary | ICD-10-CM

## 2021-09-14 DIAGNOSIS — E83.19 IRON OVERLOAD SYNDROME: ICD-10-CM

## 2021-09-14 DIAGNOSIS — Z79.899 CHRONIC USE OF BENZODIAZEPINE FOR THERAPEUTIC PURPOSE: ICD-10-CM

## 2021-09-14 DIAGNOSIS — E83.110 HEREDITARY HEMOCHROMATOSIS (H): ICD-10-CM

## 2021-09-14 DIAGNOSIS — Z13.1 ENCOUNTER FOR SCREENING FOR DIABETES MELLITUS: ICD-10-CM

## 2021-09-14 DIAGNOSIS — I10 ESSENTIAL HYPERTENSION: ICD-10-CM

## 2021-09-14 LAB
ANION GAP SERPL CALCULATED.3IONS-SCNC: 7 MMOL/L (ref 3–14)
BUN SERPL-MCNC: 11 MG/DL (ref 7–30)
CALCIUM SERPL-MCNC: 8.8 MG/DL (ref 8.5–10.1)
CHLORIDE BLD-SCNC: 100 MMOL/L (ref 94–109)
CHOLEST SERPL-MCNC: 218 MG/DL
CO2 SERPL-SCNC: 26 MMOL/L (ref 20–32)
CREAT SERPL-MCNC: 0.9 MG/DL (ref 0.52–1.04)
CREAT UR-MCNC: 161 MG/DL
FASTING STATUS PATIENT QL REPORTED: YES
FERRITIN SERPL-MCNC: 33 NG/ML (ref 8–252)
GFR SERPL CREATININE-BSD FRML MDRD: 68 ML/MIN/1.73M2
GLUCOSE BLD-MCNC: 78 MG/DL (ref 70–99)
HBA1C MFR BLD: 4.4 % (ref 0–5.6)
HDLC SERPL-MCNC: 102 MG/DL
LDLC SERPL CALC-MCNC: 101 MG/DL
NONHDLC SERPL-MCNC: 116 MG/DL
OSMOLALITY UR: 497 MMOL/KG (ref 100–1200)
POTASSIUM BLD-SCNC: 3.5 MMOL/L (ref 3.4–5.3)
SODIUM SERPL-SCNC: 133 MMOL/L (ref 133–144)
SODIUM UR-SCNC: 66 MMOL/L
TRIGL SERPL-MCNC: 74 MG/DL
TSH SERPL DL<=0.005 MIU/L-ACNC: 2.23 MU/L (ref 0.4–4)

## 2021-09-14 PROCEDURE — 84300 ASSAY OF URINE SODIUM: CPT | Performed by: INTERNAL MEDICINE

## 2021-09-14 PROCEDURE — 83036 HEMOGLOBIN GLYCOSYLATED A1C: CPT | Performed by: INTERNAL MEDICINE

## 2021-09-14 PROCEDURE — 83935 ASSAY OF URINE OSMOLALITY: CPT | Performed by: INTERNAL MEDICINE

## 2021-09-14 PROCEDURE — 90682 RIV4 VACC RECOMBINANT DNA IM: CPT | Performed by: INTERNAL MEDICINE

## 2021-09-14 PROCEDURE — 80048 BASIC METABOLIC PNL TOTAL CA: CPT | Performed by: INTERNAL MEDICINE

## 2021-09-14 PROCEDURE — 99396 PREV VISIT EST AGE 40-64: CPT | Mod: 25 | Performed by: INTERNAL MEDICINE

## 2021-09-14 PROCEDURE — 90471 IMMUNIZATION ADMIN: CPT | Performed by: INTERNAL MEDICINE

## 2021-09-14 PROCEDURE — 82728 ASSAY OF FERRITIN: CPT | Performed by: INTERNAL MEDICINE

## 2021-09-14 PROCEDURE — 84443 ASSAY THYROID STIM HORMONE: CPT | Performed by: INTERNAL MEDICINE

## 2021-09-14 PROCEDURE — 82570 ASSAY OF URINE CREATININE: CPT | Performed by: INTERNAL MEDICINE

## 2021-09-14 PROCEDURE — 36415 COLL VENOUS BLD VENIPUNCTURE: CPT | Performed by: INTERNAL MEDICINE

## 2021-09-14 PROCEDURE — 80061 LIPID PANEL: CPT | Performed by: INTERNAL MEDICINE

## 2021-09-14 PROCEDURE — 99213 OFFICE O/P EST LOW 20 MIN: CPT | Mod: 25 | Performed by: INTERNAL MEDICINE

## 2021-09-14 RX ORDER — METOPROLOL SUCCINATE 25 MG/1
25 TABLET, EXTENDED RELEASE ORAL 2 TIMES DAILY
Qty: 60 TABLET | Refills: 0 | Status: SHIPPED | OUTPATIENT
Start: 2021-09-14 | End: 2021-09-16

## 2021-09-14 ASSESSMENT — ENCOUNTER SYMPTOMS
DYSURIA: 0
DIZZINESS: 0
SHORTNESS OF BREATH: 0
NERVOUS/ANXIOUS: 1
FEVER: 0
CONSTIPATION: 0
HEMATOCHEZIA: 0
HEMATURIA: 0
BREAST MASS: 0
COUGH: 0
WEAKNESS: 0
HEADACHES: 0
MYALGIAS: 0
ABDOMINAL PAIN: 0
FREQUENCY: 0
EYE PAIN: 0
CHILLS: 0
NAUSEA: 0
DIARRHEA: 0
HEARTBURN: 0
ARTHRALGIAS: 0
JOINT SWELLING: 0
PALPITATIONS: 0
SORE THROAT: 0
PARESTHESIAS: 0

## 2021-09-14 ASSESSMENT — PAIN SCALES - GENERAL: PAINLEVEL: NO PAIN (0)

## 2021-09-14 NOTE — PROGRESS NOTES
SUBJECTIVE:   CC: Irina Hanks is an 61 year old woman who presents for preventive health visit.       Patient has been advised of split billing requirements and indicates understanding: Yes  Healthy Habits:     Getting at least 3 servings of Calcium per day:  Yes    Bi-annual eye exam:  Yes    Dental care twice a year:  Yes    Sleep apnea or symptoms of sleep apnea:  None    Diet:  Regular (no restrictions)    Frequency of exercise:  4-5 days/week    Duration of exercise:  45-60 minutes    Taking medications regularly:  Yes    Medication side effects:  Not applicable    PHQ-2 Total Score: 0    Additional concerns today:  Yes    Today's PHQ-2 Score:   PHQ-2 (  [employer]) 2021   Q1: Little interest or pleasure in doing things 0   Q2: Feeling down, depressed or hopeless 0   PHQ-2 Score 0   Q1: Little interest or pleasure in doing things Not at all   Q2: Feeling down, depressed or hopeless Not at all   PHQ-2 Score 0       Abuse: Current or Past (Physical, Sexual or Emotional) - No  Do you feel safe in your environment? Yes    Have you ever done Advance Care Planning? (For example, a Health Directive, POLST, or a discussion with a medical provider or your loved ones about your wishes): N/A    Social History     Tobacco Use     Smoking status: Former Smoker     Packs/day: 0.26     Years: 10.00     Pack years: 2.60     Types: Cigarettes     Quit date: 1989     Years since quittin.4     Smokeless tobacco: Never Used   Substance Use Topics     Alcohol use: Yes     Alcohol/week: 0.0 standard drinks     Comment: 2 drinks per day     If you drink alcohol do you typically have >3 drinks per day or >7 drinks per week? Yes    Alcohol Use 2021   Prescreen: >3 drinks/day or >7 drinks/week? No   Prescreen: >3 drinks/day or >7 drinks/week? -   No flowsheet data found.    Reviewed orders with patient.  Reviewed health maintenance and updated orders accordingly - Yes  Lab work is in process  Labs reviewed  in EPIC    Breast Cancer Screening:  Any new diagnosis of family breast, ovarian, or bowel cancer? Yes     FHS-7: No flowsheet data found.  click delete button to remove this line now  Mammogram Screening: Recommended mammography every 1-2 years with patient discussion and risk factor consideration  Pertinent mammograms are reviewed under the imaging tab.    History of abnormal Pap smear: NO - age 30- 65 PAP every 3 years recommended  PAP / HPV Latest Ref Rng & Units 2019   PAP (Historical) - NIL ASC-US(A) NIL   HPV16 NEG:Negative Negative - -   HPV18 NEG:Negative Negative - -   HRHPV NEG:Negative Negative - -     Reviewed and updated as needed this visit by clinical staff  Tobacco  Allergies  Meds              Reviewed and updated as needed this visit by Provider                Past Medical History:   Diagnosis Date     ASCUS favor benign 2014    3 yr co-test     Essential hypertension, benign      Impaired renal function      Inflammatory arthritis     Managed by Rheumatology  - q 6 mos     Microscopic colitis      Osteoarthritis of first carpometacarpal joint     bilateral     Other specified acquired hypothyroidism      Seasonal allergic rhinitis      Shortness of breath      Spider veins      Symptomatic menopausal or female climacteric states     age 43, on HRT     Tricuspid regurgitation     +1-2 TR noted on 14 Echo      Past Surgical History:   Procedure Laterality Date     BIOPSY BREAST       C/SECTION, LOW TRANSVERSE      twins     COLONOSCOPY      nl, next one due in 5 years     LEEP TX, CERVICAL  1990s    normal since that time     MYRINGOTOMY, INSERT TUBE, COMBINED Left 2015    chronic NORM, ETD     OB History    Para Term  AB Living   2 2 2 0 0 3   SAB TAB Ectopic Multiple Live Births   0 0 0 1 0      # Outcome Date GA Lbr [place]/2nd Weight Sex Delivery Anes PTL Lv   2 Term            1 Term               Obstetric Comments   twins       Review of  Systems   Constitutional: Negative for chills and fever.   HENT: Negative for congestion, ear pain, hearing loss and sore throat.    Eyes: Negative for pain and visual disturbance.   Respiratory: Negative for cough and shortness of breath.    Cardiovascular: Negative for chest pain, palpitations and peripheral edema.   Gastrointestinal: Negative for abdominal pain, constipation, diarrhea, heartburn, hematochezia and nausea.   Breasts:  Negative for tenderness, breast mass and discharge.   Genitourinary: Negative for dysuria, frequency, genital sores, hematuria, pelvic pain, urgency, vaginal bleeding and vaginal discharge.   Musculoskeletal: Negative for arthralgias, joint swelling and myalgias.   Skin: Negative for rash.   Neurological: Negative for dizziness, weakness, headaches and paresthesias.   Psychiatric/Behavioral: Negative for mood changes. The patient is nervous/anxious.      CONSTITUTIONAL: NEGATIVE for fever, chills, change in weight  INTEGUMENTARY/SKIN: NEGATIVE for worrisome rashes, moles or lesions  EYES: NEGATIVE for vision changes or irritation  ENT: NEGATIVE for ear, mouth and throat problems  RESP: NEGATIVE for significant cough or SOB  BREAST: NEGATIVE for masses, tenderness or discharge  CV: NEGATIVE for chest pain, palpitations or peripheral edema  GI: NEGATIVE for nausea, abdominal pain, heartburn, or change in bowel habits  : NEGATIVE for unusual urinary or vaginal symptoms. No vaginal bleeding.  MUSCULOSKELETAL: NEGATIVE for significant arthralgias or myalgia  NEURO: NEGATIVE for weakness, dizziness or paresthesias  PSYCHIATRIC: NEGATIVE for changes in mood or affect      OBJECTIVE:   /82   Pulse 89   Temp 96.9  F (36.1  C) (Temporal)   Wt 68.5 kg (151 lb)   SpO2 98%   BMI 26.75 kg/m    Physical Exam  GENERAL APPEARANCE: healthy, alert and no distress  EYES: Eyes grossly normal to inspection, PERRL and conjunctivae and sclerae normal  HENT: ear canals and TM's normal, nose and  mouth without ulcers or lesions, oropharynx clear and oral mucous membranes moist  NECK: no adenopathy, no asymmetry, masses, or scars and thyroid normal to palpation  RESP: lungs clear to auscultation - no rales, rhonchi or wheezes  BREAST: normal without masses, tenderness or nipple discharge and no palpable axillary masses or adenopathy  CV: regular rate and rhythm, normal S1 S2, no S3 or S4, no murmur, click or rub, no peripheral edema and peripheral pulses strong  ABDOMEN: soft, nontender, no hepatosplenomegaly, no masses and bowel sounds normal  MS: no musculoskeletal defects are noted and gait is age appropriate without ataxia  SKIN: no suspicious lesions or rashes  NEURO: Normal strength and tone, sensory exam grossly normal, mentation intact and speech normal  PSYCH: mentation appears normal and affect normal/bright    Diagnostic Test Results:  Labs reviewed in Epic    ASSESSMENT/PLAN:     Assessment and Plan  1. Routine general medical examination at a health care facility  Last seen pt on 8/7/2021 for hospital follow up visit of hyponatremia.Last TSH , Lipid panel in 10/2020 - Will need recheck at this time. Vit.D CBC normal in recent labs. A1C never checked in the past.  Last Colonoscopy in 3/2021 showing Internal hemorrhoids and Lymphocytic Colitis S/P completion of Steroid course for 2 months at that time and asymptomatic at this time. Pt supposed to follow GI in 3 months as per their note. Last PAP in 9/2019 normal good for 3 yrs , not due until 9/2022.  - TSH WITH FREE T4 REFLEX; Future  - metoprolol succinate ER (TOPROL-XL) 25 MG 24 hr tablet; Take 1 tablet (25 mg) by mouth 2 times daily  Dispense: 60 tablet; Refill: 0  - Basic metabolic panel  (Ca, Cl, CO2, Creat, Gluc, K, Na, BUN); Future  - Lipid panel reflex to direct LDL Fasting; Future  - Hemoglobin A1c; Future  - Ferritin; Future    2. Hyponatremia  Recent hospitalization for hyponatremia, with recheck showing mild improvement in last visit.   Pt urine lytes need to be repeated regarding her hyponatremia as her last Urine lytes check in hospital were done when she was taking HCTZ.   - Basic metabolic panel  (Ca, Cl, CO2, Creat, Gluc, K, Na, BUN); Future  - URINE SODIUM  - Creatinine random urine; Future  - Osmolality urine; Future    3. Acquired hypothyroidism  - TSH WITH FREE T4 REFLEX; Future    4. Essential hypertension  Uncontrolled, borderline high in patient OV today. She does states that her BP was high at home upto 150/80's. Currently not on any BP meds as pt BP was normal off meds during hospitalization. Will restart on her previous Metoprolol which she was taking 50 mg XR BID last month. Will restart at 25 mg XR BID at this time, await for the BP log in next 5-7 days which was instructed to the patient before we titrate the does up for better control.   - metoprolol succinate ER (TOPROL-XL) 25 MG 24 hr tablet; Take 1 tablet (25 mg) by mouth 2 times daily  Dispense: 60 tablet; Refill: 0    5. Hereditary hemochromatosis (H)  6. Iron overload syndrome  Recent Phlebotomy on 8/13/21 which patient underwent as per Oncology visits. Given the upcoming Rt hip replacement surgery will make sure its within normal range as we try to avoid further phlebotomy before the surgery.   - Ferritin; Future    7. Rheumatoid arthritis involving multiple sites, unspecified whether rheumatoid factor present (H)  Stable,Not on any DMARDS at this time.     8. Anxiety disorder, unspecified type  9. Chronic use of benzodiazepine for therapeutic purpose  Stable, Pt has seen Psychiatry recently on 9/7/21 for anxiety and benzodiazpine dependance.not opting for SSRIs due to this and recommend to continue Clonazepam low dose 0.5 mg daily as needed as she has been taking them for last 5 yrs from other providers. To continue current dose and frequency for 5 days/ week. Also followed Psychotherapy on the same day. Pt is here today for Physical. and will recheck her labs at this  time.We are OK to take over the medication refills as requested by the patient in the past. Will plan CSA in her upcoming visit as it was not done today.    10. Encounter for screening for diabetes mellitus  - Hemoglobin A1c; Future    11. Acetabular labrum tear, right, subsequent encounter  Upcoming Rt hip Arthroplasty, pt will make preop clearance in a separate visit.    12. Need for prophylactic vaccination and inoculation against influenza  - INFLUENZA QUAD, RECOMBINANT, P-FREE (RIV4) (FLUBLOK)     Patient Instructions   As discussed, please do the labs. Will await for your next visit of Preoperative clearance which you can schedule nearer to the surgery as 10/5/21 given your Uncontrolled hypertension and Low sodium so that you can get some time and no need to repeat preop clearance if its passed off 30 days as concerned.     ================================================    Preventive Health Recommendations  Female Ages 50 - 64    Yearly exam: See your health care provider every year in order to  o Review health changes.   o Discuss preventive care.    o Review your medicines if your doctor has prescribed any.      Get a Pap test every three years (unless you have an abnormal result and your provider advises testing more often).    If you get Pap tests with HPV test, you only need to test every 5 years, unless you have an abnormal result.     You do not need a Pap test if your uterus was removed (hysterectomy) and you have not had cancer.    You should be tested each year for STDs (sexually transmitted diseases) if you're at risk.     Have a mammogram every 1 to 2 years.    Have a colonoscopy at age 50, or have a yearly FIT test (stool test). These exams screen for colon cancer.      Have a cholesterol test every 5 years, or more often if advised.    Have a diabetes test (fasting glucose) every three years. If you are at risk for diabetes, you should have this test more often.     If you are at risk for  "osteoporosis (brittle bone disease), think about having a bone density scan (DEXA).    Shots: Get a flu shot each year. Get a tetanus shot every 10 years.    Nutrition:     Eat at least 5 servings of fruits and vegetables each day.    Eat whole-grain bread, whole-wheat pasta and brown rice instead of white grains and rice.    Get adequate Calcium and Vitamin D.     Lifestyle    Exercise at least 150 minutes a week (30 minutes a day, 5 days a week). This will help you control your weight and prevent disease.    Limit alcohol to one drink per day.    No smoking.     Wear sunscreen to prevent skin cancer.     See your dentist every six months for an exam and cleaning.    See your eye doctor every 1 to 2 years.      Return in about 3 weeks (around 10/5/2021), or if symptoms worsen or fail to improve, for Preop clearance.    Mercedez Britton MD  [facility]      Patient has been advised of split billing requirements and indicates understanding: Yes  COUNSELING:  Reviewed preventive health counseling, as reflected in patient instructions  Special attention given to:        Regular exercise       Healthy diet/nutrition       Vision screening       Hearing screening       Immunizations    Vaccinated for: Influenza             Osteoporosis prevention/bone health       (Stormy)menopause management    Estimated body mass index is 26.75 kg/m  as calculated from the following:    Height as of 8/17/21: 1.6 m (5' 3\").    Weight as of this encounter: 68.5 kg (151 lb).    Weight management plan: Discussed healthy diet and exercise guidelines    She reports that she quit smoking about 32 years ago. Her smoking use included cigarettes. She has a 2.60 pack-year smoking history. She has never used smokeless tobacco.      Counseling Resources:  ATP IV Guidelines  Pooled Cohorts Equation Calculator  Breast Cancer Risk Calculator  BRCA-Related Cancer Risk Assessment: FHS-7 Tool  FRAX Risk Assessment  ICSI " Preventive Guidelines  Dietary Guidelines for Americans, 2010  USDA's MyPlate  ASA Prophylaxis  Lung CA Screening    Mercedez Britton MD  [facility]

## 2021-09-14 NOTE — PATIENT INSTRUCTIONS
As discussed, please do the labs. Will await for your next visit of Preoperative clearance which you can schedule nearer to the surgery as 10/5/21 given your Uncontrolled hypertension and Low sodium so that you can get some time and no need to repeat preop clearance if its passed off 30 days as concerned.     ================================================    Preventive Health Recommendations  Female Ages 50 - 64    Yearly exam: See your health care provider every year in order to  o Review health changes.   o Discuss preventive care.    o Review your medicines if your doctor has prescribed any.      Get a Pap test every three years (unless you have an abnormal result and your provider advises testing more often).    If you get Pap tests with HPV test, you only need to test every 5 years, unless you have an abnormal result.     You do not need a Pap test if your uterus was removed (hysterectomy) and you have not had cancer.    You should be tested each year for STDs (sexually transmitted diseases) if you're at risk.     Have a mammogram every 1 to 2 years.    Have a colonoscopy at age 50, or have a yearly FIT test (stool test). These exams screen for colon cancer.      Have a cholesterol test every 5 years, or more often if advised.    Have a diabetes test (fasting glucose) every three years. If you are at risk for diabetes, you should have this test more often.     If you are at risk for osteoporosis (brittle bone disease), think about having a bone density scan (DEXA).    Shots: Get a flu shot each year. Get a tetanus shot every 10 years.    Nutrition:     Eat at least 5 servings of fruits and vegetables each day.    Eat whole-grain bread, whole-wheat pasta and brown rice instead of white grains and rice.    Get adequate Calcium and Vitamin D.     Lifestyle    Exercise at least 150 minutes a week (30 minutes a day, 5 days a week). This will help you control your weight and prevent disease.    Limit alcohol to one  drink per day.    No smoking.     Wear sunscreen to prevent skin cancer.     See your dentist every six months for an exam and cleaning.    See your eye doctor every 1 to 2 years.

## 2021-09-15 ENCOUNTER — TELEPHONE (OUTPATIENT)
Dept: FAMILY MEDICINE | Facility: CLINIC | Age: 61
End: 2021-09-15

## 2021-09-15 NOTE — RESULT ENCOUNTER NOTE
Good news! Your Sodium levels are normalized.    Your Cholesterol remaining borderline high. Recommend dietery management of avoiding high fat foods and red meat . Life style measures on weight reduction recommended.     All your other labs normal, you may see some highlighted which do not have Clinical significance.    Please let me know if you have any questions.  Mercedez Britton MD on 9/14/2021 at 11:25 PM  [facility]

## 2021-09-15 NOTE — TELEPHONE ENCOUNTER
----- Message from Mercedez Britton MD sent at 9/14/2021 11:28 PM CDT -----  Good news! Your Sodium levels are normalized.    Your Cholesterol remaining borderline high. Recommend dietery management of avoiding high fat foods and red meat . Life style measures on weight reduction recommended.     All your other labs normal, you may see some highlighted which do not have Clinical significance.     Please let me know if you have any questions.  Mercedez Britton MD on 9/14/2021 at 11:25 PM  [facility]

## 2021-09-16 DIAGNOSIS — I10 ESSENTIAL HYPERTENSION: ICD-10-CM

## 2021-09-16 DIAGNOSIS — Z00.00 ROUTINE GENERAL MEDICAL EXAMINATION AT A HEALTH CARE FACILITY: ICD-10-CM

## 2021-09-16 RX ORDER — METOPROLOL SUCCINATE 25 MG/1
25 TABLET, EXTENDED RELEASE ORAL 2 TIMES DAILY
Qty: 180 TABLET | Refills: 2 | Status: SHIPPED | OUTPATIENT
Start: 2021-09-16 | End: 2021-09-21

## 2021-09-23 ENCOUNTER — NURSE TRIAGE (OUTPATIENT)
Dept: NURSING | Facility: CLINIC | Age: 61
End: 2021-09-23

## 2021-09-23 NOTE — TELEPHONE ENCOUNTER
Question about the booster shot. Rheumatoligist recommended the shot. She has surgery Oct 12th. Surgeon said minimal 72 hours before surgery she can have the shot. 2nd Moderna shot was May 12th. Is it too soon to get the booster shot? Please call and notify her. Call her at:  [phone].    Thank you,  Sera Thomson RN  [facility]    Reason for Disposition    COVID-19 vaccine, Frequently Asked Questions (FAQs)    Additional Information    Negative: [1] Difficulty breathing or swallowing AND [2] starts within 2 hours after injection    Negative: Sounds like a life-threatening emergency to the triager    Negative: [1] Has NOT completed COVID-19 vaccine series AND [2] COVID-19 exposure AND [2] AND [3] typical COVID-19 symptoms    Negative: [1] Has NOT completed COVID-19 vaccine series AND [2]  COVID-19 exposure AND [3] no symptoms    Negative: [1] COVID-19 vaccine series completed (fully vaccinated) in past 3 months AND [2] new-onset of COVID-19 symptoms BUT [3] no known exposure    Negative: [1] Typical COVID-19 symptoms AND [2] symptoms that are NOT expected from vaccine (e.g., cough, difficulty breathing, loss of taste or smell, runny nose, sore throat)    Negative: [1] Typical COVID-19 symptoms AND [2] started > 3 days after getting vaccine    Negative: Fever > 104 F (40 C)    Negative: Sounds like a severe, unusual reaction to the triager    Negative: [1] Redness or red streak around the injection site AND [2] started > 48 hours after getting vaccine AND [3] fever    Negative: [1] Fever > 101 F (38.3 C) AND [2] age > 60 years AND [3] started > 48 hours after getting vaccine    Negative: [1] Fever > 100.0 F (37.8 C) AND [2] bedridden (e.g., nursing home patient, CVA, chronic illness, recovering from surgery) AND [3] started > 48 hours after getting vaccine    Negative: [1] Fever > 100.0 F (37.8 C) AND [2] diabetes mellitus or weak immune system (e.g., HIV positive, cancer chemo, splenectomy, organ  transplant, chronic steroids) AND [3] started > 48 hours after getting vaccine    Negative: [1] Redness or red streak around the injection site AND [2] started > 48 hours after getting vaccine AND [3] no fever  (Exception: red area < 1 inch or 2.5 cm wide)    Negative: [1] Pain, tenderness, or swelling at the injection site AND [2] over 3 days (72 hours) since vaccine AND [3] getting worse    Negative: Fever > 100.0 F (37.8 C) present > 3 days (72 hours)    Negative: [1] Fever > 100.0 F (37.8 C) AND [2] healthcare worker    Negative: [1] Pain, tenderness, or swelling at the injection site AND [2] lasts > 7 days    Negative: [1] Lymph node swelling (i.e., armpit or neck on side of vaccine) AND [2] lasts > 3 weeks    Negative: [1] Requesting COVID-19 vaccine AND [2] healthcare worker (e.g., EMS first responders, doctors, nurses)    Negative: [1] Requesting COVID-19 vaccine AND [2] resident of a long-term care facility (e.g., nursing home)    Negative: [1] Requesting COVID-19 vaccine AND [2] vaccine available in the community for this patient group    Negative: COVID-19 vaccine, injection site reaction (e.g., pain, redness, swelling), question about    Negative: COVID-19 vaccine, systemic reactions (e.g., fatigue, fever, muscle aches), questions about    Protocols used: CORONAVIRUS (COVID-19) VACCINE QUESTIONS AND WRIBERVEO-X-UJ 3.25

## 2021-09-24 NOTE — TELEPHONE ENCOUNTER
Patient given message from Dr. Britton. Patient is out of town this weekend and next weekend. States that she got quite sick after her second Moderna and did not want be sick while out of town.   Restated that to prevent serious illness from COVID 19 it is recommended that she get the third dose of vaccine.   Carmen Lopez RN

## 2021-09-24 NOTE — TELEPHONE ENCOUNTER
Reynaldo Morales, as per [facility] - Please see the last para last sentence. Its either of them can get the 3rd booster respective vaccines. She can go ahead and get the 3rd booster as per my understanding.     Thank you,  Mercedez Britton MD on 9/24/2021 at 7:20 AM    [email or web address]

## 2021-09-24 NOTE — TELEPHONE ENCOUNTER
Pt can go ahead and take her booster vaccine at this time in any of the pharmacy. So that she will be outside the 72 hrs before her surgery on 10/12/21. If she wants to take it with our clinic it needs to be a provider visit as confirmed by Josiah in one of our huddles recently.    Thank you,  Mercedez Britton MD on 9/23/2021 at 8:27 PM

## 2021-09-27 ENCOUNTER — MYC MEDICAL ADVICE (OUTPATIENT)
Dept: FAMILY MEDICINE | Facility: CLINIC | Age: 61
End: 2021-09-27

## 2021-09-27 ENCOUNTER — LAB (OUTPATIENT)
Dept: LAB | Facility: CLINIC | Age: 61
End: 2021-09-27
Payer: COMMERCIAL

## 2021-09-27 ENCOUNTER — NURSE TRIAGE (OUTPATIENT)
Dept: FAMILY MEDICINE | Facility: CLINIC | Age: 61
End: 2021-09-27

## 2021-09-27 DIAGNOSIS — I10 ESSENTIAL HYPERTENSION: ICD-10-CM

## 2021-09-27 DIAGNOSIS — I10 ESSENTIAL HYPERTENSION: Primary | ICD-10-CM

## 2021-09-27 PROCEDURE — 80048 BASIC METABOLIC PNL TOTAL CA: CPT

## 2021-09-27 PROCEDURE — 36415 COLL VENOUS BLD VENIPUNCTURE: CPT

## 2021-09-27 RX ORDER — LOSARTAN POTASSIUM 100 MG/1
50 TABLET ORAL DAILY
Qty: 30 TABLET | Refills: 0 | Status: SHIPPED | OUTPATIENT
Start: 2021-09-27 | End: 2021-09-29

## 2021-09-27 NOTE — TELEPHONE ENCOUNTER
Patient Contact    Called patient and relayed provider message below. She requests it to be sent in [employer] message.    Patient also reports that when she had low sodium, her main symptom was headache. She questions if she needs to come in today for lab-only check of her sodium. Routing to provider to review/adivse. Last check on 9/14 was 133.

## 2021-09-27 NOTE — TELEPHONE ENCOUNTER
Rx for losartan sent to patient's pharmacy. Start with 1/2 tab (50 mg) daily x3 days. Continue to check BP once daily every morning. If BP remains >140/90 after three days, increase to 1 tab (100 mg) daily.

## 2021-09-27 NOTE — TELEPHONE ENCOUNTER
"Call from patient who reports elevated BP. See BP log below. Patient notes started having a headache on 9/26, which she rates as 10/10. Today, she rates pain as 6-7/10. She was having lightheadedness yesterday, but none today. Denies chest pain, SOB, weakness, vision changes.     She states that she used to be on losartan 100 mg tablet and she is questioning if she can go back on this medication. Recent adjustments were made on  Routing to provider to review/advise.     WED 9/22   134/93  Pulse 80    Thurs. 9/23    134/93  pulse 79, /98, pulse 72    Friday, 9/24/21 9/24 AM   157/92. Pulse 75    Sun 9/26 ** headache started**  AM   150/98  7:30 /110 p73  8:30 /107 pulse 73    Monday 9/27  4 /97. P 70  10 AM   165/97 p70  ** headache is slightly improved today. Recently started metoprolol again.      Going into surgery in a few weeks       Additional Information    Negative: Sounds like a life-threatening emergency to the triager    Negative: Pregnant > 20 weeks or postpartum (< 6 weeks after delivery) and new hand or face swelling    Negative: Pregnant > 20 weeks and BP > 140/90    Negative: Systolic BP >= 160 OR Diastolic >= 100, and any cardiac or neurologic symptoms (e.g., chest pain, difficulty breathing, unsteady gait, blurred vision)    Negative: Patient sounds very sick or weak to the triager    Negative: BP Systolic BP >= 140 OR Diastolic >= 90 and postpartum (from 0 to 6 weeks after delivery)    Negative: Systolic BP >= 180 OR Diastolic >= 110, and missed most recent dose of blood pressure medication    Answer Assessment - Initial Assessment Questions  1. BLOOD PRESSURE: \"What is the blood pressure?\" \"Did you take at least two measurements 5 minutes apart?\"      See log   2. ONSET: \"When did you take your blood pressure?\"    Wednesday 9/22, noticed it climbing    - medications adjusted on Tuesday, 9/21      3. HOW: \"How did you obtain the blood pressure?\" (e.g., visiting nurse, " "automatic home BP monitor)        Automatic BP cuff     4. HISTORY: \"Do you have a history of high blood pressure?\"    Yes  - recent adjustment in medications  - using new BP cuff         5. MEDICATIONS: \"Are you taking any medications for blood pressure?\" \"Have you missed any doses recently?\"        Taking: metoprolol 50 mg BID    Previously taking:   losartan-hydrochlorothiazide 100-25 MG one tablet every day  - caused low sodium, potassium, chloride     6. OTHER SYMPTOMS: \"Do you have any symptoms?\" (e.g., headache, chest pain, blurred vision, difficulty breathing, weakness)        Headache   - rates as 6-7/10. Yesterday was a 10.     Lightheadedness (yesterday, none today)    Denies vision change, chest pain, difficulty breathing, weakness.     7. PREGNANCY: \"Is there any chance you are pregnant?\" \"When was your last menstrual period?\"        No    Protocols used: HIGH BLOOD PRESSURE-A-OH      "

## 2021-09-28 LAB
ANION GAP SERPL CALCULATED.3IONS-SCNC: 4 MMOL/L (ref 3–14)
BUN SERPL-MCNC: 7 MG/DL (ref 7–30)
CALCIUM SERPL-MCNC: 8.7 MG/DL (ref 8.5–10.1)
CHLORIDE BLD-SCNC: 102 MMOL/L (ref 94–109)
CO2 SERPL-SCNC: 27 MMOL/L (ref 20–32)
CREAT SERPL-MCNC: 0.86 MG/DL (ref 0.52–1.04)
GFR SERPL CREATININE-BSD FRML MDRD: 72 ML/MIN/1.73M2
GLUCOSE BLD-MCNC: 84 MG/DL (ref 70–99)
POTASSIUM BLD-SCNC: 4 MMOL/L (ref 3.4–5.3)
SODIUM SERPL-SCNC: 133 MMOL/L (ref 133–144)

## 2021-09-29 ENCOUNTER — OFFICE VISIT (OUTPATIENT)
Dept: FAMILY MEDICINE | Facility: CLINIC | Age: 61
End: 2021-09-29
Payer: COMMERCIAL

## 2021-09-29 VITALS
HEART RATE: 71 BPM | SYSTOLIC BLOOD PRESSURE: 170 MMHG | DIASTOLIC BLOOD PRESSURE: 98 MMHG | HEIGHT: 63 IN | OXYGEN SATURATION: 99 % | BODY MASS INDEX: 27.46 KG/M2 | RESPIRATION RATE: 8 BRPM | TEMPERATURE: 97.7 F | WEIGHT: 155 LBS

## 2021-09-29 DIAGNOSIS — I10 ESSENTIAL HYPERTENSION: ICD-10-CM

## 2021-09-29 PROCEDURE — 99214 OFFICE O/P EST MOD 30 MIN: CPT | Performed by: FAMILY MEDICINE

## 2021-09-29 RX ORDER — LOSARTAN POTASSIUM 100 MG/1
100 TABLET ORAL DAILY
Qty: 90 TABLET | Refills: 0 | Status: SHIPPED | OUTPATIENT
Start: 2021-09-29 | End: 2021-10-07

## 2021-09-29 RX ORDER — LOSARTAN POTASSIUM 100 MG/1
TABLET ORAL
Qty: 45 TABLET | OUTPATIENT
Start: 2021-09-29

## 2021-09-29 RX ORDER — METOPROLOL SUCCINATE 50 MG/1
100 TABLET, EXTENDED RELEASE ORAL DAILY
Qty: 180 TABLET | Refills: 1 | COMMUNITY
Start: 2021-09-29 | End: 2021-11-22

## 2021-09-29 ASSESSMENT — PAIN SCALES - GENERAL: PAINLEVEL: NO PAIN (0)

## 2021-09-29 ASSESSMENT — MIFFLIN-ST. JEOR: SCORE: 1227.21

## 2021-09-29 NOTE — TELEPHONE ENCOUNTER
Patient calling today to report that her blood pressures are still running high at 158/109 and 152/102. States that her headache is better.    Patient is asking if she should increase losartan to a full tablet. Patient is scheduled with Dr. Becerra at 10 this morning.     Advised to keep the appointment for possible need for medication adjustment. Also advised to bring home BP cuff for comparison.  Patient is scheduled for preop on 10/5 and is anxious to get blood pressure under control before her surgery.   Carmen Lopez RN

## 2021-09-29 NOTE — PROGRESS NOTES
"    Assessment & Plan     Essential hypertension  Blood pressures not well controlled.  Recommending to resume metoprolol 100 mg once daily.  Increase the dose of losartan from 50 to 100 mg daily.  Continue monitoring blood pressures at home.  Patient has a follow-up appointment next week with PCP for preop and blood pressure recheck.  - metoprolol succinate ER (TOPROL-XL) 50 MG 24 hr tablet; Take 2 tablets (100 mg) by mouth daily  - losartan (COZAAR) 100 MG tablet; Take 1 tablet (100 mg) by mouth daily      Rommel Becerra MD  [facility] ROMULO Luong is a 61 year old who presents for the following health issues     HPI     Hypertension Follow-up      Do you check your blood pressure regularly outside of the clinic? Yes     Are you following a low salt diet? No    Are your blood pressures ever more than 140 on the top number (systolic) OR more   than 90 on the bottom number (diastolic), for example 140/90? Yes   Patient wanted to note that her Bps have been high on her auto cuff which is concerning to her.       How many servings of fruits and vegetables do you eat daily?  2-3    On average, how many sweetened beverages do you drink each day (Examples: soda, juice, sweet tea, etc.  Do NOT count diet or artificially sweetened beverages)?   0    How many days per week do you exercise enough to make your heart beat faster? none     How many minutes a day do you exercise enough to make your heart beat faster?  none    How many days per week do you miss taking your medication? 0          Review of Systems   CONSTITUTIONAL: NEGATIVE for fever, chills, change in weight  ENT/MOUTH: NEGATIVE for ear, mouth and throat problems  RESP: NEGATIVE for significant cough or SOB  CV: NEGATIVE for chest pain, palpitations or peripheral edema      Objective    BP (!) 170/98   Pulse 71   Temp 97.7  F (36.5  C) (Tympanic)   Resp 8   Ht 1.6 m (5' 3\")   Wt 70.3 kg (155 lb)   SpO2 99%   BMI 27.46 kg/m  "   Body mass index is 27.46 kg/m .  Physical Exam   GENERAL: healthy, alert and no distress  NECK: no adenopathy, no asymmetry, masses, or scars and thyroid normal to palpation  RESP: lungs clear to auscultation - no rales, rhonchi or wheezes  CV: regular rate and rhythm, normal S1 S2, no S3 or S4, no murmur, click or rub, no peripheral edema and peripheral pulses strong  ABDOMEN: soft, nontender, no hepatosplenomegaly, no masses and bowel sounds normal  MS: no gross musculoskeletal defects noted, no edema

## 2021-10-02 ENCOUNTER — NURSE TRIAGE (OUTPATIENT)
Dept: NURSING | Facility: CLINIC | Age: 61
End: 2021-10-02

## 2021-10-02 ENCOUNTER — MYC MEDICAL ADVICE (OUTPATIENT)
Dept: FAMILY MEDICINE | Facility: CLINIC | Age: 61
End: 2021-10-02

## 2021-10-03 ENCOUNTER — MYC MEDICAL ADVICE (OUTPATIENT)
Dept: FAMILY MEDICINE | Facility: CLINIC | Age: 61
End: 2021-10-03

## 2021-10-04 NOTE — TELEPHONE ENCOUNTER
[facility] [place] on [address] today. Carmen Lopez RN

## 2021-10-05 ENCOUNTER — OFFICE VISIT (OUTPATIENT)
Dept: FAMILY MEDICINE | Facility: CLINIC | Age: 61
End: 2021-10-05
Payer: COMMERCIAL

## 2021-10-05 VITALS
HEART RATE: 70 BPM | SYSTOLIC BLOOD PRESSURE: 140 MMHG | WEIGHT: 152.2 LBS | DIASTOLIC BLOOD PRESSURE: 100 MMHG | OXYGEN SATURATION: 100 % | TEMPERATURE: 97.6 F | BODY MASS INDEX: 26.96 KG/M2

## 2021-10-05 DIAGNOSIS — F41.9 ANXIETY DISORDER, UNSPECIFIED TYPE: ICD-10-CM

## 2021-10-05 DIAGNOSIS — Z79.899 CONTROLLED SUBSTANCE AGREEMENT SIGNED: ICD-10-CM

## 2021-10-05 DIAGNOSIS — E87.1 HYPONATREMIA: ICD-10-CM

## 2021-10-05 DIAGNOSIS — Z79.899 CHRONIC USE OF BENZODIAZEPINE FOR THERAPEUTIC PURPOSE: ICD-10-CM

## 2021-10-05 DIAGNOSIS — Z01.818 PREOP GENERAL PHYSICAL EXAM: Primary | ICD-10-CM

## 2021-10-05 DIAGNOSIS — I10 ESSENTIAL HYPERTENSION: ICD-10-CM

## 2021-10-05 DIAGNOSIS — E83.110 HEREDITARY HEMOCHROMATOSIS (H): ICD-10-CM

## 2021-10-05 DIAGNOSIS — M06.9 RHEUMATOID ARTHRITIS INVOLVING MULTIPLE SITES, UNSPECIFIED WHETHER RHEUMATOID FACTOR PRESENT (H): ICD-10-CM

## 2021-10-05 DIAGNOSIS — S73.191D ACETABULAR LABRUM TEAR, RIGHT, SUBSEQUENT ENCOUNTER: ICD-10-CM

## 2021-10-05 LAB
AMPHETAMINES UR QL: NOT DETECTED
BARBITURATES UR QL SCN: NOT DETECTED
BENZODIAZ UR QL SCN: DETECTED
BUPRENORPHINE UR QL: NOT DETECTED
CANNABINOIDS UR QL: NOT DETECTED
COCAINE UR QL SCN: NOT DETECTED
D-METHAMPHET UR QL: NOT DETECTED
ERYTHROCYTE [DISTWIDTH] IN BLOOD BY AUTOMATED COUNT: 12.1 % (ref 10–15)
HCT VFR BLD AUTO: 35.1 % (ref 35–47)
HGB BLD-MCNC: 11.4 G/DL (ref 11.7–15.7)
MCH RBC QN AUTO: 28.9 PG (ref 26.5–33)
MCHC RBC AUTO-ENTMCNC: 32.5 G/DL (ref 31.5–36.5)
MCV RBC AUTO: 89 FL (ref 78–100)
METHADONE UR QL SCN: NOT DETECTED
OPIATES UR QL SCN: NOT DETECTED
OXYCODONE UR QL SCN: NOT DETECTED
PCP UR QL SCN: NOT DETECTED
PLATELET # BLD AUTO: 246 10E3/UL (ref 150–450)
PROPOXYPH UR QL: NOT DETECTED
RBC # BLD AUTO: 3.94 10E6/UL (ref 3.8–5.2)
TRICYCLICS UR QL SCN: NOT DETECTED
WBC # BLD AUTO: 4.3 10E3/UL (ref 4–11)

## 2021-10-05 PROCEDURE — 85027 COMPLETE CBC AUTOMATED: CPT | Performed by: INTERNAL MEDICINE

## 2021-10-05 PROCEDURE — 99215 OFFICE O/P EST HI 40 MIN: CPT | Performed by: INTERNAL MEDICINE

## 2021-10-05 PROCEDURE — 36415 COLL VENOUS BLD VENIPUNCTURE: CPT | Performed by: INTERNAL MEDICINE

## 2021-10-05 PROCEDURE — 80048 BASIC METABOLIC PNL TOTAL CA: CPT | Performed by: INTERNAL MEDICINE

## 2021-10-05 PROCEDURE — 80306 DRUG TEST PRSMV INSTRMNT: CPT | Performed by: INTERNAL MEDICINE

## 2021-10-05 PROCEDURE — 82043 UR ALBUMIN QUANTITATIVE: CPT | Performed by: INTERNAL MEDICINE

## 2021-10-05 RX ORDER — HYDRALAZINE HYDROCHLORIDE 10 MG/1
10 TABLET, FILM COATED ORAL 3 TIMES DAILY PRN
Qty: 90 TABLET | Refills: 0 | Status: SHIPPED | OUTPATIENT
Start: 2021-10-05 | End: 2022-06-08

## 2021-10-05 ASSESSMENT — PAIN SCALES - GENERAL: PAINLEVEL: NO PAIN (0)

## 2021-10-05 NOTE — PATIENT INSTRUCTIONS
As discussed, added Hydralazine as needed for your high BP inspite of current medcations. Will await for your BP log in next 5- 6 days.     Please find below the Fax numbers we are going to fax your Clearance.     Surgery    10/12/2021  [facility]   Fercho Lake    Surgery - Orthopedics  [facility] [employer] [facility]    Source Organization  RIGHT TOTAL KNEE ARTHROPLASTY    Comments      View related Clinical Summary from [facility] (Last Received: 10/5/2021  9:59 AM)      Providers     Fercho Lake MD   Surgery - Orthopedics   NPI: [id number]   [id number]  [address]   [facility] [address]       Phone: [phone]   Fax: [phone]            Preparing for Your Surgery  Getting started  A nurse will call you to review your health history and instructions. They will give you an arrival time based on your scheduled surgery time.  Please be ready to share the following:    Your doctor's clinic name and phone number    Your medical, surgical and anesthesia history    A list of allergies and sensitivities    A list of medicines, including herbal treatments and over-the-counter drugs    Whether the patient has a legal guardian (ask how to send us the papers in advance)  If you have a child who's having surgery, please ask for a copy of Preparing for Your Child's Surgery.    Preparing for surgery    Within 30 days of surgery: Have a pre-op exam (sometimes called an H&P, or History and Physical). This can be done at a clinic or pre-operative center.  ? If you're having a , you may not need this exam. Talk to your care team    At your pre-op exam, talk to your care team about all medicines you take. If you need to stop any medicines before surgery, ask when to start taking them again.  ? We do this for your safety. Many medicines can make you bleed too much during surgery. Some change how well surgery (anesthesia)  drugs work.    Call your insurance company to let them know you're having surgery. (If you don't have insurance, call [phone].)    Call your clinic if there's any change in your health. This includes signs of a cold or flu (sore throat, runny nose, cough, rash, fever). It also includes a scrape or scratch near the surgery site.    If you have questions on the day of surgery, call your hospital or surgery center.  Eating and drinking guidelines  For your safety: Unless your surgeon tells you otherwise, follow the guidelines below.    Eat and drink as usual until 8 hours before surgery. After that, no food or milk.    Drink clear liquids until 2 hours before surgery. These are liquids you can see through, like water, Gatorade and Propel Water. You may also have black coffee and tea (no cream or milk).    Nothing by mouth within 2 hours of surgery. This includes gum, candy and breath mints.    If you drink, stop drinking alcohol the night before surgery.    If your care team tells you to take medicine on the morning of surgery, it's okay to take it with a sip of water.  Preventing infection    Shower or bathe the night before and morning of your surgery. Follow the instructions your clinic gave you. (If no instructions, use regular soap.)    Don't shave or clip hair near your surgery site. We'll remove the hair if needed.    Don't smoke or vape the morning of surgery. You may chew nicotine gum up to 2 hours before surgery. A nicotine patch is okay.  ? Note: Some surgeries require you to completely quit smoking and nicotine. Check with your surgeon.    Your care team will make every effort to keep you safe from infection. We will:  ? Clean our hands often with soap and water (or an alcohol-based hand rub).  ? Clean the skin at your surgery site with a special soap that kills germs.  ? Give you a special gown to keep you warm. (Cold raises the risk of infection.)  ? Wear special hair covers, masks, gowns and gloves  during surgery.  ? Give antibiotic medicine, if prescribed. Not all surgeries need antibiotics.  What to bring on the day of surgery    Photo ID and insurance card    Copy of your health care directive, if you have one    Glasses and hearing aides (bring cases)  ? You can't wear contacts during surgery    Inhaler and eye drops, if you use them (tell us about these when you arrive)    CPAP machine or breathing device, if you use them    A few personal items, if spending the night    If you have . . .  ? A pacemaker or ICD (cardiac defibrillator): Bring the ID card.  ? An implanted stimulator: Bring the remote control.  ? A legal guardian: Bring a copy of the certified (court-stamped) guardianship papers.  Please remove any jewelry, including body piercings. Leave jewelry and other valuables at home.  If you're going home the day of surgery  Important: If you don't follow the rules below, we must cancel your surgery.     Arrange for someone to drive you home after surgery. You may not drive, take a taxi or take public transportation by yourself (unless you'll have local anesthesia only).    Arrange for a responsible adult to stay with you overnight. If you don't, we may keep you in the hospital overnight, and you may need to pay the costs yourself.  Questions?   If you have any questions for your care team, list them here: _________________________________________________________________________________________________________________________________________________________________________________________________________________________________________________________________________________________________________________________  For informational purposes only. Not to replace the advice of your health care provider. Copyright   2003, 2019 [facility]. All rights reserved. Clinically reviewed by Joanna Anne MD. [employer] [id number] - REV 4/20.

## 2021-10-05 NOTE — PROGRESS NOTES
[address] [address]  Phone: [phone]  Primary Provider: Suresh Joseph  Pre-op Performing Provider: SURESH JOSEPH    PREOPERATIVE EVALUATION:  Today's date: 10/5/2021    Irina Hanks is a 61 year old female who presents for a preoperative evaluation.    Surgical Information:  Surgery/Procedure: right total hip, gluteus medius repair  Surgery Location: [facility]  Surgeon: Dr. Boucher  Surgery Date: 10-12-21  Time of Surgery: TBD  Where patient plans to recover: Other: TBD  Fax number for surgical facility: Fax: [phone] ( This is seen in Epic    Type of Anesthesia Anticipated: General    Assessment & Plan     The proposed surgical procedure is considered INTERMEDIATE risk.      1. Preop general physical exam  2. Acetabular labrum tear, right, subsequent encounter  Pt was last seen by me on 9/14/2021 for hyponatremia. She was off BP meds during hospitalization due to low blood pressures but later restarted by me at lower dose and were titrated up slowly. Recently seen by our group on 9/2021 for BP recheck . Her medications were resumed back at Metoprlol 100 mg daily and Losartan 100 mg daily.   - Pt is here for preop clearance - RIGHT TOTAL HIP ARTHROPLASTY on 10/12/21 by  at [facility] under General anaesthesia. No Cardiac risks, last EKG in 8/2021 was normal, less than 90 days.   - SURGERY NOTE IN EPIC IS MENTIONING KNEE ARTHROPLASTY BY MISTAKE WHICH WAS NOTIFIED TO THE PATIENT TO GET IT CORRECTED.   - Basic metabolic panel  (Ca, Cl, CO2, Creat, Gluc, K, Na, BUN); Future  - CBC with platelets; Future  - Basic metabolic panel  (Ca, Cl, CO2, Creat, Gluc, K, Na, BUN)  - CBC with platelets    3. Essential hypertension  Uncontrolled, inspite of current Losartan 100 mg daily and Metoprolol  mg daily. Have added as needed Hydralazine and explained goal BP which she understood and agreed.   - Albumin  Random Urine Quantitative with Creat Ratio; Future  - hydrALAZINE (APRESOLINE) 10 MG tablet; Take 1 tablet (10 mg) by mouth 3 times daily as needed (For BP above 140/90)  Dispense: 90 tablet; Refill: 0  - Albumin Random Urine Quantitative with Creat Ratio    4. Hyponatremia  Improved, Will recheck labs and update. Pending report.     5. Chronic use of benzodiazepine for therapeutic purpose  6. Anxiety disorder, unspecified type  7. Controlled substance agreement signed 10/5/21  Pt will need CSA for her ongoing Benzo prescriptions to continue at current Clonipin as per psychiatrist and prescription to be taken over by PCP.   - Drug Abuse Screen Panel 13, Urine (Pain Care Package) - lab collect; Future  - Drug Abuse Screen Panel 13, Urine (Pain Care Package) - lab collect    8. Hereditary hemochromatosis (H)  Recent Phlebotomy and the CBC showing mild anemia which will possibly correct given patient Iron overload syndrome.     9. Rheumatoid arthritis involving multiple sites, unspecified whether rheumatoid factor present (H)  Stable, follows Rheumatology . Continue current Plaquinel. Pt discussed on the booster COVID which her last dose was less than 6 months and recommend to await for completion of 6 months which she understood and agreed.          Risks and Recommendations:  The patient has the following additional risks and recommendations for perioperative complications:   - Consult Hospitalist / IM to assist with post-op medical management  Anemia/Bleeding/Clotting:    - Hemochromatosis S/P Phlebotomies as managed by Oncology.   Social and Substance:    - Alcohol use sometimes and risk of withdrawal    Medication Instructions:  Patient is to take all scheduled medications on the day of surgery EXCEPT for modifications listed below:   - aspirin: Discontinue aspirin 7-10 days prior to procedure to reduce bleeding risk. It should be resumed postoperatively.    - ACE/ARB: May be continued on the day of surgery.    -  Beta Blockers: Continue taking on the day of surgery.   - Calcium Channel Blockers: May be continued on the day of surgery.   - DMARDs - On Plaquinil     - Benzodiazepines: Continue without modification.    RECOMMENDATION:  APPROVAL GIVEN - ONLY IF BLOOD PRESSURE NORMALIZES to proceed with proposed procedure, without further diagnostic evaluation.    Review of external notes as documented above     35 minutes spent on the date of the encounter doing chart review, review of outside records, review of test results, interpretation of tests, patient visit and documentation         Subjective     HPI related to upcoming procedure:     Preop Questions 10/5/2021   1. Have you ever had a heart attack or stroke? No   2. Have you ever had surgery on your heart or blood vessels, such as a stent placement, a coronary artery bypass, or surgery on an artery in your head, neck, heart, or legs? No   3. Do you have chest pain with activity? No   4. Do you have a history of  heart failure? No   5. Do you currently have a cold, bronchitis or symptoms of other infection? No   6. Do you have a cough, shortness of breath, or wheezing? No   7. Do you or anyone in your family have previous history of blood clots? No   8. Do you or does anyone in your family have a serious bleeding problem such as prolonged bleeding following surgeries or cuts? No   9. Have you ever had problems with anemia or been told to take iron pills? No   10. Have you had any abnormal blood loss such as black, tarry or bloody stools, or abnormal vaginal bleeding? No   11. Have you ever had a blood transfusion? No   12. Are you willing to have a blood transfusion if it is medically needed before, during, or after your surgery? Yes   13. Have you or any of your relatives ever had problems with anesthesia? No   14. Do you have sleep apnea, excessive snoring or daytime drowsiness? No   15. Do you have any artifical heart valves or other implanted medical devices like a  pacemaker, defibrillator, or continuous glucose monitor? No   16. Do you have artificial joints? No   17. Are you allergic to latex? No       Health Care Directive:  Patient does not have a Health Care Directive or Living Will: N/A    Preoperative Review of :   reviewed - controlled substances reflected in medication list.    Status of Chronic Conditions:  See problem list for active medical problems.  Problems all longstanding and stable, except as noted/documented.  See ROS for pertinent symptoms related to these conditions.      Review of Systems  CONSTITUTIONAL: NEGATIVE for fever, chills, change in weight  INTEGUMENTARY/SKIN: NEGATIVE for worrisome rashes, moles or lesions  EYES: NEGATIVE for vision changes or irritation  ENT/MOUTH: NEGATIVE for ear, mouth and throat problems  RESP: NEGATIVE for significant cough or SOB  CV: NEGATIVE for chest pain, palpitations or peripheral edema  GI: NEGATIVE for nausea, abdominal pain, heartburn, or change in bowel habits  : NEGATIVE for frequency, dysuria, or hematuria  MUSCULOSKELETAL: NEGATIVE for significant arthralgias or myalgia  NEURO: NEGATIVE for weakness, dizziness or paresthesias  ENDOCRINE: NEGATIVE for temperature intolerance, skin/hair changes  HEME: NEGATIVE for bleeding problems  PSYCHIATRIC: NEGATIVE for changes in mood or affect    Patient Active Problem List    Diagnosis Date Noted     Anxiety disorder, unspecified type 09/07/2021     Priority: Medium     Hypokalemia 08/13/2021     Priority: Medium     Tension headache 08/12/2021     Priority: Medium     Acetabular labrum tear, right, subsequent encounter 04/14/2021     Priority: Medium     DJD (degenerative joint disease) 04/14/2021     Priority: Medium     Lichen planus 01/21/2019     Priority: Medium     Hereditary hemochromatosis (H) 08/27/2018     Priority: Medium     Iron overload syndrome 07/16/2018     Priority: Medium     Iron overload 06/21/2018     Priority: Medium     Hyponatremia  06/12/2018     Priority: Medium     Adjustment disorder with anxious mood 08/14/2017     Priority: Medium     Psychophysiological insomnia 09/07/2016     Priority: Medium     Abdominal pain, right lower quadrant 11/13/2015     Priority: Medium     Controlled substance agreement signed 10/5/21 07/27/2015     Priority: Medium     S/P myringotomy with insertion of tube 04/19/2015     Priority: Medium     Left, 4/2015 d/t chronic NORM, ETD       Vasomotor rhinitis 04/19/2015     Priority: Medium     Chronic clear rhinorrhea, Atrovent nasal spray per ENT       Ganglion of joint 01/05/2015     Priority: Medium     Tricuspid regurgitation      Priority: Medium     +1-2 TR noted on 12/22/14 Echo       Shortness of breath      Priority: Medium     Essential hypertension      Priority: Medium     Acquired hypothyroidism      Priority: Medium     Problem list name updated by automated process. Provider to review       Symptomatic menopausal or female climacteric states      Priority: Medium     age 43, on HRT       ASCUS of cervix with negative high risk HPV 09/29/2014     Priority: Medium     09/29/14 Pap= ASCUS, Neg HPV. Repeat co-test 3 yrs per ASCCP guidelines.  9/11/19 NIL, Neg HPV. Routine screening       RA (rheumatoid arthritis) (H) 07/23/2014     Priority: Medium     Vitamin D deficiency 08/09/2012     Priority: Medium     (Problem list name updated by automated process. Provider to review and confirm.)       Inflammatory arthritis      Priority: Medium     Osteoarthritis of first carpometacarpal joint      Priority: Medium     bilateral       Lymphocytic colitis      Priority: Medium     Seasonal allergic rhinitis      Priority: Medium     Overweight (BMI 25.0-29.9) 03/01/2012     Priority: Medium     Anxiety 03/01/2012     Priority: Medium     Postmenopausal symptoms 03/01/2012     Priority: Medium     IBS (irritable bowel syndrome) 09/16/2011     Priority: Medium     Hyperlipidemia LDL goal <130 09/16/2011      Priority: Medium     Hypothyroidism 11/01/2006     Priority: Medium     Problem list name updated by automated process. Provider to review       Absence of menstruation 11/01/2006     Priority: Medium      Past Medical History:   Diagnosis Date     ASCUS favor benign 09/2014    3 yr co-test     Essential hypertension, benign      Impaired renal function      Inflammatory arthritis     Managed by Rheumatology  - q 6 mos     Microscopic colitis      Osteoarthritis of first carpometacarpal joint     bilateral     Other specified acquired hypothyroidism      Seasonal allergic rhinitis      Shortness of breath      Spider veins      Symptomatic menopausal or female climacteric states     age 43, on HRT     Tricuspid regurgitation     +1-2 TR noted on 12/22/14 Echo     Past Surgical History:   Procedure Laterality Date     ABDOMEN SURGERY  1/30/95    C section     BIOPSY BREAST       C/SECTION, LOW TRANSVERSE      twins     COLONOSCOPY  2013    nl, next one due in 5 years     LEEP TX, CERVICAL  1990s    normal since that time     MYRINGOTOMY, INSERT TUBE, COMBINED Left 4/2015    chronic NORM, ETD     Current Outpatient Medications   Medication Sig Dispense Refill     aspirin 81 MG EC tablet Take 81 mg by mouth daily       Biotin 5000 MCG TABS Take 1 tablet by mouth daily        clobetasol (TEMOVATE) 0.05 % external solution Apply topically daily To scalp       clonazePAM (KLONOPIN) 0.5 MG ODT Take 1 tablet (0.5 mg) by mouth nightly as needed for anxiety 30 tablet 0     gabapentin (NEURONTIN) 100 MG capsule Take 100 mg by mouth 3 times daily        hydrALAZINE (APRESOLINE) 10 MG tablet Take 1 tablet (10 mg) by mouth 3 times daily as needed (For BP above 140/90) 90 tablet 0     hydroxychloroquine (PLAQUENIL) 200 MG tablet Take 2 tablets (400 mg) by mouth daily 90 tablet 3     ipratropium (ATROVENT) 0.03 % nasal spray Spray 2 sprays into both nostrils daily        levothyroxine (SYNTHROID/LEVOTHROID) 75 MCG tablet TAKE 1  TABLET BY MOUTH EVERY MORNING 90 tablet 2     liothyronine (CYTOMEL) 5 MCG tablet TAKE 2 TABLETS BY MOUTH EVERY  tablet 2     loratadine (CLARITIN) 10 MG tablet Take 1 tablet (10 mg) by mouth daily 90 tablet 3     losartan (COZAAR) 100 MG tablet Take 1 tablet (100 mg) by mouth daily 90 tablet 0     metoprolol succinate ER (TOPROL-XL) 50 MG 24 hr tablet Take 2 tablets (100 mg) by mouth daily 180 tablet 1     Omega-3 Fatty Acids (FISH OIL ADULT GUMMIES PO) Take 1 tablet by mouth daily        TURMERIC PO Take 1 tablet by mouth daily        Vitamin D3 (CHOLECALCIFEROL) 125 MCG (5000 UT) tablet Take 1 tablet by mouth daily         Allergies   Allergen Reactions     Codeine Nausea and Nausea and Vomiting     Nitrofurantoin Unknown and Other (See Comments)     Other reaction(s): Gastrointestinal       Ace Inhibitors Cough     lisinopril  lisinopril     Hctz [Hydrochlorothiazide]      Severe Hyponatremia less than 120     Sulfa Drugs      Sulfur         Social History     Tobacco Use     Smoking status: Former Smoker     Packs/day: 0.26     Years: 10.00     Pack years: 2.60     Types: Cigarettes     Quit date: 1989     Years since quittin.4     Smokeless tobacco: Never Used   Substance Use Topics     Alcohol use: Yes     Alcohol/week: 0.0 standard drinks     Comment: 2 drinks per day     Family History   Problem Relation Age of Onset     Cancer - colorectal Father         age 50     Colon Cancer Father      Arthritis Mother      Cancer - colorectal Brother         age 50     Colon Cancer Brother      Hypertension Sister      Thyroid Disease Sister         Sisters     Hypertension Brother      Colon Cancer Brother      Prostate Cancer Brother      Thyroid Disease Sister      Thyroid Disease Brother      Kidney Cancer Brother      Prostate Cancer Brother      Arthritis Sister      Cancer Sister 53        Uterine     Cancer Sister 50        Uterine     Testicular cancer Nephew      Prostate Cancer Brother       History   Drug Use No         Objective     BP (!) 140/100 (BP Location: Left arm, Patient Position: Sitting, Cuff Size: Adult Regular)   Pulse 70   Temp 97.6  F (36.4  C) (Tympanic)   Wt 69 kg (152 lb 3.2 oz)   SpO2 100%   BMI 26.96 kg/m      Physical Exam    GENERAL APPEARANCE: healthy, alert and no distress     EYES: EOMI, PERRL     HENT: ear canals and TM's normal and nose and mouth without ulcers or lesions     NECK: no adenopathy, no asymmetry, masses, or scars and thyroid normal to palpation     RESP: lungs clear to auscultation - no rales, rhonchi or wheezes     CV: regular rates and rhythm, normal S1 S2, no S3 or S4 and no murmur, click or rub     ABDOMEN:  soft, nontender, no HSM or masses and bowel sounds normal     MS: extremities normal- no gross deformities noted, no evidence of inflammation in joints, FROM in all extremities.     SKIN: no suspicious lesions or rashes     NEURO: Normal strength and tone, sensory exam grossly normal, mentation intact and speech normal     PSYCH: mentation appears normal. and affect normal/bright     LYMPHATICS: No cervical adenopathy    Recent Labs   Lab Test 09/27/21  1352 09/14/21  0931 08/14/21  0212 08/13/21  2019 08/13/21  1458 08/13/21  1457   HGB  --   --   --  11.9  --  12.8   PLT  --   --   --  282  --  305    133   < > 117*   < >  --    POTASSIUM 4.0 3.5   < > 3.0*   < >  --    CR 0.86 0.90   < > 0.91   < >  --    A1C  --  4.4  --   --   --   --     < > = values in this interval not displayed.        Diagnostics:  Labs pending at this time.  Results will be reviewed when available.  Recent Results (from the past 720 hour(s))   Ferritin    Collection Time: 09/14/21  9:31 AM   Result Value Ref Range    Ferritin 33 8 - 252 ng/mL   Hemoglobin A1c    Collection Time: 09/14/21  9:31 AM   Result Value Ref Range    Hemoglobin A1C 4.4 0.0 - 5.6 %   Lipid panel reflex to direct LDL Fasting    Collection Time: 09/14/21  9:31 AM   Result Value Ref Range     Cholesterol 218 (H) <200 mg/dL    Triglycerides 74 <150 mg/dL    Direct Measure  >=50 mg/dL    LDL Cholesterol Calculated 101 (H) <=100 mg/dL    Non HDL Cholesterol 116 <130 mg/dL    Patient Fasting > 8hrs? Yes    Basic metabolic panel  (Ca, Cl, CO2, Creat, Gluc, K, Na, BUN)    Collection Time: 09/14/21  9:31 AM   Result Value Ref Range    Sodium 133 133 - 144 mmol/L    Potassium 3.5 3.4 - 5.3 mmol/L    Chloride 100 94 - 109 mmol/L    Carbon Dioxide (CO2) 26 20 - 32 mmol/L    Anion Gap 7 3 - 14 mmol/L    Urea Nitrogen 11 7 - 30 mg/dL    Creatinine 0.90 0.52 - 1.04 mg/dL    Calcium 8.8 8.5 - 10.1 mg/dL    Glucose 78 70 - 99 mg/dL    GFR Estimate 68 >60 mL/min/1.73m2   TSH WITH FREE T4 REFLEX    Collection Time: 09/14/21  9:31 AM   Result Value Ref Range    TSH 2.23 0.40 - 4.00 mU/L   Sodium random urine    Collection Time: 09/14/21  9:38 AM   Result Value Ref Range    Sodium Urine mmol/L 66 mmol/L   Osmolality urine    Collection Time: 09/14/21  9:38 AM   Result Value Ref Range    Osmolality Urine 497 100-1,200 mmol/kg   Creatinine random urine    Collection Time: 09/14/21  9:38 AM   Result Value Ref Range    Creatinine Urine mg/dL 161 mg/dL   Basic metabolic panel  (Ca, Cl, CO2, Creat, Gluc, K, Na, BUN)    Collection Time: 09/27/21  1:52 PM   Result Value Ref Range    Sodium 133 133 - 144 mmol/L    Potassium 4.0 3.4 - 5.3 mmol/L    Chloride 102 94 - 109 mmol/L    Carbon Dioxide (CO2) 27 20 - 32 mmol/L    Anion Gap 4 3 - 14 mmol/L    Urea Nitrogen 7 7 - 30 mg/dL    Creatinine 0.86 0.52 - 1.04 mg/dL    Calcium 8.7 8.5 - 10.1 mg/dL    Glucose 84 70 - 99 mg/dL    GFR Estimate 72 >60 mL/min/1.73m2   CBC with platelets    Collection Time: 10/05/21 11:07 AM   Result Value Ref Range    WBC Count 4.3 4.0 - 11.0 10e3/uL    RBC Count 3.94 3.80 - 5.20 10e6/uL    Hemoglobin 11.4 (L) 11.7 - 15.7 g/dL    Hematocrit 35.1 35.0 - 47.0 %    MCV 89 78 - 100 fL    MCH 28.9 26.5 - 33.0 pg    MCHC 32.5 31.5 - 36.5 g/dL    RDW 12.1  10.0 - 15.0 %    Platelet Count 246 150 - 450 10e3/uL      No EKG this visit, completed in the last 90 days.    Revised Cardiac Risk Index (RCRI):  The patient has the following serious cardiovascular risks for perioperative complications:   - No serious cardiac risks = 0 points     RCRI Interpretation: 0 points: Class I (very low risk - 0.4% complication rate)           Signed Electronically by: Mercedez Britton MD  Copy of this evaluation report is provided to requesting physician.

## 2021-10-05 NOTE — LETTER
Opioid / Opioid Plus Controlled Substance Agreement    This is an agreement between you and your provider about the safe and appropriate use of controlled substance/opioids prescribed by your care team. Controlled substances are medicines that can cause physical and mental dependence (abuse).    There are strict laws about having and using these medicines. We here at [facility] are committing to working with you in your efforts to get better. To support you in this work, we ll help you schedule regular office appointments for medicine refills. If we must cancel or change your appointment for any reason, we ll make sure you have enough medicine to last until your next appointment.     As a Provider, I will:    Listen carefully to your concerns and treat you with respect.     Recommend a treatment plan that I believe is in your best interest. This plan may involve therapies other than opioid pain medication.     Talk with you often about the possible benefits, and the risk of harm of any medicine that we prescribe for you.     Provide a plan on how to taper (discontinue or go off) using this medicine if the decision is made to stop its use.    As a Patient, I understand that opioid(s):     Are a controlled substance prescribed by my care team to help me function or work and manage my condition(s).     Are strong medicines and can cause serious side effects such as:    Drowsiness, which can seriously affect my driving ability    A lower breathing rate, enough to cause death    Harm to my thinking ability     Depression     Abuse of and addiction to this medicine    Need to be taken exactly as prescribed. Combining opioids with certain medicines or chemicals (such as illegal drugs, sedatives, sleeping pills, and benzodiazepines) can be dangerous or even fatal. If I stop opioids suddenly, I may have severe withdrawal symptoms.    Do not work for all types of pain nor for all patients. If they re not helpful, I may  be asked to stop them.    I am also being prescribed a benzodiazepine (tranquilizer) controlled substance: I understand this type of medicine is sedating and can increase the risk of death when taken together with opioids. I have talked to my care team about having prescription Narcan available for reversing the opioid medicine and have been instructed in its use. I will be very careful to take my medicines only as directed    The risks, benefits and side effects of these medicine(s) were explained to me. I agree that:  1. I will take part in other treatments as advised by my care team. This may be psychiatry or counseling, physical therapy, behavioral therapy, group treatment or a referral to a specialist.     2. I will keep all my appointments. I understand that this is part of the monitoring of opioids. My care team may require an office visit for EVERY opioid/controlled substance refill. If I miss appointments or don t follow instructions, my care team may stop my medicine.    3. I will take my medicines as prescribed. I will not change the dose or schedule unless my care team tells me to. There will be no refills if I run out early.     4. I may be asked to come to the clinic and complete a urine drug test or complete a pill count at any time. If I don t give a urine sample or participate in a pill count, the care team may stop my medicine.    5. I will only receive prescriptions from this clinic for chronic pain. If I am treated by another provider for acute pain issues, I will tell them that I am taking opioid pain medication for chronic pain and that I have a treatment agreement with this provider. I will inform my [facility] care team within one business day if I am given a prescription for any pain medication by another healthcare provider. My [facility] care team can contact other providers and pharmacists about my use of any medicines.    6. It is up to me to make sure that I don t run out  of my medicines on weekends or holidays. If my care team is willing to refill my opioid prescription without a visit, I must request refills only during office hours. Refills may take up to 3 business days to process. I will use one pharmacy to fill all my opioid and other controlled substance prescriptions. I will notify the clinic about any changes to my insurance or medication availability.    7. I am responsible for my prescriptions. If the medicine/prescription is lost, stolen or destroyed, it will not be replaced. I also agree not to share controlled substance medicines with anyone.    8. I am aware I should not use any illegal or recreational drugs. I agree not to drink alcohol unless my care team says I can.       9. If I enroll in the [place] [facility] Cann[facility] program, I will tell my care team prior to my next refill.     10. I will tell my care team right away if I become pregnant, have a new medical problem treated outside of my regular clinic, or have a change in my medications.    11. I understand that this medicine can affect my thinking, judgment and reaction time. Alcohol and drugs affect the brain and body, which can affect the safety of my driving. Being under the influence of alcohol or drugs can affect my decision-making, behaviors, personal safety, and the safety of others. Driving while impaired (DWI) can occur if a person is driving, operating, or in physical control of a car, motorcycle, boat, snowmobile, ATV, motorbike, off-road vehicle, or any other motor vehicle ([place] Statute 169A.20). I understand the risk if I choose to drive or operate any vehicle or machinery.    I understand that if I do not follow any of the conditions above, my prescriptions or treatment may be stopped or changed.          Opioids  What You Need to Know    What are opioids?   Opioids are pain medicines that must be prescribed by a doctor. They are also known as narcotics.     Examples are:   1. morphine (MS Contin,  Damaris)  2. oxycodone (Oxycontin)  3. oxycodone and acetaminophen (Percocet)  4. hydrocodone and acetaminophen (Vicodin, Norco)   5. fentanyl patch (Duragesic)   6. hydromorphone (Dilaudid)   7. methadone  8. codeine (Tylenol #3)     What do opioids do well?   Opioids are best for severe short-term pain such as after a surgery or injury. They may work well for cancer pain. They may help some people with long-lasting (chronic) pain.     What do opioids NOT do well?   Opioids never get rid of pain entirely, and they don t work well for most patients with chronic pain. Opioids don t reduce swelling, one of the causes of pain.                                    Other ways to manage chronic pain and improve function include:       Treat the health problem that may be causing pain    Anti-inflammation medicines, which reduce swelling and tenderness, such as ibuprofen (Advil, Motrin) or naproxen (Aleve)    Acetaminophen (Tylenol)    Antidepressants and anti-seizure medicines, especially for nerve pain    Topical treatments such as patches or creams    Injections or nerve blocks    Chiropractic or osteopathic treatment    Acupuncture, massage, deep breathing, meditation, visual imagery, aromatherapy    Use heat or ice at the pain site    Physical therapy     Exercise    Stop smoking    Take part in therapy       Risks and side effects     Talk to your doctor before you start or decide to keep taking opioids. Possible side effects include:      Lowering your breathing rate enough to cause death    Overdose, including death, especially if taking higher than prescribed doses    Worse depression symptoms; less pleasure in things you usually enjoy    Feeling tired or sluggish    Slower thoughts or cloudy thinking    Being more sensitive to pain over time; pain is harder to control    Trouble sleeping or restless sleep    Changes in hormone levels (for example, less testosterone)    Changes in sex drive or ability to have  sex    Constipation    Unsafe driving    Itching and sweating    Dizziness    Nausea, throwing up and dry mouth    What else should I know about opioids?    Opioids may lead to dependence, tolerance, or addiction.      Dependence means that if you stop or reduce the medicine too quickly, you will have withdrawal symptoms. These include loose poop (diarrhea), jitters, flu-like symptoms, nervousness and tremors. Dependence is not the same as addiction.                       Tolerance means needing higher doses over time to get the same effect. This may increase the chance of serious side effects.      Addiction is when people improperly use a substance that harms their body, their mind or their relations with others. Use of opiates can cause a relapse of addiction if you have a history of drug or alcohol abuse.      People who have used opioids for a long time may have a lower quality of life, worse depression, higher levels of pain and more visits to doctors.    You can overdose on opioids. Take these steps to lower your risk of overdose:    1. Recognize the signs:  Signs of overdose include decrease or loss of consciousness (blackout), slowed breathing, trouble waking up and blue lips. If someone is worried about overdose, they should call 911.    2. Talk to your doctor about Narcan (naloxone).   If you are at risk for overdose, you may be given a prescription for Narcan. This medicine very quickly reverses the effects of opioids.   If you overdose, a friend or family member can give you Narcan while waiting for the ambulance. They need to know the signs of overdose and how to give Narcan.     3. Don't use alcohol or street drugs.   Taking them with opioids can cause death.    4. Do not take any of these medicines unless your doctor says it s OK. Taking these with opioids can cause death:    Benzodiazepines, such as lorazepam (Ativan), alprazolam (Xanax) or diazepam (Valium)    Muscle relaxers, such as  cyclobenzaprine (Flexeril)    Sleeping pills like zolpidem (Ambien)     Other opioids      How to keep you and other people safe while taking opioids:    1. Never share your opioids with others.  Opioid medicines are regulated by the Drug Enforcement Agency ([place]). Selling or sharing medications is a criminal act.    2. Be sure to store opioids in a secure place, locked up if possible. Young children can easily swallow them and overdose.    3. When you are traveling with your medicines, keep them in the original bottles. If you use a pill box, be sure you also carry a copy of your medicine list from your clinic or pharmacy.    4. Safe disposal of opioids    Most pharmacies have places to get rid of medicine, called disposal kiosks. Medicine disposal options are also available in every [facility]. Search your county and  medication disposal  to find more options. You can find more details at:  https://[email or web address].[facility].[place]./living-green/managing-unwanted-medications     I agree that my provider, clinic care team, and pharmacy may work with any city, state or federal law enforcement agency that investigates the misuse, sale, or other diversion of my controlled medicine. I will allow my provider to discuss my care with, or share a copy of, this agreement with any other treating provider, pharmacy or emergency room where I receive care.    I have read this agreement and have asked questions about anything I did not understand.    _______________________________________________________  Patient Signature - Irina Hanks _____________________                   Date     _______________________________________________________  Provider Signature - Mercedez Britton MD   _____________________                   Date     _______________________________________________________  Witness Signature (required if provider not present while patient signing)   _____________________                   Date

## 2021-10-06 LAB
ANION GAP SERPL CALCULATED.3IONS-SCNC: 8 MMOL/L (ref 3–14)
BUN SERPL-MCNC: 9 MG/DL (ref 7–30)
CALCIUM SERPL-MCNC: 9.1 MG/DL (ref 8.5–10.1)
CHLORIDE BLD-SCNC: 99 MMOL/L (ref 94–109)
CO2 SERPL-SCNC: 26 MMOL/L (ref 20–32)
CREAT SERPL-MCNC: 0.93 MG/DL (ref 0.52–1.04)
CREAT UR-MCNC: 119 MG/DL
GFR SERPL CREATININE-BSD FRML MDRD: 66 ML/MIN/1.73M2
GLUCOSE BLD-MCNC: 74 MG/DL (ref 70–99)
MICROALBUMIN UR-MCNC: 14 MG/L
MICROALBUMIN/CREAT UR: 11.76 MG/G CR (ref 0–25)
POTASSIUM BLD-SCNC: 4 MMOL/L (ref 3.4–5.3)
SODIUM SERPL-SCNC: 133 MMOL/L (ref 133–144)

## 2021-10-07 ENCOUNTER — TELEPHONE (OUTPATIENT)
Dept: FAMILY MEDICINE | Facility: CLINIC | Age: 61
End: 2021-10-07

## 2021-10-07 DIAGNOSIS — I10 ESSENTIAL HYPERTENSION: ICD-10-CM

## 2021-10-07 RX ORDER — LOSARTAN POTASSIUM 100 MG/1
100 TABLET ORAL DAILY
Qty: 90 TABLET | Refills: 1 | Status: SHIPPED | OUTPATIENT
Start: 2021-10-07 | End: 2022-02-08

## 2021-10-07 NOTE — TELEPHONE ENCOUNTER
Pt calling wanting to know what she was taking in May, June, and July of this year because blood pressure was good.    Advised pt was on losartan 100 mg 1/5/21-6/15/21, then losartan/hctz 100-25 from 7/23/21-8/15/21, and now is on hydralazine 10 mg 3 times dailythat was added on Tuesday and not helping BP at this time. Also on losartan 100 mg.    Is there something more to try or can she take more than 3 tabs of hydralazine daily?  Can she go back on losartan/hctz 100-12.5 mg?    Needs refills on losartan.    Pharmacy pended.    Pt can be reached [phone].    Patsy VIERA RN  EP Triage

## 2021-10-07 NOTE — TELEPHONE ENCOUNTER
----- Message from Mercedez Britton MD sent at 10/6/2021 10:51 PM CDT -----  Good news! Your Sodium levels are remaining normal.     Your hemoglobin is borderline low status post phlebotomy for your hemochromatosis. I am positive that this will be normalized slowly.     All your other labs normal, you may see some highlighted which do not have Clinical significance.  Mercedez Britton MD on 10/6/2021 at 10:50 PM

## 2021-10-07 NOTE — TELEPHONE ENCOUNTER
General Call:     Who is calling: PT     Reason for Call:  Calling about her medication history for Blood Pressure- would like to talk with a nurse about it     What are your questions or concerns:      Date of last appointment with provider:  10-5-21    Okay to leave a detailed message:Yes at Cell number on file:    Telephone Information:   Mobile [phone]

## 2021-10-08 NOTE — TELEPHONE ENCOUNTER
Call back from patient. She states this morning, her BP was 125/90. States previously, has been 160/110. She will continue to monitor and report BP log on 10/10.

## 2021-10-08 NOTE — TELEPHONE ENCOUNTER
Patient Contact    Attempt # 1    Was call answered?  No.  Left message on voicemail with information to call triage back.    On call back:     - Relay provider message.

## 2021-10-08 NOTE — TELEPHONE ENCOUNTER
Refill done.    Reviewed the message .   No we cannot hive hydrochlorothiazide as this has caused her severe hyponatremia and hospitalization. She will need to quit alcohol completely if she is consuming any -  which is causing her withdrawal and elevated BP.     As pt has upcoming surgery she is anxious that her BP is not coming down sooner. As we started Hydralazine only on 10/5/21 it will take atleast 4 days to get this normalized.     We can increase Hydralazine dose to 20 mg , 3x/day if no improvement. I await for BP log by 10/10/21.     Thank you,  Mercedez Britton MD on 10/7/2021 at 9:11 PM

## 2021-10-13 NOTE — PROGRESS NOTES
CC: Hematuria.    HPI: It is a pleasure to see Ms. Irina Hanks, a 59 year old female, asked to be seen in consultation by Dr. Eduardo for evaluation of UTI symptoms.  6/9/19 noted gross hematuria. UC was neg.  UA x 2 also showing micro hematuria (last one on 8/8/19 noting increased RBCs/HPF).  She has UTI-like symptoms including frequency, urgency.      Hx of hemochromatosis, gets phlebotomy q 3 months.      She currently feels well and denies any dysuria, pyuria, hesitancy, intermittency, feelings of incomplete emptying, or any recent history of urinary tract infections or stones.    Hematuria Risk Factors:  Age >40: Yes     Smoking history: Yes, quit in 1989  Occupational exposure to chemicals or dyes (ie, benzenes, aromatic amines): no  History of urologic disorder or disease: no  History of irritative voiding symptoms: no  History of urinary tract infection: no  Analgesic abuse: no  History of pelvic irradiation: no    Past Medical History:   Diagnosis Date     ASCUS favor benign 09/2014    3 yr co-test     Essential hypertension, benign      Impaired renal function      Inflammatory arthritis     Managed by Rheumatology  - q 6 mos     Microscopic colitis      Osteoarthritis of first carpometacarpal joint     bilateral     Other specified acquired hypothyroidism      Seasonal allergic rhinitis      Shortness of breath      Symptomatic menopausal or female climacteric states     age 43, on HRT     Tricuspid regurgitation     +1-2 TR noted on 12/22/14 Echo     Past Surgical History:   Procedure Laterality Date     BIOPSY BREAST       C/SECTION, LOW TRANSVERSE      twins     COLONOSCOPY  2013    nl, next one due in 5 years     LEEP TX, CERVICAL  1990s    normal since that time     MYRINGOTOMY, INSERT TUBE, COMBINED Left 4/2015    chronic NORM, ETD     Current Outpatient Medications   Medication Sig Dispense Refill     Cholecalciferol (VITAMIN D PO) Take 5,000 Units by mouth       clonazePAM (KLONOPIN) 0.5 MG ODT  DISSOLVE 1 TABLET ON THE TONGUE EVERY NIGHT AS NEEDED FOR ANXIETY 30 tablet 0     hydroxychloroquine (PLAQUENIL) 200 MG tablet Take 2 tablets (400 mg) by mouth daily 90 tablet 3     levothyroxine (SYNTHROID/LEVOTHROID) 75 MCG tablet TAKE 1 TABLET BY MOUTH EVERY MORNING 30 tablet 11     liothyronine (CYTOMEL) 5 MCG tablet TAKE 2 TABLETS BY MOUTH EVERY  tablet 1     loratadine (CLARITIN) 10 MG tablet Take 1 tablet (10 mg) by mouth daily 90 tablet 3     losartan-hydrochlorothiazide (HYZAAR) 100-12.5 MG tablet TAKE 1 TABLET BY MOUTH DAILY 90 tablet 0     meclizine (ANTIVERT) 25 MG tablet Take 1 tablet (25 mg) by mouth every 6 hours as needed for dizziness 30 tablet 1     metoprolol succinate ER (TOPROL-XL) 50 MG 24 hr tablet Take 1 tablet (50 mg) by mouth daily 7 tablet 3     psyllium (METAMUCIL) 58.6 % POWD Take by mouth daily       TURMERIC PO        Allergies   Allergen Reactions     Ace Inhibitors Cough     lisinopril     Sulfa Drugs      FAMILY HISTORY: There is reported history of genitourinary carcinoma (brother kidney cancer).  There is no history of urolithiasis.      Social History     Socioeconomic History     Marital status:      Spouse name: Bill     Number of children: 3     Years of education: Not on file     Highest education level: Not on file   Occupational History     Occupation: homemaker     Employer: NONE    Social Needs     Financial resource strain: Not on file     Food insecurity:     Worry: Not on file     Inability: Not on file     Transportation needs:     Medical: Not on file     Non-medical: Not on file   Tobacco Use     Smoking status: Former Smoker     Packs/day: 0.26     Years: 10.00     Pack years: 2.60     Types: Cigarettes     Last attempt to quit: 1989     Years since quittin.3     Smokeless tobacco: Never Used   Substance and Sexual Activity     Alcohol use: Yes     Alcohol/week: 0.0 oz     Comment: 2 drinks per day     Drug use: No     Sexual activity: Yes     " Partners: Male   Lifestyle     Physical activity:     Days per week: Not on file     Minutes per session: Not on file     Stress: Not on file   Relationships     Social connections:     Talks on phone: Not on file     Gets together: Not on file     Attends Caodaism service: Not on file     Active member of club or organization: Not on file     Attends meetings of clubs or organizations: Not on file     Relationship status: Not on file     Intimate partner violence:     Fear of current or ex partner: Not on file     Emotionally abused: Not on file     Physically abused: Not on file     Forced sexual activity: Not on file   Other Topics Concern      Service No     Blood Transfusions No     Caffeine Concern Yes     Comment: 1 cup/day      Occupational Exposure No     Hobby Hazards No     Sleep Concern No     Stress Concern No     Weight Concern No     Special Diet Yes     Comment: low melyssa and low glucose      Back Care No     Exercise Yes     Comment: 3 x week, walking     Bike Helmet Yes     Seat Belt Yes     Self-Exams No   Social History Narrative     Not on file       ROS: A comprehensive 14 point ROS was obtained and negative except for that outlined above in the HPI.    PHYSICAL EXAM:   /60 (BP Location: Right arm, Patient Position: Sitting, Cuff Size: Adult Regular)   Pulse 69   Ht 1.6 m (5' 3\")   Wt 67.1 kg (148 lb)   SpO2 99%   BMI 26.22 kg/m      PSYCH: NAD  EYES: EOMI  MOUTH: MMM  NECK: Supple, no notable adenopathy  RESP: Unlabored breathing  CARDIAC: No LE edema  SKIN: Warm, no rashes  ABD: soft, Nontender  NEURO: AAO x3    Orders Only on 08/21/2019   Component Date Value Ref Range Status     Specimen Description 08/21/2019 Midstream Urine   Final     Culture Micro 08/21/2019 No growth   Final     Color Urine 08/21/2019 Yellow   Final     Appearance Urine 08/21/2019 Clear   Final     Glucose Urine 08/21/2019 Negative  NEG^Negative mg/dL Final     Bilirubin Urine 08/21/2019 Negative  " NEG^Negative Final     Ketones Urine 08/21/2019 Negative  NEG^Negative mg/dL Final     Specific Gravity Urine 08/21/2019 1.020  1.003 - 1.035 Final     Blood Urine 08/21/2019 Negative  NEG^Negative Final     pH Urine 08/21/2019 7.0  5.0 - 7.0 pH Final     Protein Albumin Urine 08/21/2019 Negative  NEG^Negative mg/dL Final     Urobilinogen Urine 08/21/2019 0.2  0.2 - 1.0 EU/dL Final     Nitrite Urine 08/21/2019 Negative  NEG^Negative Final     Leukocyte Esterase Urine 08/21/2019 Negative  NEG^Negative Final     Source 08/21/2019 Midstream Urine   Final       IMAGING: CT ABDOMEN AND PELVIS WITH CONTRAST   6/25/2018 11:42 AM      HISTORY:  Abdominal pain, generalized.     TECHNIQUE:   76 mL Isovue-370. Radiation dose for this scan was  reduced using automated exposure control, adjustment of the mA and/or  kV according to patient size, or iterative reconstruction technique.     COMPARISON: 8/28/2008.     FINDINGS:  Liver, gallbladder, spleen, pancreas, adrenal glands, and  right kidney appear normal. Left kidney is unremarkable other than a  simple cyst. No abdominal or retroperitoneal adenopathy. The appendix  appears normal. No bowel obstruction, free air, or ascites. No pelvic  lymphadenopathy, free fluid, or mass.                                                                      IMPRESSION: Unremarkable CT of the abdomen and pelvis. No cause for  pain demonstrated.       ASSESSMENT and PLAN:    59 year old female with gross and micro hematuria and urgency.  The differential diagnosis at this point includes stone disease, infection, vaginal contaminant, urothelial malignancy, renal disorder versus another yet unknown diagnosis.    At this time, recommend proceeding with comprehensive hematuria evaluation to include:  - Urinalysis.   - Urine cytology to look for cells concerning for malignancy.  - CT urogram for upper tract imaging.  - Cystoscopy with the first available urologist to evaluate the interior of the  bladder. Follow up for hematuria as recommended by urologist performing cystoscopic evaluation.  -Estrogen cream three times a week near urethra (pea-sized amount): If >$40, she will let me know and we can get via a compound pharmacy.    Thank you for allowing me to participate in Ms. Hanks's care. I will keep you updated of her progress, but please do not hesitate to contact me with any questions.    Xenia Estrada PA-C  [facility]  30 minutes were spent with the patient today, > 50% in counseling and coordination of care of hematuria.     Fecal[facility]

## 2021-10-26 ENCOUNTER — E-VISIT (OUTPATIENT)
Dept: FAMILY MEDICINE | Facility: CLINIC | Age: 61
End: 2021-10-26
Payer: COMMERCIAL

## 2021-10-26 DIAGNOSIS — N39.0 ACUTE UTI (URINARY TRACT INFECTION): Primary | ICD-10-CM

## 2021-10-26 PROCEDURE — 99421 OL DIG E/M SVC 5-10 MIN: CPT | Performed by: INTERNAL MEDICINE

## 2021-10-26 RX ORDER — CEFDINIR 300 MG/1
300 CAPSULE ORAL 2 TIMES DAILY
Qty: 14 CAPSULE | Refills: 0 | Status: SHIPPED | OUTPATIENT
Start: 2021-10-26 | End: 2021-11-02

## 2021-10-26 NOTE — PATIENT INSTRUCTIONS
Dear Irina Hanks    After reviewing your responses, I've been able to diagnose you with a urinary tract infection, which is a common infection of the bladder with bacteria.  This is not a sexually transmitted infection, though urinating immediately after intercourse can help prevent infections.  Drinking lots of fluids is also helpful to clear your current infection and prevent the next one.      I have sent a prescription for antibiotics to your pharmacy to treat this infection.    It is important that you take all of your prescribed medication even if your symptoms are improving after a few doses.  Taking all of your medicine helps prevent the symptoms from returning.     If your symptoms worsen, you develop pain in your back or stomach, develop fevers, or are not improving in 5 days, please contact your primary care provider for an appointment or visit any of our convenient Walk-in or Urgent Care Centers to be seen, which can be found on our website here.    Thanks again for choosing us as your health care partner,    Mercedez Britton MD

## 2021-10-26 NOTE — TELEPHONE ENCOUNTER
Provider E-Visit time total (minutes): 6     Prevacust message sent to patient as below.     Hi Peg,   I have reviewed your concerns. Sent in emperic antibiotic to your pharmacy. Given your allergies to standard UTI medications I have chosen alternative. If no improvement after completion of the antibiotic course, please make Inclinic visit for possible need of Urine exam and do further recommendations.     Please let me know if you have any questions.   Mercedez Britton MD on 10/26/2021 at 4:11 PM

## 2021-11-01 ENCOUNTER — MYC MEDICAL ADVICE (OUTPATIENT)
Dept: FAMILY MEDICINE | Facility: CLINIC | Age: 61
End: 2021-11-01

## 2021-11-01 DIAGNOSIS — F43.22 ADJUSTMENT DISORDER WITH ANXIOUS MOOD: ICD-10-CM

## 2021-11-02 RX ORDER — CLONAZEPAM 0.5 MG/1
0.5 TABLET, ORALLY DISINTEGRATING ORAL
Qty: 30 TABLET | Refills: 0 | Status: SHIPPED | OUTPATIENT
Start: 2021-11-02 | End: 2022-03-07

## 2021-11-02 NOTE — TELEPHONE ENCOUNTER
Clonazepam 0.5 mg      Last Written Prescription Date:  10/4/21  Last Fill Quantity: 30,   # refills: 0  Last Office Visit: 10/26/21 E-visit Tobi  Future Office visit:       Routing refill request to provider for review/approval because:  Drug not on the [facility], [facility] or [facility] refill protocol or controlled substance    Patsy VIERA RN  EP Triage

## 2021-11-03 NOTE — TELEPHONE ENCOUNTER
RX [place] ([place]) reviewed:  reviewed- which shows recent post operative period and Opiates filled by the surgeon.     [place]:  [email or web address]    Refill done.  Mercedez Britton MD on 11/2/2021 at 9:12 PM

## 2021-11-22 ENCOUNTER — TRANSFERRED RECORDS (OUTPATIENT)
Dept: HEALTH INFORMATION MANAGEMENT | Facility: CLINIC | Age: 61
End: 2021-11-22
Payer: COMMERCIAL

## 2021-11-22 DIAGNOSIS — I10 ESSENTIAL HYPERTENSION: ICD-10-CM

## 2021-11-22 RX ORDER — METOPROLOL SUCCINATE 50 MG/1
100 TABLET, EXTENDED RELEASE ORAL DAILY
Qty: 180 TABLET | Refills: 1 | Status: SHIPPED | OUTPATIENT
Start: 2021-11-22 | End: 2022-02-08

## 2021-11-22 NOTE — TELEPHONE ENCOUNTER
Routing refill request to provider for review/approval because:  Failed BP protocol    Patsy VIERA RN  EP Triage

## 2022-01-04 ENCOUNTER — LAB (OUTPATIENT)
Dept: INFUSION THERAPY | Facility: CLINIC | Age: 62
End: 2022-01-04
Attending: INTERNAL MEDICINE
Payer: COMMERCIAL

## 2022-01-04 DIAGNOSIS — E83.110 HEREDITARY HEMOCHROMATOSIS (H): Primary | ICD-10-CM

## 2022-01-04 DIAGNOSIS — E83.19 IRON OVERLOAD: ICD-10-CM

## 2022-01-04 LAB
FERRITIN SERPL-MCNC: 31 NG/ML (ref 8–252)
HCT VFR BLD AUTO: 38.2 % (ref 35–47)
HGB BLD-MCNC: 12 G/DL (ref 11.7–15.7)

## 2022-01-04 PROCEDURE — 82728 ASSAY OF FERRITIN: CPT | Performed by: INTERNAL MEDICINE

## 2022-01-04 PROCEDURE — 85014 HEMATOCRIT: CPT | Performed by: INTERNAL MEDICINE

## 2022-01-04 PROCEDURE — 36415 COLL VENOUS BLD VENIPUNCTURE: CPT | Performed by: INTERNAL MEDICINE

## 2022-01-04 RX ORDER — HEPARIN SODIUM (PORCINE) LOCK FLUSH IV SOLN 100 UNIT/ML 100 UNIT/ML
5 SOLUTION INTRAVENOUS
Status: CANCELLED | OUTPATIENT
Start: 2022-01-04

## 2022-01-04 RX ORDER — HEPARIN SODIUM,PORCINE 10 UNIT/ML
5 VIAL (ML) INTRAVENOUS
Status: CANCELLED | OUTPATIENT
Start: 2022-01-04

## 2022-01-04 NOTE — RESULT ENCOUNTER NOTE
Dear Ms. [place],    Blood test is good.  Normal hemoglobin.  No anemia.  Ferritin is normal at 21.    Please, call me with any questions.    Eloisa Rossi MD

## 2022-01-04 NOTE — PROGRESS NOTES
Medical Assistant Note:  Irina WRAY Latonya presents today for lab draw.    Patient seen by provider today: No.   present during visit today: Not Applicable.    Concerns: No Concerns.    Procedure:  Lab draw site: Left Hand, Needle type: Butterfly, Gauge: 23.    Post Assessment:  Labs drawn without difficulty: Yes.    Discharge Plan:  Departure Mode: Ambulatory.    Face to Face Time: 4 min.    Carlotta Ram CMA

## 2022-01-11 ENCOUNTER — TELEPHONE (OUTPATIENT)
Dept: ONCOLOGY | Facility: CLINIC | Age: 62
End: 2022-01-11
Payer: COMMERCIAL

## 2022-01-11 DIAGNOSIS — E83.110 HEREDITARY HEMOCHROMATOSIS (H): Primary | ICD-10-CM

## 2022-01-11 NOTE — TELEPHONE ENCOUNTER
Patient called to schedule lab draw, possible phlebotomy.   This  assisted patient in scheduling for 2/2/22.  Patient also inquired about additional labs ALT, AST and electrolytes.  Patient wants what sounds like CMP drawn when she comes on February 2?  Please advise.

## 2022-01-15 DIAGNOSIS — E03.9 ACQUIRED HYPOTHYROIDISM: ICD-10-CM

## 2022-01-17 RX ORDER — LIOTHYRONINE SODIUM 5 UG/1
TABLET ORAL
Qty: 180 TABLET | Refills: 2 | Status: SHIPPED | OUTPATIENT
Start: 2022-01-17 | End: 2022-10-25

## 2022-01-17 RX ORDER — LEVOTHYROXINE SODIUM 75 UG/1
TABLET ORAL
Qty: 90 TABLET | Refills: 2 | Status: SHIPPED | OUTPATIENT
Start: 2022-01-17 | End: 2022-10-25

## 2022-01-17 NOTE — PROGRESS NOTES
Assessment:      ICD-10-CM    1. Onychocryptosis  L60.0    2. IGTN (ingrowing toe nail)  L60.0 Orthopedic  Referral          Plan:  No orders of the defined types were placed in this encounter.      Discussed the etiology and treatment of the condition with the patient.  Discussed treatment options, specifically permanent removal of involved borders.  She would like to consider.      Return:  Return if symptoms worsen or fail to improve, for Chemical matrixectomy bilateral hallux.    Irish Nuñez DPM                Chief Complaint:     Patient presents with:  Ingrown Toenail     bilateral ingrown toenail    HPI:  Irina Hanks is a 61 year old year old female who presents for evaluation of ingrown toenail.    BL hallux nails, possible ingrowns. Been going on for several weeks but seems to be improving with soaking and bacitracin. Can be a 10/10 with touch and with tight socks.        Past Medical & Surgical History:  Past Medical History:   Diagnosis Date     ASCUS favor benign 09/2014    3 yr co-test     Essential hypertension, benign      Impaired renal function      Inflammatory arthritis     Managed by Rheumatology  - q 6 mos     Microscopic colitis      Osteoarthritis of first carpometacarpal joint     bilateral     Other specified acquired hypothyroidism      Seasonal allergic rhinitis      Shortness of breath      Spider veins      Symptomatic menopausal or female climacteric states     age 43, on HRT     Tricuspid regurgitation     +1-2 TR noted on 12/22/14 Echo      Past Surgical History:   Procedure Laterality Date     ABDOMEN SURGERY  1/30/95    C section     BIOPSY BREAST       C/SECTION, LOW TRANSVERSE      twins     COLONOSCOPY  2013    nl, next one due in 5 years     LEEP TX, CERVICAL  1990s    normal since that time     MYRINGOTOMY, INSERT TUBE, COMBINED Left 4/2015    chronic NORM, ETD      Family History   Problem Relation Age of Onset     Cancer - colorectal Father          age 50     Colon Cancer Father      Arthritis Mother      Cancer - colorectal Brother         age 50     Colon Cancer Brother      Hypertension Sister      Thyroid Disease Sister         Sisters     Hypertension Brother      Colon Cancer Brother      Prostate Cancer Brother      Thyroid Disease Sister      Thyroid Disease Brother      Kidney Cancer Brother      Prostate Cancer Brother      Arthritis Sister      Cancer Sister 53        Uterine     Cancer Sister 50        Uterine     Testicular cancer Nephew      Prostate Cancer Brother         Social History:  ?  History   Smoking Status     Former Smoker     Packs/day: 0.26     Years: 10.00     Types: Cigarettes     Quit date: 4/26/1989   Smokeless Tobacco     Never Used     History   Drug Use No     Social History    Substance and Sexual Activity      Alcohol use: Yes        Alcohol/week: 0.0 standard drinks        Comment: 2 drinks per day      Allergies:  ?   Allergies   Allergen Reactions     Codeine Nausea and Nausea and Vomiting     Nitrofurantoin Unknown and Other (See Comments)     Other reaction(s): Gastrointestinal       Ace Inhibitors Cough     lisinopril  lisinopril     Hctz [Hydrochlorothiazide]      Severe Hyponatremia less than 120     Sulfa Drugs      Sulfur         Medications:    Current Outpatient Medications   Medication     aspirin 81 MG EC tablet     Biotin 5000 MCG TABS     clobetasol (TEMOVATE) 0.05 % external solution     clonazePAM (KLONOPIN) 0.5 MG ODT     gabapentin (NEURONTIN) 100 MG capsule     hydrALAZINE (APRESOLINE) 10 MG tablet     hydroxychloroquine (PLAQUENIL) 200 MG tablet     ipratropium (ATROVENT) 0.03 % nasal spray     levothyroxine (SYNTHROID/LEVOTHROID) 75 MCG tablet     liothyronine (CYTOMEL) 5 MCG tablet     loratadine (CLARITIN) 10 MG tablet     losartan (COZAAR) 100 MG tablet     metoprolol succinate ER (TOPROL-XL) 50 MG 24 hr tablet     Omega-3 Fatty Acids (FISH OIL ADULT GUMMIES PO)     TURMERIC PO     Vitamin D3  "(CHOLECALCIFEROL) 125 MCG (5000 UT) tablet     No current facility-administered medications for this visit.       Physical Exam:  ?  Vitals:  /72   Ht 1.6 m (5' 3\")   Wt 66.7 kg (147 lb)   BMI 26.04 kg/m     General:  WD/WN, in NAD.  A&O x3.  Dermatologic:    Onychocryptosis present lateral R, medial L border(s) of hallux   Paronychia present.  Purulence present, absent.  Skin texture, turgor is normal.  Vascular:  Pulses palpable bilateral.  Digital capillary refill time <3 seconds.  Skin temperature is normal to affected digit(s).  Generalized edema- none bilateral.  Focal edema- mild to affected digit(s).  Neurologic:    Gross sensation normal.  Gait and balance normal.  Musculoskeletal:  Maximal pain to palpation of affected nail border(s).  Gross deformity            "

## 2022-01-17 NOTE — PATIENT INSTRUCTIONS
"PATIENT INSTRUCTIONS    Permanent nail border removal if desired at follow-up    Aftercare instructions will be:  -Remove the bandage tomorrow to begin soaking  -Soak 2x daily in warm Epsom salts (2 tbsp per 1 gal warm water) or warm soapy water for 5-10 minutes  -Cover w/ antibiotic ointment and a band-aid  -Redness and drainage (straw colored or bloody) are normal reactions to the chemical    -Continue soaking until the drainage stops  -Call the clinic if you experience:   -Redness extending beyond the 1st joint (\"knuckle\") of the toe   -Severe blistering   -Purulent (pus) drainage    "

## 2022-01-17 NOTE — TELEPHONE ENCOUNTER
Failed protocol.  please route to  team if patient needs an appointment     Naomie PECKRN BSN  [facility]  [phone]

## 2022-01-18 ENCOUNTER — OFFICE VISIT (OUTPATIENT)
Dept: PODIATRY | Facility: CLINIC | Age: 62
End: 2022-01-18
Attending: INTERNAL MEDICINE
Payer: COMMERCIAL

## 2022-01-18 VITALS
BODY MASS INDEX: 26.05 KG/M2 | SYSTOLIC BLOOD PRESSURE: 122 MMHG | WEIGHT: 147 LBS | HEIGHT: 63 IN | DIASTOLIC BLOOD PRESSURE: 72 MMHG

## 2022-01-18 DIAGNOSIS — L60.0 ONYCHOCRYPTOSIS: Primary | ICD-10-CM

## 2022-01-18 DIAGNOSIS — L60.0 IGTN (INGROWING TOE NAIL): ICD-10-CM

## 2022-01-18 PROCEDURE — 99203 OFFICE O/P NEW LOW 30 MIN: CPT | Performed by: PODIATRIST

## 2022-01-18 ASSESSMENT — MIFFLIN-ST. JEOR: SCORE: 1190.92

## 2022-01-18 NOTE — LETTER
1/18/2022         RE: Irina Hanks  [address]  [place] [address]        Dear Colleague,    Thank you for referring your patient, Irina Hanks, to the [facility]. Please see a copy of my visit note below.      Assessment:      ICD-10-CM    1. Onychocryptosis  L60.0    2. IGTN (ingrowing toe nail)  L60.0 Orthopedic  Referral          Plan:  No orders of the defined types were placed in this encounter.      Discussed the etiology and treatment of the condition with the patient.  Discussed treatment options, specifically permanent removal of involved borders.  She would like to consider.      Return:  Return if symptoms worsen or fail to improve, for Chemical matrixectomy bilateral hallux.    Irish Nuñez DPM                Chief Complaint:     Patient presents with:  Ingrown Toenail     bilateral ingrown toenail    HPI:  Irina Hanks is a 61 year old year old female who presents for evaluation of ingrown toenail.    BL hallux nails, possible ingrowns. Been going on for several weeks but seems to be improving with soaking and bacitracin. Can be a 10/10 with touch and with tight socks.        Past Medical & Surgical History:  Past Medical History:   Diagnosis Date     ASCUS favor benign 09/2014    3 yr co-test     Essential hypertension, benign      Impaired renal function      Inflammatory arthritis     Managed by Rheumatology  - q 6 mos     Microscopic colitis      Osteoarthritis of first carpometacarpal joint     bilateral     Other specified acquired hypothyroidism      Seasonal allergic rhinitis      Shortness of breath      Spider veins      Symptomatic menopausal or female climacteric states     age 43, on HRT     Tricuspid regurgitation     +1-2 TR noted on 12/22/14 Echo      Past Surgical History:   Procedure Laterality Date     ABDOMEN SURGERY  1/30/95    C section     BIOPSY BREAST       C/SECTION, LOW TRANSVERSE       twins     COLONOSCOPY  2013    nl, next one due in 5 years     LEEP TX, CERVICAL  1990s    normal since that time     MYRINGOTOMY, INSERT TUBE, COMBINED Left 4/2015    chronic NORM, ETD      Family History   Problem Relation Age of Onset     Cancer - colorectal Father         age 50     Colon Cancer Father      Arthritis Mother      Cancer - colorectal Brother         age 50     Colon Cancer Brother      Hypertension Sister      Thyroid Disease Sister         Sisters     Hypertension Brother      Colon Cancer Brother      Prostate Cancer Brother      Thyroid Disease Sister      Thyroid Disease Brother      Kidney Cancer Brother      Prostate Cancer Brother      Arthritis Sister      Cancer Sister 53        Uterine     Cancer Sister 50        Uterine     Testicular cancer Nephew      Prostate Cancer Brother         Social History:  ?  History   Smoking Status     Former Smoker     Packs/day: 0.26     Years: 10.00     Types: Cigarettes     Quit date: 4/26/1989   Smokeless Tobacco     Never Used     History   Drug Use No     Social History    Substance and Sexual Activity      Alcohol use: Yes        Alcohol/week: 0.0 standard drinks        Comment: 2 drinks per day      Allergies:  ?   Allergies   Allergen Reactions     Codeine Nausea and Nausea and Vomiting     Nitrofurantoin Unknown and Other (See Comments)     Other reaction(s): Gastrointestinal       Ace Inhibitors Cough     lisinopril  lisinopril     Hctz [Hydrochlorothiazide]      Severe Hyponatremia less than 120     Sulfa Drugs      Sulfur         Medications:    Current Outpatient Medications   Medication     aspirin 81 MG EC tablet     Biotin 5000 MCG TABS     clobetasol (TEMOVATE) 0.05 % external solution     clonazePAM (KLONOPIN) 0.5 MG ODT     gabapentin (NEURONTIN) 100 MG capsule     hydrALAZINE (APRESOLINE) 10 MG tablet     hydroxychloroquine (PLAQUENIL) 200 MG tablet     ipratropium (ATROVENT) 0.03 % nasal spray     levothyroxine  "(SYNTHROID/LEVOTHROID) 75 MCG tablet     liothyronine (CYTOMEL) 5 MCG tablet     loratadine (CLARITIN) 10 MG tablet     losartan (COZAAR) 100 MG tablet     metoprolol succinate ER (TOPROL-XL) 50 MG 24 hr tablet     Omega-3 Fatty Acids (FISH OIL ADULT GUMMIES PO)     TURMERIC PO     Vitamin D3 (CHOLECALCIFEROL) 125 MCG (5000 UT) tablet     No current facility-administered medications for this visit.       Physical Exam:  ?  Vitals:  /72   Ht 1.6 m (5' 3\")   Wt 66.7 kg (147 lb)   BMI 26.04 kg/m     General:  WD/WN, in NAD.  A&O x3.  Dermatologic:    Onychocryptosis present lateral R, medial L border(s) of hallux   Paronychia present.  Purulence present, absent.  Skin texture, turgor is normal.  Vascular:  Pulses palpable bilateral.  Digital capillary refill time <3 seconds.  Skin temperature is normal to affected digit(s).  Generalized edema- none bilateral.  Focal edema- mild to affected digit(s).  Neurologic:    Gross sensation normal.  Gait and balance normal.  Musculoskeletal:  Maximal pain to palpation of affected nail border(s).  Gross deformity                Again, thank you for allowing me to participate in the care of your patient.        Sincerely,        Irish Nuñez DPM    "

## 2022-01-20 ENCOUNTER — HOSPITAL ENCOUNTER (OUTPATIENT)
Dept: MAMMOGRAPHY | Facility: CLINIC | Age: 62
Discharge: HOME | End: 2022-01-20
Attending: INTERNAL MEDICINE | Admitting: INTERNAL MEDICINE
Payer: COMMERCIAL

## 2022-01-20 DIAGNOSIS — Z12.31 VISIT FOR SCREENING MAMMOGRAM: ICD-10-CM

## 2022-01-20 PROCEDURE — 77067 SCR MAMMO BI INCL CAD: CPT

## 2022-02-02 ENCOUNTER — LAB (OUTPATIENT)
Dept: INFUSION THERAPY | Facility: CLINIC | Age: 62
End: 2022-02-02
Attending: INTERNAL MEDICINE
Payer: COMMERCIAL

## 2022-02-02 ENCOUNTER — TELEPHONE (OUTPATIENT)
Dept: FAMILY MEDICINE | Facility: CLINIC | Age: 62
End: 2022-02-02

## 2022-02-02 DIAGNOSIS — E83.19 IRON OVERLOAD: ICD-10-CM

## 2022-02-02 DIAGNOSIS — E83.110 HEREDITARY HEMOCHROMATOSIS (H): Primary | ICD-10-CM

## 2022-02-02 DIAGNOSIS — E83.110 HEREDITARY HEMOCHROMATOSIS (H): ICD-10-CM

## 2022-02-02 LAB
ALBUMIN SERPL-MCNC: 3.8 G/DL (ref 3.4–5)
ALP SERPL-CCNC: 94 U/L (ref 40–150)
ALT SERPL W P-5'-P-CCNC: 26 U/L (ref 0–50)
ANION GAP SERPL CALCULATED.3IONS-SCNC: 7 MMOL/L (ref 3–14)
AST SERPL W P-5'-P-CCNC: 24 U/L (ref 0–45)
BILIRUB SERPL-MCNC: 0.6 MG/DL (ref 0.2–1.3)
BUN SERPL-MCNC: 16 MG/DL (ref 7–30)
CALCIUM SERPL-MCNC: 9.5 MG/DL (ref 8.5–10.1)
CHLORIDE BLD-SCNC: 96 MMOL/L (ref 94–109)
CO2 SERPL-SCNC: 23 MMOL/L (ref 20–32)
CREAT SERPL-MCNC: 0.79 MG/DL (ref 0.52–1.04)
FERRITIN SERPL-MCNC: 36 NG/ML (ref 8–252)
GFR SERPL CREATININE-BSD FRML MDRD: 84 ML/MIN/1.73M2
GLUCOSE BLD-MCNC: 88 MG/DL (ref 70–99)
HCT VFR BLD AUTO: 36.4 % (ref 35–47)
HGB BLD-MCNC: 12.1 G/DL (ref 11.7–15.7)
POTASSIUM BLD-SCNC: 4.5 MMOL/L (ref 3.4–5.3)
PROT SERPL-MCNC: 8.1 G/DL (ref 6.8–8.8)
SODIUM SERPL-SCNC: 126 MMOL/L (ref 133–144)

## 2022-02-02 PROCEDURE — 80053 COMPREHEN METABOLIC PANEL: CPT | Performed by: INTERNAL MEDICINE

## 2022-02-02 PROCEDURE — 36415 COLL VENOUS BLD VENIPUNCTURE: CPT

## 2022-02-02 PROCEDURE — 82728 ASSAY OF FERRITIN: CPT | Performed by: INTERNAL MEDICINE

## 2022-02-02 PROCEDURE — 85018 HEMOGLOBIN: CPT | Performed by: INTERNAL MEDICINE

## 2022-02-02 RX ORDER — HEPARIN SODIUM,PORCINE 10 UNIT/ML
5 VIAL (ML) INTRAVENOUS
Status: CANCELLED | OUTPATIENT
Start: 2022-02-02

## 2022-02-02 RX ORDER — HEPARIN SODIUM (PORCINE) LOCK FLUSH IV SOLN 100 UNIT/ML 100 UNIT/ML
5 SOLUTION INTRAVENOUS
Status: CANCELLED | OUTPATIENT
Start: 2022-02-02

## 2022-02-02 NOTE — PROGRESS NOTES
Medical Assistant Note:  Irina Hanks presents today for blood draw.    Patient seen by provider today: No.   present during visit today: Not Applicable.    Concerns: No Concerns.    Procedure:  Lab draw site: Left Hand, Needle type: BF, Gauge: 23g.    Post Assessment:  Labs drawn without difficulty: Yes.    Discharge Plan:  Departure Mode: Ambulatory.    Face to Face Time: 5.    Tanesha Ram, CMA

## 2022-02-02 NOTE — TELEPHONE ENCOUNTER
Dr. Britton,    Pt calling in wondering what she should do about her low sodium level that was drawn today .    Please advise.     Penelope Silver RN      Can we leave a detailed message on this number? YES  Phone number patient can be reached at: Home number on file [phone] (home)    Penelope Silver RN  [facility] Triage

## 2022-02-02 NOTE — PROGRESS NOTES
Infusion Nursing Note:  Irina Hanks presents today for possible phlebotomy.    Patient seen by provider today: No   present during visit today: Not Applicable.    Note: Phlebotomy not needed today. Discussed labs with patient. She will discuss her sodium level with her PCP as it has been low in the past. She is overdue with a follow up with Dr. Rossi I reminded her; she is going out of town until the end of March, she will call then and schedule an appointment.      Intravenous Access:  No Intravenous access/labs at this visit.    Treatment Conditions:  Lab Results   Component Value Date    HGB 12.1 02/02/2022    WBC 4.3 10/05/2021    ANEU 6.4 08/13/2021     10/05/2021      Lab Results   Component Value Date     (L) 02/02/2022    POTASSIUM 4.5 02/02/2022    MAG 1.6 08/15/2021    CR 0.79 02/02/2022    LUCA 9.5 02/02/2022    BILITOTAL 0.6 02/02/2022    ALBUMIN 3.8 02/02/2022    ALT 26 02/02/2022    AST 24 02/02/2022         Post Infusion Assessment:  N/A.       Discharge Plan:   AVS to patient via MYCHART.  Patient will return as ray - patient to call  for next appointment.   Patient discharged in stable condition accompanied by: self.  Departure Mode: Ambulatory.      Rory Rivera RN

## 2022-02-03 NOTE — TELEPHONE ENCOUNTER
Please schedule a Video visit / Inclinic visit >> as I have last seen patient on 10/2021 for preop clearance and she had multiple updates with outside records transferred to us after that . We will need to do medication reconcile and discuss on the causes of her worsening Sodium levels before treating it.     Thank you,  Mercedez Britton MD on 2/2/2022 at 9:47 PM

## 2022-02-07 NOTE — RESULT ENCOUNTER NOTE
Dear Ms. HuberLatonya,    Your hemoglobin is normal.  Ferritin is good at 36.  No phlebotomy needed. Liver blood test are normal.  Your sodium is low.  Please discuss this with your family doctor.    Please, call me with any questions.    Eloisa Rossi MD

## 2022-02-08 ENCOUNTER — OFFICE VISIT (OUTPATIENT)
Dept: FAMILY MEDICINE | Facility: CLINIC | Age: 62
End: 2022-02-08
Payer: COMMERCIAL

## 2022-02-08 VITALS
TEMPERATURE: 97.4 F | WEIGHT: 147.6 LBS | OXYGEN SATURATION: 98 % | BODY MASS INDEX: 26.15 KG/M2 | DIASTOLIC BLOOD PRESSURE: 74 MMHG | HEART RATE: 73 BPM | SYSTOLIC BLOOD PRESSURE: 122 MMHG

## 2022-02-08 DIAGNOSIS — F43.22 ADJUSTMENT DISORDER WITH ANXIOUS MOOD: ICD-10-CM

## 2022-02-08 DIAGNOSIS — I10 ESSENTIAL HYPERTENSION: ICD-10-CM

## 2022-02-08 DIAGNOSIS — E22.2 SIADH (SYNDROME OF INAPPROPRIATE ADH PRODUCTION) (H): ICD-10-CM

## 2022-02-08 DIAGNOSIS — Z96.641 STATUS POST RIGHT HIP REPLACEMENT: ICD-10-CM

## 2022-02-08 DIAGNOSIS — M62.838 MUSCLE SPASM: ICD-10-CM

## 2022-02-08 DIAGNOSIS — M05.79 RHEUMATOID ARTHRITIS INVOLVING MULTIPLE SITES WITH POSITIVE RHEUMATOID FACTOR (H): ICD-10-CM

## 2022-02-08 DIAGNOSIS — M16.11 PRIMARY OSTEOARTHRITIS OF RIGHT HIP: ICD-10-CM

## 2022-02-08 DIAGNOSIS — E83.110 HEREDITARY HEMOCHROMATOSIS (H): ICD-10-CM

## 2022-02-08 DIAGNOSIS — E87.1 HYPONATREMIA: Primary | ICD-10-CM

## 2022-02-08 PROBLEM — K64.8 INTERNAL HEMORRHOIDS: Status: ACTIVE | Noted: 2021-03-31

## 2022-02-08 PROCEDURE — 99215 OFFICE O/P EST HI 40 MIN: CPT | Performed by: INTERNAL MEDICINE

## 2022-02-08 RX ORDER — HYDROMORPHONE HYDROCHLORIDE 2 MG/1
TABLET ORAL
COMMUNITY
Start: 2021-10-18 | End: 2022-06-08

## 2022-02-08 RX ORDER — METOPROLOL SUCCINATE 50 MG/1
100 TABLET, EXTENDED RELEASE ORAL DAILY
Qty: 180 TABLET | Refills: 1 | Status: SHIPPED | OUTPATIENT
Start: 2022-02-08 | End: 2022-08-31

## 2022-02-08 RX ORDER — PSYLLIUM HUSK (WITH SUGAR) 3 G/12 G
POWDER (GRAM) ORAL
COMMUNITY
End: 2022-06-08

## 2022-02-08 RX ORDER — OMEGA-3-ACID ETHYL ESTERS 900 MG/1
CAPSULE, LIQUID FILLED ORAL
COMMUNITY
End: 2022-06-08

## 2022-02-08 RX ORDER — CLONAZEPAM 0.5 MG/1
0.5 TABLET, ORALLY DISINTEGRATING ORAL
Qty: 30 TABLET | Refills: 0 | Status: CANCELLED | OUTPATIENT
Start: 2022-02-08

## 2022-02-08 RX ORDER — AMOXICILLIN 250 MG
1 CAPSULE ORAL
COMMUNITY
Start: 2021-10-12 | End: 2022-06-08

## 2022-02-08 RX ORDER — SODIUM CHLORIDE 1 G/1
1 TABLET ORAL DAILY
Qty: 60 TABLET | Refills: 1 | Status: SHIPPED | OUTPATIENT
Start: 2022-02-08 | End: 2022-08-29

## 2022-02-08 RX ORDER — BUDESONIDE 3 MG/1
CAPSULE, COATED PELLETS ORAL
COMMUNITY
Start: 2021-04-01 | End: 2022-06-08

## 2022-02-08 RX ORDER — HYDROXYCHLOROQUINE SULFATE 200 MG/1
400 TABLET, FILM COATED ORAL DAILY
Qty: 90 TABLET | Refills: 3 | Status: SHIPPED | OUTPATIENT
Start: 2022-02-08

## 2022-02-08 RX ORDER — LOSARTAN POTASSIUM 100 MG/1
100 TABLET ORAL DAILY
Qty: 90 TABLET | Refills: 1 | Status: SHIPPED | OUTPATIENT
Start: 2022-02-08 | End: 2022-10-17

## 2022-02-08 RX ORDER — OXYCODONE HYDROCHLORIDE 5 MG/1
TABLET ORAL
COMMUNITY
Start: 2021-10-22 | End: 2022-06-08

## 2022-02-08 RX ORDER — OXYCODONE HYDROCHLORIDE 5 MG/1
5-10 TABLET ORAL
COMMUNITY
Start: 2021-10-12 | End: 2022-06-08

## 2022-02-08 NOTE — PATIENT INSTRUCTIONS
As discussed, please start off on low dose salt tablets for your Hyponatremia.   Though fluid restriction of less than 1.5 liters of water/day recommended for your SIADH but your Hemochromatosis medical condition will make it difficult to restrict the water to avoid hemoconcentration.     Ordered for BMP check of Sodium in 1 month, please do LAB only appointment ( non fasting ) in 1 month.     Started on Zanaflex as needed at bed time for muscle spasm of Rt back.     Please follow up with your psychiatry for your Klonipin refills who has been filling you already for last 2 months as per  review and [place] regulations.   Charmaine White NP at   [facility]  [address] [address]    ========================    Patient Education     Discharge Instructions for Hyponatremia  You were diagnosed with hyponatremia. This means your blood level of sodium (salt) is too low. Salt is needed for the body and brain to work. Very low blood levels of sodium can be fatal. Symptoms can include headache, confusion, severe tiredness (fatigue), muscle cramps, hallucinations, seizures, and coma. You have been treated to raise your blood levels of sodium. These instructions will help you care for yourself at home as you have been instructed.   Home care    Limit your intake of fluids. Drink only the amounts directed by your healthcare provider.    Ask your provider what you should use to replace fluids if you are throwing up.    Keep all follow-up appointments. Your provider needs to watch your condition closely.  To help prevent hyponatremia:    Take all medicines exactly as directed. Certain medicines can lower blood sodium levels.    If you have done something that makes you sweat a lot, drink fluids that contain salt and other electrolytes.     Tell all healthcare providers what medicines you take. Mention all prescription and over-the-counter medicines, vitamins, supplements, and herbs.    Have your sodium  levels checked often. This is vital if you take a medicine that helps your body get rid of water (diuretic).  Follow-up  Follow up with your healthcare provider, or as advised.   When to call your healthcare provider  Call your provider right away if you have any of the following:    Severe tiredness    Fainting    Dizziness    Loss of appetite    Nausea or vomiting    Confusion or forgetfulness    Muscle spasms, cramping, or twitching    Seizures    Walking abnormally  [facility] last reviewed this educational content on 2/1/2021 2000-2021 [employer]. All rights reserved. This information is not intended as a substitute for professional medical care. Always follow your healthcare professional's instructions.

## 2022-02-08 NOTE — PROGRESS NOTES
Assessment and Plan    1. Hyponatremia  2. SIADH (syndrome of inappropriate ADH production) (H)  Uncontrolled,  Recent labs done by them does show worsening hyponatremia with Sodium at 126 which was also seen 5 months back. Urine lytes were checked at thattime with FeNa <1 at 0.3 and Urine Sodium >20 , Urine Osm > 100 with euvolemia which depicts SIADH - no medication causing this on her medication list.   - Will start on low dose Salt tablet with once daily and possibly titrate the dose depending on recheck labs.   - sodium chloride 1 GM tablet; Take 1 tablet (1 g) by mouth daily  Dispense: 60 tablet; Refill: 1  - Basic metabolic panel  (Ca, Cl, CO2, Creat, Gluc, K, Na, BUN); Future    3. Muscle spasm  New problem, with right low back muscle spasm which PT has been working on her and will give as needed muscle relaxor for improvement.   - tiZANidine (ZANAFLEX) 4 MG tablet; Take 1 tablet (4 mg) by mouth nightly as needed for muscle spasms  Dispense: 30 tablet; Refill: 1    4. Essential hypertension  Well controlled, emphasized on checking her BP and get me the BP log in 10 days as starting on low dose Sal ttablets given patient worsening hyponatremia.   - losartan (COZAAR) 100 MG tablet; Take 1 tablet (100 mg) by mouth daily  Dispense: 90 tablet; Refill: 1  - metoprolol succinate ER (TOPROL-XL) 50 MG 24 hr tablet; Take 2 tablets (100 mg) by mouth daily  Dispense: 180 tablet; Refill: 1    5. Adjustment disorder with anxious mood  Well controlled, Pt follows Psychiatry as per  check though she was getting refills from both PCP and psychiatry which I explained on [place] regulations. AVS given with instructions.     6. Rheumatoid arthritis involving multiple sites with positive rheumatoid factor (H)  - hydroxychloroquine (PLAQUENIL) 200 MG tablet; Take 2 tablets (400 mg) by mouth daily  Dispense: 90 tablet; Refill: 3    7. Primary osteoarthritis of right hip  8. Status post right hip replacement  Well controlled, Later  undergoing PT for the same.Pt states she does have muscle spasm on the inguinal region with Orthopedics plans for need of Steroid injection and possible revision surgery.     9. Hereditary hemochromatosis (H)  Stable, She does follow Hematology for Hemochromatosis , Iron overload and gets therapeutic phlebotomies. She didn't needed recent phlebotomies for more than 4-6 months as per the patient.       Over 40 minutes spent on reviewing patient chart,  face to face encounter, greater than 50% time spent with plan/cordination of care and documentation as above in my A/P.        Patient Instructions   As discussed, please start off on low dose salt tablets for your Hyponatremia.   Though fluid restriction of less than 1.5 liters of water/day recommended for your SIADH but your Hemochromatosis medical condition will make it difficult to restrict the water to avoid hemoconcentration.     Ordered for BMP check of Sodium in 1 month, please do LAB only appointment ( non fasting ) in 1 month.     Started on Zanaflex as needed at bed time for muscle spasm of Rt back.     Please follow up with your psychiatry for your Klonipin refills who has been filling you already for last 2 months as per  review and [place] regulations.   Charmaine hWite NP at   [facility]  [address] [place] [address]    ========================    Patient Education     Discharge Instructions for Hyponatremia  You were diagnosed with hyponatremia. This means your blood level of sodium (salt) is too low. Salt is needed for the body and brain to work. Very low blood levels of sodium can be fatal. Symptoms can include headache, confusion, severe tiredness (fatigue), muscle cramps, hallucinations, seizures, and coma. You have been treated to raise your blood levels of sodium. These instructions will help you care for yourself at home as you have been instructed.   Home care    Limit your intake of fluids. Drink only the amounts directed by  your healthcare provider.    Ask your provider what you should use to replace fluids if you are throwing up.    Keep all follow-up appointments. Your provider needs to watch your condition closely.  To help prevent hyponatremia:    Take all medicines exactly as directed. Certain medicines can lower blood sodium levels.    If you have done something that makes you sweat a lot, drink fluids that contain salt and other electrolytes.     Tell all healthcare providers what medicines you take. Mention all prescription and over-the-counter medicines, vitamins, supplements, and herbs.    Have your sodium levels checked often. This is vital if you take a medicine that helps your body get rid of water (diuretic).  Follow-up  Follow up with your healthcare provider, or as advised.   When to call your healthcare provider  Call your provider right away if you have any of the following:    Severe tiredness    Fainting    Dizziness    Loss of appetite    Nausea or vomiting    Confusion or forgetfulness    Muscle spasms, cramping, or twitching    Seizures    Walking abnormally  eOriginal last reviewed this educational content on 2/1/2021 2000-2021 [employer]. All rights reserved. This information is not intended as a substitute for professional medical care. Always follow your healthcare professional's instructions.                 Return in about 6 months (around 8/8/2022), or if symptoms worsen or fail to improve, for Follow up of last visit.    Mercedez Britton MD  [facility] ROMULO Luong is a 61 year old who presents for the following health issues       HPI     Patient here to discuss lab results from her 2-2-22 appointment    Pt states she is traveling to [place] and needs refills for all medications till she returns back in 1-2 months.     Last seen pt on 10/2021 for preop clearance of Rt total hip arthtoplasty at that time.      Allergies   Allergen Reactions      Codeine Nausea and Nausea and Vomiting     Nitrofurantoin Unknown and Other (See Comments)     Other reaction(s): Gastrointestinal       Ace Inhibitors Cough     lisinopril  lisinopril     Hctz [Hydrochlorothiazide]      Severe Hyponatremia less than 120     Sulfa Drugs      Sulfur Unknown        Past Medical History:   Diagnosis Date     ASCUS favor benign 09/2014    3 yr co-test     Essential hypertension, benign      Impaired renal function      Inflammatory arthritis     Managed by Rheumatology  - q 6 mos     Microscopic colitis      Osteoarthritis of first carpometacarpal joint     bilateral     Other specified acquired hypothyroidism      Seasonal allergic rhinitis      Shortness of breath      Spider veins      Symptomatic menopausal or female climacteric states     age 43, on HRT     Tricuspid regurgitation     +1-2 TR noted on 12/22/14 Echo       Past Surgical History:   Procedure Laterality Date     ABDOMEN SURGERY  1/30/95    C section     BIOPSY BREAST       C/SECTION, LOW TRANSVERSE      twins     COLONOSCOPY  2013    nl, next one due in 5 years     LEEP TX, CERVICAL  1990s    normal since that time     MYRINGOTOMY, INSERT TUBE, COMBINED Left 4/2015    chronic NORM, ETD       Family History   Problem Relation Age of Onset     Cancer - colorectal Father         age 50     Colon Cancer Father      Arthritis Mother      Cancer - colorectal Brother         age 50     Colon Cancer Brother      Hypertension Sister      Thyroid Disease Sister         Sisters     Hypertension Brother      Colon Cancer Brother      Prostate Cancer Brother      Thyroid Disease Sister      Thyroid Disease Brother      Kidney Cancer Brother      Prostate Cancer Brother      Arthritis Sister      Cancer Sister 53        Uterine     Cancer Sister 50        Uterine     Testicular cancer Nephew      Prostate Cancer Brother        Social History     Tobacco Use     Smoking status: Former Smoker     Packs/day: 0.26     Years: 10.00      Pack years: 2.60     Types: Cigarettes     Quit date: 1989     Years since quittin.8     Smokeless tobacco: Never Used   Substance Use Topics     Alcohol use: Yes     Alcohol/week: 0.0 standard drinks     Comment: 2 drinks per day        Current Outpatient Medications   Medication     aspirin 81 MG EC tablet     Biotin 5000 MCG TABS     budesonide (ENTOCORT EC) 3 MG EC capsule     clobetasol (TEMOVATE) 0.05 % external solution     clonazePAM (KLONOPIN) 0.5 MG ODT     Estriol POWD     gabapentin (NEURONTIN) 100 MG capsule     hydroxychloroquine (PLAQUENIL) 200 MG tablet     ipratropium (ATROVENT) 0.03 % nasal spray     levothyroxine (SYNTHROID/LEVOTHROID) 75 MCG tablet     liothyronine (CYTOMEL) 5 MCG tablet     loratadine (CLARITIN) 10 MG tablet     losartan (COZAAR) 100 MG tablet     metoprolol succinate ER (TOPROL-XL) 50 MG 24 hr tablet     Omega-3 Fatty Acids (FISH OIL ADULT GUMMIES PO)     oxyCODONE (ROXICODONE) 5 MG tablet     senna-docusate (SENOKOT-S/PERICOLACE) 8.6-50 MG tablet     sodium chloride 1 GM tablet     tiZANidine (ZANAFLEX) 4 MG tablet     TURMERIC PO     Vitamin D3 (CHOLECALCIFEROL) 125 MCG (5000 UT) tablet     hydrALAZINE (APRESOLINE) 10 MG tablet     HYDROmorphone (DILAUDID) 2 MG tablet     Omega-3-acid Ethyl Esters (FISH OIL OMEGA-3 FATTY ACID) 320MG/ML oral suspension     oxyCODONE (ROXICODONE) 5 MG tablet     Psyllium (FIBER) 28.3 % POWD     No current facility-administered medications for this visit.        Review of Systems   Constitutional, HEENT, cardiovascular, pulmonary, GI, , musculoskeletal, neuro, skin, endocrine and psych systems are negative, except as otherwise noted.      Objective    /74 (Cuff Size: Adult Regular)   Pulse 73   Temp 97.4  F (36.3  C) (Tympanic)   Wt 67 kg (147 lb 9.6 oz)   SpO2 98%   BMI 26.15 kg/m    Body mass index is 26.15 kg/m .  Physical Exam   GENERAL: healthy, alert and no distress  NECK: no adenopathy, no asymmetry, masses, or  scars and thyroid normal to palpation  RESP: lungs clear to auscultation - no rales, rhonchi or wheezes  CV: regular rate and rhythm, normal S1 S2, no S3 or S4, no murmur, click or rub, no peripheral edema and peripheral pulses strong  ABDOMEN: soft, nontender, no hepatosplenomegaly, no masses and bowel sounds normal  MS: no gross musculoskeletal defects noted, no edema

## 2022-02-28 ENCOUNTER — TELEPHONE (OUTPATIENT)
Dept: BEHAVIORAL HEALTH | Facility: CLINIC | Age: 62
End: 2022-02-28
Payer: COMMERCIAL

## 2022-02-28 DIAGNOSIS — F43.22 ADJUSTMENT DISORDER WITH ANXIOUS MOOD: ICD-10-CM

## 2022-02-28 NOTE — TELEPHONE ENCOUNTER
Reason for call:  Medication   If this is a refill request, has the caller requested the refill from the pharmacy already? No  Will the patient be using a [facility]? No  Name of the pharmacy and phone number for the current request: [facility] - [place], [place]. [phone]    Name of the medication requested: Clonazepam ODT 0.5 MG    Other request: N/A    Phone number to reach patient:  Home number on file [phone] (home)    Best Time:  ASAP    Can we leave a detailed message on this number?  YES    Travel screening: Not Applicable

## 2022-03-01 NOTE — TELEPHONE ENCOUNTER
Pt will need to contact her Psychiatrist for her Klonipin refills which I discussed with her already in the last OV with us .    Charmaine White NP at   [facility]  [address] [place] [address]    Thank you,  Mercedez Britton MD on 3/1/2022 at 9:09 AM

## 2022-03-01 NOTE — TELEPHONE ENCOUNTER
Patient Contact     Called pt and relayed message from Dr. Britton. She stated her understanding and had no further questions or concerns at this time.     Nadine TRINH RN  [facility]

## 2022-03-07 RX ORDER — CLONAZEPAM 0.5 MG/1
0.5 TABLET, ORALLY DISINTEGRATING ORAL
Qty: 30 TABLET | Refills: 0 | Status: SHIPPED | OUTPATIENT
Start: 2022-03-07 | End: 2022-03-08

## 2022-03-07 NOTE — TELEPHONE ENCOUNTER
Charmaine White is her psych.  She is out and wants this refilled asap.  Please contact patient.    Liv Bill [facility]  [phone]

## 2022-03-07 NOTE — TELEPHONE ENCOUNTER
Patient is no longer under BHUMI Montano's care.    Patient Status:  Our psychiatry providers act as a specialty service for Primary Care Providers in the [facility] that seek to optimize medications for unstable patients.  Once medications have been optimized, our providers discharge the patient back to the referring Primary Care Provider for ongoing medication management.  This type of system allows our providers to serve a high volume of patients. At this time  Patient has been stabilized and will be returned to PCP for ongoing care, medication prescribing and future medication refills.    3 months of medications fills provided.  Follow up with @PCP@ in about   3 months. Patient can be re-referred back to this service for further consultation in the future if needed but a new referral will need to be placed

## 2022-03-08 DIAGNOSIS — F43.22 ADJUSTMENT DISORDER WITH ANXIOUS MOOD: ICD-10-CM

## 2022-03-08 RX ORDER — CLONAZEPAM 0.5 MG/1
0.5 TABLET, ORALLY DISINTEGRATING ORAL
Qty: 30 TABLET | Refills: 0 | Status: SHIPPED | OUTPATIENT
Start: 2022-03-08 | End: 2022-04-05

## 2022-03-08 NOTE — TELEPHONE ENCOUNTER
Before calling the pt, TC checked the pharmacy and it was sent to the pharmacy on file.. Pt is in [place] (see note below) Thank you.  Cindy Hinds,

## 2022-03-08 NOTE — TELEPHONE ENCOUNTER
I have sent in refill for the patient at this time >> as her psychiatrist is not going to refill it anymore as per the message reply sent to us.     Pt will need to make a CSA with us to get her Klonipin refills from us from now on. Please schedule follow up visit at the earliest available slot for future refills.     Refill done for now.  Mercedez Britton MD on 3/7/2022 at 7:23 PM

## 2022-03-08 NOTE — TELEPHONE ENCOUNTER
Pt calling would like rx for clonazepam sent to [facility] in [place] since she is in [place] and not [place] at this time.    rx pended.    Patsy VIERA RN  EP Triage

## 2022-03-08 NOTE — TELEPHONE ENCOUNTER
Pt confirms she is still in [place]. This RN called [facility] in [place] and cancelled rx for Clonazepam. Please approve of refill in separate encounter already routed to you. See refill encounter 3/8/22. Pt also scheduled for CSA f/u with Dr. Britton when she returns to [place].     Pt number: [phone]    Nadine TRINH RN  [facility]

## 2022-03-09 NOTE — TELEPHONE ENCOUNTER
Refill done to the requested pharmacy. Please call the patient and let her know.     Thank you,  Mercedez Britton MD on 3/8/2022 at 7:55 PM

## 2022-03-09 NOTE — TELEPHONE ENCOUNTER
Patient Contact     Called pt and relayed message from Dr. Britton, see below. She stated her understanding.     Nadine TRINH RN  [facility]

## 2022-03-24 ENCOUNTER — OFFICE VISIT (OUTPATIENT)
Dept: FAMILY MEDICINE | Facility: CLINIC | Age: 62
End: 2022-03-24
Payer: COMMERCIAL

## 2022-03-24 VITALS
BODY MASS INDEX: 25.51 KG/M2 | SYSTOLIC BLOOD PRESSURE: 122 MMHG | DIASTOLIC BLOOD PRESSURE: 62 MMHG | OXYGEN SATURATION: 98 % | WEIGHT: 144 LBS | TEMPERATURE: 97.4 F | RESPIRATION RATE: 14 BRPM | HEART RATE: 79 BPM

## 2022-03-24 DIAGNOSIS — Z79.899 CHRONIC PRESCRIPTION BENZODIAZEPINE USE: ICD-10-CM

## 2022-03-24 DIAGNOSIS — F41.9 ANXIETY DISORDER, UNSPECIFIED TYPE: Primary | ICD-10-CM

## 2022-03-24 DIAGNOSIS — E87.1 HYPONATREMIA: ICD-10-CM

## 2022-03-24 LAB
AMPHETAMINES UR QL: NOT DETECTED
BARBITURATES UR QL SCN: NOT DETECTED
BENZODIAZ UR QL SCN: NOT DETECTED
BUPRENORPHINE UR QL: NOT DETECTED
CANNABINOIDS UR QL: NOT DETECTED
COCAINE UR QL SCN: NOT DETECTED
D-METHAMPHET UR QL: NOT DETECTED
METHADONE UR QL SCN: NOT DETECTED
OPIATES UR QL SCN: NOT DETECTED
OXYCODONE UR QL SCN: NOT DETECTED
PCP UR QL SCN: NOT DETECTED
PROPOXYPH UR QL: NOT DETECTED
TRICYCLICS UR QL SCN: NOT DETECTED

## 2022-03-24 PROCEDURE — 99214 OFFICE O/P EST MOD 30 MIN: CPT | Performed by: INTERNAL MEDICINE

## 2022-03-24 PROCEDURE — 36415 COLL VENOUS BLD VENIPUNCTURE: CPT | Performed by: INTERNAL MEDICINE

## 2022-03-24 PROCEDURE — 80306 DRUG TEST PRSMV INSTRMNT: CPT | Performed by: INTERNAL MEDICINE

## 2022-03-24 PROCEDURE — 84295 ASSAY OF SERUM SODIUM: CPT | Performed by: INTERNAL MEDICINE

## 2022-03-24 ASSESSMENT — ANXIETY QUESTIONNAIRES
4. TROUBLE RELAXING: SEVERAL DAYS
2. NOT BEING ABLE TO STOP OR CONTROL WORRYING: NOT AT ALL
GAD7 TOTAL SCORE: 3
7. FEELING AFRAID AS IF SOMETHING AWFUL MIGHT HAPPEN: NOT AT ALL
7. FEELING AFRAID AS IF SOMETHING AWFUL MIGHT HAPPEN: NOT AT ALL
GAD7 TOTAL SCORE: 3
5. BEING SO RESTLESS THAT IT IS HARD TO SIT STILL: NOT AT ALL
3. WORRYING TOO MUCH ABOUT DIFFERENT THINGS: SEVERAL DAYS
1. FEELING NERVOUS, ANXIOUS, OR ON EDGE: SEVERAL DAYS
6. BECOMING EASILY ANNOYED OR IRRITABLE: NOT AT ALL
GAD7 TOTAL SCORE: 3

## 2022-03-24 ASSESSMENT — PAIN SCALES - GENERAL: PAINLEVEL: NO PAIN (0)

## 2022-03-24 NOTE — PROGRESS NOTES
Assessment and Plan  1. Anxiety disorder, unspecified type  2. Chronic prescription benzodiazepine use  Well controlled. Pt has been getting her Klonipin with psychiatrist whose department has sent in message on patient chart to convey that since she has been on chronic benzodiazepine use with standard dosage , its OK for PCP to take over it/ Pt has signed CSA in the office today. Will do refills from me from now on.   - Drug Abuse Screen Panel 13, Urine (Pain Care Package) - lab collect; Future  - Drug Abuse Screen Panel 13, Urine (Pain Care Package) - lab collect    2. Hyponatremia  Uncontrolled, last check in 2/2022 for showing 120s. She is started on Salt tablets given her SIADH conservative management has not been helping. Pt did have transient rash as the allergic reaction to salt tablets which she possibly thinks its her other food additives causing it - but currently tolerating it well. Will recheck at this time to make sure its normalized for further adjustment of the salt tablets dosage.   - Sodium; Future  - Sodium       Patient Instructions   As discussed, will take over your Klonipin refills. Please do the lab work , continue Salt tablets.     ====================  Patient Education     Benzodiazepines (Urine)  Does this test have other names?  Benzodiazepine urine toxicology screen   What is this test?  This is a urine test to check for a type of medicine called benzodiazepine. Benzodiazepines (behn-zoh-di-AZ-uh-peens) are medicines that depress the central nervous system. They are used to sedate patients, help them sleep, prevent seizures, ease anxiety, and relax muscle spasms. You may also hear these medicines called tranquilizers, sleeping pills, and muscle relaxants.      Examples of common medicines for anxiety, muscle tension, and seizures include:     Alprazolam    Chlordiazepoxide    Clonazepam    Clorazepate    Diazepam    Lorazepam    Oxazepam  Examples of common medicines for calmness and  "sleep include:     Temazepam    Triazolam    Flurazepam    Estazolam  These medicines are also sometimes used illegally. Street names for these medicines include \"downers,\" \"benzos,\" \"nerve pills,\" \"candy,\" and \"tranks.\" Chronic abuse of benzodiazepines can lead to addiction. Using these medicines with other depressants like alcohol can be fatal.   Why do I need this test?  Even if you have been prescribed one of these medicines, you may need this test if you have signs or symptoms of an overdose. Signs and symptoms of overdose can include:     Confusion    Slurred speech    Loss of muscle control    Trouble thinking or talking    Low blood pressure    Slow or shallow breathing    Seizure    Unconsciousness    Cardiac arrest  You may also have this test if a healthcare provider thinks you are abusing these medicines or using them illegally.   If you have signs as above, you may also have this test as part of a drug screen to check for other commonly abused medicines. These screens often include tests for:     Cocaine    Opioids    Amphetamines    Barbiturates    Marijuana    Phencyclidine  If you are conscious and able to talk, you can give information to help your healthcare providers figure out the right test for you. For example, if you are a victim of sexual assault, you may have this test to see if someone put a benzodiazepine date rape drug, such as flunitrazepam (Rohypnol or \"roofie\"), into your drink.   You might also be tested if providers think you have taken benzodiazepines accidentally or in a suicide attempt.    Different benzodiazepines have different doses, ranging from 0.5 to 50 milligrams (mg). Overdoses of 10 to 20 times the prescribed dose of some benzodiazepines can cause a mild coma but don't cause slow or shallow breathing. Most people recover. But overdoses of fast-acting benzodiazepines such as triazolam are more likely to cause breathing problems and even death.   What other tests might I " have along with this test?  You may also have a glucose test to check your blood sugar.   A benzodiazepine overdose alone is unlikely to cause coma or severe heart or lung function problems. If you have those symptoms, a healthcare provider may screen for other drugs and test for causes of central nervous system problems that are not caused by medicine or drugs.   You may also have a blood test for benzodiazepines. In some cases, it may be more practical to take a blood sample than a urine sample. Blood tests are also harder for a person to alter to hide drug abuse.   Which tests you have depends on your exam and information that you are able to provide.    What do my test results mean?  Test results may vary depending on your age, gender, health history, the method used for the test, and other things. Your test results may not mean you have a problem. Ask your healthcare provider what your test results mean for you.   A typical benzodiazepine urine test can detect benzodiazepines or their break-down products, called metabolites. But this is a very complex test.   A positive test result means that the test found the medicine's metabolite in your urine at the time the urine sample was taken. The amount found is called the threshold concentration. This means there was enough metabolite to measure. It does not mean the amount was enough to show you are actively using the medicine. The time it takes for a substance to show up in the urine varies by medicine. It can show up within minutes of taking the medicine, and it can last for days.   The presence of benzodiazepines varies a lot by each medicine's half-life. Half-life means the amount of time it takes for half of the medicine to be eliminated from the body. Diazepam, for example, can be found for weeks after the last dose.   Although most benzodiazepines show up in standard urine tests, some don't. Alprazolam, clonazepam, temazepam, and triazolam may not be found in  many of the common tests. Many benzodiazepine tests can find whether the medicine is present, but they can't give the amount.   A medicine called flumazenil may be used as an antidote to the sedative effects of benzodiazepines. It shouldn't be used in people who have been taking benzodiazepines over a long period to control seizures. In these cases, flumazenil could cause withdrawal that could lead to death.    How is this test done?  This test is done with a urine sample. Follow your healthcare provider's instructions for collecting and storing the sample. In some cases, you may have to provide a sample in the presence of a lab employee.   Does this test pose any risks?  This test poses no known risks.  What might affect my test results?  Some other medicines can cause a false positive result in benzodiazepine urine tests. These medicines include:     Tolmetin    Naproxen    Etodolac    Fenoprofen    Oxaprozin    Sertraline  How do I get ready for this test?  You do not need to prepare for this test. But be sure the  and your healthcare provider know all the medicines, herbs, vitamins, and supplements you are taking. This includes medicines that don t need a prescription and any illegal drugs you may use.    Dealised last reviewed this educational content on 11/1/2020 2000-2021 [employer]. All rights reserved. This information is not intended as a substitute for professional medical care. Always follow your healthcare professional's instructions.             Return in about 3 months (around 6/24/2022), or if symptoms worsen or fail to improve, for Follow up of last visit.    Mercedez Britton MD  [facility] ROMULO Luong is a 61 year old who presents for the following health issues         History of Present Illness       Mental Health Follow-up:  Patient presents to follow-up on Anxiety.    Patient's anxiety since last visit has been:  Good  The  patient is not having other symptoms associated with anxiety.  Any significant life events: other  Patient is not feeling anxious or having panic attacks.  Patient has no concerns about alcohol or drug use.         Today's MARIEL-7 Score: 3    She eats 2-3 servings of fruits and vegetables daily.She consumes 0 sweetened beverage(s) daily.She exercises with enough effort to increase her heart rate 20 to 29 minutes per day.  She exercises with enough effort to increase her heart rate 4 days per week.   She is taking medications regularly.        Pt wants to start getting her Klonipin from PCP which was previously managed by psychiatry - Charmaine White       Allergies   Allergen Reactions     Codeine Nausea and Nausea and Vomiting     Nitrofurantoin Unknown and Other (See Comments)     Other reaction(s): Gastrointestinal       Ace Inhibitors Cough     lisinopril  lisinopril     Hctz [Hydrochlorothiazide]      Severe Hyponatremia less than 120     Sulfa Drugs      Sulfur Unknown        Past Medical History:   Diagnosis Date     ASCUS favor benign 09/2014    3 yr co-test     Essential hypertension, benign      Impaired renal function      Inflammatory arthritis     Managed by Rheumatology  - q 6 mos     Microscopic colitis      Osteoarthritis of first carpometacarpal joint     bilateral     Other specified acquired hypothyroidism      Seasonal allergic rhinitis      Shortness of breath      Spider veins      Symptomatic menopausal or female climacteric states     age 43, on HRT     Tricuspid regurgitation     +1-2 TR noted on 12/22/14 Echo       Past Surgical History:   Procedure Laterality Date     ABDOMEN SURGERY  1/30/95    C section     BIOPSY BREAST       C/SECTION, LOW TRANSVERSE      twins     COLONOSCOPY  2013    nl, next one due in 5 years     LEEP TX, CERVICAL  1990s    normal since that time     MYRINGOTOMY, INSERT TUBE, COMBINED Left 4/2015    chronic NORM, ETD       Family History   Problem Relation Age of Onset      Cancer - colorectal Father         age 50     Colon Cancer Father      Arthritis Mother      Cancer - colorectal Brother         age 50     Colon Cancer Brother      Hypertension Sister      Thyroid Disease Sister         Sisters     Hypertension Brother      Colon Cancer Brother      Prostate Cancer Brother      Thyroid Disease Sister      Thyroid Disease Brother      Kidney Cancer Brother      Prostate Cancer Brother      Arthritis Sister      Cancer Sister 53        Uterine     Cancer Sister 50        Uterine     Testicular cancer Nephew      Prostate Cancer Brother        Social History     Tobacco Use     Smoking status: Former Smoker     Packs/day: 0.26     Years: 10.00     Pack years: 2.60     Types: Cigarettes     Quit date: 1989     Years since quittin.9     Smokeless tobacco: Never Used   Substance Use Topics     Alcohol use: Yes     Alcohol/week: 0.0 standard drinks     Comment: 2 drinks per day        Current Outpatient Medications   Medication     aspirin 81 MG EC tablet     Biotin 5000 MCG TABS     budesonide (ENTOCORT EC) 3 MG EC capsule     clobetasol (TEMOVATE) 0.05 % external solution     clonazePAM (KLONOPIN) 0.5 MG ODT     Estriol POWD     gabapentin (NEURONTIN) 100 MG capsule     hydrALAZINE (APRESOLINE) 10 MG tablet     HYDROmorphone (DILAUDID) 2 MG tablet     hydroxychloroquine (PLAQUENIL) 200 MG tablet     ipratropium (ATROVENT) 0.03 % nasal spray     levothyroxine (SYNTHROID/LEVOTHROID) 75 MCG tablet     liothyronine (CYTOMEL) 5 MCG tablet     loratadine (CLARITIN) 10 MG tablet     losartan (COZAAR) 100 MG tablet     metoprolol succinate ER (TOPROL-XL) 50 MG 24 hr tablet     Omega-3 Fatty Acids (FISH OIL ADULT GUMMIES PO)     Omega-3-acid Ethyl Esters (FISH OIL OMEGA-3 FATTY ACID) 320MG/ML oral suspension     oxyCODONE (ROXICODONE) 5 MG tablet     oxyCODONE (ROXICODONE) 5 MG tablet     Psyllium (FIBER) 28.3 % POWD     senna-docusate (SENOKOT-S/PERICOLACE) 8.6-50 MG tablet      sodium chloride 1 GM tablet     tiZANidine (ZANAFLEX) 4 MG tablet     TURMERIC PO     Vitamin D3 (CHOLECALCIFEROL) 125 MCG (5000 UT) tablet     No current facility-administered medications for this visit.        Review of Systems   Constitutional, HEENT, cardiovascular, pulmonary, GI, , musculoskeletal, neuro, skin, endocrine and psych systems are negative, except as otherwise noted.      Objective    /62   Pulse 79   Temp 97.4  F (36.3  C) (Tympanic)   Resp 14   Wt 65.3 kg (144 lb)   SpO2 98%   BMI 25.51 kg/m    Body mass index is 25.51 kg/m .  Physical Exam   GENERAL: healthy, alert and no distress  NECK: no adenopathy, no asymmetry, masses, or scars and thyroid normal to palpation  RESP: lungs clear to auscultation - no rales, rhonchi or wheezes  CV: regular rate and rhythm, normal S1 S2, no S3 or S4, no murmur, click or rub, no peripheral edema and peripheral pulses strong  ABDOMEN: soft, nontender, no hepatosplenomegaly, no masses and bowel sounds normal  MS: no gross musculoskeletal defects noted, no edema

## 2022-03-24 NOTE — LETTER
Opioid / Opioid Plus Controlled Substance Agreement    This is an agreement between you and your provider about the safe and appropriate use of controlled substance/opioids prescribed by your care team. Controlled substances are medicines that can cause physical and mental dependence (abuse).    There are strict laws about having and using these medicines. We here at [facility] are committing to working with you in your efforts to get better. To support you in this work, we ll help you schedule regular office appointments for medicine refills. If we must cancel or change your appointment for any reason, we ll make sure you have enough medicine to last until your next appointment.     As a Provider, I will:    Listen carefully to your concerns and treat you with respect.     Recommend a treatment plan that I believe is in your best interest. This plan may involve therapies other than opioid pain medication.     Talk with you often about the possible benefits, and the risk of harm of any medicine that we prescribe for you.     Provide a plan on how to taper (discontinue or go off) using this medicine if the decision is made to stop its use.    As a Patient, I understand that opioid(s):     Are a controlled substance prescribed by my care team to help me function or work and manage my condition(s).     Are strong medicines and can cause serious side effects such as:    Drowsiness, which can seriously affect my driving ability    A lower breathing rate, enough to cause death    Harm to my thinking ability     Depression     Abuse of and addiction to this medicine    Need to be taken exactly as prescribed. Combining opioids with certain medicines or chemicals (such as illegal drugs, sedatives, sleeping pills, and benzodiazepines) can be dangerous or even fatal. If I stop opioids suddenly, I may have severe withdrawal symptoms.    Do not work for all types of pain nor for all patients. If they re not helpful, I may  be asked to stop them.    I am also being prescribed a benzodiazepine (tranquilizer) controlled substance: I understand this type of medicine is sedating and can increase the risk of death when taken together with opioids. I have talked to my care team about having prescription Narcan available for reversing the opioid medicine and have been instructed in its use. I will be very careful to take my medicines only as directed    The risks, benefits and side effects of these medicine(s) were explained to me. I agree that:  1. I will take part in other treatments as advised by my care team. This may be psychiatry or counseling, physical therapy, behavioral therapy, group treatment or a referral to a specialist.     2. I will keep all my appointments. I understand that this is part of the monitoring of opioids. My care team may require an office visit for EVERY opioid/controlled substance refill. If I miss appointments or don t follow instructions, my care team may stop my medicine.    3. I will take my medicines as prescribed. I will not change the dose or schedule unless my care team tells me to. There will be no refills if I run out early.     4. I may be asked to come to the clinic and complete a urine drug test or complete a pill count at any time. If I don t give a urine sample or participate in a pill count, the care team may stop my medicine.    5. I will only receive prescriptions from this clinic for chronic pain. If I am treated by another provider for acute pain issues, I will tell them that I am taking opioid pain medication for chronic pain and that I have a treatment agreement with this provider. I will inform my [facility] care team within one business day if I am given a prescription for any pain medication by another healthcare provider. My [facility] care team can contact other providers and pharmacists about my use of any medicines.    6. It is up to me to make sure that I don t run out  of my medicines on weekends or holidays. If my care team is willing to refill my opioid prescription without a visit, I must request refills only during office hours. Refills may take up to 3 business days to process. I will use one pharmacy to fill all my opioid and other controlled substance prescriptions. I will notify the clinic about any changes to my insurance or medication availability.    7. I am responsible for my prescriptions. If the medicine/prescription is lost, stolen or destroyed, it will not be replaced. I also agree not to share controlled substance medicines with anyone.    8. I am aware I should not use any illegal or recreational drugs. I agree not to drink alcohol unless my care team says I can.       9. If I enroll in the [place] Medical Cannabis program, I will tell my care team prior to my next refill.     10. I will tell my care team right away if I become pregnant, have a new medical problem treated outside of my regular clinic, or have a change in my medications.    11. I understand that this medicine can affect my thinking, judgment and reaction time. Alcohol and drugs affect the brain and body, which can affect the safety of my driving. Being under the influence of alcohol or drugs can affect my decision-making, behaviors, personal safety, and the safety of others. Driving while impaired (DWI) can occur if a person is driving, operating, or in physical control of a car, motorcycle, boat, snowmobile, ATV, motorbike, off-road vehicle, or any other motor vehicle ([place] Statute 169A.20). I understand the risk if I choose to drive or operate any vehicle or machinery.    I understand that if I do not follow any of the conditions above, my prescriptions or treatment may be stopped or changed.          Opioids  What You Need to Know    What are opioids?   Opioids are pain medicines that must be prescribed by a doctor. They are also known as narcotics.     Examples are:   1. morphine (MS Contin,  Damaris)  2. oxycodone (Oxycontin)  3. oxycodone and acetaminophen (Percocet)  4. hydrocodone and acetaminophen (Vicodin, Norco)   5. fentanyl patch (Duragesic)   6. hydromorphone (Dilaudid)   7. methadone  8. codeine (Tylenol #3)     What do opioids do well?   Opioids are best for severe short-term pain such as after a surgery or injury. They may work well for cancer pain. They may help some people with long-lasting (chronic) pain.     What do opioids NOT do well?   Opioids never get rid of pain entirely, and they don t work well for most patients with chronic pain. Opioids don t reduce swelling, one of the causes of pain.                                    Other ways to manage chronic pain and improve function include:       Treat the health problem that may be causing pain    Anti-inflammation medicines, which reduce swelling and tenderness, such as ibuprofen (Advil, Motrin) or naproxen (Aleve)    Acetaminophen (Tylenol)    Antidepressants and anti-seizure medicines, especially for nerve pain    Topical treatments such as patches or creams    Injections or nerve blocks    Chiropractic or osteopathic treatment    Acupuncture, massage, deep breathing, meditation, visual imagery, aromatherapy    Use heat or ice at the pain site    Physical therapy     Exercise    Stop smoking    Take part in therapy       Risks and side effects     Talk to your doctor before you start or decide to keep taking opioids. Possible side effects include:      Lowering your breathing rate enough to cause death    Overdose, including death, especially if taking higher than prescribed doses    Worse depression symptoms; less pleasure in things you usually enjoy    Feeling tired or sluggish    Slower thoughts or cloudy thinking    Being more sensitive to pain over time; pain is harder to control    Trouble sleeping or restless sleep    Changes in hormone levels (for example, less testosterone)    Changes in sex drive or ability to have  sex    Constipation    Unsafe driving    Itching and sweating    Dizziness    Nausea, throwing up and dry mouth    What else should I know about opioids?    Opioids may lead to dependence, tolerance, or addiction.      Dependence means that if you stop or reduce the medicine too quickly, you will have withdrawal symptoms. These include loose poop (diarrhea), jitters, flu-like symptoms, nervousness and tremors. Dependence is not the same as addiction.                       Tolerance means needing higher doses over time to get the same effect. This may increase the chance of serious side effects.      Addiction is when people improperly use a substance that harms their body, their mind or their relations with others. Use of opiates can cause a relapse of addiction if you have a history of drug or alcohol abuse.      People who have used opioids for a long time may have a lower quality of life, worse depression, higher levels of pain and more visits to doctors.    You can overdose on opioids. Take these steps to lower your risk of overdose:    1. Recognize the signs:  Signs of overdose include decrease or loss of consciousness (blackout), slowed breathing, trouble waking up and blue lips. If someone is worried about overdose, they should call 911.    2. Talk to your doctor about Narcan (naloxone).   If you are at risk for overdose, you may be given a prescription for Narcan. This medicine very quickly reverses the effects of opioids.   If you overdose, a friend or family member can give you Narcan while waiting for the ambulance. They need to know the signs of overdose and how to give Narcan.     3. Don't use alcohol or street drugs.   Taking them with opioids can cause death.    4. Do not take any of these medicines unless your doctor says it s OK. Taking these with opioids can cause death:    Benzodiazepines, such as lorazepam (Ativan), alprazolam (Xanax) or diazepam (Valium)    Muscle relaxers, such as  cyclobenzaprine (Flexeril)    Sleeping pills like zolpidem (Ambien)     Other opioids      How to keep you and other people safe while taking opioids:    1. Never share your opioids with others.  Opioid medicines are regulated by the Drug Enforcement Agency ([place]). Selling or sharing medications is a criminal act.    2. Be sure to store opioids in a secure place, locked up if possible. Young children can easily swallow them and overdose.    3. When you are traveling with your medicines, keep them in the original bottles. If you use a pill box, be sure you also carry a copy of your medicine list from your clinic or pharmacy.    4. Safe disposal of opioids    Most pharmacies have places to get rid of medicine, called disposal kiosks. Medicine disposal options are also available in every [facility]. Search your county and  medication disposal  to find more options. You can find more details at:  https://www.[email or web address]./living-green/managing-[email or web address]-medications     I agree that my provider, clinic care team, and pharmacy may work with any city, state or federal law enforcement agency that investigates the misuse, sale, or other diversion of my controlled medicine. I will allow my provider to discuss my care with, or share a copy of, this agreement with any other treating provider, pharmacy or emergency room where I receive care.    I have read this agreement and have asked questions about anything I did not understand.    _______________________________________________________  Patient Signature - Irina Hanks _____________________                   Date     _______________________________________________________  Provider Signature - Mercedez Britton MD   _____________________                   Date     _______________________________________________________  Witness Signature (required if provider not present while patient signing)   _____________________                   Date

## 2022-03-25 LAB — SODIUM SERPL-SCNC: 136 MMOL/L (ref 133–144)

## 2022-03-25 ASSESSMENT — ANXIETY QUESTIONNAIRES: GAD7 TOTAL SCORE: 3

## 2022-04-04 DIAGNOSIS — F43.22 ADJUSTMENT DISORDER WITH ANXIOUS MOOD: ICD-10-CM

## 2022-04-05 RX ORDER — CLONAZEPAM 0.5 MG/1
0.5 TABLET, ORALLY DISINTEGRATING ORAL
Qty: 30 TABLET | Refills: 0 | Status: SHIPPED | OUTPATIENT
Start: 2022-04-05 | End: 2022-05-09

## 2022-04-05 RX ORDER — CLONAZEPAM 0.5 MG/1
0.5 TABLET, ORALLY DISINTEGRATING ORAL
Qty: 30 TABLET | Refills: 0 | OUTPATIENT
Start: 2022-04-05

## 2022-04-05 NOTE — TELEPHONE ENCOUNTER
Clonazepam 0.5 mg      Last Written Prescription Date:  3/8/22  Last Fill Quantity: 30,   # refills: 0  Last Office Visit: 3/24/22 Tobi  Future Office visit:       Routing refill request to provider for review/approval because:  Drug not on the [facility], [facility] or [facility] refill protocol or controlled substance    Patsy VIERA RN  EP Triage

## 2022-04-06 NOTE — TELEPHONE ENCOUNTER
[place] reviewed:  reviewed- no concerns    [place]  [email or web address]    Refill done.  Mercedez Britton MD on 4/5/2022 at 9:27 PM

## 2022-05-10 ENCOUNTER — LAB (OUTPATIENT)
Dept: LAB | Facility: CLINIC | Age: 62
End: 2022-05-10
Payer: COMMERCIAL

## 2022-05-10 DIAGNOSIS — E87.1 HYPONATREMIA: ICD-10-CM

## 2022-05-10 LAB — HGB BLD-MCNC: 13.8 G/DL (ref 11.7–15.7)

## 2022-05-10 PROCEDURE — 36415 COLL VENOUS BLD VENIPUNCTURE: CPT | Performed by: INTERNAL MEDICINE

## 2022-05-10 PROCEDURE — 85018 HEMOGLOBIN: CPT | Performed by: INTERNAL MEDICINE

## 2022-05-10 PROCEDURE — 82728 ASSAY OF FERRITIN: CPT | Performed by: INTERNAL MEDICINE

## 2022-05-10 PROCEDURE — 84295 ASSAY OF SERUM SODIUM: CPT

## 2022-05-11 LAB
FERRITIN SERPL-MCNC: 84 NG/ML (ref 8–252)
SODIUM SERPL-SCNC: 134 MMOL/L (ref 133–144)

## 2022-05-11 NOTE — RESULT ENCOUNTER NOTE
Dear Ms. Hanks,    Your most recent ferritin is 84. You should have phlebotomy done. (Please, disregard my last message).    Please, call me with any questions.    Eloisa Rossi MD

## 2022-05-23 ENCOUNTER — MYC MEDICAL ADVICE (OUTPATIENT)
Dept: FAMILY MEDICINE | Facility: CLINIC | Age: 62
End: 2022-05-23
Payer: COMMERCIAL

## 2022-05-23 DIAGNOSIS — Z79.2 NEED FOR PROPHYLACTIC ANTIBIOTIC: Primary | ICD-10-CM

## 2022-05-24 RX ORDER — AMOXICILLIN 500 MG/1
CAPSULE ORAL
Qty: 4 CAPSULE | Refills: 0 | Status: SHIPPED | OUTPATIENT
Start: 2022-05-24 | End: 2022-06-08

## 2022-05-24 NOTE — TELEPHONE ENCOUNTER
See MyChart request. Patient has dental appointment today at 1:30 pm.  Triage to call patient back as soon as ordered.  Can we leave a detailed message on this number? YES  Phone number patient can be reached at: Cell number on file:    Telephone Information:   Mobile [phone]       Carmen Lopez RN  [facility] Triage

## 2022-05-24 NOTE — TELEPHONE ENCOUNTER
Sent in the requested antibiotic to pharmacy.     Thank you,  Mercedez Britton MD on 5/24/2022 at 10:15 AM

## 2022-05-24 NOTE — TELEPHONE ENCOUNTER
Patient Contact     Attempted to call pt and left detailed vm with message from provider. Also sent [employer] message with provider response.     Nadine TRINH RN  [facility]

## 2022-06-08 ENCOUNTER — HOSPITAL ENCOUNTER (OUTPATIENT)
Facility: CLINIC | Age: 62
Setting detail: OBSERVATION
Discharge: HOME | End: 2022-06-09
Attending: PHYSICIAN ASSISTANT | Admitting: PHYSICIAN ASSISTANT
Payer: COMMERCIAL

## 2022-06-08 ENCOUNTER — APPOINTMENT (OUTPATIENT)
Dept: CT IMAGING | Facility: CLINIC | Age: 62
End: 2022-06-08
Attending: PHYSICIAN ASSISTANT
Payer: COMMERCIAL

## 2022-06-08 ENCOUNTER — NURSE TRIAGE (OUTPATIENT)
Dept: NURSING | Facility: CLINIC | Age: 62
End: 2022-06-08
Payer: COMMERCIAL

## 2022-06-08 DIAGNOSIS — I16.1 HYPERTENSIVE EMERGENCY: Primary | ICD-10-CM

## 2022-06-08 LAB
ANION GAP SERPL CALCULATED.3IONS-SCNC: 9 MMOL/L (ref 3–14)
ATRIAL RATE - MUSE: 72 BPM
BASOPHILS # BLD AUTO: 0.1 10E3/UL (ref 0–0.2)
BASOPHILS NFR BLD AUTO: 1 %
BUN SERPL-MCNC: 6 MG/DL (ref 7–30)
CALCIUM SERPL-MCNC: 9 MG/DL (ref 8.5–10.1)
CHLORIDE BLD-SCNC: 91 MMOL/L (ref 94–109)
CO2 SERPL-SCNC: 26 MMOL/L (ref 20–32)
CREAT SERPL-MCNC: 0.72 MG/DL (ref 0.52–1.04)
D DIMER PPP FEU-MCNC: 1.36 UG/ML FEU (ref 0–0.5)
DIASTOLIC BLOOD PRESSURE - MUSE: NORMAL MMHG
EOSINOPHIL # BLD AUTO: 0 10E3/UL (ref 0–0.7)
EOSINOPHIL NFR BLD AUTO: 0 %
ERYTHROCYTE [DISTWIDTH] IN BLOOD BY AUTOMATED COUNT: 11.8 % (ref 10–15)
GFR SERPL CREATININE-BSD FRML MDRD: >90 ML/MIN/1.73M2
GLUCOSE BLD-MCNC: 88 MG/DL (ref 70–99)
HCT VFR BLD AUTO: 37.9 % (ref 35–47)
HGB BLD-MCNC: 13.4 G/DL (ref 11.7–15.7)
IMM GRANULOCYTES # BLD: 0 10E3/UL
IMM GRANULOCYTES NFR BLD: 0 %
INTERPRETATION ECG - MUSE: NORMAL
LYMPHOCYTES # BLD AUTO: 0.9 10E3/UL (ref 0.8–5.3)
LYMPHOCYTES NFR BLD AUTO: 14 %
MCH RBC QN AUTO: 32.4 PG (ref 26.5–33)
MCHC RBC AUTO-ENTMCNC: 35.4 G/DL (ref 31.5–36.5)
MCV RBC AUTO: 92 FL (ref 78–100)
MONOCYTES # BLD AUTO: 0.9 10E3/UL (ref 0–1.3)
MONOCYTES NFR BLD AUTO: 14 %
NEUTROPHILS # BLD AUTO: 4.4 10E3/UL (ref 1.6–8.3)
NEUTROPHILS NFR BLD AUTO: 71 %
NRBC # BLD AUTO: 0 10E3/UL
NRBC BLD AUTO-RTO: 0 /100
NT-PROBNP SERPL-MCNC: 1092 PG/ML (ref 0–900)
OSMOLALITY SERPL: 265 MMOL/KG (ref 280–301)
OSMOLALITY UR: 239 MMOL/KG (ref 100–1200)
P AXIS - MUSE: 49 DEGREES
PLATELET # BLD AUTO: 208 10E3/UL (ref 150–450)
POTASSIUM BLD-SCNC: 4.5 MMOL/L (ref 3.4–5.3)
PR INTERVAL - MUSE: 162 MS
QRS DURATION - MUSE: 66 MS
QT - MUSE: 396 MS
QTC - MUSE: 433 MS
R AXIS - MUSE: 51 DEGREES
RBC # BLD AUTO: 4.14 10E6/UL (ref 3.8–5.2)
SARS-COV-2 RNA RESP QL NAA+PROBE: NEGATIVE
SODIUM SERPL-SCNC: 126 MMOL/L (ref 133–144)
SODIUM SERPL-SCNC: 128 MMOL/L (ref 133–144)
SODIUM UR-SCNC: 39 MMOL/L
SYSTOLIC BLOOD PRESSURE - MUSE: NORMAL MMHG
T AXIS - MUSE: 10 DEGREES
TROPONIN I SERPL HS-MCNC: 86 NG/L
TROPONIN I SERPL HS-MCNC: 97 NG/L
TROPONIN I SERPL HS-MCNC: 98 NG/L
TSH SERPL DL<=0.005 MIU/L-ACNC: 2.58 MU/L (ref 0.4–4)
VENTRICULAR RATE- MUSE: 72 BPM
WBC # BLD AUTO: 6.3 10E3/UL (ref 4–11)

## 2022-06-08 PROCEDURE — 93005 ELECTROCARDIOGRAM TRACING: CPT

## 2022-06-08 PROCEDURE — 96375 TX/PRO/DX INJ NEW DRUG ADDON: CPT | Mod: 59

## 2022-06-08 PROCEDURE — 85025 COMPLETE CBC W/AUTO DIFF WBC: CPT | Performed by: EMERGENCY MEDICINE

## 2022-06-08 PROCEDURE — 250N000011 HC RX IP 250 OP 636: Performed by: PHYSICIAN ASSISTANT

## 2022-06-08 PROCEDURE — 70498 CT ANGIOGRAPHY NECK: CPT

## 2022-06-08 PROCEDURE — 36415 COLL VENOUS BLD VENIPUNCTURE: CPT | Performed by: INTERNAL MEDICINE

## 2022-06-08 PROCEDURE — 70450 CT HEAD/BRAIN W/O DYE: CPT

## 2022-06-08 PROCEDURE — 84295 ASSAY OF SERUM SODIUM: CPT | Performed by: INTERNAL MEDICINE

## 2022-06-08 PROCEDURE — C9803 HOPD COVID-19 SPEC COLLECT: HCPCS

## 2022-06-08 PROCEDURE — 99220 PR INITIAL OBSERVATION CARE,LEVEL III: CPT | Performed by: INTERNAL MEDICINE

## 2022-06-08 PROCEDURE — 250N000013 HC RX MED GY IP 250 OP 250 PS 637: Performed by: PHYSICIAN ASSISTANT

## 2022-06-08 PROCEDURE — 84443 ASSAY THYROID STIM HORMONE: CPT | Performed by: INTERNAL MEDICINE

## 2022-06-08 PROCEDURE — 96374 THER/PROPH/DIAG INJ IV PUSH: CPT | Mod: 59

## 2022-06-08 PROCEDURE — 83930 ASSAY OF BLOOD OSMOLALITY: CPT | Performed by: INTERNAL MEDICINE

## 2022-06-08 PROCEDURE — 84484 ASSAY OF TROPONIN QUANT: CPT | Performed by: INTERNAL MEDICINE

## 2022-06-08 PROCEDURE — 70496 CT ANGIOGRAPHY HEAD: CPT

## 2022-06-08 PROCEDURE — 84300 ASSAY OF URINE SODIUM: CPT | Performed by: INTERNAL MEDICINE

## 2022-06-08 PROCEDURE — U0005 INFEC AGEN DETEC AMPLI PROBE: HCPCS | Performed by: PHYSICIAN ASSISTANT

## 2022-06-08 PROCEDURE — 36415 COLL VENOUS BLD VENIPUNCTURE: CPT | Performed by: EMERGENCY MEDICINE

## 2022-06-08 PROCEDURE — 83935 ASSAY OF URINE OSMOLALITY: CPT | Performed by: INTERNAL MEDICINE

## 2022-06-08 PROCEDURE — 83880 ASSAY OF NATRIURETIC PEPTIDE: CPT | Performed by: INTERNAL MEDICINE

## 2022-06-08 PROCEDURE — 71275 CT ANGIOGRAPHY CHEST: CPT

## 2022-06-08 PROCEDURE — 99285 EMERGENCY DEPT VISIT HI MDM: CPT | Mod: 25

## 2022-06-08 PROCEDURE — G0378 HOSPITAL OBSERVATION PER HR: HCPCS

## 2022-06-08 PROCEDURE — 80048 BASIC METABOLIC PNL TOTAL CA: CPT | Performed by: EMERGENCY MEDICINE

## 2022-06-08 PROCEDURE — 250N000009 HC RX 250: Performed by: PHYSICIAN ASSISTANT

## 2022-06-08 PROCEDURE — 85379 FIBRIN DEGRADATION QUANT: CPT | Performed by: PHYSICIAN ASSISTANT

## 2022-06-08 PROCEDURE — 84484 ASSAY OF TROPONIN QUANT: CPT | Performed by: EMERGENCY MEDICINE

## 2022-06-08 PROCEDURE — 250N000013 HC RX MED GY IP 250 OP 250 PS 637: Performed by: INTERNAL MEDICINE

## 2022-06-08 RX ORDER — NALOXONE HYDROCHLORIDE 0.4 MG/ML
0.4 INJECTION, SOLUTION INTRAMUSCULAR; INTRAVENOUS; SUBCUTANEOUS
Status: DISCONTINUED | OUTPATIENT
Start: 2022-06-08 | End: 2022-06-09 | Stop reason: HOSPADM

## 2022-06-08 RX ORDER — ACETAMINOPHEN 650 MG/1
650 SUPPOSITORY RECTAL EVERY 6 HOURS PRN
Status: DISCONTINUED | OUTPATIENT
Start: 2022-06-08 | End: 2022-06-09 | Stop reason: HOSPADM

## 2022-06-08 RX ORDER — CLONAZEPAM 0.25 MG/1
0.5 TABLET, ORALLY DISINTEGRATING ORAL
Status: DISCONTINUED | OUTPATIENT
Start: 2022-06-08 | End: 2022-06-09 | Stop reason: HOSPADM

## 2022-06-08 RX ORDER — HYDRALAZINE HYDROCHLORIDE 20 MG/ML
10 INJECTION INTRAMUSCULAR; INTRAVENOUS ONCE
Status: COMPLETED | OUTPATIENT
Start: 2022-06-08 | End: 2022-06-08

## 2022-06-08 RX ORDER — IOPAMIDOL 755 MG/ML
58 INJECTION, SOLUTION INTRAVASCULAR ONCE
Status: COMPLETED | OUTPATIENT
Start: 2022-06-08 | End: 2022-06-08

## 2022-06-08 RX ORDER — NALOXONE HYDROCHLORIDE 0.4 MG/ML
0.2 INJECTION, SOLUTION INTRAMUSCULAR; INTRAVENOUS; SUBCUTANEOUS
Status: DISCONTINUED | OUTPATIENT
Start: 2022-06-08 | End: 2022-06-09 | Stop reason: HOSPADM

## 2022-06-08 RX ORDER — ACETAMINOPHEN 325 MG/1
650 TABLET ORAL EVERY 6 HOURS PRN
Status: DISCONTINUED | OUTPATIENT
Start: 2022-06-08 | End: 2022-06-09 | Stop reason: HOSPADM

## 2022-06-08 RX ORDER — IOPAMIDOL 755 MG/ML
75 INJECTION, SOLUTION INTRAVASCULAR ONCE
Status: COMPLETED | OUTPATIENT
Start: 2022-06-08 | End: 2022-06-08

## 2022-06-08 RX ORDER — LEVOTHYROXINE SODIUM 75 UG/1
75 TABLET ORAL EVERY MORNING
Status: DISCONTINUED | OUTPATIENT
Start: 2022-06-09 | End: 2022-06-09 | Stop reason: HOSPADM

## 2022-06-08 RX ORDER — NITROGLYCERIN 0.4 MG/1
0.4 TABLET SUBLINGUAL EVERY 5 MIN PRN
Status: DISCONTINUED | OUTPATIENT
Start: 2022-06-08 | End: 2022-06-09 | Stop reason: HOSPADM

## 2022-06-08 RX ORDER — LIDOCAINE 40 MG/G
CREAM TOPICAL
Status: DISCONTINUED | OUTPATIENT
Start: 2022-06-08 | End: 2022-06-09 | Stop reason: HOSPADM

## 2022-06-08 RX ORDER — HYDROXYCHLOROQUINE SULFATE 200 MG/1
400 TABLET, FILM COATED ORAL DAILY
Status: DISCONTINUED | OUTPATIENT
Start: 2022-06-08 | End: 2022-06-09 | Stop reason: HOSPADM

## 2022-06-08 RX ORDER — LABETALOL HYDROCHLORIDE 5 MG/ML
20 INJECTION, SOLUTION INTRAVENOUS ONCE
Status: COMPLETED | OUTPATIENT
Start: 2022-06-08 | End: 2022-06-08

## 2022-06-08 RX ORDER — NALTREXONE 100 %
POWDER (GRAM) MISCELLANEOUS DAILY
COMMUNITY
End: 2022-06-15

## 2022-06-08 RX ORDER — LIOTHYRONINE SODIUM 5 UG/1
10 TABLET ORAL DAILY
Status: DISCONTINUED | OUTPATIENT
Start: 2022-06-09 | End: 2022-06-09 | Stop reason: HOSPADM

## 2022-06-08 RX ORDER — LOSARTAN POTASSIUM 100 MG/1
100 TABLET ORAL DAILY
Status: DISCONTINUED | OUTPATIENT
Start: 2022-06-09 | End: 2022-06-09 | Stop reason: HOSPADM

## 2022-06-08 RX ORDER — METOPROLOL SUCCINATE 100 MG/1
100 TABLET, EXTENDED RELEASE ORAL DAILY
Status: DISCONTINUED | OUTPATIENT
Start: 2022-06-09 | End: 2022-06-09 | Stop reason: HOSPADM

## 2022-06-08 RX ORDER — HYDRALAZINE HYDROCHLORIDE 20 MG/ML
10 INJECTION INTRAMUSCULAR; INTRAVENOUS EVERY 4 HOURS PRN
Status: DISCONTINUED | OUTPATIENT
Start: 2022-06-08 | End: 2022-06-09 | Stop reason: HOSPADM

## 2022-06-08 RX ORDER — ASPIRIN 81 MG/1
81 TABLET ORAL DAILY
Status: DISCONTINUED | OUTPATIENT
Start: 2022-06-08 | End: 2022-06-08

## 2022-06-08 RX ORDER — OXYCODONE HYDROCHLORIDE 5 MG/1
5 TABLET ORAL EVERY 4 HOURS PRN
Status: DISCONTINUED | OUTPATIENT
Start: 2022-06-08 | End: 2022-06-09 | Stop reason: HOSPADM

## 2022-06-08 RX ORDER — MAGNESIUM HYDROXIDE/ALUMINUM HYDROXICE/SIMETHICONE 120; 1200; 1200 MG/30ML; MG/30ML; MG/30ML
30 SUSPENSION ORAL EVERY 4 HOURS PRN
Status: DISCONTINUED | OUTPATIENT
Start: 2022-06-08 | End: 2022-06-09 | Stop reason: HOSPADM

## 2022-06-08 RX ORDER — ASPIRIN 81 MG/1
324 TABLET, CHEWABLE ORAL ONCE
Status: COMPLETED | OUTPATIENT
Start: 2022-06-08 | End: 2022-06-08

## 2022-06-08 RX ORDER — ASPIRIN 81 MG/1
81 TABLET ORAL DAILY
Status: DISCONTINUED | OUTPATIENT
Start: 2022-06-09 | End: 2022-06-09 | Stop reason: HOSPADM

## 2022-06-08 RX ADMIN — SODIUM CHLORIDE 85 ML: 900 INJECTION INTRAVENOUS at 11:54

## 2022-06-08 RX ADMIN — ACETAMINOPHEN 650 MG: 325 TABLET ORAL at 17:28

## 2022-06-08 RX ADMIN — IOPAMIDOL 58 ML: 755 INJECTION, SOLUTION INTRAVENOUS at 11:54

## 2022-06-08 RX ADMIN — CLONAZEPAM 0.5 MG: 0.25 TABLET, ORALLY DISINTEGRATING ORAL at 21:57

## 2022-06-08 RX ADMIN — HYDROXYCHLOROQUINE SULFATE 400 MG: 200 TABLET ORAL at 17:28

## 2022-06-08 RX ADMIN — IOPAMIDOL 75 ML: 755 INJECTION, SOLUTION INTRAVENOUS at 11:53

## 2022-06-08 RX ADMIN — HYDRALAZINE HYDROCHLORIDE 10 MG: 20 INJECTION INTRAMUSCULAR; INTRAVENOUS at 14:21

## 2022-06-08 RX ADMIN — ASPIRIN 81 MG CHEWABLE TABLET 324 MG: 81 TABLET CHEWABLE at 12:31

## 2022-06-08 RX ADMIN — LABETALOL HYDROCHLORIDE 20 MG: 5 INJECTION INTRAVENOUS at 12:58

## 2022-06-08 RX ADMIN — SODIUM CHLORIDE 90 ML: 900 INJECTION INTRAVENOUS at 11:53

## 2022-06-08 ASSESSMENT — ENCOUNTER SYMPTOMS
HEADACHES: 1
BACK PAIN: 1
RHINORRHEA: 0
TREMORS: 1
ABDOMINAL PAIN: 0
LIGHT-HEADEDNESS: 1
SPEECH DIFFICULTY: 0
COUGH: 0
FEVER: 0
DIZZINESS: 1
SHORTNESS OF BREATH: 1

## 2022-06-08 NOTE — ED PROVIDER NOTES
Emergency Department Attending Supervision Note  6/8/2022  2:24 PM      I evaluated this patient in conjunction with Ismael Kraus PA-C       Briefly, the patient presented with exertional shortness of breath for months, worsened over the last few weeks.  Patient had mild headache today and checked her blood pressure noted to be elevated.  She did note that she may have unknowingly missed her metoprolol over the last several days.  No leg swelling.  Patient denies chest pain.  Patient notes that shortness of breath is more than expected with exertion and most noticeable when she is climbing stairs.      On my exam,  General: Alert and cooperative with exam. Patient in mild distress. Normal mentation.  Head:  Scalp is NC/AT  Eyes:  No scleral icterus, PERRL  ENT:  The external nose and ears are normal.   Neck:  Normal range of motion without rigidity.  CV:  Regular rate and rhythm    No pathologic murmur   Resp:  Breath sounds are clear bilaterally    Non-labored, no retractions or accessory muscle use  GI:  Abdomen is soft, no distension, no tenderness. No peritoneal signs  MS:  No lower extremity edema   Skin:  Warm and dry, No rash or lesions noted.  Neuro: Oriented x 3. No gross motor deficits.      Results:  CT Chest Pulmonary Embolism w Contrast   Final Result   IMPRESSION: No evidence of pulmonary embolism.      DUSTIN LEAHY MD            SYSTEM ID:  [id number]      CTA Head Neck with Contrast   Final Result   IMPRESSION: Normal neck and head CTA.         MAMI KANG MD            SYSTEM ID:  [id number]      Head CT w/o contrast   Final Result   IMPRESSION: Normal head CT.            MAMI KANG MD            SYSTEM ID:  [id number]      Echocardiogram Complete    (Results Pending)     Labs Ordered and Resulted from Time of ED Arrival to Time of ED Departure   BASIC METABOLIC PANEL - Abnormal       Result Value    Sodium 126 (*)     Potassium 4.5      Chloride 91 (*)     Carbon Dioxide (CO2) 26       Anion Gap 9      Urea Nitrogen 6 (*)     Creatinine 0.72      Calcium 9.0      Glucose 88      GFR Estimate >90     TROPONIN I - Abnormal    Troponin I High Sensitivity 97 (*)    D DIMER QUANTITATIVE - Abnormal    D-Dimer Quantitative 1.36 (*)    NT PROBNP INPATIENT - Abnormal    N terminal Pro BNP Inpatient 1,092 (*)    COVID-19 VIRUS (CORONAVIRUS) BY PCR - Normal    SARS CoV2 PCR Negative     OSMOLALITY, RANDOM URINE - Normal    Osmolality Urine 239     TSH WITH FREE T4 REFLEX - Normal    TSH 2.58     CBC WITH PLATELETS AND DIFFERENTIAL    WBC Count 6.3      RBC Count 4.14      Hemoglobin 13.4      Hematocrit 37.9      MCV 92      MCH 32.4      MCHC 35.4      RDW 11.8      Platelet Count 208      % Neutrophils 71      % Lymphocytes 14      % Monocytes 14      % Eosinophils 0      % Basophils 1      % Immature Granulocytes 0      NRBCs per 100 WBC 0      Absolute Neutrophils 4.4      Absolute Lymphocytes 0.9      Absolute Monocytes 0.9      Absolute Eosinophils 0.0      Absolute Basophils 0.1      Absolute Immature Granulocytes 0.0      Absolute NRBCs 0.0     SODIUM RANDOM URINE    Sodium Urine mmol/L 39     OSMOLALITY   TROPONIN I   TROPONIN I         ED course:  Medications   iopamidol (ISOVUE-370) solution 75 mL (75 mLs Intravenous Given 6/8/22 1153)   Saline Flush (90 mLs Intravenous Given 6/8/22 1153)   iopamidol (ISOVUE-370) solution 58 mL (58 mLs Intravenous Given 6/8/22 1154)   Saline Flush (85 mLs Intravenous Given 6/8/22 1154)   aspirin (ASA) chewable tablet 324 mg (324 mg Oral Given 6/8/22 1231)   labetalol (NORMODYNE/TRANDATE) injection 20 mg (20 mg Intravenous Given 6/8/22 1258)   hydrALAZINE (APRESOLINE) injection 10 mg (10 mg Intravenous Given 6/8/22 1421)       My impression:  Patient is a 62-year-old female who presents with chronic low worsening exertional shortness of breath, mild headache, and hypertension (history of hypertension).  Patient's medical history and records were reviewed.  I was  asked to evaluate the patient by Fahad Kraus PA-C secondary to need for admission and concern for hypertensive emergency.  Imaging obtained prior to my evaluation demonstrates CTA of chest without PE; head and neck imaging without significant findings.  On my evaluation patient is neuro intact.  Lungs clear to auscultation.  EKG with isolated T wave inversion in V4 without other evidence of significant ischemia, infarction, or significant arrhythmia.  Patient denies chest pain.  Troponin was noted to be mildly elevated; concern for potential hypertensive emergency, cardiomyopathy, or heart failure.  Patient's BNP was mildly elevated though she does not appear acutely volume overloaded on exam.  Sodium also noted to be mildly low (126).  Patient will be admitted to observation with the hospitalist service for further evaluation and care.  She did receive labetalol and hydralazine for blood pressure control as well as 324 mg aspirin in the ED.    Diagnosis    ICD-10-CM    1. Hypertensive emergency  I16.1        Reji Myers DO I, Sophia Pantano, am serving as a scribe on 6/8/2022 at 2:25 PM to personally document services performed by Reji Myers DO based on my observations and the provider's statements to me.          Reji Myers DO  06/08/22 1655

## 2022-06-08 NOTE — ED TRIAGE NOTES
Has had a terrible headache for the last 3 days and hasn't slept for the last couple nights. Noticed she had a high blood pressure. Has been taking tylenol and ibuprofen.      Triage Assessment     Row Name 06/08/22 0722       Triage Assessment (Adult)    Airway WDL WDL       Respiratory WDL    Respiratory WDL WDL       Skin Circulation/Temperature WDL    Skin Circulation/Temperature WDL WDL       Peripheral/Neurovascular WDL    Peripheral Neurovascular WDL WDL       Cognitive/Neuro/Behavioral WDL    Cognitive/Neuro/Behavioral WDL X;mood/behavior    Mood/Behavior anxious;sad

## 2022-06-08 NOTE — ED NOTES
[facility]  ED Nurse Handoff Report    ED Chief complaint: No chief complaint on file.      ED Diagnosis:   Final diagnoses:   Hypertensive emergency       Code Status: Full Code    Allergies:   Allergies   Allergen Reactions     Codeine Nausea and Nausea and Vomiting     Nitrofurantoin Unknown and Other (See Comments)     Other reaction(s): Gastrointestinal       Ace Inhibitors Cough     lisinopril  lisinopril     Hctz [Hydrochlorothiazide]      Severe Hyponatremia less than 120     Sulfa Drugs      Sulfur Unknown       Patient Story: Pt comes to ED with c/o headache for the last couple of days and elevated BP. Upon getting back to be seen by provider, pt informed staff that she has also been SOB since October. Resp: SOB with activity. Sats stable on RA. Cardiac: Hypertensive. Neuro: Headache that has minimally improved.   Focused Assessment:      Treatments and/or interventions provided: labetalol  Patient's response to treatments and/or interventions:     To be done/followed up on inpatient unit:      Does this patient have any cognitive concerns?: None    Activity level - Baseline/Home:  Independent  Activity Level - Current:   Independent    Patient's Preferred language: English   Needed?: No    Isolation: None  Infection: Not Applicable  Patient tested for COVID 19 prior to admission: YES  Bariatric?: No    Vital Signs:   Vitals:    06/08/22 0937 06/08/22 1100 06/08/22 1145 06/08/22 1245   BP: (!) 193/114 (!) 198/113 (!) 193/119 (!) 172/116   Pulse:  79     Resp:       Temp: 97.7  F (36.5  C)      TempSrc: Oral      SpO2:   97%    Weight:       Height:           Cardiac Rhythm:     Was the PSS-3 completed:   Yes  What interventions are required if any?               Family Comments:   OBS brochure/video discussed/provided to patient/family: Yes              Name of person given brochure if not patient:               Relationship to patient:     For the majority of the shift  this patient's behavior was Green.   Behavioral interventions performed were .    ED NURSE PHONE NUMBER:

## 2022-06-08 NOTE — H&P
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    #Concern for hypertensive emergency with blood pressure of 198/113 and troponin I of 97 (no other endorgan damage.  Creatinine of 0.72, CT head negative for acute pathology and normal mental status)   # H/o HTN- PTA on losartan 100 mg daily, metoprolol  mg daily and hydralazine 10 mg prn  # r/o ACS vs nonischemic myocardial injury  -We will get serial troponin, echocardiogram to evaluate for any wall motion abnormality.  (Initial troponin I high-sensitivity of 97, EKG with T wave inversion in anterolateral leads)     -Patient is currently pain-free.  Continue aspirin, statin, metoprolol.  If any change in status may consider heparin gtt.  -Goal decrease in blood pressure by 20% in the next 24 hours.  -Resume PTA losartan, metoprolol   -As needed hydralazine, labetalol with goal /90 for now .  If any chest pain or blood patient is trending down may consider nitroglycerin gtt.    #Chronic exertional dyspnea:   -CT chest to rule out PE (negative).  Lung parenchyma clear except for mild atelectasis.  -Last stress echo 12/01/2014 negative for ischemia.  She also had repeat echocardiogram in January 2019 and pulmonary function test which were apparently normal ..    # Hyponatremia:   Patient was initially admitted in August 2021 with symptomatic hyponatremia which was suspected related to diuretic use.  She was followed by her primary care and on follow-up testing showed worsening hyponatremia and sodium on 126 Urine lytes were checked at thattime with FeNa <1 at 0.3 and Urine Sodium >20 , Urine Osm > 100 with euvolemia which  "depicts SIADH - and she was started on low dose Salt tablet 2/8/22  -With concern for hypertensive emergency, will hold PTA salt tablets  -Monitor sodium every 8 hours for now  -We will recheck urine sodium and osmolality (although the results may be skewed as patient had been on sodium tablets)     #Anxiety  -Continue PTA Klonopin as needed (very rare use)     #Hypothyroidism  -Continue PTA levothyroxine 75 MCG daily, liothyronine 10 MCG  Thyroid Studies   Recent Labs   Lab Test 09/14/21  0931 10/14/20  1008 09/17/19  0835 12/18/18  1340 08/14/17  1116   TSH 2.23 1.71 1.41 0.75 0.82     #Rheumatoid arthritis  -Continue hydroxychloroquine 400 mg by mouth daily    #Hemochromatosis-follows with Dr. Rossi and gets intermittent therapeutic phlebotomies.    #h/o Chronic ETOH use:  - monitor on telemetry, seizure and fall precautions  - [facility] alcohol withdrawal protocol  - Consider MVT, Thiamine, Folate on discharge     Admitted as observation, if troponin trends up or require GTT to to control BP, may need to change to inpatient.      Diet:   REGULAR  DVT Prophylaxis: Pneumatic Compression Devices  Stone Catheter: Not present  Central Lines: None  Cardiac Monitoring: None  Code Status:   Full     Clinically Significant Risk Factors Present on Admission         # Hyponatremia: Na = 126 mmol/L (Ref range: 133 - 144 mmol/L) on admission, will monitor as appropriate        # Platelet Defect: home medication list includes an antiplatelet medication       Disposition Plan   Expected Discharge:  1-2 days   Anticipated discharge location:  Awaiting care coordination huddle  Delays:         The patient's care was discussed with the Bedside Nurse, Patient, Patient's Family and ED Team.    Levy Cox MD, MD  Hospitalist Service  [facility]  Securely message with the Vocera Web Console (learn more here)  Text page via [employer] Paging/Directory     \"This dictation was performed with voice recognition " "software and may contain errors,  omissions and inadvertent word substitution.\"     ______________________________________________________________________    Chief Complaint   Headache    History is obtained from the patient  Review of EMR and D/W ED OUMAR     History of Present Illness   Irina Hanks is a 62 year old female with a history of rheumatoid arthritis, anxiety, essential hypertension, hemochromatosis and 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    As per the patient, since Saturday she has been having headaches (dull, predominantly apical, no specific aggravating or relieving factors, not associated with blurred vision or new tingling/numbness or weakness) .  Since headache was not getting better she decided to present to the emergency department.  Had similar episodes when her sodium was significantly low.  She also has chronic exertional dyspnea, which has been present for many years.  As per her long-ago she also had stress test that was negative.  Denies any new chest pain/palpitations or worsening shortness of breath  Denies any orthopnea/PND.  Uses 1 pillow for comfort  Denies any recent fever/chills/rigors  Denies any new bowel/urinary problems    Review of Systems    The 10 point Review of Systems is negative other than noted in the HPI or here.     Past Medical History    I have reviewed this patient's medical history and updated it with pertinent information if needed.   Past Medical History:   Diagnosis Date     ASCUS favor benign 09/2014    3 yr co-test     Essential hypertension, benign      Impaired renal function      Inflammatory arthritis     Managed by Rheumatology  - q 6 mos     Microscopic colitis      Osteoarthritis of first carpometacarpal joint     bilateral     Other specified acquired hypothyroidism      Seasonal allergic rhinitis      Shortness of breath      Spider veins      Symptomatic menopausal or female " climacteric states     age 43, on HRT     Tricuspid regurgitation     +1-2 TR noted on 14 Echo       Past Surgical History   I have reviewed this patient's surgical history and updated it with pertinent information if needed.  Past Surgical History:   Procedure Laterality Date     ABDOMEN SURGERY  89    C section     BIOPSY BREAST       C/SECTION, LOW TRANSVERSE      twins     COLONOSCOPY      nl, next one due in 5 years     LEEP TX, CERVICAL  1990s    normal since that time     MYRINGOTOMY, INSERT TUBE, COMBINED Left 2015    chronic NORM, ETD       Social History   I have reviewed this patient's social history and updated it with pertinent information if needed.  Social History     Tobacco Use     Smoking status: Former Smoker     Packs/day: 0.26     Years: 10.00     Pack years: 2.60     Types: Cigarettes     Quit date: 1989     Years since quittin.1     Smokeless tobacco: Never Used   Substance Use Topics     Alcohol use: Yes     Alcohol/week: 0.0 standard drinks     Comment: 2 drinks per day     Drug use: No       Family History   I have reviewed this patient's family history and updated it with pertinent information if needed.  Family History   Problem Relation Age of Onset     Cancer - colorectal Father         age 50     Colon Cancer Father      Arthritis Mother      Cancer - colorectal Brother         age 50     Colon Cancer Brother      Hypertension Sister      Thyroid Disease Sister         Sisters     Hypertension Brother      Colon Cancer Brother      Prostate Cancer Brother      Thyroid Disease Sister      Thyroid Disease Brother      Kidney Cancer Brother      Prostate Cancer Brother      Arthritis Sister      Cancer Sister 53        Uterine     Cancer Sister 50        Uterine     Testicular cancer Nephew      Prostate Cancer Brother        Prior to Admission Medications   Prior to Admission Medications   Prescriptions Last Dose Informant Patient Reported? Taking?   Biotin 5000  MCG TABS 6/7/2022 at Unknown time  Yes Yes   Sig: Take 1 tablet by mouth daily    Naltrexone POWD 6/7/2022 at Unknown time  Yes Yes   Sig: Apply topically daily (To scalp)   Omega-3 Fatty Acids (FISH OIL ADULT GUMMIES PO) 6/7/2022 at Unknown time  Yes Yes   Sig: Take 1 tablet by mouth daily    TURMERIC PO 6/7/2022 at Unknown time  Yes Yes   Sig: Take 1 tablet by mouth daily    Vitamin D3 (CHOLECALCIFEROL) 125 MCG (5000 UT) tablet 6/7/2022 at Unknown time  Yes Yes   Sig: Take 1 tablet by mouth daily   aspirin 81 MG EC tablet 6/7/2022 at Unknown time  Yes Yes   Sig: Take 81 mg by mouth daily   clobetasol (TEMOVATE) 0.05 % external solution 6/7/2022 at Unknown time  Yes Yes   Sig: Apply topically daily To scalp   clonazePAM (KLONOPIN) 0.5 MG ODT PRN  No Yes   Sig: Take 1 tablet (0.5 mg) by mouth nightly as needed for anxiety   hydroxychloroquine (PLAQUENIL) 200 MG tablet 6/7/2022 at Unknown time  No Yes   Sig: Take 2 tablets (400 mg) by mouth daily   ipratropium (ATROVENT) 0.03 % nasal spray 6/7/2022 at Unknown time  Yes Yes   Sig: Spray 2 sprays into both nostrils daily    levothyroxine (SYNTHROID/LEVOTHROID) 75 MCG tablet 6/7/2022 at Unknown time  No Yes   Sig: TAKE 1 TABLET BY MOUTH EVERY MORNING   liothyronine (CYTOMEL) 5 MCG tablet 6/7/2022 at Unknown time  No Yes   Sig: TAKE 2 TABLETS BY MOUTH EVERY DAY   loratadine (CLARITIN) 10 MG tablet 6/7/2022 at Unknown time  Yes Yes   Sig: Take 1 tablet (10 mg) by mouth daily   losartan (COZAAR) 100 MG tablet 6/8/2022 at Unknown time  No Yes   Sig: Take 1 tablet (100 mg) by mouth daily   metoprolol succinate ER (TOPROL-XL) 50 MG 24 hr tablet 6/8/2022 at Unknown time  No Yes   Sig: Take 2 tablets (100 mg) by mouth daily   sodium chloride 1 GM tablet 6/7/2022 at Unknown time  No Yes   Sig: Take 1 tablet (1 g) by mouth daily   tiZANidine (ZANAFLEX) 4 MG tablet PRN  No Yes   Sig: Take 1 tablet (4 mg) by mouth nightly as needed for muscle spasms      Facility-Administered  "Medications: None     Allergies   Allergies   Allergen Reactions     Codeine Nausea and Nausea and Vomiting     Nitrofurantoin Unknown and Other (See Comments)     Other reaction(s): Gastrointestinal       Ace Inhibitors Cough     lisinopril  lisinopril     Hctz [Hydrochlorothiazide]      Severe Hyponatremia less than 120     Sulfa Drugs      Sulfur Unknown       Physical Exam   BP (!) 176/107   Pulse 77   Temp 97.7  F (36.5  C) (Oral)   Resp 16   Ht 1.626 m (5' 4\")   Wt 65.3 kg (144 lb)   SpO2 99%   BMI 24.72 kg/m    Gen- pleasant lying in bed  HEENT- NAD, FLAKITA  Neck- supple, no JVD elevation, no thyromegaly  CVS- I+II+ no m/r/g  RS- CTABS  Abdo- soft, no tenderness . No g/r/r  Ext- no edema   CNS- no focal signs     Data   Data reviewed today: I reviewed all medications, new labs and imaging results over the last 24 hours. I personally reviewed the head CT image(s) showing as below.    Recent Results (from the past 24 hour(s))   Head CT w/o contrast    Narrative    CT OF THE HEAD WITHOUT CONTRAST June 8, 2022 12:02 PM     HISTORY: Dizziness, non-specific.    TECHNIQUE: Axial CT images of the head from the skull base to the  vertex were acquired without IV contrast. Radiation dose for this scan  was reduced using automated exposure control, adjustment of the mA  and/or kV according to patient size, or iterative reconstruction  technique.    COMPARISON: Brain MR 3/14/2008.    FINDINGS: The ventricles and basal cisterns are within normal limits  in configuration. There is no midline shift. There are no extra-axial  fluid collections. Gray-white differentiation is well maintained.    No intracranial hemorrhage, mass or recent infarct.    The visualized paranasal sinuses are well-aerated. There is no  mastoiditis. There are no fractures of the visualized bones.      Impression    IMPRESSION: Normal head CT.        MAMI KANG MD         SYSTEM ID:  [id number]   CTA Head Neck with Contrast    Narrative    CT " ANGIOGRAM OF THE HEAD AND NECK WITHOUT AND WITH CONTRAST June 8, 2022 12:11 PM     HISTORY: Dizziness, non-specific.    TECHNIQUE: Precontrast localizing scans were followed by CT  angiography with an injection of 75 mL Isovue-370 nonionic intravenous  contrast material with scans through the head and neck. Images were  transferred to a separate 3-D workstation where multiplanar  reformations and 3-D images were created. Estimates of carotid  stenoses are made relative to the distal internal carotid artery  diameters except as noted. Radiation dose for this scan was reduced  using automated exposure control, adjustment of the mA and/or kV  according to patient size, or iterative reconstruction technique.     COMPARISON: None.    FINDINGS:   Neck CTA: The bilateral common carotid, bilateral cervical internal  carotid and bilateral vertebral arteries are patent without stenosis.     Head CTA: The basilar, bilateral intracranial distal internal carotid,  bilateral anterior cerebral, bilateral middle cerebral and bilateral  posterior cerebral arteries are patent and unremarkable.      Impression    IMPRESSION: Normal neck and head CTA.      MAMI KANG MD         SYSTEM ID:  [id number]   CT Chest Pulmonary Embolism w Contrast    Narrative    CT CHEST PULMONARY EMBOLISM WITH CONTRAST June 8, 2022 12:12 PM    HISTORY: High blood pressure, headache and shortness of breath.  Pulmonary embolus suspected, low/intermediate probability, positive  D-dimer.    TECHNIQUE: Scans obtained from the apices through the diaphragm with  IV contrast. 85 mL Isovue-370 IV injected. Radiation dose for this  scan was reduced using automated exposure control, adjustment of the  mA and/or kV according to patient size, or iterative reconstruction  technique.    COMPARISON: Chest CT on 1/31/2019.    FINDINGS:  Chest/mediastinum: No evidence of pulmonary embolism. No cardiomegaly  or significant pericardial effusion. No significant mediastinal  or  hilar lymphadenopathy. No significant atherosclerotic vascular  calcification of the coronary arteries. Multiple prominent but not  significantly enlarged mediastinal hilar lymph nodes, indeterminate,  could be reactive.    Lungs and pleura: No pleural effusion or pneumothorax. Bibasilar  pulmonary opacities, likely atelectasis.    Upper abdomen: Limited evaluation of the upper abdomen due to lack of  coverage and timing of contrast.    Bones and soft tissue: No suspicious osseous lesion.      Impression    IMPRESSION: No evidence of pulmonary embolism.    DUSTIN LEAHY MD         SYSTEM ID:  [id number]     BMPRecent Labs   Lab 06/08/22  1108   *   POTASSIUM 4.5   CHLORIDE 91*   LUCA 9.0   CO2 26   BUN 6*   CR 0.72   GLC 88     CBC  Recent Labs   Lab 06/08/22  1108   WBC 6.3   RBC 4.14   HGB 13.4   HCT 37.9   MCV 92   MCH 32.4   MCHC 35.4   RDW 11.8

## 2022-06-08 NOTE — TELEPHONE ENCOUNTER
Nurse Triage SBAR    Is this a 2nd Level Triage? YES, LICENSED PRACTITIONER REVIEW IS REQUIRED    Situation:   Pt calling to report hypertension.    Background:   She takes metoprolol and lisinopril.    Assessment:   Blood pressure was 174/119 and HR was 96 before the call.  Pt states she took her lisinopril and metoprolol before calling around 6:45 AM.  She reports having 7-8/10 headache at the top of her head and lightheadedness and dizziness. She said she might have missed taking her metoprolol for a week at the most after checking her pill box.  6:52 AM /121 pulse 108.    Protocol Recommended Disposition:   See HCP Within 4 Hours (Or PCP Triage)    Recommendation:   Paging on call MD for recommendations     Paged to provider    Does the patient meet one of the following criteria for ADS visit consideration? 16+ years old, with an [facility] PCP     TIP  Providers, please consider if this condition is appropriate for management at one of our Acute and Diagnostic Services sites.     If patient is a good candidate, please use dotphrase <dot>triageresponse and select Refer to ADS to document.      6:59 AM Paged Dr. Rousseau  7:04 AM Called patient back to verify what dose of lisinopril she takes as it's not showing up on her list on epic.  Pt is now being driven to the hospital by her .  Says they are going to the ED. No further paging wanted.  7:11 AM Will not repage.    Reason for Disposition    [1] Systolic BP  >= 200 OR Diastolic >= 120  AND [2] having NO cardiac or neurologic symptoms    Additional Information    Negative: Difficult to awaken or acting confused (e.g., disoriented, slurred speech)    Negative: Severe difficulty breathing (e.g., struggling for each breath, speaks in single words)    Negative: [1] Weakness of the face, arm or leg on one side of the body AND [2] new onset    Negative: [1] Numbness (i.e., loss of sensation) of the face, arm or leg on one side of the body AND [2] new  onset    Negative: [1] Chest pain lasts > 5 minutes AND [2] history of heart disease  (i.e., heart attack, bypass surgery, angina, angioplasty, CHF)    Negative: [1] Chest pain AND [2] took nitrogylcerin AND [3] pain was not relieved    Negative: Sounds like a life-threatening emergency to the triager    Negative: [1] Systolic BP  >= 160 OR Diastolic >= 100 AND [2] cardiac or neurologic symptoms (e.g., chest pain, difficulty breathing, unsteady gait, blurred vision)    Protocols used: HIGH BLOOD PRESSURE--    Crista Luque RN, BSN Nurse Triage Advisor 6/8/2022 7:12 AM

## 2022-06-08 NOTE — PHARMACY-ADMISSION MEDICATION HISTORY
Exam consistent with viral illness. Typically viruses pass on their own in 7-10 days. You may take Over the counter medications as directed for cough, congestion, discomfort, or fever. If symptoms worsen or do not improve then call the clinic for further instructions or follow up with your Primary Care provider. You were screened for COVID today and swabbed. You will be called with the results and any indicated treatments. You were provided with written CDC isolation/quarantine guidelines. Pharmacy Medication History  Admission medication history interview status for the 6/8/2022  admission is complete. See EPIC admission navigator for prior to admission medications     Location of Interview: Patient room  Medication history sources: Patient and Surescripts    Significant changes made to the medication list:  Added naltrexone  Removed amoxicillin, budesonide, estriol, gabapentin, hydralazine, hydromorphone, oxycodone, psyllium, senna-docusate    In the past week, patient estimated taking medication this percent of the time: greater than 90%    Additional medication history information:   None    Medication reconciliation completed by provider prior to medication history? No    Time spent in this activity: 15 minutes    Prior to Admission medications    Medication Sig Last Dose Taking? Auth Provider   aspirin 81 MG EC tablet Take 81 mg by mouth daily 6/7/2022 at Unknown time Yes Reported, Patient   Biotin 5000 MCG TABS Take 1 tablet by mouth daily  6/7/2022 at Unknown time Yes Reported, Patient   clobetasol (TEMOVATE) 0.05 % external solution Apply topically daily To scalp 6/7/2022 at Unknown time Yes Reported, Patient   clonazePAM (KLONOPIN) 0.5 MG ODT Take 1 tablet (0.5 mg) by mouth nightly as needed for anxiety PRN Yes Mercedez Britton MD   hydroxychloroquine (PLAQUENIL) 200 MG tablet Take 2 tablets (400 mg) by mouth daily 6/7/2022 at Unknown time Yes Merecdez Britton MD   ipratropium (ATROVENT) 0.03 % nasal spray Spray 2 sprays into both nostrils daily  6/7/2022 at Unknown time Yes Rommel Becerra MD   levothyroxine (SYNTHROID/LEVOTHROID) 75 MCG tablet TAKE 1 TABLET BY MOUTH EVERY MORNING 6/7/2022 at Unknown time Yes Mercedez Britton MD   liothyronine (CYTOMEL) 5 MCG tablet TAKE 2 TABLETS BY MOUTH EVERY DAY 6/7/2022 at Unknown time Yes Mercedez Britton MD   loratadine (CLARITIN) 10 MG tablet Take 1 tablet (10 mg) by mouth daily 6/7/2022 at Unknown time Yes Sosa Neville PA-C   losartan  (COZAAR) 100 MG tablet Take 1 tablet (100 mg) by mouth daily 6/8/2022 at Unknown time Yes Mercedez Britton MD   metoprolol succinate ER (TOPROL-XL) 50 MG 24 hr tablet Take 2 tablets (100 mg) by mouth daily 6/8/2022 at Unknown time Yes Mercedez Britton MD   Naltrexone POWD Apply topically daily (To scalp) 6/7/2022 at Unknown time Yes Unknown, Entered By History   Omega-3 Fatty Acids (FISH OIL ADULT GUMMIES PO) Take 1 tablet by mouth daily  6/7/2022 at Unknown time Yes Reported, Patient   sodium chloride 1 GM tablet Take 1 tablet (1 g) by mouth daily 6/7/2022 at Unknown time Yes Mercedez Brittno MD   tiZANidine (ZANAFLEX) 4 MG tablet Take 1 tablet (4 mg) by mouth nightly as needed for muscle spasms PRN Yes Mercedez Britton MD   TURMERIC PO Take 1 tablet by mouth daily  6/7/2022 at Unknown time Yes Reported, Patient   Vitamin D3 (CHOLECALCIFEROL) 125 MCG (5000 UT) tablet Take 1 tablet by mouth daily 6/7/2022 at Unknown time Yes Unknown, Entered By History       The information provided in this note is only as accurate as the sources available at the time of update(s)

## 2022-06-08 NOTE — PROGRESS NOTES
Observation goals  PRIOR TO DISCHARGE        Comments: List all goals to be met before discharge home:   - Serial troponins and stress test complete. Not met  - Seen and cleared by consultant if applicable not met  - Adequate pain control on oral analgesia partially met  - Vital signs normal or at patient baseline not met  - Safe disposition plan has been identified not met  - Nurse to notify provider when observation goals have been met and patient is ready for discharge.

## 2022-06-08 NOTE — ED PROVIDER NOTES
History   Chief Complaint:  High blood pressure, Headache.      The history is provided by the patient.      Irina Hanks is a 62 year old female with history of sodium deficiency, hemochromatosis, hyperlipidemia and arthritis who presents with high blood pressure and a headache. Patient notes the headache has been going on for 3 days which is keeping her up at night. She mentions she thought it was her muscle relaxer Zanaflex, but realized she hadn't been taking her Toprol XL. She took 2 Toprol this morning and one Lisinopril. Notes experiencing dizziness accompanied by light-headedness from her headache and mentions being hospitalized recently for low sodium (116). She is seeing and hearing just fine but denies any fever, rhinorrhea, cough, chest pain, abdominal pain, history of blood clots, cancer or any recent travel. She does however mention experiencing episodes of shortness of breath as well as back pain from a procedure she had done back in October which resulted in non-weight bearing activity for two months. Irina takes a salt pill every day and was scheduled for a phlebotomy because of her hemochromatosis.     Review of Systems   Constitutional: Negative for fever.   HENT: Negative for rhinorrhea.    Eyes: Negative for visual disturbance.   Respiratory: Positive for shortness of breath. Negative for cough.    Cardiovascular: Negative for chest pain.   Gastrointestinal: Negative for abdominal pain.   Musculoskeletal: Positive for back pain (improving from injury).   Neurological: Positive for dizziness, tremors, light-headedness and headaches. Negative for speech difficulty.   All other systems reviewed and are negative.    Allergies:  Codeine  Nitrofurantoin  Ace Inhibitors  Hctz [Hydrochlorothiazide]  Sulfa Drugs  Sulfur    Medications:  amoxicillin (AMOXIL) 500 MG capsule  aspirin 81 MG EC tablet  Biotin 5000 MCG TABS  budesonide (ENTOCORT EC) 3 MG EC capsule  clonazePAM (KLONOPIN) 0.5 MG  ODT  Estriol POWD  gabapentin (NEURONTIN) 100 MG capsule  hydrALAZINE (APRESOLINE) 10 MG tablet  HYDROmorphone (DILAUDID) 2 MG tablet  hydroxychloroquine (PLAQUENIL) 200 MG tablet  ipratropium (ATROVENT) 0.03 % nasal spray  levothyroxine (SYNTHROID/LEVOTHROID) 75 MCG tablet  liothyronine (CYTOMEL) 5 MCG tablet  loratadine (CLARITIN) 10 MG tablet  losartan (COZAAR) 100 MG tablet  metoprolol succinate ER (TOPROL-XL) 50 MG 24 hr tablet  Omega-3 Fatty Acids (FISH OIL ADULT GUMMIES PO)  Omega-3-acid Ethyl Esters (FISH OIL OMEGA-3 FATTY ACID) 320MG/ML oral suspension  oxyCODONE (ROXICODONE) 5 MG tablet  oxyCODONE (ROXICODONE) 5 MG tablet  Psyllium (FIBER) 28.3 % POWD  senna-docusate (SENOKOT-S/PERICOLACE) 8.6-50 MG tablet  sodium chloride 1 GM tablet  tiZANidine (ZANAFLEX) 4 MG tablet  TURMERIC PO  Vitamin D3 (CHOLECALCIFEROL) 125 MCG (5000 UT) tablet    Past Medical History:     ASCUS   Hypertension  Renal impairment   Arthritis  Colitis   Osteoarthritis   Hemachromatosis  Hyperlipidemia  Allergies   Anxiety  IBS  High blood pressure    Past Surgical History:    Abdomen surgery (c section)  Biopsy breast  Colonoscopy  LEEP TX  Myringotomy      Family History:    Father - cancer  Mother - Osteoporosis     Social History:  Presents to the ED accompanied by spouse.     Physical Exam     Patient Vitals for the past 24 hrs:   BP Temp Temp src Pulse Resp SpO2 Height Weight   06/08/22 1500 (!) 133/90 -- -- 98 14 96 % -- --   06/08/22 1445 139/88 -- -- 100 10 95 % -- --   06/08/22 1430 (!) 146/96 -- -- 90 11 97 % -- --   06/08/22 1400 (!) 176/107 -- -- 77 16 99 % -- --   06/08/22 1345 (!) 160/97 -- -- 86 23 -- -- --   06/08/22 1330 (!) 148/99 -- -- 80 14 -- -- --   06/08/22 1315 (!) 175/102 -- -- -- -- -- -- --   06/08/22 1245 (!) 172/116 -- -- -- -- -- -- --   06/08/22 1145 (!) 193/119 -- -- -- -- 97 % -- --   06/08/22 1100 (!) 198/113 -- -- 79 -- -- -- --   06/08/22 0937 (!) 193/114 97.7  F (36.5  C) Oral -- -- -- -- --  "  06/08/22 0725 (!) 178/114 (!) 96.2  F (35.7  C) -- 114 18 100 % 1.626 m (5' 4\") 65.3 kg (144 lb)       Physical Exam  General: Well appearing, pleasant, resting on exam bed  HEENT: No evidence of trauma.  Conjunctive are clear.  Extraocular eye movements intact.  Neck range of motion intact.  Nose and throat clear.  Respiratory: Good breath sounds bilaterally  Cardiovascular: fast rate and rhythm   Gastrointestinal: Soft, nontender.   Musculoskeletal: Atraumatic  Skin: Exposed skin clear.  Neurologic: Alert and oriented x4.  Gross sensation intact.  Proximal and distal motor strength of the upper and lower extremities intact.  Cerebellar function test intact.  Cranial nerves II through XII intact.  She is alert, answers questions, follows commands, has normal gaze and visual fields without any facial palsy.  Her upper and lower extremities have no drift.  She has no limb ataxia, changes in sensation or language, no dysarthria or neglect. Gait normal.  Psych:  Patient is cooperative, with normal affect.    Emergency Department Course   ECG  ECG results from 06/08/22   EKG 12 lead     Value    Systolic Blood Pressure     Diastolic Blood Pressure     Ventricular Rate 72    Atrial Rate 72    MI Interval 162    QRS Duration 66        QTc 433    P Axis 49    R AXIS 51    T Axis 10    Interpretation ECG      Sinus rhythm  Nonspecific ST and T wave abnormality  Abnormal ECG  When compared with ECG of 13-AUG-2021 18:14,  Nonspecific T wave abnormality, worse in Inferior leads  Confirmed by GENERATED REPORT, COMPUTER ([id number]),  Ghanshyam Moreno ([id number]) on 6/8/2022 2:51:48 PM         Imaging:  CT Chest Pulmonary Embolism w Contrast   Final Result   IMPRESSION: No evidence of pulmonary embolism.      DUSTIN LEAHY MD            SYSTEM ID:  [id number]      CTA Head Neck with Contrast   Final Result   IMPRESSION: Normal neck and head CTA.         MAMI KANG MD            SYSTEM ID:  [id number]      Head CT w/o contrast "   Final Result   IMPRESSION: Normal head CT.            MAMI KANG MD            SYSTEM ID:  [id number]      Echocardiogram Complete    (Results Pending)     Report per radiology    Laboratory:  Labs Ordered and Resulted from Time of ED Arrival to Time of ED Departure   BASIC METABOLIC PANEL - Abnormal       Result Value    Sodium 126 (*)     Potassium 4.5      Chloride 91 (*)     Carbon Dioxide (CO2) 26      Anion Gap 9      Urea Nitrogen 6 (*)     Creatinine 0.72      Calcium 9.0      Glucose 88      GFR Estimate >90     TROPONIN I - Abnormal    Troponin I High Sensitivity 97 (*)    D DIMER QUANTITATIVE - Abnormal    D-Dimer Quantitative 1.36 (*)    NT PROBNP INPATIENT - Abnormal    N terminal Pro BNP Inpatient 1,092 (*)    COVID-19 VIRUS (CORONAVIRUS) BY PCR - Normal    SARS CoV2 PCR Negative     OSMOLALITY, RANDOM URINE - Normal    Osmolality Urine 239     TSH WITH FREE T4 REFLEX - Normal    TSH 2.58     CBC WITH PLATELETS AND DIFFERENTIAL    WBC Count 6.3      RBC Count 4.14      Hemoglobin 13.4      Hematocrit 37.9      MCV 92      MCH 32.4      MCHC 35.4      RDW 11.8      Platelet Count 208      % Neutrophils 71      % Lymphocytes 14      % Monocytes 14      % Eosinophils 0      % Basophils 1      % Immature Granulocytes 0      NRBCs per 100 WBC 0      Absolute Neutrophils 4.4      Absolute Lymphocytes 0.9      Absolute Monocytes 0.9      Absolute Eosinophils 0.0      Absolute Basophils 0.1      Absolute Immature Granulocytes 0.0      Absolute NRBCs 0.0     SODIUM RANDOM URINE    Sodium Urine mmol/L 39     OSMOLALITY   TROPONIN I   TROPONIN I      Emergency Department Course:         Reviewed:  I reviewed nursing notes, vitals, past medical history and Care Everywhere    Assessments:  1055 I obtained history and examined the patient as noted above.   1151 I rechecked the patient and explained findings.     Consults:  1255 I staffed the patient with Dr. Myers.   1315 I spoke to Dr. Cox, Hospitalist who  agrees to admit the patient.     Interventions:  1231 Aspirin 324 mg, PO  1258 Labetalol 20 mg, IV  1421 Apresoline 10 mg, IV    Disposition:  The patient was admitted to the hospital under the care of Dr. Cox.     Impression & Plan     Medical Decision Making:  Irina Hanks is a 62 year old female presents emergency room today for evaluation of elevated blood pressures, headache, and chronic shortness of breath.  See HPI.  She is hypertensive.  She has normal neurological exam.  EKG shows nonspecific T waves.  These do appear new compared to prior.  BMP shows mild hyponatremia.  Her dimer is elevated.  Troponin was 97.  CT of her head and angiography of neck and head were unremarkable.  PE study unremarkable.  She remains clinically and vitally stable for over 7 hours.  She was given an aspirin.  Likely hypertensive emergency causing her elevated troponin.  Her blood pressure was controlled with labetalol and hydralazine.  I staffed case with Dr. Myers.  Dr. Cox is excepting of the hospital service.  Less likely NSTEMI given that her shortness of breath has been going on for months.  She forgot to take her metoprolol this week.  Could be contributing.  Nonetheless, inpatient management is indicated.  Patient is in agreement of the plan.  No further questions.    Covid-19  Irina Hanks was evaluated during a global COVID-19 pandemic, which necessitated consideration that the patient might be at risk for infection with the SARS-CoV-2 virus that causes COVID-19.   Applicable protocols for evaluation were followed during the patient's care.   COVID-19 was considered as part of the patient's evaluation. The plan for testing is:  a test was obtained during this visit.    Diagnosis:    ICD-10-CM    1. Hypertensive emergency  I16.1        Scribe Disclosure:  ML JUAREZ, am serving as a scribe at 10:50 AM on 6/8/2022 to document services personally performed by Ismael Kraus PA-C based on my  observations and the provider's statements to me.          Ismael Kraus PA-C  06/08/22 [id number]

## 2022-06-08 NOTE — PROGRESS NOTES
RECEIVING UNIT ED HANDOFF REVIEW    ED Nurse Handoff Report was reviewed by: Ashley Cutler RN on June 8, 2022 at 4:26 PM

## 2022-06-08 NOTE — PLAN OF CARE
Orientation/Cognitive: A&O. Anxiety  Observation Goals (Met/ Not Met): Not met  Mobility Level/Assist Equipment: SBA. Light headed, dizzy at times  Fall Risk (Y/N): Y  Behavior Concerns: N  Pain Management: HA tx tylenol. HA for 3 days. Denies chest pain or related symp.   Tele/VS/O2: Tele NS. Bp elevated, Hydralazine prn for sbp >160 and dbp>100. /99. Temp 99.5, acetaminophen given. Denies SOB.  ABNL Lab/BG: Trop elevated x2. Hypona. BNP, ddimer  Diet: Comb reg diet, no caff. No beta blockers for stress test  Bowel/Bladder: cont  Skin Concerns: no  Drains/Devices: no  Tests/Procedures for next shift: Stress test, ECHO, trop  Anticipated DC date & active delays: TBD  Patient Stated Goal for Today: feel better and less anx

## 2022-06-09 ENCOUNTER — APPOINTMENT (OUTPATIENT)
Dept: CARDIOLOGY | Facility: CLINIC | Age: 62
End: 2022-06-09
Attending: INTERNAL MEDICINE
Payer: COMMERCIAL

## 2022-06-09 VITALS
OXYGEN SATURATION: 95 % | DIASTOLIC BLOOD PRESSURE: 89 MMHG | HEIGHT: 64 IN | HEART RATE: 76 BPM | WEIGHT: 144 LBS | BODY MASS INDEX: 24.59 KG/M2 | SYSTOLIC BLOOD PRESSURE: 135 MMHG | RESPIRATION RATE: 19 BRPM | TEMPERATURE: 97.6 F

## 2022-06-09 LAB
LVEF ECHO: NORMAL
SODIUM SERPL-SCNC: 131 MMOL/L (ref 133–144)
TROPONIN I SERPL HS-MCNC: 76 NG/L

## 2022-06-09 PROCEDURE — 93306 TTE W/DOPPLER COMPLETE: CPT

## 2022-06-09 PROCEDURE — 99217 PR OBSERVATION CARE DISCHARGE: CPT | Performed by: HOSPITALIST

## 2022-06-09 PROCEDURE — 84484 ASSAY OF TROPONIN QUANT: CPT | Performed by: HOSPITALIST

## 2022-06-09 PROCEDURE — 250N000013 HC RX MED GY IP 250 OP 250 PS 637: Performed by: INTERNAL MEDICINE

## 2022-06-09 PROCEDURE — G0378 HOSPITAL OBSERVATION PER HR: HCPCS

## 2022-06-09 PROCEDURE — 84295 ASSAY OF SERUM SODIUM: CPT | Performed by: HOSPITALIST

## 2022-06-09 PROCEDURE — 93306 TTE W/DOPPLER COMPLETE: CPT | Mod: 26 | Performed by: INTERNAL MEDICINE

## 2022-06-09 PROCEDURE — 36415 COLL VENOUS BLD VENIPUNCTURE: CPT | Performed by: HOSPITALIST

## 2022-06-09 RX ADMIN — LEVOTHYROXINE SODIUM 75 MCG: 75 TABLET ORAL at 08:17

## 2022-06-09 RX ADMIN — LOSARTAN POTASSIUM 100 MG: 100 TABLET, FILM COATED ORAL at 08:18

## 2022-06-09 RX ADMIN — ASPIRIN 81 MG: 81 TABLET, COATED ORAL at 08:18

## 2022-06-09 RX ADMIN — HYDROXYCHLOROQUINE SULFATE 400 MG: 200 TABLET ORAL at 08:18

## 2022-06-09 RX ADMIN — LIOTHYRONINE SODIUM 10 MCG: 5 TABLET ORAL at 08:17

## 2022-06-09 RX ADMIN — METOPROLOL SUCCINATE 100 MG: 100 TABLET, EXTENDED RELEASE ORAL at 08:18

## 2022-06-09 NOTE — PROGRESS NOTES
Observation goals  PRIOR TO DISCHARGE       Comments: List all goals to be met before discharge home:   - Serial troponins and stress test complete- Met  - Seen and cleared by consultant if applicable- Not met   - Adequate pain control on oral analgesia- Met    - Vital signs normal or at patient baseline- Partially met    - Safe disposition plan has been identified- Not met

## 2022-06-09 NOTE — PLAN OF CARE
Goal Outcome Evaluation:  Orientation/Cognitive: A&O x4  Observation Goals (Met/ Not Met): Not met   Mobility Level/Assist Equipment: SBA   Fall Risk (Y/N): Y  Behavior Concerns: None  Pain Management: PRN tylenol   Tele/VS/O2: VSS on RA except HTN   ABNL Lab/BG: Na-128, Troponin- 97,98,86  Diet: Regular Diet with No Caffeine   Bowel/Bladder: Continent   Skin Concerns: None  Drains/Devices: PIV SL   Tests/Procedures for next shift: ECHO  Anticipated DC date & active delays: TBD  Patient Stated Goal for Today: Rest     Observation goals  PRIOR TO DISCHARGE       Comments: List all goals to be met before discharge home:   - Serial troponins and stress test complete- Met  - Seen and cleared by consultant if applicable- Not met   - Adequate pain control on oral analgesia- Met    - Vital signs normal or at patient baseline- Partially met    - Safe disposition plan has been identified- Not met

## 2022-06-09 NOTE — DISCHARGE SUMMARY
[facility]  Hospitalist Discharge Summary      Date of Admission:  6/8/2022  Date of Discharge:  6/9/2022  Discharging Provider: Kennedy Vasquez DO  Discharge Service: Hospitalist Service    Discharge Diagnoses   Headache from hypertensive emergency with potential beta blocker withdrawal  Type II nstemi due to demand/mismatch for uncontrolled hypertension  Chronic exertional dyspnea  Hyponatremia, likely SIADH      Follow-ups Needed After Discharge   Follow-up Appointments     Follow-up and recommended labs and tests       Follow up with primary care provider, Mercedez Britton, within 7 days to   evaluate medication change, to evaluate after surgery, and for hospital   follow- up.  The following labs/tests are recommended: bmp in 1 week.             Unresulted Labs Ordered in the Past 30 Days of this Admission     No orders found for last 31 day(s).      These results will be followed up by PCP    Discharge Disposition   Discharged to home  Condition at discharge: Stable    Hospital Course   Patient presented with headache and tremeor after forgetting to take her outpatient metoprolol for the last 5 days prior to admission. She was subsequently found to have a blood pressure at max of 200/120. EKG was completed and did show nonspecific ST changes. CT PE study was negative.    The patient apparently had refilled her metoprolol but did not fill her pill box so missed about 5 days of the medication.     Initial imaging included CTPE and CT of the head that were unremarkable    She did have a mild troponin leak of 98 -> 78 likely due to her elevated blood pressure. She had no symptoms consistent with ACS such as chest pain, shortness of breath, nausea, or vomiting. Her symptoms did resolve after control of her blood pressure and restarting her metoprolol. An echocardiogram was completed and normal.     Her sodium level was slightly low as well, but improved with resolution of her  headache.    She will follow up with her PCP in 1 week for a bmp recheck and will continue her salt tablets.    A stress test was advised and she will do that as an outpatient. Ordered a lexiscan given her recent back sprain and difficulty using a treadmill due to lower/midback pain    She was advised to return to the ED if she developed recurrent symptoms.    Consultations This Hospital Stay   None    Code Status   Full Code    Time Spent on this Encounter   I, Kennedy Vasquez DO, personally saw the patient today and spent greater than 30 minutes discharging this patient.       Kennedy Vasquez DO  [facility] EXTENDED RECOVERY AND SHORT STAY  [address] [address]  Phone: [phone]  ______________________________________________________________________    Physical Exam   Vital Signs: Temp: 97.6  F (36.4  C) Temp src: Oral BP: 135/89 Pulse: 76   Resp: 19 SpO2: 95 % O2 Device: None (Room air)    Weight: 144 lbs 0 oz  HEENT- NAD, FLAKITA  Neck- supple, no JVD elevation, no thyromegaly  CVS- RRR, no murmurs  RS- CTABS  Abdo- soft, no tenderness . No g/r/r  Ext- no edema   CNS- no focal signs           Primary Care Physician   Mercedez Britton    Discharge Orders      Follow-Up with Cardiology O[facility]AR      Reason for your hospital stay    Headache potentially from metoprolol withdrawal     Follow-up and recommended labs and tests     Follow up with primary care provider, Mercedez Britton, within 7 days to evaluate medication change, to evaluate after surgery, and for hospital follow- up.  The following labs/tests are recommended: bmp in 1 week.     Activity    Your activity upon discharge: activity as tolerated     Diet    Follow this diet upon discharge: Orders Placed This Encounter      Combination Diet Regular Diet Adult; No Caffeine Diet     NM Lexiscan stress test (nuc card)    [id number] send results to PCP and cardiology       Significant Results and Procedures   Most  Recent 3 CBC's:Recent Labs   Lab Test 06/08/22  1108 05/10/22  1414 02/02/22  1330 01/04/22  1502 10/05/21  1107 08/13/21 2019   WBC 6.3  --   --   --  4.3 8.2   HGB 13.4 13.8 12.1   < > 11.4* 11.9   MCV 92  --   --   --  89 83     --   --   --  246 282    < > = values in this interval not displayed.     Most Recent 3 BMP's:Recent Labs   Lab Test 06/09/22  0926 06/08/22  1836 06/08/22  1108 03/24/22  1134 02/02/22  1330 10/05/21  1107   * 128* 126*   < > 126* 133   POTASSIUM  --   --  4.5  --  4.5 4.0   CHLORIDE  --   --  91*  --  96 99   CO2  --   --  26  --  23 26   BUN  --   --  6*  --  16 9   CR  --   --  0.72  --  0.79 0.93   ANIONGAP  --   --  9  --  7 8   LUCA  --   --  9.0  --  9.5 9.1   GLC  --   --  88  --  88 74    < > = values in this interval not displayed.     Most Recent 2 LFT's:Recent Labs   Lab Test 02/02/22  1330 08/13/21  1457   AST 24 22   ALT 26 22   ALKPHOS 94 96   BILITOTAL 0.6 0.7   ,   Results for orders placed or performed during the hospital encounter of 06/08/22   CT Chest Pulmonary Embolism w Contrast    Narrative    CT CHEST PULMONARY EMBOLISM WITH CONTRAST June 8, 2022 12:12 PM    HISTORY: High blood pressure, headache and shortness of breath.  Pulmonary embolus suspected, low/intermediate probability, positive  D-dimer.    TECHNIQUE: Scans obtained from the apices through the diaphragm with  IV contrast. 85 mL Isovue-370 IV injected. Radiation dose for this  scan was reduced using automated exposure control, adjustment of the  mA and/or kV according to patient size, or iterative reconstruction  technique.    COMPARISON: Chest CT on 1/31/2019.    FINDINGS:  Chest/mediastinum: No evidence of pulmonary embolism. No cardiomegaly  or significant pericardial effusion. No significant mediastinal or  hilar lymphadenopathy. No significant atherosclerotic vascular  calcification of the coronary arteries. Multiple prominent but not  significantly enlarged mediastinal hilar lymph  nodes, indeterminate,  could be reactive.    Lungs and pleura: No pleural effusion or pneumothorax. Bibasilar  pulmonary opacities, likely atelectasis.    Upper abdomen: Limited evaluation of the upper abdomen due to lack of  coverage and timing of contrast.    Bones and soft tissue: No suspicious osseous lesion.      Impression    IMPRESSION: No evidence of pulmonary embolism.    DUSTIN LEAHY MD         SYSTEM ID:  [id number]   Head CT w/o contrast    Narrative    CT OF THE HEAD WITHOUT CONTRAST June 8, 2022 12:02 PM     HISTORY: Dizziness, non-specific.    TECHNIQUE: Axial CT images of the head from the skull base to the  vertex were acquired without IV contrast. Radiation dose for this scan  was reduced using automated exposure control, adjustment of the mA  and/or kV according to patient size, or iterative reconstruction  technique.    COMPARISON: Brain MR 3/14/2008.    FINDINGS: The ventricles and basal cisterns are within normal limits  in configuration. There is no midline shift. There are no extra-axial  fluid collections. Gray-white differentiation is well maintained.    No intracranial hemorrhage, mass or recent infarct.    The visualized paranasal sinuses are well-aerated. There is no  mastoiditis. There are no fractures of the visualized bones.      Impression    IMPRESSION: Normal head CT.        MAMI KANG MD         SYSTEM ID:  [id number]   CTA Head Neck with Contrast    Narrative    CT ANGIOGRAM OF THE HEAD AND NECK WITHOUT AND WITH CONTRAST June 8, 2022 12:11 PM     HISTORY: Dizziness, non-specific.    TECHNIQUE: Precontrast localizing scans were followed by CT  angiography with an injection of 75 mL Isovue-370 nonionic intravenous  contrast material with scans through the head and neck. Images were  transferred to a separate 3-D workstation where multiplanar  reformations and 3-D images were created. Estimates of carotid  stenoses are made relative to the distal internal carotid  artery  diameters except as noted. Radiation dose for this scan was reduced  using automated exposure control, adjustment of the mA and/or kV  according to patient size, or iterative reconstruction technique.     COMPARISON: None.    FINDINGS:   Neck CTA: The bilateral common carotid, bilateral cervical internal  carotid and bilateral vertebral arteries are patent without stenosis.     Head CTA: The basilar, bilateral intracranial distal internal carotid,  bilateral anterior cerebral, bilateral middle cerebral and bilateral  posterior cerebral arteries are patent and unremarkable.      Impression    IMPRESSION: Normal neck and head CTA.      MAMI KANG MD         SYSTEM ID:  [id number]   Echocardiogram Complete     Value    LVEF  60-65%    Narrative    [id number]  [id number]  [id number]  [id number]^ANNA^HEBERT     [facility]  Echocardiography Laboratory  [address] [place] [address]     Name: RO GARCÍA  MRN: [id number]  : 1960  Study Date: 2022 08:40 AM  Age: 62 yrs  Gender: Female  Patient Location: [facility]  Reason For Study: SOB  Ordering Physician: HEBERT CASTILLO  Referring Physician: Mercedez Britton  Performed By: Hussein Barron     BSA: 1.7 m2  Height: 64 in  Weight: 144 lb  HR: 74  BP: 172/116 mmHg  ______________________________________________________________________________  Procedure  Complete Portable Echo Adult.  ______________________________________________________________________________  Interpretation Summary     Left ventricular systolic function is normal.  The visual ejection fraction is 60-65%.  The right ventricle is normal in structure, function and size.  No significant valve dysfunction.  The inferior vena cava was normal in size with preserved respiratory  variability.  Trivial pericardial effusion     No significant change from study dated 2019.  ______________________________________________________________________________  Left  Ventricle  The left ventricle is normal in structure, function and size. Left ventricular  systolic function is normal. The visual ejection fraction is 60-65%. Grade I  or early diastolic dysfunction. Normal left ventricular wall motion.     Right Ventricle  The right ventricle is normal in structure, function and size.     Atria  Normal left atrial size. Right atrial size is normal.     Mitral Valve  The mitral valve is normal in structure and function.     Tricuspid Valve  The tricuspid valve is normal in structure and function. The right ventricular  systolic pressure is approximated at 9.4 mmHg plus the right atrial pressure.  Right ventricular systolic pressure is normal.     Aortic Valve  The aortic valve is normal in structure and function.     Pulmonic Valve  The pulmonic valve is normal in structure and function.     Vessels  The aortic root is normal size. Normal size ascending aorta. The inferior vena  cava was normal in size with preserved respiratory variability.     Pericardium  Trivial pericardial effusion.     ______________________________________________________________________________  MMode/2D Measurements & Calculations  IVSd: 1.2 cm  LVIDd: 3.9 cm  LVIDs: 2.4 cm  LVPWd: 1.1 cm  FS: 37.4 %  LV mass(C)d: 153.1 grams  LV mass(C)dI: 90.0 grams/m2     Ao root diam: 3.2 cm  LA dimension: 3.5 cm  asc Aorta Diam: 3.2 cm  LA/Ao: 1.1  LVOT diam: 2.0 cm  LVOT area: 3.0 cm2  LA Volume (BP): 49.9 ml  LA Volume Index (BP): 29.4 ml/m2  RWT: 0.58     Doppler Measurements & Calculations  MV E max ivy: 46.6 cm/sec  MV A max ivy: 60.1 cm/sec  MV E/A: 0.77  MV dec time: 0.21 sec     PA acc time: 0.10 sec  TR max ivy: 153.4 cm/sec  TR max P.4 mmHg  E/E' av.1  Lateral E/e': 9.3  Medial E/e': 13.0     ______________________________________________________________________________  Report approved by: Lauren Glover 2022 11:41 AM               Discharge Medications   Current Discharge Medication  List      CONTINUE these medications which have NOT CHANGED    Details   aspirin 81 MG EC tablet Take 81 mg by mouth daily      Biotin 5000 MCG TABS Take 1 tablet by mouth daily       clobetasol (TEMOVATE) 0.05 % external solution Apply topically daily To scalp      clonazePAM (KLONOPIN) 0.5 MG ODT Take 1 tablet (0.5 mg) by mouth nightly as needed for anxiety  Qty: 30 tablet, Refills: 0    Associated Diagnoses: Adjustment disorder with anxious mood      hydroxychloroquine (PLAQUENIL) 200 MG tablet Take 2 tablets (400 mg) by mouth daily  Qty: 90 tablet, Refills: 3    Associated Diagnoses: Rheumatoid arthritis involving multiple sites with positive rheumatoid factor (H)      ipratropium (ATROVENT) 0.03 % nasal spray Spray 2 sprays into both nostrils daily       levothyroxine (SYNTHROID/LEVOTHROID) 75 MCG tablet TAKE 1 TABLET BY MOUTH EVERY MORNING  Qty: 90 tablet, Refills: 2    Associated Diagnoses: Acquired hypothyroidism      liothyronine (CYTOMEL) 5 MCG tablet TAKE 2 TABLETS BY MOUTH EVERY DAY  Qty: 180 tablet, Refills: 2    Associated Diagnoses: Acquired hypothyroidism      loratadine (CLARITIN) 10 MG tablet Take 1 tablet (10 mg) by mouth daily  Qty: 90 tablet, Refills: 3      losartan (COZAAR) 100 MG tablet Take 1 tablet (100 mg) by mouth daily  Qty: 90 tablet, Refills: 1    Associated Diagnoses: Essential hypertension      metoprolol succinate ER (TOPROL-XL) 50 MG 24 hr tablet Take 2 tablets (100 mg) by mouth daily  Qty: 180 tablet, Refills: 1    Associated Diagnoses: Essential hypertension      Naltrexone POWD Apply topically daily (To scalp)      Omega-3 Fatty Acids (FISH OIL ADULT GUMMIES PO) Take 1 tablet by mouth daily       sodium chloride 1 GM tablet Take 1 tablet (1 g) by mouth daily  Qty: 60 tablet, Refills: 1    Associated Diagnoses: Hyponatremia; SIADH (syndrome of inappropriate ADH production) (H)      TURMERIC PO Take 1 tablet by mouth daily       Vitamin D3 (CHOLECALCIFEROL) 125 MCG (5000 UT)  tablet Take 1 tablet by mouth daily         STOP taking these medications       tiZANidine (ZANAFLEX) 4 MG tablet Comments:   Reason for Stopping:             Allergies   Allergies   Allergen Reactions     Codeine Nausea and Nausea and Vomiting     Nitrofurantoin Unknown and Other (See Comments)     Other reaction(s): Gastrointestinal       Ace Inhibitors Cough     lisinopril  lisinopril     Hctz [Hydrochlorothiazide]      Severe Hyponatremia less than 120     Sulfa Drugs      Sulfur Unknown

## 2022-06-09 NOTE — PROVIDER NOTIFICATION
MD Notification    Notified Person: MD    Notified Person Name: Dr. Kenny Lockett     Notification Date/Time:    Notification Interaction:    Purpose of Notification:Short stay Room 532  Pt is requesting her PTA clonazepam for sleep.  Na @1837 is 128  troponin is 86  Pt denies chest pain  Thank you,   Sienna CHISHOLM       Orders Received:    Comments:

## 2022-06-09 NOTE — PROGRESS NOTES
Patient discharged from short stay unit with NA via wc.  Orientation:x4  Respirations status: WNL  Ambulation status: SBA  Pain status: denies  VS: WNL  PIV: Removed and dressed  Discharge outcome: After visit summary provided and explained, patient verbalized understanding. Left the unit with all of her belongings.

## 2022-06-10 ENCOUNTER — PATIENT OUTREACH (OUTPATIENT)
Dept: FAMILY MEDICINE | Facility: CLINIC | Age: 62
End: 2022-06-10
Payer: COMMERCIAL

## 2022-06-10 NOTE — TELEPHONE ENCOUNTER
What type of discharge? Observation  Risk of Hospital admission or ED visit: 13%  Is a TCM episode required? No  When should the patient follow up with PCP? within 30 days of discharge.    Carmen Lopez RN  [facility]

## 2022-06-15 ENCOUNTER — OFFICE VISIT (OUTPATIENT)
Dept: FAMILY MEDICINE | Facility: CLINIC | Age: 62
End: 2022-06-15
Payer: COMMERCIAL

## 2022-06-15 ENCOUNTER — TELEPHONE (OUTPATIENT)
Dept: ONCOLOGY | Facility: CLINIC | Age: 62
End: 2022-06-15

## 2022-06-15 VITALS
TEMPERATURE: 97.8 F | BODY MASS INDEX: 24.59 KG/M2 | HEART RATE: 67 BPM | WEIGHT: 144 LBS | DIASTOLIC BLOOD PRESSURE: 104 MMHG | SYSTOLIC BLOOD PRESSURE: 170 MMHG | OXYGEN SATURATION: 96 % | HEIGHT: 64 IN

## 2022-06-15 DIAGNOSIS — R79.89 ELEVATED TROPONIN: ICD-10-CM

## 2022-06-15 DIAGNOSIS — I16.0 HYPERTENSIVE URGENCY: Primary | ICD-10-CM

## 2022-06-15 DIAGNOSIS — L43.9 LICHEN PLANUS: ICD-10-CM

## 2022-06-15 DIAGNOSIS — R79.89 ELEVATED BRAIN NATRIURETIC PEPTIDE (BNP) LEVEL: ICD-10-CM

## 2022-06-15 DIAGNOSIS — I10 BENIGN ESSENTIAL HYPERTENSION: ICD-10-CM

## 2022-06-15 DIAGNOSIS — E87.1 HYPONATREMIA: ICD-10-CM

## 2022-06-15 LAB
ALBUMIN SERPL-MCNC: 3.4 G/DL (ref 3.4–5)
ALP SERPL-CCNC: 92 U/L (ref 40–150)
ALT SERPL W P-5'-P-CCNC: 119 U/L (ref 0–50)
ANION GAP SERPL CALCULATED.3IONS-SCNC: 7 MMOL/L (ref 3–14)
AST SERPL W P-5'-P-CCNC: 82 U/L (ref 0–45)
BILIRUB DIRECT SERPL-MCNC: 0.2 MG/DL (ref 0–0.2)
BILIRUB SERPL-MCNC: 0.6 MG/DL (ref 0.2–1.3)
BUN SERPL-MCNC: 8 MG/DL (ref 7–30)
CALCIUM SERPL-MCNC: 8.8 MG/DL (ref 8.5–10.1)
CHLORIDE BLD-SCNC: 101 MMOL/L (ref 94–109)
CO2 SERPL-SCNC: 24 MMOL/L (ref 20–32)
CREAT SERPL-MCNC: 0.73 MG/DL (ref 0.52–1.04)
GFR SERPL CREATININE-BSD FRML MDRD: >90 ML/MIN/1.73M2
GLUCOSE BLD-MCNC: 90 MG/DL (ref 70–99)
POTASSIUM BLD-SCNC: 4.1 MMOL/L (ref 3.4–5.3)
PROT SERPL-MCNC: 7.2 G/DL (ref 6.8–8.8)
SODIUM SERPL-SCNC: 132 MMOL/L (ref 133–144)

## 2022-06-15 PROCEDURE — 99214 OFFICE O/P EST MOD 30 MIN: CPT | Performed by: INTERNAL MEDICINE

## 2022-06-15 PROCEDURE — 36415 COLL VENOUS BLD VENIPUNCTURE: CPT | Performed by: INTERNAL MEDICINE

## 2022-06-15 PROCEDURE — 82248 BILIRUBIN DIRECT: CPT | Performed by: INTERNAL MEDICINE

## 2022-06-15 PROCEDURE — 80053 COMPREHEN METABOLIC PANEL: CPT | Performed by: INTERNAL MEDICINE

## 2022-06-15 RX ORDER — AMLODIPINE BESYLATE 5 MG/1
5 TABLET ORAL DAILY
Qty: 90 TABLET | Refills: 3 | Status: SHIPPED | OUTPATIENT
Start: 2022-06-15 | End: 2022-10-04

## 2022-06-15 NOTE — PROGRESS NOTES
Assessment and Plan  1. Hypertensive urgency  Recent hospitalization and discharge on 6/8/22 - 6/9/22 for headache and tremors. Found to have HTN Urgency due to patient who has missed her Metoprolol dosage for past 5 days. 200/120. EKG was completed and did show nonspecific ST changes. CT PE study was negative. Did have elevtaed BNP , troponins and low sodium. .Echo done was showing normal LVEF and normal RVSP.   Will need recheck of BMP   - Given the Uncontrolled BP, discussed on avoiding the triggering factors including quitting Alcohol / Beer which she states Ocassional but causing possible withdrawal , headaches due to Hyponatremia and elevated BP. . Will start on Amlodipine which she was taking in the past.     2. Benign essential hypertension  Please see AVS for details.   - amLODIPine (NORVASC) 5 MG tablet; Take 1 tablet (5 mg) by mouth daily  Dispense: 90 tablet; Refill: 3    3. Elevated troponin  4. Elevated brain natriuretic peptide (BNP) level  Pt absolutely asymptomatic on any signs or symptoms of CHF , please see above for details.     5. Hyponatremia  - Basic metabolic panel  (Ca, Cl, CO2, Creat, Gluc, K, Na, BUN); Future  - Basic metabolic panel  (Ca, Cl, CO2, Creat, Gluc, K, Na, BUN)    6. Lichen planus  Ongoing problem , more on the scalp for which pt does follow Dermatology. She was started on Naltreoxone recently and as she states that the headache and current symptoms started after this. Discussed on follow up with dermatology and discuss other options.   - Hepatic panel (Albumin, ALT, AST, Bili, Alk Phos, TP); Future     Patient Instructions   As discussed, your blood pressures are remaining high and we will need to resume back your Amlodipine which we stopped in the past.     Continue Losartan and Metoprolol at 100 mg each daily. Please send me the BP log for next 1 week before further titration.    Will check your BMP.     ===================    Dietary Approaches to Stop Hypertension trial --  "A different approach was evaluated in the Dietary Approaches to Stop Hypertension (DASH) trial [6]. Rather than evaluating sodium intake or weight loss, DASH randomly assigned 459 patients with BPs of less than 160/80 to 95 mmHg to one of three diets:  ?A control diet low in fruits, vegetables, and legumes and high in snacks, sweets, meats, and saturated fat.  ?A diet rich in fruits, vegetables, legumes and low in snacks and sweets.  ?A combination diet rich in fruits, vegetables, legumes, and low-fat dairy products and low in snacks, sweets, meats, and saturated and total fat (this combination diet is called the \"DASH diet\"). The DASH diet is comprised of four to five servings of fruit, four to five servings of vegetables, two to three servings of low-fat dairy per day, and <25 percent fat.  The following observations were noted in which the BP reductions were expressed in relation to the fall in BP seen with the control diet:  ?The fruits and vegetables diet reduced the BP by 2.8/1.1 mmHg, and the combination diet reduced the BP by 5.5/3.0.  ?These effects were more pronounced in patients with hypertension. With the combination diet, for example, the BP fell 11.4/5.5 mmHg in hypertensives versus 3.5/2.1 mmHg in the normotensives.  ?The antihypertensive effects were maximal by the end of week 2 with any of the diets and were then maintained for eight weeks.      Return in about 3 months (around 9/15/2022), or if symptoms worsen or fail to improve, for Preventative Visit.    Mercedez Britton MD  [facility] ROMULO Luong is a 62 year old who presents for the following health issues       HPI     ED/UC Followup:    Facility:  [facility]  Date of visit: 06/08/2022  Reason for visit: Hypertensive Emergency  Current Status: Still feeling dizzy with headaches       Allergies   Allergen Reactions     Codeine Nausea and Nausea and Vomiting     Nitrofurantoin Unknown and Other " (See Comments)     Other reaction(s): Gastrointestinal       Ace Inhibitors Cough     lisinopril  lisinopril     Hctz [Hydrochlorothiazide]      Severe Hyponatremia less than 120     Sulfa Drugs      Sulfur Unknown        Past Medical History:   Diagnosis Date     ASCUS favor benign 09/2014    3 yr co-test     Essential hypertension, benign      Impaired renal function      Inflammatory arthritis     Managed by Rheumatology  - q 6 mos     Microscopic colitis      Osteoarthritis of first carpometacarpal joint     bilateral     Other specified acquired hypothyroidism      Seasonal allergic rhinitis      Shortness of breath      Spider veins      Symptomatic menopausal or female climacteric states     age 43, on HRT     Tricuspid regurgitation     +1-2 TR noted on 12/22/14 Echo       Past Surgical History:   Procedure Laterality Date     ABDOMEN SURGERY  1/30/95    C section     BIOPSY BREAST       C/SECTION, LOW TRANSVERSE      twins     COLONOSCOPY  2013    nl, next one due in 5 years     LEEP TX, CERVICAL  1990s    normal since that time     MYRINGOTOMY, INSERT TUBE, COMBINED Left 4/2015    chronic NORM, ETD       Family History   Problem Relation Age of Onset     Cancer - colorectal Father         age 50     Colon Cancer Father      Arthritis Mother      Cancer - colorectal Brother         age 50     Colon Cancer Brother      Hypertension Sister      Thyroid Disease Sister         Sisters     Hypertension Brother      Colon Cancer Brother      Prostate Cancer Brother      Thyroid Disease Sister      Thyroid Disease Brother      Kidney Cancer Brother      Prostate Cancer Brother      Arthritis Sister      Cancer Sister 53        Uterine     Cancer Sister 50        Uterine     Testicular cancer Nephew      Prostate Cancer Brother        Social History     Tobacco Use     Smoking status: Former Smoker     Packs/day: 0.26     Years: 10.00     Pack years: 2.60     Types: Cigarettes     Quit date: 4/26/1989     Years  "since quittin.1     Smokeless tobacco: Never Used   Substance Use Topics     Alcohol use: Yes     Alcohol/week: 0.0 standard drinks     Comment: 2 drinks per day        Current Outpatient Medications   Medication     amLODIPine (NORVASC) 5 MG tablet     aspirin 81 MG EC tablet     Biotin 5000 MCG TABS     clobetasol (TEMOVATE) 0.05 % external solution     clonazePAM (KLONOPIN) 0.5 MG ODT     hydroxychloroquine (PLAQUENIL) 200 MG tablet     ipratropium (ATROVENT) 0.03 % nasal spray     levothyroxine (SYNTHROID/LEVOTHROID) 75 MCG tablet     liothyronine (CYTOMEL) 5 MCG tablet     loratadine (CLARITIN) 10 MG tablet     losartan (COZAAR) 100 MG tablet     metoprolol succinate ER (TOPROL-XL) 50 MG 24 hr tablet     Omega-3 Fatty Acids (FISH OIL ADULT GUMMIES PO)     sodium chloride 1 GM tablet     TURMERIC PO     Vitamin D3 (CHOLECALCIFEROL) 125 MCG (5000 UT) tablet     No current facility-administered medications for this visit.        Review of Systems   Constitutional, HEENT, cardiovascular, pulmonary, GI, , musculoskeletal, neuro, skin, endocrine and psych systems are negative, except as otherwise noted.      Objective    BP (!) 170/104   Pulse 67   Temp 97.8  F (36.6  C) (Temporal)   Ht 1.626 m (5' 4\")   Wt 65.3 kg (144 lb)   SpO2 96%   BMI 24.72 kg/m    Body mass index is 24.72 kg/m .  Physical Exam   GENERAL: healthy, alert and no distress  NECK: no adenopathy, no asymmetry, masses, or scars and thyroid normal to palpation  RESP: lungs clear to auscultation - no rales, rhonchi or wheezes  CV: regular rate and rhythm, normal S1 S2, no S3 or S4, no murmur, click or rub, no peripheral edema and peripheral pulses strong  ABDOMEN: soft, nontender, no hepatosplenomegaly, no masses and bowel sounds normal  MS: no gross musculoskeletal defects noted, no edema      "

## 2022-06-15 NOTE — TELEPHONE ENCOUNTER
Patient called to schedule phlebotomy. Patient states she should have had procedure done in May but she was ill and hospitalized. Patient states she had labs drawn on 5/10/22 for phlebotomy. Patient was informed that first available appointment is 7/12/22, pt is added to wait  List.

## 2022-06-15 NOTE — PATIENT INSTRUCTIONS
"As discussed, your blood pressures are remaining high and we will need to resume back your Amlodipine which we stopped in the past.     Continue Losartan and Metoprolol at 100 mg each daily. Please send me the BP log for next 1 week before further titration.    Will check your BMP.     ===================    Dietary Approaches to Stop Hypertension trial -- A different approach was evaluated in the Dietary Approaches to Stop Hypertension (DASH) trial [6]. Rather than evaluating sodium intake or weight loss, DASH randomly assigned 459 patients with BPs of less than 160/80 to 95 mmHg to one of three diets:  ?A control diet low in fruits, vegetables, and legumes and high in snacks, sweets, meats, and saturated fat.  ?A diet rich in fruits, vegetables, legumes and low in snacks and sweets.  ?A combination diet rich in fruits, vegetables, legumes, and low-fat dairy products and low in snacks, sweets, meats, and saturated and total fat (this combination diet is called the \"DASH diet\"). The DASH diet is comprised of four to five servings of fruit, four to five servings of vegetables, two to three servings of low-fat dairy per day, and <25 percent fat.  The following observations were noted in which the BP reductions were expressed in relation to the fall in BP seen with the control diet:  ?The fruits and vegetables diet reduced the BP by 2.8/1.1 mmHg, and the combination diet reduced the BP by 5.5/3.0.  ?These effects were more pronounced in patients with hypertension. With the combination diet, for example, the BP fell 11.4/5.5 mmHg in hypertensives versus 3.5/2.1 mmHg in the normotensives.  ?The antihypertensive effects were maximal by the end of week 2 with any of the diets and were then maintained for eight weeks.    "

## 2022-06-21 ENCOUNTER — HOSPITAL ENCOUNTER (OUTPATIENT)
Dept: CARDIOLOGY | Facility: CLINIC | Age: 62
Discharge: HOME | End: 2022-06-21
Attending: HOSPITALIST
Payer: COMMERCIAL

## 2022-06-21 ENCOUNTER — PATIENT OUTREACH (OUTPATIENT)
Dept: ONCOLOGY | Facility: CLINIC | Age: 62
End: 2022-06-21
Payer: COMMERCIAL

## 2022-06-21 ENCOUNTER — MYC MEDICAL ADVICE (OUTPATIENT)
Dept: FAMILY MEDICINE | Facility: CLINIC | Age: 62
End: 2022-06-21

## 2022-06-21 VITALS
DIASTOLIC BLOOD PRESSURE: 88 MMHG | WEIGHT: 140.8 LBS | BODY MASS INDEX: 24.04 KG/M2 | SYSTOLIC BLOOD PRESSURE: 128 MMHG | HEIGHT: 64 IN | OXYGEN SATURATION: 95 %

## 2022-06-21 DIAGNOSIS — R79.89 ELEVATED BRAIN NATRIURETIC PEPTIDE (BNP) LEVEL: ICD-10-CM

## 2022-06-21 DIAGNOSIS — R94.39 ABNORMAL CARDIOVASCULAR STRESS TEST: Primary | ICD-10-CM

## 2022-06-21 DIAGNOSIS — R79.89 ELEVATED TROPONIN: ICD-10-CM

## 2022-06-21 DIAGNOSIS — I16.1 HYPERTENSIVE EMERGENCY: ICD-10-CM

## 2022-06-21 LAB
CV BLOOD PRESSURE: 70 MMHG
CV STRESS MAX HR HE: 103
NUC STRESS EJECTION FRACTION: 58 %
RATE PRESSURE PRODUCT: NORMAL
STRESS ECHO BASELINE DIASTOLIC HE: 66
STRESS ECHO BASELINE HR: 69 BPM
STRESS ECHO BASELINE SYSTOLIC BP: 124
STRESS ECHO CALCULATED PERCENT HR: 66 %
STRESS ECHO LAST STRESS DIASTOLIC BP: 60
STRESS ECHO LAST STRESS SYSTOLIC BP: 120
STRESS ECHO TARGET HR: 156
STRESS/REST PERFUSION RATIO: 1.41

## 2022-06-21 PROCEDURE — 2894A VOIDCORRECT: CPT | Performed by: INTERNAL MEDICINE

## 2022-06-21 PROCEDURE — 78452 HT MUSCLE IMAGE SPECT MULT: CPT | Mod: 26 | Performed by: INTERNAL MEDICINE

## 2022-06-21 PROCEDURE — A9502 TC99M TETROFOSMIN: HCPCS | Performed by: HOSPITALIST

## 2022-06-21 PROCEDURE — 2894A NM MPI WITH LEXISCAN: CPT | Performed by: INTERNAL MEDICINE

## 2022-06-21 PROCEDURE — 250N000011 HC RX IP 250 OP 636: Performed by: HOSPITALIST

## 2022-06-21 PROCEDURE — 93016 CV STRESS TEST SUPVJ ONLY: CPT | Performed by: INTERNAL MEDICINE

## 2022-06-21 PROCEDURE — 93017 CV STRESS TEST TRACING ONLY: CPT

## 2022-06-21 PROCEDURE — 343N000001 HC RX 343: Performed by: HOSPITALIST

## 2022-06-21 RX ORDER — ACYCLOVIR 200 MG/1
0-1 CAPSULE ORAL
Status: DISCONTINUED | OUTPATIENT
Start: 2022-06-21 | End: 2022-06-22 | Stop reason: HOSPADM

## 2022-06-21 RX ORDER — AMINOPHYLLINE 25 MG/ML
50-100 INJECTION, SOLUTION INTRAVENOUS
Status: DISCONTINUED | OUTPATIENT
Start: 2022-06-21 | End: 2022-06-22 | Stop reason: HOSPADM

## 2022-06-21 RX ORDER — ALBUTEROL SULFATE 90 UG/1
2 AEROSOL, METERED RESPIRATORY (INHALATION) EVERY 5 MIN PRN
Status: DISCONTINUED | OUTPATIENT
Start: 2022-06-21 | End: 2022-06-22 | Stop reason: HOSPADM

## 2022-06-21 RX ORDER — CAFFEINE CITRATE 20 MG/ML
60 SOLUTION INTRAVENOUS
Status: DISCONTINUED | OUTPATIENT
Start: 2022-06-21 | End: 2022-06-22 | Stop reason: HOSPADM

## 2022-06-21 RX ORDER — REGADENOSON 0.08 MG/ML
0.4 INJECTION, SOLUTION INTRAVENOUS ONCE
Status: COMPLETED | OUTPATIENT
Start: 2022-06-21 | End: 2022-06-21

## 2022-06-21 RX ADMIN — TETROFOSMIN 9.92 MCI.: 1.38 INJECTION, POWDER, LYOPHILIZED, FOR SOLUTION INTRAVENOUS at 10:01

## 2022-06-21 RX ADMIN — TETROFOSMIN 3.6 MCI.: 1.38 INJECTION, POWDER, LYOPHILIZED, FOR SOLUTION INTRAVENOUS at 08:53

## 2022-06-21 RX ADMIN — REGADENOSON 0.4 MG: 0.08 INJECTION, SOLUTION INTRAVENOUS at 09:58

## 2022-06-21 NOTE — PROGRESS NOTES
Writer received a message from patient concerned about liver function ALT &  AST that was ordered by PCP, and are elevated.     Writer returned call and spoke with the patient. She is concerned and wonders what Dr. Rossi's thought on it. I told her that we do not prescribed a medication and that her PCP is monitoring it and she should reach out to her about when it should be rechecked. I also emphasized the MyChart from her PCP that states abstain from alcohol and medications that contain acetaminophen.     Patient verbalizes understanding.     Danielle Newman RN

## 2022-06-22 ENCOUNTER — TELEPHONE (OUTPATIENT)
Dept: ONCOLOGY | Facility: CLINIC | Age: 62
End: 2022-06-22

## 2022-06-22 ENCOUNTER — MYC MEDICAL ADVICE (OUTPATIENT)
Dept: FAMILY MEDICINE | Facility: CLINIC | Age: 62
End: 2022-06-22

## 2022-06-22 NOTE — TELEPHONE ENCOUNTER
Patient recently had a nuclear stress test that showed ischemia to heart muscle. Has a follow up with cardiologist on Monday.     She is scheduled for phlebotomy today at 10:30 and wants to know if it is ok to proceed.    Divya Nuñez RN  Triage Nurse Advisor  [facility]

## 2022-06-22 NOTE — TELEPHONE ENCOUNTER
Per Dr. Rossi, patient should cancel her phlebotomy today and reschedule after her phlebotomy appointment.    Called patient to inform her and she verbalized understanding. Transferred her to the scheduling line to reschedule.

## 2022-06-22 NOTE — TELEPHONE ENCOUNTER
Spoke to patient about her past cardiac nuclear study. She will call cardiology today to get the appointment.     PCP placed her referral in the sytem.     Georgina Stone RN  [facility]

## 2022-06-27 ENCOUNTER — OFFICE VISIT (OUTPATIENT)
Dept: CARDIOLOGY | Facility: CLINIC | Age: 62
End: 2022-06-27
Payer: COMMERCIAL

## 2022-06-27 VITALS
HEIGHT: 64 IN | DIASTOLIC BLOOD PRESSURE: 86 MMHG | SYSTOLIC BLOOD PRESSURE: 122 MMHG | TEMPERATURE: 97 F | BODY MASS INDEX: 24.33 KG/M2 | WEIGHT: 142.5 LBS | HEART RATE: 80 BPM

## 2022-06-27 DIAGNOSIS — R79.89 ELEVATED BRAIN NATRIURETIC PEPTIDE (BNP) LEVEL: ICD-10-CM

## 2022-06-27 DIAGNOSIS — R94.39 ABNORMAL CARDIOVASCULAR STRESS TEST: ICD-10-CM

## 2022-06-27 DIAGNOSIS — I10 BENIGN ESSENTIAL HYPERTENSION: ICD-10-CM

## 2022-06-27 DIAGNOSIS — M05.79 RHEUMATOID ARTHRITIS INVOLVING MULTIPLE SITES WITH POSITIVE RHEUMATOID FACTOR (H): ICD-10-CM

## 2022-06-27 DIAGNOSIS — E83.110 HEREDITARY HEMOCHROMATOSIS (H): Primary | ICD-10-CM

## 2022-06-27 DIAGNOSIS — R79.89 ELEVATED TROPONIN: ICD-10-CM

## 2022-06-27 PROCEDURE — 99204 OFFICE O/P NEW MOD 45 MIN: CPT | Performed by: INTERNAL MEDICINE

## 2022-06-27 NOTE — LETTER
6/27/2022    Mercedez Britton MD  [address]  [place] [address]    RE: Irina Hanks       Dear Colleague,     I had the pleasure of seeing Irina Hanks in the [facility].  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 Hip surgery with gluteus medius repair. Deconditioning. C section  No allergies : intolerant of codeine.   No smoking no diabetes  Hypertension No MI NO family history    Colon CA sisters with uterine cancer. Hypertension    Missed metoprolol for 5 days had missed.  Hemochromatosis wants to have phelbotomy. Hyponatremic.    Dyspnea with activity very limiting one flight of steps.     Orders this Visit:  No orders of the defined types were placed in this encounter.    No orders of the defined types were placed in this encounter.    There are no discontinued medications.    Encounter Diagnoses   Name Primary?     Abnormal cardiovascular stress test      Elevated brain natriuretic peptide (BNP) level      Elevated troponin        CURRENT MEDICATIONS:  Current Outpatient Medications   Medication Sig Dispense Refill     amLODIPine (NORVASC) 5 MG tablet Take 1 tablet (5 mg) by mouth daily 90 tablet 3     aspirin 81 MG EC tablet Take 81 mg by mouth daily       Biotin 5000 MCG TABS Take 1 tablet by mouth daily        clobetasol (TEMOVATE) 0.05 % external solution Apply topically daily To scalp       clonazePAM (KLONOPIN) 0.5 MG ODT Take 1 tablet (0.5 mg) by mouth nightly as needed for anxiety 30 tablet 0     hydroxychloroquine (PLAQUENIL) 200 MG tablet Take 2 tablets (400 mg) by mouth daily 90 tablet 3     ipratropium (ATROVENT) 0.03 % nasal spray Spray 2 sprays into both nostrils daily        levothyroxine (SYNTHROID/LEVOTHROID) 75 MCG tablet TAKE 1 TABLET BY MOUTH EVERY MORNING 90 tablet 2     liothyronine (CYTOMEL) 5 MCG tablet TAKE 2 TABLETS BY MOUTH EVERY  tablet 2     loratadine (CLARITIN) 10 MG tablet Take 1  "tablet (10 mg) by mouth daily 90 tablet 3     losartan (COZAAR) 100 MG tablet Take 1 tablet (100 mg) by mouth daily 90 tablet 1     metoprolol succinate ER (TOPROL-XL) 50 MG 24 hr tablet Take 2 tablets (100 mg) by mouth daily 180 tablet 1     Omega-3 Fatty Acids (FISH OIL ADULT GUMMIES PO) Take 1 tablet by mouth daily        sodium chloride 1 GM tablet Take 1 tablet (1 g) by mouth daily 60 tablet 1     TURMERIC PO Take 1 tablet by mouth daily        Vitamin D3 (CHOLECALCIFEROL) 125 MCG (5000 UT) tablet Take 1 tablet by mouth daily         ALLERGIES     Allergies   Allergen Reactions     Codeine Nausea and Nausea and Vomiting     Nitrofurantoin Unknown and Other (See Comments)     Other reaction(s): Gastrointestinal       Ace Inhibitors Cough     lisinopril  lisinopril     Hctz [Hydrochlorothiazide]      Severe Hyponatremia less than 120     Sulfa Drugs      Sulfur Unknown       PAST MEDICAL, SURGICAL, FAMILY, SOCIAL HISTORY:  History was reviewed and updated as needed, see medical record.    Review of Systems:  A 12-point review of systems was completed, see medical record for detailed review of systems information.    Physical Exam:  Vitals: /86 (BP Location: Right arm, Patient Position: Sitting, Cuff Size: Adult Regular)   Pulse 80   Temp 97  F (36.1  C)   Ht 1.626 m (5' 4\")   Wt 64.6 kg (142 lb 8 oz)   BMI 24.46 kg/m      Constitutional:           Skin:           Head:           Eyes:           ENT:           Neck:           Chest:           Cardiac:                    Abdomen:           Vascular:                                        Extremities and Back:           Neurological:           ASSESSMENT: unexplained dyspnea   hemachromatosis   RECOMMENDATIONS:   [facility] CAG  Could consider tapering beta blcoker  Phlebotomy is safe.  Consider tapering beta blocker        Recent Lab Results:  LIPID RESULTS:  Lab Results   Component Value Date    CHOL 218 (H) 09/14/2021    CHOL 181 10/14/2020     " 09/14/2021    HDL 73 10/14/2020     (H) 09/14/2021    LDL 69 10/14/2020    TRIG 74 09/14/2021    TRIG 195 (H) 10/14/2020    CHOLHDLRATIO 2.7 11/13/2015       LIVER ENZYME RESULTS:  Lab Results   Component Value Date    AST 82 (H) 06/15/2022    AST 21 05/11/2021     (H) 06/15/2022    ALT 28 05/11/2021       CBC RESULTS:  Lab Results   Component Value Date    WBC 6.3 06/08/2022    WBC 7.8 05/11/2021    RBC 4.14 06/08/2022    RBC 3.81 05/11/2021    HGB 13.4 06/08/2022    HGB 11.8 05/11/2021    HCT 37.9 06/08/2022    HCT 36.1 05/11/2021    MCV 92 06/08/2022    MCV 95 05/11/2021    MCH 32.4 06/08/2022    MCH 31.0 05/11/2021    MCHC 35.4 06/08/2022    MCHC 32.7 05/11/2021    RDW 11.8 06/08/2022    RDW 14.1 05/11/2021     06/08/2022     05/11/2021       BMP RESULTS:  Lab Results   Component Value Date     (L) 06/15/2022     (L) 06/15/2021    POTASSIUM 4.1 06/15/2022    POTASSIUM 3.9 06/15/2021    CHLORIDE 101 06/15/2022    CHLORIDE 98 06/15/2021    CO2 24 06/15/2022    CO2 27 06/15/2021    ANIONGAP 7 06/15/2022    ANIONGAP 5 06/15/2021    GLC 90 06/15/2022    GLC 81 06/15/2021    BUN 8 06/15/2022    BUN 10 06/15/2021    CR 0.73 06/15/2022    CR 0.87 06/15/2021    GFRESTIMATED >90 06/15/2022    GFRESTIMATED 70 06/15/2021    GFRESTBLACK 82 06/15/2021    LUCA 8.8 06/15/2022    LUCA 9.6 06/15/2021        A1C RESULTS:  Lab Results   Component Value Date    A1C 4.4 09/14/2021       INR RESULTS:  No results found for: INR    We greatly appreciate the opportunity to be involved in the care of your patient, Irina Hanks.    Sincerely,  Parminder Acevedo MD        Mercedez Britton MD  [address] [place] [address] [place] [address]          Thank you for allowing me to participate in the care of your patient.      Sincerely,     Parminder Acevedo MD     [facility]

## 2022-06-27 NOTE — PROGRESS NOTES
"Service Date: 06/27/2022    HISTORY OF PRESENT ILLNESS:  Irina Zhao, a 62-year-old woman with hypertension, hereditary hemochromatosis, Rheumatoid arthritis, osteoarthritis (status post hip replacement) and hypothyroidism, was evaluated in consultation at your request for dyspnea and an abnormal nuclear stress test.    Ms. Zhao feels as if her exercise tolerance has been poor for at least 10 years. Even moderate activities such as climbing steps produces dyspnea, which she feels is disproportionate to the level of activity. The symptoms  have worsened since her hip surgery earlier this year.  She does not describe typical chest, arm, neck, jaw or back discomfort with exertion, hemoptysis, smoking history or productive cough.  The patient has rheumatoid arthritis for which she takes hydroxychloroquine 400 mg daily.  She has hereditary hemochromatosis, followed by Hematology Oncology and treated with phlebotomy.    The patient was hospitalized at [facility] between 06/08/2022 and 06/09/2022  for headache, tremor and severe hypertension. The  patient had not been taking her metoprolol for about 5 days prior to admission.  Her troponin level was 98, falling to 78.  There were no EKG changes.  CT scan excluded pulmonary embolus, aortic dissection and evidence of pulmonary infiltrate.  Head CT was unremarkable. An echocardiogram showed a normal ejection fraction with no significant valvular stenosis or insufficiency and no regional wall motion abnormalities.      A regadenoson stress test was interpreted as being \"probably abnormal,\" with a drop in ejection fraction from 70% to 50% and increase in LV chamber dimensions interpreted as a transient ischemic dilation.  Cardiology consultation was then requested. Her hematologist/oncologist has been hesitant to proceed with phlebotomy in view of her abnormal stress test.    MEDICATIONS:    1.  Amlodipine 5 mg daily.  2.  Aspirin 81 mg daily.  3.  " Clonazepam 0.5 mg nightly for anxiety p.r.n.  4.  Hydroxychloroquine 400 mg daily.  5.  Levothyroxine 75 mcg daily.  6.  Liothyronine (Cytomel) 5 mg daily.  7.  Loratadine 10 mg daily.  8.  Losartan 100 mg daily.  9.  Metoprolol  mg daily.    ALLERGIES:  ACE inhibitors cause a cough.  Sulfa drugs.  Hydrochlorothiazide was stopped because of hypernatremia.    HABITS:  The patient does not use tobacco or abuse alcohol.  She does not use illicit drugs.    SOCIAL HISTORY:  The patient is , has 3 children.  She has a daughter who is getting  soon.  She has a very supportive sister with her at the visit today.  She has worked as a .    FAMILY HISTORY:  There is a family history of uterine cancer in 2 sisters, hypertension in several family members, no known heart disease.    CARDIAC RISK FACTORS:  Denies diabetes, dyslipidemia, premature family history of coronary disease, previous MI or cigarette smoking.    REVIEW OF SYSTEMS:  A 12-point review of systems was performed.  Outside the issues mentioned in HPI, there are no complaints.    PHYSICAL EXAMINATION:    GENERAL:  Exam today demonstrates a very pleasant, cooperative 62-year-old woman.  VITAL SIGNS:  Her blood pressure is 122/86, her heart rate is 80.  Her height is 1.63 meters, her weight is 65 kg.  Her BMI is 24.5.  RESPIRATORY:  Her lungs are clear to percussion and auscultation.  CARDIOVASCULAR:  Shows a normal S1 with a normal S2.  There is no S3.  There is no murmur, rub or click.  Her pulses are symmetrical in the carotid, radial, brachial femoral, popliteal, dorsalis pedis and posterior tibials.  ABDOMEN:  Bowel sounds are present in all 4 quadrants.  Liver percusses to 7 cm.  Spleen was not palpable.  The aorta is not tender.  LOWER EXTREMITIES:  There is no swelling, cyanosis or clubbing.  NEUROLOGIC:  Cranial nerves II through XII are intact.  Strength equal and symmetrical.  She displays normal insight and  judgment.    LABORATORY STUDIES:  Her sodium was 132 with potassium of 4.1, hemoglobin 13.4.    The results of her nuclear stress test are in  the above text.  The interpreting doctor  felt that the ejection fraction fell, LV chamber dilated with stress.  Her echo was normal with ejection fraction of 60%.  No wall motion abnormalities, normal valve function.      Her ECG shows a sinus rhythm with normal axis, normal intervals and nonspecific ST and T-wave changes.    ASSESSMENT AND PLAN:  Ms. Zhao has many potential causes for exertional dyspnea which include deconditioning, beta blocker therapy, diastolic heart failure and pulmonary hypertension..  In view of her abnormal nuclear stress test, I believe that diagnostic BHC and coronary angiography is warranted to exclude or confirm the presence of coronary artery disease and to accurately assess intracardiac pressures.  I would not schedule for ad hoc PCI in view of her lack of angina and global abnormalities seen on the stress test. If no significant coronary disease or hemodynamic abnormalities are found, then I would recommend tapering her beta blocker and substituting alternate antihypertensive medications if needed  as the next step.  Finally, if we find no evidence of coronary artery disease, hemodynamic abnormality and the patient failsto respond to beta blocker taper, I would consider a cardiac MRI in view of her multiple systemic illnesses.    It is my opinion that would be safe to proceed with phlebotomy even in the absence of any other further cardiac testing at this time.    RECOMMENDATIONS:    1.  Continue present medications for now.  2.  Bilateral cardiac catheterization with coronary angiography.  3.  If we fail to find significant abnormalities on cardiac catheterization/angiography, I would consider tapering off the beta blocker and observing blood pressure control on current medical therapy.  4.  I believe the patient can proceed with  phlebotomy without any further cardiac testing at this time.  5.  We may consider cardiac MRI if coronary angiography and cardiac catheterization are unrevealing and tapering beta blockers and has no effect on her symptoms of dyspnea.    We appreciate the opportunity to care for your patient, Irina Zhao.    Mercedez Britton MD  [address] [address]     MD Flo  Hematology Oncology    PHAN Ruiz MD        D: 2022   T: 2022   MT: CLARA    Name:     IRINA GARCÍA  MRN:      [id number]        Account:      [id number]   :      1960           Service Date: 2022       Document: [id number]

## 2022-06-27 NOTE — PROGRESS NOTES
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 Hip surgery with gluteus medius repair. Deconditioning. C section  No allergies : intolerant of codeine.   No smoking no diabetes  Hypertension No MI NO family history    Colon CA sisters with uterine cancer. Hypertension    Missed metoprolol for 5 days had missed.  Hemochromatosis wants to have phelbotomy. Hyponatremic.    Dyspnea with activity very limiting one flight of steps.     Orders this Visit:  No orders of the defined types were placed in this encounter.    No orders of the defined types were placed in this encounter.    There are no discontinued medications.    Encounter Diagnoses   Name Primary?     Abnormal cardiovascular stress test      Elevated brain natriuretic peptide (BNP) level      Elevated troponin        CURRENT MEDICATIONS:  Current Outpatient Medications   Medication Sig Dispense Refill     amLODIPine (NORVASC) 5 MG tablet Take 1 tablet (5 mg) by mouth daily 90 tablet 3     aspirin 81 MG EC tablet Take 81 mg by mouth daily       Biotin 5000 MCG TABS Take 1 tablet by mouth daily        clobetasol (TEMOVATE) 0.05 % external solution Apply topically daily To scalp       clonazePAM (KLONOPIN) 0.5 MG ODT Take 1 tablet (0.5 mg) by mouth nightly as needed for anxiety 30 tablet 0     hydroxychloroquine (PLAQUENIL) 200 MG tablet Take 2 tablets (400 mg) by mouth daily 90 tablet 3     ipratropium (ATROVENT) 0.03 % nasal spray Spray 2 sprays into both nostrils daily        levothyroxine (SYNTHROID/LEVOTHROID) 75 MCG tablet TAKE 1 TABLET BY MOUTH EVERY MORNING 90 tablet 2     liothyronine (CYTOMEL) 5 MCG tablet TAKE 2 TABLETS BY MOUTH EVERY  tablet 2     loratadine (CLARITIN) 10 MG tablet Take 1 tablet (10 mg) by mouth daily 90 tablet 3     losartan (COZAAR) 100 MG tablet Take 1 tablet (100 mg) by mouth daily 90 tablet 1     metoprolol succinate ER (TOPROL-XL) 50 MG 24 hr tablet Take 2 tablets (100 mg) by mouth  "daily 180 tablet 1     Omega-3 Fatty Acids (FISH OIL ADULT GUMMIES PO) Take 1 tablet by mouth daily        sodium chloride 1 GM tablet Take 1 tablet (1 g) by mouth daily 60 tablet 1     TURMERIC PO Take 1 tablet by mouth daily        Vitamin D3 (CHOLECALCIFEROL) 125 MCG (5000 UT) tablet Take 1 tablet by mouth daily         ALLERGIES     Allergies   Allergen Reactions     Codeine Nausea and Nausea and Vomiting     Nitrofurantoin Unknown and Other (See Comments)     Other reaction(s): Gastrointestinal       Ace Inhibitors Cough     lisinopril  lisinopril     Hctz [Hydrochlorothiazide]      Severe Hyponatremia less than 120     Sulfa Drugs      Sulfur Unknown       PAST MEDICAL, SURGICAL, FAMILY, SOCIAL HISTORY:  History was reviewed and updated as needed, see medical record.    Review of Systems:  A 12-point review of systems was completed, see medical record for detailed review of systems information.    Physical Exam:  Vitals: /86 (BP Location: Right arm, Patient Position: Sitting, Cuff Size: Adult Regular)   Pulse 80   Temp 97  F (36.1  C)   Ht 1.626 m (5' 4\")   Wt 64.6 kg (142 lb 8 oz)   BMI 24.46 kg/m      Constitutional:           Skin:           Head:           Eyes:           ENT:           Neck:           Chest:           Cardiac:                    Abdomen:           Vascular:                                        Extremities and Back:           Neurological:           ASSESSMENT: unexplained dyspnea   hemachromatosis   RECOMMENDATIONS:   [facility] CAG  Could consider tapering beta blcoker  Phlebotomy is safe.  Consider tapering beta blocker        Recent Lab Results:  LIPID RESULTS:  Lab Results   Component Value Date    CHOL 218 (H) 09/14/2021    CHOL 181 10/14/2020     09/14/2021    HDL 73 10/14/2020     (H) 09/14/2021    LDL 69 10/14/2020    TRIG 74 09/14/2021    TRIG 195 (H) 10/14/2020    CHOLHDLRATIO 2.7 11/13/2015       LIVER ENZYME RESULTS:  Lab Results   Component Value Date    " AST 82 (H) 06/15/2022    AST 21 05/11/2021     (H) 06/15/2022    ALT 28 05/11/2021       CBC RESULTS:  Lab Results   Component Value Date    WBC 6.3 06/08/2022    WBC 7.8 05/11/2021    RBC 4.14 06/08/2022    RBC 3.81 05/11/2021    HGB 13.4 06/08/2022    HGB 11.8 05/11/2021    HCT 37.9 06/08/2022    HCT 36.1 05/11/2021    MCV 92 06/08/2022    MCV 95 05/11/2021    MCH 32.4 06/08/2022    MCH 31.0 05/11/2021    MCHC 35.4 06/08/2022    MCHC 32.7 05/11/2021    RDW 11.8 06/08/2022    RDW 14.1 05/11/2021     06/08/2022     05/11/2021       BMP RESULTS:  Lab Results   Component Value Date     (L) 06/15/2022     (L) 06/15/2021    POTASSIUM 4.1 06/15/2022    POTASSIUM 3.9 06/15/2021    CHLORIDE 101 06/15/2022    CHLORIDE 98 06/15/2021    CO2 24 06/15/2022    CO2 27 06/15/2021    ANIONGAP 7 06/15/2022    ANIONGAP 5 06/15/2021    GLC 90 06/15/2022    GLC 81 06/15/2021    BUN 8 06/15/2022    BUN 10 06/15/2021    CR 0.73 06/15/2022    CR 0.87 06/15/2021    GFRESTIMATED >90 06/15/2022    GFRESTIMATED 70 06/15/2021    GFRESTBLACK 82 06/15/2021    LUCA 8.8 06/15/2022    LUCA 9.6 06/15/2021        A1C RESULTS:  Lab Results   Component Value Date    A1C 4.4 09/14/2021       INR RESULTS:  No results found for: INR    We greatly appreciate the opportunity to be involved in the care of your patient, Irina Hanks.    Sincerely,  Parminder Acevedo MD        Mercedez Britton MD  [address] [place] [address] [place] [address]

## 2022-06-29 ENCOUNTER — TELEPHONE (OUTPATIENT)
Dept: CARDIOLOGY | Facility: CLINIC | Age: 62
End: 2022-06-29

## 2022-06-29 DIAGNOSIS — R06.02 SHORTNESS OF BREATH: ICD-10-CM

## 2022-06-29 DIAGNOSIS — R79.89 ELEVATED BRAIN NATRIURETIC PEPTIDE (BNP) LEVEL: ICD-10-CM

## 2022-06-29 DIAGNOSIS — I10 BENIGN ESSENTIAL HYPERTENSION: ICD-10-CM

## 2022-06-29 DIAGNOSIS — E83.110 HEREDITARY HEMOCHROMATOSIS (H): ICD-10-CM

## 2022-06-29 DIAGNOSIS — R94.39 ABNORMAL CARDIOVASCULAR STRESS TEST: Primary | ICD-10-CM

## 2022-06-29 RX ORDER — SODIUM CHLORIDE 9 MG/ML
INJECTION, SOLUTION INTRAVENOUS CONTINUOUS
Status: CANCELLED | OUTPATIENT
Start: 2022-06-29

## 2022-06-29 RX ORDER — POTASSIUM CHLORIDE 750 MG/1
20 TABLET, EXTENDED RELEASE ORAL
Status: CANCELLED | OUTPATIENT
Start: 2022-06-29

## 2022-06-29 RX ORDER — ASPIRIN 325 MG
325 TABLET ORAL ONCE
Status: CANCELLED | OUTPATIENT
Start: 2022-06-29 | End: 2022-06-29

## 2022-06-29 RX ORDER — LIDOCAINE 40 MG/G
CREAM TOPICAL
Status: CANCELLED | OUTPATIENT
Start: 2022-06-29

## 2022-06-29 RX ORDER — ASPIRIN 81 MG/1
243 TABLET, CHEWABLE ORAL ONCE
Status: CANCELLED | OUTPATIENT
Start: 2022-06-29

## 2022-06-29 NOTE — TELEPHONE ENCOUNTER
Coronary angiogram/PCI/Right Heart Cath prep instructions.     Patient is scheduled for a Coronary Angiogram and Right Heart Cath at [facility] - [address], [place] [address] - Main Entrance of the Hospital, on 6/30/22.    Check in time is at 7:30am and procedure to follow.    Advised patient not eat or drink after midnight on 6/29/22.      Pt to take ASA 81 mg today and  mg tomorrow AM.     Patient advised to take their other daily medications the morning of the procedure with small sips of water.     Verified patient does not have a contrast allergy.    Verified patient has someone available to drive them home from the hospital and can stay with them for 24 hours after the procedure.     Patient aware that a negative COVID test result is mandatory prior to procedure and should bring result with them the morning of the procedure if not completed at a [place] location.     Patient will check their temperature the morning of procedure and call [facility] at [phone] if temp is >100.0.    Patient is aware of visitor policy.    Patient expresses understanding of above instructions and denies further questions at this time.      PHAN Simmons  [facility]

## 2022-06-29 NOTE — PROGRESS NOTES
Pt to have bilateral cardiac catheterization with coronary angiography per OV note 6/27/22 w/ . Case request re-entered. PHAN Simmons

## 2022-06-30 ENCOUNTER — HOSPITAL ENCOUNTER (OUTPATIENT)
Facility: CLINIC | Age: 62
Discharge: HOME | End: 2022-06-30
Admitting: INTERNAL MEDICINE
Payer: COMMERCIAL

## 2022-06-30 VITALS
WEIGHT: 142.3 LBS | HEART RATE: 67 BPM | DIASTOLIC BLOOD PRESSURE: 67 MMHG | TEMPERATURE: 98.8 F | OXYGEN SATURATION: 95 % | HEIGHT: 64 IN | SYSTOLIC BLOOD PRESSURE: 113 MMHG | RESPIRATION RATE: 16 BRPM | BODY MASS INDEX: 24.3 KG/M2

## 2022-06-30 DIAGNOSIS — I10 BENIGN ESSENTIAL HYPERTENSION: ICD-10-CM

## 2022-06-30 DIAGNOSIS — M05.79 RHEUMATOID ARTHRITIS INVOLVING MULTIPLE SITES WITH POSITIVE RHEUMATOID FACTOR (H): ICD-10-CM

## 2022-06-30 DIAGNOSIS — R06.02 SHORTNESS OF BREATH: ICD-10-CM

## 2022-06-30 DIAGNOSIS — R94.39 ABNORMAL CARDIOVASCULAR STRESS TEST: ICD-10-CM

## 2022-06-30 DIAGNOSIS — E83.110 HEREDITARY HEMOCHROMATOSIS (H): ICD-10-CM

## 2022-06-30 DIAGNOSIS — R79.89 ELEVATED BRAIN NATRIURETIC PEPTIDE (BNP) LEVEL: ICD-10-CM

## 2022-06-30 PROBLEM — Z98.890 STATUS POST CORONARY ANGIOGRAM: Status: ACTIVE | Noted: 2022-06-30

## 2022-06-30 LAB
ANION GAP SERPL CALCULATED.3IONS-SCNC: 5 MMOL/L (ref 3–14)
APTT PPP: 25 SECONDS (ref 22–38)
BUN SERPL-MCNC: 6 MG/DL (ref 7–30)
CALCIUM SERPL-MCNC: 8.8 MG/DL (ref 8.5–10.1)
CATH EF ESTIMATED: 60 %
CHLORIDE BLD-SCNC: 101 MMOL/L (ref 94–109)
CO2 SERPL-SCNC: 27 MMOL/L (ref 20–32)
COHGB MFR BLD: 64 % (ref 92–100)
COHGB MFR BLD: 97 % (ref 92–100)
CREAT SERPL-MCNC: 0.65 MG/DL (ref 0.52–1.04)
ERYTHROCYTE [DISTWIDTH] IN BLOOD BY AUTOMATED COUNT: 11.4 % (ref 10–15)
GFR SERPL CREATININE-BSD FRML MDRD: >90 ML/MIN/1.73M2
GLUCOSE BLD-MCNC: 73 MG/DL (ref 70–99)
HCO3 BLDA-SCNC: 23 MMOL/L (ref 21–28)
HCO3 BLDA-SCNC: 24 MMOL/L (ref 21–28)
HCT VFR BLD AUTO: 37.4 % (ref 35–47)
HGB BLD-MCNC: 13.2 G/DL (ref 11.7–15.7)
INR PPP: 1.04 (ref 0.85–1.15)
LACTATE BLD-SCNC: 1.2 MMOL/L
LACTATE BLD-SCNC: 1.2 MMOL/L
MCH RBC QN AUTO: 32.4 PG (ref 26.5–33)
MCHC RBC AUTO-ENTMCNC: 35.3 G/DL (ref 31.5–36.5)
MCV RBC AUTO: 92 FL (ref 78–100)
PCO2 BLDA: 38 MM HG (ref 35–45)
PCO2 BLDA: 42 MM HG (ref 35–45)
PH BLDA: 7.37 [PH] (ref 7.35–7.45)
PH BLDA: 7.39 [PH] (ref 7.35–7.45)
PLATELET # BLD AUTO: 252 10E3/UL (ref 150–450)
PO2 BLDA: 34 MM HG (ref 80–105)
PO2 BLDA: 86 MM HG (ref 80–105)
POTASSIUM BLD-SCNC: 3.9 MMOL/L (ref 3.4–5.3)
RBC # BLD AUTO: 4.08 10E6/UL (ref 3.8–5.2)
SODIUM SERPL-SCNC: 133 MMOL/L (ref 133–144)
WBC # BLD AUTO: 4.3 10E3/UL (ref 4–11)

## 2022-06-30 PROCEDURE — 93460 R&L HRT ART/VENTRICLE ANGIO: CPT | Mod: 26 | Performed by: INTERNAL MEDICINE

## 2022-06-30 PROCEDURE — 999N000184 HC STATISTIC TELEMETRY

## 2022-06-30 PROCEDURE — 82435 ASSAY OF BLOOD CHLORIDE: CPT | Performed by: INTERNAL MEDICINE

## 2022-06-30 PROCEDURE — 272N000001 HC OR GENERAL SUPPLY STERILE: Performed by: INTERNAL MEDICINE

## 2022-06-30 PROCEDURE — 250N000009 HC RX 250: Performed by: INTERNAL MEDICINE

## 2022-06-30 PROCEDURE — 258N000003 HC RX IP 258 OP 636: Performed by: INTERNAL MEDICINE

## 2022-06-30 PROCEDURE — 999N000071 HC STATISTIC HEART CATH LAB OR EP LAB

## 2022-06-30 PROCEDURE — 250N000013 HC RX MED GY IP 250 OP 250 PS 637: Performed by: INTERNAL MEDICINE

## 2022-06-30 PROCEDURE — 250N000011 HC RX IP 250 OP 636: Performed by: INTERNAL MEDICINE

## 2022-06-30 PROCEDURE — 36415 COLL VENOUS BLD VENIPUNCTURE: CPT | Performed by: INTERNAL MEDICINE

## 2022-06-30 PROCEDURE — 999N000054 HC STATISTIC EKG NON-CHARGEABLE

## 2022-06-30 PROCEDURE — 85610 PROTHROMBIN TIME: CPT | Performed by: INTERNAL MEDICINE

## 2022-06-30 PROCEDURE — 85730 THROMBOPLASTIN TIME PARTIAL: CPT | Performed by: INTERNAL MEDICINE

## 2022-06-30 PROCEDURE — 85027 COMPLETE CBC AUTOMATED: CPT | Performed by: INTERNAL MEDICINE

## 2022-06-30 PROCEDURE — 82803 BLOOD GASES ANY COMBINATION: CPT

## 2022-06-30 PROCEDURE — 99152 MOD SED SAME PHYS/QHP 5/>YRS: CPT | Performed by: INTERNAL MEDICINE

## 2022-06-30 PROCEDURE — 93005 ELECTROCARDIOGRAM TRACING: CPT

## 2022-06-30 PROCEDURE — 93460 R&L HRT ART/VENTRICLE ANGIO: CPT | Performed by: INTERNAL MEDICINE

## 2022-06-30 PROCEDURE — 83605 ASSAY OF LACTIC ACID: CPT

## 2022-06-30 PROCEDURE — 250N000011 HC RX IP 250 OP 636

## 2022-06-30 PROCEDURE — 99153 MOD SED SAME PHYS/QHP EA: CPT | Performed by: INTERNAL MEDICINE

## 2022-06-30 PROCEDURE — 93010 ELECTROCARDIOGRAM REPORT: CPT | Performed by: INTERNAL MEDICINE

## 2022-06-30 RX ORDER — ASPIRIN 81 MG/1
243 TABLET, CHEWABLE ORAL ONCE
Status: COMPLETED | OUTPATIENT
Start: 2022-06-30 | End: 2022-06-30

## 2022-06-30 RX ORDER — NALOXONE HYDROCHLORIDE 0.4 MG/ML
0.4 INJECTION, SOLUTION INTRAMUSCULAR; INTRAVENOUS; SUBCUTANEOUS
Status: DISCONTINUED | OUTPATIENT
Start: 2022-06-30 | End: 2022-06-30 | Stop reason: HOSPADM

## 2022-06-30 RX ORDER — ATROPINE SULFATE 0.1 MG/ML
0.5 INJECTION INTRAVENOUS
Status: DISCONTINUED | OUTPATIENT
Start: 2022-06-30 | End: 2022-06-30 | Stop reason: HOSPADM

## 2022-06-30 RX ORDER — SODIUM CHLORIDE 9 MG/ML
INJECTION, SOLUTION INTRAVENOUS CONTINUOUS
Status: DISCONTINUED | OUTPATIENT
Start: 2022-06-30 | End: 2022-06-30 | Stop reason: HOSPADM

## 2022-06-30 RX ORDER — LIDOCAINE 40 MG/G
CREAM TOPICAL
Status: DISCONTINUED | OUTPATIENT
Start: 2022-06-30 | End: 2022-06-30 | Stop reason: HOSPADM

## 2022-06-30 RX ORDER — ASPIRIN 325 MG
325 TABLET ORAL ONCE
Status: COMPLETED | OUTPATIENT
Start: 2022-06-30 | End: 2022-06-30

## 2022-06-30 RX ORDER — POTASSIUM CHLORIDE 1500 MG/1
20 TABLET, EXTENDED RELEASE ORAL
Status: DISCONTINUED | OUTPATIENT
Start: 2022-06-30 | End: 2022-06-30 | Stop reason: HOSPADM

## 2022-06-30 RX ORDER — IOPAMIDOL 755 MG/ML
INJECTION, SOLUTION INTRAVASCULAR
Status: DISCONTINUED | OUTPATIENT
Start: 2022-06-30 | End: 2022-06-30 | Stop reason: HOSPADM

## 2022-06-30 RX ORDER — NALOXONE HYDROCHLORIDE 0.4 MG/ML
0.2 INJECTION, SOLUTION INTRAMUSCULAR; INTRAVENOUS; SUBCUTANEOUS
Status: DISCONTINUED | OUTPATIENT
Start: 2022-06-30 | End: 2022-06-30 | Stop reason: HOSPADM

## 2022-06-30 RX ORDER — FENTANYL CITRATE 50 UG/ML
INJECTION, SOLUTION INTRAMUSCULAR; INTRAVENOUS
Status: DISCONTINUED | OUTPATIENT
Start: 2022-06-30 | End: 2022-06-30 | Stop reason: HOSPADM

## 2022-06-30 RX ORDER — ACETAMINOPHEN 325 MG/1
650 TABLET ORAL EVERY 4 HOURS PRN
Status: DISCONTINUED | OUTPATIENT
Start: 2022-06-30 | End: 2022-06-30 | Stop reason: HOSPADM

## 2022-06-30 RX ORDER — FLUMAZENIL 0.1 MG/ML
0.2 INJECTION, SOLUTION INTRAVENOUS
Status: DISCONTINUED | OUTPATIENT
Start: 2022-06-30 | End: 2022-06-30 | Stop reason: HOSPADM

## 2022-06-30 RX ORDER — FENTANYL CITRATE 50 UG/ML
25 INJECTION, SOLUTION INTRAMUSCULAR; INTRAVENOUS
Status: DISCONTINUED | OUTPATIENT
Start: 2022-06-30 | End: 2022-06-30 | Stop reason: HOSPADM

## 2022-06-30 RX ORDER — OXYCODONE HYDROCHLORIDE 5 MG/1
10 TABLET ORAL EVERY 4 HOURS PRN
Status: DISCONTINUED | OUTPATIENT
Start: 2022-06-30 | End: 2022-06-30 | Stop reason: HOSPADM

## 2022-06-30 RX ORDER — OXYCODONE HYDROCHLORIDE 5 MG/1
5 TABLET ORAL EVERY 4 HOURS PRN
Status: DISCONTINUED | OUTPATIENT
Start: 2022-06-30 | End: 2022-06-30 | Stop reason: HOSPADM

## 2022-06-30 RX ADMIN — ASPIRIN 81 MG CHEWABLE TABLET 243 MG: 81 TABLET CHEWABLE at 08:24

## 2022-06-30 RX ADMIN — SODIUM CHLORIDE: 9 INJECTION, SOLUTION INTRAVENOUS at 08:25

## 2022-06-30 NOTE — PRE-PROCEDURE
GENERAL PRE-PROCEDURE:   Procedure:  Right and left  heart catheterization possible intervention  Date/Time:  6/30/2022 9:31 AM    Written consent obtained?: Yes    Risks and benefits: Risks, benefits and alternatives were discussed    Consent given by:  Patient  Patient states understanding of procedure being performed: Yes    Patient's understanding of procedure matches consent: Yes    Procedure consent matches procedure scheduled: Yes    Expected level of sedation:  Moderate  Appropriately NPO:  Yes  Lungs:  Lungs clear with good breath sounds bilaterally  Heart:  Normal heart sounds and rate  History & Physical reviewed:  History and physical reviewed and no updates needed  Statement of review:  I have reviewed the lab findings, diagnostic data, medications, and the plan for sedation  risk benefit indication for r/l heart cath poss intervention discussed with pt  Labs reviewed   All questions answered and she wishes to proceed

## 2022-06-30 NOTE — PROGRESS NOTES
Care Suites Discharge Nursing Note    Patient Information  Name: Irina Hanks  Age: 62 year old    Discharge Education:  Discharge instructions reviewed: Yes  Additional education/resources provided: no  Patient/patient representative verbalizes understanding: Yes  Patient discharging on new medications: No  Medication education completed: N/A    Discharge Plans:   Discharge location: home  Discharge ride contacted: Yes  Approximate discharge time: 1315    Discharge Criteria:  Discharge criteria met and vital signs stable: Yes    Patient Belongs:  Patient belongings returned to patient: yes    Jeison Hussein RN

## 2022-06-30 NOTE — PROGRESS NOTES
PATIENT/VISITOR WELLNESS SCREENING    Step 1 Patient Screening    1. In the last month, have you been in contact with someone who was confirmed or suspected to have Coronavirus/COVID-19? No    2. Do you have the following symptoms?  Fever/Chills? No   Cough? No   Shortness of breath? No   New loss of taste or smell? No  Sore throat? No  Muscle or body aches? No  Headaches? No  Fatigue? No  Vomiting or diarrhea? No    Step 2 Visitor Screening    1. Name of Visitor (1 visitor per patient): JESSICA    2. In the last month, have you been in contact with someone who was confirmed or suspected to have Coronavirus/COVID-19? No    3. Do you have the following symptoms?  Fever/Chills? No   Cough? No   Shortness of breath? No   Skin rash? No   Loss of taste or smell? No  Sore throat? No  Runny or stuffy nose? No  Muscle or body aches? No  Headaches? No  Fatigue? No  Vomiting or diarrhea? No    If the visitor has positive symptoms, notify supervisor/manger  Per policy, the visitor will need to leave the facility     Step 3 Refer to logic grid below for actions    NO SYMPTOM(S)    ACTIONS:  1. Standard rooming process  2. Provider to assess per normal protocol  3. Implement precautions as needed and per guidelines     POSITIVE SYMPTOM(S)  If positive for ANY of the following symptoms: fever, cough, shortness of breath, rash    ACTION:  1. Continue to have the patient wear a mask   2. Room patient as soon as possible  3. Don appropriate PPE when entering room  4. Provider evaluation

## 2022-06-30 NOTE — CARE PLAN
Care Suites Post Procedure Note    Patient Information  Name: Irina Hanks  Age: 62 year old    Post Procedure  Time patient returned to Care Suites: 1055  Concerns/abnormal assessment: none  If abnormal assessment, provider notified: N/A  Plan/Other: recovery from sedation, bedrest x2 hours, discharge eduacation    Pt alert, oriented, VSS on RA. R groin site slightly ecchomotic (border marked), stable with tegaderm over puncture site. Patient denies pain. Tolerating PO. Spouse at bedside.    Florence Lara RN

## 2022-06-30 NOTE — DISCHARGE INSTRUCTIONS
Cardiac Angiogram Discharge Instructions - Femoral    After you go home:    Have an adult stay with you until tomorrow.  Drink extra fluids for 2 days.  You may resume your normal diet.  No smoking       For 24 hours - due to the sedation you received:  Relax and take it easy.  Do NOT make any important or legal decisions.  Do NOT drive or operate machines at home or at work.  Do NOT drink alcohol.    Care of Groin Puncture Site:    For the first 24 hrs - check the puncture site every 1-2 hours while awake.  For 2 days, when you cough, sneeze, laugh or move your bowels, hold your hand over the puncture site and press firmly.  Remove the bandaid after 24 hours. If there is minor oozing, apply another bandaid and remove it after 12 hours.  It is normal to have a small bruise or pea size lump at the site.  You may shower tomorrow. Do NOT take a bath, or use a hot tub or pool for at least 3 days. Do NOT scrub the site. Do not use lotion or powder near the puncture site.    Activity:            For 2 days:  No stooping or squatting  Do NOT do any heavy activity such as exercise, lifting, or straining.   No housework, yard work or any activity that make you sweat  Do NOT lift more than 10 pounds    Bleeding:    If you start bleeding from the site in your groin, lie down flat and press firmly on/above the site for 10 minutes.   Once bleeding stops, lay flat for 2 hours.   Call [facility] as soon as you can.       Call 911 right away if you have heavy bleeding or bleeding that does not stop.      Medicines:    Take your medications, including blood thinners, unless your provider tells you not to.    If you have stopped any medicines, check with your provider about when to restart them.    Follow Up Appointments:    Follow up with [facility] Nurse Practitioner at [facility] of patient preference in 7-10 days.    Call the clinic if:    You have increased pain or a large or growing hard lump around the site.  The site is  red, swollen, hot or tender.  Blood or fluid is draining from the site.  You have chills or a fever greater than 101 F (38 C).  Your leg feels numb, cool or changes color.  You have hives, a rash or unusual itching.  New pain in the back or belly that you cannot control with Tylenol.  Any questions or concerns.      [facility] [place]:    [phone] UMP (7 days a week)

## 2022-06-30 NOTE — PROGRESS NOTES
Care Suites Admission Nursing Note    Patient Information  Name: Irina Hanks  Age: 62 year old  Reason for admission: heart cath  Care Suites arrival time: 0730    Visitor Information  Name: JESSICA  Informed of visitor restrictions: Yes  1 visitor allowed per patient   Visitor must screen negative for COVID symptoms   Visitor must wear a mask  Waiting rooms closed to visitors    Patient Admission/Assessment   Pre-procedure assessment complete: Yes  If abnormal assessment/labs, provider notified: N/A  NPO: Yes  Medications held per instructions/orders: Yes  Consent: deferred  If applicable, pregnancy test status: deferred  Patient oriented to room: Yes  Education/questions answered: Yes  Plan/other: cath    Discharge Planning  Discharge name/phone number: JESSICA [phone]   Overnight post sedation caregiver: JESSICA  Discharge location: home    Jeison Hussein RN

## 2022-07-01 ENCOUNTER — TELEPHONE (OUTPATIENT)
Dept: CARDIOLOGY | Facility: CLINIC | Age: 62
End: 2022-07-01

## 2022-07-01 NOTE — TELEPHONE ENCOUNTER
Contacted patient and review Dr. Acevedo comments on angiogram and patient requested we forward information to her MY Chart for her own records.  Patient verbalized understanding.    I think this is good news for . There is no significant coronary artery disease and intracardiac pressures are normal. I am NOT enthusiastic about further cardiac testing given her normal intracardiac pressures... I don't think an MRI would  as they are planning to treat her anyway.   If her oncologist feels differently ( that is if they would do anything different from phlebotomy eg give IV desferoxamine ) I would stop any further cardiac testing at this time. I think the abnormality on nuclear scan ( TID) was from hypertension and would encourage follow up with her other health care providers     Thanks

## 2022-07-01 NOTE — TELEPHONE ENCOUNTER
[facility] Call Center    Phone Message    May a detailed message be left on voicemail: yes     Reason for Call: Other: Pt would like a call back to discuss getting orders for MRi and  stress test  , notes from other Dr was sent for these, Pt would like a call back asap to discuss     Action Taken: Message routed to:  [facility] Cardio    Travel Screening: Not Applicable

## 2022-07-06 ENCOUNTER — TELEPHONE (OUTPATIENT)
Dept: FAMILY MEDICINE | Facility: CLINIC | Age: 62
End: 2022-07-06

## 2022-07-06 DIAGNOSIS — F43.22 ADJUSTMENT DISORDER WITH ANXIOUS MOOD: ICD-10-CM

## 2022-07-06 NOTE — TELEPHONE ENCOUNTER
Pt called and states her BP has been doing well, she is taking her Amlodipine, Losartan, and Metoprolol as scheduled. Completely asymptomatic at this time, she states she is feeling much better. She notes she is seeing hematology at the end of the month. She reports BP this morning was 114/82. Per MyChart encounters 6/22/22 provider also viewed recent BP log and states it looked good except a couple of occassions, no changes at this time. Pt has physical scheduled in the fall.     She states her cardiologist did inform her Metoprolol can take a toll on pt's and cause fatigue. She has been on this for 10 years now. This is something that needs to be tapered and advised to look into this. Routing to provider, do you want pt to schedule regarding this? Please review/advise.     Nadine TRINH RN  [facility]

## 2022-07-07 ENCOUNTER — TELEPHONE (OUTPATIENT)
Dept: FAMILY MEDICINE | Facility: CLINIC | Age: 62
End: 2022-07-07

## 2022-07-07 DIAGNOSIS — Z79.2 PROPHYLACTIC ANTIBIOTIC: ICD-10-CM

## 2022-07-07 DIAGNOSIS — Z96.641 STATUS POST RIGHT HIP REPLACEMENT: Primary | ICD-10-CM

## 2022-07-07 LAB
ATRIAL RATE - MUSE: 69 BPM
DIASTOLIC BLOOD PRESSURE - MUSE: NORMAL MMHG
INTERPRETATION ECG - MUSE: NORMAL
P AXIS - MUSE: 31 DEGREES
PR INTERVAL - MUSE: 170 MS
QRS DURATION - MUSE: 80 MS
QT - MUSE: 398 MS
QTC - MUSE: 426 MS
R AXIS - MUSE: 40 DEGREES
SYSTOLIC BLOOD PRESSURE - MUSE: NORMAL MMHG
T AXIS - MUSE: 270 DEGREES
VENTRICULAR RATE- MUSE: 69 BPM

## 2022-07-07 RX ORDER — CLONAZEPAM 0.5 MG/1
0.5 TABLET, ORALLY DISINTEGRATING ORAL
Qty: 30 TABLET | Refills: 0 | Status: SHIPPED | OUTPATIENT
Start: 2022-07-12 | End: 2022-08-15

## 2022-07-07 NOTE — TELEPHONE ENCOUNTER
RX [place] ([place]) reviewed:  reviewed- no concerns    [place]:  [email or web address]      Refill done from 7/12/22 as per  check.  Mercedez Britton MD on 7/7/2022

## 2022-07-07 NOTE — TELEPHONE ENCOUNTER
Patient is having a dental procedure next week. She is asking for prop lactic antibiotic prior to dental procedure.     Hx of hip replacement.     Pharmacy is [facility] in [place].     Georgina Stone RN  -[facility]

## 2022-07-07 NOTE — TELEPHONE ENCOUNTER
Sure, we will need appointment to discuss on these changes on BP meds.     Thank you,  Mercedez Britton MD on 7/7/2022

## 2022-07-08 RX ORDER — AMOXICILLIN 500 MG/1
CAPSULE ORAL
Qty: 4 CAPSULE | Refills: 0 | Status: SHIPPED | OUTPATIENT
Start: 2022-07-08 | End: 2022-07-26

## 2022-07-08 NOTE — TELEPHONE ENCOUNTER
Patient Contact     S/w pt and relayed message from provider below. She confirms BP still the same, still asymptomatic, and feeling well. Offered to schedule appointment to discuss med changes but pt did not want to schedule at this time. She has physical in September and would like to discuss at that time. She had no further questions.     Nadine TRINH RN  [facility]

## 2022-07-20 ENCOUNTER — NURSE TRIAGE (OUTPATIENT)
Dept: FAMILY MEDICINE | Facility: CLINIC | Age: 62
End: 2022-07-20

## 2022-07-20 NOTE — TELEPHONE ENCOUNTER
"Received a call from the patient stating she has been experiencing swelling in her ankles and feet since last Thursday. Patient states she has been taking sodium pills (Dr. Britton recently cut the patient down to 1/2 tablets) but the patient stopped taking the medication all together on Sunday to see if that was causing her swelling. Patient states she has not noticed a difference in the swelling since stopping the sodium pills. Patient states the swelling is solely in her feet and ankles-does not extend to her knees. No pain, no fever, and no redness. Patient denies chest pain and difficulty breathing.    1. ONSET: \"When did the swelling start?\" (e.g., minutes, hours, days)      Thhursday  2. LOCATION: \"What part of the leg is swollen?\"  \"Are both legs swollen or just one leg?\"      Both feet and ankles  3. SEVERITY: \"How bad is the swelling?\" (e.g., localized; mild, moderate, severe)   - MILD pedal edema - swelling limited to foot and ankle, pitting edema < 1/4 inch (6 mm) deep, rest and elevation eliminate most or all swelling  4. REDNESS: \"Does the swelling look red or infected?\"      No redness  5. PAIN: \"Is the swelling painful to touch?\" If so, ask: \"How painful is it?\"   (Scale 1-10; mild, moderate or severe)      No pain  6. FEVER: \"Do you have a fever?\" If so, ask: \"What is it, how was it measured, and when did it start?\"       No fever  7. CAUSE: \"What do you think is causing the leg swelling?\"      Unknown  8. MEDICAL HISTORY: \"Do you have a history of heart failure, kidney disease, liver failure, or cancer?\"      No  9. RECURRENT SYMPTOM: \"Have you had leg swelling before?\" If so, ask: \"When was the last time?\" \"What happened that time?\"      \"Years ago\"  10. OTHER SYMPTOMS: \"Do you have any other symptoms?\" (e.g., chest pain, difficulty breathing)        No chest pain, no difficult breathing    Triage protocol recommending patient be seen within the next 3 days. Patient states she is currently up at " her cabin and will not return until either Friday or Saturday. Patient is wondering if she needs to get started on a diuretic? Will route to PCP for further recommendations.    If medication is sent in..patient would like medication sent to [facility] in [place]    Can we leave a detailed message on this number? YES  Phone number patient can be reached at: Cell number on file:    Telephone Information:   Mobile [phone]       Reason for Disposition    MILD swelling of both ankles (i.e., pedal edema) AND new onset or worsening    Additional Information    Negative: Sounds like a life-threatening emergency to the triager    Negative: Chest pain    Negative: Small area of swelling and followed an insect bite to the area    Negative: Followed a knee injury    Negative: Ankle or foot injury    Negative: Pregnant with leg swelling or edema    Negative: Difficulty breathing at rest    Negative: Entire foot is cool or blue in comparison to other side    Negative: SEVERE swelling (e.g., swelling extends above knee, entire leg is swollen, weeping fluid)    Negative: Thigh or calf pain and only 1 side and present > 1 hour    Negative: Thigh, calf, or ankle swelling in only one leg    Negative: Thigh, calf, or ankle swelling in both legs, but one side is definitely more swollen (Exception: longstanding difference between legs)    Negative: Cast on leg or ankle and has increasing pain    Negative: Can't walk or can barely stand (new onset)    Negative: Fever and red area (or area very tender to touch)    Negative: Patient sounds very sick or weak to the triager    Negative: Swelling of face, arm or hands (Exception: slight puffiness of fingers during hot weather)    Negative: Pregnant > 20 weeks and sudden weight gain (i.e., > 2 lbs, 1 kg in one week)    Negative: MODERATE swelling of both ankles (e.g., swelling extends up to the knees) AND new onset or worsening    Negative: Difficulty breathing with exertion  AND worsening or new onset    Negative: Looks like a boil, infected sore, deep ulcer, or other infected rash (spreading redness, pus)    Negative: Patient wants to be seen    Protocols used: LEG SWELLING AND EDEMA-A-GUERITA Cagle RN

## 2022-07-20 NOTE — TELEPHONE ENCOUNTER
Covering for Tobi, chart reviewed. I would be hesitant to use a diuretic in this patient due to electrolyte abnormalities in the past, as diruetics can make this worse. I recommend she continue to hold the sodium tablets as they are likely contributing. Make sure she is drinking plenty of water. Keep feet elevated when not walking or standing; can also wear a tight pair of socks to help decrease swelling. If she has access to a scale, please notify us is swelling gets worse or results in a 3 lb weight gain over night (or 5 lb weight gain over three days). Recommend she be seen if she develops chest pain or trouble breathing.

## 2022-07-20 NOTE — TELEPHONE ENCOUNTER
Provider Recommendation Follow Up:   Reached patient/caregiver. Informed of provider's recommendations. Patient verbalized understanding and agrees with the plan.       Itzel HARRIS RN  EP Triage

## 2022-07-26 ENCOUNTER — ONCOLOGY VISIT (OUTPATIENT)
Dept: ONCOLOGY | Facility: CLINIC | Age: 62
End: 2022-07-26
Attending: INTERNAL MEDICINE
Payer: COMMERCIAL

## 2022-07-26 ENCOUNTER — TELEPHONE (OUTPATIENT)
Dept: ONCOLOGY | Facility: CLINIC | Age: 62
End: 2022-07-26

## 2022-07-26 VITALS
TEMPERATURE: 98.1 F | RESPIRATION RATE: 16 BRPM | OXYGEN SATURATION: 99 % | DIASTOLIC BLOOD PRESSURE: 85 MMHG | WEIGHT: 143.8 LBS | BODY MASS INDEX: 24.68 KG/M2 | SYSTOLIC BLOOD PRESSURE: 120 MMHG | HEART RATE: 73 BPM

## 2022-07-26 DIAGNOSIS — E83.110 HEREDITARY HEMOCHROMATOSIS (H): ICD-10-CM

## 2022-07-26 LAB
ALBUMIN SERPL-MCNC: 3.6 G/DL (ref 3.4–5)
ALP SERPL-CCNC: 94 U/L (ref 40–150)
ALT SERPL W P-5'-P-CCNC: 32 U/L (ref 0–50)
ANION GAP SERPL CALCULATED.3IONS-SCNC: 6 MMOL/L (ref 3–14)
AST SERPL W P-5'-P-CCNC: 34 U/L (ref 0–45)
BILIRUB SERPL-MCNC: 1.5 MG/DL (ref 0.2–1.3)
BUN SERPL-MCNC: 8 MG/DL (ref 7–30)
CALCIUM SERPL-MCNC: 9.4 MG/DL (ref 8.5–10.1)
CHLORIDE BLD-SCNC: 101 MMOL/L (ref 94–109)
CO2 SERPL-SCNC: 26 MMOL/L (ref 20–32)
CREAT SERPL-MCNC: 0.71 MG/DL (ref 0.52–1.04)
FERRITIN SERPL-MCNC: 49 NG/ML (ref 8–252)
GFR SERPL CREATININE-BSD FRML MDRD: >90 ML/MIN/1.73M2
GLUCOSE BLD-MCNC: 89 MG/DL (ref 70–99)
HGB BLD-MCNC: 13.5 G/DL (ref 11.7–15.7)
POTASSIUM BLD-SCNC: 4.1 MMOL/L (ref 3.4–5.3)
PROT SERPL-MCNC: 7.5 G/DL (ref 6.8–8.8)
SODIUM SERPL-SCNC: 133 MMOL/L (ref 133–144)

## 2022-07-26 PROCEDURE — 85018 HEMOGLOBIN: CPT | Performed by: INTERNAL MEDICINE

## 2022-07-26 PROCEDURE — 80053 COMPREHEN METABOLIC PANEL: CPT | Performed by: INTERNAL MEDICINE

## 2022-07-26 PROCEDURE — G0463 HOSPITAL OUTPT CLINIC VISIT: HCPCS | Mod: 25

## 2022-07-26 PROCEDURE — 99214 OFFICE O/P EST MOD 30 MIN: CPT | Performed by: INTERNAL MEDICINE

## 2022-07-26 PROCEDURE — 82728 ASSAY OF FERRITIN: CPT | Performed by: INTERNAL MEDICINE

## 2022-07-26 PROCEDURE — 36415 COLL VENOUS BLD VENIPUNCTURE: CPT | Performed by: INTERNAL MEDICINE

## 2022-07-26 PROCEDURE — 82040 ASSAY OF SERUM ALBUMIN: CPT | Performed by: INTERNAL MEDICINE

## 2022-07-26 ASSESSMENT — PAIN SCALES - GENERAL: PAINLEVEL: NO PAIN (0)

## 2022-07-26 NOTE — LETTER
"    7/26/2022         RE: Irina Hanks  [address]  [place] [address]        Dear Colleague,    Thank you for referring your patient, Irina Hanks, to the [facility] [place]. Please see a copy of my visit note below.    Oncology Rooming Note    July 26, 2022 10:46 AM   Irina Hanks is a 62 year old female who presents for:    Chief Complaint   Patient presents with     Oncology Clinic Visit     Initial Vitals: /85   Pulse 73   Temp 98.1  F (36.7  C) (Oral)   Resp 16   Wt 65.2 kg (143 lb 12.8 oz)   SpO2 99%   BMI 24.68 kg/m   Estimated body mass index is 24.68 kg/m  as calculated from the following:    Height as of 6/30/22: 1.626 m (5' 4\").    Weight as of this encounter: 65.2 kg (143 lb 12.8 oz). Body surface area is 1.72 meters squared.  No Pain (0) Comment: Data Unavailable   No LMP recorded. Patient is postmenopausal.  Allergies reviewed: Yes  Medications reviewed: Yes    Medications: Medication refills not needed today.  Pharmacy name entered into [facility]:    [facility] [place] - [address] [facility] [facility][id number] - [place] - [address] [facility] [place] [address]  [facility] #[id number] - [place], [place] - [address] [facility] [address] [facility] [address] [place] [address] [address][id number] - [place], [place] - [address] [facility] #[id number] - [place], [place] - [address] AT [address] [place] [address] & [facility]    Clinical concerns:  doctor was notified.      Viki Arroyo [facility]              HEMATOLOGY HISTORY: Ms. Zhao is a female with hereditary hemochromatosis. Homozygous for H63D mutation.   1.  On 06/15/2016, ferritin of 506.   -  On 06/12/2018,  ALT of 240 and AST of 136.   2.  On 06/15/2018, Iron of 138, ferritin of 1140 and saturation of 66%.   3.  Ultrasound of abdomen and pelvis on 06/18/2018 is normal.   4. On 06/21/2018, HFE gene analysis reveals patient to be " homozygous for H63D mutation.   5. CT abdomen and pelvis on 06/25/2018 is normal.  Liver looks normal.   6. Because of elevated LFT, phlebotomy started on 06/21/2018.  7. Echocardiogram done on 06/08/2022 was essentially normal with normal ejection fraction.      SUBJECTIVE:  Ms. 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  The patient gets phlebotomy when ferritin is above 50.    Patient has been having shortness of breath and exertion.  In June, she was admitted to the hospital.  Patient has had multiple cardiac work-up done.  Echocardiogram done on 06/08/2022 was essentially normal with normal ejection fraction.  Coronary angiogram on 06/30/2022 did not reveal any significant stenosis. She has a follow-up appointment with her cardiologist tomorrow.    Patient continues to have shortness of breath on exertion.  She gets mainly short of breath going up the steps.  No headache. Occasional dizziness.  No chest pain.  No abdominal pain.  No nausea or vomiting.  Appetite is good.  No urinary complaints.  No bowel problem.  All other review of system negative.      PHYSICAL EXAMINATION:   GENERAL:  She is alert, oriented x 3.   VITAL SIGNS:  Reviewed.  She is not in distress.   The rest of systems not examined.      LABORATORY DATA: Hemoglobin, CMP and ferritin reviewed.     ASSESSMENT:   1.  Hereditary hemochromatosis.   2.  Minimally elevated bilirubin.   3.  Shortness of breath on exertion.     PLAN:  1.  Patient's ferritin is 49.  She does not need any phlebotomy.  She will have labs checked every 6 months.  Phlebotomy will be done for ferritin above 50.    2.  Her bilirubin is minimally elevated.  Previously AST and ALT were elevated but now they are normal.  At this time I would recommend simply monitoring it.    3.  Patient continues to have shortness of breath.  She has upcoming appointment with cardiologist tomorrow.  If no cardiac cause is found, she will need to  see a pulmonologist regarding shortness of breath.  She will discuss this with her cardiologist or with her PCP.    4.  I will see her in 1 year's time.  I advised her to call us with any questions or concerns.     Total visit time of 30 minutes.  Time spent in today's visit, review of chart/investigations today and documentation today.         Again, thank you for allowing me to participate in the care of your patient.        Sincerely,        Eloisa Rossi MD

## 2022-07-26 NOTE — PROGRESS NOTES
"Oncology Rooming Note    July 26, 2022 10:46 AM   Irina Hanks is a 62 year old female who presents for:    Chief Complaint   Patient presents with     Oncology Clinic Visit     Initial Vitals: /85   Pulse 73   Temp 98.1  F (36.7  C) (Oral)   Resp 16   Wt 65.2 kg (143 lb 12.8 oz)   SpO2 99%   BMI 24.68 kg/m   Estimated body mass index is 24.68 kg/m  as calculated from the following:    Height as of 6/30/22: 1.626 m (5' 4\").    Weight as of this encounter: 65.2 kg (143 lb 12.8 oz). Body surface area is 1.72 meters squared.  No Pain (0) Comment: Data Unavailable   No LMP recorded. Patient is postmenopausal.  Allergies reviewed: Yes  Medications reviewed: Yes    Medications: Medication refills not needed today.  Pharmacy name entered into EPIC:    [facility] [place] - [address] [facility] #[id number] - [place] - [address] [facility] [address]  [id number] [place] [address] [facility] [address] [facility] [address] [place] [address]  [facility][id number] - [place], [place] - [address] [facility] #[id number] - [place], [place] - [address]  AT [address] [place] [address] & [facility]    Clinical concerns:  doctor was notified.      Viki Arroyo CMA            "

## 2022-07-26 NOTE — TELEPHONE ENCOUNTER
"Peg left message stating she has a therapeutic phlebotomy appt scheduled today. She received her labs from today. Hgb 13.5 and Ferritin 49. Her parameters are for hgb above 11 and ferritin above 50.     Discussed with Rishi Kaufman, OUM[facility]. Ok to cancel phleb appt for today. Pt agrees with plan.     Discussed plans to follow up on next blood draw. Dr Rossi's visit instructions from today states     \"Labs every 6 months and phlebotomy as needed. See me in 1 year\"    Pt states she has labs drawn at her PCP and will contact us with need for phlebotomy.     Routed to Dr Rossi and Rishi as update.    Shaista Rogers RN      "

## 2022-07-27 ENCOUNTER — OFFICE VISIT (OUTPATIENT)
Dept: CARDIOLOGY | Facility: CLINIC | Age: 62
End: 2022-07-27
Payer: COMMERCIAL

## 2022-07-27 VITALS
BODY MASS INDEX: 24.59 KG/M2 | SYSTOLIC BLOOD PRESSURE: 113 MMHG | HEART RATE: 71 BPM | DIASTOLIC BLOOD PRESSURE: 78 MMHG | OXYGEN SATURATION: 98 % | WEIGHT: 144 LBS | HEIGHT: 64 IN

## 2022-07-27 DIAGNOSIS — R06.02 SHORTNESS OF BREATH: Primary | ICD-10-CM

## 2022-07-27 PROCEDURE — 99214 OFFICE O/P EST MOD 30 MIN: CPT | Performed by: NURSE PRACTITIONER

## 2022-07-27 NOTE — LETTER
2022    Mercedez Britton MD  [address]  [place] [address]    RE: Irina Hanks       Dear Colleague,     I had the pleasure of seeing Irina Hanks in the [facility].  CARDIOLOGY CLINIC   DOS: 22    Irina Hanks  : 1960  MRN: [id number]    PRIMARY CARDIOLOGIST: Dr. Acevedo     REASON FOR VISIT: review tests     HISTORY OF PRESENT ILLNESS:    Irina Zhao, a 62-year-old woman with hypertension, hereditary hemochromatosis, Rheumatoid arthritis, osteoarthritis (status post hip replacement) and hypothyroidism, was recently evaluated by a cardiologsit in the hospital and Dr. Acevedo as outpatient   for dyspnea and an abnormal nuclear stress test.     Ms. Zhao feels as if her exercise tolerance has been poor for at least 10 years. Even moderate activities such as climbing steps produces dyspnea, which she feels is disproportionate to the level of activity. The symptoms have worsened since her hip surgery earlier this year.  She denied typical chest, arm, neck, jaw or back discomfort with exertion, hemoptysis, smoking history or productive cough.  The patient has rheumatoid arthritis for which she takes hydroxychloroquine 400 mg daily.  She has hereditary hemochromatosis, followed by Hematology Oncology and treated with phlebotomy.     The patient was hospitalized at [facility] between 2022 and 2022  for headache, tremor and severe hypertension. The  patient had not been taking her metoprolol for about 5 days prior to admission.  Her troponin level was 98, falling to 78.  There were no EKG changes.  CT scan excluded pulmonary embolus, aortic dissection and evidence of pulmonary infiltrate.  Head CT was unremarkable.   An echocardiogram showed a normal ejection fraction with no significant valvular stenosis or insufficiency and no regional wall motion abnormalities.       A regadenoson stress test was interpreted as  "being \"probably abnormal,\" with a drop in ejection fraction from 70% to 50% and increase in LV chamber dimensions interpreted as a transient ischemic dilation.  Cardiology consultation was then requested. Her hematologist/oncologist has been hesitant to proceed with phlebotomy in view of her abnormal stress test.  The interpreting doctor of the stress test felt that the ejection fraction fell, LV chamber dilated with stress.  Her echo was normal with ejection fraction of 60%.  No wall motion abnormalities, normal valve function.    Her ECG shows a sinus rhythm with normal axis, normal intervals and nonspecific ST and T-wave changes.    Dr. Acevedo recommended a BHC and coronary angiogram  6/30/22 BHC and coronary angiogram showed no significant coronary artery disease and intracardiac pressures are normal. No further tesing needed       ASSESSMENT AND PLAN:    Ms. Zhao has many potential causes for exertional dyspnea which include deconditioning, beta blocker therapy, diastolic heart failure and pulmonary hypertension.    In view of her abnormal nuclear stress test, Dr. Acevedo ordered BHC and coronary angiography did not show significant coronary artery disease. Dr. Acevedo did not recommend further testing.     Dr. Acevedo thought the abnormality on nuclear scan ( TID) was from hypertension and would encourage follow up with her other health care providers.    RECOMMENDATIONS:    1.  Continue present medications for now.  2.  See PCP for further testing for dyspnea            Thank you for the opportunity to be involved in this very pleasant patient's care please feel to contact me with any questions.    Total time: 34minutes was spent today reviewing the chart, visiting the patient, and documenting the visit.    Verónica JACOBS, CNP   Nurse Practitioner  [facility]        CURRENT MEDICATIONS:  Current Outpatient Medications   Medication Sig Dispense Refill     amLODIPine (NORVASC) " 5 MG tablet Take 1 tablet (5 mg) by mouth daily 90 tablet 3     aspirin 81 MG EC tablet Take 81 mg by mouth daily       Biotin 5000 MCG TABS Take 1 tablet by mouth daily        clobetasol (TEMOVATE) 0.05 % external solution Apply topically daily To scalp       clonazePAM (KLONOPIN) 0.5 MG ODT Take 1 tablet (0.5 mg) by mouth nightly as needed for anxiety 30 tablet 0     hydroxychloroquine (PLAQUENIL) 200 MG tablet Take 2 tablets (400 mg) by mouth daily 90 tablet 3     ipratropium (ATROVENT) 0.03 % nasal spray Spray 2 sprays into both nostrils daily        levothyroxine (SYNTHROID/LEVOTHROID) 75 MCG tablet TAKE 1 TABLET BY MOUTH EVERY MORNING 90 tablet 2     liothyronine (CYTOMEL) 5 MCG tablet TAKE 2 TABLETS BY MOUTH EVERY  tablet 2     loratadine (CLARITIN) 10 MG tablet Take 1 tablet (10 mg) by mouth daily 90 tablet 3     losartan (COZAAR) 100 MG tablet Take 1 tablet (100 mg) by mouth daily 90 tablet 1     metoprolol succinate ER (TOPROL-XL) 50 MG 24 hr tablet Take 2 tablets (100 mg) by mouth daily 180 tablet 1     Omega-3 Fatty Acids (FISH OIL ADULT GUMMIES PO) Take 1 tablet by mouth daily        sodium chloride 1 GM tablet Take 1 tablet (1 g) by mouth daily 60 tablet 1     TURMERIC PO Take 1 tablet by mouth daily        Vitamin D3 (CHOLECALCIFEROL) 125 MCG (5000 UT) tablet Take 1 tablet by mouth daily         ALLERGIES  Allergies   Allergen Reactions     Codeine Nausea and Nausea and Vomiting     Nitrofurantoin Unknown and Other (See Comments)     Other reaction(s): Gastrointestinal       Ace Inhibitors Cough     lisinopril  lisinopril     Hctz [Hydrochlorothiazide]      Severe Hyponatremia less than 120     Sulfa Drugs      Sulfur Unknown       PAST MEDICAL, SURGICAL, FAMILY HISTORY:  History was reviewed and updated as needed, see medical record.    ROS:  12-pt ROS is negative except for as noted above.     PHYSICAL EXAMINATION:  Vitals:Bp 113/78, HR 71 weight 144 pounds.       Past Medical History:    Diagnosis Date     ASCUS favor benign 09/2014    3 yr co-test     Essential hypertension, benign      Impaired renal function      Inflammatory arthritis     Managed by Rheumatology  - q 6 mos     Microscopic colitis      Osteoarthritis of first carpometacarpal joint     bilateral     Other specified acquired hypothyroidism      Seasonal allergic rhinitis      Shortness of breath      Spider veins      Symptomatic menopausal or female climacteric states     age 43, on HRT     Tricuspid regurgitation     +1-2 TR noted on 12/22/14 Echo              Past Surgical History:     Past Surgical History:   Procedure Laterality Date     ABDOMEN SURGERY  1/30/95    C section     BIOPSY BREAST       C/SECTION, LOW TRANSVERSE      twins     COLONOSCOPY  2013    nl, next one due in 5 years     CV CORONARY ANGIOGRAM N/A 6/30/2022    Procedure: Coronary Angiogram;  Surgeon: Jose Antonio Ferraro MD;  Location:  HEART CARDIAC CATH LAB     CV LEFT HEART CATH N/A 6/30/2022    Procedure: Left Heart Catheterization;  Surgeon: Jose Antonio Ferraro MD;  Location:  HEART CARDIAC CATH LAB     CV LEFT VENTRICULOGRAM N/A 6/30/2022    Procedure: Left Ventriculogram;  Surgeon: Jose Antonio Ferraro MD;  Location:  HEART CARDIAC CATH LAB     CV RIGHT HEART CATH MEASUREMENTS RECORDED N/A 6/30/2022    Procedure: Right Heart Catheterization;  Surgeon: Jose Antonio Ferraro MD;  Location:  HEART CARDIAC CATH LAB     LEEP TX, CERVICAL  1990s    normal since that time     MYRINGOTOMY, INSERT TUBE, COMBINED Left 4/2015    chronic NORM, ETD        Recent Lab Results:  LIPID RESULTS:  Lab Results   Component Value Date    CHOL 218 (H) 09/14/2021    CHOL 181 10/14/2020     09/14/2021    HDL 73 10/14/2020     (H) 09/14/2021    LDL 69 10/14/2020    TRIG 74 09/14/2021    TRIG 195 (H) 10/14/2020    CHOLHDLRATIO 2.7 11/13/2015     LIVER ENZYME RESULTS:  Lab Results   Component Value Date    AST 34 07/26/2022    AST 21 05/11/2021    ALT 32  07/26/2022    ALT 28 05/11/2021     CBC RESULTS:  Lab Results   Component Value Date    WBC 4.3 06/30/2022    WBC 7.8 05/11/2021    RBC 4.08 06/30/2022    RBC 3.81 05/11/2021    HGB 13.5 07/26/2022    HGB 11.8 05/11/2021    HCT 37.4 06/30/2022    HCT 36.1 05/11/2021    MCV 92 06/30/2022    MCV 95 05/11/2021    MCH 32.4 06/30/2022    MCH 31.0 05/11/2021    MCHC 35.3 06/30/2022    MCHC 32.7 05/11/2021    RDW 11.4 06/30/2022    RDW 14.1 05/11/2021     06/30/2022     05/11/2021     BMP RESULTS:  Lab Results   Component Value Date     07/26/2022     (L) 06/15/2021    POTASSIUM 4.1 07/26/2022    POTASSIUM 3.9 06/15/2021    CHLORIDE 101 07/26/2022    CHLORIDE 98 06/15/2021    CO2 26 07/26/2022    CO2 27 06/15/2021    ANIONGAP 6 07/26/2022    ANIONGAP 5 06/15/2021    GLC 89 07/26/2022    GLC 81 06/15/2021    BUN 8 07/26/2022    BUN 10 06/15/2021    CR 0.71 07/26/2022    CR 0.87 06/15/2021    GFRESTIMATED >90 07/26/2022    GFRESTIMATED 70 06/15/2021    GFRESTBLACK 82 06/15/2021    LUCA 9.4 07/26/2022    LUCA 9.6 06/15/2021      A1C RESULTS:  Lab Results   Component Value Date    A1C 4.4 09/14/2021     INR RESULTS:  Lab Results   Component Value Date    INR 1.04 06/30/2022       This note was completed in part using Dragon voice recognition software. Although reviewed after completion, some word and grammatical errors may occur.      Thank you for allowing me to participate in the care of your patient.      Sincerely,     ARLENE Islas [facility]  cc:   No referring provider defined for this encounter.

## 2022-07-27 NOTE — PROGRESS NOTES
HEMATOLOGY HISTORY: Ms. Zhao is a female with hereditary hemochromatosis. Homozygous for H63D mutation.   1.  On 06/15/2016, ferritin of 506.   -  On 06/12/2018,  ALT of 240 and AST of 136.   2.  On 06/15/2018, Iron of 138, ferritin of 1140 and saturation of 66%.   3.  Ultrasound of abdomen and pelvis on 06/18/2018 is normal.   4. On 06/21/2018, HFE gene analysis reveals patient to be homozygous for H63D mutation.   5. CT abdomen and pelvis on 06/25/2018 is normal.  Liver looks normal.   6. Because of elevated LFT, phlebotomy started on 06/21/2018.  7. Echocardiogram done on 06/08/2022 was essentially normal with normal ejection fraction.      SUBJECTIVE:  Ms. 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  The patient gets phlebotomy when ferritin is above 50.    Patient has been having shortness of breath and exertion.  In June, she was admitted to the hospital.  Patient has had multiple cardiac work-up done.  Echocardiogram done on 06/08/2022 was essentially normal with normal ejection fraction.  Coronary angiogram on 06/30/2022 did not reveal any significant stenosis. She has a follow-up appointment with her cardiologist tomorrow.    Patient continues to have shortness of breath on exertion.  She gets mainly short of breath going up the steps.  No headache. Occasional dizziness.  No chest pain.  No abdominal pain.  No nausea or vomiting.  Appetite is good.  No urinary complaints.  No bowel problem.  All other review of system negative.      PHYSICAL EXAMINATION:   GENERAL:  She is alert, oriented x 3.   VITAL SIGNS:  Reviewed.  She is not in distress.   The rest of systems not examined.      LABORATORY DATA: Hemoglobin, CMP and ferritin reviewed.     ASSESSMENT:   1.  Hereditary hemochromatosis.   2.  Minimally elevated bilirubin.   3.  Shortness of breath on exertion.     PLAN:  1.  Patient's ferritin is 49.  She does not need any phlebotomy.  She will have labs  checked every 6 months.  Phlebotomy will be done for ferritin above 50.    2.  Her bilirubin is minimally elevated.  Previously AST and ALT were elevated but now they are normal.  At this time I would recommend simply monitoring it.    3.  Patient continues to have shortness of breath.  She has upcoming appointment with cardiologist tomorrow.  If no cardiac cause is found, she will need to see a pulmonologist regarding shortness of breath.  She will discuss this with her cardiologist or with her PCP.    4.  I will see her in 1 year's time.  I advised her to call us with any questions or concerns.     Total visit time of 30 minutes.  Time spent in today's visit, review of chart/investigations today and documentation today.

## 2022-07-27 NOTE — PROGRESS NOTES
"CARDIOLOGY CLINIC   DOS: 7.27.22    Irina Hanks  : 1960  MRN: [id number]    PRIMARY CARDIOLOGIST: Dr. Acevedo     REASON FOR VISIT: review tests     HISTORY OF PRESENT ILLNESS:    Irina Zhao, a 62-year-old woman with hypertension, hereditary hemochromatosis, Rheumatoid arthritis, osteoarthritis (status post hip replacement) and hypothyroidism, was recently evaluated by a cardiologsit in the hospital and Dr. Acevedo as outpatient   for dyspnea and an abnormal nuclear stress test.     Ms. Zhao feels as if her exercise tolerance has been poor for at least 10 years. Even moderate activities such as climbing steps produces dyspnea, which she feels is disproportionate to the level of activity. The symptoms have worsened since her hip surgery earlier this year.  She denied typical chest, arm, neck, jaw or back discomfort with exertion, hemoptysis, smoking history or productive cough.  The patient has rheumatoid arthritis for which she takes hydroxychloroquine 400 mg daily.  She has hereditary hemochromatosis, followed by Hematology Oncology and treated with phlebotomy.     The patient was hospitalized at [facility] between 2022 and 2022  for headache, tremor and severe hypertension. The  patient had not been taking her metoprolol for about 5 days prior to admission.  Her troponin level was 98, falling to 78.  There were no EKG changes.  CT scan excluded pulmonary embolus, aortic dissection and evidence of pulmonary infiltrate.  Head CT was unremarkable.   An echocardiogram showed a normal ejection fraction with no significant valvular stenosis or insufficiency and no regional wall motion abnormalities.       A regadenoson stress test was interpreted as being \"probably abnormal,\" with a drop in ejection fraction from 70% to 50% and increase in LV chamber dimensions interpreted as a transient ischemic dilation.  Cardiology consultation was then requested. Her " hematologist/oncologist has been hesitant to proceed with phlebotomy in view of her abnormal stress test.  The interpreting doctor of the stress test felt that the ejection fraction fell, LV chamber dilated with stress.  Her echo was normal with ejection fraction of 60%.  No wall motion abnormalities, normal valve function.    Her ECG shows a sinus rhythm with normal axis, normal intervals and nonspecific ST and T-wave changes.    Dr. Acevedo recommended a BHC and coronary angiogram  6/30/22 BHC and coronary angiogram showed no significant coronary artery disease and intracardiac pressures are normal. No further tesing needed       ASSESSMENT AND PLAN:    Ms. Zhao has many potential causes for exertional dyspnea which include deconditioning, beta blocker therapy, diastolic heart failure and pulmonary hypertension.    In view of her abnormal nuclear stress test, Dr. Acevedo ordered BHC and coronary angiography did not show significant coronary artery disease. Dr. Acevedo did not recommend further testing.     Dr. Acevedo thought the abnormality on nuclear scan ( TID) was from hypertension and would encourage follow up with her other health care providers.    RECOMMENDATIONS:    1.  Continue present medications for now.  2.  See PCP for further testing for dyspnea            Thank you for the opportunity to be involved in this very pleasant patient's care please feel to contact me with any questions.    Total time: 34minutes was spent today reviewing the chart, visiting the patient, and documenting the visit.    Verónica JACOBS CNP   Nurse Practitioner  [facility]        CURRENT MEDICATIONS:  Current Outpatient Medications   Medication Sig Dispense Refill     amLODIPine (NORVASC) 5 MG tablet Take 1 tablet (5 mg) by mouth daily 90 tablet 3     aspirin 81 MG EC tablet Take 81 mg by mouth daily       Biotin 5000 MCG TABS Take 1 tablet by mouth daily        clobetasol (TEMOVATE) 0.05 %  external solution Apply topically daily To scalp       clonazePAM (KLONOPIN) 0.5 MG ODT Take 1 tablet (0.5 mg) by mouth nightly as needed for anxiety 30 tablet 0     hydroxychloroquine (PLAQUENIL) 200 MG tablet Take 2 tablets (400 mg) by mouth daily 90 tablet 3     ipratropium (ATROVENT) 0.03 % nasal spray Spray 2 sprays into both nostrils daily        levothyroxine (SYNTHROID/LEVOTHROID) 75 MCG tablet TAKE 1 TABLET BY MOUTH EVERY MORNING 90 tablet 2     liothyronine (CYTOMEL) 5 MCG tablet TAKE 2 TABLETS BY MOUTH EVERY  tablet 2     loratadine (CLARITIN) 10 MG tablet Take 1 tablet (10 mg) by mouth daily 90 tablet 3     losartan (COZAAR) 100 MG tablet Take 1 tablet (100 mg) by mouth daily 90 tablet 1     metoprolol succinate ER (TOPROL-XL) 50 MG 24 hr tablet Take 2 tablets (100 mg) by mouth daily 180 tablet 1     Omega-3 Fatty Acids (FISH OIL ADULT GUMMIES PO) Take 1 tablet by mouth daily        sodium chloride 1 GM tablet Take 1 tablet (1 g) by mouth daily 60 tablet 1     TURMERIC PO Take 1 tablet by mouth daily        Vitamin D3 (CHOLECALCIFEROL) 125 MCG (5000 UT) tablet Take 1 tablet by mouth daily         ALLERGIES  Allergies   Allergen Reactions     Codeine Nausea and Nausea and Vomiting     Nitrofurantoin Unknown and Other (See Comments)     Other reaction(s): Gastrointestinal       Ace Inhibitors Cough     lisinopril  lisinopril     Hctz [Hydrochlorothiazide]      Severe Hyponatremia less than 120     Sulfa Drugs      Sulfur Unknown       PAST MEDICAL, SURGICAL, FAMILY HISTORY:  History was reviewed and updated as needed, see medical record.    ROS:  12-pt ROS is negative except for as noted above.     PHYSICAL EXAMINATION:  Vitals:Bp 113/78, HR 71 weight 144 pounds.       Past Medical History:   Diagnosis Date     ASCUS favor benign 09/2014    3 yr co-test     Essential hypertension, benign      Impaired renal function      Inflammatory arthritis     Managed by Rheumatology  - q 6 mos     Microscopic  colitis      Osteoarthritis of first carpometacarpal joint     bilateral     Other specified acquired hypothyroidism      Seasonal allergic rhinitis      Shortness of breath      Spider veins      Symptomatic menopausal or female climacteric states     age 43, on HRT     Tricuspid regurgitation     +1-2 TR noted on 12/22/14 Echo              Past Surgical History:     Past Surgical History:   Procedure Laterality Date     ABDOMEN SURGERY  1/30/95    C section     BIOPSY BREAST       C/SECTION, LOW TRANSVERSE      twins     COLONOSCOPY  2013    nl, next one due in 5 years     CV CORONARY ANGIOGRAM N/A 6/30/2022    Procedure: Coronary Angiogram;  Surgeon: Jose Antonio Ferraro MD;  Location:  HEART CARDIAC CATH LAB     CV LEFT HEART CATH N/A 6/30/2022    Procedure: Left Heart Catheterization;  Surgeon: Jose Antonio Ferraro MD;  Location:  HEART CARDIAC CATH LAB     CV LEFT VENTRICULOGRAM N/A 6/30/2022    Procedure: Left Ventriculogram;  Surgeon: Jose Antonio Ferraro MD;  Location:  HEART CARDIAC CATH LAB     CV RIGHT HEART CATH MEASUREMENTS RECORDED N/A 6/30/2022    Procedure: Right Heart Catheterization;  Surgeon: Jose Antonio Ferraro MD;  Location:  HEART CARDIAC CATH LAB     LEEP TX, CERVICAL  1990s    normal since that time     MYRINGOTOMY, INSERT TUBE, COMBINED Left 4/2015    chronic NORM, ETD        Recent Lab Results:  LIPID RESULTS:  Lab Results   Component Value Date    CHOL 218 (H) 09/14/2021    CHOL 181 10/14/2020     09/14/2021    HDL 73 10/14/2020     (H) 09/14/2021    LDL 69 10/14/2020    TRIG 74 09/14/2021    TRIG 195 (H) 10/14/2020    CHOLHDLRATIO 2.7 11/13/2015     LIVER ENZYME RESULTS:  Lab Results   Component Value Date    AST 34 07/26/2022    AST 21 05/11/2021    ALT 32 07/26/2022    ALT 28 05/11/2021     CBC RESULTS:  Lab Results   Component Value Date    WBC 4.3 06/30/2022    WBC 7.8 05/11/2021    RBC 4.08 06/30/2022    RBC 3.81 05/11/2021    HGB 13.5 07/26/2022    HGB 11.8 05/11/2021     HCT 37.4 06/30/2022    HCT 36.1 05/11/2021    MCV 92 06/30/2022    MCV 95 05/11/2021    MCH 32.4 06/30/2022    MCH 31.0 05/11/2021    MCHC 35.3 06/30/2022    MCHC 32.7 05/11/2021    RDW 11.4 06/30/2022    RDW 14.1 05/11/2021     06/30/2022     05/11/2021     BMP RESULTS:  Lab Results   Component Value Date     07/26/2022     (L) 06/15/2021    POTASSIUM 4.1 07/26/2022    POTASSIUM 3.9 06/15/2021    CHLORIDE 101 07/26/2022    CHLORIDE 98 06/15/2021    CO2 26 07/26/2022    CO2 27 06/15/2021    ANIONGAP 6 07/26/2022    ANIONGAP 5 06/15/2021    GLC 89 07/26/2022    GLC 81 06/15/2021    BUN 8 07/26/2022    BUN 10 06/15/2021    CR 0.71 07/26/2022    CR 0.87 06/15/2021    GFRESTIMATED >90 07/26/2022    GFRESTIMATED 70 06/15/2021    GFRESTBLACK 82 06/15/2021    LUCA 9.4 07/26/2022    LUCA 9.6 06/15/2021      A1C RESULTS:  Lab Results   Component Value Date    A1C 4.4 09/14/2021     INR RESULTS:  Lab Results   Component Value Date    INR 1.04 06/30/2022       This note was completed in part using Dragon voice recognition software. Although reviewed after completion, some word and grammatical errors may occur.

## 2022-07-27 NOTE — RESULT ENCOUNTER NOTE
Dear Ms. Hanks,    Blood test good.  -Normal sodium.  -Minimally elevated bilirubin of 1.5. Please, have it rechecked in next few months with PCP.  -Normal AST and ALT.  -Ferritin good at 49. No phlebotomy needed.    Please, call me with any questions.    Eloisa Rossi MD

## 2022-08-01 ENCOUNTER — NURSE TRIAGE (OUTPATIENT)
Dept: FAMILY MEDICINE | Facility: CLINIC | Age: 62
End: 2022-08-01

## 2022-08-01 NOTE — TELEPHONE ENCOUNTER
Covering for Tobi. Ok to hold amlodipine for 1 week to see if this helps swelling. Monitor BP once daily, contact clinic if results >140/90 consistently.

## 2022-08-01 NOTE — TELEPHONE ENCOUNTER
"Pt called the clinic stating her feet/ankle swelling (bilateral) has not changed even with stopping the sodium.      Wakes up in the AM and is \"fine\" but when on feet during the day \"feet/ankle swelling gets worse\".     Pt wants to know if its okay to amlodipine stop cold turkey? Pt hasnt taken amlodipine today. Pt also wants a follow up with Dr. Britton about this. Next available in person appt is 9/2. Pt states that's too long to wait. Routing to provider for recommendations.       Pt will stated she will not take the sodium and amlodipine for a week. Pt states she's doing her \"own doctoring here\". Pt does have a way to check BP.       Can we leave a detailed message on this number? YES  Phone number patient can be reached at: Home number on file [phone] (home)    Palmira Encinas RN  [facility] Triage    "

## 2022-08-15 DIAGNOSIS — F43.22 ADJUSTMENT DISORDER WITH ANXIOUS MOOD: ICD-10-CM

## 2022-08-15 RX ORDER — CLONAZEPAM 0.5 MG/1
0.5 TABLET, ORALLY DISINTEGRATING ORAL
Qty: 30 TABLET | Refills: 0 | Status: SHIPPED | OUTPATIENT
Start: 2022-08-15 | End: 2022-08-19

## 2022-08-15 NOTE — TELEPHONE ENCOUNTER
Routing refill request to provider for review/approval because:  Drug not on the [facility] refill protocol     Maty Graham RN

## 2022-08-15 NOTE — TELEPHONE ENCOUNTER
Reason for call:  Medication     Name of the pharmacy:   [facility] on [address]    Name of the medication requested:   Clonazepam    Phone number to reach patient:  Telephone Information:   Mobile [phone]     Notes:  Pt states she called the pharmacy who sent a request for medication on 8/10, 8/11, and 8/12. No record of request in pt's chart. Pt is out of medication and is requesting an urgent refill.     Agustina Barreto,  [facility]

## 2022-08-15 NOTE — TELEPHONE ENCOUNTER
[place] reviewed:  reviewed- no concerns    [place]:  [email or web address]    Refill done.  Mercedez Britton MD on 8/15/2022 at 3:54 PM

## 2022-08-16 DIAGNOSIS — F43.22 ADJUSTMENT DISORDER WITH ANXIOUS MOOD: ICD-10-CM

## 2022-08-19 RX ORDER — CLONAZEPAM 0.5 MG/1
0.5 TABLET, ORALLY DISINTEGRATING ORAL
Qty: 30 TABLET | Refills: 0 | Status: SHIPPED | OUTPATIENT
Start: 2022-08-19 | End: 2022-10-15

## 2022-08-20 NOTE — TELEPHONE ENCOUNTER
[place] reviewed:  reviewed- no concerns    [place]:  [email or web address]    Refill done.  Mercedez Britton MD on 8/19/2022 at 7:37 PM

## 2022-08-29 DIAGNOSIS — E87.1 HYPONATREMIA: ICD-10-CM

## 2022-08-29 DIAGNOSIS — E22.2 SIADH (SYNDROME OF INAPPROPRIATE ADH PRODUCTION) (H): ICD-10-CM

## 2022-08-29 RX ORDER — SODIUM CHLORIDE 1 G/1
1 TABLET ORAL DAILY
Qty: 60 TABLET | Refills: 1 | Status: SHIPPED | OUTPATIENT
Start: 2022-08-29 | End: 2023-01-18

## 2022-08-30 DIAGNOSIS — I10 ESSENTIAL HYPERTENSION: ICD-10-CM

## 2022-08-30 NOTE — TELEPHONE ENCOUNTER
Patient only has enough for tomorrow. She requested from pharmacy same time as sodium refill. Carmen Lopez RN

## 2022-08-31 RX ORDER — METOPROLOL SUCCINATE 50 MG/1
100 TABLET, EXTENDED RELEASE ORAL DAILY
Qty: 180 TABLET | Refills: 2 | Status: SHIPPED | OUTPATIENT
Start: 2022-08-31 | End: 2022-10-19

## 2022-09-27 ENCOUNTER — VIRTUAL VISIT (OUTPATIENT)
Dept: FAMILY MEDICINE | Facility: CLINIC | Age: 62
End: 2022-09-27
Payer: COMMERCIAL

## 2022-09-27 ENCOUNTER — NURSE TRIAGE (OUTPATIENT)
Dept: NURSING | Facility: CLINIC | Age: 62
End: 2022-09-27

## 2022-09-27 DIAGNOSIS — U07.1 INFECTION DUE TO 2019 NOVEL CORONAVIRUS: Primary | ICD-10-CM

## 2022-09-27 PROCEDURE — 99214 OFFICE O/P EST MOD 30 MIN: CPT | Mod: TEL | Performed by: FAMILY MEDICINE

## 2022-09-27 NOTE — TELEPHONE ENCOUNTER
S-(situation): Call from patient who reports she started coughing last night. Patient says [place] was [place].  Today the COVID-19 rapid was positive.  She also report fatigue and losing her voice.           B-(background):   Last booster was Aug 9th 2022  Says she has 6 autoimmune disorders    A-(assessment):       R-(recommendations): Transfer to schedule antiviral treatment eval    Reviewed care advice with caller per RN triage protocol guideline.  Advised to call back with worsening symptoms, concerns or questions.   Caller verbalized understanding.          Charline Douglas RN/[facility]      Reason for Disposition    Information only question and nurse able to answer    Protocols used: INFORMATION ONLY CALL - NO TRIAGE-A-OH

## 2022-09-27 NOTE — PROGRESS NOTES
Cherise is a 62 year old who is being evaluated via a billable telephone visit.      How would you like to obtain your AVS? MyChart        Assessment & Plan     Infection due to 2019 novel coronavirus    Patient has body ache cough symptoms started within the last 12 hours exposure to 150 other individuals none with any overt symptoms.  Home COVID test positive.  She takes Plaquenil for rheumatoid arthritis started on Paxil over potential side effects discussed she was told to follow-up in the emergency room if he develops any shortness of breath wheezing chest tightness or high fever  - nirmatrelvir and ritonavir (PAXLOVID) therapy pack; Take 3 tablets by mouth 2 times daily for 5 days (Take 2 Nirmatrelvir tablets and 1 Ritonavir tablet twice daily for 5 days)          No follow-ups on file.    Ezra Rossi MD  [facility]    Sonali Luong is a 62 year old, presenting for the following health issues:  tested positive for covid       HPI     62-year-old female here on a virtual telephone visit with a 1 day history of coughing and body ache.  Patient was up north in [place] last weekend and was at a wedding where there were 150 people present.  She returned home today but noticed a cough last night.  She notes body aches this morning and did a home COVID test which was positive she denies any fever, diarrhea, nausea, abdominal pain.  She has not been short of breath no wheezing's been noted she does have rheumatoid arthritis which has been stable and she takes Plaquenil for that.  She has not noticed any chest tightness.      Review of Systems   Constitutional, HEENT, cardiovascular, pulmonary, gi and gu systems are negative, except as otherwise noted.      Objective    Vitals - Patient Reported  Temperature (Patient Reported): 98.5  F (36.9  C)        Physical Exam   Not done because of telephone visit        Total amount of time spent was 30 minutes

## 2022-10-04 ENCOUNTER — NURSE TRIAGE (OUTPATIENT)
Dept: FAMILY MEDICINE | Facility: CLINIC | Age: 62
End: 2022-10-04

## 2022-10-04 NOTE — TELEPHONE ENCOUNTER
Patient called the clinic reporting positive COVID on 9/27/22. Finished Paxlovid. Has been coughing up phlegm in the last 24 hours along with a headache and sinus pressure and green nasal mucus. She is concerned about a sinus infection which she previously had. Took Tylenol today, but afraid to take OTC medications for congestion due to her BP and medical history. She did stop amlodipine 5 mg in July due to swelling in hands, ankles, feet, hands, and fingers - all have improved. BP today 135/94 earlier today and 168/121, HR 80 at time of phone call. BP yesterday 130/97 & 135/94. Advised to monitor BP twice daily, rest/increase water intake, report if BP consistently over 140/__. Pt wants to talk to a doctor. Virtual OV schedule with Dr. Sesay tomorrow.      Appointments in Next Year    Oct 05, 2022 11:00 AM  (Arrive by 10:40 AM)  Provider Visit with Rex Sesay MD  [facility] ([facility] ) [phone]   Oct 19, 2022 11:00 AM  (Arrive by 10:40 AM)  Adult Preventative Visit with Mercedez Britton MD  [facility] ([facility] ) [phone]            Reason for Disposition    Patient wants to be seen    Additional Information    Negative: Difficulty breathing, and not from stuffy nose (e.g., not relieved by cleaning out the nose)    Negative: Sounds like a life-threatening emergency to the triager    Negative: SEVERE headache and has fever    Negative: Patient sounds very sick or weak to the triager    Negative: SEVERE sinus pain    Negative: Severe headache    Negative: Redness or swelling on the cheek, forehead, or around the eye    Negative: Fever > 103 F (39.4 C)    Negative: Fever > 101 F (38.3 C) and over 60 years of age    Negative: Fever > 100.0 F (37.8 C) and has diabetes mellitus or a weak immune system (e.g., HIV positive, cancer chemotherapy, organ transplant, splenectomy, chronic steroids)     Negative: Fever > 100.0 F (37.8 C) and bedridden (e.g., nursing home patient, stroke, chronic illness, recovering from surgery)    Negative: Fever present > 3 days (72 hours)    Negative: Fever returns after gone for over 24 hours and symptoms worse or not improved    Negative: Sinus pain (not just congestion) and fever    Negative: Earache    Negative: Sinus congestion (pressure, fullness) present > 10 days    Negative: Nasal discharge present > 10 days    Negative: Using nasal washes and pain medicine > 24 hours and sinus pain (lower forehead, cheekbone, or eye) persists    Negative: Lots of coughing    Protocols used: SINUS PAIN OR CONGESTION-A-OH

## 2022-10-05 ENCOUNTER — VIRTUAL VISIT (OUTPATIENT)
Dept: FAMILY MEDICINE | Facility: CLINIC | Age: 62
End: 2022-10-05
Payer: COMMERCIAL

## 2022-10-05 DIAGNOSIS — I10 BENIGN ESSENTIAL HYPERTENSION: ICD-10-CM

## 2022-10-05 DIAGNOSIS — U07.1 INFECTION DUE TO 2019 NOVEL CORONAVIRUS: Primary | ICD-10-CM

## 2022-10-05 PROCEDURE — 99213 OFFICE O/P EST LOW 20 MIN: CPT | Mod: TEL | Performed by: FAMILY MEDICINE

## 2022-10-05 NOTE — PROGRESS NOTES
Cherise is a 62 year old who is being evaluated via a billable telephone visit.      What phone number would you like to be contacted at? [phone]   How would you like to obtain your AVS? MyChart    Assessment & Plan     Infection due to 2019 novel coronavirus  Some of her symptoms could be related to post-COVID and upper URI.  Suggested symptomatic care at this time no antibiotics necessary as long as there is no fever.  She will follow-up and let us know if she has any worsening.  She is interested in future checking her sodium level.    Benign essential hypertension  Patient blood pressure currently stable suggested to continue the blood pressure medication sometime post COVID or viral infections blood pressure can be slightly elevated.  Continue to do observation once day blood pressure check when she is more relaxed.                   No follow-ups on file.    Rex Sesay MD  [facility]    Sonali Luong is a 62 year old, presenting for the following health issues:  Sinus Problem and Hypertension  Extremities appointment to discuss her high blood pressure along with upper respiratory symptoms.  She recently recovered from COVID-19.  She is taken back paxlovid.  Since yesterday she is feeling somewhat better energy levels are improved however she continues have sinus congestion and postnasal drainage mild cough.  She has checked her blood pressure this morning which was reasonably stable.  Currently on losartan and metoprolol.  In the past her sodium has been slightly low but she is wondering if she should get some blood work.    HPI     Sinus infection and BP concerns        Review of Systems   Constitutional, HEENT, cardiovascular, pulmonary, gi and gu systems are negative, except as otherwise noted.      Objective           Vitals:  No vitals were obtained today due to virtual visit.    Physical Exam   healthy, alert and no distress  PSYCH: Alert and oriented times 3; coherent  speech, normal   rate and volume, able to articulate logical thoughts, able   to abstract reason, no tangential thoughts, no hallucinations   or delusions  Her affect is normal  RESP: No cough, no audible wheezing, able to talk in full sentences  Remainder of exam unable to be completed due to telephone visits            Phone call duration: 10 minutes

## 2022-10-13 DIAGNOSIS — I10 ESSENTIAL HYPERTENSION: ICD-10-CM

## 2022-10-15 DIAGNOSIS — F43.22 ADJUSTMENT DISORDER WITH ANXIOUS MOOD: ICD-10-CM

## 2022-10-15 RX ORDER — CLONAZEPAM 0.5 MG/1
TABLET, ORALLY DISINTEGRATING ORAL
Qty: 30 TABLET | Refills: 0 | Status: SHIPPED | OUTPATIENT
Start: 2022-10-15 | End: 2022-11-14

## 2022-10-17 RX ORDER — LOSARTAN POTASSIUM 100 MG/1
TABLET ORAL
Qty: 90 TABLET | Refills: 0 | Status: SHIPPED | OUTPATIENT
Start: 2022-10-17 | End: 2023-01-11

## 2022-10-18 ASSESSMENT — ENCOUNTER SYMPTOMS
MYALGIAS: 1
EYE PAIN: 0
ABDOMINAL PAIN: 0
COUGH: 0
SORE THROAT: 0
HEARTBURN: 0
PALPITATIONS: 0
HEMATURIA: 0
WEAKNESS: 0
SHORTNESS OF BREATH: 0
ARTHRALGIAS: 0
DIZZINESS: 0
BREAST MASS: 0
DYSURIA: 0
PARESTHESIAS: 0
FEVER: 0
DIARRHEA: 0
HEADACHES: 0
CHILLS: 0
JOINT SWELLING: 0
FREQUENCY: 0
HEMATOCHEZIA: 0
CONSTIPATION: 0
NERVOUS/ANXIOUS: 0
NAUSEA: 0

## 2022-10-19 ENCOUNTER — OFFICE VISIT (OUTPATIENT)
Dept: FAMILY MEDICINE | Facility: CLINIC | Age: 62
End: 2022-10-19
Payer: COMMERCIAL

## 2022-10-19 VITALS
HEART RATE: 66 BPM | DIASTOLIC BLOOD PRESSURE: 82 MMHG | RESPIRATION RATE: 14 BRPM | BODY MASS INDEX: 23.73 KG/M2 | HEIGHT: 64 IN | TEMPERATURE: 97.6 F | OXYGEN SATURATION: 99 % | WEIGHT: 139 LBS | SYSTOLIC BLOOD PRESSURE: 126 MMHG

## 2022-10-19 DIAGNOSIS — E83.110 HEREDITARY HEMOCHROMATOSIS (H): ICD-10-CM

## 2022-10-19 DIAGNOSIS — I10 ESSENTIAL HYPERTENSION: ICD-10-CM

## 2022-10-19 DIAGNOSIS — Z96.641 STATUS POST RIGHT HIP REPLACEMENT: ICD-10-CM

## 2022-10-19 DIAGNOSIS — S76.011S TEAR OF RIGHT GLUTEUS MEDIUS TENDON, SEQUELA: ICD-10-CM

## 2022-10-19 DIAGNOSIS — E03.9 HYPOTHYROIDISM, UNSPECIFIED TYPE: ICD-10-CM

## 2022-10-19 DIAGNOSIS — R06.09 EXERTIONAL DYSPNEA: ICD-10-CM

## 2022-10-19 DIAGNOSIS — E78.5 HYPERLIPIDEMIA LDL GOAL <130: ICD-10-CM

## 2022-10-19 DIAGNOSIS — F10.20 UNCOMPLICATED ALCOHOL DEPENDENCE (H): ICD-10-CM

## 2022-10-19 DIAGNOSIS — Z79.899 CHRONIC PRESCRIPTION BENZODIAZEPINE USE: ICD-10-CM

## 2022-10-19 DIAGNOSIS — E55.9 VITAMIN D DEFICIENCY: ICD-10-CM

## 2022-10-19 DIAGNOSIS — Z00.00 ROUTINE GENERAL MEDICAL EXAMINATION AT A HEALTH CARE FACILITY: Primary | ICD-10-CM

## 2022-10-19 DIAGNOSIS — Z23 NEED FOR VACCINATION: ICD-10-CM

## 2022-10-19 DIAGNOSIS — E87.1 HYPONATREMIA: ICD-10-CM

## 2022-10-19 DIAGNOSIS — E22.2 SIADH (SYNDROME OF INAPPROPRIATE ADH PRODUCTION) (H): ICD-10-CM

## 2022-10-19 DIAGNOSIS — M62.838 MUSCLE SPASM: ICD-10-CM

## 2022-10-19 LAB
ALBUMIN SERPL-MCNC: 3.4 G/DL (ref 3.4–5)
ALP SERPL-CCNC: 90 U/L (ref 40–150)
ALT SERPL W P-5'-P-CCNC: 24 U/L (ref 0–50)
ANION GAP SERPL CALCULATED.3IONS-SCNC: 11 MMOL/L (ref 3–14)
AST SERPL W P-5'-P-CCNC: 25 U/L (ref 0–45)
BILIRUB SERPL-MCNC: 0.4 MG/DL (ref 0.2–1.3)
BUN SERPL-MCNC: 7 MG/DL (ref 7–30)
CALCIUM SERPL-MCNC: 8.8 MG/DL (ref 8.5–10.1)
CHLORIDE BLD-SCNC: 106 MMOL/L (ref 94–109)
CHOLEST SERPL-MCNC: 178 MG/DL
CO2 SERPL-SCNC: 23 MMOL/L (ref 20–32)
CREAT SERPL-MCNC: 0.72 MG/DL (ref 0.52–1.04)
CREAT UR-MCNC: 166 MG/DL
ERYTHROCYTE [DISTWIDTH] IN BLOOD BY AUTOMATED COUNT: 12.5 % (ref 10–15)
FASTING STATUS PATIENT QL REPORTED: NORMAL
FERRITIN SERPL-MCNC: 105 NG/ML (ref 8–252)
GFR SERPL CREATININE-BSD FRML MDRD: >90 ML/MIN/1.73M2
GLUCOSE BLD-MCNC: 74 MG/DL (ref 70–99)
HCT VFR BLD AUTO: 38.6 % (ref 35–47)
HDLC SERPL-MCNC: 86 MG/DL
HGB BLD-MCNC: 13.4 G/DL (ref 11.7–15.7)
LDLC SERPL CALC-MCNC: 73 MG/DL
MCH RBC QN AUTO: 32.1 PG (ref 26.5–33)
MCHC RBC AUTO-ENTMCNC: 34.7 G/DL (ref 31.5–36.5)
MCV RBC AUTO: 93 FL (ref 78–100)
MICROALBUMIN UR-MCNC: 26 MG/L
MICROALBUMIN/CREAT UR: 15.66 MG/G CR (ref 0–25)
NONHDLC SERPL-MCNC: 92 MG/DL
PLATELET # BLD AUTO: 216 10E3/UL (ref 150–450)
POTASSIUM BLD-SCNC: 3.5 MMOL/L (ref 3.4–5.3)
PROT SERPL-MCNC: 7.1 G/DL (ref 6.8–8.8)
RBC # BLD AUTO: 4.17 10E6/UL (ref 3.8–5.2)
SODIUM SERPL-SCNC: 140 MMOL/L (ref 133–144)
TRIGL SERPL-MCNC: 95 MG/DL
WBC # BLD AUTO: 4.3 10E3/UL (ref 4–11)

## 2022-10-19 PROCEDURE — 80053 COMPREHEN METABOLIC PANEL: CPT | Performed by: INTERNAL MEDICINE

## 2022-10-19 PROCEDURE — 85027 COMPLETE CBC AUTOMATED: CPT | Performed by: INTERNAL MEDICINE

## 2022-10-19 PROCEDURE — 90682 RIV4 VACC RECOMBINANT DNA IM: CPT | Performed by: INTERNAL MEDICINE

## 2022-10-19 PROCEDURE — 82306 VITAMIN D 25 HYDROXY: CPT | Performed by: INTERNAL MEDICINE

## 2022-10-19 PROCEDURE — 80061 LIPID PANEL: CPT | Performed by: INTERNAL MEDICINE

## 2022-10-19 PROCEDURE — 36415 COLL VENOUS BLD VENIPUNCTURE: CPT | Performed by: INTERNAL MEDICINE

## 2022-10-19 PROCEDURE — 82728 ASSAY OF FERRITIN: CPT | Performed by: INTERNAL MEDICINE

## 2022-10-19 PROCEDURE — 82043 UR ALBUMIN QUANTITATIVE: CPT | Performed by: INTERNAL MEDICINE

## 2022-10-19 PROCEDURE — 99215 OFFICE O/P EST HI 40 MIN: CPT | Mod: 25 | Performed by: INTERNAL MEDICINE

## 2022-10-19 PROCEDURE — 90471 IMMUNIZATION ADMIN: CPT | Performed by: INTERNAL MEDICINE

## 2022-10-19 PROCEDURE — 99396 PREV VISIT EST AGE 40-64: CPT | Mod: 25 | Performed by: INTERNAL MEDICINE

## 2022-10-19 RX ORDER — METOPROLOL SUCCINATE 100 MG/1
100 TABLET, EXTENDED RELEASE ORAL DAILY
Qty: 90 TABLET | Refills: 1 | Status: SHIPPED | OUTPATIENT
Start: 2022-10-19 | End: 2023-04-10

## 2022-10-19 ASSESSMENT — ENCOUNTER SYMPTOMS
ABDOMINAL PAIN: 0
SORE THROAT: 0
FEVER: 0
DYSURIA: 0
HEARTBURN: 0
NERVOUS/ANXIOUS: 0
CHILLS: 0
HEADACHES: 0
NAUSEA: 0
CONSTIPATION: 0
DIZZINESS: 0
ARTHRALGIAS: 0
JOINT SWELLING: 0
EYE PAIN: 0
MYALGIAS: 1
COUGH: 0
HEMATURIA: 0
PALPITATIONS: 0
FREQUENCY: 0
SHORTNESS OF BREATH: 0
WEAKNESS: 0
DIARRHEA: 0
HEMATOCHEZIA: 0
BREAST MASS: 0
PARESTHESIAS: 0

## 2022-10-19 ASSESSMENT — PAIN SCALES - GENERAL: PAINLEVEL: NO PAIN (0)

## 2022-10-19 NOTE — PROGRESS NOTES
SUBJECTIVE:   CC: Cherise is an 62 year old who presents for preventive health visit.       Patient has been advised of split billing requirements and indicates understanding: Yes  Healthy Habits:     Getting at least 3 servings of Calcium per day:  Yes    Bi-annual eye exam:  Yes    Dental care twice a year:  Yes    Sleep apnea or symptoms of sleep apnea:  None    Diet:  Regular (no restrictions)    Frequency of exercise:  2-3 days/week    Duration of exercise:  30-45 minutes    Taking medications regularly:  Yes    Medication side effects:  None    PHQ-2 Total Score: 0    Additional concerns today:  Yes      Today's PHQ-2 Score:   PHQ-2 (  [employer]) 10/18/2022   Q1: Little interest or pleasure in doing things 0   Q2: Feeling down, depressed or hopeless 0   PHQ-2 Score 0   PHQ-2 Total Score (12-17 Years)- Positive if 3 or more points; Administer PHQ-A if positive -   Q1: Little interest or pleasure in doing things Not at all   Q2: Feeling down, depressed or hopeless Not at all   PHQ-2 Score 0     Abuse: Current or Past (Physical, Sexual or Emotional) - No  Do you feel safe in your environment? Yes    Have you ever done Advance Care Planning? (For example, a Health Directive, POLST, or a discussion with a medical provider or your loved ones about your wishes): No, advance care planning information given to patient to review.  Patient declined advance care planning discussion at this time.    Social History     Tobacco Use     Smoking status: Former     Packs/day: 0.26     Years: 10.00     Pack years: 2.60     Types: Cigarettes     Quit date: 1987     Years since quittin.5     Smokeless tobacco: Former     Tobacco comments:     Was a social smoker   Substance Use Topics     Alcohol use: Yes     Comment: A couple a day     If you drink alcohol do you typically have >3 drinks per day or >7 drinks per week? No    No flowsheet data found.    Reviewed orders with patient.  Reviewed health maintenance and  updated orders accordingly - Yes  Lab work is in process  Labs reviewed in EPIC    Breast Cancer Screening:    FHS-7:   Breast CA Risk Assessment (FHS-7) 1/20/2022   Did any of your first-degree relatives have breast or ovarian cancer? No   Did any of your relatives have bilateral breast cancer? No   Did any man in your family have breast cancer? No   Did any woman in your family have breast and ovarian cancer? No   Did any woman in your family have breast cancer before age 50 y? No   Do you have 2 or more relatives with breast and/or ovarian cancer? No   Do you have 2 or more relatives with breast and/or bowel cancer? No     click delete button to remove this line now  Mammogram Screening: Recommended mammography every 1-2 years with patient discussion and risk factor consideration  Pertinent mammograms are reviewed under the imaging tab.    History of abnormal Pap smear: NO - age 30- 65 PAP every 3 years recommended  PAP / HPV Latest Ref Rng & Units 9/11/2019 9/29/2014 9/4/2012   PAP (Historical) - NIL ASC-US(A) NIL   HPV16 NEG:Negative Negative - -   HPV18 NEG:Negative Negative - -   HRHPV NEG:Negative Negative - -     Reviewed and updated as needed this visit by clinical staff   Tobacco  Allergies  Meds  Problems  Med Hx  Surg Hx  Fam Hx  Soc   Hx        Reviewed and updated as needed this visit by Provider   Tobacco  Allergies  Meds  Problems  Med Hx  Surg Hx  Fam Hx         Past Medical History:   Diagnosis Date     ASCUS favor benign 09/2014    3 yr co-test     Essential hypertension, benign      Impaired renal function      Inflammatory arthritis     Managed by Rheumatology  - q 6 mos     Microscopic colitis      Osteoarthritis of first carpometacarpal joint     bilateral     Other specified acquired hypothyroidism      Seasonal allergic rhinitis      Shortness of breath      Spider veins      Symptomatic menopausal or female climacteric states     age 43, on HRT     Tricuspid regurgitation      +1-2 TR noted on 14 Echo      Past Surgical History:   Procedure Laterality Date     ABDOMEN SURGERY  95    C section     BIOPSY BREAST       C/SECTION, LOW TRANSVERSE      twins     COLONOSCOPY      nl, next one due in 5 years     CV CORONARY ANGIOGRAM N/A 2022    Procedure: Coronary Angiogram;  Surgeon: Jose Antonio Ferraro MD;  Location:  HE[facility] CARDI[facility] CATH LAB     CV LEFT HEART CATH N/A 2022    Procedure: Left Heart Catheterization;  Surgeon: Jose Antonio Ferraro MD;  Location:  [facility]ARD[facility] C[facility] LAB     CV LEFT VENTRICULOGRAM N/A 2022    Procedure: Left Ventriculogram;  Surgeon: Jose Antonio Ferraro MD;  Location:  HEART CARDIAC CATH LAB     CV RIGHT HEART CATH MEASUREMENTS RECORDED N/A 2022    Procedure: Right Heart Catheterization;  Surgeon: Jose Antonio Ferraro MD;  Location:  HEART CARDIAC CATH LAB     LEEP TX, CERVICAL  1990s    normal since that time     MYRINGOTOMY, INSERT TUBE, COMBINED Left 2015    chronic NORM, ETD     OB History    Para Term  AB Living   2 2 2 0 0 3   SAB IAB Ectopic Multiple Live Births   0 0 0 1 0      # Outcome Date GA Lbr [place]/2nd Weight Sex Delivery Anes PTL Lv   2 Term            1 Term               Obstetric Comments   twins       Review of Systems   Constitutional: Negative for chills and fever.   HENT: Negative for congestion, ear pain, hearing loss and sore throat.    Eyes: Negative for pain and visual disturbance.   Respiratory: Negative for cough and shortness of breath.    Cardiovascular: Negative for chest pain, palpitations and peripheral edema.   Gastrointestinal: Negative for abdominal pain, constipation, diarrhea, heartburn, hematochezia and nausea.   Breasts:  Negative for tenderness, breast mass and discharge.   Genitourinary: Negative for dysuria, frequency, genital sores, hematuria, pelvic pain, urgency, vaginal bleeding and vaginal discharge.   Musculoskeletal: Positive for myalgias. Negative for arthralgias  "and joint swelling.   Skin: Negative for rash.   Neurological: Negative for dizziness, weakness, headaches and paresthesias.   Psychiatric/Behavioral: Negative for mood changes. The patient is not nervous/anxious.      CONSTITUTIONAL: NEGATIVE for fever, chills, change in weight  INTEGUMENTARY/SKIN: NEGATIVE for worrisome rashes, moles or lesions  EYES: NEGATIVE for vision changes or irritation  ENT: NEGATIVE for ear, mouth and throat problems  RESP: NEGATIVE for significant cough or SOB  BREAST: NEGATIVE for masses, tenderness or discharge  CV: NEGATIVE for chest pain, palpitations or peripheral edema  GI: NEGATIVE for nausea, abdominal pain, heartburn, or change in bowel habits  : NEGATIVE for unusual urinary or vaginal symptoms. No vaginal bleeding.  MUSCULOSKELETAL: NEGATIVE for significant arthralgias or myalgia  NEURO: NEGATIVE for weakness, dizziness or paresthesias  PSYCHIATRIC: NEGATIVE for changes in mood or affect      OBJECTIVE:   /82   Pulse 66   Temp 97.6  F (36.4  C) (Temporal)   Resp 14   Ht 1.626 m (5' 4\")   Wt 63 kg (139 lb)   SpO2 99%   BMI 23.86 kg/m    Physical Exam  GENERAL APPEARANCE: healthy, alert and no distress  EYES: Eyes grossly normal to inspection, PERRL and conjunctivae and sclerae normal  HENT: ear canals and TM's normal, nose and mouth without ulcers or lesions, oropharynx clear and oral mucous membranes moist  NECK: no adenopathy, no asymmetry, masses, or scars and thyroid normal to palpation  RESP: lungs clear to auscultation - no rales, rhonchi or wheezes  BREAST: Defered , pt gets mammogram.   CV: regular rate and rhythm, normal S1 S2, no S3 or S4, no murmur, click or rub, no peripheral edema and peripheral pulses strong  ABDOMEN: soft, nontender, no hepatosplenomegaly, no masses and bowel sounds normal  MS: no musculoskeletal defects are noted and gait is age appropriate without ataxia  SKIN: no suspicious lesions or rashes  NEURO: Normal strength and tone, " sensory exam grossly normal, mentation intact and speech normal  PSYCH: mentation appears normal and affect normal/bright    Diagnostic Test Results:  Labs reviewed in Epic    ASSESSMENT/PLAN:       Assessment and Plan  1. Routine general medical examination at a health care facility  Last seen pt in 6/2022 for HTN uncontrolled. And later seen by our group in 10/2022 for COVID concerns and post COVID syndrome. SHe is here for Annual physical. Does have Heriditary hemochromotosis with Phlebotomies and also has Hyponatremia.   - INFLUENZA QUAD, RECOMBINANT, P-FREE (RIV4) (FLUBLOK) AGE 50-64 [FMM702]  - Albumin Random Urine Quantitative with Creat Ratio; Future  - metoprolol succinate ER (TOPROL XL) 100 MG 24 hr tablet; Take 1 tablet (100 mg) by mouth daily  Dispense: 90 tablet; Refill: 1  - Comprehensive metabolic panel (BMP + Alb, Alk Phos, ALT, AST, Total. Bili, TP); Future  - CBC with platelets; Future  - Ferritin; Future  - Vitamin D deficiency screening; Future  - Lipid panel reflex to direct LDL Fasting; Future  - Albumin Random Urine Quantitative with Creat Ratio  - Comprehensive metabolic panel (BMP + Alb, Alk Phos, ALT, AST, Total. Bili, TP)  - CBC with platelets  - Ferritin  - Vitamin D deficiency screening  - Lipid panel reflex to direct LDL Fasting    2. Vitamin D deficiency  - Vitamin D deficiency screening; Future  - Vitamin D deficiency screening    3. Hyperlipidemia LDL goal <130  - Lipid panel reflex to direct LDL Fasting; Future  - Lipid panel reflex to direct LDL Fasting    4. Hypothyroidism, unspecified type  Stable, recent TSH normal. Continue current Levothyroxine 75 mcg daily and Cytomel.      5. Hereditary hemochromatosis (H)  Chronic stable, no recent Phlebotomies done. Pt requesting pertinent labs to be done by me in this physical. Follow up with recommendations of Hemoncology.   - CBC with platelets; Future  - Ferritin; Future  - CBC with platelets  - Ferritin    6. Chronic prescription  benzodiazepine use  Ongoing Clonazepam refills from me ,with CSA and UDS uptodate     7. SIADH (syndrome of inappropriate ADH production) (H)  8. Hyponatremia  Stable, improved. Will continue current Salt tablets 1g daily    9. Uncomplicated alcohol dependence (H)      10. Need for vaccination  - INFLUENZA QUAD, RECOMBINANT, P-FREE (RIV4) (FLUBLOK) AGE 50-64 [JLR341]    11. Essential hypertension  - metoprolol succinate ER (TOPROL XL) 100 MG 24 hr tablet; Take 1 tablet (100 mg) by mouth daily  Dispense: 90 tablet; Refill: 1    12. Exertional dyspnea  Ongoing problem, Uncontrolled. Will do PFTS as all recent cardiac work up normal including Cardiac cath .   - General PFT Lab (Please always keep checked); Future  - Pulmonary Function Test; Future    13. Muscle spasm  14. Status post right hip replacement  15. Tear of right gluteus medius tendon, sequela  New problem , of Rt quadriceps spasm on and off . Physical exam negative at this time. Pt is following Pilates which is helping her symptoms. Will give as needed Zanaflex for improvement. Side effects explained.   - tiZANidine (ZANAFLEX) 4 MG tablet; Take 1 tablet (4 mg) by mouth nightly as needed for muscle spasms  Dispense: 30 tablet; Refill: 1      Over 40 minutes spent outside the preventive visit on reviewing patient chart,  face to face encounter, greater than 50% time spent with plan/cordination of care and documentation as above in my A/P.         Patient Instructions   As discussed , please do fasting labs ordered.   Please follow up with hemoncology as scheduled.     Placed orders for PFTs ( pulmonary function tests )     Sent in medications for your Muscle spasm and also Albuterol inhaler.     Pulmonary Function Test Address :     [facility]  [phone]  / [phone]  [address] [place] [id number]    [facility] will distribute Patient Information Packet;   [facility] ([facility]) will contact patient by telephone  Please call the following departments if there are questions or need assistance:  [facility]: [phone]  [facility]: [phone]  [facility]: [phone]  [facility] ATS Registered: [phone]  [facility]: [phone]  [facility]: [phone]       ==============================      Preventive Health Recommendations  Female Ages 50 - 64    Yearly exam: See your health care provider every year in order to  o Review health changes.   o Discuss preventive care.    o Review your medicines if your doctor has prescribed any.      Get a Pap test every three years (unless you have an abnormal result and your provider advises testing more often).    If you get Pap tests with HPV test, you only need to test every 5 years, unless you have an abnormal result.     You do not need a Pap test if your uterus was removed (hysterectomy) and you have not had cancer.    You should be tested each year for STDs (sexually transmitted diseases) if you're at risk.     Have a mammogram every 1 to 2 years.    Have a colonoscopy at age 50, or have a yearly FIT test (stool test). These exams screen for colon cancer.      Have a cholesterol test every 5 years, or more often if advised.    Have a diabetes test (fasting glucose) every three years. If you are at risk for diabetes, you should have this test more often.     If you are at risk for osteoporosis (brittle bone disease), think about having a bone density scan (DEXA).    Shots: Get a flu shot each year. Get a tetanus shot every 10 years.    Nutrition:     Eat at least 5 servings of fruits and vegetables each day.    Eat whole-grain bread, whole-wheat pasta and brown rice instead of white grains and rice.    Get adequate Calcium and Vitamin D.     Lifestyle    Exercise at least 150 minutes a week (30 minutes a day, 5 days a week). This  "will help you control your weight and prevent disease.    Limit alcohol to one drink per day.    No smoking.     Wear sunscreen to prevent skin cancer.     See your dentist every six months for an exam and cleaning.    See your eye doctor every 1 to 2 years.      Return in about 3 months (around 1/19/2023), or if symptoms worsen or fail to improve, for For benzodiazepine follow up .    Mercedez Britton MD  [facility]      Patient has been advised of split billing requirements and indicates understanding: Yes    COUNSELING:  Reviewed preventive health counseling, as reflected in patient instructions  Special attention given to:        Regular exercise       Healthy diet/nutrition       Vision screening       Hearing screening       Immunizations    Vaccinated for: Influenza             Osteoporosis prevention/bone health    Estimated body mass index is 23.86 kg/m  as calculated from the following:    Height as of this encounter: 1.626 m (5' 4\").    Weight as of this encounter: 63 kg (139 lb).        She reports that she quit smoking about 35 years ago. Her smoking use included cigarettes. She has a 2.60 pack-year smoking history. She has quit using smokeless tobacco.      Counseling Resources:  ATP IV Guidelines  Pooled Cohorts Equation Calculator  Breast Cancer Risk Calculator  BRCA-Related Cancer Risk Assessment: FHS-7 Tool  FRAX Risk Assessment  ICSI Preventive Guidelines  Dietary Guidelines for Americans, 2010  USDA's MyPlate  ASA Prophylaxis  Lung CA Screening    Mercedez Britton MD  [facility]  "

## 2022-10-19 NOTE — LETTER
October 21, 2022      Peg NILS Hanks  [address]  [place] [address]        Dear Ms.St Cadena,    We are writing to inform you of your test results.    All your labs are normal, there might be some highlighted which doesn't have any clinical significance.     Mercedez Britton MD   Internal medicine physician   [facility]     Resulted Orders   Albumin Random Urine Quantitative with Creat Ratio   Result Value Ref Range    Creatinine Urine mg/dL 166 mg/dL    Albumin Urine mg/L 26 mg/L    Albumin Urine mg/g Cr 15.66 0.00 - 25.00 mg/g Cr   Comprehensive metabolic panel (BMP + Alb, Alk Phos, ALT, AST, Total. Bili, TP)   Result Value Ref Range    Sodium 140 133 - 144 mmol/L    Potassium 3.5 3.4 - 5.3 mmol/L    Chloride 106 94 - 109 mmol/L    Carbon Dioxide (CO2) 23 20 - 32 mmol/L    Anion Gap 11 3 - 14 mmol/L    Urea Nitrogen 7 7 - 30 mg/dL    Creatinine 0.72 0.52 - 1.04 mg/dL    Calcium 8.8 8.5 - 10.1 mg/dL    Glucose 74 70 - 99 mg/dL    Alkaline Phosphatase 90 40 - 150 U/L    AST 25 0 - 45 U/L    ALT 24 0 - 50 U/L    Protein Total 7.1 6.8 - 8.8 g/dL    Albumin 3.4 3.4 - 5.0 g/dL    Bilirubin Total 0.4 0.2 - 1.3 mg/dL    GFR Estimate >90 >60 mL/min/1.73m2      Comment:      Effective December 21, 2021 eGFRcr in adults is calculated using the 2021 CKD-EPI creatinine equation which includes age and gender (Aron et al., NE, DOI: 10.1056/IYBHfe2643767)   CBC with platelets   Result Value Ref Range    WBC Count 4.3 4.0 - 11.0 10e3/uL    RBC Count 4.17 3.80 - 5.20 10e6/uL    Hemoglobin 13.4 11.7 - 15.7 g/dL    Hematocrit 38.6 35.0 - 47.0 %    MCV 93 78 - 100 fL    MCH 32.1 26.5 - 33.0 pg    MCHC 34.7 31.5 - 36.5 g/dL    RDW 12.5 10.0 - 15.0 %    Platelet Count 216 150 - 450 10e3/uL   Ferritin   Result Value Ref Range    Ferritin 105 8 - 252 ng/mL   Vitamin D deficiency screening   Result Value Ref Range    Vitamin D, Total (25-Hydroxy) 46 20 - 75 ug/L    Narrative    Season, race, dietary intake,  and treatment affect the concentration of 25-hydroxy-Vitamin D. Values may decrease during winter months and increase during summer months. Values 20-29 ug/L may indicate Vitamin D insufficiency and values <20 ug/L may indicate Vitamin D deficiency.    Vitamin D determination is routinely performed by an immunoassay specific for 25 hydroxyvitamin D3.  If an individual is on vitamin D2(ergocalciferol) supplementation, please specify 25 OH vitamin D2 and D3 level determination by LCMSMS test VITD23.     Lipid panel reflex to direct LDL Fasting   Result Value Ref Range    Cholesterol 178 <200 mg/dL    Triglycerides 95 <150 mg/dL    Direct Measure HDL 86 >=50 mg/dL    LDL Cholesterol Calculated 73 <=100 mg/dL    Non HDL Cholesterol 92 <130 mg/dL    Patient Fasting > 8hrs? Unknown     Narrative    Cholesterol  Desirable:  <200 mg/dL    Triglycerides  Normal:  Less than 150 mg/dL  Borderline High:  150-199 mg/dL  High:  200-499 mg/dL  Very High:  Greater than or equal to 500 mg/dL    Direct Measure HDL  Female:  Greater than or equal to 50 mg/dL   Male:  Greater than or equal to 40 mg/dL    LDL Cholesterol  Desirable:  <100mg/dL  Above Desirable:  100-129 mg/dL   Borderline High:  130-159 mg/dL   High:  160-189 mg/dL   Very High:  >= 190 mg/dL    Non HDL Cholesterol  Desirable:  130 mg/dL  Above Desirable:  130-159 mg/dL  Borderline High:  160-189 mg/dL  High:  190-219 mg/dL  Very High:  Greater than or equal to 220 mg/dL       If you have any questions or concerns, please call the clinic at the number listed above.       Sincerely,      Mercedez Britton MD

## 2022-10-19 NOTE — PATIENT INSTRUCTIONS
As discussed , please do fasting labs ordered.   Please follow up with hemoncology as scheduled.     Placed orders for PFTs ( pulmonary function tests )     Sent in medications for your Muscle spasm and also Albuterol inhaler.     Pulmonary Function Test Address :     [facility]  [phone]  / [phone]  [address] [place] [address]    [facility] will distribute Patient Information Packet;   [facility] ([facility]) will contact patient by telephone  Please call the following departments if there are questions or need assistance:  [facility]: [phone]  [facility]: [phone]  [facility]: [phone]  [facility] Registered: [phone]  [facility]: [phone]  [facility]: [phone]       ==============================      Preventive Health Recommendations  Female Ages 50 - 64    Yearly exam: See your health care provider every year in order to  o Review health changes.   o Discuss preventive care.    o Review your medicines if your doctor has prescribed any.      Get a Pap test every three years (unless you have an abnormal result and your provider advises testing more often).    If you get Pap tests with HPV test, you only need to test every 5 years, unless you have an abnormal result.     You do not need a Pap test if your uterus was removed (hysterectomy) and you have not had cancer.    You should be tested each year for STDs (sexually transmitted diseases) if you're at risk.     Have a mammogram every 1 to 2 years.    Have a colonoscopy at age 50, or have a yearly FIT test (stool test). These exams screen for colon cancer.      Have a cholesterol test every 5 years, or more often if advised.    Have a diabetes test (fasting glucose) every three years. If you are at risk for diabetes, you should have this test more  often.     If you are at risk for osteoporosis (brittle bone disease), think about having a bone density scan (DEXA).    Shots: Get a flu shot each year. Get a tetanus shot every 10 years.    Nutrition:     Eat at least 5 servings of fruits and vegetables each day.    Eat whole-grain bread, whole-wheat pasta and brown rice instead of white grains and rice.    Get adequate Calcium and Vitamin D.     Lifestyle    Exercise at least 150 minutes a week (30 minutes a day, 5 days a week). This will help you control your weight and prevent disease.    Limit alcohol to one drink per day.    No smoking.     Wear sunscreen to prevent skin cancer.     See your dentist every six months for an exam and cleaning.    See your eye doctor every 1 to 2 years.

## 2022-10-20 LAB — DEPRECATED CALCIDIOL+CALCIFEROL SERPL-MC: 46 UG/L (ref 20–75)

## 2022-10-21 DIAGNOSIS — E03.9 ACQUIRED HYPOTHYROIDISM: ICD-10-CM

## 2022-10-25 RX ORDER — LEVOTHYROXINE SODIUM 75 UG/1
TABLET ORAL
Qty: 90 TABLET | Refills: 3 | Status: SHIPPED | OUTPATIENT
Start: 2022-10-25 | End: 2023-10-17

## 2022-10-25 RX ORDER — LIOTHYRONINE SODIUM 5 UG/1
TABLET ORAL
Qty: 180 TABLET | Refills: 3 | Status: SHIPPED | OUTPATIENT
Start: 2022-10-25 | End: 2023-10-05

## 2022-10-25 NOTE — TELEPHONE ENCOUNTER
Prescription approved per [facility] Refill Protocol.  Marilee Bhandari RN

## 2022-10-28 ENCOUNTER — INFUSION THERAPY VISIT (OUTPATIENT)
Dept: INFUSION THERAPY | Facility: CLINIC | Age: 62
End: 2022-10-28
Attending: INTERNAL MEDICINE
Payer: COMMERCIAL

## 2022-10-28 VITALS
OXYGEN SATURATION: 97 % | DIASTOLIC BLOOD PRESSURE: 97 MMHG | SYSTOLIC BLOOD PRESSURE: 138 MMHG | TEMPERATURE: 98.3 F | RESPIRATION RATE: 14 BRPM | HEART RATE: 69 BPM

## 2022-10-28 DIAGNOSIS — E83.110 HEREDITARY HEMOCHROMATOSIS (H): Primary | ICD-10-CM

## 2022-10-28 DIAGNOSIS — E83.19 IRON OVERLOAD: ICD-10-CM

## 2022-10-28 PROCEDURE — 99195 PHLEBOTOMY: CPT

## 2022-10-28 RX ORDER — HEPARIN SODIUM (PORCINE) LOCK FLUSH IV SOLN 100 UNIT/ML 100 UNIT/ML
5 SOLUTION INTRAVENOUS
Status: CANCELLED | OUTPATIENT
Start: 2022-10-28

## 2022-10-28 RX ORDER — HEPARIN SODIUM,PORCINE 10 UNIT/ML
5 VIAL (ML) INTRAVENOUS
Status: CANCELLED | OUTPATIENT
Start: 2022-10-28

## 2022-10-28 NOTE — PROGRESS NOTES
Infusion Nursing Note:  Irina Hanks presents today for 500 ml Phlebotomy.    Patient seen by provider today: No   present during visit today: Not Applicable.    Note:Patient tolerated removal of 500 ml of whole blood via phlebotomy. Patient drank 450 ml of oral fluids post phlebotomy.  Vitals stable pre and post procedure.  Patient discharged in stable condition after 10 minutes of observation.       Intravenous Access:  Peripheral phlebotomy needle 17 G to L AC.    Treatment Conditions:  Lab Results   Component Value Date    HGB 13.4 10/19/2022    WBC 4.3 10/19/2022    ANEU 6.4 08/13/2021    ANEUTAUTO 4.4 06/08/2022     10/19/2022    Ferritin 105.   Results reviewed, labs MET treatment parameters, ok to proceed with treatment.    Post Infusion Assessment   Pt tolerated Phlebotomy well.     Discharge Plan:   Patient declined prescription refills.  Discharge instructions reviewed with: Patient.  Patient and/or family verbalized understanding of discharge instructions and all questions answered.  AVS to patient via [employer].  Patient will return as instructed by her provider for next appointment.   Patient discharged in stable condition accompanied by: self.  Departure Mode: Ambulatory.      Amy Moore RN

## 2022-11-02 ENCOUNTER — OFFICE VISIT (OUTPATIENT)
Dept: NURSING | Facility: CLINIC | Age: 62
End: 2022-11-02
Payer: COMMERCIAL

## 2022-11-02 VITALS — WEIGHT: 143.3 LBS | BODY MASS INDEX: 24.6 KG/M2 | HEART RATE: 82 BPM | OXYGEN SATURATION: 99 %

## 2022-11-02 DIAGNOSIS — R06.09 EXERTIONAL DYSPNEA: ICD-10-CM

## 2022-11-02 LAB
DLCOUNC-%PRED-PRE: 71 %
DLCOUNC-PRE: 13.68 ML/MIN/MMHG
DLCOUNC-PRED: 19.24 ML/MIN/MMHG
ERV-%PRED-PRE: 98 %
ERV-PRE: 0.72 L
ERV-PRED: 0.73 L
EXPTIME-PRE: 6.87 SEC
FEF2575-%PRED-PRE: 137 %
FEF2575-PRE: 2.82 L/SEC
FEF2575-PRED: 2.06 L/SEC
FEFMAX-%PRED-PRE: 103 %
FEFMAX-PRE: 6.12 L/SEC
FEFMAX-PRED: 5.91 L/SEC
FEV1-%PRED-PRE: 105 %
FEV1-PRE: 2.44 L
FEV1FEV6-PRE: 84 %
FEV1FEV6-PRED: 80 %
FEV1FVC-PRE: 84 %
FEV1FVC-PRED: 79 %
FEV1SVC-PRE: 81 %
FEV1SVC-PRED: 75 %
FIFMAX-PRE: 4.38 L/SEC
FRCPLETH-%PRED-PRE: 84 %
FRCPLETH-PRE: 2.23 L
FRCPLETH-PRED: 2.65 L
FVC-%PRED-PRE: 98 %
FVC-PRE: 2.89 L
FVC-PRED: 2.94 L
IC-%PRED-PRE: 97 %
IC-PRE: 2.28 L
IC-PRED: 2.33 L
RVPLETH-%PRED-PRE: 78 %
RVPLETH-PRE: 1.51 L
RVPLETH-PRED: 1.92 L
TLCPLETH-%PRED-PRE: 94 %
TLCPLETH-PRE: 4.51 L
TLCPLETH-PRED: 4.77 L
VA-%PRED-PRE: 95 %
VA-PRE: 4.38 L
VC-%PRED-PRE: 97 %
VC-PRE: 3 L
VC-PRED: 3.06 L

## 2022-11-02 PROCEDURE — 94729 DIFFUSING CAPACITY: CPT | Performed by: INTERNAL MEDICINE

## 2022-11-02 PROCEDURE — 94375 RESPIRATORY FLOW VOLUME LOOP: CPT | Performed by: INTERNAL MEDICINE

## 2022-11-02 PROCEDURE — 94726 PLETHYSMOGRAPHY LUNG VOLUMES: CPT | Performed by: INTERNAL MEDICINE

## 2022-11-03 ENCOUNTER — MYC MEDICAL ADVICE (OUTPATIENT)
Dept: FAMILY MEDICINE | Facility: CLINIC | Age: 62
End: 2022-11-03

## 2022-11-03 DIAGNOSIS — E83.110 HEREDITARY HEMOCHROMATOSIS (H): ICD-10-CM

## 2022-11-03 DIAGNOSIS — R06.02 SHORTNESS OF BREATH: ICD-10-CM

## 2022-11-03 DIAGNOSIS — R94.2 DECREASED DIFFUSION CAPACITY OF LUNG: Primary | ICD-10-CM

## 2022-11-03 DIAGNOSIS — Z83.6 FAMILY HISTORY OF PULMONARY FIBROSIS: ICD-10-CM

## 2022-11-14 DIAGNOSIS — F43.22 ADJUSTMENT DISORDER WITH ANXIOUS MOOD: ICD-10-CM

## 2022-11-14 RX ORDER — CLONAZEPAM 0.5 MG/1
TABLET, ORALLY DISINTEGRATING ORAL
Qty: 30 TABLET | Refills: 0 | Status: SHIPPED | OUTPATIENT
Start: 2022-11-14 | End: 2022-12-16

## 2022-11-15 ENCOUNTER — MYC MEDICAL ADVICE (OUTPATIENT)
Dept: FAMILY MEDICINE | Facility: CLINIC | Age: 62
End: 2022-11-15

## 2022-11-15 NOTE — TELEPHONE ENCOUNTER
[place] reviewed:  reviewed- no concerns    [place]:  [email or web address]-[email or web address]    Refill done.  Mercedez Britton MD on 11/14/2022 at 7:26 PM

## 2022-11-18 NOTE — TELEPHONE ENCOUNTER
Please see My Chart Message and advise. Left detailed message for the patient to call to speak with a triage nurse for any new or worsening symptoms.   Triage to contact the patient.  Carmen Lopez RN

## 2022-11-20 NOTE — TELEPHONE ENCOUNTER
Please schedule virtual visit to discuss further on patient concern. OK to double book / Use same day slot sometime this week.     Thank you,  Mercedez Britton MD on 11/19/2022

## 2022-11-21 NOTE — TELEPHONE ENCOUNTER
Spoke to patient:    Patient has reached out to surgeon that did the surgery 1 year ago: total hip and glutt repair. At this time she is seeing her surgeon and he is going to take imaging and see if there is any changes or if anything is pinching a nerve.    Would like to hold off on appointment with Tobi at this time.    Suad AMADOR CMA

## 2022-12-07 ENCOUNTER — OFFICE VISIT (OUTPATIENT)
Dept: PULMONOLOGY | Facility: CLINIC | Age: 62
End: 2022-12-07
Payer: COMMERCIAL

## 2022-12-07 VITALS
HEIGHT: 64 IN | WEIGHT: 143 LBS | RESPIRATION RATE: 16 BRPM | HEART RATE: 80 BPM | OXYGEN SATURATION: 99 % | BODY MASS INDEX: 24.41 KG/M2 | SYSTOLIC BLOOD PRESSURE: 166 MMHG | DIASTOLIC BLOOD PRESSURE: 97 MMHG

## 2022-12-07 DIAGNOSIS — R06.02 SHORTNESS OF BREATH: ICD-10-CM

## 2022-12-07 DIAGNOSIS — Z83.6 FAMILY HISTORY OF PULMONARY FIBROSIS: ICD-10-CM

## 2022-12-07 DIAGNOSIS — R94.2 DECREASED DIFFUSION CAPACITY OF LUNG: ICD-10-CM

## 2022-12-07 DIAGNOSIS — E83.110 HEREDITARY HEMOCHROMATOSIS (H): ICD-10-CM

## 2022-12-07 PROCEDURE — 99205 OFFICE O/P NEW HI 60 MIN: CPT | Performed by: INTERNAL MEDICINE

## 2022-12-07 RX ORDER — DICLOFENAC SODIUM 75 MG/1
75 TABLET, DELAYED RELEASE ORAL 2 TIMES DAILY
COMMUNITY
Start: 2022-11-29 | End: 2022-12-30

## 2022-12-07 RX ORDER — FLUTICASONE PROPIONATE AND SALMETEROL XINAFOATE 45; 21 UG/1; UG/1
2 AEROSOL, METERED RESPIRATORY (INHALATION) 2 TIMES DAILY
Qty: 12 G | Refills: 0 | Status: SHIPPED | OUTPATIENT
Start: 2022-12-07 | End: 2023-06-27

## 2022-12-07 ASSESSMENT — PAIN SCALES - GENERAL: PAINLEVEL: SEVERE PAIN (6)

## 2022-12-07 NOTE — PROGRESS NOTES
"Irina Hanks's goals for this visit include: Consult  She requests these members of her care team be copied on today's visit information: PCP    PCP: Mercedez Britton    Referring Provider:  Mercedez Britton MD  [address] [place] [address] [place] [address]    BP (!) 166/97   Pulse 80   Resp 16   Ht 1.626 m (5' 4.02\")   Wt 64.9 kg (143 lb)   SpO2 99%   BMI 24.53 kg/m      Do you need any medication refills at today's visit? N      Dario Augustin LPN  Pulmonary Medicine:  [facility]  Phone: [phone] Fax: [phone]      "

## 2022-12-07 NOTE — PROGRESS NOTES
Pulmonary Clinic New Patient Consult  Reason for Consult: SOB  History of Present Illness  I had a pleasure seeing Irina Zhao who is a 62-year-old female with a history of hematochromatosis and inflammatory arthritis who presents to clinic for an evaluation of chronic shortness of breath.  She tells me the symptoms have been going on for several years but have been more severe and more recent years.  She has dyspnea on mild exertion.  This is usually associated with poor exercise intolerance to activities like walking up a hill, going up the stairs or brisk walking on flat surface.  She denies any wheezing, no cough, no orthopnea, no PND, no pedal swellings, no dysphonia and no dysphagia. She has had phlebotomies done in the past for history of hematochromatosis but none recently.  She underwent extensive cardiac work-up including a stress test which was abnormal with findings of 3 times daily but her left heart cath and right heart cath did not show any evidence of significant coronary artery disease nor elevated filling pressures.  No findings of cyanosis, no new skin lesions.  She denies any muscular weakness.  She worked as a reserve teacher in a high school and she was a  in the past. Brother was diagnosed with pulmonary fibrosis. She has a remote hx of social smoking.    Review of Systems:  10 of 14 systems reviewed and are negative unless otherwise stated in HPI.    Past Medical History:   Diagnosis Date     ASCUS favor benign 09/2014    3 yr co-test     Essential hypertension, benign      Impaired renal function      Inflammatory arthritis     Managed by Rheumatology  - q 6 mos     Microscopic colitis      Osteoarthritis of first carpometacarpal joint     bilateral     Other specified acquired hypothyroidism      Seasonal allergic rhinitis      Shortness of breath      Spider veins      Symptomatic menopausal or female climacteric states     age 43, on HRT     Tricuspid regurgitation      +1-2 TR noted on 12/22/14 Echo       Past Surgical History:   Procedure Laterality Date     ABDOMEN SURGERY  1/30/95    C section     BIOPSY BREAST       C/SECTION, LOW TRANSVERSE      twins     COLONOSCOPY  2013    nl, next one due in 5 years     CV CORONARY ANGIOGRAM N/A 6/30/2022    Procedure: Coronary Angiogram;  Surgeon: Jose Antonio Ferraro MD;  Location:  HEART CARDIAC CATH LAB     CV LEFT HEART CATH N/A 6/30/2022    Procedure: Left Heart Catheterization;  Surgeon: Jose Antonio Ferraro MD;  Location:  HEART CARDIAC CATH LAB     CV LEFT VENTRICULOGRAM N/A 6/30/2022    Procedure: Left Ventriculogram;  Surgeon: Jose Antonio Ferraro MD;  Location:  HEART CARDIAC CATH LAB     CV RIGHT HEART CATH MEASUREMENTS RECORDED N/A 6/30/2022    Procedure: Right Heart Catheterization;  Surgeon: Jose Antonio Ferraro MD;  Location:  HEART CARDIAC CATH LAB     LEEP TX, CERVICAL  1990s    normal since that time     MYRINGOTOMY, INSERT TUBE, COMBINED Left 4/2015    chronic NORM, ETD       Family History   Problem Relation Age of Onset     Cancer - colorectal Father         age 50     Colon Cancer Father      Arthritis Mother      Cancer - colorectal Brother         age 50     Colon Cancer Brother      Hypertension Sister      Hypertension Brother      Colon Cancer Brother      Prostate Cancer Brother      Kidney Cancer Brother      Prostate Cancer Brother      Arthritis Sister      Cancer Sister 53        Uterine     Cancer Sister 50        Uterine     Testicular cancer Nephew      Prostate Cancer Brother      Thyroid Disease No family hx of        Social History     Socioeconomic History     Marital status:      Spouse name: Bill     Number of children: 3     Years of education: None     Highest education level: None   Occupational History     Occupation: homemaker     Employer: NONE    Tobacco Use     Smoking status: Former     Packs/day: 0.26     Years: 10.00     Pack years: 2.60     Types: Cigarettes     Quit date:  1987     Years since quittin.6     Smokeless tobacco: Former     Tobacco comments:     Was a social smoker   Substance and Sexual Activity     Alcohol use: Yes     Comment: A couple a day     Drug use: No     Sexual activity: Yes     Partners: Male     Birth control/protection: Post-menopausal   Other Topics Concern      Service No     Blood Transfusions No     Caffeine Concern Yes     Comment: 1 cup/day      Occupational Exposure No     Hobby Hazards No     Sleep Concern No     Stress Concern No     Weight Concern No     Special Diet Yes     Comment: low melyssa and low glucose      Back Care No     Exercise Yes     Comment: 3 x week, walking     Bike Helmet Yes     Seat Belt Yes     Self-Exams No     Parent/sibling w/ CABG, MI or angioplasty before 65F 55M? No         Allergies   Allergen Reactions     Codeine Nausea and Nausea and Vomiting     Nitrofurantoin Unknown and Other (See Comments)     Other reaction(s): Gastrointestinal       Ace Inhibitors Cough     lisinopril  lisinopril     Hctz [Hydrochlorothiazide]      Severe Hyponatremia less than 120     Sulfa Drugs      Sulfur Unknown         Current Outpatient Medications:      Biotin 5000 MCG TABS, Take 1 tablet by mouth daily , Disp: , Rfl:      clobetasol (TEMOVATE) 0.05 % external solution, Apply topically daily To scalp, Disp: , Rfl:      clonazePAM (KLONOPIN) 0.5 MG ODT, DISSOLVE 1 TABLET(0.5 MG) ON THE TONGUE EVERY NIGHT AS NEEDED FOR ANXIETY, Disp: 30 tablet, Rfl: 0     diclofenac (VOLTAREN) 75 MG EC tablet, Take 75 mg by mouth 2 times daily, Disp: , Rfl:      hydroxychloroquine (PLAQUENIL) 200 MG tablet, Take 2 tablets (400 mg) by mouth daily, Disp: 90 tablet, Rfl: 3     ipratropium (ATROVENT) 0.03 % nasal spray, Spray 2 sprays into both nostrils daily , Disp: , Rfl:      levothyroxine (SYNTHROID/LEVOTHROID) 75 MCG tablet, TAKE 1 TABLET BY MOUTH EVERY MORNING, Disp: 90 tablet, Rfl: 3     liothyronine (CYTOMEL) 5 MCG tablet, TAKE 2  "TABLETS BY MOUTH EVERY DAY, Disp: 180 tablet, Rfl: 3     loratadine (CLARITIN) 10 MG tablet, Take 1 tablet (10 mg) by mouth daily, Disp: 90 tablet, Rfl: 3     losartan (COZAAR) 100 MG tablet, TAKE 1 TABLET(100 MG) BY MOUTH DAILY, Disp: 90 tablet, Rfl: 0     metoprolol succinate ER (TOPROL XL) 100 MG 24 hr tablet, Take 1 tablet (100 mg) by mouth daily, Disp: 90 tablet, Rfl: 1     sodium chloride 1 GM tablet, Take 1 tablet (1 g) by mouth daily, Disp: 60 tablet, Rfl: 1     TURMERIC PO, Take 1 tablet by mouth daily , Disp: , Rfl:      Vitamin D3 (CHOLECALCIFEROL) 125 MCG (5000 UT) tablet, Take 1 tablet by mouth daily, Disp: , Rfl:      aspirin 81 MG EC tablet, Take 81 mg by mouth daily, Disp: , Rfl:      Omega-3 Fatty Acids (FISH OIL ADULT GUMMIES PO), Take 1 tablet by mouth daily , Disp: , Rfl:      tiZANidine (ZANAFLEX) 4 MG tablet, Take 1 tablet (4 mg) by mouth nightly as needed for muscle spasms, Disp: 30 tablet, Rfl: 1      Physical Exam:  BP (!) 166/97   Pulse 80   Resp 16   Ht 1.626 m (5' 4.02\")   Wt 64.9 kg (143 lb)   SpO2 99%   BMI 24.53 kg/m    GENERAL: Well developed, well nourished, alert, and in no apparent distress.  HEENT: Normocephalic, atraumatic. PERRL, EOMI. Oral mucosa is moist. No perioral cyanosis.  NECK: supple, no masses, no thyromegaly.  RESP:  Normal respiratory effort.  CTAB.  No rales, wheezes, rhonchi.  No cyanosis or clubbing.  CV: Normal S1, S2, regular rhythm, normal rate. No murmur.  No LE edema.   ABDOMEN:  Soft, non-tender, non-distended.   SKIN: warm and dry. No rash.  NEURO: AAOx3.  Normal gait.  Fluent speech.  PSYCH: mentation appears normal.       Results:  PFTs: Mild reduction in diffusion capacity  Most Recent [facility] Pulmonary Function Testing    FVC-Pred   Date Value Ref Range Status   11/02/2022 2.94 L      FVC-Pre   Date Value Ref Range Status   11/02/2022 2.89 L      FVC-%Pred-Pre   Date Value Ref Range Status   11/02/2022 98 %      FEV1-Pre   Date Value Ref Range " Status   11/02/2022 2.44 L      FEV1-%Pred-Pre   Date Value Ref Range Status   11/02/2022 105 %      FEV1FVC-Pred   Date Value Ref Range Status   11/02/2022 79 %      FEV1FVC-Pre   Date Value Ref Range Status   11/02/2022 84 %      No results found for: 20029  FEFMax-Pred   Date Value Ref Range Status   11/02/2022 5.91 L/sec      FEFMax-Pre   Date Value Ref Range Status   11/02/2022 6.12 L/sec      FEFMax-%Pred-Pre   Date Value Ref Range Status   11/02/2022 103 %      ExpTime-Pre   Date Value Ref Range Status   11/02/2022 6.87 sec      FIFMax-Pre   Date Value Ref Range Status   11/02/2022 4.38 L/sec      FEV1FEV6-Pred   Date Value Ref Range Status   11/02/2022 80 %      FEV1FEV6-Pre   Date Value Ref Range Status   11/02/2022 84 %      No results found for: 20055 reviewed and discussed with patient.  Normal lung volumes.  Imaging (personally reviewed in clinic today): CT chest 6/8/2022  FINDINGS:  Chest/mediastinum: No evidence of pulmonary embolism. No cardiomegaly or significant pericardial effusion. No significant mediastinal or hilar lymphadenopathy. No significant atherosclerotic vascular calcification of the coronary arteries. Multiple prominent but not significantly enlarged mediastinal hilar lymph nodes, indeterminate,  could be reactive.   Lungs and pleura: No pleural effusion or pneumothorax. Bibasilar pulmonary opacities, likely atelectasis.   Upper abdomen: Limited evaluation of the upper abdomen due to lack of coverage and timing of contrast.  IMPRESSION: No evidence of pulmonary embolism.        Assessment and Plan:   Chronic shortness of breath   Extensive cardiopulmonary work-up.  PFTs are mostly normal with only mild diffusion impairment.  CT chest is also unremarkable with no evidence of airway nor airspace disease.  There is only trivial bibasilar atelectasis.  There are no pulmonary findings that can explain her symptoms.  Based on a cardiac work-up so far, it does not appear to be any cardiac  cause of her symptoms.  There is no evidence of anemia.   She certainly has a component of deconditioning, however she has significant inflammatory joint symptoms and is unable to participate in any kind of rehab at this time.    Although her symptoms do not particularly appear to be asthma-like in nature, I will try her on some inhalers to see if she derives any benefit.  Prescriptions for Advair were given.  I explained to her that when her inflammatory/joint symptoms are well controlled, we will revisit the option of referring her for pulmonary rehab    Questions and concerns were answered to the patient's satisfaction.  she was provided with my contact information should new questions or concerns arise in the interim.  she should return to clinic as needed  I spent a total of 60 minutes face to face with Irina Hanks during today's office visit. Over 50% of this time was spent counseling the patient and/or coordinating care regarding their pulmonary disease.    Marcus Hassan MD  Pulmonary, Critical Care and Sleep Medicine  [facility]  Pager: [phone]        The above note was dictated using voice recognition software and may include typographical errors. Please contact the author for any clarifications.

## 2022-12-08 ENCOUNTER — TELEPHONE (OUTPATIENT)
Dept: PULMONOLOGY | Facility: CLINIC | Age: 62
End: 2022-12-08

## 2022-12-08 NOTE — TELEPHONE ENCOUNTER
Left Voicemail (1st Attempt) for the patient to call back and schedule the following:    Appointment type: return   Provider: dr. locke  Return date: 12/7/2023  Specialty phone number: [phone]   Additonal Notes: Return in about 1 year (around 12/7/2023), or if symptoms worsen or fail to improve, for Follow up    Melissa jensen Procedure   Orthopedics, Podiatry, Sports Medicine, Ent ,Eye , Audiology, Adult Endocrine & Diabetes, Nutrition & Medication Therapy Management Specialties   [facility]

## 2022-12-14 DIAGNOSIS — F43.22 ADJUSTMENT DISORDER WITH ANXIOUS MOOD: ICD-10-CM

## 2022-12-16 RX ORDER — CLONAZEPAM 0.5 MG/1
TABLET, ORALLY DISINTEGRATING ORAL
Qty: 30 TABLET | Refills: 0 | Status: SHIPPED | OUTPATIENT
Start: 2022-12-16 | End: 2023-01-10

## 2022-12-29 ENCOUNTER — TELEPHONE (OUTPATIENT)
Dept: FAMILY MEDICINE | Facility: CLINIC | Age: 62
End: 2022-12-29

## 2022-12-29 NOTE — TELEPHONE ENCOUNTER
Pt calling stating she would like to have testing for interstitial cystitis d/t increased frequency of UTI's.    Pt states she has been treated for UTI x 5 within this last year, last 3 have been seen for outside of [facility]-  9/11 given cefdinir 300mg, 10/7 given amoxicillin 875mg , and most recently on 12/27. Pt states she was given cephalexin 500mg BID x 7 days on 12/27, which is helping.     Pt also stating that she does travel frequently and wondering if there is a way for her to get ABX as needed without having to come in for visit. Pt told that she is able to submit an E visit, but must be located within [place]. Pt verbalized understanding.       She requesting PCP recommendation on finding reason for increased UTI's and option for future treatment if needed. Please advise    Ok to leave detailed VM on call back as needed.    Itzel HARRIS RN  EP Triage

## 2022-12-30 ENCOUNTER — E-VISIT (OUTPATIENT)
Dept: FAMILY MEDICINE | Facility: CLINIC | Age: 62
End: 2022-12-30
Payer: COMMERCIAL

## 2022-12-30 DIAGNOSIS — N39.0 RECURRENT UTI: Primary | ICD-10-CM

## 2022-12-30 DIAGNOSIS — E83.110 HEREDITARY HEMOCHROMATOSIS (H): ICD-10-CM

## 2022-12-30 DIAGNOSIS — N32.3 BLADDER DIVERTICULUM: ICD-10-CM

## 2022-12-30 DIAGNOSIS — M06.9 RHEUMATOID ARTHRITIS INVOLVING MULTIPLE SITES, UNSPECIFIED WHETHER RHEUMATOID FACTOR PRESENT (H): ICD-10-CM

## 2022-12-30 DIAGNOSIS — F10.20 UNCOMPLICATED ALCOHOL DEPENDENCE (H): ICD-10-CM

## 2022-12-30 PROCEDURE — 99423 OL DIG E/M SVC 21+ MIN: CPT | Performed by: INTERNAL MEDICINE

## 2022-12-30 RX ORDER — CEPHALEXIN 500 MG/1
CAPSULE ORAL
COMMUNITY
Start: 2022-12-27 | End: 2023-01-13

## 2022-12-30 NOTE — TELEPHONE ENCOUNTER
Patient Contact     S/w pt and relayed message from Dr. Britton, see below. She stated understanding.    Nadine TRINH RN  [facility]

## 2022-12-30 NOTE — PATIENT INSTRUCTIONS
Dear Irina Hanks    After reviewing your responses, I've been able to diagnose you with a urinary tract infection, which is a common infection of the bladder with bacteria.  This is not a sexually transmitted infection, though urinating immediately after intercourse can help prevent infections.  Drinking lots of fluids is also helpful to clear your current infection and prevent the next one.      I have sent a prescription for antibiotics to your pharmacy to treat this infection.    It is important that you take all of your prescribed medication even if your symptoms are improving after a few doses.  Taking all of your medicine helps prevent the symptoms from returning.     If your symptoms worsen, you develop pain in your back or stomach, develop fevers, or are not improving in 5 days, please contact your primary care provider for an appointment or visit any of our convenient Walk-in or Urgent Care Centers to be seen, which can be found on our website here.    Thanks again for choosing us as your health care partner,    Mercedez Britton MD    Urinary Tract Infections in Women  Urinary tract infections (UTIs) are most often caused by bacteria. These bacteria enter the urinary tract. The bacteria may come from inside the body. Or they may travel from the skin outside the rectum or vagina into the urethra. Female anatomy makes it easy for bacteria from the bowel to enter a woman s urinary tract, which is the most common source of UTI. This means women develop UTIs more often than men. Pain in or around the urinary tract is a common UTI symptom. But the only way to know for sure if you have a UTI for the healthcare provider to test your urine. The two tests that may be done are the urinalysis and urine culture.     Types of UTIs    Cystitis. A bladder infection (cystitis) is the most common UTI in women. You may have urgent or frequent need to pee. You may also have pain, burning when you pee, and bloody  urine.    Urethritis. This is an inflamed urethra, which is the tube that carries urine from the bladder to outside the body. You may have lower stomach or back pain. You may also have urgent or frequent need to pee.    Pyelonephritis. This is a kidney infection. If not treated, it can be serious and damage your kidneys. In severe cases, you may need to stay in the hospital. You may have a fever and lower back pain.    Medicines to treat a UTI  Most UTIs are treated with antibiotics. These kill the bacteria. The length of time you need to take them depends on the type of infection. It may be as short as 3 days. If you have repeated UTIs, you may need a low-dose antibiotic for several months. Take antibiotics exactly as directed. Don t stop taking them until all of the medicine is gone. If you stop taking the antibiotic too soon, the infection may not go away. You may also develop a resistance to the antibiotic. This can make it much harder to treat.   Lifestyle changes to treat and prevent UTIs   The lifestyle changes below will help get rid of your UTI. They may also help prevent future UTIs.     Drink plenty of fluids. This includes water, juice, or other caffeine-free drinks. Fluids help flush bacteria out of your body.    Empty your bladder. Always empty your bladder when you feel the urge to pee. And always pee before going to sleep. Urine that stays in your bladder can lead to infection. Try to pee before and after sex as well.    Practice good personal hygiene. Wipe yourself from front to back after using the toilet. This helps keep bacteria from getting into the urethra.    Use condoms during sex. These help prevent UTIs caused by sexually transmitted bacteria. Also don't use spermicides during sex. These can increase the risk for UTIs. Choose other forms of birth control instead. For women who tend to get UTIs after sex, a low-dose of a preventive antibiotic may be used. Be sure to discuss this option with  your healthcare provider.    Follow up with your healthcare provider as directed. He or she may test to make sure the infection has cleared. If needed, more treatment may be started.  [employer] last reviewed this educational content on 7/1/2019 2000-2021 [employer]. All rights reserved. This information is not intended as a substitute for professional medical care. Always follow your healthcare professional's instructions.          Understanding Urinary Tract Infections (UTIs)   Most UTIs are caused by bacteria, but they may also be caused by viruses or fungi. Bacteria from the bowel are the most common source of infection. The infection may start because of any of the following:     Sexual activity.  During sex, bacteria can travel from the penis, vagina, or rectum into the urethra.     Bacteria outside the rectum getting into the urethra.  Bacteria on the skin outside the rectum may travel into the urethra. This is more common in women since the rectum and urethra are closer to each other than in men. Wiping from front to back after using the toilet and keeping the area clean can help prevent germs from getting to the urethra.    Blocked urine flow through the urinary tract. If urine sits too long, germs may start to grow out of control.  Parts of the urinary tract  The infection can occur in any part of the urinary tract.       The kidneys. These organs collect and store urine.    The ureters. These tubes carry urine from the kidneys to the bladder.    The bladder. This holds urine until you are ready to let it out.    The urethra. This tube carries urine from the bladder out of the body. It is shorter in women, so bacteria can move through it more easily. The urethra is longer in men, so a UTI is less likely to reach the bladder or kidneys in men.  [employer] last reviewed this educational content on 1/1/2020 2000-2021 [employer]. All rights reserved. This information is not  intended as a substitute for professional medical care. Always follow your healthcare professional's instructions.

## 2022-12-30 NOTE — TELEPHONE ENCOUNTER
Provider E-Visit time total (minutes): 22 minutes      Sent in [employer] message as below -     Hi Peg ,     Yes, I would recommend Urologist who can evaluate and do work up on your recurrent UTI's. I went ahead and placed the referral for you, please keep up your appointment with them for next steps.     You did have CT Urogram imaging in the past which shows Urinary bladder diverticulum at that time which could be causing these recurrent UTIs.     Coming to your current UTI , I do not see any UC visit in your chart . Is  It something outside [facility] ? Are you on antibiotic currently ? Please update so that I can help further if needed on this episode.     Please let me know if you have any questions.   Mercedez Britton MD on 12/30/2022 at 12:31 PM

## 2022-12-30 NOTE — TELEPHONE ENCOUNTER
OK to submit E-visit for me to place referral to Urology who can plan for chronic maintenance antibiotics and work up further as requested     Thank you,  Mercedez Britton MD on 12/30/2022

## 2023-01-08 PROBLEM — F10.20 UNCOMPLICATED ALCOHOL DEPENDENCE (H): Status: ACTIVE | Noted: 2023-01-08

## 2023-01-10 DIAGNOSIS — I10 ESSENTIAL HYPERTENSION: ICD-10-CM

## 2023-01-10 DIAGNOSIS — F43.22 ADJUSTMENT DISORDER WITH ANXIOUS MOOD: ICD-10-CM

## 2023-01-10 RX ORDER — CLONAZEPAM 0.5 MG/1
TABLET, ORALLY DISINTEGRATING ORAL
Qty: 30 TABLET | Refills: 0 | Status: SHIPPED | OUTPATIENT
Start: 2023-01-15 | End: 2023-01-19

## 2023-01-11 RX ORDER — LOSARTAN POTASSIUM 100 MG/1
TABLET ORAL
Qty: 90 TABLET | Refills: 0 | Status: SHIPPED | OUTPATIENT
Start: 2023-01-11 | End: 2023-04-10

## 2023-01-11 ASSESSMENT — ANXIETY QUESTIONNAIRES
GAD7 TOTAL SCORE: 0
GAD7 TOTAL SCORE: 0
4. TROUBLE RELAXING: NOT AT ALL
8. IF YOU CHECKED OFF ANY PROBLEMS, HOW DIFFICULT HAVE THESE MADE IT FOR YOU TO DO YOUR WORK, TAKE CARE OF THINGS AT HOME, OR GET ALONG WITH OTHER PEOPLE?: NOT DIFFICULT AT ALL
6. BECOMING EASILY ANNOYED OR IRRITABLE: NOT AT ALL
7. FEELING AFRAID AS IF SOMETHING AWFUL MIGHT HAPPEN: NOT AT ALL
5. BEING SO RESTLESS THAT IT IS HARD TO SIT STILL: NOT AT ALL
1. FEELING NERVOUS, ANXIOUS, OR ON EDGE: NOT AT ALL
7. FEELING AFRAID AS IF SOMETHING AWFUL MIGHT HAPPEN: NOT AT ALL
IF YOU CHECKED OFF ANY PROBLEMS ON THIS QUESTIONNAIRE, HOW DIFFICULT HAVE THESE PROBLEMS MADE IT FOR YOU TO DO YOUR WORK, TAKE CARE OF THINGS AT HOME, OR GET ALONG WITH OTHER PEOPLE: NOT DIFFICULT AT ALL
2. NOT BEING ABLE TO STOP OR CONTROL WORRYING: NOT AT ALL
3. WORRYING TOO MUCH ABOUT DIFFERENT THINGS: NOT AT ALL
GAD7 TOTAL SCORE: 0

## 2023-01-11 NOTE — TELEPHONE ENCOUNTER
Please call the patient and schedule Virtual visit for 3 months follow up on Benzodiazepine refills - due on 1/19/23 , which patient is due at this time, to further take care of the medical conditions and medications.OK to use same day slot / double book near another virtual slot in 2 weeks. Refill done for now , Future refills after follow up.     Thank you,  Mercedez Britton MD on 1/10/2023 at 11:09 PM           ========================================  RX [place] program ([place]) reviewed:  reviewed- no concerns    [place]:  [email or web address]      Refill done from 1/15/23 as per  check.  Mercedez Britton MD on 1/10/2023 at 11:08 PM

## 2023-01-12 ENCOUNTER — NURSE TRIAGE (OUTPATIENT)
Dept: FAMILY MEDICINE | Facility: CLINIC | Age: 63
End: 2023-01-12

## 2023-01-12 ENCOUNTER — LAB (OUTPATIENT)
Dept: LAB | Facility: CLINIC | Age: 63
End: 2023-01-12
Payer: COMMERCIAL

## 2023-01-12 DIAGNOSIS — N32.3 BLADDER DIVERTICULUM: ICD-10-CM

## 2023-01-12 DIAGNOSIS — N39.0 RECURRENT UTI: Primary | ICD-10-CM

## 2023-01-12 DIAGNOSIS — N39.0 RECURRENT UTI: ICD-10-CM

## 2023-01-12 LAB
ALBUMIN UR-MCNC: NEGATIVE MG/DL
APPEARANCE UR: ABNORMAL
BACTERIA #/AREA URNS HPF: ABNORMAL /HPF
BILIRUB UR QL STRIP: NEGATIVE
COLOR UR AUTO: YELLOW
GLUCOSE UR STRIP-MCNC: NEGATIVE MG/DL
HGB UR QL STRIP: ABNORMAL
KETONES UR STRIP-MCNC: NEGATIVE MG/DL
LEUKOCYTE ESTERASE UR QL STRIP: ABNORMAL
NITRATE UR QL: NEGATIVE
PH UR STRIP: 6 [PH] (ref 5–7)
RBC #/AREA URNS AUTO: ABNORMAL /HPF
SP GR UR STRIP: 1.01 (ref 1–1.03)
SQUAMOUS #/AREA URNS AUTO: ABNORMAL /LPF
UROBILINOGEN UR STRIP-ACNC: 0.2 E.U./DL
WBC #/AREA URNS AUTO: >100 /HPF

## 2023-01-12 PROCEDURE — 81001 URINALYSIS AUTO W/SCOPE: CPT

## 2023-01-12 PROCEDURE — 87086 URINE CULTURE/COLONY COUNT: CPT

## 2023-01-12 NOTE — TELEPHONE ENCOUNTER
"Patient had e-visit for UTI  On . She had no U/A and U/C done with e-visit.     She has the same symptoms back with urgency, no fever .  No back pain.     Urine looks normal.     Do you want another phone /  e-visit or just labs.      She is going to see urology in February.     Pharmacy is [facility] [address] [facility].         Reason for Disposition    [1] Taking antibiotic < 72 hours (3 days) AND [2] symptoms are SAME (not improved)    Answer Assessment - Initial Assessment Questions  1. INFECTION: \"What infection is the antibiotic being given for?\"      UTI  2. ANTIBIOTIC: \"What antibiotic are you taking\" \"How many times per day?\"     Cannot remember the medication.   3. DURATION: \"When was the antibiotic started?\"      10 days  4. MAIN CONCERN OR SYMPTOM:  \"What is your main concern right now?\"      I have symptoms again.   5. BETTER-SAME-WORSE: \"Are you getting better, staying the same, or getting worse compared to when you first started the antibiotics?\" If getting worse, ask: \"In what way?\"       The same.   6. FEVER: \"Do you have a fever?\" If Yes, ask: \"What is your temperature, how was it measured, and when did it start?\"      No  7. SYMPTOMS: \"Are there any other symptoms you're concerned about?\" If Yes, ask: \"When did it start?\"      Urgency.  8. FOLLOW-UP APPOINTMENT: \"Do you have a follow-up appointment with your doctor?\"      No    Protocols used: INFECTION ON ANTIBIOTIC FOLLOW-UP CALL-A-PRADEEP Stone RN  -[facility]     "

## 2023-01-12 NOTE — TELEPHONE ENCOUNTER
Spoke to patient , lab appointment scheduled for today.     Georgina Stone RN  [facility]

## 2023-01-12 NOTE — TELEPHONE ENCOUNTER
Provider Response to 2nd Level Triage Request    I have reviewed the RN documentation. My recommendation is:  Placed lab orders which she can do lab only appointment for getting on antibiotics if positive.     Thank you,  Mercedez Britton MD on 1/12/2023 at 2:24 PM

## 2023-01-13 ENCOUNTER — TELEPHONE (OUTPATIENT)
Dept: FAMILY MEDICINE | Facility: CLINIC | Age: 63
End: 2023-01-13

## 2023-01-13 DIAGNOSIS — N32.3 BLADDER DIVERTICULUM: Primary | ICD-10-CM

## 2023-01-13 DIAGNOSIS — N39.0 RECURRENT UTI: ICD-10-CM

## 2023-01-13 RX ORDER — CEPHALEXIN 500 MG/1
CAPSULE ORAL
Qty: 14 CAPSULE | Refills: 0 | Status: SHIPPED | OUTPATIENT
Start: 2023-01-13 | End: 2023-02-02

## 2023-01-13 NOTE — TELEPHONE ENCOUNTER
Called and spoke to patient , sent antibiotic. She is allergic to most UTI medications.   No action needed.     Thank you,  Mercedez Britton MD on 1/13/2023 at 11:04 AM

## 2023-01-13 NOTE — TELEPHONE ENCOUNTER
----- Message from Mercedez Britton MD sent at 1/13/2023 10:57 AM CST -----  Your Urine exam is positive for UTI. I have sent in antibiotic to your pharmacy.I will update on your culture results which takes few days. Please let me know if you have any questions.    Thank you,  Mercedez Britton MD on 1/13/2023 at 10:55 AM

## 2023-01-13 NOTE — TELEPHONE ENCOUNTER
See other encounter, PCP has spoken to the patient with results.     Georgina Stone RN  [facility]

## 2023-01-13 NOTE — TELEPHONE ENCOUNTER
Patient has not heard back regarding urinary symptoms. Patient is very upset she has not heard back.    Still having uncomfortable urinary symptoms and is very upset. Routing to PCP to please review results.     Callback [phone] - ok to leave detailed VM     Hieu Walters RN  [facility]

## 2023-01-13 NOTE — RESULT ENCOUNTER NOTE
Your Urine exam is positive for UTI. I have sent in antibiotic to your pharmacy.I will update on your culture results which takes few days. Please let me know if you have any questions.    Thank you,  Mercedez Britton MD on 1/13/2023 at 10:55 AM

## 2023-01-14 LAB — BACTERIA UR CULT: NO GROWTH

## 2023-01-18 ENCOUNTER — MYC MEDICAL ADVICE (OUTPATIENT)
Dept: FAMILY MEDICINE | Facility: CLINIC | Age: 63
End: 2023-01-18

## 2023-01-18 ENCOUNTER — VIRTUAL VISIT (OUTPATIENT)
Dept: FAMILY MEDICINE | Facility: CLINIC | Age: 63
End: 2023-01-18
Payer: COMMERCIAL

## 2023-01-18 DIAGNOSIS — F41.9 ANXIETY: Primary | ICD-10-CM

## 2023-01-18 DIAGNOSIS — E87.1 HYPONATREMIA: ICD-10-CM

## 2023-01-18 DIAGNOSIS — F43.22 ADJUSTMENT DISORDER WITH ANXIOUS MOOD: ICD-10-CM

## 2023-01-18 DIAGNOSIS — F10.20 UNCOMPLICATED ALCOHOL DEPENDENCE (H): ICD-10-CM

## 2023-01-18 DIAGNOSIS — E22.2 SIADH (SYNDROME OF INAPPROPRIATE ADH PRODUCTION) (H): ICD-10-CM

## 2023-01-18 DIAGNOSIS — M05.79 RHEUMATOID ARTHRITIS INVOLVING MULTIPLE SITES WITH POSITIVE RHEUMATOID FACTOR (H): ICD-10-CM

## 2023-01-18 DIAGNOSIS — E83.110 HEREDITARY HEMOCHROMATOSIS (H): ICD-10-CM

## 2023-01-18 PROCEDURE — 96127 BRIEF EMOTIONAL/BEHAV ASSMT: CPT | Performed by: INTERNAL MEDICINE

## 2023-01-18 PROCEDURE — 99215 OFFICE O/P EST HI 40 MIN: CPT | Mod: GT | Performed by: INTERNAL MEDICINE

## 2023-01-18 RX ORDER — SODIUM CHLORIDE 1 G/1
1 TABLET ORAL DAILY
Qty: 60 TABLET | Refills: 1 | Status: SHIPPED | OUTPATIENT
Start: 2023-01-18 | End: 2023-05-13

## 2023-01-18 ASSESSMENT — ANXIETY QUESTIONNAIRES: GAD7 TOTAL SCORE: 0

## 2023-01-18 NOTE — PROGRESS NOTES
Cherise is a 62 year old who is being evaluated via a billable video visit.      How would you like to obtain your AVS? MyChart  If the video visit is dropped, the invitation should be resent by: Text to cell phone: [phone]  Will anyone else be joining your video visit? No    Assessment and Plan  1. Anxiety  Chronic problem, well-controlled.  Continue current Klonopin 0.5 mg nightly as needed for anxiety.  Discussed on all the side effects and complications of chronic benzodiazepine therapy and knows the risks of withdrawal and overdosing.  Patient has upcoming travel plans which she is going to [place] for more than 2 months and requesting to pick her 2-month supply while she is still here at [place].  AVS given with instructions.,    2. Rheumatoid arthritis involving multiple sites with positive rheumatoid factor (H)  Chronic problem, well-controlled.  Continue current Plaquenil as managed by rheumatologist.    3. Uncomplicated alcohol dependence (H)  Remaining stable with Currently 5 - 7 wine / week.  Did emphasize on the complications and risks of withdrawal symptoms which he understands.    4. Hereditary hemochromatosis (H)  Chronic problem, continue to follow oncology Dr. Rossi.  Patient requesting for including the labs for this within this current lab work ordered so that she can contact oncology if abnormal and needing another phlebotomy at this time.  We will do as requested.  - Ferritin; Future  - Hemoglobin; Future    5. Hyponatremia  6. SIADH (syndrome of inappropriate ADH production) (H)  Chronic problem, continue current sodium tablets as prescribed.  We will recheck renal function and electrolytes at this time.  - sodium chloride 1 GM tablet; Take 1 tablet (1 g) by mouth daily  Dispense: 60 tablet; Refill: 1  - Basic metabolic panel  (Ca, Cl, CO2, Creat, Gluc, K, Na, BUN); Future       Over 40 minutes spent on reviewing patient chart,  face to face encounter, greater than 50% time spent  with plan/cordination of care and documentation as above in my A/P.           Patient Instructions   As discussed , will take care of your Klonipin refills for 2 months as requested given your travel plans by next fill on 2/11/23.     Please make a non fasting LAB only appointment on the labs placed.     Refilled your Salt tablets.             No follow-ups on file.    Mercedez Britton MD  [facility] ROMULO Luong is a 62 year old, presenting for the following health issues:   Follow Up      History of Present Illness       Mental Health Follow-up:  Patient presents to follow-up on Anxiety.    Patient's anxiety since last visit has been:  Good  The patient is not having other symptoms associated with anxiety.  Any significant life events: No  Patient is not feeling anxious or having panic attacks.  Patient has no concerns about alcohol or drug use.    She eats 2-3 servings of fruits and vegetables daily.She consumes 0 sweetened beverage(s) daily.She exercises with enough effort to increase her heart rate 9 or less minutes per day.  She exercises with enough effort to increase her heart rate 3 or less days per week.   She is taking medications regularly.  Today's MARIEL-7 Score: 0       Patient is here for Klonopin follow-up but does have multiple updates on her current health situation.  Discussed extensively on---- I will visit my patients only given the recurrent UTIs supposed to follow urology who has been scheduled in February 2023.  Discussed on chronic antibiotic therapy which she is requesting me to start but I would recommend to follow-up with urology which I explained all the side effects of chronic antibiotics and needs to be decided by the specialist.    Left Gluteus medius tear new diagnosis - Possible Left SHRUTI by 5/23/2023 which patient states that she is still yet to decide if she wants to proceed with this or not. Given the recent right hip arthroplasty.        Allergies   Allergen Reactions     Codeine Nausea and Nausea and Vomiting     Nitrofurantoin Unknown and Other (See Comments)     Other reaction(s): Gastrointestinal       Ace Inhibitors Cough     lisinopril  lisinopril     Hctz [Hydrochlorothiazide]      Severe Hyponatremia less than 120     Sulfa Drugs      Sulfur Unknown        Past Medical History:   Diagnosis Date     ASCUS favor benign 09/2014    3 yr co-test     Essential hypertension, benign      Impaired renal function      Inflammatory arthritis     Managed by Rheumatology  - q 6 mos     Microscopic colitis      Osteoarthritis of first carpometacarpal joint     bilateral     Other specified acquired hypothyroidism      Seasonal allergic rhinitis      Shortness of breath      Spider veins      Symptomatic menopausal or female climacteric states     age 43, on HRT     Tricuspid regurgitation     +1-2 TR noted on 12/22/14 Echo       Past Surgical History:   Procedure Laterality Date     ABDOMEN SURGERY  1/30/95    C section     BIOPSY BREAST       C/SECTION, LOW TRANSVERSE      twins     COLONOSCOPY  2013    nl, next one due in 5 years     CV CORONARY ANGIOGRAM N/A 6/30/2022    Procedure: Coronary Angiogram;  Surgeon: Jose Antonio Ferraro MD;  Location:  HEART CARDIAC CATH LAB     CV LEFT HEART CATH N/A 6/30/2022    Procedure: Left Heart Catheterization;  Surgeon: Jose Antonio Ferraro MD;  Location: [facility] CARDIAC CATH LAB     CV LEFT VENTRICULOGRAM N/A 6/30/2022    Procedure: Left Ventriculogram;  Surgeon: Jose Antonio Ferraro MD;  Location:  HEART CARDIAC CATH LAB     CV RIGHT HEART CATH MEASUREMENTS RECORDED N/A 6/30/2022    Procedure: Right Heart Catheterization;  Surgeon: Jose Antonio Ferraro MD;  Location:  HEART CARDIAC CATH LAB     LEEP TX, CERVICAL  1990s    normal since that time     MYRINGOTOMY, INSERT TUBE, COMBINED Left 4/2015    chronic NORM, ETD       Family History   Problem Relation Age of Onset     Cancer - colorectal Father         age 50      Colon Cancer Father      Arthritis Mother      Cancer - colorectal Brother         age 50     Colon Cancer Brother      Hypertension Sister      Hypertension Brother      Colon Cancer Brother      Prostate Cancer Brother      Kidney Cancer Brother      Prostate Cancer Brother      Arthritis Sister      Cancer Sister 53        Uterine     Cancer Sister 50        Uterine     Testicular cancer Nephew      Prostate Cancer Brother      Thyroid Disease No family hx of        Social History     Tobacco Use     Smoking status: Former     Packs/day: 0.26     Years: 10.00     Pack years: 2.60     Types: Cigarettes     Quit date: 1987     Years since quittin.7     Smokeless tobacco: Former     Tobacco comments:     Was a social smoker   Substance Use Topics     Alcohol use: Yes     Comment: A couple a day        Current Outpatient Medications   Medication     Biotin 5000 MCG TABS     cephALEXin (KEFLEX) 500 MG capsule     clobetasol (TEMOVATE) 0.05 % external solution     clonazePAM (KLONOPIN) 0.5 MG ODT     hydroxychloroquine (PLAQUENIL) 200 MG tablet     ipratropium (ATROVENT) 0.03 % nasal spray     levothyroxine (SYNTHROID/LEVOTHROID) 75 MCG tablet     liothyronine (CYTOMEL) 5 MCG tablet     loratadine (CLARITIN) 10 MG tablet     losartan (COZAAR) 100 MG tablet     metoprolol succinate ER (TOPROL XL) 100 MG 24 hr tablet     sodium chloride 1 GM tablet     TURMERIC PO     Vitamin D3 (CHOLECALCIFEROL) 125 MCG (5000 UT) tablet     fluticasone-salmeterol (ADVAIR-HFA) 45-21 MCG/ACT inhaler     No current facility-administered medications for this visit.        Review of Systems   Constitutional, HEENT, cardiovascular, pulmonary, GI, , musculoskeletal, neuro, skin, endocrine and psych systems are negative, except as otherwise noted.      Objective           Vitals:  No vitals were obtained today due to virtual visit.    Physical Exam   GENERAL: Healthy, alert and no distress  EYES: Eyes grossly normal to  inspection.  No discharge or erythema, or obvious scleral/conjunctival abnormalities.  RESP: No audible wheeze, cough, or visible cyanosis.  No visible retractions or increased work of breathing.    SKIN: Visible skin clear. No significant rash, abnormal pigmentation or lesions.  NEURO: Cranial nerves grossly intact.  Mentation and speech appropriate for age.  PSYCH: Mentation appears normal, affect normal/bright, judgement and insight intact, normal speech and appearance well-groomed.        Video-Visit Details    Type of service:  Video Visit     Originating Location (pt. Location): Home    Distant Location (provider location):  On-site  Platform used for Video Visit: Pari    Joined the call at 1/18/2023, 4:53:16 pm.  Left the call at 1/18/2023, 5:14:29 pm.  You were on the call for 21 minutes 12 seconds

## 2023-01-18 NOTE — PATIENT INSTRUCTIONS
As discussed , will take care of your Klonipin refills for 2 months as requested given your travel plans by next fill on 2/11/23.     Please make a non fasting LAB only appointment on the labs placed.     Refilled your Salt tablets.

## 2023-01-19 NOTE — TELEPHONE ENCOUNTER
Please see My Chart Message request fpr 2 month supply as discussed during virtual visit.  Triage to contact the patient.  Carmen Lopez RN

## 2023-01-20 RX ORDER — CLONAZEPAM 0.5 MG/1
TABLET, ORALLY DISINTEGRATING ORAL
Qty: 120 TABLET | Refills: 0 | Status: SHIPPED | OUTPATIENT
Start: 2023-02-10 | End: 2023-06-01

## 2023-01-20 RX ORDER — CLONAZEPAM 0.5 MG/1
TABLET, ORALLY DISINTEGRATING ORAL
Qty: 120 TABLET | Refills: 0 | Status: SHIPPED | OUTPATIENT
Start: 2023-02-10 | End: 2023-01-20

## 2023-01-20 NOTE — TELEPHONE ENCOUNTER
"RX [place] program ([place]MP) reviewed:  reviewed- no concerns    [place]:  [email or web address]      Refill done as requested for 2 months supply of 120 tabs to be picked by 2/10/23 --  and mentioned to pharmacist \" Please provide 2 months supply for this patient as she is travelling. Next will not be due until 4/12/2023.  \"     Thank you,  Mercedez Britton MD on 1/20/2023   "

## 2023-02-02 ENCOUNTER — NURSE TRIAGE (OUTPATIENT)
Dept: FAMILY MEDICINE | Facility: CLINIC | Age: 63
End: 2023-02-02
Payer: COMMERCIAL

## 2023-02-02 DIAGNOSIS — N32.3 BLADDER DIVERTICULUM: ICD-10-CM

## 2023-02-02 DIAGNOSIS — N39.0 RECURRENT UTI: ICD-10-CM

## 2023-02-02 RX ORDER — CEPHALEXIN 500 MG/1
CAPSULE ORAL
Qty: 14 CAPSULE | Refills: 0 | Status: SHIPPED | OUTPATIENT
Start: 2023-02-02 | End: 2023-06-27

## 2023-02-02 RX ORDER — KETOCONAZOLE 20 MG/ML
SHAMPOO TOPICAL
COMMUNITY
Start: 2023-02-02 | End: 2023-08-24

## 2023-02-02 NOTE — TELEPHONE ENCOUNTER
Pt calling with another new onset urinary sx. Is leaving out of town tomorrow, comes back Sunday.  Has Urology set up for 2/7/23    Sx started yesterday, Same urgency, and frequency as last time (3 weeks ago), scant blood in urine.   Discomfort with urination, but not excruciating pain   Denies fever or flank pain.    Asking if you will order another UA for her, and possibly abx as going out of town and concerned.  Did discuss that if a culture came back needing different abx, may need to find a pharmacy where she is.     Reason for Disposition    Bad or foul-smelling urine    Urinating more frequently than usual (i.e., frequency)    Additional Information    Negative: Shock suspected (e.g., cold/pale/clammy skin, too weak to stand, low BP, rapid pulse)    Negative: Sounds like a life-threatening emergency to the triager    Negative: Followed a female genital area injury (e.g., vagina, vulva)    Negative: Followed a male genital area injury (penis, scrotum)    Negative: Vaginal discharge    Negative: Pus (white, yellow) or bloody discharge from end of penis    Negative: Pain or burning with passing urine (urination) and pregnant    Negative: Pain or burning with passing urine (urination) and female    Negative: Pain or burning with passing urine (urination) and male    Negative: Pain or itching in the vulvar area    Negative: Pain in scrotum is main symptom    Negative: Blood in the urine is main symptom    Negative: Symptoms arising from use of a urinary catheter (e.g., coude, Stone)    Negative: Unable to urinate (or only a few drops) > 4 hours and bladder feels very full (e.g., palpable bladder or strong urge to urinate)    Negative: Decreased urination and drinking very little and dehydration suspected (e.g., dark urine, no urine > 12 hours, very dry mouth, very lightheaded)    Negative: Patient sounds very sick or weak to the triager    Negative: Fever > 100.4 F  (38.0 C)    Negative: Side (flank) or lower back  pain present    Negative: Can't control passage of urine (i.e., urinary incontinence) and new-onset (< 2 weeks) or worsening    Protocols used: URINARY SYMPTOMS-A-[place]

## 2023-02-03 NOTE — TELEPHONE ENCOUNTER
Provider Response to 2nd Level Triage Request    I have reviewed the RN documentation and Sent antibiotic to pharmacy. My recommendation is: To give Urine sample to lab for Culture placed after completion of antibiotic to make sure its cleared.   Please let the pt know to keep up her appointment with Urology     Thank you,  Mercedez Britton MD on 2/2/2023

## 2023-02-03 NOTE — TELEPHONE ENCOUNTER
Patient Contact     Attempt # 1     Was call answered?    No  Left detailed message to call the clinic back at [phone].      On call back:      -See note below from Dr. Britton.    Nadine TRINH RN  [facility]

## 2023-02-06 ENCOUNTER — HOSPITAL ENCOUNTER (OUTPATIENT)
Dept: MAMMOGRAPHY | Facility: CLINIC | Age: 63
Discharge: HOME | End: 2023-02-06
Attending: INTERNAL MEDICINE | Admitting: INTERNAL MEDICINE
Payer: COMMERCIAL

## 2023-02-06 DIAGNOSIS — Z12.31 VISIT FOR SCREENING MAMMOGRAM: ICD-10-CM

## 2023-02-06 PROCEDURE — 77067 SCR MAMMO BI INCL CAD: CPT

## 2023-02-07 ENCOUNTER — OFFICE VISIT (OUTPATIENT)
Dept: UROLOGY | Facility: CLINIC | Age: 63
End: 2023-02-07
Payer: COMMERCIAL

## 2023-02-07 VITALS
HEIGHT: 64 IN | HEART RATE: 69 BPM | OXYGEN SATURATION: 98 % | DIASTOLIC BLOOD PRESSURE: 88 MMHG | BODY MASS INDEX: 23.9 KG/M2 | SYSTOLIC BLOOD PRESSURE: 128 MMHG | WEIGHT: 140 LBS

## 2023-02-07 DIAGNOSIS — R35.1 NOCTURIA: ICD-10-CM

## 2023-02-07 DIAGNOSIS — R39.15 URINARY URGENCY: ICD-10-CM

## 2023-02-07 DIAGNOSIS — N81.6 RECTOCELE: ICD-10-CM

## 2023-02-07 DIAGNOSIS — N95.2 VAGINAL ATROPHY: ICD-10-CM

## 2023-02-07 DIAGNOSIS — Z87.440 HISTORY OF UTI: Primary | ICD-10-CM

## 2023-02-07 DIAGNOSIS — N39.41 URGE INCONTINENCE: ICD-10-CM

## 2023-02-07 DIAGNOSIS — N94.10 DYSPAREUNIA, FEMALE: ICD-10-CM

## 2023-02-07 LAB
ALBUMIN UR-MCNC: NEGATIVE MG/DL
APPEARANCE UR: CLEAR
BILIRUB UR QL STRIP: NEGATIVE
COLOR UR AUTO: YELLOW
GLUCOSE UR STRIP-MCNC: NEGATIVE MG/DL
HGB UR QL STRIP: NEGATIVE
KETONES UR STRIP-MCNC: NEGATIVE MG/DL
LEUKOCYTE ESTERASE UR QL STRIP: NEGATIVE
NITRATE UR QL: NEGATIVE
PH UR STRIP: 5.5 [PH] (ref 5–7)
RESIDUAL VOLUME (RV) (EXTERNAL): 7
SP GR UR STRIP: 1.02 (ref 1–1.03)
UROBILINOGEN UR STRIP-ACNC: 0.2 E.U./DL

## 2023-02-07 PROCEDURE — 51798 US URINE CAPACITY MEASURE: CPT | Performed by: PHYSICIAN ASSISTANT

## 2023-02-07 PROCEDURE — 81003 URINALYSIS AUTO W/O SCOPE: CPT | Mod: QW | Performed by: PHYSICIAN ASSISTANT

## 2023-02-07 PROCEDURE — 99204 OFFICE O/P NEW MOD 45 MIN: CPT | Mod: 25 | Performed by: PHYSICIAN ASSISTANT

## 2023-02-07 RX ORDER — CEFDINIR 300 MG/1
300 CAPSULE ORAL 2 TIMES DAILY
Qty: 14 CAPSULE | Refills: 0 | Status: SHIPPED | OUTPATIENT
Start: 2023-02-07 | End: 2023-02-14

## 2023-02-07 RX ORDER — ESTRADIOL 0.1 MG/G
CREAM VAGINAL
Qty: 40 G | Refills: 3 | Status: SHIPPED | OUTPATIENT
Start: 2023-02-07 | End: 2023-02-08

## 2023-02-07 ASSESSMENT — PAIN SCALES - GENERAL: PAINLEVEL: NO PAIN (0)

## 2023-02-07 NOTE — NURSING NOTE
Chief Complaint   Patient presents with     Recurrent UTIs     Although not showing/feeling any symptoms patient is wondering if she still has her UTI.   PVR was 7 ml today.  Abbi Lira LPN

## 2023-02-07 NOTE — PATIENT INSTRUCTIONS
- Follow-up in 3 months after your trips.  - Contact clinic if interested in Myrbetriq for your urgency/frequency.  - Start estrogen cream 2/week at night. Apply blueberry sized amount on finger and rub along vaginal tissue like a face cream. Call clinic if medication is too expensive. You can also try using a Good Rx card to see if cheaper than insurance coverage. Please clear estrogen cream with your PCP prior to starting this medication if you have history of blood clot, stroke, heart attack or cancer.    _______________________________________________    - Below is a list of things that can irritate the bladder and should be eliminated:  Caffeinated soft drinks.  Coffee.  Tea.  Chocolate.  Tomato-based foods.  Acidic juices and fruits. (includes cranberry juice)  Alcohol.  Nicotine  Carbonated drinks.  Aspartame/Nutrasweet.      UTI Prevention:    - Recommend cranberry supplement 1gm twice-daily or Vitamin C 1 gm twice daily.   - AZO as needed; if symptoms do not improve after 24-48 hours after taking AZO as needed advise contacting clinic.   - Probiotic daily; recommend for Florajen 3 or Florajen women's  - Make sure you stay hydrated with about 60-80oz of water per day.   - Void after sexual intercourse.   - Recommend good vulvar hygiene such as wearing loose cotton underwear and avoiding scented hygenic products/wipes/soaps or detergents. Wipe front to back after voiding/defecation. Avoid sitting in soiled clothing and keeping vulva dry.   - Do not recommend cranberry juice.   - Do not recommend hygenic wipes.   - Encourage changing clothes if have accident of urine.  - Recommend showering daily.   - If you develop symptoms of UTI, please contact clinic. However, if you develop fevers  greater than 100.4 degrees fahrenheit, flank pain or blood in your urine, recommend going to urgent care or ER.   - Make sure to drink plenty of water each day and to really push fluids when have symptoms of a UTI.  - Estrogen  cream 2x per week at night; apply blueberry-sized amount on finger and rub along vaginal tissue.   - Can try starting D-mannose 2gm daily as well.   _______________________________________________

## 2023-02-07 NOTE — PROGRESS NOTES
Urology Clinic    Name: Irina Hanks    MRN: [id number]   YOB: 1960  Accompanied at today's visit by:self                Assessment and Plan:   62 year old female with history of UTIs, vaginal atrophy, rectocele, UUI, urinary urgency, nocturia, dyspareunia with penetration.    - UA negative.   - PVR WNL.   - Educated patient about UTI prevention.   - Discussed OTC medications/supplements for UTI prevention; handout given.   - Can try AZO prn and push fluids for UTI symptoms; advised to contact clinic if symptoms worsen or don't improve after 24-48 hours.   - Educated and encourage good vulvar hygiene at length.  - Patient to contact clinic if develops UTI symptoms in the future.  - Declined PFPT.   - States she has start of glaucoma and hypertensive emergency in the past; consider gemtesa in the future if becomes bothered by bladder symptoms.  - Consider sleep study referral if night time symptoms persist.  - Start Estrogen cream 2x per week at night. Disscused risks/benefits and potential side effects. Patient to clear with PCP first.   - Avoid bladder irritants.  - Patient concerned about UTIs on her trips this spring. Ordered Keflex 500mg BID x7 days with no refills incase develops UTI symptoms on trip.   - Unclear if having true UTIs or not as do not have UCs in EMR. Consider CT urogram with cysto in the future if UTIs persist.   - Follow-up in 3 months or sooner if needed.     Orders Placed This Encounter   Procedures     MEASURE POST-VOID RESIDUAL URINE/BLADDER CAPACITY, US NON-IMAGING (58242)     UA without Microscopic [[id number]]       After discussing the assessment and plan with patient, patient verbalized understanding and agreed to the above plan. All questions answered.     40 minutes were spent today on the date of the encounter in reviewing the EMR, direct patient care, coordination of care and documentation in addition to exam, ordering medication.    Irish Cristina  [place]  February 7, 2023    Patient Care Team:  Mercedez Joseph MD as PCP - General (Internal Medicine)  Odessa Ga RD as Registered Dietitian (Dietitian, Registered)  Mercedez Joseph MD as Assigned PCP  Charmaine White APRN CNP as Assigned Neuroscience Provider  Irish Nuñez DPM as Assigned Surgical Provider  Eloisa Rossi MD as Assigned Cancer Care Provider  Marcus Hassan MD as Assigned Pulmonology Provider  Parminder Acevedo MD as Assigned Heart and Vascular Provider  Irish Cristina PA-C as Physician Assistant  MERCEDEZ JOSEPH          Chief Complaint:   Hx of UTI          History of Present Illness:   February 7, 2023    HISTORY: Irina Hanks is a 62 year old female as a new consultation for concerns of Yulia. States she typically goes to urgent cares to get treated. Unsure what grows on her urine cultures. States she has had multiple UTIs over the past year. Per chart review, only see 1 UC on 1/12/23 that showed no growth. Typical UTI symptoms include worsening frequency, burning with urination and gross hematuria. States all symptoms resolve after having an antibiotic. At baseline when not having UTI symptoms, reports voiding every 2 hours during the day and 2-3x/night.Reports occasional UUI. Denies BARBARA. Feels like they empty completely after voiding. Drinks 1-2 cans of soda per day and 1 cup of coffee per day. Takes cranberry supplement and drinks cranberry juice. Using hygenic wipes along vulva. Saw Dr. Zaldivar in 10/2019 for micro hematuria work-up. Last seen on 10/29/19 for cysto with no abnormalities but grade 1 trabeculations. CT scan and urine cytology at the time were negative. Was adviesed to start topical estrogen cream at that time.  Never remembered to start estrogen cream from encounter with urologist in 2019. Denies history of kidney stones. Denies prolapse symptoms. Sexually active and questions if 70% of her UTIs are after sex. Has pain with  intercourse occasionally but not every time. Pain mostly with penetration. Hx of microscopic colitis; was following with GI but not recently. PMH is significant for Hx of hemochromatosis, gets phlebotomy q3-6 months. Also has hx lichen planus, OA, alcohol dependence, controlled substance agreement, tricuspid regurge, HTN. Has hx of ASCUS in the past. Last pap smear  and NIL. PSH  x1. No other  surgeries per patient. Former smoker. was a social smoker and quit 40 years ago. Patient voices no other concerns at this time.            Past Medical History:     Past Medical History:   Diagnosis Date     ASCUS favor benign 2014    3 yr co-test     Essential hypertension, benign      Impaired renal function      Inflammatory arthritis     Managed by Rheumatology  - q 6 mos     Microscopic colitis      Osteoarthritis of first carpometacarpal joint     bilateral     Other specified acquired hypothyroidism      Seasonal allergic rhinitis      Shortness of breath      Spider veins      Symptomatic menopausal or female climacteric states     age 43, on HRT     Tricuspid regurgitation     +1-2 TR noted on 14 Echo            Past Surgical History:     Past Surgical History:   Procedure Laterality Date     ABDOMEN SURGERY  89    C section     BIOPSY BREAST       C/SECTION, LOW TRANSVERSE      twins     COLONOSCOPY      nl, next one due in 5 years     CV CORONARY ANGIOGRAM N/A 2022    Procedure: Coronary Angiogram;  Surgeon: Jose Antonio Ferraro MD;  Location:  [facility]     CV LEFT HEART CATH N/A 2022    Procedure: Left Heart Catheterization;  Surgeon: Jose Antonio Ferraro MD;  Location: [facility] CARDIAC C[facility]     CV LEFT VENTRICULOGRAM N/A 2022    Procedure: Left Ventriculogram;  Surgeon: Jose Antonio Ferraro MD;  Location:  [facility]     CV RIGHT HEART CATH MEASUREMENTS RECORDED N/A 2022    Procedure: Right Heart Catheterization;  Surgeon: Jasmine  Jose Antonio KEITH MD;  Location:  HEART CARDI[facility] CATH LAB     LEEP TX, CERVICAL  1990s    normal since that time     MYRINGOTOMY, INSERT TUBE, COMBINED Left 2015    chronic NORM, ETD            Social History:     Social History     Tobacco Use     Smoking status: Former     Packs/day: 0.26     Years: 10.00     Pack years: 2.60     Types: Cigarettes     Quit date: 1987     Years since quittin.8     Smokeless tobacco: Former     Tobacco comments:     Was a social smoker   Substance Use Topics     Alcohol use: Yes     Comment: A couple a day            Family History:     Family History   Problem Relation Age of Onset     Cancer - colorectal Father         age 50     Colon Cancer Father      Arthritis Mother      Cancer - colorectal Brother         age 50     Colon Cancer Brother      Hypertension Sister      Hypertension Brother      Colon Cancer Brother      Prostate Cancer Brother      Kidney Cancer Brother      Prostate Cancer Brother      Arthritis Sister      Cancer Sister 53        Uterine     Cancer Sister 50        Uterine     Testicular cancer Nephew      Prostate Cancer Brother      Thyroid Disease No family hx of               Allergies:     Allergies   Allergen Reactions     Codeine Nausea and Nausea and Vomiting     Nitrofurantoin Unknown and Other (See Comments)     Other reaction(s): Gastrointestinal       Ace Inhibitors Cough     lisinopril  lisinopril     Hctz [Hydrochlorothiazide]      Severe Hyponatremia less than 120     Sulfa Drugs      Sulfur Unknown            Medications:     Current Outpatient Medications   Medication Sig     Biotin 5000 MCG TABS Take 1 tablet by mouth daily      cephALEXin (KEFLEX) 500 MG capsule TAKE 1 CAPSULE BY MOUTH TWICE DAILY FOR 7 DAYS Strength: 500 mg     clobetasol (TEMOVATE) 0.05 % external solution Apply topically daily To scalp     [START ON 2/10/2023] clonazePAM (KLONOPIN) 0.5 MG ODT DISSOLVE 1 TABLET(0.5 MG) ON THE TONGUE EVERY NIGHT AS NEEDED FOR ANXIETY  "Strength: 0.5 mg     hydroxychloroquine (PLAQUENIL) 200 MG tablet Take 2 tablets (400 mg) by mouth daily     ipratropium (ATROVENT) 0.03 % nasal spray Spray 2 sprays into both nostrils daily      ketoconazole (NIZORAL) 2 % external shampoo      levothyroxine (SYNTHROID/LEVOTHROID) 75 MCG tablet TAKE 1 TABLET BY MOUTH EVERY MORNING     liothyronine (CYTOMEL) 5 MCG tablet TAKE 2 TABLETS BY MOUTH EVERY DAY     loratadine (CLARITIN) 10 MG tablet Take 1 tablet (10 mg) by mouth daily     losartan (COZAAR) 100 MG tablet TAKE 1 TABLET(100 MG) BY MOUTH DAILY     metoprolol succinate ER (TOPROL XL) 100 MG 24 hr tablet Take 1 tablet (100 mg) by mouth daily     sodium chloride 1 GM tablet Take 1 tablet (1 g) by mouth daily     TURMERIC PO Take 1 tablet by mouth daily      Vitamin D3 (CHOLECALCIFEROL) 125 MCG (5000 UT) tablet Take 1 tablet by mouth daily     fluticasone-salmeterol (ADVAIR-HFA) 45-21 MCG/ACT inhaler Inhale 2 puffs into the lungs 2 times daily for 30 days     No current facility-administered medications for this visit.             Review of Systems:    ROS: 14 point ROS neg other than the symptoms noted above in the HPI.          Physical Exam:   Blood pressure 128/88, pulse 69, height 1.626 m (5' 4\"), weight 63.5 kg (140 lb), SpO2 98 %, not currently breastfeeding.  5' 4\", Body mass index is 24.03 kg/m ., 140 lbs 0 oz  Gen appearance: Age-appropriate appearing female in NAD.   HEENT:  EOMI, conjunctiva clear/white. Normal ROM of neck for age.   Psych:  alert , In no acute distress.  Neuro:  A&Ox3  Skin:  Clear of obvious rashes, ecchymoses.  Resp:  Normal respiratory effort; not in acute respiratory distress.   Vasc:  Regular rate.  lymph:  No obvious LE edema bilaterally.     exam:  Vulva is normal in appearance. Urethral prolapse noted.. Negative for BARBARA with reduction of speculum when instructed to cough. Vaginal mucosa atrophic. No obvious pain to palpation along internal or external exam. High tone pelvic " floor noted. Rectocele grade 3.. Strength 1/5. No obvious masses, lesions, ulcers, bleeding noted on internal or external exam.          Data:    PVR  7mL    Labs:  UA RESULTS:  Recent Labs   Lab Test 02/07/23  1320 01/12/23  1523   COLOR Yellow Yellow   APPEARANCE Clear Slightly Cloudy*   URINEGLC Negative Negative   URINEBILI Negative Negative   URINEKETONE Negative Negative   SG 1.025 1.010   UBLD Negative Large*   URINEPH 5.5 6.0   PROTEIN Negative Negative   UROBILINOGEN 0.2 0.2   NITRITE Negative Negative   LEUKEST Negative Large*   RBCU  --  5-10*   WBCU  --  >100*       Creatinine   Date Value Ref Range Status   10/19/2022 0.72 0.52 - 1.04 mg/dL Final   06/15/2021 0.87 0.52 - 1.04 mg/dL Final     UC:  1/12/23 no growth; UA showed >100 WBC and 5-10 RBC which is similar to microscopic hematuria noted in 2019. Of note, patient was treated with keflex prior to this UC. Of ntoe, has had multiple negative UAs since 8/2019.     Lab Results   Component Value Date    AST 25 10/19/2022    AST 21 05/11/2021     Lab Results   Component Value Date    ALT 24 10/19/2022    ALT 28 05/11/2021     Imaging:    Pelvic US on 1/11/21   IMPRESSION:  1.  Normal pelvic ultrasound.

## 2023-02-07 NOTE — LETTER
2/7/2023       RE: Irina Hanks  [address]  [place] [address]     Dear Colleague,    Thank you for referring your patient, Irina Hanks, to the [facility] at [facility]. Please see a copy of my visit note below.    Urology Clinic    Name: Irina Hanks    MRN: [id number]   YOB: 1960  Accompanied at today's visit by:self                Assessment and Plan:   62 year old female with history of UTIs, vaginal atrophy, rectocele, UUI, urinary urgency, nocturia, dyspareunia with penetration.    - UA negative.   - PVR WNL.   - Educated patient about UTI prevention.   - Discussed OTC medications/supplements for UTI prevention; handout given.   - Can try AZO prn and push fluids for UTI symptoms; advised to contact clinic if symptoms worsen or don't improve after 24-48 hours.   - Educated and encourage good vulvar hygiene at length.  - Patient to contact clinic if develops UTI symptoms in the future.  - Declined PFPT.   - States she has start of glaucoma; consider myrbetriq in the future if becomes bothered by bladder symptoms.  - Consider sleep study referral if night time symptoms persist.  - Start Estrogen cream 2x per week at night. Disscused risks/benefits and potential side effects. Patient to clear with PCP first.   - Avoid bladder irritants.  - Patient concerned about UTIs on her trips this spring. Ordered Keflex 500mg BID x7 days with no refills incase develops UTI symptoms on trip.   - Unclear if having true UTIs or not as do not have UCs in EMR. Consider CT urogram with cysto in the future if UTIs persist.   - Follow-up in 3 months or sooner if needed.     Orders Placed This Encounter   Procedures     MEASURE POST-VOID RESIDUAL URINE/BLADDER CAPACITY, US NON-IMAGING (37123)     UA without Microscopic [HIB0044]       After discussing the assessment and plan with patient, patient verbalized  understanding and agreed to the above plan. All questions answered.     40 minutes were spent today on the date of the encounter in reviewing the EMR, direct patient care, coordination of care and documentation in addition to exam, ordering medication.    Irish Cristina PA-C  February 7, 2023    Patient Care Team:  Mercedez Joseph MD as PCP - General (Internal Medicine)  Odessa Ga RD as Registered Dietitian (Dietitian, Registered)  Mercedez Joseph MD as Assigned PCP  Charmaine White APRN CNP as Assigned Neuroscience Provider  Irish Nuñez DPM as Assigned Surgical Provider  Eloisa Rossi MD as Assigned Cancer Care Provider  Marcus Hassan MD as Assigned Pulmonology Provider  Parminder Acevedo MD as Assigned Heart and Vascular Provider  Irish Cristina PA-C as Physician Assistant  MERCEDEZ JOSEPH          Chief Complaint:   Hx of UTI          History of Present Illness:   February 7, 2023    HISTORY: Irina Hanks is a 62 year old female as a new consultation for concerns of Yulia. States she typically goes to urgent cares to get treated. Unsure what grows on her urine cultures. States she has had multiple UTIs over the past year. Per chart review, only see 1 UC on 1/12/23 that showed no growth. Typical UTI symptoms include worsening frequency, burning with urination and gross hematuria. States all symptoms resolve after having an antibiotic. At baseline when not having UTI symptoms, reports voiding every 2 hours during the day and 2-3x/night.Reports occasional UUI. Denies BARBARA. Feels like they empty completely after voiding. Drinks 1-2 cans of soda per day and 1 cup of coffee per day. Takes cranberry supplement and drinks cranberry juice. Using hygenic wipes along vulva. Saw Dr. Zaldivar in 10/2019 for micro hematuria work-up. Last seen on 10/29/19 for cysto with no abnormalities but grade 1 trabeculations. CT scan and urine cytology at the time were negative. Was  adviesed to start topical estrogen cream at that time.  Never remembered to start estrogen cream from encounter with urologist in 2019. Denies history of kidney stones. Denies prolapse symptoms. Sexually active and questions if 70% of her UTIs are after sex. Has pain with intercourse occasionally but not every time. Pain mostly with penetration. Hx of microscopic colitis; was following with GI but not recently. PMH is significant for Hx of hemochromatosis, gets phlebotomy q3-6 months. Also has hx lichen planus, OA, alcohol dependence, controlled substance agreement, tricuspid regurge, HTN. Has hx of ASCUS in the past. Last pap smear  and NIL. PSH  x1. No other  surgeries per patient. Former smoker. was a social smoker and quit 40 years ago. Patient voices no other concerns at this time.            Past Medical History:     Past Medical History:   Diagnosis Date     ASCUS favor benign 2014    3 yr co-test     Essential hypertension, benign      Impaired renal function      Inflammatory arthritis     Managed by Rheumatology  - q 6 mos     Microscopic colitis      Osteoarthritis of first carpometacarpal joint     bilateral     Other specified acquired hypothyroidism      Seasonal allergic rhinitis      Shortness of breath      Spider veins      Symptomatic menopausal or female climacteric states     age 43, on HRT     Tricuspid regurgitation     +1-2 TR noted on 14 Echo            Past Surgical History:     Past Surgical History:   Procedure Laterality Date     ABDOMEN SURGERY  95    C section     BIOPSY BREAST       C/SECTION, LOW TRANSVERSE      twins     COLONOSCOPY      nl, next one due in 5 years     CV CORONARY ANGIOGRAM N/A 2022    Procedure: Coronary Angiogram;  Surgeon: Jose Antonio Ferraro MD;  Location:  HEART CARDIAC CATH LAB     CV LEFT HEART CATH N/A 2022    Procedure: Left Heart Catheterization;  Surgeon: Jose Antonio Ferraro MD;  Location:  HEART CARDIAC CATH  LAB     CV LEFT VENTRICULOGRAM N/A 2022    Procedure: Left Ventriculogram;  Surgeon: Jose Antonio Ferraro MD;  Location:  HEART CARDIAC CATH LAB     CV RIGHT HEART CATH MEASUREMENTS RECORDED N/A 2022    Procedure: Right Heart Catheterization;  Surgeon: Jose Antonio Ferraro MD;  Location:  HEART CARDIAC CATH LAB     LEEP TX, CERVICAL  1990s    normal since that time     MYRINGOTOMY, INSERT TUBE, COMBINED Left 2015    chronic NORM, ETD            Social History:     Social History     Tobacco Use     Smoking status: Former     Packs/day: 0.26     Years: 10.00     Pack years: 2.60     Types: Cigarettes     Quit date: 1987     Years since quittin.8     Smokeless tobacco: Former     Tobacco comments:     Was a social smoker   Substance Use Topics     Alcohol use: Yes     Comment: A couple a day            Family History:     Family History   Problem Relation Age of Onset     Cancer - colorectal Father         age 50     Colon Cancer Father      Arthritis Mother      Cancer - colorectal Brother         age 50     Colon Cancer Brother      Hypertension Sister      Hypertension Brother      Colon Cancer Brother      Prostate Cancer Brother      Kidney Cancer Brother      Prostate Cancer Brother      Arthritis Sister      Cancer Sister 53        Uterine     Cancer Sister 50        Uterine     Testicular cancer Nephew      Prostate Cancer Brother      Thyroid Disease No family hx of               Allergies:     Allergies   Allergen Reactions     Codeine Nausea and Nausea and Vomiting     Nitrofurantoin Unknown and Other (See Comments)     Other reaction(s): Gastrointestinal       Ace Inhibitors Cough     lisinopril  lisinopril     Hctz [Hydrochlorothiazide]      Severe Hyponatremia less than 120     Sulfa Drugs      Sulfur Unknown            Medications:     Current Outpatient Medications   Medication Sig     Biotin 5000 MCG TABS Take 1 tablet by mouth daily      cephALEXin (KEFLEX) 500 MG capsule TAKE 1  "CAPSULE BY MOUTH TWICE DAILY FOR 7 DAYS Strength: 500 mg     clobetasol (TEMOVATE) 0.05 % external solution Apply topically daily To scalp     [START ON 2/10/2023] clonazePAM (KLONOPIN) 0.5 MG ODT DISSOLVE 1 TABLET(0.5 MG) ON THE TONGUE EVERY NIGHT AS NEEDED FOR ANXIETY Strength: 0.5 mg     hydroxychloroquine (PLAQUENIL) 200 MG tablet Take 2 tablets (400 mg) by mouth daily     ipratropium (ATROVENT) 0.03 % nasal spray Spray 2 sprays into both nostrils daily      ketoconazole (NIZORAL) 2 % external shampoo      levothyroxine (SYNTHROID/LEVOTHROID) 75 MCG tablet TAKE 1 TABLET BY MOUTH EVERY MORNING     liothyronine (CYTOMEL) 5 MCG tablet TAKE 2 TABLETS BY MOUTH EVERY DAY     loratadine (CLARITIN) 10 MG tablet Take 1 tablet (10 mg) by mouth daily     losartan (COZAAR) 100 MG tablet TAKE 1 TABLET(100 MG) BY MOUTH DAILY     metoprolol succinate ER (TOPROL XL) 100 MG 24 hr tablet Take 1 tablet (100 mg) by mouth daily     sodium chloride 1 GM tablet Take 1 tablet (1 g) by mouth daily     TURMERIC PO Take 1 tablet by mouth daily      Vitamin D3 (CHOLECALCIFEROL) 125 MCG (5000 UT) tablet Take 1 tablet by mouth daily     fluticasone-salmeterol (ADVAIR-HFA) 45-21 MCG/ACT inhaler Inhale 2 puffs into the lungs 2 times daily for 30 days     No current facility-administered medications for this visit.             Review of Systems:    ROS: 14 point ROS neg other than the symptoms noted above in the HPI.          Physical Exam:   Blood pressure 128/88, pulse 69, height 1.626 m (5' 4\"), weight 63.5 kg (140 lb), SpO2 98 %, not currently breastfeeding.  5' 4\", Body mass index is 24.03 kg/m ., 140 lbs 0 oz  Gen appearance: Age-appropriate appearing female in NAD.   HEENT:  EOMI, conjunctiva clear/white. Normal ROM of neck for age.   Psych:  alert , In no acute distress.  Neuro:  A&Ox3  Skin:  Clear of obvious rashes, ecchymoses.  Resp:  Normal respiratory effort; not in acute respiratory distress.   Vasc:  Regular rate.  lymph:  No " obvious LE edema bilaterally.     exam:  Vulva is normal in appearance. Urethral prolapse noted.. Negative for BARBARA with reduction of speculum when instructed to cough. Vaginal mucosa atrophic. No obvious pain to palpation along internal or external exam. High tone pelvic floor noted. Rectocele grade 3.. Strength 1/5. No obvious masses, lesions, ulcers, bleeding noted on internal or external exam.          Data:    PVR  7mL    Labs:  UA RESULTS:  Recent Labs   Lab Test 02/07/23  1320 01/12/23  1523   COLOR Yellow Yellow   APPEARANCE Clear Slightly Cloudy*   URINEGLC Negative Negative   URINEBILI Negative Negative   URINEKETONE Negative Negative   SG 1.025 1.010   UBLD Negative Large*   URINEPH 5.5 6.0   PROTEIN Negative Negative   UROBILINOGEN 0.2 0.2   NITRITE Negative Negative   LEUKEST Negative Large*   RBCU  --  5-10*   WBCU  --  >100*       Creatinine   Date Value Ref Range Status   10/19/2022 0.72 0.52 - 1.04 mg/dL Final   06/15/2021 0.87 0.52 - 1.04 mg/dL Final     UC:  1/12/23 no growth; UA showed >100 WBC and 5-10 RBC which is similar to microscopic hematuria noted in 2019. Of note, patient was treated with keflex prior to this UC. Of ntoe, has had multiple negative UAs since 8/2019.     Lab Results   Component Value Date    AST 25 10/19/2022    AST 21 05/11/2021     Lab Results   Component Value Date    ALT 24 10/19/2022    ALT 28 05/11/2021     Imaging:    Pelvic US on 1/11/21   IMPRESSION:  1.  Normal pelvic ultrasound.

## 2023-02-08 ENCOUNTER — TELEPHONE (OUTPATIENT)
Dept: UROLOGY | Facility: CLINIC | Age: 63
End: 2023-02-08
Payer: COMMERCIAL

## 2023-02-08 DIAGNOSIS — N94.10 DYSPAREUNIA, FEMALE: ICD-10-CM

## 2023-02-08 DIAGNOSIS — N95.2 VAGINAL ATROPHY: ICD-10-CM

## 2023-02-08 DIAGNOSIS — Z87.440 HISTORY OF UTI: Primary | ICD-10-CM

## 2023-03-09 ENCOUNTER — NURSE TRIAGE (OUTPATIENT)
Dept: FAMILY MEDICINE | Facility: CLINIC | Age: 63
End: 2023-03-09
Payer: COMMERCIAL

## 2023-03-09 DIAGNOSIS — N39.0 RECURRENT UTI: Primary | ICD-10-CM

## 2023-03-09 NOTE — TELEPHONE ENCOUNTER
"To PCP    UTI symptoms started yesterday. Patient states PCP is very familiar with her chronic UTI's. She would like an order for UA/UC so she can come and drop off a sample. States she does not want to have an appointment as she is seen frequently for this.     Please advise.   Order for UA/UC pended for your Review.       Cheryl Bonilla RN on 3/9/2023 at 1:00 PM      Reason for Disposition    Pain or burning with passing urine (urination) and female    > 2 UTIs in last year    Additional Information    Negative: Shock suspected (e.g., cold/pale/clammy skin, too weak to stand, low BP, rapid pulse)    Negative: Sounds like a life-threatening emergency to the triager    Negative: Shock suspected (e.g., cold/pale/clammy skin, too weak to stand, low BP, rapid pulse)    Negative: Sounds like a life-threatening emergency to the triager    Negative: Unable to urinate (or only a few drops) and bladder feels very full    Negative: Vomiting    Negative: Patient sounds very sick or weak to the triager    Negative: SEVERE pain with urination    Negative: Fever > 100.4 F (38.0 C)    Negative: Side (flank) or lower back pain present    Negative: Taking antibiotic > 24 hours for UTI and fever persists    Negative: Taking antibiotic > 3 days for UTI and painful urination not improved    Negative: Unusual vaginal discharge    Answer Assessment - Initial Assessment Questions  1. SYMPTOM: \"What's the main symptom you're concerned about?\" (e.g., frequency, incontinence)      Burning with urination, urgency  2. ONSET: \"When did the  Burning with urination, urgency   start?\"     03/08/23  3. PAIN: \"Is there any pain?\" If Yes, ask: \"How bad is it?\" (Scale: 1-10; mild, moderate, severe)      Discomfort  4. CAUSE: \"What do you think is causing the symptoms?\"      UTI  5. OTHER SYMPTOMS: \"Do you have any other symptoms?\" (e.g., fever, flank pain, blood in urine, pain with urination)      No  6. PREGNANCY: \"Is there any chance you are " "pregnant?\" \"When was your last menstrual period?\"      N/A    Answer Assessment - Initial Assessment Questions  1. SEVERITY: \"How bad is the pain?\"  (e.g., Scale 1-10; mild, moderate, or severe)    - MILD (1-3): complains slightly about urination hurting    - MODERATE (4-7): interferes with normal activities      - SEVERE (8-10): excruciating, unwilling or unable to urinate because of the pain       8/10  2. FREQUENCY: \"How many times have you had painful urination today?\"       Several  3. PATTERN: \"Is pain present every time you urinate or just sometimes?\"       Yes  4. ONSET: \"When did the painful urination start?\"       Yesterda  5. FEVER: \"Do you have a fever?\" If Yes, ask: \"What is your temperature, how was it measured, and when did it start?\"      No  6. PAST UTI: \"Have you had a urine infection before?\" If Yes, ask: \"When was the last time?\" and \"What happened that time?\"       Yes  7. CAUSE: \"What do you think is causing the painful urination?\"  (e.g., UTI, scratch, Herpes sore)      UTI  8. OTHER SYMPTOMS: \"Do you have any other symptoms?\" (e.g., flank pain, vaginal discharge, genital sores, urgency, blood in urine)      *No Answer*  9. PREGNANCY: \"Is there any chance you are pregnant?\" \"When was your last menstrual period?\"      *No Answer*    Protocols used: URINARY SYMPTOMS-A-OH, URINATION PAIN - FEMALE-A-OH      "

## 2023-03-10 NOTE — TELEPHONE ENCOUNTER
Provider Response to 2nd Level Triage Request    I have reviewed the RN documentation. My recommendation is:  Patient is currently following urology, recently seen them on February 7, 2023.  Please asked the patient to send similar Datto message to the urologist for further recommendations as I reviewed her progress note which states that they want to consider CT urogram if there is any recurrence.  Please asked the patient to call the urology office instead of PCP office.     Thank you,  Mercedez Britton MD on 3/9/2023

## 2023-03-10 NOTE — TELEPHONE ENCOUNTER
Left detailed message to contact urology with same message. Advised to call back [phone], option 2 to speak with a nurse if further questions. Carmen Lopez RN

## 2023-03-10 NOTE — TELEPHONE ENCOUNTER
Patient calling back regarding below.    Writer reviewed Dr. Britton's  instructions below with patient. Patient expressed verbal understanding and is agreeable, will call urology clinic now.    No further questions or concerns at this time. Will call back to clinic with further questions or concerns.     Hieu Walters RN  [facility]

## 2023-03-13 ENCOUNTER — LAB (OUTPATIENT)
Dept: LAB | Facility: CLINIC | Age: 63
End: 2023-03-13
Payer: COMMERCIAL

## 2023-03-13 DIAGNOSIS — E83.110 HEREDITARY HEMOCHROMATOSIS (H): ICD-10-CM

## 2023-03-13 DIAGNOSIS — E22.2 SIADH (SYNDROME OF INAPPROPRIATE ADH PRODUCTION) (H): ICD-10-CM

## 2023-03-13 DIAGNOSIS — N39.0 RECURRENT UTI: ICD-10-CM

## 2023-03-13 DIAGNOSIS — E87.1 HYPONATREMIA: ICD-10-CM

## 2023-03-13 DIAGNOSIS — N32.3 BLADDER DIVERTICULUM: ICD-10-CM

## 2023-03-13 LAB
ANION GAP SERPL CALCULATED.3IONS-SCNC: 12 MMOL/L (ref 7–15)
BUN SERPL-MCNC: 13.1 MG/DL (ref 8–23)
CALCIUM SERPL-MCNC: 9.6 MG/DL (ref 8.8–10.2)
CHLORIDE SERPL-SCNC: 99 MMOL/L (ref 98–107)
CREAT SERPL-MCNC: 0.82 MG/DL (ref 0.51–0.95)
DEPRECATED HCO3 PLAS-SCNC: 24 MMOL/L (ref 22–29)
FERRITIN SERPL-MCNC: 54 NG/ML (ref 11–328)
GFR SERPL CREATININE-BSD FRML MDRD: 79 ML/MIN/1.73M2
GLUCOSE SERPL-MCNC: 110 MG/DL (ref 70–99)
HGB BLD-MCNC: 14.6 G/DL (ref 11.7–15.7)
POTASSIUM SERPL-SCNC: 3.9 MMOL/L (ref 3.4–5.3)
SODIUM SERPL-SCNC: 135 MMOL/L (ref 136–145)

## 2023-03-13 PROCEDURE — 36415 COLL VENOUS BLD VENIPUNCTURE: CPT

## 2023-03-13 PROCEDURE — 82728 ASSAY OF FERRITIN: CPT

## 2023-03-13 PROCEDURE — 85018 HEMOGLOBIN: CPT

## 2023-03-13 PROCEDURE — 87086 URINE CULTURE/COLONY COUNT: CPT

## 2023-03-13 PROCEDURE — 80048 BASIC METABOLIC PNL TOTAL CA: CPT

## 2023-03-15 ENCOUNTER — TELEPHONE (OUTPATIENT)
Dept: FAMILY MEDICINE | Facility: CLINIC | Age: 63
End: 2023-03-15
Payer: COMMERCIAL

## 2023-03-15 LAB — BACTERIA UR CULT: NO GROWTH

## 2023-03-15 NOTE — TELEPHONE ENCOUNTER
----- Message from Mercedez Britton MD sent at 3/13/2023  7:37 PM CDT -----  Your Sodium levels are borderline, please take your salt tablets regularly for improvement.     All your OTHER labs normal, you may see some highlighted which do not have Clinical significance.    Mercedez Britton MD on 3/13/2023

## 2023-03-15 NOTE — TELEPHONE ENCOUNTER
Basic metabolic panel  (Ca, Cl, CO2, Creat, Gluc, K, Na, BUN)  Order: [id number]   Status: Final result      Visible to patient: Yes (seen)     3/13/2023  7:37 PM [facility] System Message  New test results     Patient has read PCP test results and note.     Will close encounter.     Georgina Stone RN  -[facility]

## 2023-04-09 DIAGNOSIS — I10 ESSENTIAL HYPERTENSION: ICD-10-CM

## 2023-04-09 DIAGNOSIS — Z00.00 ROUTINE GENERAL MEDICAL EXAMINATION AT A HEALTH CARE FACILITY: ICD-10-CM

## 2023-04-10 RX ORDER — METOPROLOL SUCCINATE 100 MG/1
TABLET, EXTENDED RELEASE ORAL
Qty: 90 TABLET | Refills: 0 | Status: SHIPPED | OUTPATIENT
Start: 2023-04-10 | End: 2023-08-25

## 2023-04-10 RX ORDER — LOSARTAN POTASSIUM 100 MG/1
TABLET ORAL
Qty: 90 TABLET | Refills: 0 | Status: SHIPPED | OUTPATIENT
Start: 2023-04-10 | End: 2023-07-24

## 2023-04-19 ENCOUNTER — NURSE TRIAGE (OUTPATIENT)
Dept: NURSING | Facility: CLINIC | Age: 63
End: 2023-04-19

## 2023-04-19 ENCOUNTER — OFFICE VISIT (OUTPATIENT)
Dept: FAMILY MEDICINE | Facility: CLINIC | Age: 63
End: 2023-04-19
Payer: COMMERCIAL

## 2023-04-19 VITALS
HEART RATE: 74 BPM | DIASTOLIC BLOOD PRESSURE: 100 MMHG | BODY MASS INDEX: 25.29 KG/M2 | OXYGEN SATURATION: 97 % | WEIGHT: 148.13 LBS | RESPIRATION RATE: 12 BRPM | SYSTOLIC BLOOD PRESSURE: 160 MMHG | HEIGHT: 64 IN | TEMPERATURE: 97.4 F

## 2023-04-19 DIAGNOSIS — R30.0 DYSURIA: ICD-10-CM

## 2023-04-19 DIAGNOSIS — T50.905A MEDICATION SIDE EFFECT, INITIAL ENCOUNTER: ICD-10-CM

## 2023-04-19 DIAGNOSIS — Z01.818 PREOPERATIVE CLEARANCE: Primary | ICD-10-CM

## 2023-04-19 DIAGNOSIS — Z79.899 CHRONIC PRESCRIPTION BENZODIAZEPINE USE: ICD-10-CM

## 2023-04-19 DIAGNOSIS — L03.031 PARONYCHIA OF GREAT TOE OF RIGHT FOOT: ICD-10-CM

## 2023-04-19 DIAGNOSIS — F41.9 ANXIETY: ICD-10-CM

## 2023-04-19 DIAGNOSIS — E87.1 HYPONATREMIA: ICD-10-CM

## 2023-04-19 DIAGNOSIS — Z22.322 POSITIVE RESULT FOR METHICILLIN RESISTANT STAPHYLOCOCCUS AUREUS (MRSA) SCREENING: ICD-10-CM

## 2023-04-19 DIAGNOSIS — E22.2 SIADH (SYNDROME OF INAPPROPRIATE ADH PRODUCTION) (H): ICD-10-CM

## 2023-04-19 DIAGNOSIS — I10 ESSENTIAL HYPERTENSION: ICD-10-CM

## 2023-04-19 DIAGNOSIS — M16.12 ARTHRITIS OF LEFT HIP: ICD-10-CM

## 2023-04-19 LAB
ALBUMIN SERPL BCG-MCNC: 4.3 G/DL (ref 3.5–5.2)
ALBUMIN UR-MCNC: NEGATIVE MG/DL
ALP SERPL-CCNC: 115 U/L (ref 35–104)
ALT SERPL W P-5'-P-CCNC: 20 U/L (ref 10–35)
AMPHETAMINES UR QL: NOT DETECTED
ANION GAP SERPL CALCULATED.3IONS-SCNC: 15 MMOL/L (ref 7–15)
APPEARANCE UR: CLEAR
AST SERPL W P-5'-P-CCNC: 31 U/L (ref 10–35)
BACTERIA #/AREA URNS HPF: NORMAL /HPF
BARBITURATES UR QL SCN: NOT DETECTED
BENZODIAZ UR QL SCN: NOT DETECTED
BILIRUB SERPL-MCNC: 0.7 MG/DL
BILIRUB UR QL STRIP: NEGATIVE
BUN SERPL-MCNC: 9.7 MG/DL (ref 8–23)
BUPRENORPHINE UR QL: NOT DETECTED
CALCIUM SERPL-MCNC: 10 MG/DL (ref 8.8–10.2)
CANNABINOIDS UR QL: NOT DETECTED
CHLORIDE SERPL-SCNC: 97 MMOL/L (ref 98–107)
COCAINE UR QL SCN: NOT DETECTED
COLOR UR AUTO: YELLOW
CREAT SERPL-MCNC: 0.84 MG/DL (ref 0.51–0.95)
D-METHAMPHET UR QL: NOT DETECTED
DEPRECATED HCO3 PLAS-SCNC: 23 MMOL/L (ref 22–29)
ERYTHROCYTE [DISTWIDTH] IN BLOOD BY AUTOMATED COUNT: 12.6 % (ref 10–15)
GFR SERPL CREATININE-BSD FRML MDRD: 77 ML/MIN/1.73M2
GLUCOSE SERPL-MCNC: 78 MG/DL (ref 70–99)
GLUCOSE UR STRIP-MCNC: NEGATIVE MG/DL
HCT VFR BLD AUTO: 39.8 % (ref 35–47)
HGB BLD-MCNC: 14.1 G/DL (ref 11.7–15.7)
HGB UR QL STRIP: NEGATIVE
KETONES UR STRIP-MCNC: NEGATIVE MG/DL
LEUKOCYTE ESTERASE UR QL STRIP: NEGATIVE
MCH RBC QN AUTO: 33 PG (ref 26.5–33)
MCHC RBC AUTO-ENTMCNC: 35.4 G/DL (ref 31.5–36.5)
MCV RBC AUTO: 93 FL (ref 78–100)
METHADONE UR QL SCN: NOT DETECTED
NITRATE UR QL: NEGATIVE
OPIATES UR QL SCN: NOT DETECTED
OXYCODONE UR QL SCN: NOT DETECTED
PCP UR QL SCN: NOT DETECTED
PH UR STRIP: 6.5 [PH] (ref 5–7)
PLATELET # BLD AUTO: 212 10E3/UL (ref 150–450)
POTASSIUM SERPL-SCNC: 4.3 MMOL/L (ref 3.4–5.3)
PROPOXYPH UR QL: NOT DETECTED
PROT SERPL-MCNC: 7.4 G/DL (ref 6.4–8.3)
RBC # BLD AUTO: 4.27 10E6/UL (ref 3.8–5.2)
RBC #/AREA URNS AUTO: NORMAL /HPF
SODIUM SERPL-SCNC: 135 MMOL/L (ref 136–145)
SP GR UR STRIP: 1.01 (ref 1–1.03)
TRICYCLICS UR QL SCN: NOT DETECTED
UROBILINOGEN UR STRIP-ACNC: 0.2 E.U./DL
WBC # BLD AUTO: 5.4 10E3/UL (ref 4–11)
WBC #/AREA URNS AUTO: NORMAL /HPF

## 2023-04-19 PROCEDURE — 85027 COMPLETE CBC AUTOMATED: CPT | Performed by: INTERNAL MEDICINE

## 2023-04-19 PROCEDURE — 80053 COMPREHEN METABOLIC PANEL: CPT | Performed by: INTERNAL MEDICINE

## 2023-04-19 PROCEDURE — 93000 ELECTROCARDIOGRAM COMPLETE: CPT | Performed by: INTERNAL MEDICINE

## 2023-04-19 PROCEDURE — 80306 DRUG TEST PRSMV INSTRMNT: CPT | Performed by: INTERNAL MEDICINE

## 2023-04-19 PROCEDURE — 99215 OFFICE O/P EST HI 40 MIN: CPT | Performed by: INTERNAL MEDICINE

## 2023-04-19 PROCEDURE — 36415 COLL VENOUS BLD VENIPUNCTURE: CPT | Performed by: INTERNAL MEDICINE

## 2023-04-19 RX ORDER — AMOXICILLIN 500 MG/1
2000 CAPSULE ORAL
COMMUNITY
Start: 2023-04-18 | End: 2023-06-27

## 2023-04-19 RX ORDER — CELECOXIB 200 MG/1
CAPSULE ORAL
COMMUNITY
Start: 2023-03-07 | End: 2023-06-27

## 2023-04-19 RX ORDER — DOXYCYCLINE HYCLATE 100 MG
100 TABLET ORAL
COMMUNITY
Start: 2023-04-18 | End: 2023-04-25

## 2023-04-19 RX ORDER — AMLODIPINE BESYLATE 5 MG/1
5 TABLET ORAL DAILY
Qty: 90 TABLET | Refills: 1 | Status: SHIPPED | OUTPATIENT
Start: 2023-04-19 | End: 2023-08-25

## 2023-04-19 RX ORDER — LIOTHYRONINE SODIUM 5 UG/1
10 TABLET ORAL
COMMUNITY
Start: 2022-10-25 | End: 2023-04-19

## 2023-04-19 RX ORDER — MUPIROCIN 20 MG/G
OINTMENT TOPICAL 3 TIMES DAILY
Qty: 22 G | Refills: 0 | Status: SHIPPED | OUTPATIENT
Start: 2023-04-19 | End: 2023-06-27

## 2023-04-19 RX ORDER — CELECOXIB 200 MG/1
200 CAPSULE ORAL DAILY
Qty: 30 CAPSULE | Refills: 1 | Status: SHIPPED | OUTPATIENT
Start: 2023-04-19 | End: 2023-06-27

## 2023-04-19 RX ORDER — DOXYCYCLINE HYCLATE 100 MG
TABLET ORAL
COMMUNITY
Start: 2023-04-18 | End: 2023-04-19

## 2023-04-19 ASSESSMENT — PAIN SCALES - GENERAL: PAINLEVEL: MILD PAIN (2)

## 2023-04-19 NOTE — PATIENT INSTRUCTIONS
As discussed please do pertinent work up needed for clearance.     TO STOP Celebrex 4 days before Surgery    We will need to make sure that your Toe nail infection is completely resolved before you get the Hip Surgery.     Sent in antibiotic for toe infection since you are not tolerating Doxycycline.     Please do the MRSA regimen as scheduled by your surgeon.     Added Amlodipine low dose for current high BP.     Please keep up the appointmnent with Podiatry Dr. Ivett Knox >> To make sure infection is gone to be documented on his Note so that I can clear you for your surgery at that time.     ============================

## 2023-04-19 NOTE — LETTER
Opioid / Opioid Plus Controlled Substance Agreement    This is an agreement between you and your provider about the safe and appropriate use of controlled substance/opioids prescribed by your care team. Controlled substances are medicines that can cause physical and mental dependence (abuse).    There are strict laws about having and using these medicines. We here at [facility] are committing to working with you in your efforts to get better. To support you in this work, we ll help you schedule regular office appointments for medicine refills. If we must cancel or change your appointment for any reason, we ll make sure you have enough medicine to last until your next appointment.     As a Provider, I will:  Listen carefully to your concerns and treat you with respect.   Recommend a treatment plan that I believe is in your best interest. This plan may involve therapies other than opioid pain medication.   Talk with you often about the possible benefits, and the risk of harm of any medicine that we prescribe for you.   Provide a plan on how to taper (discontinue or go off) using this medicine if the decision is made to stop its use.    As a Patient, I understand that opioid(s):   Are a controlled substance prescribed by my care team to help me function or work and manage my condition(s).   Are strong medicines and can cause serious side effects such as:  Drowsiness, which can seriously affect my driving ability  A lower breathing rate, enough to cause death  Harm to my thinking ability   Depression   Abuse of and addiction to this medicine  Need to be taken exactly as prescribed. Combining opioids with certain medicines or chemicals (such as illegal drugs, sedatives, sleeping pills, and benzodiazepines) can be dangerous or even fatal. If I stop opioids suddenly, I may have severe withdrawal symptoms.  Do not work for all types of pain nor for all patients. If they re not helpful, I may be asked to stop  them.    I am also being prescribed a benzodiazepine (tranquilizer) controlled substance: I understand this type of medicine is sedating and can increase the risk of death when taken together with opioids. I have talked to my care team about having prescription Narcan available for reversing the opioid medicine and have been instructed in its use. I will be very careful to take my medicines only as directed    The risks, benefits and side effects of these medicine(s) were explained to me. I agree that:  I will take part in other treatments as advised by my care team. This may be psychiatry or counseling, physical therapy, behavioral therapy, group treatment or a referral to a specialist.     I will keep all my appointments. I understand that this is part of the monitoring of opioids. My care team may require an office visit for EVERY opioid/controlled substance refill. If I miss appointments or don t follow instructions, my care team may stop my medicine.    I will take my medicines as prescribed. I will not change the dose or schedule unless my care team tells me to. There will be no refills if I run out early.     I may be asked to come to the clinic and complete a urine drug test or complete a pill count at any time. If I don t give a urine sample or participate in a pill count, the care team may stop my medicine.    I will only receive prescriptions from this clinic for chronic pain. If I am treated by another provider for acute pain issues, I will tell them that I am taking opioid pain medication for chronic pain and that I have a treatment agreement with this provider. I will inform my [facility] care team within one business day if I am given a prescription for any pain medication by another healthcare provider. My [facility] care team can contact other providers and pharmacists about my use of any medicines.    It is up to me to make sure that I don t run out of my medicines on weekends or  holidays. If my care team is willing to refill my opioid prescription without a visit, I must request refills only during office hours. Refills may take up to 3 business days to process. I will use one pharmacy to fill all my opioid and other controlled substance prescriptions. I will notify the clinic about any changes to my insurance or medication availability.    I am responsible for my prescriptions. If the medicine/prescription is lost, stolen or destroyed, it will not be replaced. I also agree not to share controlled substance medicines with anyone.    I am aware I should not use any illegal or recreational drugs. I agree not to drink alcohol unless my care team says I can.       If I enroll in the [place] [facility] program, I will tell my care team prior to my next refill.     I will tell my care team right away if I become pregnant, have a new medical problem treated outside of my regular clinic, or have a change in my medications.    I understand that this medicine can affect my thinking, judgment and reaction time. Alcohol and drugs affect the brain and body, which can affect the safety of my driving. Being under the influence of alcohol or drugs can affect my decision-making, behaviors, personal safety, and the safety of others. Driving while impaired (DWI) can occur if a person is driving, operating, or in physical control of a car, motorcycle, boat, snowmobile, ATV, motorbike, off-road vehicle, or any other motor vehicle ([place] Statute 169A.20). I understand the risk if I choose to drive or operate any vehicle or machinery.    I understand that if I do not follow any of the conditions above, my prescriptions or treatment may be stopped or changed.          Opioids  What You Need to Know    What are opioids?   Opioids are pain medicines that must be prescribed by a doctor. They are also known as narcotics.     Examples are:   morphine (MS Contin, Damaris)  oxycodone (Oxycontin)  oxycodone and  acetaminophen (Percocet)  hydrocodone and acetaminophen (Vicodin, Norco)   fentanyl patch (Duragesic)   hydromorphone (Dilaudid)   methadone  codeine (Tylenol #3)     What do opioids do well?   Opioids are best for severe short-term pain such as after a surgery or injury. They may work well for cancer pain. They may help some people with long-lasting (chronic) pain.     What do opioids NOT do well?   Opioids never get rid of pain entirely, and they don t work well for most patients with chronic pain. Opioids don t reduce swelling, one of the causes of pain.                                    Other ways to manage chronic pain and improve function include:     Treat the health problem that may be causing pain  Anti-inflammation medicines, which reduce swelling and tenderness, such as ibuprofen (Advil, Motrin) or naproxen (Aleve)  Acetaminophen (Tylenol)  Antidepressants and anti-seizure medicines, especially for nerve pain  Topical treatments such as patches or creams  Injections or nerve blocks  Chiropractic or osteopathic treatment  Acupuncture, massage, deep breathing, meditation, visual imagery, aromatherapy  Use heat or ice at the pain site  Physical therapy   Exercise  Stop smoking  Take part in therapy       Risks and side effects     Talk to your doctor before you start or decide to keep taking opioids. Possible side effects include:    Lowering your breathing rate enough to cause death  Overdose, including death, especially if taking higher than prescribed doses  Worse depression symptoms; less pleasure in things you usually enjoy  Feeling tired or sluggish  Slower thoughts or cloudy thinking  Being more sensitive to pain over time; pain is harder to control  Trouble sleeping or restless sleep  Changes in hormone levels (for example, less testosterone)  Changes in sex drive or ability to have sex  Constipation  Unsafe driving  Itching and sweating  Dizziness  Nausea, throwing up and dry mouth    What else  should I know about opioids?    Opioids may lead to dependence, tolerance, or addiction.    Dependence means that if you stop or reduce the medicine too quickly, you will have withdrawal symptoms. These include loose poop (diarrhea), jitters, flu-like symptoms, nervousness and tremors. Dependence is not the same as addiction.                     Tolerance means needing higher doses over time to get the same effect. This may increase the chance of serious side effects.    Addiction is when people improperly use a substance that harms their body, their mind or their relations with others. Use of opiates can cause a relapse of addiction if you have a history of drug or alcohol abuse.    People who have used opioids for a long time may have a lower quality of life, worse depression, higher levels of pain and more visits to doctors.    You can overdose on opioids. Take these steps to lower your risk of overdose:    Recognize the signs:  Signs of overdose include decrease or loss of consciousness (blackout), slowed breathing, trouble waking up and blue lips. If someone is worried about overdose, they should call 911.    Talk to your doctor about Narcan (naloxone).   If you are at risk for overdose, you may be given a prescription for Narcan. This medicine very quickly reverses the effects of opioids.   If you overdose, a friend or family member can give you Narcan while waiting for the ambulance. They need to know the signs of overdose and how to give Narcan.     Don't use alcohol or street drugs.   Taking them with opioids can cause death.    Do not take any of these medicines unless your doctor says it s OK. Taking these with opioids can cause death:  Benzodiazepines, such as lorazepam (Ativan), alprazolam (Xanax) or diazepam (Valium)  Muscle relaxers, such as cyclobenzaprine (Flexeril)  Sleeping pills like zolpidem (Ambien)   Other opioids      How to keep you and other people safe while taking opioids:    Never share  your opioids with others.  Opioid medicines are regulated by the Drug Enforcement Agency ([place]). Selling or sharing medications is a criminal act.    2. Be sure to store opioids in a secure place, locked up if possible. Young children can easily swallow them and overdose.    3. When you are traveling with your medicines, keep them in the original bottles. If you use a pill box, be sure you also carry a copy of your medicine list from your clinic or pharmacy.    4. Safe disposal of opioids    Most pharmacies have places to get rid of medicine, called disposal kiosks. Medicine disposal options are also available in every [facility]. Search your [facility] and  medication disposal  to find more options. You can find more details at:  [email or web address]     I agree that my provider, clinic care team, and pharmacy may work with any city, state or federal law enforcement agency that investigates the misuse, sale, or other diversion of my controlled medicine. I will allow my provider to discuss my care with, or share a copy of, this agreement with any other treating provider, pharmacy or emergency room where I receive care.    I have read this agreement and have asked questions about anything I did not understand.    _______________________________________________________  Patient Signature - Irina Hanks _____________________                   Date     _______________________________________________________  Provider Signature - Mercedez Britton MD   _____________________                   Date     _______________________________________________________  Witness Signature (required if provider not present while patient signing)   _____________________                   Date

## 2023-04-19 NOTE — TELEPHONE ENCOUNTER
"    Nurse Triage SBAR    Is this a 2nd Level Triage? YES, LICENSED PRACTITIONER REVIEW IS REQUIRED    Situation:   Patient got a alert for a abnormal EKG result through [facility].    Background:   Patient was seen in clinic today for a pre op and had a EKG done.    Assessment:   Patient states she got a result \" high alert notice\" from mychart regarding the EKG result.  She states she \"googled it\" and it looks serious.  Patient states when she looked on [employer] it has not be reviewed.    Patient is very concerned and wants to know what she should do.    Protocol Recommended Disposition:   Call PCP Now    Recommendation:   What would you recommend the patient to do? The EKG was reviewed by PCP and it was reassuring. Per the note on her encounter. Nothing that she would recommend doing, no concerns.  Route message to PCP to follow up.    Paged to provider Dr Becerra, she is going to review the chart and call me back.    Does the patient meet one of the following criteria for ADS visit consideration? 16+ years old, with an [facility] PCP     Provider Recommendation Follow Up:   Reached patient/caregiver. Informed of provider's recommendations. Patient verbalized understanding and agrees with the plan.         Reason for Disposition    [1] Caller requests to speak ONLY to PCP AND [2] URGENT question    Additional Information    Negative: Lab calling with strep throat test results and triager can call in prescription    Negative: Lab calling with urinalysis test results and triager can call in prescription    Negative: Medication questions    Negative: Medication renewal and refill questions    Negative: Pre-operative or pre-procedural questions    Negative: ED call to PCP (i.e., primary care provider; doctor, NP, or PA)    Negative: Doctor (or NP/PA) call to PCP    Negative: Call about patient who is currently hospitalized    Negative: Lab or radiology calling with CRITICAL test results    Negative: [1] Follow-up call from patient " regarding patient's clinical status AND [2] information urgent    Protocols used: PCP CALL - NO TRIAGE-A-AH

## 2023-04-19 NOTE — PROGRESS NOTES
[address] [address]  Phone: [phone]  Primary Provider: Mercedez Joseph  Pre-op Performing Provider: MERCEDEZ JOSEPH      PREOPERATIVE EVALUATION:  Today's date: 4/19/2023    Irina Hanks is a 62 year old female who presents for a preoperative evaluation.      4/19/2023     1:10 PM   Additional Questions   Roomed by Sheela Ramos LPN     Surgical Information:  Surgery/Procedure: Left hip  Surgery Location: [facility]  Surgeon: Dr. Leandro Edmond  Surgery Date: 4/26/2023  Time of Surgery:unknown at this time  Where patient plans to recover: At home with family Over night at [place] then [place]  Fax number for surgical facility:  [phone] //  [phone]     Assessment & Plan     The proposed surgical procedure is considered INTERMEDIATE risk.      Assessment and Plan  1. Preoperative clearance  2. Arthritis of left hip  Patient is here for preoperative clearance of left hip arthroplasty, she did have recent right hip arthroplasty and gluteus medius repair.  She does have risk factors of uncontrolled hypertension, paronychia of the right great toe at this time.  Other medical conditions as mentioned below.  - Patient denies any cardiopulmonary symptoms, EKG does show ST-T segment changes as seen in the past.  No acute concerns.  She recently had angiogram which was normal.  - EKG 12-lead complete w/read - Clinics  - Comprehensive metabolic panel (BMP + Alb, Alk Phos, ALT, AST, Total. Bili, TP); Future  - CBC with platelets; Future  - Comprehensive metabolic panel (BMP + Alb, Alk Phos, ALT, AST, Total. Bili, TP)  - CBC with platelets    3. Paronychia of great toe of right foot  Ongoing problem, recent started off doxycycline antibiotic which she has side effect as mentioned below.  Changed to Keflex and sent to pharmacy.  Discussed on the source of infection and its complications on the above procedure, clearance  will be given only if the infection subsides.  - cephALEXin (KEFLEX) 250 MG capsule; Take 1 capsule (250 mg) by mouth 4 times daily  Dispense: 20 capsule; Refill: 0    4. Positive result for methicillin resistant Staphylococcus aureus (MRSA) screening  - chlorhexidine (HIBICLENS) 4 % liquid; Apply topically daily as needed for wound care  Dispense: 236 mL; Refill: 0  - mupirocin (BACTROBAN) 2 % external ointment; Apply topically 3 times daily  Dispense: 22 g; Refill: 0    5. Medication side effect, initial encounter  Patient was started on doxycycline which was causing intense pruritus all over her body just with the first dose.  Change the medication to Keflex above.    6. Essential hypertension  Uncontrolled in spite of current maximum dose of losartan and metoprolol.  We will add amlodipine at a lower dose for improvement.  Discussed with the patient that previously patient was on high doses of antihypertensives which were all weaned down and stopped given her hyponatremia affect with hospitalization due to hydrochlorothiazide in the past.  -Emphasized on planning for BP recheck and paronychia improved update with the upcoming podiatry office visit for clearance of this procedure.  Patient understood and agreed with the plan.  - amLODIPine (NORVASC) 5 MG tablet; Take 1 tablet (5 mg) by mouth daily  Dispense: 90 tablet; Refill: 1    7. SIADH (syndrome of inappropriate ADH production) (H)  8. Hyponatremia  Chronic stable condition, continue current salt tablets.    9. Chronic prescription benzodiazepine use  10. Anxiety  Patient CSA , will check urine drug screen.  - Drug Abuse Screen Panel 13, Urine (Pain Care Package) - lab collect; Future  - Drug Abuse Screen Panel 13, Urine (Pain Care Package) - lab collect    11. Dysuria  Patient is requesting for UTI check, it is reasonable to make sure there is no source of infection given the upcoming hip replacement surgery.  Emphasized that patient is already on  good antibiotics for the above problems which will take care of her UTI if any.  - UA with Microscopic reflex to Culture - lab collect; Future  - UA with Microscopic reflex to Culture - lab collect  - UA Microscopic with Reflex to Culture        Patient Instructions   As discussed please do pertinent work up needed for clearance.     We will need to make sure that your Toe nail infection is completely resolved before you get the Hip Surgery.     Sent in antibiotic for toe infection since you are not tolerating Doxycycline.     Please do the MRSA regimen as scheduled by your surgeon.     Added Amlodipine low dose for current high BP.     Please keep up the appointmnent with Podiatry Dr. Ivett Knox >> To make sure infection is gone to be documented on his Note so that I can clear you for your surgery at that time.     ============================        Return in about 3 months (around 7/19/2023), or if symptoms worsen or fail to improve, for Follow up of last visit, If symptoms persist.    Mercedez Britton MD  [facility] [place]         Risks and Recommendations:  The patient has the following additional risks and recommendations for perioperative complications:   - Consult Hospitalist / IM to assist with post-op medical management  Cardiovascular:   - Uncontrolled blood pressure, started on additional blood pressure medication.  Will await for recheck of blood pressure normalization.  Social and Substance:    - Alcohol abuse and risk of withdrawal  Infection:    - Patient has a history of MRSA   - Prophylactic antibiotics were prescribed.    - Does have toenail paronychia, started on antibiotics.    Medication Instructions:  Patient is to take all scheduled medications on the day of surgery EXCEPT for modifications listed below:   - ACE/ARB: May be continued on the day of surgery.    - Beta Blockers: Continue taking on the day of surgery.   - Calcium Channel Blockers: May be continued on the  day of surgery.   - DMARDs patient currently on Plaquenil.     - Benzodiazepines: Continue without modification.    RECOMMENDATION:  APPROVAL PENDING ( For uncontrolled BP , Infection on Toe nail medications started - Will await from Podiatry clearance which pt has upcoming appointment )   to proceed with proposed procedure, without further diagnostic evaluation.    Review of external notes as documented elsewhere in note  42 minutes spent by me on the date of the encounter doing chart review, review of outside records, review of test results, interpretation of tests, patient visit and documentation       Subjective     HPI related to upcoming procedure:         4/16/2023    10:25 AM   Preop Questions   1. Have you ever had a heart attack or stroke? No   2. Have you ever had surgery on your heart or blood vessels, such as a stent placement, a coronary artery bypass, or surgery on an artery in your head, neck, heart, or legs? No   3. Do you have chest pain with activity? No   4. Do you have a history of  heart failure? No   5. Do you currently have a cold, bronchitis or symptoms of other infection? No   6. Do you have a cough, shortness of breath, or wheezing? No   7. Do you or anyone in your family have previous history of blood clots? No   8. Do you or does anyone in your family have a serious bleeding problem such as prolonged bleeding following surgeries or cuts? No   9. Have you ever had problems with anemia or been told to take iron pills? No   10. Have you had any abnormal blood loss such as black, tarry or bloody stools, or abnormal vaginal bleeding? No   11. Have you ever had a blood transfusion? No   12. Are you willing to have a blood transfusion if it is medically needed before, during, or after your surgery? Yes   13. Have you or any of your relatives ever had problems with anesthesia? No   14. Do you have sleep apnea, excessive snoring or daytime drowsiness? No   15. Do you have any artifical heart  valves or other implanted medical devices like a pacemaker, defibrillator, or continuous glucose monitor? No   16. Do you have artificial joints? YES    17. Are you allergic to latex? No       Health Care Directive:  Patient does not have a Health Care Directive or Living Will:  N/A     Preoperative Review of :   reviewed - controlled substances reflected in medication list.      Status of Chronic Conditions:  See problem list for active medical problems.  Problems all longstanding and stable, except as noted/documented.  See ROS for pertinent symptoms related to these conditions.      Review of Systems  CONSTITUTIONAL: NEGATIVE for fever, chills, change in weight  INTEGUMENTARY/SKIN: NEGATIVE for worrisome rashes, moles or lesions  EYES: NEGATIVE for vision changes or irritation  ENT/MOUTH: NEGATIVE for ear, mouth and throat problems  RESP: NEGATIVE for significant cough or SOB  CV: NEGATIVE for chest pain, palpitations or peripheral edema  GI: NEGATIVE for nausea, abdominal pain, heartburn, or change in bowel habits  : NEGATIVE for frequency, dysuria, or hematuria  MUSCULOSKELETAL: NEGATIVE for significant arthralgias or myalgia  NEURO: NEGATIVE for weakness, dizziness or paresthesias  ENDOCRINE: NEGATIVE for temperature intolerance, skin/hair changes  HEME: NEGATIVE for bleeding problems  PSYCHIATRIC: NEGATIVE for changes in mood or affect    Patient Active Problem List    Diagnosis Date Noted     Uncomplicated alcohol dependence (H) 01/08/2023     Priority: Medium     Status post coronary angiogram 06/30/2022     Priority: Medium     Hypertensive emergency 06/08/2022     Priority: Medium     Chronic prescription benzodiazepine use 03/24/2022     Priority: Medium     Primary osteoarthritis of right hip 10/11/2021     Priority: Medium     Anxiety disorder, unspecified type 09/07/2021     Priority: Medium     Hypokalemia 08/13/2021     Priority: Medium     DJD (degenerative joint disease) 04/14/2021      Priority: Medium     Bladder diverticulum 10/07/2019     Priority: Medium     Lichen planus 01/21/2019     Priority: Medium     Hereditary hemochromatosis (H) 08/27/2018     Priority: Medium     Iron overload syndrome 07/16/2018     Priority: Medium     Iron overload 06/21/2018     Priority: Medium     Hyponatremia 06/12/2018     Priority: Medium     Adjustment disorder with anxious mood 08/14/2017     Priority: Medium     Psychophysiological insomnia 09/07/2016     Priority: Medium     Controlled substance agreement signed 10/5/21 07/27/2015     Priority: Medium     Vasomotor rhinitis 04/19/2015     Priority: Medium     Chronic clear rhinorrhea, Atrovent nasal spray per ENT       Tricuspid regurgitation      Priority: Medium     +1-2 TR noted on 12/22/14 Echo       Shortness of breath      Priority: Medium     Essential hypertension      Priority: Medium     Acquired hypothyroidism      Priority: Medium     Problem list name updated by automated process. Provider to review       Symptomatic menopausal or female climacteric states      Priority: Medium     age 43, on HRT       ASCUS of cervix with negative high risk HPV 09/29/2014     Priority: Medium     09/29/14 Pap= ASCUS, Neg HPV. Repeat co-test 3 yrs per ASCCP guidelines.  9/11/19 NIL, Neg HPV. Routine screening       RA (rheumatoid arthritis) (H) 07/23/2014     Priority: Medium     Vitamin D deficiency 08/09/2012     Priority: Medium     (Problem list name updated by automated process. Provider to review and confirm.)       Inflammatory arthritis      Priority: Medium     Osteoarthritis of first carpometacarpal joint      Priority: Medium     bilateral       Lymphocytic colitis      Priority: Medium     Seasonal allergic rhinitis      Priority: Medium     Anxiety 03/01/2012     Priority: Medium     Postmenopausal symptoms 03/01/2012     Priority: Medium     IBS (irritable bowel syndrome) 09/16/2011     Priority: Medium     Hyperlipidemia LDL goal <130  09/16/2011     Priority: Medium     Noninfectious gastroenteritis 09/24/2008     Priority: Medium     Hypothyroidism 11/01/2006     Priority: Medium     Problem list name updated by automated process. Provider to review       Absence of menstruation 11/01/2006     Priority: Medium      Past Medical History:   Diagnosis Date     ASCUS favor benign 09/2014    3 yr co-test     Essential hypertension, benign      Impaired renal function      Inflammatory arthritis     Managed by Rheumatology  - q 6 mos     Microscopic colitis      Osteoarthritis of first carpometacarpal joint     bilateral     Other specified acquired hypothyroidism      Seasonal allergic rhinitis      Shortness of breath      Spider veins      Symptomatic menopausal or female climacteric states     age 43, on HRT     Tricuspid regurgitation     +1-2 TR noted on 12/22/14 Echo     Past Surgical History:   Procedure Laterality Date     ABDOMEN SURGERY  01/30/1995    C section     BIOPSY BREAST       C/SECTION, LOW TRANSVERSE      twins     COLONOSCOPY  2013    nl, next one due in 5 years     CV CORONARY ANGIOGRAM N/A 06/30/2022    Procedure: Coronary Angiogram;  Surgeon: Jose Antonio Ferraro MD;  Location:  HEART CARDIAC CATH LAB     CV LEFT HEART CATH N/A 06/30/2022    Procedure: Left Heart Catheterization;  Surgeon: Jose Antonio Ferraro MD;  Location:  HEART CARDIAC CATH LAB     CV LEFT VENTRICULOGRAM N/A 06/30/2022    Procedure: Left Ventriculogram;  Surgeon: Jose Antonio Ferraro MD;  Location: [facility] CARDIAC C[facility] LAB      RIGHT HEART CATH MEASUREMENTS RECORDED N/A 06/30/2022    Procedure: Right Heart Catheterization;  Surgeon: Jose Antonio Ferraro MD;  Location:  HEART CARDIAC CATH LAB     LEEP TX, CERVICAL  1990s    normal since that time     MYRINGOTOMY, INSERT TUBE, COMBINED Left 04/2015    chronic NORM, ETD     REPLACEMENT TOTAL HIP W/  RESURFACING IMPLANTS Right      Current Outpatient Medications   Medication Sig Dispense Refill     amLODIPine  (NORVASC) 5 MG tablet Take 1 tablet (5 mg) by mouth daily 90 tablet 1     amoxicillin (AMOXIL) 500 MG capsule Take 2,000 mg by mouth       Biotin 5000 MCG TABS Take 1 tablet by mouth daily        celecoxib (CELEBREX) 200 MG capsule Take 1 capsule (200 mg) by mouth daily 30 capsule 1     cephALEXin (KEFLEX) 250 MG capsule Take 1 capsule (250 mg) by mouth 4 times daily 20 capsule 0     cephALEXin (KEFLEX) 500 MG capsule TAKE 1 CAPSULE BY MOUTH TWICE DAILY FOR 7 DAYS Strength: 500 mg 14 capsule 0     chlorhexidine (HIBICLENS) 4 % liquid Apply topically daily as needed for wound care 236 mL 0     clobetasol (TEMOVATE) 0.05 % external solution Apply topically daily To scalp       clonazePAM (KLONOPIN) 0.5 MG ODT DISSOLVE 1 TABLET(0.5 MG) ON THE TONGUE EVERY NIGHT AS NEEDED FOR ANXIETY Strength: 0.5 mg 120 tablet 0     COMPOUNDED NON-CONTROLLED SUBSTANCE (CMPD RX) - PHARMACY TO MIX COMPOUNDED MEDICATION Apply blueberry sized amount on finger and rub along vaginal tissue 2x/week. 40 g 3     doxycycline hyclate (VIBRA-TABS) 100 MG tablet Take 100 mg by mouth       hydroxychloroquine (PLAQUENIL) 200 MG tablet Take 2 tablets (400 mg) by mouth daily 90 tablet 3     ipratropium (ATROVENT) 0.03 % nasal spray Spray 2 sprays into both nostrils daily        ketoconazole (NIZORAL) 2 % external shampoo        levothyroxine (SYNTHROID/LEVOTHROID) 75 MCG tablet TAKE 1 TABLET BY MOUTH EVERY MORNING 90 tablet 3     liothyronine (CYTOMEL) 5 MCG tablet TAKE 2 TABLETS BY MOUTH EVERY  tablet 3     loratadine (CLARITIN) 10 MG tablet Take 1 tablet (10 mg) by mouth daily 90 tablet 3     losartan (COZAAR) 100 MG tablet TAKE 1 TABLET(100 MG) BY MOUTH DAILY 90 tablet 0     metoprolol succinate ER (TOPROL XL) 100 MG 24 hr tablet TAKE 1 TABLET(100 MG) BY MOUTH DAILY 90 tablet 0     mupirocin (BACTROBAN) 2 % external ointment Apply topically 3 times daily 22 g 0     sodium chloride 1 GM tablet Take 1 tablet (1 g) by mouth daily 60 tablet 1      "TURMERIC PO Take 1 tablet by mouth daily        Vitamin D3 (CHOLECALCIFEROL) 125 MCG (5000 UT) tablet Take 1 tablet by mouth daily       celecoxib (CELEBREX) 200 MG capsule        [START ON 2023] chlorhexidine (HIBICLENS) 4 % liquid        Estriol POWD        fluticasone-salmeterol (ADVAIR-HFA) 45-21 MCG/ACT inhaler Inhale 2 puffs into the lungs 2 times daily for 30 days 12 g 0       Allergies   Allergen Reactions     Codeine Nausea and Nausea and Vomiting     Nitrofurantoin Unknown and Other (See Comments)     Other reaction(s): Gastrointestinal       Ace Inhibitors Cough     lisinopril  lisinopril  lisinopril     Hydrochlorothiazide Unknown     Severe Hyponatremia less than 120  Severe Hyponatremia less than 120     Sulfa Drugs      Sulfur Unknown        Social History     Tobacco Use     Smoking status: Former     Packs/day: 0.26     Years: 10.00     Pack years: 2.60     Types: Cigarettes     Quit date: 1987     Years since quittin.0     Smokeless tobacco: Former     Tobacco comments:     Was a social smoker   Vaping Use     Vaping status: Not on file   Substance Use Topics     Alcohol use: Yes     Comment: A couple a day     Family History   Problem Relation Age of Onset     Cancer - colorectal Father         age 50     Colon Cancer Father      Arthritis Mother      Cancer - colorectal Brother         age 50     Colon Cancer Brother      Hypertension Sister      Hypertension Brother      Colon Cancer Brother      Prostate Cancer Brother      Kidney Cancer Brother      Prostate Cancer Brother      Arthritis Sister      Cancer Sister 53        Uterine     Cancer Sister 50        Uterine     Testicular cancer Nephew      Prostate Cancer Brother      Thyroid Disease No family hx of      History   Drug Use No         Objective     BP (!) 160/100   Pulse 74   Temp 97.4  F (36.3  C) (Temporal)   Resp 12   Ht 1.626 m (5' 4\")   Wt 67.2 kg (148 lb 2 oz)   SpO2 97%   BMI 25.43 kg/m      Physical " Exam    GENERAL APPEARANCE: healthy, alert and no distress     EYES: EOMI, PERRL     HENT: ear canals and TM's normal and nose and mouth without ulcers or lesions     NECK: no adenopathy, no asymmetry, masses, or scars and thyroid normal to palpation     RESP: lungs clear to auscultation - no rales, rhonchi or wheezes     CV: regular rates and rhythm, normal S1 S2, no S3 or S4 and no murmur, click or rub     ABDOMEN:  soft, nontender, no HSM or masses and bowel sounds normal     MS: extremities normal- no gross deformities noted, no evidence of inflammation in joints, FROM in all extremities.     SKIN: no suspicious lesions or rashes  Positive for right great toe paronychia, mild erythema and tenderness on palpation.  No discharge or pus pocket seen.     NEURO: Normal strength and tone, sensory exam grossly normal, mentation intact and speech normal     PSYCH: mentation appears normal. and affect normal/bright     LYMPHATICS: No cervical adenopathy    Recent Labs   Lab Test 03/13/23  1353 10/19/22  1133 07/26/22  1122 06/30/22  0816 09/27/21  1352 09/14/21  0931   HGB 14.6 13.4   < > 13.2   < >  --    PLT  --  216  --  252   < >  --    INR  --   --   --  1.04  --   --    * 140   < > 133   < > 133   POTASSIUM 3.9 3.5   < > 3.9   < > 3.5   CR 0.82 0.72   < > 0.65   < > 0.90   A1C  --   --   --   --   --  4.4    < > = values in this interval not displayed.        Diagnostics:  Labs pending at this time.  Results will be reviewed when available.  Recent Results (from the past 720 hour(s))   CBC with platelets    Collection Time: 04/19/23  2:48 PM   Result Value Ref Range    WBC Count 5.4 4.0 - 11.0 10e3/uL    RBC Count 4.27 3.80 - 5.20 10e6/uL    Hemoglobin 14.1 11.7 - 15.7 g/dL    Hematocrit 39.8 35.0 - 47.0 %    MCV 93 78 - 100 fL    MCH 33.0 26.5 - 33.0 pg    MCHC 35.4 31.5 - 36.5 g/dL    RDW 12.6 10.0 - 15.0 %    Platelet Count 212 150 - 450 10e3/uL   Drug Abuse Screen Panel 13, Urine (Pain Care Package) - lab  collect    Collection Time: 04/19/23  2:56 PM   Result Value Ref Range    Cannabinoids (56-rza-0-carboxy-9-THC) Not Detected Not Detected, Indeterminate    Phencyclidine Not Detected Not Detected, Indeterminate    Cocaine (Benzoylecgonine) Not Detected Not Detected, Indeterminate    Methamphetamine (d-Methamphetamine) Not Detected Not Detected, Indeterminate    Opiates (Morphine) Not Detected Not Detected, Indeterminate    Amphetamine (d-Amphetamine) Not Detected Not Detected, Indeterminate    Benzodiazepines (Nordiazepam) Not Detected Not Detected, Indeterminate    Tricyclic Antidepressants (Desipramine) Not Detected Not Detected, Indeterminate    Methadone Not Detected Not Detected, Indeterminate    Barbiturates (Butalbital) Not Detected Not Detected, Indeterminate    Oxycodone Not Detected Not Detected, Indeterminate    Propoxyphene (Norpropoxyphene) Not Detected Not Detected, Indeterminate    Buprenorphine Not Detected Not Detected, Indeterminate   UA with Microscopic reflex to Culture - lab collect    Collection Time: 04/19/23  2:56 PM    Specimen: Urine, Clean Catch   Result Value Ref Range    Color Urine Yellow Colorless, Straw, Light Yellow, Yellow    Appearance Urine Clear Clear    Glucose Urine Negative Negative mg/dL    Bilirubin Urine Negative Negative    Ketones Urine Negative Negative mg/dL    Specific Gravity Urine 1.010 1.003 - 1.035    Blood Urine Negative Negative    pH Urine 6.5 5.0 - 7.0    Protein Albumin Urine Negative Negative mg/dL    Urobilinogen Urine 0.2 0.2, 1.0 E.U./dL    Nitrite Urine Negative Negative    Leukocyte Esterase Urine Negative Negative   UA Microscopic with Reflex to Culture    Collection Time: 04/19/23  2:56 PM   Result Value Ref Range    Bacteria Urine None Seen None Seen /HPF    RBC Urine 0-2 0-2 /HPF /HPF    WBC Urine 0-5 0-5 /HPF /HPF      EKG required for Previous abnormal EKG, intermediate risk surgery, general anesthesia. and not completed in the last 90 days.   EKG:  appears normal, NSR, no LVH by voltage criteria, nonspecific ST-T changes, unchanged from previous tracings    Revised Cardiac Risk Index (RCRI):  The patient has the following serious cardiovascular risks for perioperative complications:   - No serious cardiac risks = 0 points     RCRI Interpretation: 0 points: Class I (very low risk - 0.4% complication rate)           Signed Electronically by: Mercedez Britton MD  Copy of this evaluation report is provided to requesting physician.

## 2023-04-20 NOTE — TELEPHONE ENCOUNTER
Pt updated with PCP response below. Pt verbalized understanding.    Itzel HARRIS RN  [facility] Triage Team

## 2023-04-20 NOTE — TELEPHONE ENCOUNTER
Please let the patient know that I have already reviewed EKG while in the clinic and updated in her progress notes which she can always view in her mychart -

## 2023-05-03 ENCOUNTER — MEDICAL CORRESPONDENCE (OUTPATIENT)
Dept: HEALTH INFORMATION MANAGEMENT | Facility: CLINIC | Age: 63
End: 2023-05-03

## 2023-05-04 NOTE — TELEPHONE ENCOUNTER
RX [place] ([place]) reviewed:  reviewed- no concerns    [place]:  [email or web address]    Keyla Hardin RN   [facility] - Triage      Burow's Graft Text: The defect edges were debeveled with a #15 scalpel blade.  Given the location of the defect, shape of the defect, the proximity to free margins and the presence of a standing cone deformity a Burow's skin graft was deemed most appropriate. The standing cone was removed and this tissue was then trimmed to the shape of the primary defect. The adipose tissue was also removed until only dermis and epidermis were left.  The skin margins of the secondary defect were undermined to an appropriate distance in all directions utilizing iris scissors.  The secondary defect was closed with interrupted buried subcutaneous sutures.  The skin edges were then re-apposed with running  sutures.  The skin graft was then placed in the primary defect and oriented appropriately.

## 2023-05-09 ENCOUNTER — TELEPHONE (OUTPATIENT)
Dept: FAMILY MEDICINE | Facility: CLINIC | Age: 63
End: 2023-05-09

## 2023-05-09 ENCOUNTER — MEDICAL CORRESPONDENCE (OUTPATIENT)
Dept: HEALTH INFORMATION MANAGEMENT | Facility: CLINIC | Age: 63
End: 2023-05-09

## 2023-05-09 NOTE — TELEPHONE ENCOUNTER
Forms/Letter Request    Type of form/letter: [facility]    Have you been seen for this request: No    Do we have the form/letter: Yes    Who is the form from? Home care    Where did/will the form come from? form was faxed in    When is form/letter needed by: when able    How would you like the form/letter returned: Fax : [phone]    Placed in red folder, and put on Dr Britton's desk.    Anita LAMBERT    [facility]

## 2023-05-12 ENCOUNTER — TELEPHONE (OUTPATIENT)
Dept: FAMILY MEDICINE | Facility: CLINIC | Age: 63
End: 2023-05-12
Payer: MEDICARE

## 2023-05-12 NOTE — TELEPHONE ENCOUNTER
Forms/Letter Request    Type of form/letter: [facility]     Have you been seen for this request: No    Do we have the form/letter: Yes:     Who is the form from? [facility]     Where did/will the form come from? form was faxed in    When is form/letter needed by: When able     How would you like the form/letter returned: Fax : [phone]    Placed in red folder & put on 's desk.   Lena Alberts

## 2023-05-13 DIAGNOSIS — E22.2 SIADH (SYNDROME OF INAPPROPRIATE ADH PRODUCTION) (H): ICD-10-CM

## 2023-05-13 DIAGNOSIS — E87.1 HYPONATREMIA: ICD-10-CM

## 2023-05-13 RX ORDER — SODIUM CHLORIDE 1 G/1
TABLET ORAL
Qty: 60 TABLET | Refills: 1 | Status: SHIPPED | OUTPATIENT
Start: 2023-05-13 | End: 2023-09-21

## 2023-05-13 NOTE — TELEPHONE ENCOUNTER
Please call the patient and schedule Video visit for Klonipin refill follow up with me, in 7/2023 . We have done CSA in pt recent OV in 4/2023 which is not yet scanned in pt chart. Can you please look into it and get CSA scanned at earliest ?      Thank you, Refill done.  Mercedez Britton MD on 5/13/2023 at 3:31 PM

## 2023-06-01 DIAGNOSIS — F43.22 ADJUSTMENT DISORDER WITH ANXIOUS MOOD: ICD-10-CM

## 2023-06-01 RX ORDER — CLONAZEPAM 0.5 MG/1
TABLET, ORALLY DISINTEGRATING ORAL
Qty: 30 TABLET | Refills: 0 | Status: SHIPPED | OUTPATIENT
Start: 2023-06-01 | End: 2023-06-27

## 2023-06-02 NOTE — TELEPHONE ENCOUNTER
RX monitoring program ([place]) reviewed:  reviewed- no concerns    [place] profile:  [email or web address]    Refill done.  Mercedez Britton MD on 6/1/2023

## 2023-06-22 ASSESSMENT — ANXIETY QUESTIONNAIRES
7. FEELING AFRAID AS IF SOMETHING AWFUL MIGHT HAPPEN: NOT AT ALL
7. FEELING AFRAID AS IF SOMETHING AWFUL MIGHT HAPPEN: NOT AT ALL
8. IF YOU CHECKED OFF ANY PROBLEMS, HOW DIFFICULT HAVE THESE MADE IT FOR YOU TO DO YOUR WORK, TAKE CARE OF THINGS AT HOME, OR GET ALONG WITH OTHER PEOPLE?: NOT DIFFICULT AT ALL
3. WORRYING TOO MUCH ABOUT DIFFERENT THINGS: NOT AT ALL
GAD7 TOTAL SCORE: 0
5. BEING SO RESTLESS THAT IT IS HARD TO SIT STILL: NOT AT ALL
2. NOT BEING ABLE TO STOP OR CONTROL WORRYING: NOT AT ALL
GAD7 TOTAL SCORE: 0
4. TROUBLE RELAXING: NOT AT ALL
1. FEELING NERVOUS, ANXIOUS, OR ON EDGE: NOT AT ALL
6. BECOMING EASILY ANNOYED OR IRRITABLE: NOT AT ALL
IF YOU CHECKED OFF ANY PROBLEMS ON THIS QUESTIONNAIRE, HOW DIFFICULT HAVE THESE PROBLEMS MADE IT FOR YOU TO DO YOUR WORK, TAKE CARE OF THINGS AT HOME, OR GET ALONG WITH OTHER PEOPLE: NOT DIFFICULT AT ALL

## 2023-06-27 ENCOUNTER — VIRTUAL VISIT (OUTPATIENT)
Dept: FAMILY MEDICINE | Facility: CLINIC | Age: 63
End: 2023-06-27
Payer: COMMERCIAL

## 2023-06-27 DIAGNOSIS — Z79.899 CHRONIC PRESCRIPTION BENZODIAZEPINE USE: ICD-10-CM

## 2023-06-27 DIAGNOSIS — F43.22 ADJUSTMENT DISORDER WITH ANXIOUS MOOD: Primary | ICD-10-CM

## 2023-06-27 DIAGNOSIS — E03.9 HYPOTHYROIDISM, UNSPECIFIED TYPE: ICD-10-CM

## 2023-06-27 DIAGNOSIS — E22.2 SIADH (SYNDROME OF INAPPROPRIATE ADH PRODUCTION) (H): ICD-10-CM

## 2023-06-27 DIAGNOSIS — Z98.890 STATUS POST HIP SURGERY: ICD-10-CM

## 2023-06-27 DIAGNOSIS — E87.1 HYPONATREMIA: ICD-10-CM

## 2023-06-27 PROCEDURE — 99214 OFFICE O/P EST MOD 30 MIN: CPT | Mod: VID | Performed by: INTERNAL MEDICINE

## 2023-06-27 RX ORDER — ASPIRIN 81 MG/1
TABLET, COATED ORAL
Status: ON HOLD | COMMUNITY
Start: 2023-04-26 | End: 2023-10-21

## 2023-06-27 RX ORDER — CLONAZEPAM 0.5 MG/1
TABLET, ORALLY DISINTEGRATING ORAL
Qty: 30 TABLET | Refills: 0 | Status: SHIPPED | OUTPATIENT
Start: 2023-07-01 | End: 2023-07-25

## 2023-06-27 ASSESSMENT — ANXIETY QUESTIONNAIRES: GAD7 TOTAL SCORE: 0

## 2023-06-27 NOTE — PATIENT INSTRUCTIONS
As discussed, please do non fasting labs ordered to recheck on your Sodium status and elevated liver function on the recent check     Please follow up with Orthopedics on the recent Hip surgery and further care on it.     Sent in Klonipin refills when it is due     ====================================

## 2023-06-27 NOTE — PROGRESS NOTES
Cherise is a 63 year old who is being evaluated via a billable video visit.      How would you like to obtain your AVS? MyChart  If the video visit is dropped, the invitation should be resent by: Text to cell phone: [phone]  Will anyone else be joining your video visit? No      Assessment and Plan  1. Adjustment disorder with anxious mood  2. Chronic prescription benzodiazepine use  Chronic problem, well-controlled.  Continue current clonazepam with the  check done and refills given.  Patient does have her CSA which needs to be renewed at this time, shared decision that she will come for annual wellness visit soon and get this done.  - clonazePAM (KLONOPIN) 0.5 MG ODT; DISSOLVE 1 TABLET ON TONGUE EVERY NIGHT AS NEEDED FOR ANXIETY  Dispense: 30 tablet; Refill: 0    3. Status post hip surgery  Recent left hip arthroplasty, patient was unhappy on this as she is unable to bear any weight on her left hip at all in spite of 6 weeks follow-up with orthopedics.  Discussed on possible infection if any but they excluded the infection by checking a complete blood count and also a recheck MRI of the left hip which does show possible tear of the rectus femoris which patient states that this happened newly at the time of surgery.  She also stated that orthopedics has decided for repeat surgery in October 2023 for correction of this.  As it needs to be at least 6 months from the current surgery to get another surgery.    4. SIADH (syndrome of inappropriate ADH production) (H)  5. Hyponatremia  - Comprehensive metabolic panel (BMP + Alb, Alk Phos, ALT, AST, Total. Bili, TP); Future    6. Hypothyroidism, unspecified type  - TSH with free T4 reflex; Future         Please note that this note consists of symbols derived from keyboarding, dictation and/or voice recognition software. As a result, there may be errors in the script that have gone undetected. Please consider this when interpreting information found in this  chart.    Patient Instructions   As discussed, please do non fasting labs ordered to recheck on your Sodium status and elevated liver function on the recent check     Please follow up with Orthopedics on the recent Hip surgery and further care on it.     Sent in Klonipin refills when it is due     ====================================        Return in about 15 weeks (around 10/10/2023), or if symptoms worsen or fail to improve, for Annual Wellness Exam.    MD SAGAR Norris [facility] [place] Lu[place] is a 63 year old, presenting for the following health issues:  Recheck Medication and MH Follow Up        6/27/2023     1:28 PM   Additional Questions   Roomed by Margarita KEITH     History of Present Illness       Mental Health Follow-up:  Patient presents to follow-up on Anxiety.    Patient's anxiety since last visit has been:  Good  The patient is not having other symptoms associated with anxiety.  Any significant life events: other  Patient is not feeling anxious or having panic attacks.  Patient has no concerns about alcohol or drug use.    She eats 2-3 servings of fruits and vegetables daily.She consumes 1 sweetened beverage(s) daily.She exercises with enough effort to increase her heart rate 30 to 60 minutes per day.  She exercises with enough effort to increase her heart rate 5 days per week.   She is taking medications regularly.  Today's MARIEL-7 Score: 0       Last seen pt on 4/2023 for preop clearance if Left hip arthroplasty. Pt BP was uncontrolled. She is here for Benzodiazepine refils follow up.  Most part of the appointment patient has been talking about her recent left hip surgery which did not go well.         Allergies   Allergen Reactions     Codeine Nausea and Nausea and Vomiting     Nitrofurantoin Unknown and Other (See Comments)     Other reaction(s): Gastrointestinal       Ace Inhibitors Cough     lisinopril  lisinopril  lisinopril     Elemental Sulfur Unknown      Hydrochlorothiazide Unknown     Severe Hyponatremia less than 120  Severe Hyponatremia less than 120     Sulfa Antibiotics         Past Medical History:   Diagnosis Date     ASCUS favor benign 09/2014    3 yr co-test     Essential hypertension, benign      Impaired renal function      Inflammatory arthritis     Managed by Rheumatology  - q 6 mos     Microscopic colitis      Osteoarthritis of first carpometacarpal joint     bilateral     Other specified acquired hypothyroidism      Seasonal allergic rhinitis      Shortness of breath      Spider veins      Symptomatic menopausal or female climacteric states     age 43, on HRT     Tricuspid regurgitation     +1-2 TR noted on 12/22/14 Echo       Past Surgical History:   Procedure Laterality Date     ABDOMEN SURGERY  01/30/1995    C section     BIOPSY BREAST       C/SECTION, LOW TRANSVERSE      twins     COLONOSCOPY  2013    nl, next one due in 5 years     CV CORONARY ANGIOGRAM N/A 06/30/2022    Procedure: Coronary Angiogram;  Surgeon: Jose Antonio Ferraro MD;  Location:  HEART CARDIAC CATH LAB     CV LEFT HEART CATH N/A 06/30/2022    Procedure: Left Heart Catheterization;  Surgeon: Jose Antonio Ferraro MD;  Location:  HEART CARDIAC CATH LAB     CV LEFT VENTRICULOGRAM N/A 06/30/2022    Procedure: Left Ventriculogram;  Surgeon: Jose Antonio Ferraro MD;  Location:  HEART CARDIAC CATH LAB     CV RIGHT HEART CATH MEASUREMENTS RECORDED N/A 06/30/2022    Procedure: Right Heart Catheterization;  Surgeon: Jose Antonio Ferraro MD;  Location:  HEART CARDIAC CATH LAB     LEEP TX, CERVICAL  1990s    normal since that time     MYRINGOTOMY, INSERT TUBE, COMBINED Left 04/2015    chronic NORM, ETD     REPLACEMENT TOTAL HIP W/  RESURFACING IMPLANTS Right        Family History   Problem Relation Age of Onset     Cancer - colorectal Father         age 50     Colon Cancer Father      Arthritis Mother      Cancer - colorectal Brother         age 50     Colon Cancer Brother      Hypertension  Sister      Hypertension Brother      Colon Cancer Brother      Prostate Cancer Brother      Kidney Cancer Brother      Prostate Cancer Brother      Arthritis Sister      Cancer Sister 53        Uterine     Cancer Sister 50        Uterine     Testicular cancer Nephew      Prostate Cancer Brother      Thyroid Disease No family hx of        Social History     Tobacco Use     Smoking status: Former     Packs/day: 0.26     Years: 10.00     Pack years: 2.60     Types: Cigarettes     Quit date: 1987     Years since quittin.1     Smokeless tobacco: Former     Tobacco comments:     Was a social smoker   Substance Use Topics     Alcohol use: Yes     Comment: A couple a day        Current Outpatient Medications   Medication     amLODIPine (NORVASC) 5 MG tablet     ASPIRIN LOW DOSE 81 MG EC tablet     Biotin 5000 MCG TABS     clobetasol (TEMOVATE) 0.05 % external solution     [START ON 2023] clonazePAM (KLONOPIN) 0.5 MG ODT     COMPOUNDED NON-CONTROLLED SUBSTANCE (CMPD RX) - PHARMACY TO MIX COMPOUNDED MEDICATION     hydroxychloroquine (PLAQUENIL) 200 MG tablet     ipratropium (ATROVENT) 0.03 % nasal spray     ketoconazole (NIZORAL) 2 % external shampoo     levothyroxine (SYNTHROID/LEVOTHROID) 75 MCG tablet     liothyronine (CYTOMEL) 5 MCG tablet     loratadine (CLARITIN) 10 MG tablet     losartan (COZAAR) 100 MG tablet     metoprolol succinate ER (TOPROL XL) 100 MG 24 hr tablet     sodium chloride 1 GM tablet     TURMERIC PO     Vitamin D3 (CHOLECALCIFEROL) 125 MCG (5000 UT) tablet     No current facility-administered medications for this visit.      Review of Systems   Constitutional, HEENT, cardiovascular, pulmonary, GI, , musculoskeletal, neuro, skin, endocrine and psych systems are negative, except as otherwise noted.      Objective    Vitals - Patient Reported  Systolic (Patient Reported): 114  Diastolic (Patient Reported): 86      Vitals:  No vitals were obtained today due to virtual visit.    Physical  Exam   GENERAL: Healthy, alert and no distress  EYES: Eyes grossly normal to inspection.  No discharge or erythema, or obvious scleral/conjunctival abnormalities.  RESP: No audible wheeze, cough, or visible cyanosis.  No visible retractions or increased work of breathing.    SKIN: Visible skin clear. No significant rash, abnormal pigmentation or lesions.  NEURO: Cranial nerves grossly intact.  Mentation and speech appropriate for age.  PSYCH: Mentation appears normal, affect normal/bright, judgement and insight intact, normal speech and appearance well-groomed.    Labs and imaging reviewed and discussed with the patient.    Video-Visit Details    Type of service:  Video Visit     Originating Location (pt. Location): Home  Distant Location (provider location):  On-site  Platform used for Video Visit: Pari

## 2023-07-22 DIAGNOSIS — I10 ESSENTIAL HYPERTENSION: ICD-10-CM

## 2023-07-24 DIAGNOSIS — F43.22 ADJUSTMENT DISORDER WITH ANXIOUS MOOD: ICD-10-CM

## 2023-07-24 RX ORDER — LOSARTAN POTASSIUM 100 MG/1
TABLET ORAL
Qty: 90 TABLET | Refills: 0 | Status: SHIPPED | OUTPATIENT
Start: 2023-07-24 | End: 2023-10-26

## 2023-07-25 ENCOUNTER — TRANSFERRED RECORDS (OUTPATIENT)
Dept: HEALTH INFORMATION MANAGEMENT | Facility: CLINIC | Age: 63
End: 2023-07-25
Payer: MEDICARE

## 2023-07-25 ENCOUNTER — TELEPHONE (OUTPATIENT)
Dept: FAMILY MEDICINE | Facility: CLINIC | Age: 63
End: 2023-07-25
Payer: MEDICARE

## 2023-07-25 DIAGNOSIS — E83.110 HEREDITARY HEMOCHROMATOSIS (H): Primary | ICD-10-CM

## 2023-07-25 RX ORDER — CLONAZEPAM 0.5 MG/1
TABLET, ORALLY DISINTEGRATING ORAL
Qty: 30 TABLET | Refills: 0 | Status: SHIPPED | OUTPATIENT
Start: 2023-07-30 | End: 2023-08-24

## 2023-07-25 NOTE — TELEPHONE ENCOUNTER
RX monitoring program ([place]) reviewed:  reviewed- no concerns    [place] profile:  [email or web address]    Refill done from 7/30/23 as per  check.    Thank you,  Mercedez Britton MD on 7/25/2023 at 7:18 AM

## 2023-07-25 NOTE — TELEPHONE ENCOUNTER
Received a call from the patient stating she is needing to get scheduled for a lab appointment. Before patient schedules an appointment, patient is wondering if a Ferritin can be ordered due to her hemochromatosis.     Lab order pended for review. Will route to PCP.    Can we leave a detailed message on this number? YES  Phone number patient can be reached at: Cell number on file:    Telephone Information:   Mobile [phone]       Griselda Cagle RN  [facility] Triage

## 2023-07-26 NOTE — TELEPHONE ENCOUNTER
Signed Ferritin Orders as requested. But she will need to follow up with Dr. Rossi Hematology whom she has not been seeing him lately after his last ferritin orders in 7/2022.     Thank you,  Mercedez Britton MD on 7/26/2023 at 2:21 AM

## 2023-07-27 NOTE — TELEPHONE ENCOUNTER
Patient Contact    Attempt # 1 and 2. Also sent [employer] message.     Was call answered?  No.  Left message on voicemail with information to call triage back at [phone], option 2.     On call back:   Message below. Check to see if patient read message before calling her again. Carmen Lopez RN

## 2023-07-28 NOTE — TELEPHONE ENCOUNTER
Messages sent to: Peg E Latonya    Delivery Read From Message Type Attachments Subject   7/27/2023 11:22 AM Y Carmen Lopez RN Patient Medical Advice Request  Regarding Request for Lab Orders       Patient has read the my chart message with Dr. Britton's recommendations.       Georgina Stone ,RN  [facility]

## 2023-08-03 ENCOUNTER — TELEPHONE (OUTPATIENT)
Dept: FAMILY MEDICINE | Facility: CLINIC | Age: 63
End: 2023-08-03

## 2023-08-03 NOTE — TELEPHONE ENCOUNTER
Pt calling because she has a lab appt tomorrow (8/4) and would like a urinanalysis lab order to be placed for a possible UTI.

## 2023-08-04 ENCOUNTER — LAB (OUTPATIENT)
Dept: LAB | Facility: CLINIC | Age: 63
End: 2023-08-04
Payer: COMMERCIAL

## 2023-08-04 ENCOUNTER — E-VISIT (OUTPATIENT)
Dept: FAMILY MEDICINE | Facility: CLINIC | Age: 63
End: 2023-08-04
Payer: COMMERCIAL

## 2023-08-04 DIAGNOSIS — K64.9 UNSPECIFIED HEMORRHOIDS: ICD-10-CM

## 2023-08-04 DIAGNOSIS — E03.9 HYPOTHYROIDISM, UNSPECIFIED TYPE: ICD-10-CM

## 2023-08-04 DIAGNOSIS — E87.1 HYPONATREMIA: ICD-10-CM

## 2023-08-04 DIAGNOSIS — R30.0 DYSURIA: Primary | ICD-10-CM

## 2023-08-04 DIAGNOSIS — R30.0 DYSURIA: ICD-10-CM

## 2023-08-04 DIAGNOSIS — K62.5 RECTAL BLEEDING: Primary | ICD-10-CM

## 2023-08-04 DIAGNOSIS — E83.110 HEREDITARY HEMOCHROMATOSIS (H): ICD-10-CM

## 2023-08-04 LAB
ALBUMIN SERPL BCG-MCNC: 4.4 G/DL (ref 3.5–5.2)
ALBUMIN UR-MCNC: NEGATIVE MG/DL
ALP SERPL-CCNC: 94 U/L (ref 35–104)
ALT SERPL W P-5'-P-CCNC: 11 U/L (ref 0–50)
ANION GAP SERPL CALCULATED.3IONS-SCNC: 13 MMOL/L (ref 7–15)
APPEARANCE UR: CLEAR
AST SERPL W P-5'-P-CCNC: 28 U/L (ref 0–45)
BILIRUB SERPL-MCNC: 0.4 MG/DL
BILIRUB UR QL STRIP: NEGATIVE
BUN SERPL-MCNC: 12.9 MG/DL (ref 8–23)
CALCIUM SERPL-MCNC: 9.2 MG/DL (ref 8.8–10.2)
CHLORIDE SERPL-SCNC: 97 MMOL/L (ref 98–107)
COLOR UR AUTO: YELLOW
CREAT SERPL-MCNC: 0.85 MG/DL (ref 0.51–0.95)
DEPRECATED HCO3 PLAS-SCNC: 22 MMOL/L (ref 22–29)
ERYTHROCYTE [DISTWIDTH] IN BLOOD BY AUTOMATED COUNT: 11.6 % (ref 10–15)
FERRITIN SERPL-MCNC: 101 NG/ML (ref 11–328)
GFR SERPL CREATININE-BSD FRML MDRD: 76 ML/MIN/1.73M2
GLUCOSE SERPL-MCNC: 85 MG/DL (ref 70–99)
GLUCOSE UR STRIP-MCNC: NEGATIVE MG/DL
HCT VFR BLD AUTO: 38.3 % (ref 35–47)
HGB BLD-MCNC: 13.3 G/DL (ref 11.7–15.7)
HGB UR QL STRIP: NEGATIVE
KETONES UR STRIP-MCNC: NEGATIVE MG/DL
LEUKOCYTE ESTERASE UR QL STRIP: NEGATIVE
MCH RBC QN AUTO: 32.7 PG (ref 26.5–33)
MCHC RBC AUTO-ENTMCNC: 34.7 G/DL (ref 31.5–36.5)
MCV RBC AUTO: 94 FL (ref 78–100)
NITRATE UR QL: NEGATIVE
PH UR STRIP: 6 [PH] (ref 5–7)
PLATELET # BLD AUTO: 240 10E3/UL (ref 150–450)
POTASSIUM SERPL-SCNC: 4.2 MMOL/L (ref 3.4–5.3)
PROT SERPL-MCNC: 7 G/DL (ref 6.4–8.3)
RBC # BLD AUTO: 4.07 10E6/UL (ref 3.8–5.2)
SODIUM SERPL-SCNC: 132 MMOL/L (ref 136–145)
SP GR UR STRIP: <=1.005 (ref 1–1.03)
TSH SERPL DL<=0.005 MIU/L-ACNC: 1.08 UIU/ML (ref 0.3–4.2)
UROBILINOGEN UR STRIP-ACNC: 0.2 E.U./DL
WBC # BLD AUTO: 4.8 10E3/UL (ref 4–11)

## 2023-08-04 PROCEDURE — 36415 COLL VENOUS BLD VENIPUNCTURE: CPT

## 2023-08-04 PROCEDURE — 99422 OL DIG E/M SVC 11-20 MIN: CPT | Performed by: INTERNAL MEDICINE

## 2023-08-04 PROCEDURE — 80053 COMPREHEN METABOLIC PANEL: CPT

## 2023-08-04 PROCEDURE — 85027 COMPLETE CBC AUTOMATED: CPT

## 2023-08-04 PROCEDURE — 81003 URINALYSIS AUTO W/O SCOPE: CPT

## 2023-08-04 PROCEDURE — 84443 ASSAY THYROID STIM HORMONE: CPT

## 2023-08-04 PROCEDURE — 82728 ASSAY OF FERRITIN: CPT

## 2023-08-04 RX ORDER — HYDROCORTISONE ACETATE 25 MG
SUPPOSITORY, RECTAL RECTAL
COMMUNITY
Start: 2023-07-25 | End: 2023-08-24

## 2023-08-04 RX ORDER — CELECOXIB 200 MG/1
200 CAPSULE ORAL
COMMUNITY
Start: 2023-06-12 | End: 2023-12-28

## 2023-08-04 NOTE — TELEPHONE ENCOUNTER
Provider E-Visit time total (minutes):  16 minutes      Sent in [employer] message as below -     Hi Peg,   I am sorry to hear that. Given your recurrent UTIs , which is concerning will recheck on urine exam and do further recommendations. Meanwhile please be aware that Urology has tried to reach you for scheduling your 5/2023 appointment as per the chart review.     Please make a lab only appointment for making sure its positive before starting on antibiotic course .    Thank you,  Mercedez Britton MD on 8/4/2023 at 9:50 AM

## 2023-08-04 NOTE — TELEPHONE ENCOUNTER
Provider Response to 2nd Level Triage Request    I have reviewed the RN documentation. My recommendation is:  E-Visit  or she should follow Urology   Irish Cristina PA-C     Pt has been requesting this repeatedly to PCP. She should follow Urology as recommended in 5/8/2023 as below

## 2023-08-04 NOTE — TELEPHONE ENCOUNTER
Spoke to patient and she has requested an e-visit.       Georgina Stone RN  [facility]

## 2023-08-04 NOTE — PATIENT INSTRUCTIONS
Dear Irina Hanks    After reviewing your responses, I've been able to diagnose you with a urinary tract infection, which is a common infection of the bladder with bacteria.  This is not a sexually transmitted infection, though urinating immediately after intercourse can help prevent infections.  Drinking lots of fluids is also helpful to clear your current infection and prevent the next one.      I have sent a prescription for antibiotics to your pharmacy to treat this infection.    It is important that you take all of your prescribed medication even if your symptoms are improving after a few doses.  Taking all of your medicine helps prevent the symptoms from returning.     If your symptoms worsen, you develop pain in your back or stomach, develop fevers, or are not improving in 5 days, please contact your primary care provider for an appointment or visit any of our convenient Walk-in or Urgent Care Centers to be seen, which can be found on our website here.    Thanks again for choosing us as your health care partner,    Mercedez Britton MD  Dear Irina Hanks,     After reviewing your responses, I would like you to come in for a urine test to make sure we treat you correctly. This urine test is to evaluate you for a possible urinary tract infection, and should be scheduled for today or tomorrow. Schedule a Lab Only appointment here.     Lab appointments are not available at most locations on the weekends. If no Lab Only appointment is available, you should be seen in any of our convenient Walk-in or Urgent Care Centers, which can be found on our website here.     You will receive instructions with your results in authorGENt once they are available.     If your symptoms worsen, you develop pain in your back or stomach, develop fevers, or are not improving in 5 days, please contact your primary care provider for an appointment or visit a Walk-in or Urgent Care Center to be seen.     Thanks again for  choosing us as your health care partner,     Mercedez Britton MD

## 2023-08-07 ENCOUNTER — TRANSFERRED RECORDS (OUTPATIENT)
Dept: HEALTH INFORMATION MANAGEMENT | Facility: CLINIC | Age: 63
End: 2023-08-07
Payer: MEDICARE

## 2023-08-09 ENCOUNTER — TRANSFERRED RECORDS (OUTPATIENT)
Dept: HEALTH INFORMATION MANAGEMENT | Facility: CLINIC | Age: 63
End: 2023-08-09
Payer: MEDICARE

## 2023-08-24 ENCOUNTER — MYC REFILL (OUTPATIENT)
Dept: FAMILY MEDICINE | Facility: CLINIC | Age: 63
End: 2023-08-24
Payer: MEDICARE

## 2023-08-24 DIAGNOSIS — F43.22 ADJUSTMENT DISORDER WITH ANXIOUS MOOD: ICD-10-CM

## 2023-08-24 DIAGNOSIS — L21.9 SEBORRHEIC DERMATITIS: Primary | ICD-10-CM

## 2023-08-24 RX ORDER — CLONAZEPAM 0.5 MG/1
TABLET, ORALLY DISINTEGRATING ORAL
Qty: 30 TABLET | Refills: 0 | Status: SHIPPED | OUTPATIENT
Start: 2023-08-25 | End: 2023-09-25

## 2023-08-24 RX ORDER — KETOCONAZOLE 20 MG/ML
SHAMPOO TOPICAL DAILY PRN
Qty: 100 ML | Refills: 0 | Status: ON HOLD | OUTPATIENT
Start: 2023-08-24 | End: 2023-10-21

## 2023-08-24 RX ORDER — KETOCONAZOLE 20 MG/ML
SHAMPOO TOPICAL
Status: CANCELLED | OUTPATIENT
Start: 2023-08-24

## 2023-08-25 DIAGNOSIS — Z00.00 ROUTINE GENERAL MEDICAL EXAMINATION AT A HEALTH CARE FACILITY: ICD-10-CM

## 2023-08-25 DIAGNOSIS — I10 ESSENTIAL HYPERTENSION: ICD-10-CM

## 2023-08-25 RX ORDER — METOPROLOL SUCCINATE 100 MG/1
TABLET, EXTENDED RELEASE ORAL
Qty: 90 TABLET | Refills: 0 | Status: SHIPPED | OUTPATIENT
Start: 2023-08-25 | End: 2023-11-22

## 2023-08-25 RX ORDER — AMLODIPINE BESYLATE 5 MG/1
5 TABLET ORAL DAILY
Qty: 90 TABLET | Refills: 1 | Status: SHIPPED | OUTPATIENT
Start: 2023-08-25 | End: 2024-02-26

## 2023-08-25 NOTE — TELEPHONE ENCOUNTER
Medication Question or Refill    Contacts         Type Contact Phone/Fax    08/25/2023 09:51 AM CDT Interface (Incoming) [facility] [employer] [id number] - [place], [place] - [address] AT [facility] & [facility] [phone]            What medication are you calling about (include dose and sig)?: amLODIPine (NORVASC) 5 MG tablet   & metoprolol succinate ER (TOPROL XL) 100 MG 24 hr tablet     Preferred Pharmacy:     [facility] [employer] [id number]  [place], [place] - [address] AT [facility] & [facility]  [address] [facility] [address]  Phone: [phone] Fax: [phone]      Controlled Substance Agreement on file:   CSA -- Patient Level:     [Media Unavailable] Controlled Substance Agreement - Opioid - Scan on 3/28/2022  7:06 AM   [Media Unavailable] Controlled Substance Agreement - Opioid - Scan on 10/6/2021  7:28 AM       Who prescribed the medication?: Namballa    Do you need a refill? Yes    When did you use the medication last? Daily    Patient offered an appointment? No    Do you have any questions or concerns?  No      Could we send this information to you in [facility] or would you prefer to receive a phone call?:   Patient would prefer a phone call   Okay to leave a detailed message?: Yes at Cell number on file:    Telephone Information:   Mobile [phone]

## 2023-09-21 DIAGNOSIS — E87.1 HYPONATREMIA: ICD-10-CM

## 2023-09-21 DIAGNOSIS — E22.2 SIADH (SYNDROME OF INAPPROPRIATE ADH PRODUCTION) (H): ICD-10-CM

## 2023-09-21 RX ORDER — SODIUM CHLORIDE 1 G/1
TABLET ORAL
Qty: 60 TABLET | Refills: 1 | Status: SHIPPED | OUTPATIENT
Start: 2023-09-21 | End: 2024-01-16

## 2023-09-25 ENCOUNTER — TRANSFERRED RECORDS (OUTPATIENT)
Dept: HEALTH INFORMATION MANAGEMENT | Facility: CLINIC | Age: 63
End: 2023-09-25
Payer: MEDICARE

## 2023-09-25 DIAGNOSIS — F43.22 ADJUSTMENT DISORDER WITH ANXIOUS MOOD: ICD-10-CM

## 2023-09-25 RX ORDER — CLONAZEPAM 0.5 MG/1
TABLET, ORALLY DISINTEGRATING ORAL
Qty: 30 TABLET | Refills: 0 | Status: SHIPPED | OUTPATIENT
Start: 2023-09-27 | End: 2023-09-26

## 2023-09-25 NOTE — TELEPHONE ENCOUNTER
RX monitoring program ([place]MP) reviewed:  reviewed- no concerns    [place]MP profile:  [email or web address]    Refill done from 9/27/23 as per .  Mercedez Britton MD on 9/25/2023 at 4:10 PM

## 2023-09-26 RX ORDER — CLONAZEPAM 0.5 MG/1
TABLET, ORALLY DISINTEGRATING ORAL
Qty: 30 TABLET | Refills: 0 | Status: SHIPPED | OUTPATIENT
Start: 2023-09-27 | End: 2023-10-25

## 2023-09-26 NOTE — TELEPHONE ENCOUNTER
TO PCP     Pt called - states she needs Clonazepam but [facility] told her that they are out of stock and that they have it in stock at the [facility] on [address] instead     Pt is leaving town on Thursday and asks if PCP can approve to that pharmacy instead?     Sosa ANDRES, Triage RN  [facility]

## 2023-09-26 NOTE — TELEPHONE ENCOUNTER
Called [facility] on [address] and spoke with Griselda Pharmacy Tech.   Cancelled clonazepam 0.5 mg tablet prescription with start date of 9/27/23. Carmen Lopez RN

## 2023-10-05 DIAGNOSIS — E03.9 ACQUIRED HYPOTHYROIDISM: ICD-10-CM

## 2023-10-05 RX ORDER — LIOTHYRONINE SODIUM 5 UG/1
TABLET ORAL
Qty: 180 TABLET | Refills: 2 | Status: SHIPPED | OUTPATIENT
Start: 2023-10-05 | End: 2024-07-15

## 2023-10-16 DIAGNOSIS — E03.9 ACQUIRED HYPOTHYROIDISM: ICD-10-CM

## 2023-10-17 RX ORDER — LEVOTHYROXINE SODIUM 75 UG/1
TABLET ORAL
Qty: 90 TABLET | Refills: 1 | Status: SHIPPED | OUTPATIENT
Start: 2023-10-17 | End: 2024-01-22

## 2023-10-17 NOTE — TELEPHONE ENCOUNTER
Prescription approved per [facility] Refill Protocol.  Griselda Cagle, RN  [facility] Triage Nurse

## 2023-10-21 ENCOUNTER — APPOINTMENT (OUTPATIENT)
Dept: CT IMAGING | Facility: CLINIC | Age: 63
DRG: 153 | End: 2023-10-21
Attending: HOSPITALIST
Payer: COMMERCIAL

## 2023-10-21 ENCOUNTER — APPOINTMENT (OUTPATIENT)
Dept: GENERAL RADIOLOGY | Facility: CLINIC | Age: 63
DRG: 153 | End: 2023-10-21
Attending: EMERGENCY MEDICINE
Payer: COMMERCIAL

## 2023-10-21 ENCOUNTER — HOSPITAL ENCOUNTER (INPATIENT)
Facility: CLINIC | Age: 63
LOS: 2 days | Discharge: HOME | DRG: 153 | End: 2023-10-23
Attending: EMERGENCY MEDICINE | Admitting: HOSPITALIST
Payer: COMMERCIAL

## 2023-10-21 DIAGNOSIS — E87.1 HYPONATREMIA: ICD-10-CM

## 2023-10-21 DIAGNOSIS — F10.920 ALCOHOLIC INTOXICATION WITHOUT COMPLICATION (H): ICD-10-CM

## 2023-10-21 DIAGNOSIS — J05.10 EPIGLOTTITIS: ICD-10-CM

## 2023-10-21 LAB
ANION GAP SERPL CALCULATED.3IONS-SCNC: 15 MMOL/L (ref 7–15)
ANION GAP SERPL CALCULATED.3IONS-SCNC: 19 MMOL/L (ref 7–15)
BASO+EOS+MONOS # BLD AUTO: ABNORMAL 10*3/UL
BASO+EOS+MONOS # BLD AUTO: NORMAL 10*3/UL
BASO+EOS+MONOS NFR BLD AUTO: ABNORMAL %
BASO+EOS+MONOS NFR BLD AUTO: NORMAL %
BASOPHILS # BLD AUTO: 0 10E3/UL (ref 0–0.2)
BASOPHILS # BLD AUTO: 0.1 10E3/UL (ref 0–0.2)
BASOPHILS NFR BLD AUTO: 0 %
BASOPHILS NFR BLD AUTO: 1 %
BUN SERPL-MCNC: 10.9 MG/DL (ref 8–23)
BUN SERPL-MCNC: 9.8 MG/DL (ref 8–23)
CALCIUM SERPL-MCNC: 8.5 MG/DL (ref 8.8–10.2)
CALCIUM SERPL-MCNC: 8.7 MG/DL (ref 8.8–10.2)
CHLORIDE SERPL-SCNC: 104 MMOL/L (ref 98–107)
CHLORIDE SERPL-SCNC: 94 MMOL/L (ref 98–107)
CREAT SERPL-MCNC: 0.63 MG/DL (ref 0.51–0.95)
CREAT SERPL-MCNC: 0.7 MG/DL (ref 0.51–0.95)
DEPRECATED HCO3 PLAS-SCNC: 17 MMOL/L (ref 22–29)
DEPRECATED HCO3 PLAS-SCNC: 20 MMOL/L (ref 22–29)
EGFRCR SERPLBLD CKD-EPI 2021: >90 ML/MIN/1.73M2
EGFRCR SERPLBLD CKD-EPI 2021: >90 ML/MIN/1.73M2
EOSINOPHIL # BLD AUTO: 0 10E3/UL (ref 0–0.7)
EOSINOPHIL # BLD AUTO: 0.2 10E3/UL (ref 0–0.7)
EOSINOPHIL NFR BLD AUTO: 0 %
EOSINOPHIL NFR BLD AUTO: 2 %
ERYTHROCYTE [DISTWIDTH] IN BLOOD BY AUTOMATED COUNT: 11.6 % (ref 10–15)
ERYTHROCYTE [DISTWIDTH] IN BLOOD BY AUTOMATED COUNT: 11.7 % (ref 10–15)
ETHANOL SERPL-MCNC: 0.35 G/DL
GLUCOSE BLDC GLUCOMTR-MCNC: 90 MG/DL (ref 70–99)
GLUCOSE SERPL-MCNC: 102 MG/DL (ref 70–99)
GLUCOSE SERPL-MCNC: 99 MG/DL (ref 70–99)
HCT VFR BLD AUTO: 38.3 % (ref 35–47)
HCT VFR BLD AUTO: 38.3 % (ref 35–47)
HGB BLD-MCNC: 13.3 G/DL (ref 11.7–15.7)
HGB BLD-MCNC: 13.5 G/DL (ref 11.7–15.7)
IMM GRANULOCYTES # BLD: 0 10E3/UL
IMM GRANULOCYTES # BLD: 0 10E3/UL
IMM GRANULOCYTES NFR BLD: 0 %
IMM GRANULOCYTES NFR BLD: 0 %
LACTATE SERPL-SCNC: 3.1 MMOL/L (ref 0.7–2)
LACTATE SERPL-SCNC: 3.7 MMOL/L (ref 0.7–2)
LACTATE SERPL-SCNC: 3.8 MMOL/L (ref 0.7–2)
LACTATE SERPL-SCNC: 4 MMOL/L (ref 0.7–2)
LACTATE SERPL-SCNC: 4.1 MMOL/L (ref 0.7–2)
LACTATE SERPL-SCNC: 4.3 MMOL/L (ref 0.7–2)
LYMPHOCYTES # BLD AUTO: 1.1 10E3/UL (ref 0.8–5.3)
LYMPHOCYTES # BLD AUTO: 2.1 10E3/UL (ref 0.8–5.3)
LYMPHOCYTES NFR BLD AUTO: 10 %
LYMPHOCYTES NFR BLD AUTO: 26 %
MCH RBC QN AUTO: 32.4 PG (ref 26.5–33)
MCH RBC QN AUTO: 32.7 PG (ref 26.5–33)
MCHC RBC AUTO-ENTMCNC: 34.7 G/DL (ref 31.5–36.5)
MCHC RBC AUTO-ENTMCNC: 35.2 G/DL (ref 31.5–36.5)
MCV RBC AUTO: 93 FL (ref 78–100)
MCV RBC AUTO: 93 FL (ref 78–100)
MONOCYTES # BLD AUTO: 0.1 10E3/UL (ref 0–1.3)
MONOCYTES # BLD AUTO: 0.6 10E3/UL (ref 0–1.3)
MONOCYTES NFR BLD AUTO: 1 %
MONOCYTES NFR BLD AUTO: 7 %
NEUTROPHILS # BLD AUTO: 10.3 10E3/UL (ref 1.6–8.3)
NEUTROPHILS # BLD AUTO: 5.3 10E3/UL (ref 1.6–8.3)
NEUTROPHILS NFR BLD AUTO: 64 %
NEUTROPHILS NFR BLD AUTO: 89 %
NRBC # BLD AUTO: 0 10E3/UL
NRBC # BLD AUTO: 0 10E3/UL
NRBC BLD AUTO-RTO: 0 /100
NRBC BLD AUTO-RTO: 0 /100
PLATELET # BLD AUTO: 223 10E3/UL (ref 150–450)
PLATELET # BLD AUTO: 228 10E3/UL (ref 150–450)
POTASSIUM SERPL-SCNC: 3.6 MMOL/L (ref 3.4–5.3)
POTASSIUM SERPL-SCNC: 3.7 MMOL/L (ref 3.4–5.3)
RBC # BLD AUTO: 4.11 10E6/UL (ref 3.8–5.2)
RBC # BLD AUTO: 4.13 10E6/UL (ref 3.8–5.2)
SODIUM SERPL-SCNC: 129 MMOL/L (ref 135–145)
SODIUM SERPL-SCNC: 140 MMOL/L (ref 135–145)
WBC # BLD AUTO: 11.6 10E3/UL (ref 4–11)
WBC # BLD AUTO: 8.2 10E3/UL (ref 4–11)

## 2023-10-21 PROCEDURE — 99418 PROLNG IP/OBS E/M EA 15 MIN: CPT | Performed by: HOSPITALIST

## 2023-10-21 PROCEDURE — 80048 BASIC METABOLIC PNL TOTAL CA: CPT | Performed by: EMERGENCY MEDICINE

## 2023-10-21 PROCEDURE — 36415 COLL VENOUS BLD VENIPUNCTURE: CPT | Performed by: INTERNAL MEDICINE

## 2023-10-21 PROCEDURE — 250N000011 HC RX IP 250 OP 636: Mod: JZ | Performed by: INTERNAL MEDICINE

## 2023-10-21 PROCEDURE — 70492 CT SFT TSUE NCK W/O & W/DYE: CPT

## 2023-10-21 PROCEDURE — 36415 COLL VENOUS BLD VENIPUNCTURE: CPT | Performed by: HOSPITALIST

## 2023-10-21 PROCEDURE — 250N000013 HC RX MED GY IP 250 OP 250 PS 637: Performed by: INTERNAL MEDICINE

## 2023-10-21 PROCEDURE — 36415 COLL VENOUS BLD VENIPUNCTURE: CPT | Performed by: EMERGENCY MEDICINE

## 2023-10-21 PROCEDURE — 258N000003 HC RX IP 258 OP 636: Performed by: EMERGENCY MEDICINE

## 2023-10-21 PROCEDURE — 250N000011 HC RX IP 250 OP 636: Mod: JZ | Performed by: HOSPITALIST

## 2023-10-21 PROCEDURE — 0CJS8ZZ INSPECTION OF LARYNX, VIA NATURAL OR ARTIFICIAL OPENING ENDOSCOPIC: ICD-10-PCS | Performed by: EMERGENCY MEDICINE

## 2023-10-21 PROCEDURE — 96365 THER/PROPH/DIAG IV INF INIT: CPT

## 2023-10-21 PROCEDURE — 85025 COMPLETE CBC W/AUTO DIFF WBC: CPT | Performed by: HOSPITALIST

## 2023-10-21 PROCEDURE — 70360 X-RAY EXAM OF NECK: CPT

## 2023-10-21 PROCEDURE — 96361 HYDRATE IV INFUSION ADD-ON: CPT

## 2023-10-21 PROCEDURE — 83605 ASSAY OF LACTIC ACID: CPT | Performed by: HOSPITALIST

## 2023-10-21 PROCEDURE — 250N000011 HC RX IP 250 OP 636: Performed by: INTERNAL MEDICINE

## 2023-10-21 PROCEDURE — 96375 TX/PRO/DX INJ NEW DRUG ADDON: CPT

## 2023-10-21 PROCEDURE — 258N000003 HC RX IP 258 OP 636: Performed by: INTERNAL MEDICINE

## 2023-10-21 PROCEDURE — 31575 DIAGNOSTIC LARYNGOSCOPY: CPT

## 2023-10-21 PROCEDURE — 87040 BLOOD CULTURE FOR BACTERIA: CPT | Performed by: EMERGENCY MEDICINE

## 2023-10-21 PROCEDURE — 250N000011 HC RX IP 250 OP 636: Mod: JZ | Performed by: EMERGENCY MEDICINE

## 2023-10-21 PROCEDURE — 99291 CRITICAL CARE FIRST HOUR: CPT | Mod: 25

## 2023-10-21 PROCEDURE — 250N000009 HC RX 250: Performed by: INTERNAL MEDICINE

## 2023-10-21 PROCEDURE — 82077 ASSAY SPEC XCP UR&BREATH IA: CPT | Performed by: EMERGENCY MEDICINE

## 2023-10-21 PROCEDURE — 85025 COMPLETE CBC W/AUTO DIFF WBC: CPT | Performed by: EMERGENCY MEDICINE

## 2023-10-21 PROCEDURE — 250N000009 HC RX 250: Performed by: EMERGENCY MEDICINE

## 2023-10-21 PROCEDURE — 83605 ASSAY OF LACTIC ACID: CPT | Performed by: INTERNAL MEDICINE

## 2023-10-21 PROCEDURE — 83605 ASSAY OF LACTIC ACID: CPT | Performed by: EMERGENCY MEDICINE

## 2023-10-21 PROCEDURE — 99223 1ST HOSP IP/OBS HIGH 75: CPT | Performed by: HOSPITALIST

## 2023-10-21 PROCEDURE — 120N000001 HC R&B MED SURG/OB

## 2023-10-21 PROCEDURE — 71046 X-RAY EXAM CHEST 2 VIEWS: CPT

## 2023-10-21 PROCEDURE — 80048 BASIC METABOLIC PNL TOTAL CA: CPT | Performed by: HOSPITALIST

## 2023-10-21 RX ORDER — NICOTINE POLACRILEX 4 MG
15-30 LOZENGE BUCCAL
Status: DISCONTINUED | OUTPATIENT
Start: 2023-10-21 | End: 2023-10-23 | Stop reason: HOSPADM

## 2023-10-21 RX ORDER — DIPHENHYDRAMINE HYDROCHLORIDE 50 MG/ML
25 INJECTION INTRAMUSCULAR; INTRAVENOUS ONCE
Status: COMPLETED | OUTPATIENT
Start: 2023-10-21 | End: 2023-10-21

## 2023-10-21 RX ORDER — CEFTRIAXONE 2 G/1
2 INJECTION, POWDER, FOR SOLUTION INTRAMUSCULAR; INTRAVENOUS ONCE
Status: COMPLETED | OUTPATIENT
Start: 2023-10-21 | End: 2023-10-21

## 2023-10-21 RX ORDER — DEXAMETHASONE SODIUM PHOSPHATE 4 MG/ML
4 INJECTION, SOLUTION INTRA-ARTICULAR; INTRALESIONAL; INTRAMUSCULAR; INTRAVENOUS; SOFT TISSUE EVERY 6 HOURS
Status: DISCONTINUED | OUTPATIENT
Start: 2023-10-21 | End: 2023-10-22

## 2023-10-21 RX ORDER — BISACODYL 5 MG
5 TABLET, DELAYED RELEASE (ENTERIC COATED) ORAL DAILY PRN
Status: DISCONTINUED | OUTPATIENT
Start: 2023-10-21 | End: 2023-10-23 | Stop reason: HOSPADM

## 2023-10-21 RX ORDER — CLONAZEPAM 0.25 MG/1
0.5 TABLET, ORALLY DISINTEGRATING ORAL
Status: DISCONTINUED | OUTPATIENT
Start: 2023-10-21 | End: 2023-10-23 | Stop reason: HOSPADM

## 2023-10-21 RX ORDER — ACETAMINOPHEN 325 MG/1
650 TABLET ORAL EVERY 4 HOURS PRN
Status: DISCONTINUED | OUTPATIENT
Start: 2023-10-21 | End: 2023-10-23 | Stop reason: HOSPADM

## 2023-10-21 RX ORDER — IOPAMIDOL 755 MG/ML
90 INJECTION, SOLUTION INTRAVASCULAR ONCE
Status: COMPLETED | OUTPATIENT
Start: 2023-10-21 | End: 2023-10-21

## 2023-10-21 RX ORDER — DIPHENHYDRAMINE HYDROCHLORIDE 50 MG/ML
50 INJECTION INTRAMUSCULAR; INTRAVENOUS EVERY 6 HOURS
Status: DISCONTINUED | OUTPATIENT
Start: 2023-10-21 | End: 2023-10-22

## 2023-10-21 RX ORDER — CEFTRIAXONE 2 G/1
2 INJECTION, POWDER, FOR SOLUTION INTRAMUSCULAR; INTRAVENOUS EVERY 24 HOURS
Status: DISCONTINUED | OUTPATIENT
Start: 2023-10-22 | End: 2023-10-23 | Stop reason: HOSPADM

## 2023-10-21 RX ORDER — ACETAMINOPHEN 325 MG/10.15ML
650 LIQUID ORAL EVERY 4 HOURS PRN
Status: DISCONTINUED | OUTPATIENT
Start: 2023-10-21 | End: 2023-10-23 | Stop reason: HOSPADM

## 2023-10-21 RX ORDER — BISACODYL 5 MG
10 TABLET, DELAYED RELEASE (ENTERIC COATED) ORAL DAILY PRN
Status: DISCONTINUED | OUTPATIENT
Start: 2023-10-21 | End: 2023-10-23 | Stop reason: HOSPADM

## 2023-10-21 RX ORDER — SODIUM CHLORIDE 9 MG/ML
INJECTION, SOLUTION INTRAVENOUS CONTINUOUS
Status: DISCONTINUED | OUTPATIENT
Start: 2023-10-21 | End: 2023-10-22

## 2023-10-21 RX ORDER — DEXTROSE MONOHYDRATE 25 G/50ML
25-50 INJECTION, SOLUTION INTRAVENOUS
Status: DISCONTINUED | OUTPATIENT
Start: 2023-10-21 | End: 2023-10-23 | Stop reason: HOSPADM

## 2023-10-21 RX ORDER — DEXAMETHASONE SODIUM PHOSPHATE 10 MG/ML
10 INJECTION, SOLUTION INTRAMUSCULAR; INTRAVENOUS ONCE
Status: COMPLETED | OUTPATIENT
Start: 2023-10-21 | End: 2023-10-21

## 2023-10-21 RX ORDER — BISACODYL 5 MG
15 TABLET, DELAYED RELEASE (ENTERIC COATED) ORAL DAILY PRN
Status: DISCONTINUED | OUTPATIENT
Start: 2023-10-21 | End: 2023-10-23 | Stop reason: HOSPADM

## 2023-10-21 RX ORDER — ONDANSETRON 2 MG/ML
4 INJECTION INTRAMUSCULAR; INTRAVENOUS EVERY 6 HOURS PRN
Status: DISCONTINUED | OUTPATIENT
Start: 2023-10-21 | End: 2023-10-23 | Stop reason: HOSPADM

## 2023-10-21 RX ORDER — ONDANSETRON 4 MG/1
4 TABLET, ORALLY DISINTEGRATING ORAL EVERY 6 HOURS PRN
Status: DISCONTINUED | OUTPATIENT
Start: 2023-10-21 | End: 2023-10-23 | Stop reason: HOSPADM

## 2023-10-21 RX ORDER — DIPHENHYDRAMINE HYDROCHLORIDE 50 MG/ML
25 INJECTION INTRAMUSCULAR; INTRAVENOUS ONCE
Status: DISCONTINUED | OUTPATIENT
Start: 2023-10-21 | End: 2023-10-21

## 2023-10-21 RX ADMIN — DEXAMETHASONE SODIUM PHOSPHATE 4 MG: 4 INJECTION, SOLUTION INTRA-ARTICULAR; INTRALESIONAL; INTRAMUSCULAR; INTRAVENOUS; SOFT TISSUE at 20:08

## 2023-10-21 RX ADMIN — IOPAMIDOL 90 ML: 755 INJECTION, SOLUTION INTRAVENOUS at 09:19

## 2023-10-21 RX ADMIN — SODIUM CHLORIDE: 9 INJECTION, SOLUTION INTRAVENOUS at 14:00

## 2023-10-21 RX ADMIN — DIPHENHYDRAMINE HYDROCHLORIDE 50 MG: 50 INJECTION, SOLUTION INTRAMUSCULAR; INTRAVENOUS at 14:10

## 2023-10-21 RX ADMIN — SODIUM CHLORIDE: 9 INJECTION, SOLUTION INTRAVENOUS at 06:25

## 2023-10-21 RX ADMIN — DIPHENHYDRAMINE HYDROCHLORIDE 50 MG: 50 INJECTION, SOLUTION INTRAMUSCULAR; INTRAVENOUS at 20:08

## 2023-10-21 RX ADMIN — DEXAMETHASONE SODIUM PHOSPHATE 4 MG: 4 INJECTION, SOLUTION INTRA-ARTICULAR; INTRALESIONAL; INTRAMUSCULAR; INTRAVENOUS; SOFT TISSUE at 08:40

## 2023-10-21 RX ADMIN — SODIUM CHLORIDE: 9 INJECTION, SOLUTION INTRAVENOUS at 20:23

## 2023-10-21 RX ADMIN — EPINEPHRINE 0.5 MG: 1 INJECTION INTRAMUSCULAR; INTRAVENOUS; SUBCUTANEOUS at 01:58

## 2023-10-21 RX ADMIN — FAMOTIDINE 20 MG: 10 INJECTION INTRAVENOUS at 01:46

## 2023-10-21 RX ADMIN — DIPHENHYDRAMINE HYDROCHLORIDE 50 MG: 50 INJECTION, SOLUTION INTRAMUSCULAR; INTRAVENOUS at 08:39

## 2023-10-21 RX ADMIN — DEXAMETHASONE SODIUM PHOSPHATE 10 MG: 10 INJECTION INTRAMUSCULAR; INTRAVENOUS at 01:46

## 2023-10-21 RX ADMIN — SODIUM CHLORIDE 2016 ML: 9 INJECTION, SOLUTION INTRAVENOUS at 02:30

## 2023-10-21 RX ADMIN — CLONAZEPAM 0.5 MG: 0.25 TABLET, ORALLY DISINTEGRATING ORAL at 21:58

## 2023-10-21 RX ADMIN — FAMOTIDINE 20 MG: 10 INJECTION INTRAVENOUS at 14:10

## 2023-10-21 RX ADMIN — CEFTRIAXONE SODIUM 2 G: 2 INJECTION, POWDER, FOR SOLUTION INTRAMUSCULAR; INTRAVENOUS at 02:30

## 2023-10-21 RX ADMIN — DEXAMETHASONE SODIUM PHOSPHATE 4 MG: 4 INJECTION, SOLUTION INTRA-ARTICULAR; INTRALESIONAL; INTRAMUSCULAR; INTRAVENOUS; SOFT TISSUE at 14:10

## 2023-10-21 RX ADMIN — SODIUM CHLORIDE 60 ML: 9 INJECTION, SOLUTION INTRAVENOUS at 09:19

## 2023-10-21 RX ADMIN — DIPHENHYDRAMINE HYDROCHLORIDE 25 MG: 50 INJECTION, SOLUTION INTRAMUSCULAR; INTRAVENOUS at 01:58

## 2023-10-21 ASSESSMENT — ACTIVITIES OF DAILY LIVING (ADL)
ADLS_ACUITY_SCORE: 23
ADLS_ACUITY_SCORE: 23
ADLS_ACUITY_SCORE: 35
ADLS_ACUITY_SCORE: 23
ADLS_ACUITY_SCORE: 35
ADLS_ACUITY_SCORE: 36
ADLS_ACUITY_SCORE: 35
ADLS_ACUITY_SCORE: 23

## 2023-10-21 NOTE — PLAN OF CARE
Patient arrived in the unit at 0600, A&) x 4, VSS, SR, neuro intact. Denies pain, hoarse voice, airway is patent . NPO. Up with SBA to BR , voiding adequately. IVF continuous.

## 2023-10-21 NOTE — CONSULTS
63-year-old female seen in consultation at the request of Dr. Malik for further evaluation of her throat.  She was admitted through the emergency room following episode of emesis and then subsequent shortness of breath and difficulty swallowing.  She does not report chronic problems with her throat.  She does feel that she is improved today compared to last night.  She still has some discomfort with swallowing her saliva.  She is not reporting any current shortness of breath.  She does note some mild hoarseness.  She is not reporting any other specific ear nose or throat complaints or problems at this time  The remainder of the past medical history social history family history and review of systems is as noted on her electronic medical record.  She has allergies to codeine Macrodantin lisinopril hydrochlorothiazide and sulfa antibiotics  Examination today revealed a normal canal and tympanic membrane on the right and normal pinna and canal on the left.  Visualization of the left TM was incomplete.  The nose was clear anteriorly.  The oral cavity revealed minimal edema of the uvula without erythema.  The neck was without significant adenopathy.  We discussed the potential for flexible fiberoptic examination of the hypopharynx and larynx and she did wish to proceed.  The scope was passed through the right nostril.  There is some mild watery edema of the posterior arytenoids extending up to the aryepiglottic fold.  There is no erythema.  There is no swelling or edema of the epiglottis.  Both vocal cords move well and into the midline.  The immediate subglottic trachea is unremarkable.  The neck is without adenopathy  Impression: Supraglottic edema  Plan: At this point she is stable and has a patent airway.  There is edema primarily of the arytenoids.  It is unclear if this is secondary to possible irritation from emesis as she reports that she had very little in her stomach and she may have been having emesis  primarily of acidic contents.  There is no significant erythema.  I think it is appropriate to continue with the antibiotics and the steroids.  I expect her to improve over the next 24 hours.  She may start slowly with some clear liquids today.  If she continues to improve she may potentially be transferred to the floor tomorrow.  Again thank you for allowing us to participate in Ms. Zhao's care.  Please call if there are further questions

## 2023-10-21 NOTE — PHARMACY-ADMISSION MEDICATION HISTORY
Pharmacy Intern Admission Medication History    Admission medication history is complete. The information provided in this note is only as accurate as the sources available at the time of the update.    Information Source(s): Patient and CareEverywhere/SureScripts via in-person    Pertinent Information: None    Changes made to PTA medication list:  Added: None  Deleted: Aspirin, clobetasol, estrogen cream, ketoconazole  Changed: None    Medication Affordability:  Not including over the counter (OTC) medications, was there a time in the past 3 months when you did not take your medications as prescribed because of cost?: No    Allergies reviewed with patient and updates made in EHR: no    Medication History Completed By: Esha Fraga 10/21/2023 11:43 AM    PTA Med List   Medication Sig Last Dose    amLODIPine (NORVASC) 5 MG tablet Take 1 tablet (5 mg) by mouth daily 10/20/2023    Biotin 5000 MCG TABS Take 1 tablet by mouth daily  10/20/2023    celecoxib (CELEBREX) 200 MG capsule Take 200 mg by mouth 10/20/2023    clonazePAM (KLONOPIN) 0.5 MG ODT DISSOLVE 1 TABLET ON THE TONGUE EVERY NIGHT AS NEEDED FOR ANXIETY 10/20/2023    hydroxychloroquine (PLAQUENIL) 200 MG tablet Take 2 tablets (400 mg) by mouth daily 10/20/2023    ipratropium (ATROVENT) 0.03 % nasal spray Spray 2 sprays into both nostrils daily  10/20/2023    levothyroxine (SYNTHROID/LEVOTHROID) 75 MCG tablet TAKE 1 TABLET BY MOUTH EVERY MORNING 10/20/2023    liothyronine (CYTOMEL) 5 MCG tablet TAKE 2 TABLETS BY MOUTH EVERY DAY 10/20/2023    loratadine (CLARITIN) 10 MG tablet Take 1 tablet (10 mg) by mouth daily 10/20/2023    losartan (COZAAR) 100 MG tablet TAKE 1 TABLET(100 MG) BY MOUTH DAILY 10/20/2023    metoprolol succinate ER (TOPROL XL) 100 MG 24 hr tablet TAKE 1 TABLET(100 MG) BY MOUTH DAILY 10/20/2023    sodium chloride 1 GM tablet TAKE 1 TABLET(1 GRAM) BY MOUTH DAILY 10/20/2023

## 2023-10-21 NOTE — ED NOTES
Bed: ED19  Expected date: 10/21/23  Expected time: 12:50 AM  Means of arrival: Ambulance  Comments:  HEMS 425 63F sob; intoxicated

## 2023-10-21 NOTE — H&P
[facility]    History and Physical  Hospitalist       Date of Admission:  10/21/2023  Date of Service (when I saw the patient): 10/21/23    ASSESSMENT  Irina Hanks is a markedly pleasant 63 year old woman with past medical history that is most notable for rheumatoid arthritis on plaquneil, as well as hereditary hemochromatosis, among others; who presents with dyspnea, nausea and vomiting while intoxicated and is found to have acute epiglottitis.    PLAN     Acute epiglottitis: Of note, Ms. Hanks presents with acute dyspnea, nausea and vomiting while intoxicated. Her voice has become very hoarse tonight.    In the ED, she is afebrile, normotensive, not hypoxic or tachycardic. Flexible laryngoscopy was done in the ED and the uvula was noted to be engorged, the airway seemed patent. X-ray of the neck shows an enlarged epiglottis with enlargement of the aryepiglottic folds. The hypopharyngeal soft tissues appear thickened. No prevertebral soft tissue swelling is noted superior to the hypopharynx. CXR shows no acute pathology. Overall, we suspect acute epiglottitis, possibly inflammatory, allergic, or infectious. The case has been discussed with ENT in the ED.      -- ICU. NPO. ENT consulted. Empiric Ceftriaxone continued. Follow up cultures. CT of the neck ordered. Decadron 4 mg q 6 IV ordered. IV Pepcid and Benadryl continued. Tylenol and anti-emetics as needed. Repeat CBC this AM.    Acute toxic encephalopathy due to alcohol intoxication: Ethyl alcohol level was 0.35 initially tonight.    -- IV Banana Bag ordered. Monitor for signs of withdrawal and start CIWA if these occur.    Acute lactic metabolic acidosis: suspect due to dehydration and ETOH use. As above we are treating empirically for possible soft tissue infection of the neck. She was given 2 liters IVF in the ED and repeat Lactate is pending.    Acute hyponatremia: Mild at 129. Suspect due to dehydration. She was admitted  here for severe hyponatremia in 2021 suspected due to diuretic.    -- IVF as above; repeat BMP in AM    History of RA: Resume Plaquenil at discharge unless infection prohibits safe resumption    Hereditary hemochromatosis: Seen in the past for this by  Hematology. Cardiac catheterization done by [facility] in 2022 showed no clinically significant CAD.     -- Daily CBC while hospitalized.    Hypertension: Resume home Norvasc, Toprol, Cozaar when verified and diet can be advanced    Hypothyroid: Resume Synthroid when verified    I have spent 90 minutes on the date of service doing chart review, history, examination, documentation, and further activities per the note.    Chief Complaint   Dyspnea    History is obtained from the patient and the ED physician whom I have spoken with    History of Present Illness   Irina Hanks is a markedly pleasant 63 year old woman who presents with dyspnea. She is intoxicated currently during my interview and that limits the history provision but it seems that after going to a neighborhood gathering today and having multiple alcoholic drinks, she says that while at rest she developed acute dyspnea, associated with nausea and vomiting. She can not recall if the dyspnea preceded the nausea. EMS was called. She notes that now her voice has become very hoarse and her throat is sore. She has been given multiple medications in the ED and these have helped improve her dyspnea quite a bit. She says that for the past week she has had severe worsening of chronic pain in her left hip, and is due to have an MRI of that soon. She otherwise denies drooling, stridor, wheezing, chest pain, back or neck pain, or any other acute complaints.    In the ED,   10/21 0100 133/90 97.7  F (36.5  C) 74 16 98 %     CBC and BMP were notable for Na 129, Cl 94, CO2 20, Ca 8.7, Glucose 102, otherwise were within the normal reference range. WBC was 8.2. Lactate was 3.1. Ethyl alcohol level was 0.35.  blood cultures were sent.    The case was discussed in the ED with ENT and the Intensivist and she was given IV Ceftriaxone, 10 mg IV Decadron, 25 mg IV Benadryl, IM Epinephrine, IV Pepcid 20 mg, and IV Fluid in the ED.    Recent Results (from the past 24 hour(s))   Chest XR,  PA & LAT    Narrative    EXAM: XR CHEST 2 VIEWS  LOCATION: [facility]  DATE: 10/21/2023    INDICATION: SOB  ? aspiration  COMPARISON: None.      Impression    IMPRESSION: Negative chest.   Neck soft tissue XR    Narrative    EXAM: XR NECK SOFT TISSUE  LOCATION: [facility]  DATE: 10/21/2023    INDICATION: throat pain, swelling  COMPARISON: None.  TECHNIQUE: CR Neck Soft Tissue.      Impression    IMPRESSION:   Epiglottis appears enlarged. Suspect enlargement of the aryepiglottic folds. Hypopharyngeal soft tissues appear thickened. Findings may reflect infectious inflammatory epiglottitis or angioedema.    No prevertebral soft tissue swelling superior to the hypopharynx.    Multilevel spondylosis.       PHYSICAL EXAM  Blood pressure 127/78, pulse 82, temperature 97.7  F (36.5  C), temperature source Oral, resp. rate 16, SpO2 93%, not currently breastfeeding.  Constitutional: Alert and disoriented, intoxicated; no apparent distress  HEENT: normocephalic moist mucus membranes  Respiratory: lungs clear to auscultation bilaterally  Cardiovascular: regular S1 S2  GI: abdomen soft non tender non distended bowel sounds positive  Musculoskeletal: no clubbing, cyanosis or edema  Neurologic: extra-ocular muscles intact; moves all four extremities     DVT Prophylaxis: Pneumatic Compression Devices  Code Status: Full Code    Disposition: Expected discharge in 2-3 days    Master Altamirano MD, MD    Past Medical History    I have reviewed this patient's medical history and updated it with pertinent information if needed.   Past Medical History:   Diagnosis Date    ASCUS favor benign 09/2014    3 yr  co-test    Hereditary hemochromatosis (H24)     HTN (hypertension)     Impaired renal function     Microscopic colitis     Osteoarthritis of first carpometacarpal joint     bilateral    Other specified acquired hypothyroidism     RA (rheumatoid arthritis) (H)     Seasonal allergic rhinitis     Spider veins     Symptomatic menopausal or female climacteric states     age 43, on HRT    Tricuspid regurgitation     +1-2 TR noted on 12/22/14 Echo       Past Surgical History   I have reviewed this patient's surgical history and updated it with pertinent information if needed.  Past Surgical History:   Procedure Laterality Date    ABDOMEN SURGERY  01/30/1995    C section    BIOPSY BREAST      C/SECTION, LOW TRANSVERSE      twins    COLONOSCOPY  2013    nl, next one due in 5 years    CV CORONARY ANGIOGRAM N/A 06/30/2022    Procedure: Coronary Angiogram;  Surgeon: Jose Antonio Ferraro MD;  Location:  HEART CARDIAC CATH LAB    CV LEFT HEART CATH N/A 06/30/2022    Procedure: Left Heart Catheterization;  Surgeon: Jose Antonio Ferraro MD;  Location:  HEART CARDIAC CATH LAB    CV LEFT VENTRICULOGRAM N/A 06/30/2022    Procedure: Left Ventriculogram;  Surgeon: Jose Antonio Ferraro MD;  Location:  HEART CARDIAC CATH LAB    CV RIGHT HEART CATH MEASUREMENTS RECORDED N/A 06/30/2022    Procedure: Right Heart Catheterization;  Surgeon: Jose Antonio Ferraro MD;  Location:  HEART CARDIAC CATH LAB    LEEP TX, CERVICAL  1990s    normal since that time    MYRINGOTOMY, INSERT TUBE, COMBINED Left 04/2015    chronic NORM, ETD    REPLACEMENT TOTAL HIP W/  RESURFACING IMPLANTS Right        Prior to Admission Medications   Prior to Admission Medications   Prescriptions Last Dose Informant Patient Reported? Taking?   ASPIRIN LOW DOSE 81 MG EC tablet   Yes No   Biotin 5000 MCG TABS   Yes No   Sig: Take 1 tablet by mouth daily    COMPOUNDED NON-CONTROLLED SUBSTANCE (CMPD RX) - PHARMACY TO MIX COMPOUNDED MEDICATION   No No   Sig: Apply blueberry sized  amount on finger and rub along vaginal tissue 2x/week.   amLODIPine (NORVASC) 5 MG tablet   No No   Sig: Take 1 tablet (5 mg) by mouth daily   celecoxib (CELEBREX) 200 MG capsule   Yes No   Sig: Take 200 mg by mouth   clobetasol (TEMOVATE) 0.05 % external solution   Yes No   Sig: Apply topically daily To scalp   clonazePAM (KLONOPIN) 0.5 MG ODT   No No   Sig: DISSOLVE 1 TABLET ON THE TONGUE EVERY NIGHT AS NEEDED FOR ANXIETY   hydroxychloroquine (PLAQUENIL) 200 MG tablet   No No   Sig: Take 2 tablets (400 mg) by mouth daily   ipratropium (ATROVENT) 0.03 % nasal spray   Yes No   Sig: Spray 2 sprays into both nostrils daily    ketoconazole (NIZORAL) 2 % external shampoo   No No   Sig: Apply topically daily as needed for itching or irritation   levothyroxine (SYNTHROID/LEVOTHROID) 75 MCG tablet   No No   Sig: TAKE 1 TABLET BY MOUTH EVERY MORNING   liothyronine (CYTOMEL) 5 MCG tablet   No No   Sig: TAKE 2 TABLETS BY MOUTH EVERY DAY   loratadine (CLARITIN) 10 MG tablet   Yes No   Sig: Take 1 tablet (10 mg) by mouth daily   losartan (COZAAR) 100 MG tablet   No No   Sig: TAKE 1 TABLET(100 MG) BY MOUTH DAILY   metoprolol succinate ER (TOPROL XL) 100 MG 24 hr tablet   No No   Sig: TAKE 1 TABLET(100 MG) BY MOUTH DAILY   sodium chloride 1 GM tablet   No No   Sig: TAKE 1 TABLET(1 GRAM) BY MOUTH DAILY      Facility-Administered Medications: None     Allergies   Allergies   Allergen Reactions    Codeine Nausea and Nausea and Vomiting    Nitrofurantoin Unknown and Other (See Comments)     Other reaction(s): Gastrointestinal      Ace Inhibitors Cough     lisinopril  lisinopril  lisinopril    Elemental Sulfur Unknown    Hydrochlorothiazide Unknown     Severe Hyponatremia less than 120  Severe Hyponatremia less than 120    Sulfa Antibiotics        Social History   I have reviewed this patient's social history and updated it with pertinent information if needed. Irina Hanks  reports that she quit smoking about 36 years ago. Her  smoking use included cigarettes. She has a 2.60 pack-year smoking history. She has quit using smokeless tobacco. She reports current alcohol use. She reports that she does not use drugs.    Family History   Family history assessed and, except as above, is non-contributory.    Family History   Problem Relation Age of Onset    Cancer - colorectal Father         age 50    Colon Cancer Father     Arthritis Mother     Cancer - colorectal Brother         age 50    Colon Cancer Brother     Hypertension Sister     Hypertension Brother     Colon Cancer Brother     Prostate Cancer Brother     Kidney Cancer Brother     Prostate Cancer Brother     Arthritis Sister     Cancer Sister 53        Uterine    Cancer Sister 50        Uterine    Testicular cancer Nephew     Prostate Cancer Brother     Thyroid Disease No family hx of        Review of Systems   The 10 point Review of Systems is negative other than noted in the HPI or here.     Primary Care Physician   Mercedez Britton    Data   Labs Ordered and Resulted from Time of ED Arrival to Time of ED Departure   BASIC METABOLIC PANEL - Abnormal       Result Value    Sodium 129 (*)     Potassium 3.6      Chloride 94 (*)     Carbon Dioxide (CO2) 20 (*)     Anion Gap 15      Urea Nitrogen 10.9      Creatinine 0.70      GFR Estimate >90      Calcium 8.7 (*)     Glucose 102 (*)    ETHYL ALCOHOL LEVEL - Abnormal    Alcohol ethyl 0.35 (*)    LACTIC ACID WHOLE BLOOD - Abnormal    Lactic Acid 3.1 (*)    CBC WITH PLATELETS AND DIFFERENTIAL    WBC Count 8.2      RBC Count 4.11      Hemoglobin 13.3      Hematocrit 38.3      MCV 93      MCH 32.4      MCHC 34.7      RDW 11.7      Platelet Count 228      % Neutrophils 64      % Lymphocytes 26      % Monocytes 7      Mids % (Monos, Eos, Basos)        % Eosinophils 2      % Basophils 1      % Immature Granulocytes 0      NRBCs per 100 WBC 0      Absolute Neutrophils 5.3      Absolute Lymphocytes 2.1      Absolute Monocytes 0.6      Mids Abs (Monos,  Eos, Basos)        Absolute Eosinophils 0.2      Absolute Basophils 0.1      Absolute Immature Granulocytes 0.0      Absolute NRBCs 0.0     BLOOD CULTURE   BLOOD CULTURE       Data reviewed today:  I personally reviewed the chest x-ray image(s) showing no acute pathology .

## 2023-10-21 NOTE — PLAN OF CARE
Goal Outcome Evaluation:      Plan of Care Reviewed With: patient      [id number]: Patient alert and oriented, VSS except some HTN this evening, MD informed. Up SBA, voiding adequately in bathroom. No BM this shift, passing gas. Advanced to clear liquid diet this afternoon, tolerating. LS clear/diminished, infrequent cough/clearing of throat, O2 sats WNL on room air.  Report given to RN on station 66, plan for pt to transfer to 615.

## 2023-10-21 NOTE — ED TRIAGE NOTES
Pt BIBA from home. States she was drinking tonight and woke up with sudden onset SOB. Pt comes in with hoarse voice, but airway is patent. Noted that pt has vomit on chin and in hair. VSS. Pt states she has no history of breathing issues.

## 2023-10-21 NOTE — ED PROVIDER NOTES
History   Chief Complaint:  Shortness of Breath and Alcohol Intoxication     The history is provided by the patient and medical records. History limited by: intoxication.      Irina Hanks is a 63 year old female with history of rheumatoid arthritis, hypertension, and hypothyroidism who was brought to the emergency department via EMS with shortness of breath and alcohol intoxication. The patient states she had a few drinks with some friends earlier this evening. She then was watching TV when she had a sudden onset of shortness of breath and had an episode of emesis. She had difficulty breathing and due to this, EMS was called. Here, the patient endorses shortness of breath. She denies throat pain. She has not had similar symptoms in the past. She endorses alcohol use, approximately 6 cocktails today. She states she had a few drinks every few days. History is limited secondary to patient alcohol intoxication.     Independent Historian:   EMS provide additional history in regards to the severity of the patient's symptoms.    Review of External Notes:   Outpatient note reviewed from 2023.  The patient was seen for anxiety and is on clonazepam.    Medications:    Norvasc  Aspirin (81 mg)  Celebrex   Klonopin   Plaquenil   Synthroid   Cytomel   Cozaar  Toprol     Past Medical History:    Hypertension   Hyperlipidemia   Inflammatory arthritis   Rheumatoid arthritis   Microscopic colitis   Lymphocytic colitis   Osteoarthritis   Hypothyroidism   Tricuspid regurgitation   Adjustment disorder with anxious mood   Hereditary hemochromatosis   Irritable bowel syndrome   Vitamin D deficiency   Iron overload syndrome   Degenerative joint disease   Chronic prescription benzodiazepine use  Uncomplicated alcohol dependence     Past Surgical History:     section   Breast biopsy   Colonoscopy   Coronary angiogram  Heart catheterization, left  Heart catheterization, right   Ventriculogram, left    Myringotomy, left   Hip arthroplasty, right     Physical Exam   Patient Vitals for the past 24 hrs:   BP Temp Temp src Pulse Resp SpO2   10/21/23 0413 -- -- -- 85 25 96 %   10/21/23 0403 95/64 -- -- 69 15 --   10/21/23 0302 127/78 -- -- 82 16 93 %   10/21/23 0100 (!) 133/90 97.7  F (36.5  C) Oral 74 16 98 %      Physical Exam  Constitutional:       General: She is in acute distress.   HENT:      Head: Atraumatic.      Right Ear: External ear normal.      Left Ear: External ear normal.      Nose: Nose normal.      Mouth/Throat:      Mouth: Mucous membranes are moist.      Comments: The lips and tongue are normal.  There is edema of the uvula and posterior oropharynx.  No drooling or stridor.  Eyes:      General: No scleral icterus.     Extraocular Movements: Extraocular movements intact.      Conjunctiva/sclera: Conjunctivae normal.      Pupils: Pupils are equal, round, and reactive to light.   Cardiovascular:      Rate and Rhythm: Normal rate and regular rhythm.      Heart sounds: Normal heart sounds.   Pulmonary:      Effort: No respiratory distress.      Breath sounds: Normal breath sounds.   Abdominal:      General: Abdomen is flat. There is no distension.      Tenderness: There is no abdominal tenderness.   Musculoskeletal:      Cervical back: Neck supple.      Right lower leg: No edema.      Left lower leg: No edema.   Skin:     General: Skin is warm.      Capillary Refill: Capillary refill takes less than 2 seconds.      Findings: No rash.   Neurological:      General: No focal deficit present.      Mental Status: She is alert and oriented to person, place, and time.   Psychiatric:         Mood and Affect: Mood normal.         Behavior: Behavior normal.           Emergency Department Course   Imaging:  Neck soft tissue XR   Final Result   IMPRESSION:    Epiglottis appears enlarged. Suspect enlargement of the aryepiglottic folds. Hypopharyngeal soft tissues appear thickened. Findings may reflect infectious  inflammatory epiglottitis or angioedema.      No prevertebral soft tissue swelling superior to the hypopharynx.      Multilevel spondylosis.      Chest XR,  PA & LAT   Final Result   IMPRESSION: Negative chest.      Reading per radiology.    Laboratory:  Labs Ordered and Resulted from Time of ED Arrival to Time of ED Departure   BASIC METABOLIC PANEL - Abnormal       Result Value    Sodium 129 (*)     Potassium 3.6      Chloride 94 (*)     Carbon Dioxide (CO2) 20 (*)     Anion Gap 15      Urea Nitrogen 10.9      Creatinine 0.70      GFR Estimate >90      Calcium 8.7 (*)     Glucose 102 (*)    ETHYL ALCOHOL LEVEL - Abnormal    Alcohol ethyl 0.35 (*)    LACTIC ACID WHOLE BLOOD - Abnormal    Lactic Acid 3.1 (*)    LACTIC ACID WHOLE BLOOD - Abnormal    Lactic Acid 4.0 (*)    CBC WITH PLATELETS AND DIFFERENTIAL    WBC Count 8.2      RBC Count 4.11      Hemoglobin 13.3      Hematocrit 38.3      MCV 93      MCH 32.4      MCHC 34.7      RDW 11.7      Platelet Count 228      % Neutrophils 64      % Lymphocytes 26      % Monocytes 7      Mids % (Monos, Eos, Basos)        % Eosinophils 2      % Basophils 1      % Immature Granulocytes 0      NRBCs per 100 WBC 0      Absolute Neutrophils 5.3      Absolute Lymphocytes 2.1      Absolute Monocytes 0.6      Mids Abs (Monos, Eos, Basos)        Absolute Eosinophils 0.2      Absolute Basophils 0.1      Absolute Immature Granulocytes 0.0      Absolute NRBCs 0.0     BLOOD CULTURE   BLOOD CULTURE      Procedures     Pharyngoscopy   PROCEDURE:  Fiberoptic Pharyngoscopy  PERFORMED BY:  Dr. Colin Frost.   INDICATION:  Hoarseness   PREPARATION:  The patient's right nare was prepped using a afrin and lidocaine via mucosal atomization device.  MEDICATION:  Afrin 2 sprays, lidocaine 1% 3 ml  PROCEDURE NOTE:  A MAD device was used to spray 3ml in the right nares.  HurriCaine spray was used to anesthetize the (right/left) nares. A fiber optic scope was placed. The the epiglottis is mildly  swollen.  No erythema or purulence.  The area epiglottic folds are also swollen.  The airway is otherwise widely patent.  Vocal cords appear normal.  PATIENT STATUS:  The patient tolerated the procedure well and there were no immediate complications.     Emergency Department Course & Assessments:     Interventions:  Medications   famotidine (PEPCID) injection 20 mg (20 mg Intravenous $Given 10/21/23 0146)   sodium chloride 0.9 % infusion (has no administration in time range)   dexAMETHasone PF (DECADRON) injection 10 mg (10 mg Intravenous $Given 10/21/23 0146)   cefTRIAXone (ROCEPHIN) 2 g vial to attach to  ml bag for ADULTS or NS 50 ml bag for PEDS (0 g Intravenous Stopped 10/21/23 0300)   diphenhydrAMINE (BENADRYL) injection 25 mg (25 mg Intravenous $Given 10/21/23 0158)   EPINEPHrine (ADRENALIN) kit 0.3-0.5 mg (0.5 mg Intramuscular $Given 10/21/23 0158)   sodium chloride 0.9% BOLUS 2,016 mL (0 mLs Intravenous Stopped 10/21/23 0511)      Assessments:  0113 I obtained history and performed an exam of the patient as documented above.  0210 I performed a nasopharyngoscopy, procedure noted above.    Independent Interpretation (X-rays, CTs, rhythm strip):  I reviewed the patient's neck soft tissue x-ray. Epiglottitis.     Consultations/Discussion of Management or Tests:  0305 I spoke with Dr. La of the ENT service regarding patient's presentation, findings, and plan of care.   0317 I spoke with the intensivist regarding patient's presentation, findings, and plan of care.   0334 I spoke with Dr. Altamirano of the hospitalist service who accepts the patient for admission.    Social Determinants of Health affecting care:   Alcohol abuse.    Disposition:  The patient was admitted to the hospital under the care of Dr. Altamirano.     Impression & Plan    CMS Diagnoses: The Lactic acid level is elevated due to epiglottitis, at this time there is no sign of severe sepsis or septic shock. and The patient has signs of  Severe Sepsis        If one the following conditions is present, a 30 mL/kg bolus is recommended as part of the 6 hour bundle (IBW can be used for BMI >30, or document refusal/contraindication):      1.   Initial hypotension  defined as 2 bps < 90 or map < 65 in the 6hrs before or 3hrs after time zero.     2.  Lactate >4.      Time severe sepsis diagnosis confirmed: 0210 as this was the time when Lactate resulted, and the level was > 2.0    3 Hour Severe Sepsis Bundle Completion:    1. Initial Lactic Acid Result:   Recent Labs   Lab Test 10/21/23  0511 10/21/23  0210 06/30/22  1030   LACT 4.0* 3.1* 1.2     2. Blood Cultures before Antibiotics: Yes  3. Broad Spectrum Antibiotics Administered:  yes       Anti-infectives (From admission through now)      Start     Dose/Rate Route Frequency Ordered Stop    10/21/23 0155  cefTRIAXone (ROCEPHIN) 2 g vial to attach to  ml bag for ADULTS or NS 50 ml bag for PEDS         2 g  over 30 Minutes Intravenous ONCE 10/21/23 0152 10/21/23 0300            4. Is initial hypotension present?     No (IV fluid bolus NOT required). IV Fluid volume administered: 30 mils per kilogram of normal saline                      Medical Decision Making:  This patient presents to the ED with difficulty breathing.  She has edema of the oropharynx.  X-ray concerning for epiglottitis.  She had nasopharyngoscopy performed which does confirm mild epiglottitis.  She is feeling improved with Decadron and epinephrine.  She does not require intubation.  She has been observed here in the ED for 4-1/2 hours.  I spoke with on-call ENT who will be consulting.  The patient was given ceftriaxone.  Her lactate is elevated and this will continue to be trended.  She would be best observed in the ICU given her significant potential for decompensation although fortunately she is stabilizing at this point.  She has had close frequent repeat evaluations.    Critical Care time was 60 minutes for this patient  excluding procedures.     Diagnosis:    ICD-10-CM    1. Epiglottitis  J05.10       2. Alcoholic intoxication without complication (H24)  F10.920       3. Hyponatremia  E87.1          Scribe Disclosure:  I, Nida Cabello, am serving as a scribe at 1:14 AM on 10/21/2023 to document services personally performed by Colin Frost MD based on my observations and the provider's statements to me.      Colin Frost MD  10/21/23 0571

## 2023-10-21 NOTE — PROVIDER NOTIFICATION
"Vocera message to Dr. Malik    ([id number])Can pt' s PTA medications be started since she is clear liquids now? Her BP is starting to creep up, SBP 150s. Thank you!     ([id number]) MD response:\"I will look into this- I am not in front of a computer but will soon\"  "

## 2023-10-21 NOTE — PROVIDER NOTIFICATION
"Bryce page sent to Dr. Malik    Lactic acid back up to 4.1, please advise, thank you!    Response: \"Do not suspect sepsis, her liver is unable to metabolize lactic acid. Continue current IV fluids without boluses\".  "

## 2023-10-22 PROCEDURE — 250N000013 HC RX MED GY IP 250 OP 250 PS 637: Performed by: INTERNAL MEDICINE

## 2023-10-22 PROCEDURE — 250N000011 HC RX IP 250 OP 636: Mod: JZ | Performed by: INTERNAL MEDICINE

## 2023-10-22 PROCEDURE — 258N000003 HC RX IP 258 OP 636: Performed by: INTERNAL MEDICINE

## 2023-10-22 PROCEDURE — 99232 SBSQ HOSP IP/OBS MODERATE 35: CPT | Performed by: INTERNAL MEDICINE

## 2023-10-22 PROCEDURE — 120N000001 HC R&B MED SURG/OB

## 2023-10-22 RX ORDER — LOSARTAN POTASSIUM 100 MG/1
100 TABLET ORAL DAILY
Status: DISCONTINUED | OUTPATIENT
Start: 2023-10-22 | End: 2023-10-23 | Stop reason: HOSPADM

## 2023-10-22 RX ORDER — DIPHENHYDRAMINE HCL 25 MG
25 CAPSULE ORAL EVERY 6 HOURS PRN
Status: DISCONTINUED | OUTPATIENT
Start: 2023-10-22 | End: 2023-10-23 | Stop reason: HOSPADM

## 2023-10-22 RX ORDER — LIOTHYRONINE SODIUM 5 UG/1
10 TABLET ORAL DAILY
Status: DISCONTINUED | OUTPATIENT
Start: 2023-10-23 | End: 2023-10-23 | Stop reason: HOSPADM

## 2023-10-22 RX ORDER — FAMOTIDINE 20 MG/1
20 TABLET, FILM COATED ORAL
Status: DISCONTINUED | OUTPATIENT
Start: 2023-10-22 | End: 2023-10-23 | Stop reason: HOSPADM

## 2023-10-22 RX ORDER — METOPROLOL SUCCINATE 100 MG/1
100 TABLET, EXTENDED RELEASE ORAL DAILY
Status: DISCONTINUED | OUTPATIENT
Start: 2023-10-22 | End: 2023-10-23 | Stop reason: HOSPADM

## 2023-10-22 RX ORDER — LEVOTHYROXINE SODIUM 75 UG/1
75 TABLET ORAL EVERY MORNING
Status: DISCONTINUED | OUTPATIENT
Start: 2023-10-23 | End: 2023-10-23 | Stop reason: HOSPADM

## 2023-10-22 RX ORDER — IPRATROPIUM BROMIDE 21 UG/1
2 SPRAY, METERED NASAL DAILY
Status: DISCONTINUED | OUTPATIENT
Start: 2023-10-22 | End: 2023-10-23 | Stop reason: HOSPADM

## 2023-10-22 RX ORDER — LORATADINE 10 MG/1
10 TABLET ORAL DAILY
Status: DISCONTINUED | OUTPATIENT
Start: 2023-10-22 | End: 2023-10-23 | Stop reason: HOSPADM

## 2023-10-22 RX ORDER — AMLODIPINE BESYLATE 5 MG/1
5 TABLET ORAL DAILY
Status: DISCONTINUED | OUTPATIENT
Start: 2023-10-22 | End: 2023-10-23 | Stop reason: HOSPADM

## 2023-10-22 RX ORDER — DEXAMETHASONE SODIUM PHOSPHATE 4 MG/ML
4 INJECTION, SOLUTION INTRA-ARTICULAR; INTRALESIONAL; INTRAMUSCULAR; INTRAVENOUS; SOFT TISSUE EVERY 8 HOURS
Qty: 2 ML | Refills: 0 | Status: COMPLETED | OUTPATIENT
Start: 2023-10-22 | End: 2023-10-23

## 2023-10-22 RX ORDER — CLONAZEPAM 0.25 MG/1
0.5 TABLET, ORALLY DISINTEGRATING ORAL AT BEDTIME
Status: DISCONTINUED | OUTPATIENT
Start: 2023-10-22 | End: 2023-10-22

## 2023-10-22 RX ADMIN — LORATADINE 10 MG: 10 TABLET ORAL at 16:54

## 2023-10-22 RX ADMIN — DIPHENHYDRAMINE HYDROCHLORIDE 50 MG: 50 INJECTION, SOLUTION INTRAMUSCULAR; INTRAVENOUS at 08:23

## 2023-10-22 RX ADMIN — FAMOTIDINE 20 MG: 10 INJECTION INTRAVENOUS at 02:18

## 2023-10-22 RX ADMIN — CLONAZEPAM 0.5 MG: 0.25 TABLET, ORALLY DISINTEGRATING ORAL at 21:54

## 2023-10-22 RX ADMIN — DEXAMETHASONE SODIUM PHOSPHATE 4 MG: 4 INJECTION, SOLUTION INTRA-ARTICULAR; INTRALESIONAL; INTRAMUSCULAR; INTRAVENOUS; SOFT TISSUE at 02:18

## 2023-10-22 RX ADMIN — METOPROLOL SUCCINATE 100 MG: 100 TABLET, EXTENDED RELEASE ORAL at 16:53

## 2023-10-22 RX ADMIN — DEXAMETHASONE SODIUM PHOSPHATE 4 MG: 4 INJECTION, SOLUTION INTRA-ARTICULAR; INTRALESIONAL; INTRAMUSCULAR; INTRAVENOUS; SOFT TISSUE at 16:54

## 2023-10-22 RX ADMIN — SODIUM CHLORIDE: 9 INJECTION, SOLUTION INTRAVENOUS at 03:45

## 2023-10-22 RX ADMIN — SODIUM CHLORIDE: 9 INJECTION, SOLUTION INTRAVENOUS at 10:53

## 2023-10-22 RX ADMIN — DIPHENHYDRAMINE HYDROCHLORIDE 50 MG: 50 INJECTION, SOLUTION INTRAMUSCULAR; INTRAVENOUS at 02:19

## 2023-10-22 RX ADMIN — DEXAMETHASONE SODIUM PHOSPHATE 4 MG: 4 INJECTION, SOLUTION INTRA-ARTICULAR; INTRALESIONAL; INTRAMUSCULAR; INTRAVENOUS; SOFT TISSUE at 08:23

## 2023-10-22 RX ADMIN — FAMOTIDINE 20 MG: 20 TABLET, FILM COATED ORAL at 18:05

## 2023-10-22 RX ADMIN — CEFTRIAXONE SODIUM 2 G: 2 INJECTION, POWDER, FOR SOLUTION INTRAMUSCULAR; INTRAVENOUS at 00:30

## 2023-10-22 RX ADMIN — AMLODIPINE BESYLATE 5 MG: 5 TABLET ORAL at 16:54

## 2023-10-22 RX ADMIN — LOSARTAN POTASSIUM 100 MG: 100 TABLET, FILM COATED ORAL at 16:53

## 2023-10-22 ASSESSMENT — ACTIVITIES OF DAILY LIVING (ADL)
ADLS_ACUITY_SCORE: 26
ADLS_ACUITY_SCORE: 26
ADLS_ACUITY_SCORE: 22
ADLS_ACUITY_SCORE: 26
ADLS_ACUITY_SCORE: 22
ADLS_ACUITY_SCORE: 26
ADLS_ACUITY_SCORE: 23

## 2023-10-22 NOTE — CARE PLAN
Summary:  Admitted with amisha,  DATE & TIME: 10/21 [id number]    Cognitive Concerns/ Orientation : alert and oriented x3   BEHAVIOR & AGGRESSION TOOL COLOR: green  CIWA SCORE: na   ABNL VS/O2: vss on RA  MOBILITY: sba-independent  PAIN MANAGMENT: denies  DIET: clears  BOWEL/BLADDER: continent  ABNL LAB/BG: elevated lactic acids,3.7 at 1830-trending slowly down  DRAIN/DEVICES: PIV new L hand. DCd r AC.  TELEMETRY RHYTHM: NSR  SKIN: intact,except for redness on forehead  TESTS/PROCEDURES:    D/C DAY/GOALS/PLACE: pending improvement of eppiglotitis  OTHER IMPORTANT INFO: Pt transferred up from ICU 10/21, voice hoarseness improving. Swallows without difficulty. Klonopin at HS for sleep/angst.

## 2023-10-22 NOTE — PROGRESS NOTES
Transfer in/out    Transfer to 6615 from ICU  Report given to/received from Yeimy DANIELLE    Brief note about patient status upon transfer:Pt alert and oriented. Denies any pain. IV infusing at 150cc/hr right AC. Neuros intact. Pt states she tolerated clear liquids.      Code status: Full Code  Skin: intact, redness to forehead  Fall Risk: No  Isolation: None  Patient belongings: rings,earrings. Cell phone,clothes, Credit cards,cash given to .   If patient is a 72 hour hold/Commitment are belongings removed from room and locked up? No  Medication drips upon transfer: none  Sign and held orders released? Yes

## 2023-10-22 NOTE — PLAN OF CARE
Goal Outcome Evaluation:               DATE & TIME: 10/22 07-19    Cognitive Concerns/ Orientation : alert and oriented x4  BEHAVIOR & AGGRESSION TOOL COLOR: green  CIWA SCORE: na   ABNL VS/O2: vss on RA  MOBILITY: sba-independent  PAIN MANAGMENT: denies  DIET: regular-tolerating without difficulty  BOWEL/BLADDER: continent  ABNL LAB/BG: elevated lactic acids,3.7 at 1830-trending slowly down, no rechecks ordered  DRAIN/DEVICES: PIV  L hand.  TELEMETRY RHYTHM: NSR dc'd  SKIN: intact,except for redness on forehead  TESTS/PROCEDURES:    D/C DAY/GOALS/PLACE: pending improvement of eppiglotitis  OTHER IMPORTANT INFO: Pt transferred up from ICU 10/21, voice hoarseness improving. Swallows without difficulty. Occ cough, clearing throat

## 2023-10-22 NOTE — PROGRESS NOTES
[facility]  Hospitalist Progress Note  Tato Malik MD  10/22/2023    Assessment & Plan   Irina Hanks is a markedly pleasant 63 year old woman with past medical history that is most notable for rheumatoid arthritis on plaquneil, as well as hereditary hemochromatosis, among others; who presents with dyspnea, nausea and vomiting while intoxicated and is found to have acute epiglottitis.       Acute epiglottitis: Of note, Ms. Hanks presents with acute dyspnea, nausea and vomiting while intoxicated. Her voice has become very hoarse tonight.     In the ED, she is afebrile, normotensive, not hypoxic or tachycardic. Flexible laryngoscopy was done in the ED and the uvula was noted to be engorged, the airway seemed patent. X-ray of the neck shows an enlarged epiglottis with enlargement of the aryepiglottic folds. The hypopharyngeal soft tissues appear thickened. No prevertebral soft tissue swelling is noted superior to the hypopharynx. CXR shows no acute pathology. Overall, we suspect acute epiglottitis, possibly inflammatory, allergic, or infectious. The case has been discussed with ENT in the ED.  -- she is clinically improved with decadron, benadryl, pepcid.  -- advance diet  -- discontinue telemetry, pulse oximetry, neuro checks     Acute toxic encephalopathy due to alcohol intoxication: Ethyl alcohol level was 0.35 initially tonight.    -- IV Banana Bag ordered.     -- no signs of withdrawal       Acute lactic metabolic acidosis: suspect due to dehydration and ETOH use. As above we are treating empirically for possible soft tissue infection of the neck. She was given 2 liters IVF in the ED and repeat Lactate is pending.  -- lactate is down trending.     Acute hyponatremia: Mild at 129. Suspect due to dehydration. She was admitted here for severe hyponatremia in 2021 suspected due to diuretic.    -- IVF as above; repeat BMP shows Na improved     History of RA: Resume Plaquenil  "at discharge unless infection prohibits safe resumption     Hereditary hemochromatosis: Seen in the past for this by  Hematology. Cardiac catheterization done by [facility] in 2022 showed no clinically significant CAD.      Hypertension: Resume home Norvasc, Toprol, Cozaar      Hypothyroid: Resume Synthroid     Disposition:   -- anticipate discharge home on 10/23    Interval History   -- no acute overnight issues  -- patient voice is improved, has slight sore throat  -- no sob, no stridor   -- discussed with RN    -Data reviewed today: I reviewed all new labs and imaging over the last 24 hours. I personally reviewed     Physical Exam    , Blood pressure 136/84, pulse 83, temperature 97.8  F (36.6  C), temperature source Oral, resp. rate 16, height 1.626 m (5' 4\"), weight 71.6 kg (157 lb 14.4 oz), SpO2 97%, not currently breastfeeding.  Vitals:    10/21/23 0500 10/21/23 0600 10/22/23 0642   Weight: 69.3 kg (152 lb 12.5 oz) 69.3 kg (152 lb 11.2 oz) 71.6 kg (157 lb 14.4 oz)     Vital Signs with Ranges  Temp:  [97.1  F (36.2  C)-98.7  F (37.1  C)] 97.8  F (36.6  C)  Pulse:  [74-90] 83  Resp:  [16-27] 16  BP: (135-152)/(79-93) 136/84  SpO2:  [96 %-100 %] 97 %  I/O's Last 24 hours  I/O last 3 completed shifts:  In: 3308 [P.O.:720; I.V.:2588]  Out: 300 [Urine:300]    Constitutional: Awake, alert, cooperative, no apparent distress, normal phonation and no stridor  Respiratory:    Cardiovascular:     GI:    Skin/Integumen:    Other:      Medications   All medications were reviewed.     cefTRIAXone  2 g Intravenous Q24H    dexAMETHasone  4 mg Intravenous Q8H    diphenhydrAMINE  50 mg Intravenous Q6H    famotidine  20 mg Intravenous Q12H    sodium chloride 0.9 % 1,000 mL with Infuvite Adult 10 mL, thiamine 100 mg, folic acid 1 mg infusion   Intravenous Once        Data   Recent Labs   Lab 10/21/23  0629 10/21/23  0552 10/21/23  0127   WBC 11.6*  --  8.2   HGB 13.5  --  13.3   MCV 93  --  93     --  228     " --  129*   POTASSIUM 3.7  --  3.6   CHLORIDE 104  --  94*   CO2 17*  --  20*   BUN 9.8  --  10.9   CR 0.63  --  0.70   ANIONGAP 19*  --  15   LUCA 8.5*  --  8.7*   GLC 99 90 102*       No results found for this or any previous visit (from the past 24 hour(s)).    Tato Malik MD  Text Page  (7am to 6pm)

## 2023-10-23 VITALS
OXYGEN SATURATION: 98 % | DIASTOLIC BLOOD PRESSURE: 94 MMHG | SYSTOLIC BLOOD PRESSURE: 146 MMHG | HEART RATE: 74 BPM | BODY MASS INDEX: 26.96 KG/M2 | RESPIRATION RATE: 18 BRPM | TEMPERATURE: 97 F | HEIGHT: 64 IN | WEIGHT: 157.9 LBS

## 2023-10-23 PROCEDURE — 250N000013 HC RX MED GY IP 250 OP 250 PS 637: Performed by: INTERNAL MEDICINE

## 2023-10-23 PROCEDURE — 250N000011 HC RX IP 250 OP 636: Mod: JZ | Performed by: INTERNAL MEDICINE

## 2023-10-23 PROCEDURE — 99238 HOSP IP/OBS DSCHRG MGMT 30/<: CPT | Performed by: INTERNAL MEDICINE

## 2023-10-23 RX ADMIN — LIOTHYRONINE SODIUM 10 MCG: 5 TABLET ORAL at 08:14

## 2023-10-23 RX ADMIN — LOSARTAN POTASSIUM 100 MG: 100 TABLET, FILM COATED ORAL at 08:14

## 2023-10-23 RX ADMIN — IPRATROPIUM BROMIDE 2 SPRAY: 21 SPRAY, METERED NASAL at 08:14

## 2023-10-23 RX ADMIN — FAMOTIDINE 20 MG: 20 TABLET, FILM COATED ORAL at 08:14

## 2023-10-23 RX ADMIN — LEVOTHYROXINE SODIUM 75 MCG: 75 TABLET ORAL at 08:14

## 2023-10-23 RX ADMIN — LORATADINE 10 MG: 10 TABLET ORAL at 08:13

## 2023-10-23 RX ADMIN — METOPROLOL SUCCINATE 100 MG: 100 TABLET, EXTENDED RELEASE ORAL at 08:13

## 2023-10-23 RX ADMIN — AMLODIPINE BESYLATE 5 MG: 5 TABLET ORAL at 08:14

## 2023-10-23 RX ADMIN — DEXAMETHASONE SODIUM PHOSPHATE 4 MG: 4 INJECTION, SOLUTION INTRA-ARTICULAR; INTRALESIONAL; INTRAMUSCULAR; INTRAVENOUS; SOFT TISSUE at 00:18

## 2023-10-23 RX ADMIN — CEFTRIAXONE SODIUM 2 G: 2 INJECTION, POWDER, FOR SOLUTION INTRAMUSCULAR; INTRAVENOUS at 00:18

## 2023-10-23 ASSESSMENT — ACTIVITIES OF DAILY LIVING (ADL)
ADLS_ACUITY_SCORE: 26
ADLS_ACUITY_SCORE: 22
ADLS_ACUITY_SCORE: 26
ADLS_ACUITY_SCORE: 22
ADLS_ACUITY_SCORE: 22
ADLS_ACUITY_SCORE: 26
ADLS_ACUITY_SCORE: 26

## 2023-10-23 NOTE — PLAN OF CARE
Summary:  Admitted with epiglottits,  DATE & TIME: 10/22/23-10/23/23 1164-8134    Cognitive Concerns/ Orientation : alert and oriented x4, calm and cooperative  BEHAVIOR & AGGRESSION TOOL COLOR: green  CIWA SCORE: na   ABNL VS/O2: vss on RA  MOBILITY: Ind  PAIN MANAGMENT: denies  DIET: regular-tolerating without difficulty  BOWEL/BLADDER: continent  ABNL LAB/BG: elevated lactic acids,3.7 at 1830-trending slowly down, no rechecks ordered  DRAIN/DEVICES: PIV  L hand.  TELEMETRY RHYTHM: discontinued  SKIN: intact,except for Rash on forehead  TESTS/PROCEDURES:    D/C DAY/GOALS/PLACE: pending improvement of epiglottitis, possible d/c to home on 10/23-awaiting ENT sign off  OTHER IMPORTANT INFO: Pt transferred up from ICU 10/21, voice back to baseline. Swallows without difficulty.

## 2023-10-23 NOTE — DISCHARGE SUMMARY
"[facility]  Hospitalist Discharge Summary      Date of Admission:  10/21/2023  Date of Discharge:  10/23/2023  Discharging Provider: Tato Malik MD  Discharge Service: Hospitalist Service    Discharge Diagnoses   Acute epiglottitis:       Acute toxic encephalopathy due to alcohol intoxication:      Acute lactic metabolic acidosis:      Acute hyponatremia:      History of RA:      Hereditary hemochromatosis:      Hypertension:      Hypothyroid:        Clinically Significant Risk Factors     # Overweight: Estimated body mass index is 27.1 kg/m  as calculated from the following:    Height as of this encounter: 1.626 m (5' 4\").    Weight as of this encounter: 71.6 kg (157 lb 14.4 oz).       Follow-ups Needed After Discharge   Follow-up Appointments     Follow-up and recommended labs and tests       Follow up with primary care provider, Mercedez Britton, as needed.        {Additional follow-up instructions/to-do's for PCP    Unresulted Labs Ordered in the Past 30 Days of this Admission       Date and Time Order Name Status Description    10/21/2023  1:52 AM Blood Culture Peripheral Blood Preliminary     10/21/2023  1:52 AM Blood Culture Peripheral Blood Preliminary         These results will be followed up by PCP    Discharge Disposition   Discharged to home  Condition at discharge: Stable    Hospital Course   Irina Hanks is a markedly pleasant 63 year old woman with past medical history that is most notable for rheumatoid arthritis on [facility], as well as hereditary hemochromatosis, among others; who presents with dyspnea, nausea and vomiting while intoxicated and is found to have acute epiglottitis.       Acute epiglottitis: Of note, Ms. Hanks presents with acute dyspnea, nausea and vomiting while intoxicated. Her voice has become very hoarse tonight.     In the ED, she is afebrile, normotensive, not hypoxic or tachycardic. Flexible laryngoscopy was done in the ED and the " uvula was noted to be engorged, the airway seemed patent. X-ray of the neck shows an enlarged epiglottis with enlargement of the aryepiglottic folds. The hypopharyngeal soft tissues appear thickened. No prevertebral soft tissue swelling is noted superior to the hypopharynx. CXR shows no acute pathology. Overall, we suspect acute epiglottitis, possibly inflammatory, allergic, or infectious. The case has been discussed with ENT in the ED.  -- she is clinically improved with decadron, benadryl, pepcid.  -- tolerating diet, no pain with swallowing, no dysphonia, no stridor, no dyspnea  -- ok for discharge     Acute toxic encephalopathy due to alcohol intoxication: Ethyl alcohol level was 0.35 initially tonight.    -- IV Banana Bag ordered.     -- no signs of withdrawal       Acute lactic metabolic acidosis: suspect due to dehydration and ETOH use. As above we are treating empirically for possible soft tissue infection of the neck. She was given 2 liters IVF in the ED and repeat Lactate.  -- lactate is down trending but delayed, suspect secondary to decrease in clearance rate  -- she denies any abdominal pain and is tolerating diet     Acute hyponatremia: Mild at 129. Suspect due to dehydration. She was admitted here for severe hyponatremia in 2021 suspected due to diuretic.    -- IVF as above; repeat BMP shows Na improved     History of RA: Resume Plaquenil at discharge       Hereditary hemochromatosis: Seen in the past for this by  Hematology. Cardiac catheterization done by [facility] in 2022 showed no clinically significant CAD.      Hypertension: Resume home Norvasc, Toprol, Cozaar      Hypothyroid: Resume Synthroid     Disposition:   -- anticipate discharge home on 10/23    Consultations This Hospital Stay   ENT IP CONSULT    Code Status   Full Code    Time Spent on this Encounter   I, Tato Malik MD, personally saw the patient today and spent less than or equal to 30 minutes discharging this  patient.       Tato Malik MD  [place] [address] [facility]  [address] [place] [address]  Phone: [phone]  ______________________________________________________________________    Physical Exam   Vital Signs: Temp: 97  F (36.1  C) Temp src: Oral BP: (!) 146/94 (Nurse notified) Pulse: 74   Resp: 18 SpO2: 98 % O2 Device: None (Room air)    Weight: 157 lbs 14.4 oz         Primary Care Physician   Mercedez Britton    Discharge Orders      Reason for your hospital stay    You were admitted with acute epiglottitis     Follow-up and recommended labs and tests     Follow up with primary care provider, Mercedez Britton, as needed.     Activity    Your activity upon discharge: activity as tolerated     Diet    Follow this diet upon discharge: Orders Placed This Encounter      Regular Diet Adult       Significant Results and Procedures   Most Recent 3 CBC's:  Recent Labs   Lab Test 10/21/23  0629 10/21/23  0127 08/04/23  1432   WBC 11.6* 8.2 4.8   HGB 13.5 13.3 13.3   MCV 93 93 94    228 240     Most Recent 3 BMP's:  Recent Labs   Lab Test 10/21/23  0629 10/21/23  0552 10/21/23  0127 08/04/23  1432     --  129* 132*   POTASSIUM 3.7  --  3.6 4.2   CHLORIDE 104  --  94* 97*   CO2 17*  --  20* 22   BUN 9.8  --  10.9 12.9   CR 0.63  --  0.70 0.85   ANIONGAP 19*  --  15 13   LUCA 8.5*  --  8.7* 9.2   GLC 99 90 102* 85     Most Recent 2 LFT's:  Recent Labs   Lab Test 08/04/23  1432 04/19/23  1448   AST 28 31   ALT 11 20   ALKPHOS 94 115*   BILITOTAL 0.4 0.7   ,   Results for orders placed or performed during the hospital encounter of 10/21/23   Chest XR,  PA & LAT    Narrative    EXAM: XR CHEST 2 VIEWS  LOCATION: [facility]  DATE: 10/21/2023    INDICATION: SOB  ? aspiration  COMPARISON: None.      Impression    IMPRESSION: Negative chest.   Neck soft tissue XR    Narrative    EXAM: XR NECK SOFT TISSUE  LOCATION: [facility]  DATE: 10/21/2023    INDICATION: throat pain, swelling  COMPARISON: None.  TECHNIQUE: CR Neck Soft Tissue.      Impression    IMPRESSION:   Epiglottis appears enlarged. Suspect enlargement of the aryepiglottic folds. Hypopharyngeal soft tissues appear thickened. Findings may reflect infectious inflammatory epiglottitis or angioedema.    No prevertebral soft tissue swelling superior to the hypopharynx.    Multilevel spondylosis.   CT Soft Tissue Neck w/o & w Contrast    Narrative    EXAM: CT SOFT TISSUE NECK WITHOUT AND WITH CONTRAST  LOCATION: [facility]  DATE: 10/21/2023    INDICATION: Dyspnea; acute epiglottitis.  COMPARISON: CTA neck 06/08/2022. CT neck 09/22/2020.  CONTRAST: 90 mL Isovue 370.  TECHNIQUE: Routine CT Soft Tissue Neck without and with IV contrast. Multiplanar reformats. Dose reduction techniques were used.    FINDINGS:     MUCOSAL SPACES/SOFT TISSUES: Streak/beam hardening artifact from dental amalgam locally obscures assessment. Where visualized, the tongue and overlying superficial facial soft tissues are otherwise unremarkable.    There is edema within the oropharynx and hypopharynx, epiglottis, and glottic and paraglottic structures, most evident posteriorly and although nonspecific, compatible with infectious/inflammatory change. Airway remains adequately patent. There is mild   AP flattening of the proximal mainstem bronchi and posterior intrathoracic trachea compatible with a component of tracheomalacia. Trachea is otherwise unremarkable. Parapharyngeal and retropharyngeal spaces are normal in appearance. No abnormal masses or   collections. Teeth are unremarkable.    LYMPH NODES: No pathologic lymph nodes by size or morphology criteria.     SALIVARY GLANDS: Normal parotid and submandibular glands.    THYROID: Normal.     VESSELS: Vascular structures of the neck are patent.    VISUALIZED INTRACRANIAL/ORBITS/SINUSES: No abnormality of the visualized  intracranial compartment or orbits. Visualized paranasal sinuses and mastoid air cells are clear.    OTHER: No destructive osseous lesion. The included lung apices are clear.      Impression    IMPRESSION:   1.  There is abnormal edema involving the hypopharynx and glottic/paraglottic structures. Edema is nonspecific but compatible with infectious/inflammatory change and compatible with provided clinical indication of epiglottitis. Airway remains adequately   patent. No abnormal collections are seen to suggest abscess formation.    2.  No cervical lymphadenopathy. No abnormal neck masses or collections.           Discharge Medications   Current Discharge Medication List        CONTINUE these medications which have NOT CHANGED    Details   amLODIPine (NORVASC) 5 MG tablet Take 1 tablet (5 mg) by mouth daily  Qty: 90 tablet, Refills: 1    Associated Diagnoses: Essential hypertension      Biotin 5000 MCG TABS Take 1 tablet by mouth daily       celecoxib (CELEBREX) 200 MG capsule Take 200 mg by mouth      clonazePAM (KLONOPIN) 0.5 MG ODT DISSOLVE 1 TABLET ON THE TONGUE EVERY NIGHT AS NEEDED FOR ANXIETY  Qty: 30 tablet, Refills: 0    Associated Diagnoses: Adjustment disorder with anxious mood      hydroxychloroquine (PLAQUENIL) 200 MG tablet Take 2 tablets (400 mg) by mouth daily  Qty: 90 tablet, Refills: 3    Associated Diagnoses: Rheumatoid arthritis involving multiple sites with positive rheumatoid factor (H)      ipratropium (ATROVENT) 0.03 % nasal spray Spray 2 sprays into both nostrils daily       levothyroxine (SYNTHROID/LEVOTHROID) 75 MCG tablet TAKE 1 TABLET BY MOUTH EVERY MORNING  Qty: 90 tablet, Refills: 1    Associated Diagnoses: Acquired hypothyroidism      liothyronine (CYTOMEL) 5 MCG tablet TAKE 2 TABLETS BY MOUTH EVERY DAY  Qty: 180 tablet, Refills: 2    Associated Diagnoses: Acquired hypothyroidism      loratadine (CLARITIN) 10 MG tablet Take 1 tablet (10 mg) by mouth daily  Qty: 90 tablet, Refills: 3       losartan (COZAAR) 100 MG tablet TAKE 1 TABLET(100 MG) BY MOUTH DAILY  Qty: 90 tablet, Refills: 0    Associated Diagnoses: Essential hypertension      metoprolol succinate ER (TOPROL XL) 100 MG 24 hr tablet TAKE 1 TABLET(100 MG) BY MOUTH DAILY  Qty: 90 tablet, Refills: 0    Associated Diagnoses: Routine general medical examination at a health care facility; Essential hypertension      sodium chloride 1 GM tablet TAKE 1 TABLET(1 GRAM) BY MOUTH DAILY  Qty: 60 tablet, Refills: 1    Associated Diagnoses: Hyponatremia; SIADH (syndrome of inappropriate ADH production) (H24)           Allergies   Allergies   Allergen Reactions    Codeine Nausea and Nausea and Vomiting    Nitrofurantoin Unknown and Other (See Comments)     Other reaction(s): Gastrointestinal      Ace Inhibitors Cough     lisinopril  lisinopril  lisinopril    Elemental Sulfur Unknown    Hydrochlorothiazide Unknown     Severe Hyponatremia less than 120  Severe Hyponatremia less than 120    Sulfa Antibiotics

## 2023-10-23 NOTE — DISCHARGE SUMMARY
Discharge    Patient discharged to home via [facility] with sister.   Care plan note    Listed belongings gathered and given to patient (including from security/pharmacy). Yes  Care Plan and Patient education resolved: Yes  Prescriptions if needed, hard copies sent with patient  Yes  Medication Bin checked and emptied on discharge Yes  SW/care coordinator/charge RN aware of discharge: Yes

## 2023-10-24 ENCOUNTER — PATIENT OUTREACH (OUTPATIENT)
Dept: CARE COORDINATION | Facility: CLINIC | Age: 63
End: 2023-10-24

## 2023-10-24 ENCOUNTER — OFFICE VISIT (OUTPATIENT)
Dept: FAMILY MEDICINE | Facility: CLINIC | Age: 63
End: 2023-10-24
Payer: COMMERCIAL

## 2023-10-24 VITALS
TEMPERATURE: 97.7 F | HEART RATE: 61 BPM | DIASTOLIC BLOOD PRESSURE: 80 MMHG | OXYGEN SATURATION: 96 % | SYSTOLIC BLOOD PRESSURE: 128 MMHG | RESPIRATION RATE: 15 BRPM

## 2023-10-24 DIAGNOSIS — Z79.899 CHRONIC PRESCRIPTION BENZODIAZEPINE USE: ICD-10-CM

## 2023-10-24 DIAGNOSIS — I10 ESSENTIAL HYPERTENSION: ICD-10-CM

## 2023-10-24 DIAGNOSIS — F43.22 ADJUSTMENT DISORDER WITH ANXIOUS MOOD: ICD-10-CM

## 2023-10-24 DIAGNOSIS — R21 RASH DUE TO ALLERGY: ICD-10-CM

## 2023-10-24 DIAGNOSIS — Z79.899 CONTROLLED SUBSTANCE AGREEMENT SIGNED: ICD-10-CM

## 2023-10-24 DIAGNOSIS — F41.9 ANXIETY: ICD-10-CM

## 2023-10-24 DIAGNOSIS — E87.1 HYPONATREMIA: ICD-10-CM

## 2023-10-24 DIAGNOSIS — G93.40 ACUTE ENCEPHALOPATHY: ICD-10-CM

## 2023-10-24 DIAGNOSIS — F10.929 ALCOHOLIC INTOXICATION WITH COMPLICATION (H): Primary | ICD-10-CM

## 2023-10-24 DIAGNOSIS — T78.40XA RASH DUE TO ALLERGY: ICD-10-CM

## 2023-10-24 LAB
AMPHETAMINES UR QL: NOT DETECTED
BARBITURATES UR QL SCN: NOT DETECTED
BENZODIAZ UR QL SCN: NOT DETECTED
BUPRENORPHINE UR QL: NOT DETECTED
CANNABINOIDS UR QL: NOT DETECTED
COCAINE UR QL SCN: NOT DETECTED
CREAT UR-MCNC: 12.1 MG/DL
D-METHAMPHET UR QL: NOT DETECTED
METHADONE UR QL SCN: NOT DETECTED
MICROALBUMIN UR-MCNC: <12 MG/L
MICROALBUMIN/CREAT UR: NORMAL MG/G{CREAT}
OPIATES UR QL SCN: NOT DETECTED
OXYCODONE UR QL SCN: NOT DETECTED
PCP UR QL SCN: NOT DETECTED
TRICYCLICS UR QL SCN: NOT DETECTED

## 2023-10-24 PROCEDURE — 80306 DRUG TEST PRSMV INSTRMNT: CPT | Mod: 59 | Performed by: INTERNAL MEDICINE

## 2023-10-24 PROCEDURE — 99214 OFFICE O/P EST MOD 30 MIN: CPT | Performed by: INTERNAL MEDICINE

## 2023-10-24 PROCEDURE — 82570 ASSAY OF URINE CREATININE: CPT | Performed by: INTERNAL MEDICINE

## 2023-10-24 PROCEDURE — 82043 UR ALBUMIN QUANTITATIVE: CPT | Performed by: INTERNAL MEDICINE

## 2023-10-24 RX ORDER — METHYLPREDNISOLONE 4 MG
TABLET, DOSE PACK ORAL
Qty: 21 TABLET | Refills: 0 | Status: SHIPPED | OUTPATIENT
Start: 2023-10-24 | End: 2023-11-09

## 2023-10-24 ASSESSMENT — ENCOUNTER SYMPTOMS
FEVER: 0
PALPITATIONS: 0
NERVOUS/ANXIOUS: 0
DYSURIA: 0
BREAST MASS: 0
HEARTBURN: 0
EYE PAIN: 0
CONSTIPATION: 0
JOINT SWELLING: 0
HEMATOCHEZIA: 0
PARESTHESIAS: 0
HEMATURIA: 0
SHORTNESS OF BREATH: 0
MYALGIAS: 1
FREQUENCY: 0
CHILLS: 0
DIZZINESS: 0
ARTHRALGIAS: 1
NAUSEA: 0
COUGH: 0
HEADACHES: 0
DIARRHEA: 0
WEAKNESS: 0
SORE THROAT: 1

## 2023-10-24 ASSESSMENT — PAIN SCALES - GENERAL: PAINLEVEL: NO PAIN (0)

## 2023-10-24 ASSESSMENT — ACTIVITIES OF DAILY LIVING (ADL): CURRENT_FUNCTION: NO ASSISTANCE NEEDED

## 2023-10-24 NOTE — LETTER
Opioid / Opioid Plus Controlled Substance Agreement    This is an agreement between you and your provider about the safe and appropriate use of controlled substance/opioids prescribed by your care team. Controlled substances are medicines that can cause physical and mental dependence (abuse).    There are strict laws about having and using these medicines. We here at [facility] are committing to working with you in your efforts to get better. To support you in this work, we ll help you schedule regular office appointments for medicine refills. If we must cancel or change your appointment for any reason, we ll make sure you have enough medicine to last until your next appointment.     As a Provider, I will:  Listen carefully to your concerns and treat you with respect.   Recommend a treatment plan that I believe is in your best interest. This plan may involve therapies other than opioid pain medication.   Talk with you often about the possible benefits, and the risk of harm of any medicine that we prescribe for you.   Provide a plan on how to taper (discontinue or go off) using this medicine if the decision is made to stop its use.    As a Patient, I understand that opioid(s):   Are a controlled substance prescribed by my care team to help me function or work and manage my condition(s).   Are strong medicines and can cause serious side effects such as:  Drowsiness, which can seriously affect my driving ability  A lower breathing rate, enough to cause death  Harm to my thinking ability   Depression   Abuse of and addiction to this medicine  Need to be taken exactly as prescribed. Combining opioids with certain medicines or chemicals (such as illegal drugs, sedatives, sleeping pills, and benzodiazepines) can be dangerous or even fatal. If I stop opioids suddenly, I may have severe withdrawal symptoms.  Do not work for all types of pain nor for all patients. If they re not helpful, I may be asked to stop  them.        The risks, benefits and side effects of these medicine(s) were explained to me. I agree that:  I will take part in other treatments as advised by my care team. This may be psychiatry or counseling, physical therapy, behavioral therapy, group treatment or a referral to a specialist.     I will keep all my appointments. I understand that this is part of the monitoring of opioids. My care team may require an office visit for EVERY opioid/controlled substance refill. If I miss appointments or don t follow instructions, my care team may stop my medicine.    I will take my medicines as prescribed. I will not change the dose or schedule unless my care team tells me to. There will be no refills if I run out early.     I may be asked to come to the clinic and complete a urine drug test or complete a pill count at any time. If I don t give a urine sample or participate in a pill count, the care team may stop my medicine.    I will only receive prescriptions from this clinic for chronic pain. If I am treated by another provider for acute pain issues, I will tell them that I am taking opioid pain medication for chronic pain and that I have a treatment agreement with this provider. I will inform my [facility] care team within one business day if I am given a prescription for any pain medication by another healthcare provider. My [facility] care team can contact other providers and pharmacists about my use of any medicines.    It is up to me to make sure that I don t run out of my medicines on weekends or holidays. If my care team is willing to refill my opioid prescription without a visit, I must request refills only during office hours. Refills may take up to 3 business days to process. I will use one pharmacy to fill all my opioid and other controlled substance prescriptions. I will notify the clinic about any changes to my insurance or medication availability.    I am responsible for my  prescriptions. If the medicine/prescription is lost, stolen or destroyed, it will not be replaced. I also agree not to share controlled substance medicines with anyone.    I am aware I should not use any illegal or recreational drugs. I agree not to drink alcohol unless my care team says I can.       If I enroll in the [place] [facility] program, I will tell my care team prior to my next refill.     I will tell my care team right away if I become pregnant, have a new medical problem treated outside of my regular clinic, or have a change in my medications.    I understand that this medicine can affect my thinking, judgment and reaction time. Alcohol and drugs affect the brain and body, which can affect the safety of my driving. Being under the influence of alcohol or drugs can affect my decision-making, behaviors, personal safety, and the safety of others. Driving while impaired (DWI) can occur if a person is driving, operating, or in physical control of a car, motorcycle, boat, snowmobile, ATV, motorbike, off-road vehicle, or any other motor vehicle ([place] Statute 169A.20). I understand the risk if I choose to drive or operate any vehicle or machinery.    I understand that if I do not follow any of the conditions above, my prescriptions or treatment may be stopped or changed.          Opioids  What You Need to Know    What are opioids?   Opioids are pain medicines that must be prescribed by a doctor. They are also known as narcotics.     Examples are:   morphine (MS Contin, Damaris)  oxycodone (Oxycontin)  oxycodone and acetaminophen (Percocet)  hydrocodone and acetaminophen (Vicodin, Norco)   fentanyl patch (Duragesic)   hydromorphone (Dilaudid)   methadone  codeine (Tylenol #3)     What do opioids do well?   Opioids are best for severe short-term pain such as after a surgery or injury. They may work well for cancer pain. They may help some people with long-lasting (chronic) pain.     What do opioids NOT do  well?   Opioids never get rid of pain entirely, and they don t work well for most patients with chronic pain. Opioids don t reduce swelling, one of the causes of pain.                                    Other ways to manage chronic pain and improve function include:     Treat the health problem that may be causing pain  Anti-inflammation medicines, which reduce swelling and tenderness, such as ibuprofen (Advil, Motrin) or naproxen (Aleve)  Acetaminophen (Tylenol)  Antidepressants and anti-seizure medicines, especially for nerve pain  Topical treatments such as patches or creams  Injections or nerve blocks  Chiropractic or osteopathic treatment  Acupuncture, massage, deep breathing, meditation, visual imagery, aromatherapy  Use heat or ice at the pain site  Physical therapy   Exercise  Stop smoking  Take part in therapy       Risks and side effects     Talk to your doctor before you start or decide to keep taking opioids. Possible side effects include:    Lowering your breathing rate enough to cause death  Overdose, including death, especially if taking higher than prescribed doses  Worse depression symptoms; less pleasure in things you usually enjoy  Feeling tired or sluggish  Slower thoughts or cloudy thinking  Being more sensitive to pain over time; pain is harder to control  Trouble sleeping or restless sleep  Changes in hormone levels (for example, less testosterone)  Changes in sex drive or ability to have sex  Constipation  Unsafe driving  Itching and sweating  Dizziness  Nausea, throwing up and dry mouth    What else should I know about opioids?    Opioids may lead to dependence, tolerance, or addiction.    Dependence means that if you stop or reduce the medicine too quickly, you will have withdrawal symptoms. These include loose poop (diarrhea), jitters, flu-like symptoms, nervousness and tremors. Dependence is not the same as addiction.                     Tolerance means needing higher doses over time to  get the same effect. This may increase the chance of serious side effects.    Addiction is when people improperly use a substance that harms their body, their mind or their relations with others. Use of opiates can cause a relapse of addiction if you have a history of drug or alcohol abuse.    People who have used opioids for a long time may have a lower quality of life, worse depression, higher levels of pain and more visits to doctors.    You can overdose on opioids. Take these steps to lower your risk of overdose:    Recognize the signs:  Signs of overdose include decrease or loss of consciousness (blackout), slowed breathing, trouble waking up and blue lips. If someone is worried about overdose, they should call 911.    Talk to your doctor about Narcan (naloxone).   If you are at risk for overdose, you may be given a prescription for Narcan. This medicine very quickly reverses the effects of opioids.   If you overdose, a friend or family member can give you Narcan while waiting for the ambulance. They need to know the signs of overdose and how to give Narcan.     Don't use alcohol or street drugs.   Taking them with opioids can cause death.    Do not take any of these medicines unless your doctor says it s OK. Taking these with opioids can cause death:  Benzodiazepines, such as lorazepam (Ativan), alprazolam (Xanax) or diazepam (Valium)  Muscle relaxers, such as cyclobenzaprine (Flexeril)  Sleeping pills like zolpidem (Ambien)   Other opioids      How to keep you and other people safe while taking opioids:    Never share your opioids with others.  Opioid medicines are regulated by the Drug Enforcement Agency (GEORGE). Selling or sharing medications is a criminal act.    2. Be sure to store opioids in a secure place, locked up if possible. Young children can easily swallow them and overdose.    3. When you are traveling with your medicines, keep them in the original bottles. If you use a pill box, be sure you also  carry a copy of your medicine list from your clinic or pharmacy.    4. Safe disposal of opioids    Most pharmacies have places to get rid of medicine, called disposal kiosks. Medicine disposal options are also available in every [facility]. Search your county and  medication disposal  to find more options. You can find more details at:  https://www.pca.[facility].[place]./living-green/managing-unwanted-medications     I agree that my provider, clinic care team, and pharmacy may work with any city, state or federal law enforcement agency that investigates the misuse, sale, or other diversion of my controlled medicine. I will allow my provider to discuss my care with, or share a copy of, this agreement with any other treating provider, pharmacy or emergency room where I receive care.    I have read this agreement and have asked questions about anything I did not understand.    _______________________________________________________  Patient Signature - Irina Hanks _____________________                   Date     _______________________________________________________  Provider Signature - Mercedez Britton MD   _____________________                   Date     _______________________________________________________  Witness Signature (required if provider not present while patient signing)   _____________________                   Date

## 2023-10-24 NOTE — COMMUNITY RESOURCES LIST (ENGLISH)
10/24/2023   [facility]  N/A  For questions about this resource list or additional care needs, please contact your primary care clinic or care manager.  Phone: [phone]   Email: N/A   Address: Count includes the [facility] [place] [address]   Hours: N/A        Hotlines and Helplines       Hotline - Housing crisis  1  [facility] Distance: 10.44 miles      Phone/Virtual   [address] [place] [address]  Language: English  Hours: Mon - Sun Open 24 Hours   Phone: (113) 333-9996 Email: info@[employer] Website: http://www.[employer]     2  [facility] (Main Office) - Emergency Services Distance: 10.8 miles      Phone/Virtual   [address] [place] [address]  Language: English  Hours: Mon - Sun Open 24 Hours   Phone: (958) 494-9447 Email: info@[employer] Website: http://[employer]          Housing       Coordinated Entry access point  3  [facility] Distance: 11.63 miles      In-Person, Phone/Virtual   [address] [place] [address]  Language: English, Lithuanian  Hours: Mon - Fri 10:00 AM - 5:30 PM , Mon - Sun 7:30 PM - 10:20 PM , Sat - Sun 1:00 PM - 5:30 PM  Fees: Free   Phone: (659) 237-3676 Email: info@[employer].org Website: https://www.[employer] [facility].org/our-programs/adult-shelter-connect-patel-[facility]/     4  [facility] ([facility] - Housing Services Distance: 11.7 miles      In-Person   [address] [place] [address]  Language: English  Hours: Mon - Fri 9:00 AM - 5:00 PM  Fees: Free   Phone: (636) 443-8117 Email: housing@[facility].org Website: http://www.[facility].org/housing     Drop-in center or day shelter  5  [facility] Distance: 11.79 miles      In-Person   [address] [place] [address]  Language: English  Hours: Mon - Fri 12:00 PM - 3:00 PM  Fees: Free   Phone: (138) 619-3971 Email: alisia@[employer] Website:  http://[facility].org/     6  [facility] Distance: 11.89 miles      In-Person   [address] [place] [address]  Language: English, Hmong, Prydeinig, Turkmen  Hours: Mon - Thu 8:30 AM - 4:30 PM , Sat - Sun 9:00 AM - 12:00 PM  Fees: Free   Phone: (355) 772-8578 Email: info@[employer].org Website: https://[employer].org/     Housing search assistance  7  [employer] Distance: 8.37 miles      In-Person   [address] [facility] [address] [place] [address]  Language: English  Hours: Mon - Fri 8:00 AM - 5:00 PM  Fees: Insurance, Self Pay   Phone: (144) 932-8116 Email: Eva@[employer] Website: http://www.[employer]/     8  [facility] Distance: 8.68 miles      In-Person   [address] [place] [address]  Language: English  Hours: Mon - Fri 8:30 AM - 4:00 PM  Fees: Free   Phone: (299) 581-9589 Email: reception@[facility].[employer] Website: http://www.[facility].org     Shelter for individuals  9  [facility] Distance: 11.8 miles      In-Person   [address] [place] [address]  Language: English  Hours: Mon - Fri 4:00 PM - 9:00 AM  Fees: Free   Phone: (740) 562-2600 Email: yanira@[facility].org Website: https://[facility].org/northern/[facility]/     10  [facility] Distance: 11.91 miles      In-Person   [address] [place] [address]  Language: English  Hours: Mon - Sun Open 24 Hours  Fees: Free   Phone: (661) 379-9048 Email: communications@[facility].org Website: https://[facility]-mn.org/oursaviourshousing/          Important Numbers & Websites       Emergency Services   911  [facility] Services   [facility]  Poison Control   (226) 208-8734  Suicide Prevention Lifeline   (693) 158-6426 (TALK)  Child Abuse Hotline   (440) 145-1321 (4-A-Child)  Sexual Assault Hotline   (128) 264-8242 (HOPE)  National Runaway Safeline   (918) 742-8067 (RUNAWAY)  All-Options Talkline   (056)  744-5487  Substance Abuse Referral   (915) 272-4044 (HELP)

## 2023-10-24 NOTE — PROGRESS NOTES
Assessment and Plan  1. Alcoholic intoxication with complication (H24)  2. Acute encephalopathy  3. Hyponatremia  Recent hospitalization - 10/21 to 10/23 for Acute epiglottitis , Acute toxic encephalopathy due to alcohol intoxication: Acute lactic metabolic acidosis: Acute hyponatremia: Labs at time of discharge showing normalized sodium, leucocytosis.   -Patient is following hematology and has upcoming phlebotomy plans as per hematology.  - Will recheck sodium levels at this time.   - Basic metabolic panel  (Ca, Cl, CO2, Creat, Gluc, K, Na, BUN); Future    4. Chronic prescription benzodiazepine use  5. Anxiety  6. Controlled substance agreement signed 10/24/2023  7. Adjustment disorder with anxious mood  Will need CSA which is  for pt Benzos & also Addiction medicine referral given patient is able hospitalization but patient declined any psychiatry or addiction medicine at this time.  Will do the CSA for continuing her benzodiazepines for anxiety.  - Drug Abuse Screen Panel 13, Urine (Pain Care Map) - lab collect; Future  - Drug Abuse Screen Panel 13, Urine (Pain Care Map) - lab collect    8. Essential hypertension  Borderline elevated blood pressures which patient states that she has forgotten her blood pressure medication when she started from home and has been in a lot of stress going for MRI as planned by her orthopedics for her hip issues, emphasized on checking her BP log and update if no improvement.  Continue current medications.  - Albumin Random Urine Quantitative with Creat Ratio; Future  - Albumin Random Urine Quantitative with Creat Ratio    9. Rash due to allergy  New problem of unspecified triggers causing facial rash as seen on the cell phone picture she has shown me appears as possible allergic/contact dermatitis causing this which she attributes to her recent facial she has done at outside.  We will give her Medrol Dosepak for improvement.  - methylPREDNISolone (MEDROL DOSEPAK) 4 MG tablet  "therapy pack; Follow Package Directions  Dispense: 21 tablet; Refill: 0         Please note that this note consists of symbols derived from keyboarding, dictation and/or voice recognition software. As a result, there may be errors in the script that have gone undetected. Please consider this when interpreting information found in this chart.    Patient Instructions   As discussed, will do pertinent sodium check in your upcoming labs with Hematology . Orders placed.     Done CSA today , will need only Urine exam at lab today.     Sent in Medrol dosepak for your skin rash    =======================              Return in about 2 months (around 12/24/2023), or if symptoms worsen or fail to improve, for Annual Wellness Exam.    Mercedez Britton MD  [facility] Luong is a 63 year old, presenting for the following health issues:  Hospital F/U        10/24/2023     1:25 PM   Additional Questions   Roomed by khang       Healthy Habits:     In general, how would you rate your overall health?  Good    Frequency of exercise:  2-3 days/week    Duration of exercise:  30-45 minutes    Do you usually eat at least 4 servings of fruit and vegetables a day, include whole grains    & fiber and avoid regularly eating high fat or \"junk\" foods?  Yes    Taking medications regularly:  Yes    Medication side effects:  None    Ability to successfully perform activities of daily living:  No assistance needed    Home Safety:  No safety concerns identified    Hearing Impairment:  No hearing concerns    In the past 6 months, have you been bothered by leaking of urine?  No    In general, how would you rate your overall mental or emotional health?  Excellent    Additional concerns today:  Yes         Hospital Follow-up Visit:    Hospital/Nursing Home/IP Rehab Facility: [facility]  Date of Admission: 10/21/23  Date of Discharge: 10/23/23  Reason(s) for Admission: Epiglottitis "     Was your hospitalization related to COVID-19? No   Problems taking medications regularly:  None  Medication changes since discharge: None  Problems adhering to non-medication therapy:  None    Summary of hospitalization:  [facility] hospital discharge summary reviewed  Diagnostic Tests/Treatments reviewed.  Follow up needed: PCP , Hematology , Orthopedics  Other Healthcare Providers Involved in Patient s Care:         Homecare  Update since discharge: improved.         Plan of care communicated with patient              Allergies   Allergen Reactions    Codeine Nausea and Nausea and Vomiting    Nitrofurantoin Unknown and Other (See Comments)     Other reaction(s): Gastrointestinal      Ace Inhibitors Cough     lisinopril  lisinopril  lisinopril    Elemental Sulfur Unknown    Hydrochlorothiazide Unknown     Severe Hyponatremia less than 120  Severe Hyponatremia less than 120    Sulfa Antibiotics         Past Medical History:   Diagnosis Date    ASCUS favor benign 09/2014    3 yr co-test    Hereditary hemochromatosis (H24)     HTN (hypertension)     Impaired renal function     Microscopic colitis     Osteoarthritis of first carpometacarpal joint     bilateral    Other specified acquired hypothyroidism     RA (rheumatoid arthritis) (H)     Seasonal allergic rhinitis     Spider veins     Symptomatic menopausal or female climacteric states     age 43, on HRT    Tricuspid regurgitation     +1-2 TR noted on 12/22/14 Echo       Past Surgical History:   Procedure Laterality Date    ABDOMEN SURGERY  01/30/1995    C section    BIOPSY BREAST      C/SECTION, LOW TRANSVERSE      twins    COLONOSCOPY  2013    nl, next one due in 5 years    CV CORONARY ANGIOGRAM N/A 06/30/2022    Procedure: Coronary Angiogram;  Surgeon: Jose Antonio Ferraro MD;  Location:  HEART CARDIAC CATH LAB    CV LEFT HEART CATH N/A 06/30/2022    Procedure: Left Heart Catheterization;  Surgeon: Jose Antonio Ferraro MD;  Location:  HEART CARDIAC CATH  LAB    CV LEFT VENTRICULOGRAM N/A 2022    Procedure: Left Ventriculogram;  Surgeon: Jose Antonio Ferraro MD;  Location:  HEART CARDIAC CATH LAB    CV RIGHT HEART CATH MEASUREMENTS RECORDED N/A 2022    Procedure: Right Heart Catheterization;  Surgeon: Jose Antonio Ferraro MD;  Location:  HEART CARDIAC CATH LAB    LEEP TX, CERVICAL  1990s    normal since that time    MYRINGOTOMY, INSERT TUBE, COMBINED Left 2015    chronic NORM, ETD    REPLACEMENT TOTAL HIP W/  RESURFACING IMPLANTS Right        Family History   Problem Relation Age of Onset    Cancer - colorectal Father         age 50    Colon Cancer Father     Arthritis Mother     Cancer - colorectal Brother         age 50    Colon Cancer Brother     Hypertension Sister     Hypertension Brother     Colon Cancer Brother     Prostate Cancer Brother     Kidney Cancer Brother     Prostate Cancer Brother     Arthritis Sister     Cancer Sister 53        Uterine    Cancer Sister 50        Uterine    Testicular cancer Nephew     Prostate Cancer Brother     Thyroid Disease No family hx of        Social History     Tobacco Use    Smoking status: Former     Packs/day: 0.26     Years: 10.00     Additional pack years: 0.00     Total pack years: 2.60     Types: Cigarettes     Quit date: 1987     Years since quittin.5    Smokeless tobacco: Former    Tobacco comments:     Was a social smoker   Substance Use Topics    Alcohol use: Yes     Comment: A couple a day        Current Outpatient Medications   Medication    methylPREDNISolone (MEDROL DOSEPAK) 4 MG tablet therapy pack    amLODIPine (NORVASC) 5 MG tablet    Biotin 5000 MCG TABS    celecoxib (CELEBREX) 200 MG capsule    clonazePAM (KLONOPIN) 0.5 MG ODT    hydroxychloroquine (PLAQUENIL) 200 MG tablet    ipratropium (ATROVENT) 0.03 % nasal spray    levothyroxine (SYNTHROID/LEVOTHROID) 75 MCG tablet    liothyronine (CYTOMEL) 5 MCG tablet    loratadine (CLARITIN) 10 MG tablet    losartan (COZAAR) 100 MG tablet     metoprolol succinate ER (TOPROL XL) 100 MG 24 hr tablet    sodium chloride 1 GM tablet     No current facility-administered medications for this visit.        Review of Systems   Constitutional:  Negative for chills and fever.   HENT:  Positive for sore throat. Negative for congestion, ear pain and hearing loss.    Eyes:  Negative for pain and visual disturbance.   Respiratory:  Negative for cough and shortness of breath.    Cardiovascular:  Positive for peripheral edema. Negative for chest pain and palpitations.   Gastrointestinal:  Negative for constipation, diarrhea, heartburn, hematochezia and nausea.   Breasts:  Negative for tenderness, breast mass and discharge.   Genitourinary:  Negative for dysuria, frequency, genital sores, hematuria, pelvic pain, urgency, vaginal bleeding and vaginal discharge.   Musculoskeletal:  Positive for arthralgias and myalgias. Negative for joint swelling.   Skin:  Positive for rash.   Neurological:  Negative for dizziness, weakness, headaches and paresthesias.   Psychiatric/Behavioral:  Negative for mood changes. The patient is not nervous/anxious.       Constitutional, HEENT, cardiovascular, pulmonary, GI, , musculoskeletal, neuro, skin, endocrine and psych systems are negative, except as otherwise noted.      Objective    /80   Pulse 61   Temp 97.7  F (36.5  C) (Tympanic)   Resp 15   SpO2 96%   There is no height or weight on file to calculate BMI.  Physical Exam   GENERAL: healthy, alert and no distress  NECK: no adenopathy, no asymmetry, masses, or scars and thyroid normal to palpation  RESP: lungs clear to auscultation - no rales, rhonchi or wheezes  CV: regular rate and rhythm, normal S1 S2, no S3 or S4, no murmur, click or rub, no peripheral edema and peripheral pulses strong  ABDOMEN: soft, nontender, no hepatosplenomegaly, no masses and bowel sounds normal  MS: no gross musculoskeletal defects noted, no edema

## 2023-10-24 NOTE — PROGRESS NOTES
[facility]    Background: Transitional Care Management program identified per system criteria and reviewed by [facility] team for possible outreach.    Assessment: Upon chart review, [facility] Team member will not proceed with patient outreach related to this episode of Transitional Care Management program due to reason below:    Patient has a follow up appointment with an appropriate provider today for hospital discharge    Plan: Transitional Care Management episode addressed appropriately per reason noted above.      Latia Barron RN  [facility], [facility]    *Connected Care Resource Team does NOT follow patient ongoing. Referrals are identified based on internal discharge reports and the outreach is to ensure patient has an understanding of their discharge instructions.

## 2023-10-24 NOTE — PATIENT INSTRUCTIONS
As discussed, will do pertinent sodium check in your upcoming labs with Hematology . Orders placed.     Done CSA today , will need only Urine exam at lab today.     Sent in Medrol dosepak for your skin rash    =======================

## 2023-10-25 DIAGNOSIS — F43.22 ADJUSTMENT DISORDER WITH ANXIOUS MOOD: ICD-10-CM

## 2023-10-25 RX ORDER — CLONAZEPAM 0.5 MG/1
TABLET, ORALLY DISINTEGRATING ORAL
Qty: 30 TABLET | Refills: 0 | Status: SHIPPED | OUTPATIENT
Start: 2023-10-25 | End: 2023-11-25

## 2023-10-26 ENCOUNTER — ONCOLOGY VISIT (OUTPATIENT)
Dept: ONCOLOGY | Facility: CLINIC | Age: 63
End: 2023-10-26
Attending: INTERNAL MEDICINE
Payer: COMMERCIAL

## 2023-10-26 VITALS
RESPIRATION RATE: 16 BRPM | HEART RATE: 71 BPM | OXYGEN SATURATION: 96 % | SYSTOLIC BLOOD PRESSURE: 129 MMHG | WEIGHT: 152.2 LBS | DIASTOLIC BLOOD PRESSURE: 86 MMHG | BODY MASS INDEX: 26.13 KG/M2 | TEMPERATURE: 97.8 F

## 2023-10-26 DIAGNOSIS — I10 ESSENTIAL HYPERTENSION: ICD-10-CM

## 2023-10-26 DIAGNOSIS — E83.110 HEREDITARY HEMOCHROMATOSIS (H): Primary | ICD-10-CM

## 2023-10-26 LAB
BACTERIA BLD CULT: NO GROWTH
BACTERIA BLD CULT: NO GROWTH

## 2023-10-26 PROCEDURE — 99214 OFFICE O/P EST MOD 30 MIN: CPT | Performed by: INTERNAL MEDICINE

## 2023-10-26 RX ORDER — HEPARIN SODIUM,PORCINE 10 UNIT/ML
5 VIAL (ML) INTRAVENOUS
Status: CANCELLED | OUTPATIENT
Start: 2023-10-26

## 2023-10-26 RX ORDER — LOSARTAN POTASSIUM 100 MG/1
TABLET ORAL
Qty: 90 TABLET | Refills: 3 | Status: SHIPPED | OUTPATIENT
Start: 2023-10-26

## 2023-10-26 RX ORDER — HEPARIN SODIUM (PORCINE) LOCK FLUSH IV SOLN 100 UNIT/ML 100 UNIT/ML
5 SOLUTION INTRAVENOUS
Status: CANCELLED | OUTPATIENT
Start: 2023-10-26

## 2023-10-26 ASSESSMENT — PAIN SCALES - GENERAL: PAINLEVEL: NO PAIN (0)

## 2023-10-26 NOTE — NURSING NOTE
"Oncology Rooming Note    October 26, 2023 11:02 AM   Irina Hanks is a 63 year old female who presents for:    Chief Complaint   Patient presents with    Oncology Clinic Visit     Initial Vitals: /86   Pulse 71   Temp 97.8  F (36.6  C) (Oral)   Resp 16   Wt 69 kg (152 lb 3.2 oz)   SpO2 96%   BMI 26.13 kg/m   Estimated body mass index is 26.13 kg/m  as calculated from the following:    Height as of 10/21/23: 1.626 m (5' 4\").    Weight as of this encounter: 69 kg (152 lb 3.2 oz). Body surface area is 1.77 meters squared.  No Pain (0) Comment: Data Unavailable   No LMP recorded. Patient is postmenopausal.  Allergies reviewed: Yes  Medications reviewed: Yes    Medications: Medication refills not needed today.  Pharmacy name entered into EPIC:    [facility] [place] [address] [address] [id number] [place] [address] [facility] [address]  [id number] - [place], [place] - [address] [facility] [address] [facility] [id number] - [place], [place] - [address] [id number] - [place], [place] - [address]  [address] [place] [facility] - [place], [place] - [address]    Clinical concerns: follow up        Brenda Escobar            "

## 2023-10-26 NOTE — PATIENT INSTRUCTIONS
Phlebotomy in next few days.  Labs every 6 months and phlebotomy as needed.  See me in 1 year with labs.

## 2023-10-26 NOTE — TELEPHONE ENCOUNTER
RX [place] program ([place]) reviewed:  reviewed- no concerns    [place] profile:  [email or web address]    Refill done.  Mercedez Britton MD on 10/25/2023

## 2023-10-26 NOTE — PROGRESS NOTES
HEMATOLOGY HISTORY: Ms. Zhao is a female with hereditary hemochromatosis. Homozygous for H63D mutation.   1.  On 06/15/2016, ferritin of 506.   -  On 06/12/2018,  ALT of 240 and AST of 136.   2.  On 06/15/2018, Iron of 138, ferritin of 1140 and saturation of 66%.   3.  Ultrasound of abdomen and pelvis on 06/18/2018 is normal.   4. On 06/21/2018, HFE gene analysis reveals patient to be homozygous for H63D mutation.   5. CT abdomen and pelvis on 06/25/2018 is normal.  Liver looks normal.   6. Because of elevated LFT, phlebotomy started on 06/21/2018.  7. Echocardiogram done on 06/08/2022 was essentially normal with normal ejection fraction.      SUBJECTIVE:  Ms. Zhao is a 63-year-old female with hereditary hemochromatosis. The patient gets intermittent phlebotomy when ferritin is above 50.     Patient having joint and tendon problem. She is following up with orthopedics.     No headache. No dizziness.  No chest pain.  No shortness of breath.  No abdominal pain.  No nausea or vomiting.  Appetite is good.  No urinary complaints.  No bowel problem.  All other review of system negative.      PHYSICAL EXAMINATION:   GENERAL:  She is alert, oriented x 3.   VITAL SIGNS:  Reviewed.  She is not in distress.   The rest of systems not examined.      LABORATORY DATA: CBC and BMP done on 10/21/2023 reviewed.       ASSESSMENT:   1.  All 63-year-old female with hereditary hemochromatosis.   2.  Joint and tendon problems.     PLAN:  1.  Patient doing well from hereditary hemochromatosis.  She has not had phlebotomy from long time.  Ferritin on 08/04/2023 was 101.  We will recheck iron and ferritin.  Patient advised to have phlebotomy done if ferritin is above 50.    2.  Patient has joint and tendon problems.  It is unrelated to hereditary hemochromatosis.  Explained to the patient that her hemochromatosis is under good control and not causing any problem.    3.  She will have labs done every 6 months and phlebotomy will be  done as needed.  I will see her in 1 year time.  Advised her to call us with any questions or concerns.     Total visit time of 30 minutes.  Time spent in today's visit, review of chart/investigations today and documentation today.

## 2023-10-26 NOTE — LETTER
10/26/2023         RE: Irina Hanks  [address]  [place] [address]        Dear Colleague,    Thank you for referring your patient, Irina Hanks, to the [facility] [place]. Please see a copy of my visit note below.    HEMATOLOGY HISTORY: Ms. Zhao is a female with hereditary hemochromatosis. Homozygous for H63D mutation.   1.  On 06/15/2016, ferritin of 506.   -  On 06/12/2018,  ALT of 240 and AST of 136.   2.  On 06/15/2018, Iron of 138, ferritin of 1140 and saturation of 66%.   3.  Ultrasound of abdomen and pelvis on 06/18/2018 is normal.   4. On 06/21/2018, HFE gene analysis reveals patient to be homozygous for H63D mutation.   5. CT abdomen and pelvis on 06/25/2018 is normal.  Liver looks normal.   6. Because of elevated LFT, phlebotomy started on 06/21/2018.  7. Echocardiogram done on 06/08/2022 was essentially normal with normal ejection fraction.      SUBJECTIVE:  Ms. Zhao is a 63-year-old female with hereditary hemochromatosis. The patient gets intermittent phlebotomy when ferritin is above 50.     Patient having joint and tendon problem. She is following up with orthopedics.     No headache. No dizziness.  No chest pain.  No shortness of breath.  No abdominal pain.  No nausea or vomiting.  Appetite is good.  No urinary complaints.  No bowel problem.  All other review of system negative.      PHYSICAL EXAMINATION:   GENERAL:  She is alert, oriented x 3.   VITAL SIGNS:  Reviewed.  She is not in distress.   The rest of systems not examined.      LABORATORY DATA: CBC and BMP done on 10/21/2023 reviewed.       ASSESSMENT:   1.  All 63-year-old female with hereditary hemochromatosis.   2.  Joint and tendon problems.     PLAN:  1.  Patient doing well from hereditary hemochromatosis.  She has not had phlebotomy from long time.  Ferritin on 08/04/2023 was 101.  We will recheck iron and ferritin.  Patient advised to have phlebotomy done if ferritin is  above 50.    2.  Patient has joint and tendon problems.  It is unrelated to hereditary hemochromatosis.  Explained to the patient that her hemochromatosis is under good control and not causing any problem.    3.  She will have labs done every 6 months and phlebotomy will be done as needed.  I will see her in 1 year time.  Advised her to call us with any questions or concerns.     Total visit time of 30 minutes.  Time spent in today's visit, review of chart/investigations today and documentation today.      Again, thank you for allowing me to participate in the care of your patient.        Sincerely,        Eloisa Rossi MD

## 2023-11-07 ENCOUNTER — LAB (OUTPATIENT)
Dept: LAB | Facility: CLINIC | Age: 63
End: 2023-11-07
Payer: COMMERCIAL

## 2023-11-07 DIAGNOSIS — E83.110 HEREDITARY HEMOCHROMATOSIS (H): ICD-10-CM

## 2023-11-07 DIAGNOSIS — E87.1 HYPONATREMIA: ICD-10-CM

## 2023-11-07 LAB
ANION GAP SERPL CALCULATED.3IONS-SCNC: 13 MMOL/L (ref 7–15)
BUN SERPL-MCNC: 16.8 MG/DL (ref 8–23)
CALCIUM SERPL-MCNC: 9.6 MG/DL (ref 8.8–10.2)
CHLORIDE SERPL-SCNC: 96 MMOL/L (ref 98–107)
CREAT SERPL-MCNC: 0.75 MG/DL (ref 0.51–0.95)
DEPRECATED HCO3 PLAS-SCNC: 23 MMOL/L (ref 22–29)
EGFRCR SERPLBLD CKD-EPI 2021: 88 ML/MIN/1.73M2
GLUCOSE SERPL-MCNC: 84 MG/DL (ref 70–99)
POTASSIUM SERPL-SCNC: 5.3 MMOL/L (ref 3.4–5.3)
SODIUM SERPL-SCNC: 132 MMOL/L (ref 135–145)

## 2023-11-07 PROCEDURE — 80048 BASIC METABOLIC PNL TOTAL CA: CPT

## 2023-11-07 PROCEDURE — 36415 COLL VENOUS BLD VENIPUNCTURE: CPT

## 2023-11-08 ENCOUNTER — TELEPHONE (OUTPATIENT)
Dept: FAMILY MEDICINE | Facility: CLINIC | Age: 63
End: 2023-11-08

## 2023-11-08 ENCOUNTER — LAB (OUTPATIENT)
Dept: LAB | Facility: CLINIC | Age: 63
End: 2023-11-08
Payer: COMMERCIAL

## 2023-11-08 DIAGNOSIS — E83.19 IRON OVERLOAD: ICD-10-CM

## 2023-11-08 DIAGNOSIS — E83.19 IRON OVERLOAD: Primary | ICD-10-CM

## 2023-11-08 LAB
FERRITIN SERPL-MCNC: 120 NG/ML (ref 11–328)
HCT VFR BLD AUTO: 36.2 % (ref 35–47)
HGB BLD-MCNC: 12.4 G/DL (ref 11.7–15.7)

## 2023-11-08 PROCEDURE — 85018 HEMOGLOBIN: CPT

## 2023-11-08 PROCEDURE — 82728 ASSAY OF FERRITIN: CPT

## 2023-11-08 PROCEDURE — 85014 HEMATOCRIT: CPT

## 2023-11-08 PROCEDURE — 36415 COLL VENOUS BLD VENIPUNCTURE: CPT

## 2023-11-08 NOTE — TELEPHONE ENCOUNTER
Pt called the clinic asking for a lab appt today to have her ferritin levels drawn.     Per chart review    Mercedez Britton MD  11/8/2023 12:42 AM CST Back to Top      Your Sodium levels are showing mildly low as seen in the past . Please take salt tablets as prescribed.     Your Ferritin levels have not been done by lab since the specimen collected was not satisfactory as per lab. Please follow up with hematology for further recommendations on this.     Informed pt to follow up with hematology. Pt asked, if the orders are in the computer, can she just have her labs drawn. Informed pt writer is not sure what the issue was yesterday with the specimen and a lab appt could be scheduled but she needs to follow up with hematology for recommendations on the labs. Pt stated she would reach out to hematology about the labs.

## 2023-11-09 ENCOUNTER — INFUSION THERAPY VISIT (OUTPATIENT)
Dept: INFUSION THERAPY | Facility: CLINIC | Age: 63
End: 2023-11-09
Attending: INTERNAL MEDICINE
Payer: COMMERCIAL

## 2023-11-09 VITALS
SYSTOLIC BLOOD PRESSURE: 109 MMHG | OXYGEN SATURATION: 97 % | HEART RATE: 66 BPM | TEMPERATURE: 97.5 F | RESPIRATION RATE: 18 BRPM | DIASTOLIC BLOOD PRESSURE: 69 MMHG

## 2023-11-09 DIAGNOSIS — E83.19 IRON OVERLOAD: Primary | ICD-10-CM

## 2023-11-09 DIAGNOSIS — E83.110 HEREDITARY HEMOCHROMATOSIS (H): ICD-10-CM

## 2023-11-09 PROCEDURE — 99195 PHLEBOTOMY: CPT

## 2023-11-09 RX ORDER — HEPARIN SODIUM,PORCINE 10 UNIT/ML
5 VIAL (ML) INTRAVENOUS
OUTPATIENT
Start: 2023-11-09

## 2023-11-09 RX ORDER — HEPARIN SODIUM (PORCINE) LOCK FLUSH IV SOLN 100 UNIT/ML 100 UNIT/ML
5 SOLUTION INTRAVENOUS
OUTPATIENT
Start: 2023-11-09

## 2023-11-09 NOTE — PROGRESS NOTES
Infusion Nursing Note:  Irina Hanks presents today for phlebotomy.    Patient seen by provider today: No   present during visit today: Not Applicable.    Note: N/A.      Intravenous Access:  Phlebotomy site LAC, Needle type BD, Gauge 18.    Treatment Conditions:  Lab Results   Component Value Date    HGB 12.4 11/08/2023    WBC 11.6 (H) 10/21/2023    ANEU 6.4 08/13/2021    ANEUTAUTO 10.3 (H) 10/21/2023     10/21/2023     Ferritin 120     Results reviewed, labs MET treatment parameters, ok to proceed with treatment.      Post Infusion Assessment:  Patient tolerated phlebotomy without incident.  Patient observed for 15 minutes post phlebotomy per protocol.  Site patent and intact, free from redness, edema or discomfort.  No evidence of extravasations.  Access discontinued per protocol.       Discharge Plan:   AVS to patient via MYCHART.  Patient will return prn for next appointment.   Patient discharged in stable condition accompanied by: self.  Departure Mode: Ambulatory with cane.      Rory Rivera RN

## 2023-11-21 DIAGNOSIS — Z00.00 ROUTINE GENERAL MEDICAL EXAMINATION AT A HEALTH CARE FACILITY: ICD-10-CM

## 2023-11-21 DIAGNOSIS — I10 ESSENTIAL HYPERTENSION: ICD-10-CM

## 2023-11-22 RX ORDER — METOPROLOL SUCCINATE 100 MG/1
TABLET, EXTENDED RELEASE ORAL
Qty: 90 TABLET | Refills: 2 | Status: SHIPPED | OUTPATIENT
Start: 2023-11-22 | End: 2024-05-23

## 2023-11-22 NOTE — TELEPHONE ENCOUNTER
Prescription approved per [facility] Refill Protocol.  Griselda Cagle, RN  [facility] Triage Nurse

## 2023-11-25 DIAGNOSIS — F43.22 ADJUSTMENT DISORDER WITH ANXIOUS MOOD: ICD-10-CM

## 2023-11-25 RX ORDER — CLONAZEPAM 0.5 MG/1
TABLET ORAL
Qty: 30 TABLET | Refills: 0 | Status: SHIPPED | OUTPATIENT
Start: 2023-11-25 | End: 2023-12-21

## 2023-11-25 NOTE — TELEPHONE ENCOUNTER
RX monitoring program ([place]MP) reviewed:  reviewed- no concerns    [place]MP profile:  [email or web address]    Refill done.  Mercedez Britton MD on 11/25/2023 at 5:41 PM

## 2023-12-07 ENCOUNTER — OFFICE VISIT (OUTPATIENT)
Dept: FAMILY MEDICINE | Facility: CLINIC | Age: 63
End: 2023-12-07
Attending: INTERNAL MEDICINE
Payer: COMMERCIAL

## 2023-12-07 VITALS
RESPIRATION RATE: 16 BRPM | HEIGHT: 63 IN | HEART RATE: 65 BPM | TEMPERATURE: 97.2 F | BODY MASS INDEX: 26.51 KG/M2 | DIASTOLIC BLOOD PRESSURE: 70 MMHG | OXYGEN SATURATION: 96 % | WEIGHT: 149.6 LBS | SYSTOLIC BLOOD PRESSURE: 112 MMHG

## 2023-12-07 DIAGNOSIS — F41.9 ANXIETY: ICD-10-CM

## 2023-12-07 DIAGNOSIS — E83.19 IRON OVERLOAD: ICD-10-CM

## 2023-12-07 DIAGNOSIS — Z79.899 CHRONIC PRESCRIPTION BENZODIAZEPINE USE: ICD-10-CM

## 2023-12-07 DIAGNOSIS — R79.89 ELEVATED LFTS: ICD-10-CM

## 2023-12-07 DIAGNOSIS — E87.1 HYPONATREMIA: ICD-10-CM

## 2023-12-07 DIAGNOSIS — F10.20 UNCOMPLICATED ALCOHOL DEPENDENCE (H): ICD-10-CM

## 2023-12-07 DIAGNOSIS — E55.9 VITAMIN D DEFICIENCY: ICD-10-CM

## 2023-12-07 DIAGNOSIS — Z00.00 ENCOUNTER FOR MEDICARE ANNUAL WELLNESS EXAM: ICD-10-CM

## 2023-12-07 DIAGNOSIS — E83.110 HEREDITARY HEMOCHROMATOSIS (H): ICD-10-CM

## 2023-12-07 DIAGNOSIS — Z00.00 ROUTINE GENERAL MEDICAL EXAMINATION AT A HEALTH CARE FACILITY: Primary | ICD-10-CM

## 2023-12-07 DIAGNOSIS — M16.11 PRIMARY OSTEOARTHRITIS OF RIGHT HIP: ICD-10-CM

## 2023-12-07 DIAGNOSIS — M25.552 CHRONIC HIP PAIN, LEFT: ICD-10-CM

## 2023-12-07 DIAGNOSIS — G89.29 CHRONIC HIP PAIN, LEFT: ICD-10-CM

## 2023-12-07 DIAGNOSIS — E03.9 ACQUIRED HYPOTHYROIDISM: ICD-10-CM

## 2023-12-07 DIAGNOSIS — Z13.220 ENCOUNTER FOR SCREENING FOR LIPID DISORDER: ICD-10-CM

## 2023-12-07 DIAGNOSIS — M06.9 RHEUMATOID ARTHRITIS INVOLVING MULTIPLE SITES, UNSPECIFIED WHETHER RHEUMATOID FACTOR PRESENT (H): ICD-10-CM

## 2023-12-07 DIAGNOSIS — M16.0 OSTEOARTHRITIS OF BOTH HIPS, UNSPECIFIED OSTEOARTHRITIS TYPE: ICD-10-CM

## 2023-12-07 DIAGNOSIS — Z23 NEED FOR VACCINATION: ICD-10-CM

## 2023-12-07 PROBLEM — K64.9 HEMORRHOIDS: Status: ACTIVE | Noted: 2023-12-07

## 2023-12-07 LAB
ALBUMIN SERPL BCG-MCNC: 4.5 G/DL (ref 3.5–5.2)
ALP SERPL-CCNC: 84 U/L (ref 40–150)
ALT SERPL W P-5'-P-CCNC: 12 U/L (ref 0–50)
ANION GAP SERPL CALCULATED.3IONS-SCNC: 13 MMOL/L (ref 7–15)
AST SERPL W P-5'-P-CCNC: 22 U/L (ref 0–45)
BILIRUB SERPL-MCNC: 0.7 MG/DL
BUN SERPL-MCNC: 9.7 MG/DL (ref 8–23)
CALCIUM SERPL-MCNC: 9.8 MG/DL (ref 8.8–10.2)
CHLORIDE SERPL-SCNC: 100 MMOL/L (ref 98–107)
CHOLEST SERPL-MCNC: 197 MG/DL
CREAT SERPL-MCNC: 0.83 MG/DL (ref 0.51–0.95)
DEPRECATED HCO3 PLAS-SCNC: 23 MMOL/L (ref 22–29)
EGFRCR SERPLBLD CKD-EPI 2021: 78 ML/MIN/1.73M2
FASTING STATUS PATIENT QL REPORTED: YES
GLUCOSE SERPL-MCNC: 94 MG/DL (ref 70–99)
HDLC SERPL-MCNC: 78 MG/DL
LDLC SERPL CALC-MCNC: 85 MG/DL
NONHDLC SERPL-MCNC: 119 MG/DL
POTASSIUM SERPL-SCNC: 4.4 MMOL/L (ref 3.4–5.3)
PROT SERPL-MCNC: 7 G/DL (ref 6.4–8.3)
SODIUM SERPL-SCNC: 136 MMOL/L (ref 135–145)
T3FREE SERPL-MCNC: 4.2 PG/ML (ref 2–4.4)
TRIGL SERPL-MCNC: 170 MG/DL
TSH SERPL DL<=0.005 MIU/L-ACNC: 0.93 UIU/ML (ref 0.3–4.2)
VIT D+METAB SERPL-MCNC: 34 NG/ML (ref 20–50)

## 2023-12-07 PROCEDURE — 90678 RSV VACC PREF BIVALENT IM: CPT | Performed by: INTERNAL MEDICINE

## 2023-12-07 PROCEDURE — 80053 COMPREHEN METABOLIC PANEL: CPT | Performed by: INTERNAL MEDICINE

## 2023-12-07 PROCEDURE — 84481 FREE ASSAY (FT-3): CPT | Performed by: INTERNAL MEDICINE

## 2023-12-07 PROCEDURE — 36415 COLL VENOUS BLD VENIPUNCTURE: CPT | Performed by: INTERNAL MEDICINE

## 2023-12-07 PROCEDURE — 99214 OFFICE O/P EST MOD 30 MIN: CPT | Mod: 25 | Performed by: INTERNAL MEDICINE

## 2023-12-07 PROCEDURE — 84443 ASSAY THYROID STIM HORMONE: CPT | Performed by: INTERNAL MEDICINE

## 2023-12-07 PROCEDURE — 90471 IMMUNIZATION ADMIN: CPT | Performed by: INTERNAL MEDICINE

## 2023-12-07 PROCEDURE — 80061 LIPID PANEL: CPT | Performed by: INTERNAL MEDICINE

## 2023-12-07 PROCEDURE — 90472 IMMUNIZATION ADMIN EACH ADD: CPT | Performed by: INTERNAL MEDICINE

## 2023-12-07 PROCEDURE — 99397 PER PM REEVAL EST PAT 65+ YR: CPT | Mod: 25 | Performed by: INTERNAL MEDICINE

## 2023-12-07 PROCEDURE — 82306 VITAMIN D 25 HYDROXY: CPT | Performed by: INTERNAL MEDICINE

## 2023-12-07 PROCEDURE — 90662 IIV NO PRSV INCREASED AG IM: CPT | Performed by: INTERNAL MEDICINE

## 2023-12-07 RX ORDER — GABAPENTIN 100 MG/1
100 CAPSULE ORAL 3 TIMES DAILY
Qty: 90 CAPSULE | Refills: 1 | Status: SHIPPED | OUTPATIENT
Start: 2023-12-07 | End: 2023-12-28

## 2023-12-07 ASSESSMENT — ENCOUNTER SYMPTOMS
WEAKNESS: 0
BREAST MASS: 0
DIARRHEA: 0
NERVOUS/ANXIOUS: 0
SHORTNESS OF BREATH: 0
PARESTHESIAS: 0
CHILLS: 0
SORE THROAT: 0
CONSTIPATION: 0
MYALGIAS: 0
FEVER: 0
PALPITATIONS: 0
COUGH: 0
DIZZINESS: 0
EYE PAIN: 0
HEMATURIA: 0
ABDOMINAL PAIN: 0
ARTHRALGIAS: 1
FREQUENCY: 0
HEARTBURN: 0
HEMATOCHEZIA: 0
JOINT SWELLING: 0
HEADACHES: 0
DYSURIA: 0
NAUSEA: 0

## 2023-12-07 ASSESSMENT — PAIN SCALES - GENERAL: PAINLEVEL: NO PAIN (0)

## 2023-12-07 ASSESSMENT — ACTIVITIES OF DAILY LIVING (ADL): CURRENT_FUNCTION: NO ASSISTANCE NEEDED

## 2023-12-07 NOTE — PATIENT INSTRUCTIONS
As discussed , please do fasting labs due at this time.    Will start on Gabapentin for your DJD of hip causing uncontrolled pain.   Follow up with Orthopedics , Rheumatology and Hematology as planned by them.     =======================================    Patient Education   Personalized Prevention Plan  You are due for the preventive services outlined below.  Your care team is available to assist you in scheduling these services.  If you have already completed any of these items, please share that information with your care team to update in your medical record.  Health Maintenance Due   Topic Date Due    Pneumococcal Vaccine (1 - PCV) Never done    RSV VACCINE (Pregnancy & 60+) (1 - 1-dose 60+ series) Never done    Flu Vaccine (1) 09/01/2023    COVID-19 Vaccine (6 - 2023-24 season) 09/01/2023    Annual Wellness Visit  10/19/2023     Learning About Dietary Guidelines  What are the Dietary Guidelines for Americans?     Dietary Guidelines for Americans provide tips for eating well and staying healthy. This helps reduce the risk for long-term (chronic) diseases.  These guidelines recommend that you:  Eat and drink the right amount for you. The U.S. government's food guide is called MyPlate. It can help you make your own well-balanced eating plan.  Try to balance your eating with your activity. This helps you stay at a healthy weight.  Drink alcohol in moderation, if at all.  Limit foods high in salt, saturated fat, trans fat, and added sugar.  These guidelines are from the U.S. Department of Agriculture and the U.S. Department of Health and Human Services. They are updated every 5 years.  What is MyPlate?  MyPlate is the U.S. government's food guide. It can help you make your own well-balanced eating plan. A balanced eating plan means that you eat enough, but not too much, and that your food gives you the nutrients you need to stay healthy.  MyPlate focuses on eating plenty of whole grains, fruits, and vegetables,  "and on limiting fat and sugar. It is available online at www.ChooseMyPlate.gov.  How can you get started?  If you're trying to eat healthier, you can slowly change your eating habits over time. You don't have to make big changes all at once. Start by adding one or two healthy foods to your meals each day.  Grains  Choose whole-grain breads and cereals and whole-wheat pasta and whole-grain crackers.  Vegetables  Eat a variety of vegetables every day. They have lots of nutrients and are part of a heart-healthy diet.  Fruits  Eat a variety of fruits every day. Fruits contain lots of nutrients. Choose fresh fruit instead of fruit juice.  Protein foods  Choose fish and lean poultry more often. Eat red meat and fried meats less often. Dried beans, tofu, and nuts are also good sources of protein.  Dairy  Choose low-fat or fat-free products from this food group. If you have problems digesting milk, try eating cheese or yogurt instead.  Fats and oils  Limit fats and oils if you're trying to cut calories. Choose healthy fats when you cook. These include canola oil and olive oil.  Where can you learn more?  Go to https://www.healthdotCloud.net/patiented  Enter D676 in the search box to learn more about \"Learning About Dietary Guidelines.\"  Current as of: February 28, 2023               Content Version: 13.8    9151-3674 [employer].   Care instructions adapted under license by your healthcare professional. If you have questions about a medical condition or this instruction, always ask your healthcare professional. [employer] disclaims any warranty or liability for your use of this information.         "

## 2023-12-07 NOTE — PROGRESS NOTES
"SUBJECTIVE:   Cherise is a 63 year old, presenting for the following:  Physical (Fasting.) and Consult (Had hip surgery in April, still is in discomfort. )        12/7/2023    10:23 AM   Additional Questions   Roomed by Meredith ANDERSON       Are you in the first 12 months of your Medicare coverage?  Not on medicare yet still has private insurance.     Healthy Habits:     In general, how would you rate your overall health?  Good    Frequency of exercise:  2-3 days/week    Duration of exercise:  30-45 minutes    Do you usually eat at least 4 servings of fruit and vegetables a day, include whole grains    & fiber and avoid regularly eating high fat or \"junk\" foods?  No    Taking medications regularly:  Yes    Medication side effects:  None    Ability to successfully perform activities of daily living:  No assistance needed    Home Safety:  No safety concerns identified    Hearing Impairment:  No hearing concerns    In the past 6 months, have you been bothered by leaking of urine?  No    In general, how would you rate your overall mental or emotional health?  Excellent    Additional concerns today:  Yes    Have you ever done Advance Care Planning? (For example, a Health Directive, POLST, or a discussion with a medical provider or your loved ones about your wishes): No, advance care planning information given to patient to review.  Patient plans to discuss their wishes with loved ones or provider.      Hearing Acuity: Able to converse without any difficulty    Fall risk  Fallen 2 or more times in the past year?: No  Any fall with injury in the past year?: No  click delete button to remove this line now  Cognitive Screening   1) Repeat 3 items (Leader, Season, Table)    2) Clock draw: NORMAL  3) 3 item recall: Recalls 3 objects  Results: 3 items recalled: COGNITIVE IMPAIRMENT LESS LIKELY    Mini-CogTM Copyright S Jhoana. Licensed by the author for use in [facility]; reprinted with permission (kiran@.[email or web address]). All rights " reserved.      Do you have sleep apnea, excessive snoring or daytime drowsiness? : no    Reviewed and updated as needed this visit by clinical staff   Tobacco  Allergies  Meds  Problems  Med Hx  Surg Hx  Fam Hx          Reviewed and updated as needed this visit by Provider   Tobacco  Allergies  Meds  Problems  Med Hx  Surg Hx  Fam Hx         Social History     Tobacco Use    Smoking status: Former     Packs/day: 0.26     Years: 10.00     Additional pack years: 0.00     Total pack years: 2.60     Types: Cigarettes     Quit date: 1987     Years since quittin.6    Smokeless tobacco: Former    Tobacco comments:     Was a social smoker   Substance Use Topics    Alcohol use: Yes     Comment: A couple a day             2023    10:23 AM   Alcohol Use   Prescreen: >3 drinks/day or >7 drinks/week? No          No data to display              Do you have a current opioid prescription? No  Do you use any other controlled substances or medications that are not prescribed by a provider? None      Current providers sharing in care for this patient include:   Patient Care Team:  Mercedez Britton MD as PCP - General (Internal Medicine)  Odessa Ga RD as Registered Dietitian (Dietitian, Registered)  Mercedez Britton MD as Assigned PCP  Eloisa Rossi MD as Assigned Cancer Care Provider  Marcus Hassan MD as Assigned Pulmonology Provider  Parminder Acevedo MD as Assigned Heart and Vascular Provider  Irish Cristina PALeeannaC as Physician Assistant    The following health maintenance items are reviewed in Epic and correct as of today:  Health Maintenance   Topic Date Due    Pneumococcal Vaccine: 65+ Years (1 - PCV) Never done    COVID-19 Vaccine (2023-24 season) 2023    MEDICARE ANNUAL WELLNESS VISIT  10/19/2023    ANNUAL REVIEW OF HM ORDERS  2024    TSH W/FREE T4 REFLEX  2024    MICROALBUMIN  10/24/2024    BMP  2024    FALL RISK ASSESSMENT  2024    MAMMO  SCREENING  02/06/2025    LIPID  10/19/2027    COLORECTAL CANCER SCREENING  08/09/2028    ADVANCE CARE PLANNING  12/07/2028    DTAP/TDAP/TD IMMUNIZATION (4 - Td or Tdap) 06/15/2031    DEXA  02/06/2038    HEPATITIS C SCREENING  Completed    PHQ-2 (once per calendar year)  Completed    INFLUENZA VACCINE  Completed    ZOSTER IMMUNIZATION  Completed    RSV VACCINE (Pregnancy & 60+)  Completed    IPV IMMUNIZATION  Aged Out    HPV IMMUNIZATION  Aged Out    MENINGITIS IMMUNIZATION  Aged Out    RSV MONOCLONAL ANTIBODY  Aged Out    HIV SCREENING  Discontinued    PAP  Discontinued     Lab work is in process  Labs reviewed in [facility]  Mammogram Screening: Mammogram Screening: Recommended mammography every 1-2 years with patient discussion and risk factor consideration    FHS-7:       1/20/2022     1:24 PM 2/6/2023    11:07 AM   Breast CA Risk Assessment (FHS-7)   Did any of your first-degree relatives have breast or ovarian cancer? No No   Did any of your relatives have bilateral breast cancer? No No   Did any man in your family have breast cancer? No No   Did any woman in your family have breast and ovarian cancer? No No   Did any woman in your family have breast cancer before age 50 y? No No   Do you have 2 or more relatives with breast and/or ovarian cancer? No No   Do you have 2 or more relatives with breast and/or bowel cancer? No No     click delete button to remove this line now  Mammogram Screening: Recommended mammography every 1-2 years with patient discussion and risk factor consideration  Pertinent mammograms are reviewed under the imaging tab.    Review of Systems   Constitutional:  Negative for chills and fever.   HENT:  Negative for congestion, ear pain, hearing loss and sore throat.    Eyes:  Negative for pain and visual disturbance.   Respiratory:  Negative for cough and shortness of breath.    Cardiovascular:  Positive for peripheral edema. Negative for chest pain and palpitations.   Gastrointestinal:  Negative  "for abdominal pain, constipation, diarrhea, heartburn, hematochezia and nausea.   Breasts:  Negative for tenderness, breast mass and discharge.   Genitourinary:  Negative for dysuria, frequency, genital sores, hematuria, pelvic pain, urgency, vaginal bleeding and vaginal discharge.   Musculoskeletal:  Positive for arthralgias. Negative for joint swelling and myalgias.   Skin:  Negative for rash.   Neurological:  Negative for dizziness, weakness, headaches and paresthesias.   Psychiatric/Behavioral:  Negative for mood changes. The patient is not nervous/anxious.      Constitutional, HEENT, cardiovascular, pulmonary, GI, , musculoskeletal, neuro, skin, endocrine and psych systems are negative, except as otherwise noted.    OBJECTIVE:   /70   Pulse 65   Temp 97.2  F (36.2  C) (Temporal)   Resp 16   Ht 1.604 m (5' 3.15\")   Wt 67.9 kg (149 lb 9.6 oz)   SpO2 96%   BMI 26.37 kg/m   Estimated body mass index is 26.37 kg/m  as calculated from the following:    Height as of this encounter: 1.604 m (5' 3.15\").    Weight as of this encounter: 67.9 kg (149 lb 9.6 oz).  Physical Exam  GENERAL APPEARANCE: healthy, alert and no distress  EYES: Eyes grossly normal to inspection, PERRL and conjunctivae and sclerae normal  HENT: ear canals and TM's normal, nose and mouth without ulcers or lesions, oropharynx clear and oral mucous membranes moist  NECK: no adenopathy, no asymmetry, masses, or scars and thyroid normal to palpation  RESP: lungs clear to auscultation - no rales, rhonchi or wheezes  BREAST: normal without masses, tenderness or nipple discharge and no palpable axillary masses or adenopathy  CV: regular rate and rhythm, normal S1 S2, no S3 or S4, no murmur, click or rub, no peripheral edema and peripheral pulses strong  ABDOMEN: soft, nontender, no hepatosplenomegaly, no masses and bowel sounds normal  MS: no musculoskeletal defects are noted and gait is age appropriate without ataxia. POSITIVE for Left hip " pain only on ambulation, no erythema or swelling on the joint, no tenderness on palpation.   SKIN: no suspicious lesions or rashes  NEURO: Normal strength and tone, sensory exam grossly normal, mentation intact and speech normal  PSYCH: mentation appears normal and affect normal/bright    Diagnostic Test Results:  Labs reviewed in Epic    ASSESSMENT / PLAN:       Assessment and Plan  1. Routine general medical examination at a health care facility  2. Encounter for Medicare annual wellness exam  Last seen pt on 10/2023 for Alcoholic intoxication hospital follow up . Pt is here for AWV .   - Follows Hematology for heriditary hemochromatosis and therapeutic phlebotomies as managed by them. Follows Rheumatology on Plaquinil for RA.   - On LT and Cytomel as managed by PCP and also Klonipin for anxiety.   - RSV VACCINE (ABRYSVO)  - INFLUENZA VACCINE 65+ (FLUZONE HD)  - gabapentin (NEURONTIN) 100 MG capsule; Take 1 capsule (100 mg) by mouth 3 times daily  Dispense: 90 capsule; Refill: 1  - Comprehensive metabolic panel (BMP + Alb, Alk Phos, ALT, AST, Total. Bili, TP); Future  - Vitamin D deficiency screening; Future  - TSH with free T4 reflex; Future  - T3, Free; Future  - Lipid panel reflex to direct LDL Fasting; Future  - Comprehensive metabolic panel (BMP + Alb, Alk Phos, ALT, AST, Total. Bili, TP)  - Vitamin D deficiency screening  - TSH with free T4 reflex  - T3, Free  - Lipid panel reflex to direct LDL Fasting      3. Hereditary hemochromatosis (H24)  4. Iron overload  Chronic stable condition, please see above for details on the plan.    5. Rheumatoid arthritis involving multiple sites, unspecified whether rheumatoid factor present (H)  Chronic stable condition, please see above for details on the plan.    6. Uncomplicated alcohol dependence (H)  - Comprehensive metabolic panel (BMP + Alb, Alk Phos, ALT, AST, Total. Bili, TP); Future  - Comprehensive metabolic panel (BMP + Alb, Alk Phos, ALT, AST, Total. Bili,  TP)    7. Chronic prescription benzodiazepine use  8. Anxiety  Chronic stable condition, please see above for details on the plan.    9. Acquired hypothyroidism  Chronic stable condition, please see above for details on the plan.  - TSH with free T4 reflex; Future  - T3, Free; Future  - TSH with free T4 reflex  - T3, Free    10. Vitamin D deficiency  - Vitamin D deficiency screening; Future  - Vitamin D deficiency screening    11. Primary osteoarthritis of right hip  12. Osteoarthritis of both hips, unspecified osteoarthritis type  13. Chronic hip pain, left  Ongoing problem, uncontrolled with patient right hip has been repaired which is doing okay but the left hip pain has been bothering her too much with uncontrolled symptoms.  Supposed to have revision surgery with orthopedics, currently requesting for medications.  Discussed on avoiding opiates due to [place] state regulations as she is already on benzodiazepines for her anxiety as managed by me.  -Shared decision to start off on gabapentin 100 mg 3 times daily at this time, further titration if no improvement.  - gabapentin (NEURONTIN) 100 MG capsule; Take 1 capsule (100 mg) by mouth 3 times daily  Dispense: 90 capsule; Refill: 1      14. Hyponatremia  - Comprehensive metabolic panel (BMP + Alb, Alk Phos, ALT, AST, Total. Bili, TP); Future  - Comprehensive metabolic panel (BMP + Alb, Alk Phos, ALT, AST, Total. Bili, TP)    15. Elevated LFTs  - Comprehensive metabolic panel (BMP + Alb, Alk Phos, ALT, AST, Total. Bili, TP); Future  - Comprehensive metabolic panel (BMP + Alb, Alk Phos, ALT, AST, Total. Bili, TP)    16. Encounter for screening for lipid disorder  - Lipid panel reflex to direct LDL Fasting; Future  - Lipid panel reflex to direct LDL Fasting    17. Need for vaccination  - RSV VACCINE (ABRYSVO)  - INFLUENZA VACCINE 65+ (FLUZONE HD)         Please note that this note consists of symbols derived from keyboarding, dictation and/or voice recognition  software. As a result, there may be errors in the script that have gone undetected. Please consider this when interpreting information found in this chart.    Patient Instructions   As discussed , please do fasting labs due at this time.    Will start on Gabapentin for your DJD of hip causing uncontrolled pain.   Follow up with Orthopedics , Rheumatology and Hematology as planned by them.     =======================================    Patient Education  Personalized Prevention Plan  You are due for the preventive services outlined below.  Your care team is available to assist you in scheduling these services.  If you have already completed any of these items, please share that information with your care team to update in your medical record.  Health Maintenance Due   Topic Date Due    Pneumococcal Vaccine (1 - PCV) Never done    RSV VACCINE (Pregnancy & 60+) (1 - 1-dose 60+ series) Never done    Flu Vaccine (1) 09/01/2023    COVID-19 Vaccine (6 - 2023-24 season) 09/01/2023    Annual Wellness Visit  10/19/2023     Learning About Dietary Guidelines  What are the Dietary Guidelines for Americans?     Dietary Guidelines for Americans provide tips for eating well and staying healthy. This helps reduce the risk for long-term (chronic) diseases.  These guidelines recommend that you:  Eat and drink the right amount for you. The U.S. government's food guide is called MyPlate. It can help you make your own well-balanced eating plan.  Try to balance your eating with your activity. This helps you stay at a healthy weight.  Drink alcohol in moderation, if at all.  Limit foods high in salt, saturated fat, trans fat, and added sugar.  These guidelines are from the U.S. Department of Agriculture and the U.S. Department of Health and Human Services. They are updated every 5 years.  What is MyPlate?  MyPlate is the U.S. government's food guide. It can help you make your own well-balanced eating plan. A balanced eating plan means that  "you eat enough, but not too much, and that your food gives you the nutrients you need to stay healthy.  MyPlate focuses on eating plenty of whole grains, fruits, and vegetables, and on limiting fat and sugar. It is available online at www.ChooseMyPlate.gov.  How can you get started?  If you're trying to eat healthier, you can slowly change your eating habits over time. You don't have to make big changes all at once. Start by adding one or two healthy foods to your meals each day.  Grains  Choose whole-grain breads and cereals and whole-wheat pasta and whole-grain crackers.  Vegetables  Eat a variety of vegetables every day. They have lots of nutrients and are part of a heart-healthy diet.  Fruits  Eat a variety of fruits every day. Fruits contain lots of nutrients. Choose fresh fruit instead of fruit juice.  Protein foods  Choose fish and lean poultry more often. Eat red meat and fried meats less often. Dried beans, tofu, and nuts are also good sources of protein.  Dairy  Choose low-fat or fat-free products from this food group. If you have problems digesting milk, try eating cheese or yogurt instead.  Fats and oils  Limit fats and oils if you're trying to cut calories. Choose healthy fats when you cook. These include canola oil and olive oil.  Where can you learn more?  Go to https://www.[employer]Mark[employer].net/patiented  Enter D676 in the search box to learn more about \"Learning About Dietary Guidelines.\"  Current as of: February 28, 2023               Content Version: 13.8    1319-6130 [employer].   Care instructions adapted under license by your healthcare professional. If you have questions about a medical condition or this instruction, always ask your healthcare professional. [employer] disclaims any warranty or liability for your use of this information.         Return in about 3 months (around 3/7/2024), or if symptoms worsen or fail to improve, for anxiety, video visitClaudia Newsome" "MD Tobi  [facility] [place]        Patient has been advised of split billing requirements and indicates understanding: Yes      COUNSELING:  Reviewed preventive health counseling, as reflected in patient instructions  Special attention given to:       Regular exercise       Healthy diet/nutrition       Vision screening       Hearing screening       Dental care       Bladder control       Fall risk prevention       Immunizations  Vaccinated for: Influenza and Pneumococcal           Osteoporosis prevention/bone health      BMI:   Estimated body mass index is 26.37 kg/m  as calculated from the following:    Height as of this encounter: 1.604 m (5' 3.15\").    Weight as of this encounter: 67.9 kg (149 lb 9.6 oz).   Weight management plan: Discussed healthy diet and exercise guidelines      She reports that she quit smoking about 36 years ago. Her smoking use included cigarettes. She has a 2.60 pack-year smoking history. She has quit using smokeless tobacco.      Appropriate preventive services were discussed with this patient, including applicable screening as appropriate for fall prevention, nutrition, physical activity, Tobacco-use cessation, weight loss and cognition.  Checklist reviewing preventive services available has been given to the patient.    Reviewed patients plan of care and provided an AVS. The Basic Care Plan (routine screening as documented in Health Maintenance) for Irina meets the Care Plan requirement. This Care Plan has been established and reviewed with the Patient.          Mercedez Britton MD  [facility]    Identified Health Risks:  I have reviewed Opioid Use Disorder and Substance Use Disorder risk factors and made any needed referrals.   "

## 2023-12-07 NOTE — PROGRESS NOTES
"The patient was counseled and encouraged to consider modifying their diet and eating habits. She was provided with information on recommended healthy diet options.    Answers submitted by the patient for this visit:  Annual Preventive Visit (Submitted on 12/7/2023)  Chief Complaint: Annual Exam:  In general, how would you rate your overall physical health?: good  Frequency of exercise:: 2-3 days/week  Do you usually eat at least 4 servings of fruit and vegetables a day, include whole grains & fiber, and avoid regularly eating high fat or \"junk\" foods? : No  Taking medications regularly:: Yes  Medication side effects:: None  Activities of Daily Living: no assistance needed  Home safety: no safety concerns identified  Hearing Impairment:: no hearing concerns  In the past 6 months, have you been bothered by leaking of urine?: No  abdominal pain: No  Blood in stool: No  Blood in urine: No  chest pain: No  chills: No  congestion: No  constipation: No  cough: No  diarrhea: No  dizziness: No  ear pain: No  eye pain: No  nervous/anxious: No  fever: No  frequency: No  genital sores: No  headaches: No  hearing loss: No  heartburn: No  arthralgias: Yes  joint swelling: No  peripheral edema: Yes  mood changes: No  myalgias: No  nausea: No  dysuria: No  palpitations: No  Skin sensation changes: No  sore throat: No  urgency: No  rash: No  shortness of breath: No  visual disturbance: No  weakness: No  pelvic pain: No  vaginal bleeding: No  vaginal discharge: No  tenderness: No  breast mass: No  breast discharge: No  In general, how would you rate your overall mental or emotional health?: excellent  Additional concerns today:: Yes  Exercise outside of work (Submitted on 12/7/2023)  Chief Complaint: Annual Exam:  Duration of exercise:: 30-45 minutes    "

## 2023-12-11 ENCOUNTER — ALLIED HEALTH/NURSE VISIT (OUTPATIENT)
Dept: FAMILY MEDICINE | Facility: CLINIC | Age: 63
End: 2023-12-11
Payer: COMMERCIAL

## 2023-12-11 DIAGNOSIS — Z23 ENCOUNTER FOR IMMUNIZATION: Primary | ICD-10-CM

## 2023-12-11 PROCEDURE — 90471 IMMUNIZATION ADMIN: CPT

## 2023-12-11 PROCEDURE — 99207 PR NO CHARGE NURSE ONLY: CPT

## 2023-12-11 PROCEDURE — 90677 PCV20 VACCINE IM: CPT

## 2023-12-11 NOTE — PROGRESS NOTES
Prior to immunization administration, verified patients identity using patient s name and date of birth. Please see Immunization Activity for additional information.     Screening Questionnaire for Adult Immunization    Are you sick today?   No   Do you have allergies to medications, food, a vaccine component or latex?   Yes   Have you ever had a serious reaction after receiving a vaccination?   No   Do you have a long-term health problem with heart, lung, kidney, or metabolic disease (e.g., diabetes), asthma, a blood disorder, no spleen, complement component deficiency, a cochlear implant, or a spinal fluid leak?  Are you on long-term aspirin therapy?   No   Do you have cancer, leukemia, HIV/AIDS, or any other immune system problem?   No     Do you have a parent, brother, or sister with an immune system problem?   No   In the past 3 months, have you taken medications that affect  your immune system, such as prednisone, other steroids, or anticancer drugs; drugs for the treatment of rheumatoid arthritis, Crohn s disease, or psoriasis; or have you had radiation treatments?   No   Have you had a seizure, or a brain or other nervous system problem?   No   During the past year, have you received a transfusion of blood or blood    products, or been given immune (gamma) globulin or antiviral drug?   No   For women: Are you pregnant or is there a chance you could become       pregnant during the next month?   No   Have you received any vaccinations in the past 4 weeks?   Yes - flu, RSV     Immunization questionnaire was positive for at least one answer.  Notified rusty Valdivia to give.     I have reviewed the following standing orders: This patient is due and qualifies for the Pneumococcal vaccine.    Click here for Pneumococcal (Adult) Standing Order    I have reviewed the vaccines inclusion and exclusion criteria;No concerns regarding eligibility.     Patient instructed to remain in clinic for 15 minutes afterwards, and  to report any adverse reactions.     Screening performed by Alondra Ortiz MA on 12/11/2023 at 1:35 PM.

## 2023-12-20 ENCOUNTER — TELEPHONE (OUTPATIENT)
Dept: FAMILY MEDICINE | Facility: CLINIC | Age: 63
End: 2023-12-20
Payer: COMMERCIAL

## 2023-12-20 NOTE — TELEPHONE ENCOUNTER
Pt calling in reporting swollen feet and ankles due to taking gabapentin. Pt asking if she should keep taking it or not. Pt reports still in pain and wanted to increase she dose but now having swollen feet and ankles she is unsure.     Pt denies chest pain, SOB, difficulty breathing, numbness, tingling.     Please advise if pt should continue to take medication.    Tabatha Mccann RN

## 2023-12-20 NOTE — TELEPHONE ENCOUNTER
Gabapentin usually does not cause swelling in the feet or ankles.  She may be having those because of her other medication she is taking amlodipine.  I would suggest if it is helping she should continue that otherwise follow-up with PCP for evaluation.

## 2023-12-20 NOTE — TELEPHONE ENCOUNTER
Spoke to patient and her shoes are not tight. She was given information per Dr. Sesay.     She will monitor her feet swelling . She takes OTC sodium pill per her PCP  recommendation due to low sodium labs.     She has no feet or lower leg skin dimpling or pitting. Patient will monitor and keep us advised.       Georgina Stone RN  -[facility]

## 2023-12-21 DIAGNOSIS — F43.22 ADJUSTMENT DISORDER WITH ANXIOUS MOOD: ICD-10-CM

## 2023-12-21 RX ORDER — CLONAZEPAM 0.5 MG/1
TABLET ORAL
Qty: 30 TABLET | Refills: 0 | Status: SHIPPED | OUTPATIENT
Start: 2023-12-26 | End: 2024-01-24

## 2023-12-22 NOTE — TELEPHONE ENCOUNTER
[place] reviewed:  reviewed- no concerns    [place]:  [email or web address]    Refill done from 12/26/23 as per  check.    Mercedez Britton MD on 12/21/2023

## 2023-12-27 ENCOUNTER — NURSE TRIAGE (OUTPATIENT)
Dept: FAMILY MEDICINE | Facility: CLINIC | Age: 63
End: 2023-12-27

## 2023-12-27 NOTE — TELEPHONE ENCOUNTER
Per protocol pt was not able to be prescribed Paxlovid per standing order. Pt attempted to do and e-visit with PCP and was advised to go to ER. Pt denies chest pain, difficulty breathing or SOB.     Pt is interested in treatment.   Is provider able to do VV with pt today or tomorrow?         Reason for Disposition   HIGH RISK patient (e.g., weak immune system, age > 64 years, obesity with BMI of 30 or higher, pregnant, chronic lung disease or other chronic medical condition) and COVID symptoms (e.g., cough, fever)  (Exceptions: Already seen by doctor or NP/PA and no new or worsening symptoms.)    Additional Information   Negative: SEVERE difficulty breathing (e.g., struggling for each breath, speaks in single words)   Negative: Difficult to awaken or acting confused (e.g., disoriented, slurred speech)   Negative: Bluish (or gray) lips or face now   Negative: Shock suspected (e.g., cold/pale/clammy skin, too weak to stand, low BP, rapid pulse)   Negative: Sounds like a life-threatening emergency to the triager   Negative: Diagnosed or suspected COVID-19 and symptoms lasting 3 or more weeks   Negative: COVID-19 exposure and no symptoms   Negative: COVID-19 vaccine reaction suspected (e.g., fever, headache, muscle aches) occurring 1 to 3 days after getting vaccine   Negative: COVID-19 vaccine, questions about   Negative: Lives with someone known to have influenza (flu test positive) and flu-like symptoms (e.g., cough, runny nose, sore throat, SOB; with or without fever)   Negative: Possible COVID-19 symptoms and triager concerned about severity of symptoms or other causes   Negative: COVID-19 and breastfeeding, questions about   Negative: SEVERE or constant chest pain or pressure  (Exception: Mild central chest pain, present only when coughing.)   Negative: MODERATE difficulty breathing (e.g., speaks in phrases, SOB even at rest, pulse 100-120)   Negative: Headache and stiff neck (can't touch chin to chest)   Negative:  "Oxygen level (e.g., pulse oximetry) 90% or lower   Negative: Chest pain or pressure  (Exception: MILD central chest pain, present only when coughing.)   Negative: Drinking very little and dehydration suspected (e.g., no urine > 12 hours, very dry mouth, very lightheaded)   Negative: Patient sounds very sick or weak to the triager   Negative: Fever > 100.0 F (37.8 C) and bedridden (e.g., CVA, chronic illness, recovering from surgery)   Negative: Fever > 103 F (39.4 C)   Negative: MILD difficulty breathing (e.g., minimal/no SOB at rest, SOB with walking, pulse <100)   Negative: Fever > 101 F (38.3 C) and over 60 years of age    Answer Assessment - Initial Assessment Questions  1. COVID-19 DIAGNOSIS: \"How do you know that you have COVID?\" (e.g., positive lab test or self-test, diagnosed by doctor or NP/PA, symptoms after exposure).      Home test positive     2. COVID-19 EXPOSURE: \"Was there any known exposure to COVID before the symptoms began?\" CDC Definition of close contact: within 6 feet (2 meters) for a total of 15 minutes or more over a 24-hour period.       Possibly     3. ONSET: \"When did the COVID-19 symptoms start?\"       12/26/23     4. WORST SYMPTOM: \"What is your worst symptom?\" (e.g., cough, fever, shortness of breath, muscle aches)      Chills, fatigue, muscle aches, congestion, cough    5. COUGH: \"Do you have a cough?\" If Yes, ask: \"How bad is the cough?\"        Mild     6. FEVER: \"Do you have a fever?\" If Yes, ask: \"What is your temperature, how was it measured, and when did it start?\"      No    7. RESPIRATORY STATUS: \"Describe your breathing?\" (e.g., normal; shortness of breath, wheezing, unable to speak)       No    8. BETTER-SAME-WORSE: \"Are you getting better, staying the same or getting worse compared to yesterday?\"  If getting worse, ask, \"In what way?\"      Worse- more congested and \"achy\"    9. OTHER SYMPTOMS: \"Do you have any other symptoms?\"  (e.g., chills, fatigue, headache, loss of smell " "or taste, muscle pain, sore throat)      No    10. HIGH RISK DISEASE: \"Do you have any chronic medical problems?\" (e.g., asthma, heart or lung disease, weak immune system, obesity, etc.)        BP medication 6 autoimmune diseases     11. VACCINE: \"Have you had the COVID-19 vaccine?\" If Yes, ask: \"Which one, how many shots, when did you get it?\"        Not the booster but all others    12. PREGNANCY: \"Is there any chance you are pregnant?\" \"When was your last menstrual period?\"        No    13. O2 SATURATION MONITOR:  \"Do you use an oxygen saturation monitor (pulse oximeter) at home?\" If Yes, ask \"What is your reading (oxygen level) today?\" \"What is your usual oxygen saturation reading?\" (e.g., 95%)        No    Protocols used: Coronavirus (COVID-19) Diagnosed or [id number]      Tabatha Mccann RN    "

## 2023-12-27 NOTE — TELEPHONE ENCOUNTER
Provider Response to 2nd Level Triage Request    I have reviewed the RN documentation. My recommendation is:  Virtual Visit Same Day: work patient in on my schedule as an overbook. At 11.30 AM  [facility] 12/28/23

## 2023-12-27 NOTE — TELEPHONE ENCOUNTER
COVID Positive/Requesting COVID treatment    Patient is positive for COVID and requesting treatment options.    Date of positive COVID test (PCR or at home)? 12/27/23  Current COVID symptoms: fever or chills, cough, fatigue, muscle or body aches, headache, and congestion or runny nose  Date COVID symptoms began: 12/26/2023    Message should be routed to clinic RN pool. Best phone number to use for call back: [phone]

## 2023-12-28 ENCOUNTER — VIRTUAL VISIT (OUTPATIENT)
Dept: FAMILY MEDICINE | Facility: CLINIC | Age: 63
End: 2023-12-28
Payer: COMMERCIAL

## 2023-12-28 DIAGNOSIS — U07.1 INFECTION DUE TO 2019 NOVEL CORONAVIRUS: Primary | ICD-10-CM

## 2023-12-28 PROCEDURE — 99213 OFFICE O/P EST LOW 20 MIN: CPT | Mod: VID | Performed by: INTERNAL MEDICINE

## 2023-12-28 RX ORDER — BENZONATATE 100 MG/1
100 CAPSULE ORAL 3 TIMES DAILY PRN
Qty: 30 CAPSULE | Refills: 0 | Status: SHIPPED | OUTPATIENT
Start: 2023-12-28 | End: 2024-06-01

## 2023-12-28 RX ORDER — ACETAMINOPHEN 500 MG
500-1000 TABLET ORAL EVERY 4 HOURS PRN
Qty: 30 TABLET | Refills: 0 | Status: SHIPPED | OUTPATIENT
Start: 2023-12-28

## 2023-12-28 NOTE — PATIENT INSTRUCTIONS
For informational purposes only. Not to replace the advice of your health care provider. Copyright   2022 [facility]. All rights reserved. Clinically reviewed by Stephany Chavira, PharmD, BCACP. Zeetl [id number] - REV 06/23.  COVID-19 Outpatient Treatments  Your care team can help you find the best treatments for COVID-19. Talk to a health care provider or refer to the FDA medicine fact sheets below.  Paxlovid (nirmatrelvir and ritonavir): https://www.paxlovid.[employer]/resources  Molnupiravir (Lagevrio): https://www.fda.gov/media/176853/download  Important: We can only prescribe Paxlovid or Molnupiravir when it can be started within 5 days of first having symptoms.  Paxlovid (nimatrelvir and ritonavir)  How it works  Two medicines (nirmatrelvir and ritonavir) are taken together. They stop the virus from growing. Less amount of virus is easier for your body to fight.  Benefits  Lowers risk of a hospital stay or death from COVID-19.  How to take  Medicine comes in a daily container with both medicine tablets. Take by mouth twice daily (once in the morning, once at night) for 5 days.  The number of tablets to take varies by patient.  Don't chew or break capsules. Swallow whole.  When to take  Take it as soon as possible and within 5 days of your first symptoms.  Who can take it  Patients must be 12 years or older weigh at least 88 pounds. Paxlovid is the preferred treatment for pregnant patients.  Possible side effects  Can cause altered sense of taste, diarrhea (loose, watery stools), high blood pressure, muscle aches.  Medicine conflicts  Some medicines may conflict with Paxlovid and may cause serious side effects.  Tell your care team about all the medicines you take, including prescription and over-the-counter medicines, vitamins, and herbal supplements.  Your care team will review your medicines to make sure that you can safely take Paxlovid.  Cautions  Paxlovid is not advised for patients with severe  kidney or liver disease. If you have kidney or liver problems, the dose may need to be changed.  If you're pregnant or breastfeeding, talk to your care team about your options.  If you take hormonal birth control (such as the Pill), then you or your partner should also use a non-hormonal form of birth control (such as a condom). Keep doing this for 1 menstrual cycle after your last dose of Paxlovid.  Molnupiravir (lagevrio)  How it works  Stops the virus from growing. Less amount of virus is easier for your body to fight.  Benefits  Lowers risk of a hospital stay or death from COVID-19.  How to take  Take 4 capsules by mouth every 12 hours (4 in the morning and 4 at night) for 5 days. Don't chew or break capsules. Swallow whole.  When to take  Take as soon as possible and within 5 days of your first symptoms.  Who can take it  Patients must be 18 years or older.   Possible side effects  Diarrhea (loose, watery stools), nausea (feeling sick to your stomach), dizziness, headaches.  Medicine conflicts  Right now, there are no known conflicts with other drugs. But tell your care team about all medicines you take.  Cautions  This medicine is not advised for patients who are pregnant.  If you are someone who could become pregnant, use trusted birth control until 4 days after your last dose of molnupiravir.  If your partner could become pregnant, you should use trusted birth control until 3 months after your last dose of molnupiravir.      Coping with Life After COVID-19  Being in the hospital because of COVID-19 is scary. Going home can be scary, too. You may face changes to your life, the way you work or what you can eat. It s hard to adjust to change, and it s normal to feel afraid, frustrated or even angry. These feelings usually go away over time. If your feelings don t start to get better, it s called  adjustment disorder.      What signs should I look out for?  Adjustment disorder can happen to anyone in a time of  stress. It makes it hard to cope with daily life. You may feel lonely or fight with loved ones, even if you re glad to be home. Watch for these signs:  Fear or worry  Hard time focusing  Sadness or anger  Trouble talking to family or friends  Feeling like you don t fit in or isolating yourself  Problems with sleep   Drinking alcohol or taking drugs to cope    What can I do?  You can help yourself get better. Feeling you have control helps you move forward. You may wonder if you ll be able to do things you did before. Be patient. Do your best to make the most of every day. Try to build relationships, be as active as you can, eat right and keep a sense of humor. Avoid smoking and drinking too much alcohol. Call your family doctor or clinic if you re not sure what to do. They can guide you to care or other services.    When should I get help?  Think about getting counseling if your sadness or frustration gets worse. Together with a trained counselor, you can talk about your problems adjusting to life after your hospital stay. You can come up with new ways to handle changes that give you more control. Your family doctor or care team can help you find a counselor.     Get help if you re thinking about hurting yourself. If you need help right away, call 911 or go to the nearest emergency room. You can also try the Crisis Text Line.    Crisis Text Line: 556-325 (http://www.crisistextline.org)  The Crisis Text Line serves anyone, in any crisis. It gives free, 24/7 support. Here's how it works:  Text HOME to 460402 from anywhere in the [place], anytime, about any type of crisis.  A live, trained Crisis Counselor will text you back quickly.  The volunteer Crisis Counselor can help you move from a  hot moment  to a  cool moment.  They can also help you work out a safety plan.

## 2023-12-28 NOTE — PROGRESS NOTES
Cherise is a 63 year old who is being evaluated via a billable video visit.      How would you like to obtain your AVS? MyChart  If the video visit is dropped, the invitation should be resent by: Text to cell phone: [phone]  Will anyone else be joining your video visit? No      Assessment and Plan  1. Infection due to 2019 novel coronavirus  Acute concerns of COVID-positive home test with URI symptoms ongoing including sinus congestion, cough, generalized bodyaches.  Shared decision to start off on Paxlovid with the drug interactions went through all medications.  - I have given instructions on red flag symptoms when he should be in ER which she understood and agreed with the plan. Reassured that current Paxlovid has been proven to decrease the rate of hospitalizations in COVID positive patients as per the studies done so far.  - nirmatrelvir and ritonavir (PAXLOVID) 300 mg/100 mg therapy pack; Take 3 tablets by mouth 2 times daily for 5 days (Take 2 Nirmatrelvir tablets and 1 Ritonavir tablet twice daily for 5 days)  Dispense: 30 tablet; Refill: 0  - benzonatate (TESSALON) 100 MG capsule; Take 1 capsule (100 mg) by mouth 3 times daily as needed for cough  Dispense: 30 capsule; Refill: 0  - acetaminophen (TYLENOL) 500 MG tablet; Take 1-2 tablets (500-1,000 mg) by mouth every 4 hours as needed for mild pain  Dispense: 30 tablet; Refill: 0         Please note that this note consists of symbols derived from keyboarding, dictation and/or voice recognition software. As a result, there may be errors in the script that have gone undetected. Please consider this when interpreting information found in this chart.    Patient Instructions   For informational purposes only. Not to replace the advice of your health care provider. Copyright   2022 [facility]. All rights reserved. Clinically reviewed by Stephany Chavira, PharmD, BCACP. [place] [id number] - REV 06/23.  COVID-19 Outpatient Treatments  Your care team can  help you find the best treatments for COVID-19. Talk to a health care provider or refer to the FDA medicine fact sheets below.  Paxlovid (nirmatrelvir and ritonavir): https://www.paxlovid.com/resources  Molnupiravir (Lagevrio): https://www.fda.gov/media/908037/download  Important: We can only prescribe Paxlovid or Molnupiravir when it can be started within 5 days of first having symptoms.  Paxlovid (nimatrelvir and ritonavir)  How it works  Two medicines (nirmatrelvir and ritonavir) are taken together. They stop the virus from growing. Less amount of virus is easier for your body to fight.  Benefits  Lowers risk of a hospital stay or death from COVID-19.  How to take  Medicine comes in a daily container with both medicine tablets. Take by mouth twice daily (once in the morning, once at night) for 5 days.  The number of tablets to take varies by patient.  Don't chew or break capsules. Swallow whole.  When to take  Take it as soon as possible and within 5 days of your first symptoms.  Who can take it  Patients must be 12 years or older weigh at least 88 pounds. Paxlovid is the preferred treatment for pregnant patients.  Possible side effects  Can cause altered sense of taste, diarrhea (loose, watery stools), high blood pressure, muscle aches.  Medicine conflicts  Some medicines may conflict with Paxlovid and may cause serious side effects.  Tell your care team about all the medicines you take, including prescription and over-the-counter medicines, vitamins, and herbal supplements.  Your care team will review your medicines to make sure that you can safely take Paxlovid.  Cautions  Paxlovid is not advised for patients with severe kidney or liver disease. If you have kidney or liver problems, the dose may need to be changed.  If you're pregnant or breastfeeding, talk to your care team about your options.  If you take hormonal birth control (such as the Pill), then you or your partner should also use a non-hormonal form  of birth control (such as a condom). Keep doing this for 1 menstrual cycle after your last dose of Paxlovid.  Molnupiravir (lagevrio)  How it works  Stops the virus from growing. Less amount of virus is easier for your body to fight.  Benefits  Lowers risk of a hospital stay or death from COVID-19.  How to take  Take 4 capsules by mouth every 12 hours (4 in the morning and 4 at night) for 5 days. Don't chew or break capsules. Swallow whole.  When to take  Take as soon as possible and within 5 days of your first symptoms.  Who can take it  Patients must be 18 years or older.   Possible side effects  Diarrhea (loose, watery stools), nausea (feeling sick to your stomach), dizziness, headaches.  Medicine conflicts  Right now, there are no known conflicts with other drugs. But tell your care team about all medicines you take.  Cautions  This medicine is not advised for patients who are pregnant.  If you are someone who could become pregnant, use trusted birth control until 4 days after your last dose of molnupiravir.  If your partner could become pregnant, you should use trusted birth control until 3 months after your last dose of molnupiravir.      Coping with Life After COVID-19  Being in the hospital because of COVID-19 is scary. Going home can be scary, too. You may face changes to your life, the way you work or what you can eat. It s hard to adjust to change, and it s normal to feel afraid, frustrated or even angry. These feelings usually go away over time. If your feelings don t start to get better, it s called  adjustment disorder.      What signs should I look out for?  Adjustment disorder can happen to anyone in a time of stress. It makes it hard to cope with daily life. You may feel lonely or fight with loved ones, even if you re glad to be home. Watch for these signs:  Fear or worry  Hard time focusing  Sadness or anger  Trouble talking to family or friends  Feeling like you don t fit in or isolating  yourself  Problems with sleep   Drinking alcohol or taking drugs to cope    What can I do?  You can help yourself get better. Feeling you have control helps you move forward. You may wonder if you ll be able to do things you did before. Be patient. Do your best to make the most of every day. Try to build relationships, be as active as you can, eat right and keep a sense of humor. Avoid smoking and drinking too much alcohol. Call your family doctor or clinic if you re not sure what to do. They can guide you to care or other services.    When should I get help?  Think about getting counseling if your sadness or frustration gets worse. Together with a trained counselor, you can talk about your problems adjusting to life after your hospital stay. You can come up with new ways to handle changes that give you more control. Your family doctor or care team can help you find a counselor.     Get help if you re thinking about hurting yourself. If you need help right away, call 911 or go to the nearest emergency room. You can also try the Crisis Text Line.    Crisis Text Line: 331-716 (http://www.crisistextline.org)  The Crisis Text Line serves anyone, in any crisis. It gives free, 24/7 support. Here's how it works:  Text HOME to 911653 from anywhere in the [place], anytime, about any type of crisis.  A live, trained Crisis Counselor will text you back quickly.  The volunteer Crisis Counselor can help you move from a  hot moment  to a  cool moment.  They can also help you work out a safety plan.  Return in about 2 weeks (around 1/11/2024), or if symptoms worsen or fail to improve, for Follow up of last visit, If symptoms persist.    MD SAGAR [facility] ROMULO Luong is a 63 year old, presenting for the following health issues:  Covid Concern        12/28/2023    11:21 AM   Additional Questions   Roomed by Margarita KEITH       History of Present Illness       Reason for visit:  Covid  symptoms started 12/26 tested postive 12/27  Symptom onset:  1-3 days ago  Symptoms include:  Coughing, fatigue, achiness, congestion, nausea and vomiting  Symptom intensity:  Moderate  Symptom progression:  Staying the same  Had these symptoms before:  No  What makes it better:  Sleeping    She eats 2-3 servings of fruits and vegetables daily.She consumes 2 sweetened beverage(s) daily.She exercises with enough effort to increase her heart rate 9 or less minutes per day.  She exercises with enough effort to increase her heart rate 3 or less days per week.   She is taking medications regularly.     Allergies   Allergen Reactions    Codeine Nausea and Nausea and Vomiting    Nitrofurantoin Unknown and Other (See Comments)     Other reaction(s): Gastrointestinal      Ace Inhibitors Cough     lisinopril  lisinopril  lisinopril    Elemental Sulfur Unknown    Hydrochlorothiazide Unknown     Severe Hyponatremia less than 120  Severe Hyponatremia less than 120    Sulfa Antibiotics     Sulfatolamide     Sulfur Unknown        Past Medical History:   Diagnosis Date    ASCUS favor benign 09/2014    3 yr co-test    Hereditary hemochromatosis (H24)     HTN (hypertension)     Impaired renal function     Microscopic colitis     Osteoarthritis of first carpometacarpal joint     bilateral    Other specified acquired hypothyroidism     RA (rheumatoid arthritis) (H)     Seasonal allergic rhinitis     Spider veins     Symptomatic menopausal or female climacteric states     age 43, on HRT    Tricuspid regurgitation     +1-2 TR noted on 12/22/14 Echo       Past Surgical History:   Procedure Laterality Date    ABDOMEN SURGERY  01/30/1995    C section    BIOPSY BREAST      C/SECTION, LOW TRANSVERSE      twins    COLONOSCOPY  2013    nl, next one due in 5 years    CV CORONARY ANGIOGRAM N/A 06/30/2022    Procedure: Coronary Angiogram;  Surgeon: Jose Antonio Ferraro MD;  Location:  HEART CARDIAC CATH LAB    CV LEFT HEART CATH N/A 06/30/2022     Procedure: Left Heart Catheterization;  Surgeon: Jose Antonio Ferraro MD;  Location:  HEART [facility]ARD[facility] CATH LAB    CV LEFT VENTRICULOGRAM N/A 2022    Procedure: Left Ventriculogram;  Surgeon: Jose Antonio Ferraro MD;  Location:  HEART CARDI[facility] CATH LAB    CV RIGHT HEART CATH MEASUREMENTS RECORDED N/A 2022    Procedure: Right Heart Catheterization;  Surgeon: Jose Antonio Ferraro MD;  Location:  HEART CARDIAC CATH LAB    LEEP TX, CERVICAL  1990s    normal since that time    MYRINGOTOMY, INSERT TUBE, COMBINED Left 2015    chronic NORM, ETD    ORTHOPEDIC SURGERY  Oct 2021    New right hip    REPLACEMENT TOTAL HIP W/  RESURFACING IMPLANTS Right     SOFT TISSUE SURGERY  Oct,  2021    Gluteus, medius or tendon repair       Family History   Problem Relation Age of Onset    Cancer - colorectal Father         age 50    Colon Cancer Father     Arthritis Mother     Cancer - colorectal Brother         age 50    Colon Cancer Brother     Hypertension Sister     Hypertension Brother     Colon Cancer Brother         John passed away in 2022.    Prostate Cancer Brother     Kidney Cancer Brother     Prostate Cancer Brother     Arthritis Sister     Cancer Sister 53        Uterine    Cancer Sister 50        Uterine    Testicular cancer Nephew     Prostate Cancer Brother     Thyroid Disease No family hx of        Social History     Tobacco Use    Smoking status: Former     Packs/day: 0.26     Years: 10.00     Additional pack years: 0.00     Total pack years: 2.60     Types: Cigarettes     Quit date: 1987     Years since quittin.6    Smokeless tobacco: Never    Tobacco comments:     Was a social smoker   Substance Use Topics    Alcohol use: Yes     Comment: A couple a day        Current Outpatient Medications   Medication    acetaminophen (TYLENOL) 500 MG tablet    amLODIPine (NORVASC) 5 MG tablet    benzonatate (TESSALON) 100 MG capsule    Biotin 5000 MCG TABS    clonazePAM (KLONOPIN) 0.5 MG tablet     hydroxychloroquine (PLAQUENIL) 200 MG tablet    ipratropium (ATROVENT) 0.03 % nasal spray    levothyroxine (SYNTHROID/LEVOTHROID) 75 MCG tablet    liothyronine (CYTOMEL) 5 MCG tablet    loratadine (CLARITIN) 10 MG tablet    losartan (COZAAR) 100 MG tablet    metoprolol succinate ER (TOPROL XL) 100 MG 24 hr tablet    nirmatrelvir and ritonavir (PAXLOVID) 300 mg/100 mg therapy pack    sodium chloride 1 GM tablet     No current facility-administered medications for this visit.        Review of Systems   Constitutional, HEENT, cardiovascular, pulmonary, GI, , musculoskeletal, neuro, skin, endocrine and psych systems are negative, except as otherwise noted.      Objective           Vitals:  No vitals were obtained today due to virtual visit.    Physical Exam   GENERAL: Healthy, alert and no distress  EYES: Eyes grossly normal to inspection.  No discharge or erythema, or obvious scleral/conjunctival abnormalities.  RESP: No audible wheeze, cough, or visible cyanosis.  No visible retractions or increased work of breathing.    SKIN: Visible skin clear. No significant rash, abnormal pigmentation or lesions.  NEURO: Cranial nerves grossly intact.  Mentation and speech appropriate for age.  PSYCH: Mentation appears normal, affect normal/bright, judgement and insight intact, normal speech and appearance well-groomed.      Video-Visit Details    Type of service:  Video Visit     Originating Location (pt. Location): Home    Distant Location (provider location):  On-site  Platform used for Video Visit: Pari

## 2023-12-28 NOTE — TELEPHONE ENCOUNTER
Spoke with patient to relay provider recommendations. RN assisted patient to be scheduled for VV as an overbook per PCP approval 12/28/2023. Patient agreed with plan of care and did not have any additional questions or concerns.

## 2024-01-03 ENCOUNTER — TELEPHONE (OUTPATIENT)
Dept: FAMILY MEDICINE | Facility: CLINIC | Age: 64
End: 2024-01-03
Payer: MEDICARE

## 2024-01-03 NOTE — TELEPHONE ENCOUNTER
Pt calling in stating that she recently tested positive for Covid and was put on Paxlovid. Pt reports having cream/light colored stools the last 2-3 days. Pt denies abd pain, diarrhea, blood in stool, nausea, vomiting.     Pt states stool is returning to normal consistency and normal color.    Writer advised pt that this is a side effect from Paxlovid. If change in stools occurs again or abd pain occurs to call the clinic bad. Pt in agreement to plan.        Tabatha Mccann RN

## 2024-01-16 DIAGNOSIS — E22.2 SIADH (SYNDROME OF INAPPROPRIATE ADH PRODUCTION) (H): ICD-10-CM

## 2024-01-16 DIAGNOSIS — E87.1 HYPONATREMIA: ICD-10-CM

## 2024-01-17 RX ORDER — SODIUM CHLORIDE 1 G/1
TABLET ORAL
Qty: 60 TABLET | Refills: 1 | Status: SHIPPED | OUTPATIENT
Start: 2024-01-17 | End: 2024-06-04

## 2024-01-22 DIAGNOSIS — E03.9 ACQUIRED HYPOTHYROIDISM: ICD-10-CM

## 2024-01-23 RX ORDER — LEVOTHYROXINE SODIUM 75 UG/1
75 TABLET ORAL EVERY MORNING
Qty: 90 TABLET | Refills: 0 | Status: SHIPPED | OUTPATIENT
Start: 2024-01-23 | End: 2024-07-22

## 2024-01-24 DIAGNOSIS — F43.22 ADJUSTMENT DISORDER WITH ANXIOUS MOOD: ICD-10-CM

## 2024-01-24 RX ORDER — CLONAZEPAM 0.5 MG/1
TABLET ORAL
Qty: 30 TABLET | Refills: 0 | Status: SHIPPED | OUTPATIENT
Start: 2024-01-24 | End: 2024-01-25

## 2024-01-25 RX ORDER — CLONAZEPAM 0.5 MG/1
TABLET ORAL
Qty: 90 TABLET | Refills: 0 | Status: SHIPPED | OUTPATIENT
Start: 2024-01-26 | End: 2024-05-20

## 2024-01-25 NOTE — TELEPHONE ENCOUNTER
[place] reviewed:  reviewed- no concerns    [place]:  [place]://[place].jobandtalent.com/    Refill done.  Mercedez Britton MD on 1/24/2024

## 2024-01-25 NOTE — TELEPHONE ENCOUNTER
TO PCP    Pt calling, states she will be travelling March-April, asking for 2 month Rx for clonazepam medication while travelling. Asking for Rx to be sent at end of Feb so pt can  before leaving the state.    Pended for your review if appropriate.    LASHANDA WARD RN on 1/25/2024 at 10:06 AM

## 2024-01-26 NOTE — TELEPHONE ENCOUNTER
Patient traveling out - hence requesting 3 months supply for months of Feb , March & April as requested.   Further refill after PCP visit in April after she returns for future refills.    Pt supposed to see me in 3/2023 for follow up but she is requesting future refills which I went ahead and did it.     Please call the patient and schedule VV visit of [place] follow up with me, which patient is due - by 4/15/2024 , to further take care of the medical conditions and medications.OK to use same day slot / double book near another virtual slot.     Thank you,  Mercedez Britton MD on 1/25/2024

## 2024-01-29 NOTE — TELEPHONE ENCOUNTER
Spoke to pt and helped schedule appointment for 2/1/24.     Pt is requesting if you can change prescription of the Clonazepam as chewable- pt stated she used to have them chewable but had to take oral because they were out of stock.     Margarita Paul RN on 1/29/2024 at 9:29 AM

## 2024-01-30 NOTE — TELEPHONE ENCOUNTER
Spoke to pt and pt stated it was ok to wait and talk about prescription on her visit this Friday 2/1/24.     Margarita Paul RN on 1/30/2024 at 10:32 AM

## 2024-02-01 ENCOUNTER — TRANSFERRED RECORDS (OUTPATIENT)
Dept: HEALTH INFORMATION MANAGEMENT | Facility: CLINIC | Age: 64
End: 2024-02-01

## 2024-02-01 ENCOUNTER — VIRTUAL VISIT (OUTPATIENT)
Dept: FAMILY MEDICINE | Facility: CLINIC | Age: 64
End: 2024-02-01
Payer: MEDICARE

## 2024-02-01 DIAGNOSIS — F41.9 ANXIETY: Primary | ICD-10-CM

## 2024-02-01 DIAGNOSIS — M25.552 HIP PAIN, LEFT: ICD-10-CM

## 2024-02-01 DIAGNOSIS — E83.110 HEREDITARY HEMOCHROMATOSIS (H): ICD-10-CM

## 2024-02-01 DIAGNOSIS — M06.9 RHEUMATOID ARTHRITIS INVOLVING MULTIPLE SITES, UNSPECIFIED WHETHER RHEUMATOID FACTOR PRESENT (H): ICD-10-CM

## 2024-02-01 DIAGNOSIS — E83.19 IRON OVERLOAD SYNDROME: ICD-10-CM

## 2024-02-01 DIAGNOSIS — F10.20 UNCOMPLICATED ALCOHOL DEPENDENCE (H): ICD-10-CM

## 2024-02-01 PROCEDURE — 99214 OFFICE O/P EST MOD 30 MIN: CPT | Mod: 95 | Performed by: INTERNAL MEDICINE

## 2024-02-01 NOTE — PATIENT INSTRUCTIONS
As discussed, will do 2 months supply from next fill onwards since you have just filled this month in 1/2024     Follow-up with rheumatology, heme oncology, orthopedics for further management of your medical conditions as mentioned.

## 2024-02-24 DIAGNOSIS — I10 ESSENTIAL HYPERTENSION: ICD-10-CM

## 2024-02-26 RX ORDER — AMLODIPINE BESYLATE 5 MG/1
5 TABLET ORAL DAILY
Qty: 90 TABLET | Refills: 0 | Status: SHIPPED | OUTPATIENT
Start: 2024-02-26 | End: 2024-06-04

## 2024-03-02 DIAGNOSIS — I10 ESSENTIAL HYPERTENSION: ICD-10-CM

## 2024-03-04 RX ORDER — AMLODIPINE BESYLATE 5 MG/1
5 TABLET ORAL DAILY
Qty: 90 TABLET | Refills: 0 | OUTPATIENT
Start: 2024-03-04

## 2024-04-23 DIAGNOSIS — E03.9 ACQUIRED HYPOTHYROIDISM: ICD-10-CM

## 2024-04-23 RX ORDER — LIOTHYRONINE SODIUM 5 UG/1
10 TABLET ORAL DAILY
Qty: 180 TABLET | Refills: 2 | OUTPATIENT
Start: 2024-04-23

## 2024-04-24 ENCOUNTER — HOSPITAL ENCOUNTER (OUTPATIENT)
Dept: MAMMOGRAPHY | Facility: CLINIC | Age: 64
Discharge: HOME | End: 2024-04-24
Attending: INTERNAL MEDICINE | Admitting: INTERNAL MEDICINE
Payer: MEDICARE

## 2024-04-24 DIAGNOSIS — Z12.31 VISIT FOR SCREENING MAMMOGRAM: ICD-10-CM

## 2024-04-24 PROCEDURE — 77063 BREAST TOMOSYNTHESIS BI: CPT

## 2024-04-25 DIAGNOSIS — E03.9 ACQUIRED HYPOTHYROIDISM: ICD-10-CM

## 2024-04-25 RX ORDER — LIOTHYRONINE SODIUM 5 UG/1
10 TABLET ORAL DAILY
Qty: 180 TABLET | Refills: 2 | OUTPATIENT
Start: 2024-04-25

## 2024-05-02 ENCOUNTER — NURSE TRIAGE (OUTPATIENT)
Dept: FAMILY MEDICINE | Facility: CLINIC | Age: 64
End: 2024-05-02

## 2024-05-02 ENCOUNTER — OFFICE VISIT (OUTPATIENT)
Dept: INTERNAL MEDICINE | Facility: CLINIC | Age: 64
End: 2024-05-02
Payer: MEDICARE

## 2024-05-02 VITALS
BODY MASS INDEX: 26.44 KG/M2 | DIASTOLIC BLOOD PRESSURE: 78 MMHG | WEIGHT: 149.2 LBS | SYSTOLIC BLOOD PRESSURE: 109 MMHG | HEIGHT: 63 IN | OXYGEN SATURATION: 94 % | RESPIRATION RATE: 16 BRPM | HEART RATE: 83 BPM | TEMPERATURE: 98 F

## 2024-05-02 DIAGNOSIS — J20.9 ACUTE BRONCHITIS, UNSPECIFIED ORGANISM: Primary | ICD-10-CM

## 2024-05-02 PROCEDURE — 99213 OFFICE O/P EST LOW 20 MIN: CPT | Performed by: INTERNAL MEDICINE

## 2024-05-02 RX ORDER — ALBUTEROL SULFATE 90 UG/1
2 AEROSOL, METERED RESPIRATORY (INHALATION) EVERY 6 HOURS PRN
Qty: 18 G | Refills: 0 | Status: SHIPPED | OUTPATIENT
Start: 2024-05-02 | End: 2024-06-19

## 2024-05-02 RX ORDER — PREDNISONE 20 MG/1
40 TABLET ORAL DAILY
Qty: 10 TABLET | Refills: 0 | Status: SHIPPED | OUTPATIENT
Start: 2024-05-02 | End: 2024-05-07

## 2024-05-02 ASSESSMENT — PAIN SCALES - GENERAL: PAINLEVEL: NO PAIN (0)

## 2024-05-02 ASSESSMENT — ENCOUNTER SYMPTOMS: COUGH: 1

## 2024-05-02 NOTE — TELEPHONE ENCOUNTER
Nurse Triage SBAR    Is this a 2nd Level Triage? NO    Situation: Pt has a dry cough since Monday and wheezing.    Background: Pt tested for COVID-19 at home Tuesday and Wednesday and results was negative.     Assessment: Cough is intermittent and dry. Pt has not checked her temperature but has been sweating. Pt denies coughing up blood or severe chest pain.     Protocol Recommended Disposition:   Go To Office Now    Recommendation: OV was scheduled today.         Does the patient meet one of the following criteria for ADS visit consideration? No          Reason for Disposition   Wheezing is present    Additional Information   Negative: Bluish (or gray) lips or face   Negative: SEVERE difficulty breathing (e.g., struggling for each breath, speaks in single words)   Negative: Rapid onset of cough and has hives   Negative: Coughing started suddenly after medicine, an allergic food or bee sting   Negative: Difficulty breathing after exposure to flames, smoke, or fumes   Negative: Sounds like a life-threatening emergency to the triager   Negative: Previous asthma attacks and this feels like asthma attack   Negative: Dry cough (non-productive; no sputum or minimal clear sputum) and within 14 days of COVID-19 Exposure   Negative: MODERATE difficulty breathing (e.g., speaks in phrases, SOB even at rest, pulse 100-120) and still present when not coughing   Negative: Chest pain present when not coughing   Negative: Passed out (i.e., fainted, collapsed and was not responding)   Negative: Patient sounds very sick or weak to the triager   Negative: MILD difficulty breathing (e.g., minimal/no SOB at rest, SOB with walking, pulse <100) and still present when not coughing   Negative: Coughed up > 1 tablespoon (15 ml) blood (Exception: Blood-tinged sputum.)   Negative: Fever > 103 F (39.4 C)   Negative: Fever > 101 F (38.3 C) and over 60 years of age   Negative: Fever > 100.0 F (37.8 C) and has diabetes mellitus or a weak immune  system (e.g., HIV positive, cancer chemotherapy, organ transplant, splenectomy, chronic steroids)   Negative: Fever > 100.0 F (37.8 C) and bedridden (e.g., CVA, chronic illness, recovering from surgery)   Negative: Increasing ankle swelling    Protocols used: Cough-A-OH

## 2024-05-02 NOTE — PROGRESS NOTES
"  Assessment & Plan     Acute bronchitis, unspecified organism    No evidence of bacterial infection. Discussed treatment options and will use both steroids and albuterol PRN    - predniSONE (DELTASONE) 20 MG tablet  Dispense: 10 tablet; Refill: 0  - albuterol (PROAIR HFA/PROVENTIL HFA/VENTOLIN HFA) 108 (90 Base) MCG/ACT inhaler  Dispense: 18 g; Refill: 0                    Sonali Luong is a 63 year old, presenting for the following health issues:  Cough (wheezing)    History of Present Illness       Reason for visit:  Coughing and wheezing  Symptom onset:  3-7 days ago  Symptoms include:  Cough,  wheezing , sneezing  Symptom intensity:  Moderate  Symptom progression:  Worsening  Had these symptoms before:  No  What makes it worse:  Lying down  What makes it better:  Sitting up    She eats 2-3 servings of fruits and vegetables daily.She consumes 2 sweetened beverage(s) daily.She exercises with enough effort to increase her heart rate 20 to 29 minutes per day.    She is taking medications regularly.     She had had about a week of cough. She has done two home covid tests that were negative. Cough is associated with a wheeze. She did have some shortness of breath last night that was very scary for her. When she coughs she feels like it is sometime hard to breath.     She has had one day of sneezing. No body aches, but has had some sweating at night. She has a lot of fatigue.     She has no history of asthma.                   Objective    /78 (BP Location: Left arm, Patient Position: Sitting, Cuff Size: Adult Large)   Pulse 83   Temp 98  F (36.7  C) (Oral)   Resp 16   Ht 1.6 m (5' 3\")   Wt 67.7 kg (149 lb 3.2 oz)   SpO2 94%   BMI 26.43 kg/m    Body mass index is 26.43 kg/m .  Physical Exam  Vitals reviewed.   HENT:      Mouth/Throat:      Mouth: Mucous membranes are moist.      Pharynx: Oropharynx is clear.   Cardiovascular:      Rate and Rhythm: Normal rate and regular rhythm.   Pulmonary:      " Effort: Pulmonary effort is normal. No respiratory distress.      Breath sounds: Wheezing present.   Neurological:      Mental Status: She is alert.   Psychiatric:         Mood and Affect: Mood normal.                    Signed Electronically by: Sulma Cerda MD

## 2024-05-13 ENCOUNTER — TELEPHONE (OUTPATIENT)
Dept: FAMILY MEDICINE | Facility: CLINIC | Age: 64
End: 2024-05-13
Payer: MEDICARE

## 2024-05-13 NOTE — TELEPHONE ENCOUNTER
Reason for Call:  Appointment Request    Patient requesting this type of appt: Chronic Diease Management/Medication/Follow-Up    Requested provider: Mercedez Britton and open to other providers     Reason patient unable to be scheduled: Not within requested timeframe    When does patient want to be seen/preferred time:  ASAP     Comments: Follow up for bronchitis -- has not improved and now has sinus congestion and cough.     Could we send this information to you in [place] or would you prefer to receive a phone call?:   Patient would prefer a phone call   Okay to leave a detailed message?: Yes at Cell number on file:    Telephone Information:   Mobile [phone]       Call taken on 5/13/2024 at 10:30 AM by Sarah Chong

## 2024-05-14 ENCOUNTER — VIRTUAL VISIT (OUTPATIENT)
Dept: FAMILY MEDICINE | Facility: CLINIC | Age: 64
End: 2024-05-14
Payer: MEDICARE

## 2024-05-14 DIAGNOSIS — J01.90 ACUTE SINUSITIS WITH SYMPTOMS > 10 DAYS: Primary | ICD-10-CM

## 2024-05-14 DIAGNOSIS — M06.9 RHEUMATOID ARTHRITIS INVOLVING MULTIPLE SITES, UNSPECIFIED WHETHER RHEUMATOID FACTOR PRESENT (H): ICD-10-CM

## 2024-05-14 DIAGNOSIS — M19.90 INFLAMMATORY ARTHRITIS: ICD-10-CM

## 2024-05-14 DIAGNOSIS — F43.22 ADJUSTMENT DISORDER WITH ANXIOUS MOOD: ICD-10-CM

## 2024-05-14 DIAGNOSIS — Z79.899 CONTROLLED SUBSTANCE AGREEMENT SIGNED: ICD-10-CM

## 2024-05-14 DIAGNOSIS — E83.110 HEREDITARY HEMOCHROMATOSIS (H): ICD-10-CM

## 2024-05-14 DIAGNOSIS — Z79.899 CHRONIC PRESCRIPTION BENZODIAZEPINE USE: ICD-10-CM

## 2024-05-14 PROCEDURE — 99214 OFFICE O/P EST MOD 30 MIN: CPT | Mod: 95 | Performed by: INTERNAL MEDICINE

## 2024-05-14 RX ORDER — PREDNISONE 10 MG/1
TABLET ORAL
Qty: 20 TABLET | Refills: 0 | Status: SHIPPED | OUTPATIENT
Start: 2024-05-14 | End: 2024-06-01

## 2024-05-14 ASSESSMENT — ENCOUNTER SYMPTOMS
COUGH: 1
WHEEZING: 1

## 2024-05-14 NOTE — PROGRESS NOTES
Cherise is a 64 year old who is being evaluated via a billable video visit.    How would you like to obtain your AVS? MyChart  If the video visit is dropped, the invitation should be resent by: Text to cell phone: [phone]  Will anyone else be joining your video visit? No      Assessment and Plan  1. Acute sinusitis with symptoms > 10 days  Ongoing problem, uncontrolled. Patient has recently seen [facility] for acute bronchitis on 5/2/24 - for which she was treated with course of prednisone as well as albuterol as needed.  Patient is here for follow-up regarding no improvement.  - Does depicts video visit of bilateral maxillary sinusitis with tenderness, shared decision to start off on Augmentin with repeat of prednisone given the severity of symptoms with greenish secretions from both sinuses as she endorses.  - amoxicillin-clavulanate (AUGMENTIN) 875-125 MG tablet; Take 1 tablet by mouth 2 times daily  Dispense: 14 tablet; Refill: 0  - predniSONE (DELTASONE) 10 MG tablet; 4 tabs daily for 2 days, then 3 tabs daily for 2 days, then 2 tabs daily for 2 days, then 1 tab daily for 2 days, then stop  Dispense: 20 tablet; Refill: 0    2. Inflammatory arthritis  3. Rheumatoid arthritis involving multiple sites, unspecified whether rheumatoid factor present (H)  4. Hereditary hemochromatosis (H24)  Chronic stable conditions, patient currently on DMARDs.  These other risk factors for being on antibiotic coverage for the above-mentioned ongoing uncontrolled sinusitis.    5. Adjustment disorder with anxious mood  6. Controlled substance agreement signed 10/24/2023  7. Chronic prescription benzodiazepine use  Chronic stable, continue current Klonopin as managed by PCP.  Emphasized on need for CSA renewal in the upcoming in person office visit as mentioned in the AVS below.         Please note that this note consists of symbols derived from keyboarding, dictation and/or voice recognition software. As a result, there may be  errors in the script that have gone undetected. Please consider this when interpreting information found in this chart.    Patient Instructions   As discussed, you will need antibiotic coverage given  the risk factors as well as ongoing severity of symptoms. Sent in medications accordingly.     Will consider this visit for your Klonipin refills as well , when due for it but will need the controlled substance contract renewed possibly in 9/2023 which we will do follow up again.   Return in about 4 months (around 9/14/2024), or if symptoms worsen or fail to improve, for video visit, anxiety and renewal of controlled substance contract.    Mercedez Britton MD  [facility] ROMULO Luong is a 64 year old, presenting for the following health issues:  Cough (Still has symptoms of bronchitis which she was seen for on 5/02) and Sinus Problem        5/14/2024     9:12 AM   Additional Questions   Roomed by Nataliia Aguilar  This is a recurrent problem. The current episode started more than 1 week ago. The problem occurs every few hours. The problem has not changed since onset.The cough is Productive of sputum. There has been no fever. Associated symptoms include wheezing. The treatment provided mild relief.          Last seen patient in February 2024 for follow-up on anxiety, and requesting for 2-month supplies of Klonopin as she was traveling, does have risk factors of territory hemochromatosis, alcohol dependence, rheumatoid arthritis with DMARDs as managed by them.           Allergies   Allergen Reactions    Codeine Nausea and Nausea and Vomiting    Nitrofurantoin Unknown and Other (See Comments)     Other reaction(s): Gastrointestinal      Ace Inhibitors Cough     lisinopril  lisinopril  lisinopril    Elemental Sulfur Unknown    Hydrochlorothiazide Unknown     Severe Hyponatremia less than 120  Severe Hyponatremia less than 120    Sulfa Antibiotics     Sulfatolamide     Sulfur Unknown         Past Medical History:   Diagnosis Date    ASCUS favor benign 09/2014    3 yr co-test    Hereditary hemochromatosis (H24)     HTN (hypertension)     Impaired renal function     Microscopic colitis     Osteoarthritis of first carpometacarpal joint     bilateral    Other specified acquired hypothyroidism     RA (rheumatoid arthritis) (H)     Seasonal allergic rhinitis     Spider veins     Symptomatic menopausal or female climacteric states     age 43, on HRT    Tricuspid regurgitation     +1-2 TR noted on 12/22/14 Echo       Past Surgical History:   Procedure Laterality Date    ABDOMEN SURGERY  01/30/1995    C section    BIOPSY BREAST      C/SECTION, LOW TRANSVERSE      twins    COLONOSCOPY  2013    nl, next one due in 5 years    CV CORONARY ANGIOGRAM N/A 06/30/2022    Procedure: Coronary Angiogram;  Surgeon: Jose Antonio Ferraro MD;  Location:  HEART CARDIAC CATH LAB    CV LEFT HEART CATH N/A 06/30/2022    Procedure: Left Heart Catheterization;  Surgeon: Jose Antonio Ferraro MD;  Location:  HEART CARDIAC CATH LAB    CV LEFT VENTRICULOGRAM N/A 06/30/2022    Procedure: Left Ventriculogram;  Surgeon: Jose Antonio Ferraro MD;  Location:  HEART CARDIAC CATH LAB    CV RIGHT HEART CATH MEASUREMENTS RECORDED N/A 06/30/2022    Procedure: Right Heart Catheterization;  Surgeon: Jose Antonio Ferraro MD;  Location:  HEART CARDIAC CATH LAB    LEEP TX, CERVICAL  1990s    normal since that time    MYRINGOTOMY, INSERT TUBE, COMBINED Left 04/2015    chronic NORM, ETD    ORTHOPEDIC SURGERY  Oct 2021    New right hip    REPLACEMENT TOTAL HIP W/  RESURFACING IMPLANTS Right     SOFT TISSUE SURGERY  Oct,  2021    Gluteus, medius or tendon repair       Family History   Problem Relation Age of Onset    Cancer - colorectal Father         age 50    Colon Cancer Father     Arthritis Mother     Cancer - colorectal Brother         age 50    Colon Cancer Brother     Hypertension Sister     Hypertension Brother     Colon Cancer Brother          John passed away in 2022.    Prostate Cancer Brother     Kidney Cancer Brother     Prostate Cancer Brother     Arthritis Sister     Cancer Sister 53        Uterine    Cancer Sister 50        Uterine    Testicular cancer Nephew     Prostate Cancer Brother     Thyroid Disease No family hx of        Social History     Tobacco Use    Smoking status: Former     Current packs/day: 0.00     Average packs/day: 0.3 packs/day for 10.0 years (2.6 ttl pk-yrs)     Types: Cigarettes     Start date: 1977     Quit date: 1987     Years since quittin.0    Smokeless tobacco: Never    Tobacco comments:     Was a social smoker   Substance Use Topics    Alcohol use: Yes     Comment: A couple a day        Current Outpatient Medications   Medication Sig Dispense Refill    acetaminophen (TYLENOL) 500 MG tablet Take 1-2 tablets (500-1,000 mg) by mouth every 4 hours as needed for mild pain 30 tablet 0    albuterol (PROAIR HFA/PROVENTIL HFA/VENTOLIN HFA) 108 (90 Base) MCG/ACT inhaler Inhale 2 puffs into the lungs every 6 hours as needed for shortness of breath, wheezing or cough 18 g 0    amLODIPine (NORVASC) 5 MG tablet TAKE 1 TABLET(5 MG) BY MOUTH DAILY 90 tablet 0    amoxicillin-clavulanate (AUGMENTIN) 875-125 MG tablet Take 1 tablet by mouth 2 times daily 14 tablet 0    Biotin 5000 MCG TABS Take 1 tablet by mouth daily       clonazePAM (KLONOPIN) 0.5 MG tablet TAKE 1 TABLET BY MOUTH AT BEDTIME AS NEEDED FOR ANXIETY 90 tablet 0    hydroxychloroquine (PLAQUENIL) 200 MG tablet Take 2 tablets (400 mg) by mouth daily 90 tablet 3    ipratropium (ATROVENT) 0.03 % nasal spray Spray 2 sprays into both nostrils daily       levothyroxine (SYNTHROID/LEVOTHROID) 75 MCG tablet Take 1 tablet (75 mcg) by mouth every morning 90 tablet 0    liothyronine (CYTOMEL) 5 MCG tablet TAKE 2 TABLETS BY MOUTH EVERY  tablet 2    loratadine (CLARITIN) 10 MG tablet Take 1 tablet (10 mg) by mouth daily 90 tablet 3    losartan (COZAAR) 100 MG  tablet TAKE 1 TABLET(100 MG) BY MOUTH DAILY 90 tablet 3    metoprolol succinate ER (TOPROL XL) 100 MG 24 hr tablet TAKE 1 TABLET(100 MG) BY MOUTH DAILY 90 tablet 2    predniSONE (DELTASONE) 10 MG tablet 4 tabs daily for 2 days, then 3 tabs daily for 2 days, then 2 tabs daily for 2 days, then 1 tab daily for 2 days, then stop 20 tablet 0    sodium chloride 1 GM tablet TAKE 1 TABLET(1 GRAM) BY MOUTH DAILY 60 tablet 1    benzonatate (TESSALON) 100 MG capsule Take 1 capsule (100 mg) by mouth 3 times daily as needed for cough (Patient not taking: Reported on 5/14/2024) 30 capsule 0     No current facility-administered medications for this visit.          Review of Systems  Constitutional, HEENT, cardiovascular, pulmonary, GI, , musculoskeletal, neuro, skin, endocrine and psych systems are negative, except as otherwise noted.      Objective           Vitals:  No vitals were obtained today due to virtual visit.    Physical Exam   GENERAL: alert and no distress  EYES: Eyes grossly normal to inspection.  No discharge or erythema, or obvious scleral/conjunctival abnormalities.  RESP: No audible wheeze, cough, or visible cyanosis.    SKIN: Visible skin clear. No significant rash, abnormal pigmentation or lesions.  NEURO: Cranial nerves grossly intact.  Mentation and speech appropriate for age.  PSYCH: Appropriate affect, tone, and pace of words      Video-Visit Details    Type of service:  Video Visit   Originating Location (pt. Location): Home    Distant Location (provider location):  On-site  Platform used for Video Visit: Pari  Signed Electronically by: Mercedez Britton MD

## 2024-05-20 DIAGNOSIS — F43.22 ADJUSTMENT DISORDER WITH ANXIOUS MOOD: ICD-10-CM

## 2024-05-20 RX ORDER — CLONAZEPAM 0.5 MG/1
TABLET ORAL
Qty: 30 TABLET | Refills: 2 | Status: SHIPPED | OUTPATIENT
Start: 2024-05-24 | End: 2024-08-15

## 2024-05-20 NOTE — TELEPHONE ENCOUNTER
RX [place] reviewed:  reviewed- no concerns    [place]:  [email or web address]    Refill done from 5/24/24 as per >     Thank you  Mercedez Britton MD on 5/20/2024 at 2:07 PM

## 2024-05-23 DIAGNOSIS — I10 ESSENTIAL HYPERTENSION: ICD-10-CM

## 2024-05-23 DIAGNOSIS — Z00.00 ROUTINE GENERAL MEDICAL EXAMINATION AT A HEALTH CARE FACILITY: ICD-10-CM

## 2024-05-23 RX ORDER — METOPROLOL SUCCINATE 100 MG/1
TABLET, EXTENDED RELEASE ORAL
Qty: 90 TABLET | Refills: 1 | Status: SHIPPED | OUTPATIENT
Start: 2024-05-23

## 2024-06-01 ENCOUNTER — TELEPHONE (OUTPATIENT)
Dept: NURSING | Facility: CLINIC | Age: 64
End: 2024-06-01

## 2024-06-01 ENCOUNTER — OFFICE VISIT (OUTPATIENT)
Dept: URGENT CARE | Facility: URGENT CARE | Age: 64
End: 2024-06-01
Payer: MEDICARE

## 2024-06-01 ENCOUNTER — ANCILLARY PROCEDURE (OUTPATIENT)
Dept: GENERAL RADIOLOGY | Facility: CLINIC | Age: 64
End: 2024-06-01
Attending: FAMILY MEDICINE
Payer: MEDICARE

## 2024-06-01 ENCOUNTER — NURSE TRIAGE (OUTPATIENT)
Dept: NURSING | Facility: CLINIC | Age: 64
End: 2024-06-01
Payer: MEDICARE

## 2024-06-01 VITALS
DIASTOLIC BLOOD PRESSURE: 88 MMHG | SYSTOLIC BLOOD PRESSURE: 145 MMHG | HEART RATE: 65 BPM | RESPIRATION RATE: 20 BRPM | TEMPERATURE: 98.7 F | OXYGEN SATURATION: 100 %

## 2024-06-01 DIAGNOSIS — R05.2 SUBACUTE COUGH: ICD-10-CM

## 2024-06-01 DIAGNOSIS — Z86.39 HISTORY OF HEMOCHROMATOSIS: Primary | ICD-10-CM

## 2024-06-01 DIAGNOSIS — R51.9 ACUTE NONINTRACTABLE HEADACHE, UNSPECIFIED HEADACHE TYPE: ICD-10-CM

## 2024-06-01 LAB
ANION GAP SERPL CALCULATED.3IONS-SCNC: 12 MMOL/L (ref 7–15)
BASOPHILS # BLD AUTO: 0 10E3/UL (ref 0–0.2)
BASOPHILS NFR BLD AUTO: 1 %
BUN SERPL-MCNC: 8.3 MG/DL (ref 8–23)
CALCIUM SERPL-MCNC: 9.2 MG/DL (ref 8.8–10.2)
CHLORIDE SERPL-SCNC: 95 MMOL/L (ref 98–107)
CREAT SERPL-MCNC: 0.84 MG/DL (ref 0.51–0.95)
DEPRECATED HCO3 PLAS-SCNC: 24 MMOL/L (ref 22–29)
EGFRCR SERPLBLD CKD-EPI 2021: 76 ML/MIN/1.73M2
EOSINOPHIL # BLD AUTO: 0.1 10E3/UL (ref 0–0.7)
EOSINOPHIL NFR BLD AUTO: 3 %
ERYTHROCYTE [DISTWIDTH] IN BLOOD BY AUTOMATED COUNT: 12.4 % (ref 10–15)
GLUCOSE SERPL-MCNC: 115 MG/DL (ref 70–99)
HCT VFR BLD AUTO: 38.5 % (ref 35–47)
HGB BLD-MCNC: 13.2 G/DL (ref 11.7–15.7)
IMM GRANULOCYTES # BLD: 0 10E3/UL
IMM GRANULOCYTES NFR BLD: 0 %
LYMPHOCYTES # BLD AUTO: 1.2 10E3/UL (ref 0.8–5.3)
LYMPHOCYTES NFR BLD AUTO: 29 %
MCH RBC QN AUTO: 31.6 PG (ref 26.5–33)
MCHC RBC AUTO-ENTMCNC: 34.3 G/DL (ref 31.5–36.5)
MCV RBC AUTO: 92 FL (ref 78–100)
MONOCYTES # BLD AUTO: 0.7 10E3/UL (ref 0–1.3)
MONOCYTES NFR BLD AUTO: 18 %
NEUTROPHILS # BLD AUTO: 2 10E3/UL (ref 1.6–8.3)
NEUTROPHILS NFR BLD AUTO: 49 %
PLATELET # BLD AUTO: 213 10E3/UL (ref 150–450)
POTASSIUM SERPL-SCNC: 4.3 MMOL/L (ref 3.4–5.3)
RBC # BLD AUTO: 4.18 10E6/UL (ref 3.8–5.2)
SODIUM SERPL-SCNC: 131 MMOL/L (ref 135–145)
WBC # BLD AUTO: 4 10E3/UL (ref 4–11)

## 2024-06-01 PROCEDURE — 80048 BASIC METABOLIC PNL TOTAL CA: CPT | Performed by: FAMILY MEDICINE

## 2024-06-01 PROCEDURE — 85025 COMPLETE CBC W/AUTO DIFF WBC: CPT | Performed by: FAMILY MEDICINE

## 2024-06-01 PROCEDURE — 71046 X-RAY EXAM CHEST 2 VIEWS: CPT | Mod: TC | Performed by: RADIOLOGY

## 2024-06-01 PROCEDURE — 87798 DETECT AGENT NOS DNA AMP: CPT | Performed by: FAMILY MEDICINE

## 2024-06-01 PROCEDURE — 99207 PR NO CHARGE LOS: CPT

## 2024-06-01 PROCEDURE — 36415 COLL VENOUS BLD VENIPUNCTURE: CPT | Performed by: FAMILY MEDICINE

## 2024-06-01 PROCEDURE — 83540 ASSAY OF IRON: CPT | Performed by: FAMILY MEDICINE

## 2024-06-01 PROCEDURE — 83550 IRON BINDING TEST: CPT | Performed by: FAMILY MEDICINE

## 2024-06-01 PROCEDURE — 82728 ASSAY OF FERRITIN: CPT | Performed by: FAMILY MEDICINE

## 2024-06-01 PROCEDURE — 99214 OFFICE O/P EST MOD 30 MIN: CPT | Performed by: FAMILY MEDICINE

## 2024-06-01 NOTE — TELEPHONE ENCOUNTER
Pt is phoning stating that she was seen at [facility] and had labs completed - pt is calling stating that her Sodium level is low from labs today at 131.     Pt would like a call back ASAP as she is worried - pt can be reached at [phone] - pt states that she informed [facility] that she has been lightheaded and dizzy.     No triage     Cheyenne Navarro RN  [place] Nurse Advisor  1:15 PM 6/1/2024

## 2024-06-01 NOTE — PROGRESS NOTES
"  Assessment & Plan     History of hemochromatosis  Will send labs for  a check.  I forwarded  to PCP.   I advised patient that urgent care staff would not  be following up on these results.  She  will  keep an eye out and  alert her  pcp and  hematologist.     - Iron and iron binding capacity; Future  - Ferritin; Future  - Iron and iron binding capacity  - Ferritin    Acute nonintractable headache, unspecified headache type  Had this in the past when she had severe hyponatremia  Feels safe to go home until results   Ordered stat   If too low  will need to send  to  ED   - Basic metabolic panel  (Ca, Cl, CO2, Creat, Gluc, K, Na, BUN); Future  - Basic metabolic panel  (Ca, Cl, CO2, Creat, Gluc, K, Na, BUN)    Subacute cough  Due  to outbreak will  check  pertussis   Cxr  appears normal    Likely  just  viral  illness   taking  some time  to  improve   - B. pertussis/parapertussis PCR-NP  - XR Chest 2 Views; Future  - CBC with platelets and differential; Future  - CBC with platelets and differential      BMI  Estimated body mass index is 26.43 kg/m  as calculated from the following:    Height as of 5/2/24: 1.6 m (5' 3\").    Weight as of 5/2/24: 67.7 kg (149 lb 3.2 oz).       Follow  up with pcp      Sonali Luong is a 64 year old, presenting for the following health issues:  Headache (Top area of head for 3 days. This happened before and low Sodium level) and Cough (With chest congestion for 4 weeks)    HPI     Has a headache for a few days   4 years ago had this headache and had a sodium of 118 at the time   No vision change  This is not the first or the worst headache   She is a bit dizzy    No falls or presyncope       Has hemachromatosis     Cough and clearing throat for 4 weeks           Objective    BP (!) 145/88   Pulse 65   Temp 98.7  F (37.1  C)   Resp 20   SpO2 100%   There is no height or weight on file to calculate BMI.    Physical Exam  Constitutional:       General: She is not in acute " distress.  Eyes:      General: No scleral icterus.  Cardiovascular:      Rate and Rhythm: Normal rate and regular rhythm.      Heart sounds: Normal heart sounds.   Pulmonary:      Effort: No respiratory distress.      Comments: Right base crackles  Neurological:      Mental Status: She is alert.   Psychiatric:         Mood and Affect: Mood normal.         Behavior: Behavior normal.                Signed Electronically by: Ian Hedrick DO

## 2024-06-01 NOTE — TELEPHONE ENCOUNTER
Called again.  Less headache, feeling a bit better. Will be drinking some electrolyte fluid.  Encouraged her to follow up with her  primary care in a few days.

## 2024-06-01 NOTE — TELEPHONE ENCOUNTER
"Pt is phoning stating that she is having headaches and that when she has had a similar headache in the past it was discovered that pt had very low sodium and was hospitalized.     Pt states that she is drinking fluids with electrolytes in it    No dizziness    No blurred vision      No weakness or numbness on one side of pts body     Rates headache 6/10    No fever     Per disposition: See PCP Within 24 Hours     Pt will be going to [facility] now [place] evaluation     Care advice given per protocol and when to call back. Pt verbalized understanding and agrees to plan of care.    Cheyenne Navarro RN  [place] Nurse Advisor  9:58 AM 6/1/2024            Reason for Disposition   [1] MODERATE headache (e.g., interferes with normal activities) AND [2] present > 24 hours AND [3] unexplained  (Exceptions: analgesics not tried, typical migraine, or headache part of viral illness)    Additional Information   Negative: Difficult to awaken or acting confused (e.g., disoriented, slurred speech)   Negative: [1] Weakness of the face, arm or leg on one side of the body AND [2] new-onset   Negative: [1] Numbness of the face, arm or leg on one side of the body AND [2] new-onset   Negative: [1] Loss of speech or garbled speech AND [2] new-onset   Negative: Passed out (i.e., lost consciousness, collapsed and was not responding)   Negative: Sounds like a life-threatening emergency to the triager   Negative: Followed a head injury   Negative: Pregnant   Negative: Postpartum (from 0 to 6 weeks after delivery)   Negative: Traumatic Brain Injury (TBI) is suspected   Negative: Unable to walk, or can only walk with assistance (e.g., requires support)   Negative: Stiff neck (can't touch chin to chest)   Negative: Severe pain in one eye   Negative: [1] Other family members (or people in same household) with headaches AND [2] possibility of carbon monoxide exposure   Negative: [1] SEVERE headache (e.g., excruciating) AND [2] \"worst headache\" of life   " Negative: [1] SEVERE headache AND [2] sudden-onset (i.e., reaching maximum intensity within seconds to 1 hour)   Negative: [1] SEVERE headache AND [2] fever   Negative: Loss of vision or double vision  (Exception: Same as prior migraines.)   Negative: [1] Fever > 100.0 F (37.8 C) AND [2] diabetes mellitus or weak immune system (e.g., HIV positive, cancer chemo, splenectomy, organ transplant, chronic steroids)   Negative: Patient sounds very sick or weak to the triager   Negative: [1] SEVERE headache (e.g., excruciating) AND [2] not improved after 2 hours of pain medicine   Negative: [1] Vomiting AND [2] 2 or more times  (Exception: Similar to previous migraines.)   Negative: Fever > 104 F (40 C)    Protocols used: Headache-A-AH

## 2024-06-02 LAB
B PARAPERT DNA SPEC QL NAA+PROBE: NOT DETECTED
B PERT DNA SPEC QL NAA+PROBE: NOT DETECTED
FERRITIN SERPL-MCNC: 167 NG/ML (ref 11–328)
IRON BINDING CAPACITY (ROCHE): 258 UG/DL (ref 240–430)
IRON SATN MFR SERPL: 46 % (ref 15–46)
IRON SERPL-MCNC: 119 UG/DL (ref 37–145)

## 2024-06-03 ENCOUNTER — INFUSION THERAPY VISIT (OUTPATIENT)
Dept: INFUSION THERAPY | Facility: CLINIC | Age: 64
End: 2024-06-03
Attending: INTERNAL MEDICINE
Payer: MEDICARE

## 2024-06-03 ENCOUNTER — TELEPHONE (OUTPATIENT)
Dept: ONCOLOGY | Facility: CLINIC | Age: 64
End: 2024-06-03
Payer: MEDICARE

## 2024-06-03 ENCOUNTER — TELEPHONE (OUTPATIENT)
Dept: FAMILY MEDICINE | Facility: CLINIC | Age: 64
End: 2024-06-03
Payer: MEDICARE

## 2024-06-03 VITALS
DIASTOLIC BLOOD PRESSURE: 71 MMHG | HEART RATE: 76 BPM | TEMPERATURE: 98.4 F | SYSTOLIC BLOOD PRESSURE: 101 MMHG | RESPIRATION RATE: 16 BRPM

## 2024-06-03 DIAGNOSIS — E83.19 IRON OVERLOAD: Primary | ICD-10-CM

## 2024-06-03 DIAGNOSIS — E83.110 HEREDITARY HEMOCHROMATOSIS (H): ICD-10-CM

## 2024-06-03 PROCEDURE — 99195 PHLEBOTOMY: CPT

## 2024-06-03 RX ORDER — HEPARIN SODIUM (PORCINE) LOCK FLUSH IV SOLN 100 UNIT/ML 100 UNIT/ML
5 SOLUTION INTRAVENOUS
OUTPATIENT
Start: 2024-06-03

## 2024-06-03 RX ORDER — HEPARIN SODIUM,PORCINE 10 UNIT/ML
5 VIAL (ML) INTRAVENOUS
OUTPATIENT
Start: 2024-06-03

## 2024-06-03 ASSESSMENT — PAIN SCALES - GENERAL: PAINLEVEL: NO PAIN (0)

## 2024-06-03 NOTE — TELEPHONE ENCOUNTER
Spoke to patient and relayed provider message. Patient verbalized understanding of the message. No questions at this time.    Pt declined MA BP visit -- BP at her infusion appt today was down to  and . Triage educated that she should take her BP at home when she gets headaches and to let us know if her headaches correlate with elevated BP.     Adenike Mejia RN

## 2024-06-03 NOTE — PROGRESS NOTES
Infusion Nursing Note:  Irina Hanks presents today for therapeutic phlebotomy.    Patient seen by provider today: No   present during visit today: Not Applicable.    Note: pt denies any changes in health or new concerns.      Intravenous Access:  Lab draw site left AC, Needle type straight, Gauge 17.5.    Treatment Conditions:  Lab Results   Component Value Date    HGB 13.2 06/01/2024    WBC 4.0 06/01/2024    ANEU 6.4 08/13/2021    ANEUTAUTO 2.0 06/01/2024     06/01/2024        Results reviewed, labs MET treatment parameters, ok to proceed with treatment.      Post procedure Assessment:  Pt denies any lightheadedness upon discharge.       Discharge Plan:   Patient and/or family verbalized understanding of discharge instructions and all questions answered.  AVS to patient via [employer].  Patient will return 6 months for next appointment.   Patient discharged in stable condition accompanied by: self.  Departure Mode: Ambulatory.      Florence Rose RN

## 2024-06-03 NOTE — TELEPHONE ENCOUNTER
Pt is calling.  States that she was seen in urgent care over the past weekend and had labs done.  CBC RESULTS:   Recent Labs   Lab Test 06/01/24  1116   WBC 4.0   RBC 4.18   HGB 13.2   HCT 38.5   MCV 92   MCH 31.6   MCHC 34.3   RDW 12.4         Ferritin 167    Pt meets parameters for phlebotomy.  Writer checked with charge nurse. Pt has been scheduled for phlebotomy today at 2:00 pm.    Pt states that she has already reached out to her PCP clinic regarding her low sodium and cough that were evaluated at urgent care for follow up with PCP clinic.    Patient verbalized understanding and agreement with plan.  Patient was instructed to call the clinic with any questions, concerns, or worsening symptoms.    Carla Carter RN on 6/3/2024 at 11:17 AM

## 2024-06-03 NOTE — TELEPHONE ENCOUNTER
Pt has chronic hyponatremia and remaining at baseline. As long as its above 130s I wouldn't be worried. Pt is already being managed on Salt tablets by me. Sodium levels on 6/1/2024 is normal.     Recommend to us Nasal saline spray once every 2 hours as I believe her headaches are due to Sinusitis with which she has seen me recently on 5/14/24 when we gave antibiotics as well.     Recommend to quit alcohol as well with BP log for next 5 days since she has uncontrolled HTN due to withdrawal of alcohol possibly which will need adjustment of BP medication for normalization >> which could be causing her headaches as well.   RN BP check appt for our documentation.     Thank you  Mercedez Britton MD on 6/3/2024 at 1:53 PM

## 2024-06-03 NOTE — TELEPHONE ENCOUNTER
She was seen at  06/01/2024 and was informed she has hyponatremia with advice to follow up with PCP.   Pt called requesting advice from PCP re: sodium recheck. Should she wait a week to recheck sodium ? Would PCP approve use of same day appt this week for follow up. Pt states symptoms - HA and dizziness have improved but still has symptoms.

## 2024-06-04 DIAGNOSIS — E22.2 SIADH (SYNDROME OF INAPPROPRIATE ADH PRODUCTION) (H): ICD-10-CM

## 2024-06-04 DIAGNOSIS — E87.1 HYPONATREMIA: ICD-10-CM

## 2024-06-04 DIAGNOSIS — I10 ESSENTIAL HYPERTENSION: ICD-10-CM

## 2024-06-04 RX ORDER — AMLODIPINE BESYLATE 5 MG/1
5 TABLET ORAL DAILY
Qty: 90 TABLET | Refills: 0 | Status: SHIPPED | OUTPATIENT
Start: 2024-06-04 | End: 2024-08-30

## 2024-06-04 RX ORDER — SODIUM CHLORIDE 1 G/1
TABLET ORAL
Qty: 60 TABLET | Refills: 1 | Status: SHIPPED | OUTPATIENT
Start: 2024-06-04

## 2024-06-17 ENCOUNTER — MYC MEDICAL ADVICE (OUTPATIENT)
Dept: FAMILY MEDICINE | Facility: CLINIC | Age: 64
End: 2024-06-17
Payer: MEDICARE

## 2024-06-18 ENCOUNTER — RESULTS ONLY (OUTPATIENT)
Dept: ADMINISTRATIVE | Facility: CLINIC | Age: 64
End: 2024-06-18

## 2024-06-18 ENCOUNTER — HOSPITAL ENCOUNTER (EMERGENCY)
Facility: CLINIC | Age: 64
Discharge: HOME | End: 2024-06-18
Attending: EMERGENCY MEDICINE | Admitting: EMERGENCY MEDICINE
Payer: MEDICARE

## 2024-06-18 VITALS
TEMPERATURE: 98 F | RESPIRATION RATE: 16 BRPM | DIASTOLIC BLOOD PRESSURE: 75 MMHG | HEART RATE: 99 BPM | SYSTOLIC BLOOD PRESSURE: 135 MMHG | OXYGEN SATURATION: 97 %

## 2024-06-18 DIAGNOSIS — N30.00 ACUTE CYSTITIS WITHOUT HEMATURIA: ICD-10-CM

## 2024-06-18 DIAGNOSIS — R11.2 NAUSEA AND VOMITING, UNSPECIFIED VOMITING TYPE: ICD-10-CM

## 2024-06-18 DIAGNOSIS — R51.9 ACUTE NONINTRACTABLE HEADACHE, UNSPECIFIED HEADACHE TYPE: ICD-10-CM

## 2024-06-18 DIAGNOSIS — R50.9 FEVER, UNSPECIFIED FEVER CAUSE: ICD-10-CM

## 2024-06-18 LAB
ALBUMIN SERPL BCG-MCNC: 3.8 G/DL (ref 3.5–5.2)
ALBUMIN UR-MCNC: NEGATIVE MG/DL
ALP SERPL-CCNC: 108 U/L (ref 40–150)
ALT SERPL W P-5'-P-CCNC: 13 U/L (ref 0–50)
ANION GAP SERPL CALCULATED.3IONS-SCNC: 13 MMOL/L (ref 7–15)
APPEARANCE UR: CLEAR
AST SERPL W P-5'-P-CCNC: 20 U/L (ref 0–45)
ATRIAL RATE - MUSE: 113 BPM
B BURGDOR IGG+IGM SER QL: 0.07
BACTERIA #/AREA URNS HPF: ABNORMAL /HPF
BASOPHILS # BLD AUTO: 0 10E3/UL (ref 0–0.2)
BASOPHILS NFR BLD AUTO: 1 %
BILIRUB SERPL-MCNC: 0.7 MG/DL
BILIRUB UR QL STRIP: NEGATIVE
BUN SERPL-MCNC: 13.7 MG/DL (ref 8–23)
CALCIUM SERPL-MCNC: 9.2 MG/DL (ref 8.8–10.2)
CHLORIDE SERPL-SCNC: 97 MMOL/L (ref 98–107)
COLOR UR AUTO: ABNORMAL
CREAT SERPL-MCNC: 1.09 MG/DL (ref 0.51–0.95)
DEPRECATED HCO3 PLAS-SCNC: 23 MMOL/L (ref 22–29)
DIASTOLIC BLOOD PRESSURE - MUSE: NORMAL MMHG
EGFRCR SERPLBLD CKD-EPI 2021: 56 ML/MIN/1.73M2
EOSINOPHIL # BLD AUTO: 0 10E3/UL (ref 0–0.7)
EOSINOPHIL NFR BLD AUTO: 0 %
ERYTHROCYTE [DISTWIDTH] IN BLOOD BY AUTOMATED COUNT: 12.6 % (ref 10–15)
FLUAV RNA SPEC QL NAA+PROBE: NEGATIVE
FLUBV RNA RESP QL NAA+PROBE: NEGATIVE
GLUCOSE SERPL-MCNC: 109 MG/DL (ref 70–99)
GLUCOSE UR STRIP-MCNC: NEGATIVE MG/DL
HCT VFR BLD AUTO: 34.9 % (ref 35–47)
HGB BLD-MCNC: 12 G/DL (ref 11.7–15.7)
HGB UR QL STRIP: ABNORMAL
IMM GRANULOCYTES # BLD: 0 10E3/UL
IMM GRANULOCYTES NFR BLD: 0 %
INTERPRETATION ECG - MUSE: NORMAL
KETONES UR STRIP-MCNC: NEGATIVE MG/DL
LACTATE SERPL-SCNC: 1.4 MMOL/L (ref 0.7–2)
LEUKOCYTE ESTERASE UR QL STRIP: ABNORMAL
LYMPHOCYTES # BLD AUTO: 0.5 10E3/UL (ref 0.8–5.3)
LYMPHOCYTES NFR BLD AUTO: 6 %
MCH RBC QN AUTO: 31.7 PG (ref 26.5–33)
MCHC RBC AUTO-ENTMCNC: 34.4 G/DL (ref 31.5–36.5)
MCV RBC AUTO: 92 FL (ref 78–100)
MONOCYTES # BLD AUTO: 0.2 10E3/UL (ref 0–1.3)
MONOCYTES NFR BLD AUTO: 2 %
NEUTROPHILS # BLD AUTO: 6.9 10E3/UL (ref 1.6–8.3)
NEUTROPHILS NFR BLD AUTO: 90 %
NITRATE UR QL: NEGATIVE
NRBC # BLD AUTO: 0 10E3/UL
NRBC BLD AUTO-RTO: 0 /100
P AXIS - MUSE: 54 DEGREES
PH UR STRIP: 5.5 [PH] (ref 5–7)
PLATELET # BLD AUTO: 183 10E3/UL (ref 150–450)
POTASSIUM SERPL-SCNC: 4.2 MMOL/L (ref 3.4–5.3)
PR INTERVAL - MUSE: 142 MS
PROT SERPL-MCNC: 6.9 G/DL (ref 6.4–8.3)
QRS DURATION - MUSE: 62 MS
QT - MUSE: 328 MS
QTC - MUSE: 449 MS
R AXIS - MUSE: 75 DEGREES
RBC # BLD AUTO: 3.78 10E6/UL (ref 3.8–5.2)
RBC URINE: 1 /HPF
RSV RNA SPEC NAA+PROBE: NEGATIVE
SARS-COV-2 RNA RESP QL NAA+PROBE: NEGATIVE
SODIUM SERPL-SCNC: 133 MMOL/L (ref 135–145)
SP GR UR STRIP: 1.01 (ref 1–1.03)
SYSTOLIC BLOOD PRESSURE - MUSE: NORMAL MMHG
T AXIS - MUSE: -86 DEGREES
TROPONIN T SERPL HS-MCNC: 29 NG/L
TROPONIN T SERPL HS-MCNC: 30 NG/L
UROBILINOGEN UR STRIP-MCNC: NORMAL MG/DL
VENTRICULAR RATE- MUSE: 113 BPM
WBC # BLD AUTO: 7.6 10E3/UL (ref 4–11)
WBC URINE: 23 /HPF

## 2024-06-18 PROCEDURE — 80053 COMPREHEN METABOLIC PANEL: CPT | Performed by: EMERGENCY MEDICINE

## 2024-06-18 PROCEDURE — 93005 ELECTROCARDIOGRAM TRACING: CPT

## 2024-06-18 PROCEDURE — 87637 SARSCOV2&INF A&B&RSV AMP PRB: CPT | Performed by: EMERGENCY MEDICINE

## 2024-06-18 PROCEDURE — 36415 COLL VENOUS BLD VENIPUNCTURE: CPT | Performed by: EMERGENCY MEDICINE

## 2024-06-18 PROCEDURE — 83605 ASSAY OF LACTIC ACID: CPT | Performed by: EMERGENCY MEDICINE

## 2024-06-18 PROCEDURE — 86618 LYME DISEASE ANTIBODY: CPT | Performed by: EMERGENCY MEDICINE

## 2024-06-18 PROCEDURE — 99284 EMERGENCY DEPT VISIT MOD MDM: CPT | Mod: 25

## 2024-06-18 PROCEDURE — 84484 ASSAY OF TROPONIN QUANT: CPT | Performed by: EMERGENCY MEDICINE

## 2024-06-18 PROCEDURE — 96374 THER/PROPH/DIAG INJ IV PUSH: CPT

## 2024-06-18 PROCEDURE — 81001 URINALYSIS AUTO W/SCOPE: CPT | Performed by: EMERGENCY MEDICINE

## 2024-06-18 PROCEDURE — 250N000011 HC RX IP 250 OP 636: Mod: JZ | Performed by: EMERGENCY MEDICINE

## 2024-06-18 PROCEDURE — 87086 URINE CULTURE/COLONY COUNT: CPT | Performed by: EMERGENCY MEDICINE

## 2024-06-18 PROCEDURE — 87186 SC STD MICRODIL/AGAR DIL: CPT | Performed by: EMERGENCY MEDICINE

## 2024-06-18 PROCEDURE — 85025 COMPLETE CBC W/AUTO DIFF WBC: CPT | Performed by: EMERGENCY MEDICINE

## 2024-06-18 PROCEDURE — 96375 TX/PRO/DX INJ NEW DRUG ADDON: CPT

## 2024-06-18 PROCEDURE — 250N000013 HC RX MED GY IP 250 OP 250 PS 637: Performed by: EMERGENCY MEDICINE

## 2024-06-18 PROCEDURE — 96361 HYDRATE IV INFUSION ADD-ON: CPT

## 2024-06-18 PROCEDURE — 258N000003 HC RX IP 258 OP 636: Mod: JZ | Performed by: EMERGENCY MEDICINE

## 2024-06-18 PROCEDURE — 96376 TX/PRO/DX INJ SAME DRUG ADON: CPT

## 2024-06-18 RX ORDER — ONDANSETRON 2 MG/ML
4 INJECTION INTRAMUSCULAR; INTRAVENOUS ONCE
Status: COMPLETED | OUTPATIENT
Start: 2024-06-18 | End: 2024-06-18

## 2024-06-18 RX ORDER — CEFDINIR 300 MG/1
300 CAPSULE ORAL ONCE
Status: COMPLETED | OUTPATIENT
Start: 2024-06-18 | End: 2024-06-18

## 2024-06-18 RX ORDER — ACETAMINOPHEN 325 MG/1
650 TABLET ORAL ONCE
Status: COMPLETED | OUTPATIENT
Start: 2024-06-18 | End: 2024-06-18

## 2024-06-18 RX ORDER — KETOROLAC TROMETHAMINE 15 MG/ML
15 INJECTION, SOLUTION INTRAMUSCULAR; INTRAVENOUS ONCE
Status: COMPLETED | OUTPATIENT
Start: 2024-06-18 | End: 2024-06-18

## 2024-06-18 RX ORDER — CEFDINIR 300 MG/1
300 CAPSULE ORAL 2 TIMES DAILY
Qty: 14 CAPSULE | Refills: 0 | Status: SHIPPED | OUTPATIENT
Start: 2024-06-18 | End: 2024-06-25

## 2024-06-18 RX ORDER — ONDANSETRON 4 MG/1
4 TABLET, ORALLY DISINTEGRATING ORAL EVERY 8 HOURS PRN
Qty: 10 TABLET | Refills: 0 | Status: SHIPPED | OUTPATIENT
Start: 2024-06-18 | End: 2024-06-21

## 2024-06-18 RX ADMIN — ACETAMINOPHEN 650 MG: 325 TABLET, FILM COATED ORAL at 04:30

## 2024-06-18 RX ADMIN — ONDANSETRON 4 MG: 2 INJECTION INTRAMUSCULAR; INTRAVENOUS at 02:09

## 2024-06-18 RX ADMIN — SODIUM CHLORIDE 1000 ML: 9 INJECTION, SOLUTION INTRAVENOUS at 03:57

## 2024-06-18 RX ADMIN — KETOROLAC TROMETHAMINE 15 MG: 15 INJECTION, SOLUTION INTRAMUSCULAR; INTRAVENOUS at 03:58

## 2024-06-18 RX ADMIN — CEFDINIR 300 MG: 300 CAPSULE ORAL at 06:33

## 2024-06-18 RX ADMIN — ONDANSETRON 4 MG: 2 INJECTION INTRAMUSCULAR; INTRAVENOUS at 03:58

## 2024-06-18 RX ADMIN — SODIUM CHLORIDE 1000 ML: 9 INJECTION, SOLUTION INTRAVENOUS at 02:09

## 2024-06-18 ASSESSMENT — ACTIVITIES OF DAILY LIVING (ADL)
ADLS_ACUITY_SCORE: 37
ADLS_ACUITY_SCORE: 37
ADLS_ACUITY_SCORE: 35
ADLS_ACUITY_SCORE: 37

## 2024-06-18 ASSESSMENT — COLUMBIA-SUICIDE SEVERITY RATING SCALE - C-SSRS
1. IN THE PAST MONTH, HAVE YOU WISHED YOU WERE DEAD OR WISHED YOU COULD GO TO SLEEP AND NOT WAKE UP?: NO
6. HAVE YOU EVER DONE ANYTHING, STARTED TO DO ANYTHING, OR PREPARED TO DO ANYTHING TO END YOUR LIFE?: NO
2. HAVE YOU ACTUALLY HAD ANY THOUGHTS OF KILLING YOURSELF IN THE PAST MONTH?: NO

## 2024-06-18 NOTE — ED TRIAGE NOTES
N&V, headache, and chills since Thursday night.     Triage Assessment (Adult)       Row Name 06/18/24 0154          Triage Assessment    Airway WDL WDL        Respiratory WDL    Respiratory WDL WDL        Skin Circulation/Temperature WDL    Skin Circulation/Temperature WDL WDL        Cardiac WDL    Cardiac WDL WDL        Peripheral/Neurovascular WDL    Peripheral Neurovascular WDL WDL        Cognitive/Neuro/Behavioral WDL    Cognitive/Neuro/Behavioral WDL --  headache

## 2024-06-18 NOTE — DISCHARGE INSTRUCTIONS
Discharge Instructions  Urinary Tract Infection  You or your child have been diagnosed with a urinary tract infection, or UTI. The urinary tract includes the kidneys (which make urine/pee), ureters (the tubes that carry urine/pee from the kidneys to the bladder), the bladder (which stores urine/pee), and urethra (the tube that carries urine/pee out of the bladder). Urinary tract infections occur when bacteria travel up the urethra into the bladder (bladder infection) and, in some cases, from there into the kidneys (kidney infection).  Generally, every Emergency Department visit should have a follow-up clinic visit with either a primary or a specialty clinic/provider. Please follow-up as instructed by your emergency provider today.  Return to the Emergency Department if:  You or your child have severe back pain.  You or your child are vomiting (throwing up) so that you cannot take your medicine.  You or your child have a new fever (had not previously had a fever) over 101 F.  You or your child have confusion or are very weak, or feel very ill.  Your child seems much more ill, will not wake up, will not respond right, or is crying for a long time and will not calm down.  You or your child are showing signs of dehydration. These signs may include decreased urination (pee), dry mouth/gums/tongue, or decreased activity.    Follow-up with your provider:   Children under 24 months need to be seen by their regular provider within one week after a diagnosis of a UTI. It may be necessary to do some more tests to look at the child s kidney or bladder.  You should begin to feel better within 24 - 48 hours of starting your antibiotic; follow-up with your regular clinic/doctor/provider if this is not the case.    Treatment:   You will be treated with an antibiotic to kill the bacteria. We have to make an educated guess, based on what we know about common bacteria and antibiotics, as to which antibiotic will work for your  "infection. We will be correct most times but there will be some cases where the antibiotic chosen is not correct (see urine cultures below).  Take a pain medication such as acetaminophen (Tylenol ) or ibuprofen (Advil , Motrin , Nuprin ).  Phenazopyridine (Pyridium , Uristat ) is a prescription medication that numbs the bladder to reduce the burning pain of some UTIs.  The same medication is available in a non-prescription version (Azo-Standard , Urodol ). This medication will change the color of the urine and tears (usually blue or orange). If you wear contacts, do not wear them while taking this medication as they may be stained by the medication.    Urine Cultures:  If indicated, a urine culture may have been performed today. This test generally takes 24-48 hours to complete so the results are not known at this time. The results can confirm that an infection is present but also determine which antibiotic is effective for the specific bacteria that is causing the infection. If your urine culture shows that the antibiotic you were given today will not work to treat your infection, we will attempt to contact you to make arrangements to change the antibiotic. If the culture confirms that the antibiotic is effective for your infection, you will not be contacted. We often recommend follow-up with your regular physician/provider on the culture results regardless of this process.    Antibiotic Warning:   If you have been placed on antibiotics - watch for signs of allergic reaction.  These include rash, lip swelling, difficulty breathing, wheezing, and dizziness.  If you develop any of these symptoms, stop the antibiotic immediately and go to an emergency room or urgent care for evaluation.    Probiotics: If you have been given an antibiotic, you may want to also take a probiotic pill or eat yogurt with live cultures. Probiotics have \"good bacteria\" to help your intestines stay healthy. Studies have shown that probiotics " help prevent diarrhea and other intestine problems (including C. diff infection) when you take antibiotics. You can buy these without a prescription in the pharmacy section of the store.   If you were given a prescription for medicine here today, be sure to read all of the information (including the package insert) that comes with your prescription.  This will include important information about the medicine, its side effects, and any warnings that you need to know about.  The pharmacist who fills the prescription can provide more information and answer questions you may have about the medicine.  If you have questions or concerns that the pharmacist cannot address, please call or return to the Emergency Department.   Remember that you can always come back to the Emergency Department if you are not able to see your regular provider in the amount of time listed above, if you get any new symptoms, or if there is anything that worries you.     Discharge Instructions  Vomiting    You have been seen today for vomiting (throwing up). This is usually caused by a virus, but some bacteria, parasites, medicines or other medical conditions can cause similar symptoms. At this time your provider does not find that your vomiting is a sign of anything dangerous or life-threatening. However, sometimes the signs of serious illness do not show up right away. If you have new or worse symptoms, you may need to be seen again in the Emergency Department or by your primary provider. Remember that serious problems like appendicitis can start as vomiting.    Generally, every Emergency Department visit should have a follow-up clinic visit with either a primary or a specialty clinic/provider. Please follow-up as instructed by your emergency provider today.    Return to the Emergency Department if:  You keep vomiting and you are not able to keep liquids down.   You feel you are getting dehydrated, such as being very thirsty, not urinating  (peeing) at least every 8-12 hours, or feeling faint or lightheaded.   You develop a new fever, or your fever continues for more than 2 days.   You have abdominal (belly pain) that seems worse than cramps, is in one spot, or is getting worse over time. Appendicitis usually causes pain in the right lower abdomen (to the right and below your belly button) so watch for pain in this location.  You have blood in your vomit or stools.   You feel very weak.  You are not starting to improve within 24 hours of your visit here.     What can I do to help myself?  The most important thing to do is to drink clear liquids. If you have been vomiting a lot, it is best to have only small, frequent sips of liquids. Drinking too much at once may cause more vomiting. If you are vomiting often, you must replace minerals, sodium and potassium lost with your illness. Pedialyte  is the best available rehydration liquid but some find that it doesn t taste good so sports drinks are an alterative. You can also drink clear liquids such as water, weak tea, apple juice, and 7-Up . Avoid acid liquids (orange), caffeine (coffee) or alcohol. Do not drink milk until you no longer have diarrhea (loose stools).   After liquids are staying down, you may start eating mild foods. Soda crackers, toast, plain noodles, gelatin, applesauce and bananas are good first choices. Avoid foods that have acid, are spicy, fatty or have a lot of fiber (such as meats, coarse grains, vegetables). You may start eating these foods again in about 3 days when you are better.   Sometimes treatment includes prescription medicine to prevent nausea (sick to your stomach) and vomiting. If your provider prescribes these for you, take them as directed.   Do not take ibuprofen, naproxen, or other nonsteroidal anti-inflammatory (NSAID) medicines without checking with your healthcare provider.     If you were given a prescription for medicine here today, be sure to read all of the  information (including the package insert) that comes with your prescription.  This will include important information about the medicine, its side effects, and any warnings that you need to know about.  The pharmacist who fills the prescription can provide more information and answer questions you may have about the medicine.  If you have questions or concerns that the pharmacist cannot address, please call or return to the Emergency Department.     Remember that you can always come back to the Emergency Department if you are not able to see your regular provider in the amount of time listed above, if you get any new symptoms, or if there is anything that worries you.     Discharge Instructions  Headache    You were seen today for a headache. Headaches may be caused by many different things such as muscle tension, sinus inflammation, anxiety and stress, having too little sleep, too much alcohol, some medical conditions or injury. You may have a migraine, which is caused by changes in the blood vessels in your head.  At this time your provider does not find that your headache is a sign of anything dangerous or life-threatening.  However, sometimes the signs of serious illness do not show up right away.      Generally, every Emergency Department visit should have a follow-up clinic visit with either a primary or a specialty clinic/provider. Please follow-up as instructed by your emergency provider today.    Return to the Emergency Department if:  You get a new fever of 100.4 F or higher.  Your headache gets much worse.  You get a stiff neck with your headache.  You get a new headache that is significantly different or worse than headaches you have had before.  You are vomiting (throwing up) and cannot keep food or water down.  You have blurry or double vision or other problems with your eyes.  You have a new weakness on one side of your body.  You have difficulty with balance which is new.  You or your family thinks  you are confused.  You have a seizure.    What can I do to help myself?  Pain medications - You may take a pain medication such as Tylenol  (acetaminophen), Advil , Motrin  (ibuprofen) or Aleve  (naproxen).  Take a pain reliever as soon as you notice symptoms.  Starting medications as soon as you start to have symptoms may lessen the amount of pain you have.  Relaxing in a quiet, dark room may help.  Get enough sleep and eat meals regularly.  You may need to watch for certain foods or other things which may trigger your headaches.  Keeping a journal of your headaches and possible triggers may help you and your primary provider to identify things which you should avoid which may be causing your headaches.  If you were given a prescription for medicine here today, be sure to read all of the information (including the package insert) that comes with your prescription.  This will include important information about the medicine, its side effects, and any warnings that you need to know about.  The pharmacist who fills the prescription can provide more information and answer questions you may have about the medicine.  If you have questions or concerns that the pharmacist cannot address, please call or return to the Emergency Department.   Remember that you can always come back to the Emergency Department if you are not able to see your regular provider in the amount of time listed above, if you get any new symptoms, or if there is anything that worries you.

## 2024-06-18 NOTE — ED PROVIDER NOTES
Emergency Department Note      History of Present Illness     Chief Complaint  Vomiting    HPI  Irina Hanks is a 64 year old female who presents to the emergency department with concerns of vomiting.  She reports that she has been having some urinary symptoms with urinary frequency and burning which had improved.  This past weekend she has been having nausea and vomiting.  She has had a headache and has developed fevers.  She denies any runny nose or sore throat.  She had a minimal cough.  She has had bodyaches.  No rashes.    Independent Historian  None    Review of External Notes  None  Past Medical History   Medical History and Problem List  Past Medical History:   Diagnosis Date    ASCUS favor benign 09/2014    Hereditary hemochromatosis (H24)     HTN (hypertension)     Impaired renal function     Microscopic colitis     Osteoarthritis of first carpometacarpal joint     Other specified acquired hypothyroidism     RA (rheumatoid arthritis) (H)     Seasonal allergic rhinitis     Spider veins     Symptomatic menopausal or female climacteric states     Tricuspid regurgitation        Medications  cefdinir (OMNICEF) 300 MG capsule  ondansetron (ZOFRAN ODT) 4 MG ODT tab  acetaminophen (TYLENOL) 500 MG tablet  albuterol (PROAIR HFA/PROVENTIL HFA/VENTOLIN HFA) 108 (90 Base) MCG/ACT inhaler  amLODIPine (NORVASC) 5 MG tablet  Biotin 5000 MCG TABS  clonazePAM (KLONOPIN) 0.5 MG tablet  hydroxychloroquine (PLAQUENIL) 200 MG tablet  ipratropium (ATROVENT) 0.03 % nasal spray  levothyroxine (SYNTHROID/LEVOTHROID) 75 MCG tablet  liothyronine (CYTOMEL) 5 MCG tablet  loratadine (CLARITIN) 10 MG tablet  losartan (COZAAR) 100 MG tablet  metoprolol succinate ER (TOPROL XL) 100 MG 24 hr tablet  sodium chloride 1 GM tablet        Surgical History   Past Surgical History:   Procedure Laterality Date    ABDOMEN SURGERY  01/30/1995    C section    BIOPSY BREAST      C/SECTION, LOW TRANSVERSE      twins    COLONOSCOPY  2013    nl,  next one due in 5 years    CV CORONARY ANGIOGRAM N/A 06/30/2022    Procedure: Coronary Angiogram;  Surgeon: Jose Antonio Ferraro MD;  Location:  [facility]    CV LEFT HEART CATH N/A 06/30/2022    Procedure: Left Heart Catheterization;  Surgeon: Jose Antonio Ferraro MD;  Location:  [facility]    CV LEFT VENTRICULOGRAM N/A 06/30/2022    Procedure: Left Ventriculogram;  Surgeon: Jose Antonio Ferraro MD;  Location:  [facility]    CV RIGHT HEART CATH MEASUREMENTS RECORDED N/A 06/30/2022    Procedure: Right Heart Catheterization;  Surgeon: Jose Antonio Ferraro MD;  Location:  [facility]ATH [facility]    LEEP TX, CERVICAL  1990s    normal since that time    MYRINGOTOMY, INSERT TUBE, COMBINED Left 04/2015    chronic NORM, ETD    ORTHOPEDIC SURGERY  Oct 2021    New right hip    REPLACEMENT TOTAL HIP W/  RESURFACING IMPLANTS Right     SOFT TISSUE SURGERY  Oct,  2021    Gluteus, medius or tendon repair     Physical Exam   Patient Vitals for the past 24 hrs:   BP Temp Pulse Resp SpO2   06/18/24 0155 135/75 98  F (36.7  C) 99 16 97 %     Physical Exam  Eyes:  The pupils are equal and round    Conjunctivae and sclerae are normal  ENT:    The nose is normal    Pinnae are normal    The oropharynx is normal  Neck:  Normal range of motion    There is no rigidity noted    Jolt accentuation negative  CV:  Regular rate and rhythm     No edema  Resp:  Lungs are clear    Non-labored    No rales    No wheezing   GI:  Abdomen is soft and non-tender, there is no rigidity    No distension    No rebound tenderness   MS:  Normal muscular tone    No asymmetric leg swelling  Skin:  No rash or acute skin lesions noted  Neuro:   Awake, alert.      Speech is normal and fluent.    Face is symmetric.     Moves all extremities       Diagnostics   Lab Results   Labs Ordered and Resulted from Time of ED Arrival to Time of ED Departure   COMPREHENSIVE METABOLIC PANEL - Abnormal       Result Value    Sodium 133 (*)      Potassium 4.2      Carbon Dioxide (CO2) 23      Anion Gap 13      Urea Nitrogen 13.7      Creatinine 1.09 (*)     GFR Estimate 56 (*)     Calcium 9.2      Chloride 97 (*)     Glucose 109 (*)     Alkaline Phosphatase 108      AST 20      ALT 13      Protein Total 6.9      Albumin 3.8      Bilirubin Total 0.7     CBC WITH PLATELETS AND DIFFERENTIAL - Abnormal    WBC Count 7.6      RBC Count 3.78 (*)     Hemoglobin 12.0      Hematocrit 34.9 (*)     MCV 92      MCH 31.7      MCHC 34.4      RDW 12.6      Platelet Count 183      % Neutrophils 90      % Lymphocytes 6      % Monocytes 2      % Eosinophils 0      % Basophils 1      % Immature Granulocytes 0      NRBCs per 100 WBC 0      Absolute Neutrophils 6.9      Absolute Lymphocytes 0.5 (*)     Absolute Monocytes 0.2      Absolute Eosinophils 0.0      Absolute Basophils 0.0      Absolute Immature Granulocytes 0.0      Absolute NRBCs 0.0     TROPONIN T, HIGH SENSITIVITY - Abnormal    Troponin T, High Sensitivity 29 (*)    UA MACROSCOPIC WITH REFLEX TO MICRO AND CULTURE - Abnormal    Color Urine Straw      Appearance Urine Clear      Glucose Urine Negative      Bilirubin Urine Negative      Ketones Urine Negative      Specific Gravity Urine 1.009      Blood Urine Small (*)     pH Urine 5.5      Protein Albumin Urine Negative      Urobilinogen Urine Normal      Nitrite Urine Negative      Leukocyte Esterase Urine Small (*)     Bacteria Urine Few (*)     RBC Urine 1      WBC Urine 23 (*)    TROPONIN T, HIGH SENSITIVITY - Abnormal    Troponin T, High Sensitivity 30 (*)    LACTIC ACID WHOLE BLOOD - Normal    Lactic Acid 1.4     INFLUENZA A/B, RSV, & SARS-COV2 PCR - Normal    Influenza A PCR Negative      Influenza B PCR Negative      RSV PCR Negative      SARS CoV2 PCR Negative     LYME DISEASE TOTAL ABS BLD WITH REFLEX TO CONFIRM CLIA   URINE CULTURE       Imaging  No orders to display       EKG   ECG results from 06/18/24   EKG 12 lead     Value    Systolic Blood Pressure      Diastolic Blood Pressure     Ventricular Rate 113    Atrial Rate 113    IN Interval 142    QRS Duration 62        QTc 449    P Axis 54    R AXIS 75    T Axis -86    Interpretation ECG      Sinus tachycardia  ST & T wave abnormality, consider inferior ischemia  ST & T wave abnormality, consider anterolateral ischemia  Abnormal ECG  When compared with ECG of 30-JUN-2022 07:50,  Vent. rate has increased BY  44 BPM       *Note: Due to a large number of results and/or encounters for the requested time period, some results have not been displayed. A complete set of results can be found in Results Review.        Independent Interpretation  None    ED Course    Medications Administered  Medications   sodium chloride 0.9% BOLUS 1,000 mL (0 mLs Intravenous Stopped 6/18/24 0353)   ondansetron (ZOFRAN) injection 4 mg (4 mg Intravenous $Given 6/18/24 0209)   sodium chloride 0.9% BOLUS 1,000 mL (0 mLs Intravenous Stopped 6/18/24 0517)   ondansetron (ZOFRAN) injection 4 mg (4 mg Intravenous $Given 6/18/24 0358)   ketorolac (TORADOL) injection 15 mg (15 mg Intravenous $Given 6/18/24 0358)   acetaminophen (TYLENOL) tablet 650 mg (650 mg Oral $Given 6/18/24 0430)   cefdinir (OMNICEF) capsule 300 mg (300 mg Oral $Given 6/18/24 0633)       Procedures  Procedures     Discussion of Management  None    Social Determinants of Health adding to complexity of care  None    ED Course     Medical Decision Making / Diagnosis   CMS Diagnoses: None    MIPS     None    [facility]  Irina Hanks is a 64 year old female who presents to the emergency room with a fever.  Patient has been feeling unwell for several days.  She began feeling shaky overnight.  She has been also having nausea and vomiting.  Workup here shows some evidence of UTI although the findings did not seem overwhelming.  Her white blood cell count was normal.  She did have an ECG performed and serial troponin test which were negative for significant changes.  Not having any  active chest pain.  Patient did have a headache.  Headache was improved with treatment of fever and hydration.  Tremor is improved with fever treatment and IV fluids.  Discussed possibility of meningitis although I think likelihood is low given her improvement here and normal white blood cell count.  Additionally her jolt accentuation is negative.  After discussion she did not want to proceed with lumbar puncture.  Discussed that if symptoms worsen or do not improve with treatments at home she is to return.  She was given a prescription for treatment of UTI.  She given cefdinir.  Return to the ER with any new or worrisome symptoms.    Disposition  The patient was discharged.     ICD-10 Codes:    ICD-10-CM    1. Acute cystitis without hematuria  N30.00       2. Acute nonintractable headache, unspecified headache type  R51.9       3. Fever, unspecified fever cause  R50.9       4. Nausea and vomiting, unspecified vomiting type  R11.2            Discharge Medications  Discharge Medication List as of 6/18/2024  6:29 AM        START taking these medications    Details   cefdinir (OMNICEF) 300 MG capsule Take 1 capsule (300 mg) by mouth 2 times daily for 7 days, Disp-14 capsule, R-0, E-Prescribe      ondansetron (ZOFRAN ODT) 4 MG ODT tab Take 1 tablet (4 mg) by mouth every 8 hours as needed, Disp-10 tablet, R-0, E-Prescribe             MD Dimas Adams, Bereekt Kuo MD  06/18/24 0715

## 2024-06-19 ENCOUNTER — PATIENT OUTREACH (OUTPATIENT)
Dept: FAMILY MEDICINE | Facility: CLINIC | Age: 64
End: 2024-06-19
Payer: MEDICARE

## 2024-06-19 DIAGNOSIS — J20.9 ACUTE BRONCHITIS, UNSPECIFIED ORGANISM: ICD-10-CM

## 2024-06-19 LAB
ATRIAL RATE - MUSE: 107 BPM
BACTERIA UR CULT: ABNORMAL
DIASTOLIC BLOOD PRESSURE - MUSE: NORMAL MMHG
INTERPRETATION ECG - MUSE: NORMAL
P AXIS - MUSE: 44 DEGREES
PR INTERVAL - MUSE: 158 MS
QRS DURATION - MUSE: 70 MS
QT - MUSE: 328 MS
QTC - MUSE: 437 MS
R AXIS - MUSE: 68 DEGREES
SYSTOLIC BLOOD PRESSURE - MUSE: NORMAL MMHG
T AXIS - MUSE: 252 DEGREES
VENTRICULAR RATE- MUSE: 107 BPM

## 2024-06-19 NOTE — TELEPHONE ENCOUNTER
During hosp f/u call pt requested refill of albuterol inhaler. Scheduled [facility] hosp f/u with PCP Dr. Britton 7/2/24.     Pended medication and pharmacy for review.    Thank you,  PHAN Jeong   [facility] Triage

## 2024-06-19 NOTE — TELEPHONE ENCOUNTER
Transitions of Care Outreach  Chief Complaint   Patient presents with    Hospital F/U       Most Recent Admission Date: 6/18/2024   Most Recent Admission Diagnosis:      Most Recent Discharge Date: 6/18/2024   Most Recent Discharge Diagnosis: Acute cystitis without hematuria - N30.00  Acute nonintractable headache, unspecified headache type - R51.9  Fever, unspecified fever cause - R50.9  Nausea and vomiting, unspecified vomiting type - R11.2     Transitions of Care Assessment    Discharge Assessment  How are you doing now that you are home?: doing better  How are your symptoms? (Red Flag symptoms escalate to triage hotline per guidelines): Improved  Do you know how to contact your clinic care team if you have future questions or changes to your health status? : Yes  Does the patient have their discharge instructions? : Yes  Does the patient have questions regarding their discharge instructions? : No  Were you started on any new medications or were there changes to any of your previous medications? : Yes (started on abx)  Does the patient have all of their medications?: Yes  Do you have questions regarding any of your medications? : No  Do you have all of your needed medical supplies or equipment (DME)?  (i.e. oxygen tank, CPAP, cane, etc.): Yes    Follow up Plan     Discharge Follow-Up  Discharge follow up appointment scheduled in alignment with recommended follow up timeframe or Transitions of Risk Category? (Low = within 30 days; Moderate= within 14 days; High= within 7 days): Yes  Discharge Follow Up Appointment Date: 07/02/24  Discharge Follow Up Appointment Scheduled with?: Primary Care Provider    Future Appointments   Date Time Provider Department Center   7/2/2024  3:00 PM Mercedez Britton MD [facility]       Outpatient Plan as outlined on AVS reviewed with patient.    For any urgent concerns, please contact our 24 hour nurse triage line: [phone] ([phone])       Adenike Mejia RN

## 2024-06-20 ENCOUNTER — TELEPHONE (OUTPATIENT)
Dept: NURSING | Facility: CLINIC | Age: 64
End: 2024-06-20
Payer: MEDICARE

## 2024-06-20 RX ORDER — ALBUTEROL SULFATE 90 UG/1
2 AEROSOL, METERED RESPIRATORY (INHALATION) EVERY 6 HOURS PRN
Qty: 18 G | Refills: 0 | Status: SHIPPED | OUTPATIENT
Start: 2024-06-20

## 2024-06-20 NOTE — TELEPHONE ENCOUNTER
[facility]    Reason for call: Lab Result Notification     Lab Result (including Rx patient on, if applicable).  If culture, copy of lab report at bottom.  Lab Result: Urine Culture - see below    ED Rx - cefdinir (OMNICEF) 300 MG capsule - Take 1 capsule (300 mg) by mouth 2 times daily for 7 days - Susceptible    Creatinine Level (mg/dl)   Creatinine   Date Value Ref Range Status   06/18/2024 1.09 (H) 0.51 - 0.95 mg/dL Final   06/15/2021 0.87 0.52 - 1.04 mg/dL Final    Creatinine clearance (ml/min), if applicable    Serum creatinine: 1.09 mg/dL (H) 06/18/24 0202  Estimated creatinine clearance: 46.9 mL/min (A)     Patient presented to [facility] ED on 6/18/24 with vomiting, urinary frequency and burning.    RN Recommendations/Instructions per [facility] ED lab result protocol:   [facility] ED lab result protocol utilized: Urine Culture - final positive          Unable to reach patient/caregiver.     Left voicemail message requesting a call back to [phone] between 9 a.m. and 5:30 p.m. for patient's ED/UC lab results.      Letter pended to be sent via Integration Management.    Rhiannon Stone RN

## 2024-06-20 NOTE — TELEPHONE ENCOUNTER
[facility]    Reason for call: Lab Result Notification     Lab Result (including Rx patient on, if applicable).  If culture, copy of lab report at bottom.  Lab Result: Urine Culture  6/19/24 Prescribed Cefdinir (Omnicef) 300 mg capsule, 1 capsule (300 mg) by mouth 2 times daily for 7 days     Creatinine Level (mg/dl)   Creatinine   Date Value Ref Range Status   06/18/2024 1.09 (H) 0.51 - 0.95 mg/dL Final   06/15/2021 0.87 0.52 - 1.04 mg/dL Final    Creatinine clearance (ml/min), if applicable    Serum creatinine: 1.09 mg/dL (H) 06/18/24 0202  Estimated creatinine clearance: 46.9 mL/min (A)     6/20/24 1:33 PM pt returning call left by ED Results Team   Patient's current Symptoms:   Pt states she is feeling 90% better, pain and burning with urination much improved    RN Recommendations/Instructions per [facility] ED lab result protocol:   [facility] ED lab result protocol utilized: Urine Culture    Patient/care giver notified to contact your PCP clinic or return to the Emergency department if your:  Symptoms return.  Symptoms do not improve after 3 days on antibiotic.  Symptoms do not resolve after completing antibiotic.  Symptoms worsen or other concerning symptoms.    Radha Prince RN

## 2024-06-20 NOTE — LETTER
June 20, 2024        Irina Hanks  [address]  [place] [address]          Dear Irina Hanks:    You were seen in the [facility] Emergency Department at [facility] on 6/18/2024.  We are unable to reach you by phone, so we are sending you this letter.     It is important that you call [facility] Emergency Department lab result nurse at [phone], as we have information to relay to you AND/OR we MAY have to make some changes in your treatment.    Best time to call back is between 9AM and 5:30PM, 7 days a week.      Sincerely,     [facility] Emergency Department Lab Result RN  [phone]

## 2024-06-21 NOTE — TELEPHONE ENCOUNTER
Patient calling in with more questions about e.coli in UTI's. Patient is improving on current treatment, but still having night sweats and feels tired. Encouraged patient to finish full course of antibiotics and follow-up with PCP and/or return to ED if symptoms worsen. Educated patient and answered questions about preventative measures, etc. Patient acknowledged and had no further questions.     Rhiannon Stone RN on 6/21/2024 at 10:16 AM

## 2024-06-27 ENCOUNTER — VIRTUAL VISIT (OUTPATIENT)
Dept: FAMILY MEDICINE | Facility: CLINIC | Age: 64
End: 2024-06-27
Payer: MEDICARE

## 2024-06-27 DIAGNOSIS — N39.0 URINARY TRACT INFECTION WITH HEMATURIA, SITE UNSPECIFIED: Primary | ICD-10-CM

## 2024-06-27 DIAGNOSIS — R31.9 URINARY TRACT INFECTION WITH HEMATURIA, SITE UNSPECIFIED: Primary | ICD-10-CM

## 2024-06-27 DIAGNOSIS — N17.9 AKI (ACUTE KIDNEY INJURY) (H): ICD-10-CM

## 2024-06-27 DIAGNOSIS — Z79.899 CHRONIC PRESCRIPTION BENZODIAZEPINE USE: ICD-10-CM

## 2024-06-27 DIAGNOSIS — F43.22 ADJUSTMENT DISORDER WITH ANXIOUS MOOD: ICD-10-CM

## 2024-06-27 PROCEDURE — 99214 OFFICE O/P EST MOD 30 MIN: CPT | Mod: 95 | Performed by: INTERNAL MEDICINE

## 2024-06-27 NOTE — PATIENT INSTRUCTIONS
As discussed , will recheck your Kidney function and Urine culture to make sure they are normalized.

## 2024-06-27 NOTE — PROGRESS NOTES
Cherise is a 64 year old who is being evaluated via a billable video visit.    How would you like to obtain your AVS? Mail a copy  If the video visit is dropped, the invitation should be resent by: Text to cell phone: [phone]  Will anyone else be joining your video visit? No        Assessment and Plan    1. Urinary tract infection with hematuria, site unspecified  Recent ER visit on June 18, 2024.  Episode of vomiting.  She was positive for UTI E. coli.  Does have ongoing night sweats feels tired.  Currently on cefdinir antibiotic for the same which she has completed and she feels much better.  -Given the below concerns of ALYSSA as well as make sure that the urine culture is negative.  Patient had elevated troponin levels on the vomitings episodes most likely she had in the hospital visit, denies any chest pains at this time.  Reassured that there is no need for rechecking on this.  Patient understood and agreed the plan.  - REVIEW OF HEALTH MAINTENANCE PROTOCOL ORDERS  - Basic metabolic panel  (Ca, Cl, CO2, Creat, Gluc, K, Na, BUN); Future  - Urine Culture Aerobic Bacterial - lab collect; Future    2. ALYSSA (acute kidney injury) (H24)  New problem as per the recent hospital labs reviewed.  Patient never had previous CKD in the past, will recheck to make sure this is normalized.  Patient had questions multiple which have answered them all including avoiding NSAIDs which patient endorses that she has been taking lately high doses of ibuprofen for her back pain.  Emphasized to quit that and consider only Tylenol which she understands.  - Basic metabolic panel  (Ca, Cl, CO2, Creat, Gluc, K, Na, BUN); Future    3. Adjustment disorder with anxious mood  4. Chronic prescription benzodiazepine use  Chronic stable condition, will continue to prescribe the same dose of Klonopin which is helping her.            MED REC REQUIRED  Post Medication Reconciliation Status:  Discharge medications reconciled and changed, see  notes/orders        Please note that this note consists of symbols derived from keyboarding, dictation and/or voice recognition software. As a result, there may be errors in the script that have gone undetected. Please consider this when interpreting information found in this chart.    Patient Instructions   As discussed , will recheck your Kidney function and Urine culture to make sure they are normalized.       Return in about 3 months (around 9/27/2024), or if symptoms worsen or fail to improve, for anxiety.    Mercedez Britton MD  [facility] [place] is a 64 year old, presenting for the following health issues:  Hospital F/U (Emergency Room Follow up 6/18/24 - [facility] )        6/27/2024     3:44 PM   Additional Questions   Roomed by Severiano TUCKER     ED/UC Followup:    Facility:  [facility]  Date of visit: 6/18/24  Reason for visit: Acute cystitis without hematuria   Current Status: Stable        Allergies   Allergen Reactions    Codeine Nausea and Nausea and Vomiting    Nitrofurantoin Unknown and Other (See Comments)     Other reaction(s): Gastrointestinal      Ace Inhibitors Cough     lisinopril  lisinopril  lisinopril    Elemental Sulfur Unknown    Hydrochlorothiazide Unknown     Severe Hyponatremia less than 120  Severe Hyponatremia less than 120    Morphine And Codeine Nausea    Sulfa Antibiotics     Sulfatolamide     Sulfur Unknown        Past Medical History:   Diagnosis Date    ASCUS favor benign 09/2014    3 yr co-test    Hereditary hemochromatosis (H24)     HTN (hypertension)     Impaired renal function     Microscopic colitis     Osteoarthritis of first carpometacarpal joint     bilateral    Other specified acquired hypothyroidism     RA (rheumatoid arthritis) (H)     Seasonal allergic rhinitis     Spider veins     Symptomatic menopausal or female climacteric states     age 43, on HRT    Tricuspid regurgitation     +1-2 TR noted on 12/22/14 Echo       Past  Surgical History:   Procedure Laterality Date    ABDOMEN SURGERY  01/30/1995    C section    BIOPSY BREAST      C/SECTION, LOW TRANSVERSE      twins    COLONOSCOPY  2013    nl, next one due in 5 years    CV CORONARY ANGIOGRAM N/A 06/30/2022    Procedure: Coronary Angiogram;  Surgeon: Jose Antonio Ferraro MD;  Location:  HEART CARDIAC CATH LAB    CV LEFT HEART CATH N/A 06/30/2022    Procedure: Left Heart Catheterization;  Surgeon: Jose Antonio Ferraro MD;  Location:  HEART CARDIAC CATH LAB    CV LEFT VENTRICULOGRAM N/A 06/30/2022    Procedure: Left Ventriculogram;  Surgeon: Jose Antonio Ferraro MD;  Location:  HEART CARDIAC CATH LAB    CV RIGHT HEART CATH MEASUREMENTS RECORDED N/A 06/30/2022    Procedure: Right Heart Catheterization;  Surgeon: Jose Antonio Ferraro MD;  Location:  HEART CARDIAC CATH LAB    LEEP TX, CERVICAL  1990s    normal since that time    MYRINGOTOMY, INSERT TUBE, COMBINED Left 04/2015    chronic NORM, ETD    ORTHOPEDIC SURGERY  Oct 2021    New right hip    REPLACEMENT TOTAL HIP W/  RESURFACING IMPLANTS Right     SOFT TISSUE SURGERY  Oct,  2021    Gluteus, medius or tendon repair       Family History   Problem Relation Age of Onset    Cancer - colorectal Father         age 50    Colon Cancer Father     Arthritis Mother     Cancer - colorectal Brother         age 50    Colon Cancer Brother     Hypertension Sister     Hypertension Brother     Colon Cancer Brother         John passed away in October 2022.    Prostate Cancer Brother     Kidney Cancer Brother     Prostate Cancer Brother     Arthritis Sister     Cancer Sister 53        Uterine    Cancer Sister 50        Uterine    Testicular cancer Nephew     Prostate Cancer Brother     Thyroid Disease No family hx of        Social History     Tobacco Use    Smoking status: Former     Current packs/day: 0.00     Average packs/day: 0.3 packs/day for 10.0 years (2.6 ttl pk-yrs)     Types: Cigarettes     Start date: 4/26/1977     Quit date: 4/26/1987      Years since quittin.1    Smokeless tobacco: Never    Tobacco comments:     Was a social smoker   Substance Use Topics    Alcohol use: Yes     Comment: A couple a day        Current Outpatient Medications   Medication Sig Dispense Refill    albuterol (PROAIR HFA/PROVENTIL HFA/VENTOLIN HFA) 108 (90 Base) MCG/ACT inhaler Inhale 2 puffs into the lungs every 6 hours as needed for shortness of breath, wheezing or cough 18 g 0    amLODIPine (NORVASC) 5 MG tablet TAKE 1 TABLET(5 MG) BY MOUTH DAILY 90 tablet 0    Biotin 5000 MCG TABS Take 1 tablet by mouth daily       clonazePAM (KLONOPIN) 0.5 MG tablet TAKE 1 TABLET BY MOUTH AT BEDTIME AS NEEDED FOR ANXIETY 30 tablet 2    hydroxychloroquine (PLAQUENIL) 200 MG tablet Take 2 tablets (400 mg) by mouth daily 90 tablet 3    ipratropium (ATROVENT) 0.03 % nasal spray Spray 2 sprays into both nostrils daily       levothyroxine (SYNTHROID/LEVOTHROID) 75 MCG tablet Take 1 tablet (75 mcg) by mouth every morning 90 tablet 0    liothyronine (CYTOMEL) 5 MCG tablet TAKE 2 TABLETS BY MOUTH EVERY  tablet 2    loratadine (CLARITIN) 10 MG tablet Take 1 tablet (10 mg) by mouth daily 90 tablet 3    losartan (COZAAR) 100 MG tablet TAKE 1 TABLET(100 MG) BY MOUTH DAILY 90 tablet 3    metoprolol succinate ER (TOPROL XL) 100 MG 24 hr tablet TAKE 1 TABLET(100 MG) BY MOUTH DAILY 90 tablet 1    sodium chloride 1 GM tablet TAKE 1 TABLET(1 GRAM) BY MOUTH DAILY 60 tablet 1    acetaminophen (TYLENOL) 500 MG tablet Take 1-2 tablets (500-1,000 mg) by mouth every 4 hours as needed for mild pain 30 tablet 0     No current facility-administered medications for this visit.          Review of Systems  Constitutional, HEENT, cardiovascular, pulmonary, GI, , musculoskeletal, neuro, skin, endocrine and psych systems are negative, except as otherwise noted.      Objective    Vitals - Patient Reported  Pain Score: No Pain (0)        Physical Exam   GENERAL: alert and no distress  EYES: Eyes grossly  normal to inspection.  No discharge or erythema, or obvious scleral/conjunctival abnormalities.  RESP: No audible wheeze, cough, or visible cyanosis.    SKIN: Visible skin clear. No significant rash, abnormal pigmentation or lesions.  NEURO: Cranial nerves grossly intact.  Mentation and speech appropriate for age.  PSYCH: Appropriate affect, tone, and pace of words      Video-Visit Details    Type of service:  Video Visit   Originating Location (pt. Location): Home    Distant Location (provider location):  On-site  Platform used for Video Visit: Narendra  Signed Electronically by: Mercedez Britton MD

## 2024-06-27 NOTE — PROGRESS NOTES
Cherise is a 64 year old who is being evaluated via a billable telephone visit.    What phone number would you like to be contacted at? [phone]  How would you like to obtain your AVS? MyChart  Originating Location (pt. Location): Home  {PROVIDER LOCATION On-site should be selected for visits conducted from your clinic location or adjoining [facility] hospital, academic office, or other nearby [facility] building. Off-site should be selected for all other provider locations, including home:701194}  Distant Location (provider location):  {virtual location provider:511468}    {PROVIDER CHARTING PREFERENCE:805080}    Subjective   Cherise is a 64 year old, presenting for the following health issues:  Hospital F/U (Emergency Room Follow up 6/18/24 - [facility] )        6/27/2024     3:44 PM   Additional Questions   Roomed by Severiano JAIME     [facility]     ED/UC Followup:    Facility:  [facility]  Date of visit: 6/18/24  Reason for visit: Acute cystitis without hematuria   Current Status: Great   {additonal problems for provider to add (Optional):439223}    {ROS Picklists (Optional):445020}      Objective    Vitals - Patient Reported  Pain Score: No Pain (0)      Vitals:  No vitals were obtained today due to virtual visit.    Physical Exam   General: Alert and no distress //Respiratory: No audible wheeze, cough, or shortness of breath // Psychiatric:  Appropriate affect, tone, and pace of words      {Diagnostic Test Results (Optional):105190}      Phone call duration: *** minutes  Signed Electronically by: Mercedez Britton MD  {Email feedback regarding this note to [email or web address]   :772760}

## 2024-06-28 ENCOUNTER — VIRTUAL VISIT (OUTPATIENT)
Dept: FAMILY MEDICINE | Facility: OTHER | Age: 64
End: 2024-06-28
Payer: MEDICARE

## 2024-06-28 ENCOUNTER — LAB (OUTPATIENT)
Dept: LAB | Facility: CLINIC | Age: 64
End: 2024-06-28
Payer: MEDICARE

## 2024-06-28 DIAGNOSIS — N39.0 URINARY TRACT INFECTION WITH HEMATURIA, SITE UNSPECIFIED: ICD-10-CM

## 2024-06-28 DIAGNOSIS — L28.2 PRURITIC RASH: Primary | ICD-10-CM

## 2024-06-28 DIAGNOSIS — R31.9 URINARY TRACT INFECTION WITH HEMATURIA, SITE UNSPECIFIED: ICD-10-CM

## 2024-06-28 DIAGNOSIS — N17.9 AKI (ACUTE KIDNEY INJURY) (H): ICD-10-CM

## 2024-06-28 LAB
ANION GAP SERPL CALCULATED.3IONS-SCNC: 14 MMOL/L (ref 7–15)
BUN SERPL-MCNC: 12.6 MG/DL (ref 8–23)
CALCIUM SERPL-MCNC: 9.4 MG/DL (ref 8.8–10.2)
CHLORIDE SERPL-SCNC: 98 MMOL/L (ref 98–107)
CREAT SERPL-MCNC: 0.82 MG/DL (ref 0.51–0.95)
DEPRECATED HCO3 PLAS-SCNC: 22 MMOL/L (ref 22–29)
EGFRCR SERPLBLD CKD-EPI 2021: 78 ML/MIN/1.73M2
GLUCOSE SERPL-MCNC: 100 MG/DL (ref 70–99)
POTASSIUM SERPL-SCNC: 4.4 MMOL/L (ref 3.4–5.3)
SODIUM SERPL-SCNC: 134 MMOL/L (ref 135–145)

## 2024-06-28 PROCEDURE — 99213 OFFICE O/P EST LOW 20 MIN: CPT | Mod: 95 | Performed by: STUDENT IN AN ORGANIZED HEALTH CARE EDUCATION/TRAINING PROGRAM

## 2024-06-28 PROCEDURE — 87086 URINE CULTURE/COLONY COUNT: CPT

## 2024-06-28 PROCEDURE — 36415 COLL VENOUS BLD VENIPUNCTURE: CPT

## 2024-06-28 PROCEDURE — 80048 BASIC METABOLIC PNL TOTAL CA: CPT

## 2024-06-28 RX ORDER — TRIAMCINOLONE ACETONIDE 1 MG/G
OINTMENT TOPICAL 2 TIMES DAILY PRN
Qty: 80 G | Refills: 0 | Status: SHIPPED | OUTPATIENT
Start: 2024-06-28

## 2024-06-28 RX ORDER — FAMOTIDINE 20 MG/1
20 TABLET, FILM COATED ORAL DAILY
Qty: 30 TABLET | Refills: 0 | Status: SHIPPED | OUTPATIENT
Start: 2024-06-28

## 2024-06-28 RX ORDER — CETIRIZINE HYDROCHLORIDE 10 MG/1
10 TABLET ORAL DAILY
Qty: 30 TABLET | Refills: 0 | Status: SHIPPED | OUTPATIENT
Start: 2024-06-28 | End: 2024-07-28

## 2024-06-28 NOTE — PROGRESS NOTES
Cherise is a 64 year old who is being evaluated via a billable video visit.    How would you like to obtain your AVS? MyChart  If the video visit is dropped, the invitation should be resent by: Text to cell phone: [phone]  Will anyone else be joining your video visit? No      Assessment & Plan     Pruritic rash  Rash developed this morning in particular under her right breast, low back and was quite pruritic and erythematous, patient did take Benadryl shortly thereafter and it has improved approximately 95% back to normal.  No new soaps, lotions, detergents, etc. or any other exposure that the patient can think of.  Continue with Benadryl and assuming it is still working could consider Zyrtec, Pepcid, and triamcinolone as needed to control itch.  Worrisome signs and symptoms discussed  - cetirizine (ZYRTEC) 10 MG tablet; Take 1 tablet (10 mg) by mouth daily for 30 days  - famotidine (PEPCID) 20 MG tablet; Take 1 tablet (20 mg) by mouth daily  - triamcinolone (KENALOG) 0.1 % external ointment; Apply topically 2 times daily as needed for irritation            Subjective   Cherise is a 64 year old, presenting for the following health issues:  No chief complaint on file.        6/28/2024     1:23 PM   Additional Questions   Roomed by reji   Accompanied by self       Video Start Time: 1:30 PM    History of Present Illness       Reason for visit:  Red itchy rash on my back inside of my arms and underneath my breast  Symptom onset:  Today  Symptom intensity:  Moderate  Symptom progression:  Improving  Had these symptoms before:  No  What makes it worse:  No  What makes it better:  Yes, I took two Benadryl    She eats 2-3 servings of fruits and vegetables daily.She consumes 1 sweetened beverage(s) daily.She exercises with enough effort to increase her heart rate 10 to 19 minutes per day.  She exercises with enough effort to increase her heart rate 4 days per week.   She is taking medications regularly.        Rash  Onset/Duration: today  Description  Location: back, inside of arms and under breast  Character: blotchy, red  Itching: mild - right now after benadryl  Intensity:  moderate  Progression of Symptoms:  improving  Accompanying signs and symptoms:   Fever: No  Body aches or joint pain: No  Sore throat symptoms: No  Recent cold symptoms: No  History:           Previous episodes of similar rash: None  New exposures:  medication - Plaquenil  Recent travel: No  Exposure to similar rash: No  Precipitating or alleviating factors: none  Therapies tried and outcome: Benadryl/diphenhydramine -  effective          Objective             Physical Exam   GENERAL: alert and no distress  EYES: Eyes grossly normal to inspection.  No discharge or erythema, or obvious scleral/conjunctival abnormalities.  RESP: No audible wheeze, cough, or visible cyanosis.    SKIN: Visible skin clear. No significant rash, abnormal pigmentation or lesions.  NEURO: Cranial nerves grossly intact.  Mentation and speech appropriate for age.  PSYCH: Appropriate affect, tone, and pace of words          Video-Visit Details    Type of service:  Video Visit   Video End Time:1:37 PM  Originating Location (pt. Location): Home    Distant Location (provider location):  Off-site  Platform used for Video Visit: Pari  Signed Electronically by: QUEENIE RATLIFF MD

## 2024-06-29 LAB — BACTERIA UR CULT: NO GROWTH

## 2024-07-14 DIAGNOSIS — E03.9 ACQUIRED HYPOTHYROIDISM: ICD-10-CM

## 2024-07-15 RX ORDER — LIOTHYRONINE SODIUM 5 UG/1
TABLET ORAL
Qty: 180 TABLET | Refills: 0 | Status: SHIPPED | OUTPATIENT
Start: 2024-07-15

## 2024-07-21 DIAGNOSIS — E03.9 ACQUIRED HYPOTHYROIDISM: ICD-10-CM

## 2024-07-22 ENCOUNTER — NURSE TRIAGE (OUTPATIENT)
Dept: FAMILY MEDICINE | Facility: CLINIC | Age: 64
End: 2024-07-22
Payer: MEDICARE

## 2024-07-22 DIAGNOSIS — U07.1 INFECTION DUE TO 2019 NOVEL CORONAVIRUS: Primary | ICD-10-CM

## 2024-07-22 RX ORDER — LEVOTHYROXINE SODIUM 75 UG/1
TABLET ORAL
Qty: 90 TABLET | Refills: 2 | Status: SHIPPED | OUTPATIENT
Start: 2024-07-22

## 2024-07-22 NOTE — TELEPHONE ENCOUNTER
Nurse Triage SBAR    Is this a 2nd Level Triage? NO    Situation: Patient's Covid 19 symptoms started yesterday. Symptoms are mild to moderate. Denies chest pain or pressure or difficulty breathing. States that Dr. Britton advised patient to try to schedule with Dr. Britton if possible.     Background: Patient states that she has 6 autoimmune diseases. Patient is on amlodipine which requires provider visit for Paxlovid.     Assessment: Covid 19 in a high risk patient requesting Paxlovid.     Protocol Recommended Disposition:   Discuss With PCP And Callback By Nurse Within 1 Hour    Recommendation: Patient is requesting Paxlovid. No virtual visit in clinics or virtual UC for Covid 19 treatment. Routing to Dr. Britton to request virtual visit tomorrow.     Routed to provider    Does the patient meet one of the following criteria for ADS visit consideration? 16+ years old, with an [facility] PCP     TIP  Providers, please consider if this condition is appropriate for management at one of our Acute and Diagnostic Services sites.     If patient is a good candidate, please use dotphrase <dot>triageresponse and select Refer to ADS to document.  Carmen Lopez RN

## 2024-07-22 NOTE — TELEPHONE ENCOUNTER
RN COVID TREATMENT VISIT  07/22/24      The patient has been triaged and does not require a higher level of care.    Irina Hanks  64 year old  Current weight? 148 lbs    Has the patient been seen by a primary care provider at an [facility] or [facility] within the past two years? Yes.   Have you been in close proximity to/do you have a known exposure to a person with a confirmed case of influenza? No.     General treatment eligibility:  Date of positive COVID test (PCR or at home)?  today    Are you or have you been hospitalized for this COVID-19 infection? No.   Have you received monoclonal antibodies or antiviral treatment for COVID-19 since this positive test? No.   Do you have any of the following conditions that place you at risk of being very sick from COVID-19?   - Age 50 years or older  - Patient reports taking medicines that suppress the immune system such as: Greater than or equal to 20mg prednisone or equivalent per day, cyclophosphamide or cisplatin, methotrexate, cancer chemotherapeutic agents classified as severely immunosuppressive, tumor-necrosis (TNF) blockers, and other biologics  Yes, patient has at least one high risk condition as noted above.     Current COVID symptoms:   - cough  - fatigue  - headache  - congestion or runny nose  Yes. Patient has at least one symptom as selected.     How many days since symptoms started? 5 days or less. Established patient, 12 years or older weighing at least 88.2 lbs, who has symptoms that started in the past 5 days, has not been hospitalized nor received treatment already, and is at risk for being very sick from COVID-19.     Treatment eligibility by RN:  Are you currently pregnant or nursing? No  Do you have a clinically significant hypersensitivity to nirmatrelvir or ritonavir, or toxic epidermal necrolysis (TEN) or Covarrubias-Carmine Syndrome? No  Do you have a history of hepatitis, any hepatic impairment on the Problem  List (such as Child-Dubon Class C, cirrhosis, fatty liver disease, alcoholic liver disease), or was the last liver lab (hepatic panel, ALT, AST, ALK Phos, bilirubin) elevated in the past 6 months? No  Do you have any history of severe renal impairment (eGFR < 30mL/min)? No    Is patient eligible to continue? Yes, patient meets all eligibility requirements for the RN COVID treatment (as denoted by all no responses above).     Current Outpatient Medications   Medication Sig Dispense Refill    acetaminophen (TYLENOL) 500 MG tablet Take 1-2 tablets (500-1,000 mg) by mouth every 4 hours as needed for mild pain 30 tablet 0    albuterol (PROAIR HFA/PROVENTIL HFA/VENTOLIN HFA) 108 (90 Base) MCG/ACT inhaler Inhale 2 puffs into the lungs every 6 hours as needed for shortness of breath, wheezing or cough 18 g 0    amLODIPine (NORVASC) 5 MG tablet TAKE 1 TABLET(5 MG) BY MOUTH DAILY 90 tablet 0    Biotin 5000 MCG TABS Take 1 tablet by mouth daily       cetirizine (ZYRTEC) 10 MG tablet Take 1 tablet (10 mg) by mouth daily for 30 days 30 tablet 0    clonazePAM (KLONOPIN) 0.5 MG tablet TAKE 1 TABLET BY MOUTH AT BEDTIME AS NEEDED FOR ANXIETY 30 tablet 2    famotidine (PEPCID) 20 MG tablet Take 1 tablet (20 mg) by mouth daily 30 tablet 0    hydroxychloroquine (PLAQUENIL) 200 MG tablet Take 2 tablets (400 mg) by mouth daily 90 tablet 3    ipratropium (ATROVENT) 0.03 % nasal spray Spray 2 sprays into both nostrils daily       levothyroxine (SYNTHROID/LEVOTHROID) 75 MCG tablet TAKE 1 TABLET(75 MCG) BY MOUTH EVERY MORNING 90 tablet 2    liothyronine (CYTOMEL) 5 MCG tablet TAKE 2 TABLETS BY MOUTH EVERY  tablet 0    loratadine (CLARITIN) 10 MG tablet Take 1 tablet (10 mg) by mouth daily 90 tablet 3    losartan (COZAAR) 100 MG tablet TAKE 1 TABLET(100 MG) BY MOUTH DAILY 90 tablet 3    metoprolol succinate ER (TOPROL XL) 100 MG 24 hr tablet TAKE 1 TABLET(100 MG) BY MOUTH DAILY 90 tablet 1    sodium chloride 1 GM tablet TAKE 1 TABLET(1  GRAM) BY MOUTH DAILY 60 tablet 1    triamcinolone (KENALOG) 0.1 % external ointment Apply topically 2 times daily as needed for irritation 80 g 0       Medications from List 1 of the standing order (on medications that exclude the use of Paxlovid) that patient is taking: NONE. Is patient taking [place]'s Wort? No  Is patient taking [place]'s Wort or any meds from List 1? No.   Medications from List 2 of the standing order (on meds that provider needs to adjust) that patient is taking: amlodipine (Norvasc), explained a provider visit is necessary to discuss medication adjustments while taking Paxlovid. Is patient on any of the meds from List 2? Yes. Patient will be scheduled or transferred to a  at the end of this call.   Carmen Lopez RN

## 2024-07-22 NOTE — TELEPHONE ENCOUNTER
Reason for Disposition   HIGH RISK patient (e.g., weak immune system, age > 64 years, obesity with BMI of 30 or higher, pregnant, chronic lung disease or other chronic medical condition) and COVID symptoms (e.g., cough, fever)  (Exceptions: Already seen by doctor or NP/PA and no new or worsening symptoms.)    Additional Information   Negative: SEVERE difficulty breathing (e.g., struggling for each breath, speaks in single words)   Negative: Difficult to awaken or acting confused (e.g., disoriented, slurred speech)   Negative: Bluish (or gray) lips or face now   Negative: Shock suspected (e.g., cold/pale/clammy skin, too weak to stand, low BP, rapid pulse)   Negative: Sounds like a life-threatening emergency to the triager   Negative: Diagnosed or suspected COVID-19 and symptoms lasting 3 or more weeks   Negative: COVID-19 exposure and no symptoms   Negative: COVID-19 vaccine reaction suspected (e.g., fever, headache, muscle aches) occurring 1 to 3 days after getting vaccine   Negative: COVID-19 vaccine, questions about   Negative: Lives with someone known to have influenza (flu test positive) and flu-like symptoms (e.g., cough, runny nose, sore throat, SOB; with or without fever)   Negative: Possible COVID-19 symptoms and triager concerned about severity of symptoms or other causes   Negative: COVID-19 and breastfeeding, questions about   Negative: SEVERE or constant chest pain or pressure  (Exception: Mild central chest pain, present only when coughing.)   Negative: MODERATE difficulty breathing (e.g., speaks in phrases, SOB even at rest, pulse 100-120)   Negative: Headache and stiff neck (can't touch chin to chest)   Negative: Oxygen level (e.g., pulse oximetry) 90% or lower   Negative: Chest pain or pressure  (Exception: MILD central chest pain, present only when coughing.)   Negative: Drinking very little and dehydration suspected (e.g., no urine > 12 hours, very dry mouth, very lightheaded)   Negative: Patient  "sounds very sick or weak to the triager   Negative: MILD difficulty breathing (e.g., minimal/no SOB at rest, SOB with walking, pulse <100)   Negative: Fever > 103 F (39.4 C)   Negative: Fever > 101 F (38.3 C) and over 60 years of age   Negative: Fever > 100.0 F (37.8 C) and bedridden (e.g., CVA, chronic illness, recovering from surgery)    Answer Assessment - Initial Assessment Questions  1. COVID-19 DIAGNOSIS: \"How do you know that you have COVID?\" (e.g., positive lab test or self-test, diagnosed by doctor or NP/PA, symptoms after exposure).      Positive home tests today. Patient states that all 3 tests were positive for Covid 19.  2. COVID-19 EXPOSURE: \"Was there any known exposure to COVID before the symptoms began?\" [facility] Definition of close contact: within 6 feet (2 meters) for a total of 15 minutes or more over a 24-hour period.       No known exposure.   Did attend a couple of large events this weekend.  3. ONSET: \"When did the COVID-19 symptoms start?\"       Yesterday afternoon.   4. WORST SYMPTOM: \"What is your worst symptom?\" (e.g., cough, fever, shortness of breath, muscle aches)      Headache, sinus symptoms - runny nose, coughing.  5. COUGH: \"Do you have a cough?\" If Yes, ask: \"How bad is the cough?\"        mild  6. FEVER: \"Do you have a fever?\" If Yes, ask: \"What is your temperature, how was it measured, and when did it start?\"      Doesn't think so. Has not checked. No chills.   7. RESPIRATORY STATUS: \"Describe your breathing?\" (e.g., normal; shortness of breath, wheezing, unable to speak)       Just fine. Normal.   8. BETTER-SAME-WORSE: \"Are you getting better, staying the same or getting worse compared to yesterday?\"  If getting worse, ask, \"In what way?\"      Worse.   9. OTHER SYMPTOMS: \"Do you have any other symptoms?\"  (e.g., chills, fatigue, headache, loss of smell or taste, muscle pain, sore throat)      Fatigue, headache  10. HIGH RISK DISEASE: \"Do you have any chronic medical problems?\" (e.g., " "asthma, heart or lung disease, weak immune system, obesity, etc.)        Autoimmune diseases - 6 of them.   11. VACCINE: \"Have you had the COVID-19 vaccine?\" If Yes, ask: \"Which one, how many shots, when did you get it?\"        Yes, all of them but most recent. Moderna - last on 1/2023. Total of 5 shots.   12. PREGNANCY: \"Is there any chance you are pregnant?\" \"When was your last menstrual period?\"        NA  13. O2 SATURATION MONITOR:  \"Do you use an oxygen saturation monitor (pulse oximeter) at home?\" If Yes, ask \"What is your reading (oxygen level) today?\" \"What is your usual oxygen saturation reading?\" (e.g., 95%)        no    Protocols used: Coronavirus (COVID-19) Diagnosed or [id number]  Carmen Lopez RN    "

## 2024-07-22 NOTE — TELEPHONE ENCOUNTER
Called the patient and left a detailed VM relaying provider's message. Advised patient that if she has any questions to call the clinic back.    Maty NEAL RN  [facility] Triage Team

## 2024-08-14 DIAGNOSIS — F43.22 ADJUSTMENT DISORDER WITH ANXIOUS MOOD: ICD-10-CM

## 2024-08-15 RX ORDER — CLONAZEPAM 0.5 MG/1
TABLET ORAL
Qty: 30 TABLET | Refills: 0 | Status: SHIPPED | OUTPATIENT
Start: 2024-08-20 | End: 2024-08-15

## 2024-08-15 RX ORDER — CLONAZEPAM 0.5 MG/1
TABLET ORAL
Qty: 30 TABLET | Refills: 0 | Status: SHIPPED | OUTPATIENT
Start: 2024-08-20 | End: 2024-09-19

## 2024-08-15 NOTE — TELEPHONE ENCOUNTER
[place] reviewed:  reviewed- no concerns    [place]:  [email or web address]    Refill done.  Mercedez Britton MD on 8/15/2024

## 2024-08-15 NOTE — TELEPHONE ENCOUNTER
Pt looking for permission for early refill due to pt going out of town on 8/17/24.     Please review and advise.    Adenike Mejia RN

## 2024-08-15 NOTE — TELEPHONE ENCOUNTER
Patient called to request this medication to be refilled earlier because [place] Please advise and notify patient if unable to complete this request

## 2024-08-16 NOTE — TELEPHONE ENCOUNTER
Triage Patient Outreach    Attempt # 1    Was call answered?  No.  Left voicemail to return call to Triage at [facility]    Carmen Lopez RN

## 2024-08-16 NOTE — TELEPHONE ENCOUNTER
Left message for patient to call back to speak with triage. Please give message below from Dr. Britton.    Writer spoke with [facility] with verbal approval for refill of clonazepam on 8/17 with message that September refill date will be adjusted to 9/19. Carmen Lopez RN

## 2024-08-16 NOTE — TELEPHONE ENCOUNTER
Refill done to give early on 8/17/24 as requested.     Future refill of September will need to be on 9/19/24.     Thank you  Mercedez Britton MD on 8/15/2024

## 2024-08-16 NOTE — TELEPHONE ENCOUNTER
Patient given message that clonazepam is dated for  8/17. Patient was hoping to  today, but states that if she is unable to  today she will  on the way out of town tomorrow. Carmen Lopez RN

## 2024-08-30 DIAGNOSIS — I10 ESSENTIAL HYPERTENSION: ICD-10-CM

## 2024-08-30 RX ORDER — AMLODIPINE BESYLATE 5 MG/1
5 TABLET ORAL DAILY
Qty: 90 TABLET | Refills: 0 | Status: SHIPPED | OUTPATIENT
Start: 2024-08-30

## 2024-09-03 ENCOUNTER — NURSE TRIAGE (OUTPATIENT)
Dept: FAMILY MEDICINE | Facility: CLINIC | Age: 64
End: 2024-09-03
Payer: MEDICARE

## 2024-09-03 NOTE — TELEPHONE ENCOUNTER
Reason for Call:  Appointment Request    Patient requesting this type of appt:  back pain    Requested provider: Mercedez Britton    Reason patient unable to be scheduled: Not within requested timeframe    When does patient want to be seen/preferred time: 1-2 days    Comments: back pain    [facility] or would you prefer to receive a phone call?:   Patient would prefer a phone call   Okay to leave a detailed message?: Yes at Cell number on file:    Telephone Information:   Mobile [phone]       Call taken on 9/3/2024 at 8:25 AM by Cecilio Rai

## 2024-09-04 NOTE — TELEPHONE ENCOUNTER
Patient is requesting a call back with an update on appointment requests. Has been experiencing back pain for about 3 weeks and has kidney stone concern. Strongly prefers to schedule with her primary care provider due to history.

## 2024-09-04 NOTE — TELEPHONE ENCOUNTER
Reason for Disposition    Age > 50 and no history of prior similar back pain    Additional Information    Negative: Major injury to the back (e.g., MVA, fall > 10 feet or 3 meters, penetrating injury, etc.)    Negative: Pain in the upper back over the ribs (rib cage) that radiates (travels) into the chest    Negative: Pain in the upper back over the ribs (rib cage) and worsened by coughing (or clearly increases with breathing)    Negative: Back pain during pregnancy    Negative: Passed out (i.e., fainted, collapsed and was not responding)    Negative: Shock suspected (e.g., cold/pale/clammy skin, too weak to stand, low BP, rapid pulse)    Negative: Sounds like a life-threatening emergency to the triager    Negative: SEVERE back pain of sudden onset and age > 60 years    Negative: SEVERE abdominal pain (e.g., excruciating)    Negative: Abdominal pain and age > 60 years    Negative: Unable to urinate (or only a few drops) and bladder feels very full    Negative: Loss of bladder or bowel control (urine or bowel incontinence; wetting self, leaking stool) of new-onset    Negative: Numbness (loss of sensation) in groin or rectal area    Negative: Pain radiates into groin, scrotum    Negative: Blood in urine (red, pink, or tea-colored)    Negative: Vomiting and pain over lower ribs of back (i.e., flank - kidney area)    Negative: Weakness of a leg or foot (e.g., unable to bear weight, dragging foot)    Negative: Patient sounds very sick or weak to the triager    Negative: Fever > 100.4 F (38.0 C) and flank pain    Negative: Pain or burning with passing urine (urination)    Negative: SEVERE back pain (e.g., excruciating, unable to do any normal activities) and not improved after pain medicine and CARE ADVICE    Negative: Numbness in an arm or hand (i.e., loss of sensation) and upper back pain    Negative: Numbness in a leg or foot (i.e., loss of sensation)    Negative: High-risk adult (e.g., history of cancer, history of  "HIV, or history of IV Drug Use)    Negative: Soft tissue infection (e.g., abscess, cellulitis) or other serious infection (e.g., bacteremia) in last 2 weeks    Negative: Painful rash with multiple small blisters grouped together (i.e., dermatomal distribution or 'band' or 'stripe')    Negative: Pain radiates into the thigh or further down the leg, and in both legs    Answer Assessment - Initial Assessment Questions  1. ONSET: \"When did the pain begin?\"       3-4 weeks ago     2. LOCATION: \"Where does it hurt?\" (upper, mid or lower back)      Lower R back (kidney stone?)    3. SEVERITY: \"How bad is the pain?\"  (e.g., Scale 1-10; mild, moderate, or severe)    - MILD (1-3): Doesn't interfere with normal activities.     - MODERATE (4-7): Interferes with normal activities or awakens from sleep.     - SEVERE (8-10): Excruciating pain, unable to do any normal activities.       8/10 at its worst,  not happening     4. PATTERN: \"Is the pain constant?\" (e.g., yes, no; constant, intermittent)       Shooting pains     5. RADIATION: \"Does the pain shoot into your legs or somewhere else?\"      No    6. CAUSE:  \"What do you think is causing the back pain?\"       Maybe kidney stones?     7. BACK OVERUSE:  \"Any recent lifting of heavy objects, strenuous work or exercise?\"      No    8. MEDICINES: \"What have you taken so far for the pain?\" (e.g., nothing, acetaminophen, NSAIDS)      Tylenol     9. NEUROLOGIC SYMPTOMS: \"Do you have any weakness, numbness, or problems with bowel/bladder control?\"      Denies these sx    10. OTHER SYMPTOMS: \"Do you have any other symptoms?\" (e.g., fever, abdomen pain, burning with urination, blood in urine)        Denies these sx    11. PREGNANCY: \"Is there any chance you are pregnant?\" \"When was your last menstrual period?\"        No    Protocols used: Back Pain-A-OH    " no

## 2024-09-05 ENCOUNTER — OFFICE VISIT (OUTPATIENT)
Dept: FAMILY MEDICINE | Facility: CLINIC | Age: 64
End: 2024-09-05
Payer: MEDICARE

## 2024-09-05 VITALS
BODY MASS INDEX: 27.61 KG/M2 | RESPIRATION RATE: 18 BRPM | SYSTOLIC BLOOD PRESSURE: 121 MMHG | TEMPERATURE: 97.5 F | HEART RATE: 70 BPM | HEIGHT: 63 IN | DIASTOLIC BLOOD PRESSURE: 80 MMHG | WEIGHT: 155.8 LBS | OXYGEN SATURATION: 98 %

## 2024-09-05 DIAGNOSIS — M54.50 ACUTE RIGHT-SIDED LOW BACK PAIN WITHOUT SCIATICA: Primary | ICD-10-CM

## 2024-09-05 DIAGNOSIS — M48.061 SPINAL STENOSIS OF LUMBAR REGION WITHOUT NEUROGENIC CLAUDICATION: ICD-10-CM

## 2024-09-05 DIAGNOSIS — M48.061 NEURAL FORAMINAL STENOSIS OF LUMBAR SPINE: ICD-10-CM

## 2024-09-05 DIAGNOSIS — Z23 NEED FOR VACCINATION: ICD-10-CM

## 2024-09-05 DIAGNOSIS — R10.9 ACUTE RIGHT FLANK PAIN: ICD-10-CM

## 2024-09-05 LAB
ALBUMIN UR-MCNC: NEGATIVE MG/DL
APPEARANCE UR: CLEAR
BACTERIA #/AREA URNS HPF: ABNORMAL /HPF
BILIRUB UR QL STRIP: NEGATIVE
COLOR UR AUTO: YELLOW
GLUCOSE UR STRIP-MCNC: NEGATIVE MG/DL
HGB UR QL STRIP: NEGATIVE
KETONES UR STRIP-MCNC: NEGATIVE MG/DL
LEUKOCYTE ESTERASE UR QL STRIP: NEGATIVE
NITRATE UR QL: NEGATIVE
PH UR STRIP: 6 [PH] (ref 5–7)
RBC #/AREA URNS AUTO: ABNORMAL /HPF
SP GR UR STRIP: 1.01 (ref 1–1.03)
SQUAMOUS #/AREA URNS AUTO: ABNORMAL /LPF
UROBILINOGEN UR STRIP-ACNC: 0.2 E.U./DL
WBC #/AREA URNS AUTO: ABNORMAL /HPF

## 2024-09-05 PROCEDURE — 81001 URINALYSIS AUTO W/SCOPE: CPT | Performed by: INTERNAL MEDICINE

## 2024-09-05 PROCEDURE — 99214 OFFICE O/P EST MOD 30 MIN: CPT | Mod: 25 | Performed by: INTERNAL MEDICINE

## 2024-09-05 PROCEDURE — G0008 ADMIN INFLUENZA VIRUS VAC: HCPCS | Performed by: INTERNAL MEDICINE

## 2024-09-05 PROCEDURE — 90673 RIV3 VACCINE NO PRESERV IM: CPT | Performed by: INTERNAL MEDICINE

## 2024-09-05 RX ORDER — ASPIRIN 81 MG/1
TABLET, CHEWABLE ORAL
COMMUNITY

## 2024-09-05 RX ORDER — METHYLPREDNISOLONE 4 MG
TABLET, DOSE PACK ORAL
Qty: 21 TABLET | Refills: 0 | Status: SHIPPED | OUTPATIENT
Start: 2024-09-05

## 2024-09-05 ASSESSMENT — PAIN SCALES - GENERAL: PAINLEVEL: NO PAIN (0)

## 2024-09-05 NOTE — PATIENT INSTRUCTIONS
As discussed, please do the Urine exam before further recommendations.     Before considering this as Back muscle spasm given your Severe Spinal Stenosis of Lumbar spine as per the MRI done recently by Orthopedics.     ============================================================

## 2024-09-05 NOTE — PROGRESS NOTES
Assessment and Plan  1. Acute right-sided low back pain without sciatica  2. Spinal stenosis of lumbar region without neurogenic claudication  3. Neural foraminal stenosis of lumbar spine  Ongoing problem, uncontrolled with the new medications added to patient from list today including neuroforaminal stenosis of lumbar spine on the right side, spinal stenosis of lumbar region as mentioned on the recent MRI done by orthopedics benefit with headaches reviewed and discussed with the patient.  Also given to the patient.  -Patient initially presented with acute right flank pain and has been worried that her kidneys are affected.  Discussed with the patient that given the obvious MRI findings of lumbar spine we will need to make sure there is no UTI before considering the CT renal which she is requesting.  UPDATE -urinalysis negative for hematuria or UTI, this appears most likely due to her lumbar spinal stenosis.  Will give the relaxer, Medrol Dosepak as well as placed referral to spine specialist for further recommendations.  This plan was already explained to the patient when she was in the office, patient will be notified again.  -Patient does have risk factors of rheumatoid arthritis on treatment and follow rheumatologist.  - methylPREDNISolone (MEDROL DOSEPAK) 4 MG tablet therapy pack; Follow Package Directions  Dispense: 21 tablet; Refill: 0  - tiZANidine (ZANAFLEX) 4 MG tablet; Take 1 tablet (4 mg) by mouth nightly as needed for muscle spasms.  Dispense: 30 tablet; Refill: 1  - Spine  Referral; Future    4. Acute right flank pain  Acute problem which patient endorses is that this Started since 1 month , has been having 8/10 severity , happens at rest. NO sciatica.  Will consider checking for UTI/hematuria if needed before considering CT Renal which patient is concerned.  Please see above for further details on plan.  - UA with Microscopic reflex to Culture - lab collect; Future  - UA with Microscopic  reflex to Culture - lab collect  - UA Microscopic with Reflex to Culture  - methylPREDNISolone (MEDROL DOSEPAK) 4 MG tablet therapy pack; Follow Package Directions  Dispense: 21 tablet; Refill: 0  - Spine  Referral; Future    5. Need for vaccination  - INFLUENZA VACCINE IM 18-64 YRS (FLUBLOK)       The longitudinal plan of care for the diagnosis(es)/condition(s) as documented were addressed during this visit. Due to the added complexity in care, I will continue to support Peg in the subsequent management and with ongoing continuity of care.      Please note that this note consists of symbols derived from keyboarding, dictation and/or voice recognition software. As a result, there may be errors in the script that have gone undetected. Please consider this when interpreting information found in this chart.    Patient Instructions   As discussed, please do the Urine exam before further recommendations.     Before considering this as Back muscle spasm given your Severe Spinal Stenosis of Lumbar spine as per the MRI done recently by Orthopedics.     ============================================================      Return in about 3 months (around 12/5/2024), or if symptoms worsen or fail to improve, for Follow up of last visit, If symptoms persist, anxiety, video visit.    Mercedez Britton MD  [facility] ROMULO Luong is a 64 year old, presenting for the following health issues:  Musculoskeletal Problem        9/5/2024    10:09 AM   Additional Questions   Roomed by Severiano     History of Present Illness       Back Pain:  She presents for follow up of back pain. Patient's back pain is a new problem.    Original cause of back pain: not sure  First noticed back pain: 1-4 weeks ago  Patient feels back pain: dailyLocation of back pain:  Right lower back  Description of back pain: sharp  Back pain spreads: nowhere    Since patient first noticed back pain, pain is: gradually  "worsening  Does back pain interfere with her job:  No  On a scale of 1-10 (10 being the worst), patient describes pain as:  8  What makes back pain worse: nothing   Acupuncture: not tried  Acetaminophen: not helpful  Activity or exercise: not helpful  Chiropractor:  Not helpful  Cold: not helpful  Heat: not helpful  Massage: not helpful  Muscle relaxants: not tried  NSAIDS: not helpful  Opioids: not tried  Physical Therapy: not tried  Rest: not helpful  Steroid Injection: not tried  Stretching: not helpful  Surgery: not tried  TENS unit: not tried  Topical pain relievers: not helpful  Other healthcare providers patient is seeing for back pain: Chiropractor   She is taking medications regularly.     \"When my right lower back back pain is flared up it is tender to the touch.. Also, all the way up and down along my spine on both the right and left side; it feels like my muscles are tensing up, or having muscle spasms. That is outside of the right sided lower back sharp pain.\"          Allergies   Allergen Reactions    Codeine Nausea and Nausea and Vomiting    Nitrofurantoin Unknown and Other (See Comments)     Other reaction(s): Gastrointestinal      Ace Inhibitors Cough     lisinopril  lisinopril  lisinopril    Elemental Sulfur Unknown    Hydrochlorothiazide Unknown     Severe Hyponatremia less than 120  Severe Hyponatremia less than 120    Morphine And Codeine Nausea    Sulfa Antibiotics     Sulfatolamide     Sulfur Unknown        Past Medical History:   Diagnosis Date    ASCUS favor benign 09/2014    3 yr co-test    Hereditary hemochromatosis (H24)     HTN (hypertension)     Impaired renal function     Microscopic colitis     Osteoarthritis of first carpometacarpal joint     bilateral    Other specified acquired hypothyroidism     RA (rheumatoid arthritis) (H)     Seasonal allergic rhinitis     Spider veins     Symptomatic menopausal or female climacteric states     age 43, on HRT    Tricuspid regurgitation     +1-2 " TR noted on 12/22/14 Echo       Past Surgical History:   Procedure Laterality Date    ABDOMEN SURGERY  01/30/1995    C section    BIOPSY BREAST      C/SECTION, LOW TRANSVERSE      twins    COLONOSCOPY  2013    nl, next one due in 5 years    CV CORONARY ANGIOGRAM N/A 06/30/2022    Procedure: Coronary Angiogram;  Surgeon: Jose Antonio Ferraro MD;  Location:  HEART CARDIAC CATH LAB    CV LEFT HEART CATH N/A 06/30/2022    Procedure: Left Heart Catheterization;  Surgeon: Jose Antonio Ferraro MD;  Location:  HEART CARDIAC CATH LAB    CV LEFT VENTRICULOGRAM N/A 06/30/2022    Procedure: Left Ventriculogram;  Surgeon: Jose Antonio Ferraro MD;  Location:  HEART CARDIAC CATH LAB    CV RIGHT HEART CATH MEASUREMENTS RECORDED N/A 06/30/2022    Procedure: Right Heart Catheterization;  Surgeon: Jose Antonio Ferraro MD;  Location:  HEART CARDIAC CATH LAB    LEEP TX, CERVICAL  1990s    normal since that time    MYRINGOTOMY, INSERT TUBE, COMBINED Left 04/2015    chronic NORM, ETD    ORTHOPEDIC SURGERY  Oct 2021    New right hip    REPLACEMENT TOTAL HIP W/  RESURFACING IMPLANTS Right     SOFT TISSUE SURGERY  Oct,  2021    Gluteus, medius or tendon repair       Family History   Problem Relation Age of Onset    Cancer - colorectal Father         age 50    Colon Cancer Father     Arthritis Mother     Cancer - colorectal Brother         age 50    Colon Cancer Brother     Hypertension Sister     Hypertension Brother     Colon Cancer Brother         John passed away in October 2022.    Prostate Cancer Brother     Kidney Cancer Brother     Prostate Cancer Brother     Arthritis Sister     Cancer Sister 53        Uterine    Cancer Sister 50        Uterine    Testicular cancer Nephew     Prostate Cancer Brother     Thyroid Disease No family hx of        Social History     Tobacco Use    Smoking status: Former     Current packs/day: 0.00     Average packs/day: 0.3 packs/day for 10.0 years (2.6 ttl pk-yrs)     Types: Cigarettes     Start date:  1977     Quit date: 1987     Years since quittin.4    Smokeless tobacco: Never    Tobacco comments:     Was a social smoker   Substance Use Topics    Alcohol use: Yes     Comment: A couple a day        Current Outpatient Medications   Medication Sig Dispense Refill    acetaminophen (TYLENOL) 500 MG tablet Take 1-2 tablets (500-1,000 mg) by mouth every 4 hours as needed for mild pain 30 tablet 0    amLODIPine (NORVASC) 5 MG tablet TAKE 1 TABLET(5 MG) BY MOUTH DAILY 90 tablet 0    aspirin (ASA) 81 MG chewable tablet       Biotin 5000 MCG TABS Take 1 tablet by mouth daily       clonazePAM (KLONOPIN) 0.5 MG tablet TAKE 1 TABLET BY MOUTH AT BEDTIME AS NEEDED FOR ANXIETY 30 tablet 0    hydroxychloroquine (PLAQUENIL) 200 MG tablet Take 2 tablets (400 mg) by mouth daily 90 tablet 3    ipratropium (ATROVENT) 0.03 % nasal spray Spray 2 sprays into both nostrils daily       levothyroxine (SYNTHROID/LEVOTHROID) 75 MCG tablet TAKE 1 TABLET(75 MCG) BY MOUTH EVERY MORNING 90 tablet 2    liothyronine (CYTOMEL) 5 MCG tablet TAKE 2 TABLETS BY MOUTH EVERY  tablet 0    loratadine (CLARITIN) 10 MG tablet Take 1 tablet (10 mg) by mouth daily 90 tablet 3    losartan (COZAAR) 100 MG tablet TAKE 1 TABLET(100 MG) BY MOUTH DAILY 90 tablet 3    methylPREDNISolone (MEDROL DOSEPAK) 4 MG tablet therapy pack Follow Package Directions 21 tablet 0    metoprolol succinate ER (TOPROL XL) 100 MG 24 hr tablet TAKE 1 TABLET(100 MG) BY MOUTH DAILY 90 tablet 1    sodium chloride 1 GM tablet TAKE 1 TABLET(1 GRAM) BY MOUTH DAILY 60 tablet 1    tiZANidine (ZANAFLEX) 4 MG tablet Take 1 tablet (4 mg) by mouth nightly as needed for muscle spasms. 30 tablet 1    albuterol (PROAIR HFA/PROVENTIL HFA/VENTOLIN HFA) 108 (90 Base) MCG/ACT inhaler Inhale 2 puffs into the lungs every 6 hours as needed for shortness of breath, wheezing or cough (Patient not taking: Reported on 2024) 18 g 0    famotidine (PEPCID) 20 MG tablet Take 1 tablet (20 mg)  "by mouth daily (Patient not taking: Reported on 9/5/2024) 30 tablet 0    triamcinolone (KENALOG) 0.1 % external ointment Apply topically 2 times daily as needed for irritation (Patient not taking: Reported on 9/5/2024) 80 g 0     No current facility-administered medications for this visit.        Review of Systems  Constitutional, HEENT, cardiovascular, pulmonary, GI, , musculoskeletal, neuro, skin, endocrine and psych systems are negative, except as otherwise noted.      Objective    /80 (BP Location: Left arm, Patient Position: Sitting, Cuff Size: Adult Large)   Pulse 70   Temp 97.5  F (36.4  C) (Temporal)   Resp 18   Ht 1.61 m (5' 3.39\")   Wt 70.7 kg (155 lb 12.8 oz)   SpO2 98%   BMI 27.26 kg/m    Body mass index is 27.26 kg/m .  Physical Exam   GENERAL: alert and no distress  NECK: no adenopathy, no asymmetry, masses, or scars  RESP: lungs clear to auscultation - no rales, rhonchi or wheezes  CV: regular rate and rhythm, normal S1 S2, no S3 or S4, no murmur, click or rub, no peripheral edema  ABDOMEN: soft, nontender, no hepatosplenomegaly, no masses and bowel sounds normal  MS: no gross musculoskeletal defects noted, no edema  BACK EXAM : Positive for severe back stiffness more With discomfort on palpation of the right paraspinal region.  SLR test not done due to pain.      Signed Electronically by: Mercedez Britton MD    "

## 2024-09-06 ENCOUNTER — TELEPHONE (OUTPATIENT)
Dept: FAMILY MEDICINE | Facility: CLINIC | Age: 64
End: 2024-09-06
Payer: MEDICARE

## 2024-09-06 NOTE — TELEPHONE ENCOUNTER
Order/Referral Request    Who is requesting: Patient    Orders being requested: Spine evaluation & treatment    Reason service is needed/diagnosis:   Pt unable to be scheduled until end of October through [place].      Pt requesting new order for evaluation, etc. At [facility], spine department.     When are orders needed by: ASAP    Has this been discussed with Provider: Yes    Does patient have a preference on a Group/Provider/Facility? [facility] - Spine Department    Does patient have an appointment scheduled?: No    Where to send orders:  Fax # [phone]    Could we send this information to you in [employer] or would you prefer to receive a phone call?:   Patient would prefer a phone call   Okay to leave a detailed message?: Yes at Cell number on file:    Telephone Information:   Mobile [phone]     Lexus Porter on 9/6/2024 at 3:36 PM

## 2024-09-09 NOTE — TELEPHONE ENCOUNTER
Placed the orders to [facility] as requested.   Please let the pt know.     Thank you,  Mercedez Britton MD on 9/8/2024

## 2024-09-14 ENCOUNTER — NURSE TRIAGE (OUTPATIENT)
Dept: NURSING | Facility: CLINIC | Age: 64
End: 2024-09-14
Payer: MEDICARE

## 2024-09-18 DIAGNOSIS — F43.22 ADJUSTMENT DISORDER WITH ANXIOUS MOOD: ICD-10-CM

## 2024-09-19 RX ORDER — CLONAZEPAM 0.5 MG/1
TABLET ORAL
Qty: 30 TABLET | Refills: 2 | Status: SHIPPED | OUTPATIENT
Start: 2024-09-19

## 2024-09-20 NOTE — TELEPHONE ENCOUNTER
RX [place] ([place]) reviewed:  reviewed- no concerns    [place]  [email or web address]    Refill done.    Refills given till 12/2024 and further refills after follow up due on 12/5/2024 Video visit    Thank you,  Mercedez Britton MD on 9/19/2024

## 2024-09-24 ENCOUNTER — TRANSFERRED RECORDS (OUTPATIENT)
Dept: HEALTH INFORMATION MANAGEMENT | Facility: CLINIC | Age: 64
End: 2024-09-24
Payer: MEDICARE

## 2024-10-09 DIAGNOSIS — E22.2 SIADH (SYNDROME OF INAPPROPRIATE ADH PRODUCTION) (H): ICD-10-CM

## 2024-10-09 DIAGNOSIS — E87.1 HYPONATREMIA: ICD-10-CM

## 2024-10-10 RX ORDER — SODIUM CHLORIDE 1 G/1
TABLET ORAL
Qty: 60 TABLET | Refills: 1 | Status: SHIPPED | OUTPATIENT
Start: 2024-10-10

## 2024-10-12 DIAGNOSIS — E03.9 ACQUIRED HYPOTHYROIDISM: ICD-10-CM

## 2024-10-14 RX ORDER — LIOTHYRONINE SODIUM 5 UG/1
TABLET ORAL
Qty: 180 TABLET | Refills: 0 | Status: SHIPPED | OUTPATIENT
Start: 2024-10-14

## 2024-10-14 NOTE — TELEPHONE ENCOUNTER
Prescription approved per [facility] Refill Protocol.  Danielle Mason RN  [facility]

## 2024-10-20 NOTE — TELEPHONE ENCOUNTER
Adilene from  Oncology Clinic. [phone]    Pt has labs today.     Sodium was critical at 118    Routing high priority to PCP.     Griselda De Dios RN         Never

## 2024-10-22 ENCOUNTER — ONCOLOGY VISIT (OUTPATIENT)
Dept: ONCOLOGY | Facility: CLINIC | Age: 64
End: 2024-10-22
Attending: INTERNAL MEDICINE
Payer: MEDICARE

## 2024-10-22 ENCOUNTER — LAB (OUTPATIENT)
Dept: INFUSION THERAPY | Facility: CLINIC | Age: 64
End: 2024-10-22
Attending: INTERNAL MEDICINE
Payer: MEDICARE

## 2024-10-22 VITALS
RESPIRATION RATE: 16 BRPM | BODY MASS INDEX: 27.3 KG/M2 | DIASTOLIC BLOOD PRESSURE: 87 MMHG | HEART RATE: 65 BPM | WEIGHT: 156 LBS | OXYGEN SATURATION: 98 % | SYSTOLIC BLOOD PRESSURE: 121 MMHG

## 2024-10-22 DIAGNOSIS — E83.110 HEREDITARY HEMOCHROMATOSIS (H): Primary | ICD-10-CM

## 2024-10-22 DIAGNOSIS — M54.50 ACUTE RIGHT-SIDED LOW BACK PAIN WITHOUT SCIATICA: ICD-10-CM

## 2024-10-22 DIAGNOSIS — E83.110 HEREDITARY HEMOCHROMATOSIS (H): ICD-10-CM

## 2024-10-22 LAB
ALBUMIN SERPL BCG-MCNC: 4 G/DL (ref 3.5–5.2)
ALP SERPL-CCNC: 133 U/L (ref 40–150)
ALT SERPL W P-5'-P-CCNC: 21 U/L (ref 0–50)
AST SERPL W P-5'-P-CCNC: 29 U/L (ref 0–45)
BILIRUB DIRECT SERPL-MCNC: <0.2 MG/DL (ref 0–0.3)
BILIRUB SERPL-MCNC: 0.6 MG/DL
ERYTHROCYTE [DISTWIDTH] IN BLOOD BY AUTOMATED COUNT: 12.5 % (ref 10–15)
FERRITIN SERPL-MCNC: 62 NG/ML (ref 11–328)
HCT VFR BLD AUTO: 38.5 % (ref 35–47)
HGB BLD-MCNC: 13.2 G/DL (ref 11.7–15.7)
IRON BINDING CAPACITY (ROCHE): 284 UG/DL (ref 240–430)
IRON SATN MFR SERPL: 45 % (ref 15–46)
IRON SERPL-MCNC: 128 UG/DL (ref 37–145)
MCH RBC QN AUTO: 31.1 PG (ref 26.5–33)
MCHC RBC AUTO-ENTMCNC: 34.3 G/DL (ref 31.5–36.5)
MCV RBC AUTO: 91 FL (ref 78–100)
PLATELET # BLD AUTO: 214 10E3/UL (ref 150–450)
PROT SERPL-MCNC: 6.9 G/DL (ref 6.4–8.3)
RBC # BLD AUTO: 4.24 10E6/UL (ref 3.8–5.2)
WBC # BLD AUTO: 4 10E3/UL (ref 4–11)

## 2024-10-22 PROCEDURE — 82248 BILIRUBIN DIRECT: CPT | Performed by: INTERNAL MEDICINE

## 2024-10-22 PROCEDURE — G0463 HOSPITAL OUTPT CLINIC VISIT: HCPCS | Performed by: INTERNAL MEDICINE

## 2024-10-22 PROCEDURE — 83550 IRON BINDING TEST: CPT | Performed by: INTERNAL MEDICINE

## 2024-10-22 PROCEDURE — G2211 COMPLEX E/M VISIT ADD ON: HCPCS | Performed by: INTERNAL MEDICINE

## 2024-10-22 PROCEDURE — 99214 OFFICE O/P EST MOD 30 MIN: CPT | Performed by: INTERNAL MEDICINE

## 2024-10-22 PROCEDURE — 36415 COLL VENOUS BLD VENIPUNCTURE: CPT

## 2024-10-22 PROCEDURE — 82728 ASSAY OF FERRITIN: CPT | Performed by: INTERNAL MEDICINE

## 2024-10-22 PROCEDURE — 85027 COMPLETE CBC AUTOMATED: CPT | Performed by: INTERNAL MEDICINE

## 2024-10-22 ASSESSMENT — PAIN SCALES - GENERAL: PAINLEVEL: NO PAIN (0)

## 2024-10-22 NOTE — LETTER
10/22/2024      Irina Hanks  [address]  [place] [address]      Dear Colleague,    Thank you for referring your patient, Irina Hanks, to the [facility]. Please see a copy of my visit note below.      HEMATOLOGY HISTORY: Ms. Zhao is a female with hereditary hemochromatosis. Homozygous for H63D mutation.   1.  On 06/15/2016, ferritin of 506.   -  On 06/12/2018,  ALT of 240 and AST of 136.   2.  On 06/15/2018, Iron of 138, ferritin of 1140 and saturation of 66%.   3.  Ultrasound of abdomen and pelvis on 06/18/2018 is normal.   4. On 06/21/2018, HFE gene analysis reveals patient to be homozygous for H63D mutation.   5. CT abdomen and pelvis on 06/25/2018 is normal.  Liver looks normal.   6. Because of elevated LFT, phlebotomy started on 06/21/2018.  7. Echocardiogram done on 06/08/2022 was essentially normal with normal ejection fraction.      SUBJECTIVE:    Mrs. Zhao is a 64-year-old female with hereditary hemochromatosis. Patient requires phlebotomy infrequently.    Her main problem is back pain.  She follows up with orthopedics and a spine surgeon.  MRI lumbar spine ordered 10/60/24 reveals severe spinal stenosis. Walking/activities are restricted because of pain.     No excessive fatigue. No headache. No dizziness.  No chest pain.  No shortness of breath.  No abdominal pain.  No nausea or vomiting.  Appetite is good.  No urinary complaints.  No bowel problem.       PHYSICAL EXAMINATION:   GENERAL:  She is alert, oriented x 3.   VITAL SIGNS:  Reviewed.  She is not in distress.   Rest of systems not examined.      LABORATORY DATA: CBC, CMP, iron and ferritin reviewed.       ASSESSMENT:   1.  A 64-year-old female with hereditary hemochromatosis on intermittent phlebotomy.  Labs revealed normal iron, iron saturation index and ferritin.  2.  Back pain secondary to spinal stenosis.      PLAN:  Labs every 6 months and phlebotomy as needed.  See me in  1 year with labs.    DISCUSSION:  1.  Patient's main problem is back pain.  This is secondary to spinal stenosis.  Treatment as per orthopedic/spine surgeon.    2.  Discussed regarding hemochromatosis.  Labs reviewed.  Her ferritin is 62.  Iron saturation index is 45%.  Iron is normal.  No need for phlebotomy.    Discussed regarding phlebotomy.  Will consider phlebotomy if ferritin is above 100.    As patient has not been requiring phlebotomy for a long time, advised her to have CBC and iron studies checked every 6 months and have phlebotomy done as needed.    Explained to the patient that her ferritin, iron and iron saturation index have been in normal range for a long time.  This is good.  We do not expect her to get any complications from hemochromatosis.    3.  She had a few questions which were all answered.  I will see her in 1 year time.  Advised her to call us with any questions or concerns.     Total visit time of 30 minutes.  Time spent in today's visit, review of chart/investigations today and documentation.         Again, thank you for allowing me to participate in the care of your patient.        Sincerely,        Eloisa Rossi MD

## 2024-10-22 NOTE — PROGRESS NOTES
HEMATOLOGY HISTORY: Ms. Zhao is a female with hereditary hemochromatosis. Homozygous for H63D mutation.   1.  On 06/15/2016, ferritin of 506.   -  On 06/12/2018,  ALT of 240 and AST of 136.   2.  On 06/15/2018, Iron of 138, ferritin of 1140 and saturation of 66%.   3.  Ultrasound of abdomen and pelvis on 06/18/2018 is normal.   4. On 06/21/2018, HFE gene analysis reveals patient to be homozygous for H63D mutation.   5. CT abdomen and pelvis on 06/25/2018 is normal.  Liver looks normal.   6. Because of elevated LFT, phlebotomy started on 06/21/2018.  7. Echocardiogram done on 06/08/2022 was essentially normal with normal ejection fraction.      SUBJECTIVE:    Mrs. Zhao is a 64-year-old female with hereditary hemochromatosis. Patient requires phlebotomy infrequently.    Her main problem is back pain.  She follows up with orthopedics and a spine surgeon.  MRI lumbar spine ordered 10/60/24 reveals severe spinal stenosis. Walking/activities are restricted because of pain.     No excessive fatigue. No headache. No dizziness.  No chest pain.  No shortness of breath.  No abdominal pain.  No nausea or vomiting.  Appetite is good.  No urinary complaints.  No bowel problem.       PHYSICAL EXAMINATION:   GENERAL:  She is alert, oriented x 3.   VITAL SIGNS:  Reviewed.  She is not in distress.   Rest of systems not examined.      LABORATORY DATA: CBC, CMP, iron and ferritin reviewed.       ASSESSMENT:   1.  A 64-year-old female with hereditary hemochromatosis on intermittent phlebotomy.  Labs revealed normal iron, iron saturation index and ferritin.  2.  Back pain secondary to spinal stenosis.      PLAN:  Labs every 6 months and phlebotomy as needed.  See me in 1 year with labs.    DISCUSSION:  1.  Patient's main problem is back pain.  This is secondary to spinal stenosis.  Treatment as per orthopedic/spine surgeon.    2.  Discussed regarding hemochromatosis.  Labs reviewed.  Her ferritin is 62.  Iron saturation index  is 45%.  Iron is normal.  No need for phlebotomy.    Discussed regarding phlebotomy.  Will consider phlebotomy if ferritin is above 100.    As patient has not been requiring phlebotomy for a long time, advised her to have CBC and iron studies checked every 6 months and have phlebotomy done as needed.    Explained to the patient that her ferritin, iron and iron saturation index have been in normal range for a long time.  This is good.  We do not expect her to get any complications from hemochromatosis.    3.  She had a few questions which were all answered.  I will see her in 1 year time.  Advised her to call us with any questions or concerns.     Total visit time of 30 minutes.  Time spent in today's visit, review of chart/investigations today and documentation.

## 2024-10-22 NOTE — PROGRESS NOTES
Medical Assistant Note:  Irina Hanks presents today for blood draw.    Patient seen by provider today: Yes: sadia.   present during visit today: Not Applicable.    Concerns: No Concerns.    Procedure:  Lab draw site: right hand, Needle type: bf, Gauge: 23.    Post Assessment:  Labs drawn without difficulty: Yes.    Discharge Plan:  Departure Mode: Ambulatory.    Face to Face Time: 5 min.    Viki Arroyo CMA

## 2024-10-23 NOTE — RESULT ENCOUNTER NOTE
Dear Ms. Hanks,    Ferritin is good at 62. No phlebotomy needed.    Please, call me with any questions.    Eloisa Rossi MD

## 2024-10-24 ENCOUNTER — VIRTUAL VISIT (OUTPATIENT)
Dept: FAMILY MEDICINE | Facility: CLINIC | Age: 64
End: 2024-10-24
Attending: INTERNAL MEDICINE
Payer: MEDICARE

## 2024-10-24 ENCOUNTER — TELEPHONE (OUTPATIENT)
Dept: FAMILY MEDICINE | Facility: CLINIC | Age: 64
End: 2024-10-24

## 2024-10-24 DIAGNOSIS — Z79.899 CONTROLLED SUBSTANCE AGREEMENT SIGNED: ICD-10-CM

## 2024-10-24 DIAGNOSIS — Z79.899 CHRONIC PRESCRIPTION BENZODIAZEPINE USE: ICD-10-CM

## 2024-10-24 DIAGNOSIS — N18.2 CKD (CHRONIC KIDNEY DISEASE) STAGE 2, GFR 60-89 ML/MIN: ICD-10-CM

## 2024-10-24 DIAGNOSIS — F41.9 ANXIETY: Primary | ICD-10-CM

## 2024-10-24 DIAGNOSIS — M54.50 ACUTE RIGHT-SIDED LOW BACK PAIN WITHOUT SCIATICA: ICD-10-CM

## 2024-10-24 DIAGNOSIS — E03.9 HYPOTHYROIDISM, UNSPECIFIED TYPE: ICD-10-CM

## 2024-10-24 DIAGNOSIS — M48.061 SPINAL STENOSIS OF LUMBAR REGION WITHOUT NEUROGENIC CLAUDICATION: ICD-10-CM

## 2024-10-24 DIAGNOSIS — I10 ESSENTIAL HYPERTENSION: ICD-10-CM

## 2024-10-24 LAB
ANION GAP SERPL CALCULATED.3IONS-SCNC: 17 MMOL/L (ref 7–15)
BUN SERPL-MCNC: 10.5 MG/DL (ref 8–23)
CALCIUM SERPL-MCNC: 9.3 MG/DL (ref 8.8–10.4)
CHLORIDE SERPL-SCNC: 98 MMOL/L (ref 98–107)
CREAT SERPL-MCNC: 0.87 MG/DL (ref 0.51–0.95)
EGFRCR SERPLBLD CKD-EPI 2021: 73 ML/MIN/1.73M2
GLUCOSE SERPL-MCNC: 88 MG/DL (ref 70–99)
HCO3 SERPL-SCNC: 21 MMOL/L (ref 22–29)
POTASSIUM SERPL-SCNC: 4.5 MMOL/L (ref 3.4–5.3)
SODIUM SERPL-SCNC: 136 MMOL/L (ref 135–145)

## 2024-10-24 PROCEDURE — 99214 OFFICE O/P EST MOD 30 MIN: CPT | Mod: 95 | Performed by: INTERNAL MEDICINE

## 2024-10-24 PROCEDURE — G2211 COMPLEX E/M VISIT ADD ON: HCPCS | Mod: 95 | Performed by: INTERNAL MEDICINE

## 2024-10-24 RX ORDER — LOSARTAN POTASSIUM 100 MG/1
100 TABLET ORAL DAILY
Qty: 90 TABLET | Refills: 1 | Status: SHIPPED | OUTPATIENT
Start: 2024-10-24

## 2024-10-24 RX ORDER — AMLODIPINE BESYLATE 5 MG/1
5 TABLET ORAL DAILY
Qty: 90 TABLET | Refills: 1 | Status: SHIPPED | OUTPATIENT
Start: 2024-10-24

## 2024-10-24 NOTE — PROGRESS NOTES
Cherise is a 64 year old who is being evaluated via a billable video visit.    How would you like to obtain your AVS? MyChart  If the video visit is dropped, the invitation should be resent by: Text to cell phone: [phone]  Will anyone else be joining your video visit? No        Assessment and Plan  1. Anxiety (Primary)  2. Chronic prescription benzodiazepine use  3. Controlled substance agreement signed 10/24/2024  Chronic stable, patient CSA .  Please see AVS below in detail for the plan to get this CSA signed by the patient in this video visit.  Patient understood and with the plan.  -Patient took her Klonopin recently on 2024, not due until 2024 for refill.  - Drug Confirmation Panel Urine with Creat - lab collect; Future    4. Acute right-sided low back pain without sciatica  6. Spinal stenosis of lumbar region without neurogenic claudication  Ongoing problem, uncontrolled which patient endorses that she has been having this pain which is not responding much to the Medrol Dosepak given recently.  Patient is already scheduled with her spine specialist for epidural plans in 2024.  She is requesting for a short fill of any pain management, discussed most part of the appointment as mentioned in the CKD below.  - Basic metabolic panel  (Ca, Cl, CO2, Creat, Gluc, K, Na, BUN); Future    5. Hypothyroidism, unspecified type  Chronic stable, continue current levothyroxine and Cytomel as requested by the patient to be managed by PCP which we have been doing it.  Last lab work in 2023 normal.  Okay to wait till annual physical due in couple of months.    7. Essential hypertension  - losartan (COZAAR) 100 MG tablet; Take 1 tablet (100 mg) by mouth daily.  Dispense: 90 tablet; Refill: 1  - amLODIPine (NORVASC) 5 MG tablet; Take 1 tablet (5 mg) by mouth daily.  Dispense: 90 tablet; Refill: 1    8. CKD (chronic kidney disease) stage 2, GFR 60-89 ml/min  Chronic problem,  normalized EGFR and renal function on last couple of checks.  Discussed on avoiding nephrotoxins but given patient's ongoing back pain shared decision for short course of meloxicam with close follow-up on the kidney function.  I will await for the renal function ordered today before sending the meloxicam for only 30-day supply till she sees her spine specialist for epidural.  Unable to give controlled substances given she is already on benzodiazepines managed by PCP.  Will recheck her renal function again after 1 month.  Patient understood and agreed the plan.  - Albumin Random Urine Quantitative with Creat Ratio; Future      The longitudinal plan of care for the diagnosis(es)/condition(s) as documented were addressed during this visit. Due to the added complexity in care, I will continue to support Peg in the subsequent management and with ongoing continuity of care.      Please note that this note consists of symbols derived from keyboarding, dictation and/or voice recognition software. As a result, there may be errors in the script that have gone undetected. Please consider this when interpreting information found in this chart.    Patient Instructions   As discussed with you already in this video visit, please make a  appointment/DC appointment at the  to sign the controlled substance contract printed and signed by me already in the red folder.    You have to also make a lab only appointment on the same day for the urine exam which is linked to this contract.    Will take care of the refills.    As discussed I will await for your renal function results on the labs added to the blood in the lab before sending meloxicam as needed pain medication to your pharmacy for your ongoing back pain, this will be only a short supply till you see your spine specialist so as to protect your kidneys.      Return in about 3 months (around 1/24/2025), or if symptoms worsen or fail to improve, for Annual  Wellness Exam.    Mercedez Britton MD  [facility] ROMULO Luong is a 64 year old, presenting for the following health issues:  Medication Follow-up (Anxiety, blood pressure, and thyroid. )        10/24/2024     4:31 PM   Additional Questions   Roomed by Meredith ANDERSON       History of Present Illness       Reason for visit:  General follow up   She is taking medications regularly.       Hypertension Follow-up    Do you check your blood pressure regularly outside of the clinic? Yes   Are you following a low salt diet? No  Are your blood pressures ever more than 140 on the top number (systolic) OR more   than 90 on the bottom number (diastolic), for example 140/90? No    Anxiety   How are you doing with your anxiety since your last visit? No change  Are you having other symptoms that might be associated with anxiety? No  Have you had a significant life event? No   Are you feeling depressed? No  Do you have any concerns with your use of alcohol or other drugs? No    Social History     Tobacco Use    Smoking status: Former     Current packs/day: 0.00     Average packs/day: 0.3 packs/day for 10.0 years (2.6 ttl pk-yrs)     Types: Cigarettes     Start date: 1977     Quit date: 1987     Years since quittin.5    Smokeless tobacco: Never    Tobacco comments:     Was a social smoker   Vaping Use    Vaping status: Never Used   Substance Use Topics    Alcohol use: Yes     Comment: A couple a day    Drug use: No         3/24/2022    11:02 AM 2023     5:15 PM 2023     4:09 PM   MARIEL-7 SCORE   Total Score 3 (minimal anxiety) 0 (minimal anxiety) 0 (minimal anxiety)   Total Score 3 0 0         2016     2:32 PM 2021    10:32 AM   PHQ   PHQ-9 Total Score 6 3   Q9: Thoughts of better off dead/self-harm past 2 weeks Not at all Not at all        Patient-reported         2021    10:32 AM   Last PHQ-9   1.  Little interest or pleasure in doing things 0    2.  Feeling down,  depressed, or hopeless 1    3.  Trouble falling or staying asleep, or sleeping too much 1    4.  Feeling tired or having little energy 0    5.  Poor appetite or overeating 0    6.  Feeling bad about yourself 0    7.  Trouble concentrating 1    8.  Moving slowly or restless 0    Q9: Thoughts of better off dead/self-harm past 2 weeks 0    PHQ-9 Total Score 3       Patient-reported         6/22/2023     4:09 PM   MARIEL-7    1. Feeling nervous, anxious, or on edge 0    2. Not being able to stop or control worrying 0    3. Worrying too much about different things 0    4. Trouble relaxing 0    5. Being so restless that it is hard to sit still 0    6. Becoming easily annoyed or irritable 0    7. Feeling afraid, as if something awful might happen 0    MARIEL-7 Total Score 0   If you checked any problems, how difficult have they made it for you to do your work, take care of things at home, or get along with other people? Not difficult at all        Patient-reported     Hypothyroidism Follow-up    Since last visit, patient describes the following symptoms: Weight stable, no hair loss, no skin changes, no constipation, no loose stools  How many servings of fruits and vegetables do you eat daily?  2-3  On average, how many sweetened beverages do you drink each day (Examples: soda, juice, sweet tea, etc.  Do NOT count diet or artificially sweetened beverages)?   0  How many days per week do you exercise enough to make your heart beat faster? 3 or less  How many minutes a day do you exercise enough to make your heart beat faster? 9 or less  How many days per week do you miss taking your medication? 0      Last seen patient in September 2024 for back pain as well as spinal stenosis with neurogenic claudication, MRI showing neuroforaminal stenosis.  Given the patient's risk factors of rheumatoid arthritis on treatment emphasized to keep up the appointment with spine specialist and the referral placed at that time.  Patient is here for  follow-up of    Her anxiety, medication follow-up for her blood pressure and hypothyroidism.         Allergies   Allergen Reactions    Codeine Nausea and Nausea and Vomiting    Nitrofurantoin Unknown and Other (See Comments)     Other reaction(s): Gastrointestinal      Ace Inhibitors Cough     lisinopril  lisinopril  lisinopril    Elemental Sulfur Unknown    Hydrochlorothiazide Unknown     Severe Hyponatremia less than 120  Severe Hyponatremia less than 120    Morphine And Codeine Nausea    Sulfa Antibiotics     Sulfatolamide     Sulfur Unknown        Past Medical History:   Diagnosis Date    ASCUS favor benign 09/2014    3 yr co-test    Hereditary hemochromatosis (H)     HTN (hypertension)     Impaired renal function     Microscopic colitis     Osteoarthritis of first carpometacarpal joint     bilateral    Other specified acquired hypothyroidism     RA (rheumatoid arthritis) (H)     Seasonal allergic rhinitis     Spider veins     Symptomatic menopausal or female climacteric states     age 43, on HRT    Tricuspid regurgitation     +1-2 TR noted on 12/22/14 Echo       Past Surgical History:   Procedure Laterality Date    ABDOMEN SURGERY  01/30/1995    C section    BIOPSY BREAST      C/SECTION, LOW TRANSVERSE      twins    COLONOSCOPY  2013    nl, next one due in 5 years    CV CORONARY ANGIOGRAM N/A 06/30/2022    Procedure: Coronary Angiogram;  Surgeon: Jose Antonio Ferraro MD;  Location:  HEART CARDIAC CATH LAB    CV LEFT HEART CATH N/A 06/30/2022    Procedure: Left Heart Catheterization;  Surgeon: Jose Antonio Ferraro MD;  Location: [facility] CARDIAC CATH LAB    CV LEFT VENTRICULOGRAM N/A 06/30/2022    Procedure: Left Ventriculogram;  Surgeon: Jose Antonio Ferraro MD;  Location:  HEART CARDIAC CATH LAB     RIGHT HEART CATH MEASUREMENTS RECORDED N/A 06/30/2022    Procedure: Right Heart Catheterization;  Surgeon: Jose Antonio Ferraro MD;  Location:  HEART CARDIAC CATH LAB    LEE TX, CERVICAL  1990s    normal since  that time    MYRINGOTOMY, INSERT TUBE, COMBINED Left 2015    chronic NORM, ETD    ORTHOPEDIC SURGERY  Oct 2021    New right hip    REPLACEMENT TOTAL HIP W/  RESURFACING IMPLANTS Right     SOFT TISSUE SURGERY  Oct,  2021    Gluteus, medius or tendon repair       Family History   Problem Relation Age of Onset    Cancer - colorectal Father         age 50    Colon Cancer Father     Arthritis Mother     Cancer - colorectal Brother         age 50    Colon Cancer Brother     Hypertension Sister     Hypertension Brother     Colon Cancer Brother         John passed away in 2022.    Prostate Cancer Brother     Kidney Cancer Brother     Prostate Cancer Brother     Arthritis Sister     Cancer Sister 53        Uterine    Cancer Sister 50        Uterine    Testicular cancer Nephew     Prostate Cancer Brother     Thyroid Disease No family hx of        Social History     Tobacco Use    Smoking status: Former     Current packs/day: 0.00     Average packs/day: 0.3 packs/day for 10.0 years (2.6 ttl pk-yrs)     Types: Cigarettes     Start date: 1977     Quit date: 1987     Years since quittin.5    Smokeless tobacco: Never    Tobacco comments:     Was a social smoker   Substance Use Topics    Alcohol use: Yes     Comment: A couple a day        Current Outpatient Medications   Medication Sig Dispense Refill    acetaminophen (TYLENOL) 500 MG tablet Take 1-2 tablets (500-1,000 mg) by mouth every 4 hours as needed for mild pain 30 tablet 0    albuterol (PROAIR HFA/PROVENTIL HFA/VENTOLIN HFA) 108 (90 Base) MCG/ACT inhaler Inhale 2 puffs into the lungs every 6 hours as needed for shortness of breath, wheezing or cough 18 g 0    amLODIPine (NORVASC) 5 MG tablet Take 1 tablet (5 mg) by mouth daily. 90 tablet 1    aspirin (ASA) 81 MG chewable tablet       Biotin 5000 MCG TABS Take 1 tablet by mouth daily       clonazePAM (KLONOPIN) 0.5 MG tablet TAKE 1 TABLET BY MOUTH AT BEDTIME AS NEEDED FOR ANXIETY 30 tablet 2     hydroxychloroquine (PLAQUENIL) 200 MG tablet Take 2 tablets (400 mg) by mouth daily 90 tablet 3    ipratropium (ATROVENT) 0.03 % nasal spray Spray 2 sprays into both nostrils daily       levothyroxine (SYNTHROID/LEVOTHROID) 75 MCG tablet TAKE 1 TABLET(75 MCG) BY MOUTH EVERY MORNING 90 tablet 2    liothyronine (CYTOMEL) 5 MCG tablet TAKE 2 TABLETS BY MOUTH EVERY  tablet 0    loratadine (CLARITIN) 10 MG tablet Take 1 tablet (10 mg) by mouth daily 90 tablet 3    losartan (COZAAR) 100 MG tablet Take 1 tablet (100 mg) by mouth daily. 90 tablet 1    metoprolol succinate ER (TOPROL XL) 100 MG 24 hr tablet TAKE 1 TABLET(100 MG) BY MOUTH DAILY 90 tablet 1    sodium chloride 1 GM tablet TAKE 1 TABLET(1 GRAM) BY MOUTH DAILY 60 tablet 1     No current facility-administered medications for this visit.          Review of Systems  Constitutional, HEENT, cardiovascular, pulmonary, GI, , musculoskeletal, neuro, skin, endocrine and psych systems are negative, except as otherwise noted.      Objective    Vitals - Patient Reported  Pain Score: No Pain (0)        Physical Exam   GENERAL: alert and no distress  EYES: Eyes grossly normal to inspection.  No discharge or erythema, or obvious scleral/conjunctival abnormalities.  RESP: No audible wheeze, cough, or visible cyanosis.    SKIN: Visible skin clear. No significant rash, abnormal pigmentation or lesions.  NEURO: Cranial nerves grossly intact.  Mentation and speech appropriate for age.  PSYCH: Appropriate affect, tone, and pace of words      Video-Visit Details    Type of service:  Video Visit   Originating Location (pt. Location): Home    Distant Location (provider location):  On-site  Platform used for Video Visit: Pari  Signed Electronically by: Mercedez Britton MD

## 2024-10-24 NOTE — LETTER

## 2024-10-24 NOTE — TELEPHONE ENCOUNTER
Patient is due for CSA which is  for her benzodiazepine refills as managed by us.    I have discussed with the patient in the virtual visit today, placed red folder signed already by me.  Patient will come to  after taking it easy appointment as well as lab appointment for urine drug screen due at this time.    Please help the patient and do the needful, and scanned the signed documents by the patient so as to remove this CSA  alert from her chart.    Thank you  Mercedez Britton MD on 10/24/2024 at 6:10 PM

## 2024-10-24 NOTE — PATIENT INSTRUCTIONS
As discussed with you already in this video visit, please make a  appointment/DC appointment at the  to sign the controlled substance contract printed and signed by me already in the red folder.    You have to also make a lab only appointment on the same day for the urine exam which is linked to this contract.    Will take care of the refills.    As discussed I will await for your renal function results on the labs added to the blood in the lab before sending meloxicam as needed pain medication to your pharmacy for your ongoing back pain, this will be only a short supply till you see your spine specialist so as to protect your kidneys.

## 2024-10-25 NOTE — TELEPHONE ENCOUNTER
Called patient and scheduled Lab for Tuesday, placed the CSA at the  for patient to sign when she comes into the lab

## 2024-10-27 RX ORDER — HEPARIN SODIUM,PORCINE 10 UNIT/ML
5 VIAL (ML) INTRAVENOUS
OUTPATIENT
Start: 2024-10-27

## 2024-10-27 RX ORDER — HEPARIN SODIUM (PORCINE) LOCK FLUSH IV SOLN 100 UNIT/ML 100 UNIT/ML
5 SOLUTION INTRAVENOUS
OUTPATIENT
Start: 2024-10-27

## 2024-10-29 ENCOUNTER — LAB (OUTPATIENT)
Dept: LAB | Facility: CLINIC | Age: 64
End: 2024-10-29
Payer: MEDICARE

## 2024-10-29 DIAGNOSIS — N18.2 CKD (CHRONIC KIDNEY DISEASE) STAGE 2, GFR 60-89 ML/MIN: ICD-10-CM

## 2024-10-29 DIAGNOSIS — Z79.899 CHRONIC PRESCRIPTION BENZODIAZEPINE USE: ICD-10-CM

## 2024-10-29 DIAGNOSIS — F41.9 ANXIETY: ICD-10-CM

## 2024-10-29 PROCEDURE — 82043 UR ALBUMIN QUANTITATIVE: CPT

## 2024-10-29 PROCEDURE — G0481 DRUG TEST DEF 8-14 CLASSES: HCPCS | Mod: 59

## 2024-10-29 PROCEDURE — 82570 ASSAY OF URINE CREATININE: CPT

## 2024-10-30 LAB
CREAT UR-MCNC: 75.5 MG/DL
CREAT UR-MCNC: 76 MG/DL
MICROALBUMIN UR-MCNC: <12 MG/L
MICROALBUMIN/CREAT UR: NORMAL MG/G{CREAT}

## 2024-10-31 LAB — 7AMINOCLONAZEPAM UR QL CFM: PRESENT

## 2024-12-03 DIAGNOSIS — Z00.00 ROUTINE GENERAL MEDICAL EXAMINATION AT A HEALTH CARE FACILITY: ICD-10-CM

## 2024-12-03 DIAGNOSIS — I10 ESSENTIAL HYPERTENSION: ICD-10-CM

## 2024-12-03 RX ORDER — METOPROLOL SUCCINATE 100 MG/1
TABLET, EXTENDED RELEASE ORAL
Qty: 90 TABLET | Refills: 0 | Status: SHIPPED | OUTPATIENT
Start: 2024-12-03

## 2024-12-16 DIAGNOSIS — F43.22 ADJUSTMENT DISORDER WITH ANXIOUS MOOD: ICD-10-CM

## 2024-12-16 RX ORDER — CLONAZEPAM 0.5 MG/1
TABLET ORAL
Qty: 30 TABLET | Refills: 0 | Status: SHIPPED | OUTPATIENT
Start: 2024-12-16

## 2025-01-09 DIAGNOSIS — E03.9 ACQUIRED HYPOTHYROIDISM: ICD-10-CM

## 2025-01-11 RX ORDER — LIOTHYRONINE SODIUM 5 UG/1
TABLET ORAL
Qty: 180 TABLET | Refills: 0 | Status: SHIPPED | OUTPATIENT
Start: 2025-01-11

## 2025-01-17 DIAGNOSIS — E03.9 ACQUIRED HYPOTHYROIDISM: ICD-10-CM

## 2025-01-17 DIAGNOSIS — F43.22 ADJUSTMENT DISORDER WITH ANXIOUS MOOD: ICD-10-CM

## 2025-01-18 RX ORDER — LIOTHYRONINE SODIUM 5 UG/1
TABLET ORAL
Qty: 180 TABLET | Refills: 0 | Status: SHIPPED | OUTPATIENT
Start: 2025-01-18

## 2025-01-18 RX ORDER — CLONAZEPAM 0.5 MG/1
TABLET ORAL
Qty: 30 TABLET | Refills: 0 | Status: SHIPPED | OUTPATIENT
Start: 2025-01-18

## 2025-01-19 NOTE — TELEPHONE ENCOUNTER
[place] reviewed:  reviewed- no concerns    [place]  [email or web address]    Refill done.    Pt to keep up scheduled appt on 1/24/25 annual physical    Mercedez Britton MD on 1/18/2025

## 2025-01-20 NOTE — TELEPHONE ENCOUNTER
Left voicemail informing patient prescriptions have been sent to her pharmacy. Keep upcoming appointment as scheduled.

## 2025-02-11 DIAGNOSIS — I10 ESSENTIAL HYPERTENSION: ICD-10-CM

## 2025-02-11 RX ORDER — AMLODIPINE BESYLATE 5 MG/1
5 TABLET ORAL DAILY
Qty: 90 TABLET | Refills: 1 | Status: SHIPPED | OUTPATIENT
Start: 2025-02-11

## 2025-02-17 ENCOUNTER — TELEPHONE (OUTPATIENT)
Dept: FAMILY MEDICINE | Facility: CLINIC | Age: 65
End: 2025-02-17
Payer: MEDICARE

## 2025-02-17 DIAGNOSIS — F41.9 ANXIETY: Primary | ICD-10-CM

## 2025-02-17 DIAGNOSIS — E66.3 OVERWEIGHT WITH BODY MASS INDEX (BMI) OF 26 TO 26.9 IN ADULT: ICD-10-CM

## 2025-02-17 RX ORDER — CLONAZEPAM 0.5 MG/1
0.5 TABLET, ORALLY DISINTEGRATING ORAL
Qty: 60 TABLET | Refills: 0 | Status: SHIPPED | OUTPATIENT
Start: 2025-02-18 | End: 2025-04-19

## 2025-02-17 RX ORDER — PHENTERMINE HYDROCHLORIDE 15 MG/1
15 CAPSULE ORAL EVERY MORNING
Qty: 60 CAPSULE | Refills: 0 | Status: SHIPPED | OUTPATIENT
Start: 2025-02-17 | End: 2025-04-18

## 2025-02-17 NOTE — TELEPHONE ENCOUNTER
Pt will be traveling and is requesting 2 month refills. Pt las filled Phentamine 01/24/2025.     Pt asked if phentamine dose can be increased. Triage advised her to start e-visit or schedule VV to discuss dose increase. Pt opts to keep current dose to avoid delay on rx approval.

## 2025-02-18 NOTE — TELEPHONE ENCOUNTER
[place] reviewed:  reviewed- no concerns    [place]:  [email or web address]    Refill done   No further refills till 4/20/24 >> Refill done for 2 months as requested which pt is requesting due to travel plans.    Thank you  Mercedez Britton MD on 2/17/2025

## 2025-02-21 ENCOUNTER — TRANSFERRED RECORDS (OUTPATIENT)
Dept: HEALTH INFORMATION MANAGEMENT | Facility: CLINIC | Age: 65
End: 2025-02-21
Payer: MEDICARE

## 2025-03-05 NOTE — PROGRESS NOTES
Cherise is a 63 year old who is being evaluated via a billable video visit.      How would you like to obtain your AVS? MyChart  If the video visit is dropped, the invitation should be resent by: Text to cell phone: [phone]  Will anyone else be joining your video visit? No        Assessment and Plan    1. Anxiety  Chronic stable condition current Klonopin as managed by PCP previously following psychiatry but not anymore. Pt is traveling, request for 2-month supplies-will need 60 tabs for 2/24 refill which will good for March & April. Will be refilling again in 4/2024.     2. Rheumatoid arthritis involving multiple sites, unspecified whether rheumatoid factor present (H)  Chronic stable, continue to follow rheumatology    3. Uncomplicated alcohol dependence (H)  Patient does endorses sometimes alcohol consumption but not too heavy at this time.  Understands on the risks and complications given the previous hospital visits for the same    4. Hereditary hemochromatosis (H24)  5. Iron overload syndrome  Chronic stable, continue to follow heme oncology and standing lab orders as managed by them.    6. Hip pain, left  S/p left hip arthroplasty Having guarded PT at this time. 4/26/24 - Lt hip arthroplasty with recheck MRI recently is reviewed and discussed with the patient-is reassuring and healing continuing PT.  Continue to follow orthopedic recommendations        Please note that this note consists of symbols derived from keyboarding, dictation and/or voice recognition software. As a result, there may be errors in the script that have gone undetected. Please consider this when interpreting information found in this chart.    Patient Instructions   As discussed, will do 2 months supply from next fill onwards since you have just filled this month in 1/2024     Follow-up with rheumatology, heme oncology, orthopedics for further management of your medical conditions as mentioned.  Return in about 3 months (around  5/1/2024), or if symptoms worsen or fail to improve, for anxiety, video visit.    Mercedez Britton MD  [facility] [place] Luong is a 63 year old, presenting for the following health issues:  Recheck Medication        2/1/2024    10:02 AM   Additional Questions   Roomed by Nataliia     History of Present Illness       Reason for visit:  Medication follow up    She eats 2-3 servings of fruits and vegetables daily.She consumes 2 sweetened beverage(s) daily.She exercises with enough effort to increase her heart rate 20 to 29 minutes per day.  She exercises with enough effort to increase her heart rate 3 or less days per week.   She is taking medications regularly.       Medication Followup of clonazePAM   Taking Medication as prescribed: yes  Side Effects:  None  Medication Helping Symptoms:  yes      Last seen patient in December 2023 for virtual visit on COVID-positive and treatment for it.  Patient is here for follow-up on anxiety medication.       Allergies   Allergen Reactions    Codeine Nausea and Nausea and Vomiting    Nitrofurantoin Unknown and Other (See Comments)     Other reaction(s): Gastrointestinal      Ace Inhibitors Cough     lisinopril  lisinopril  lisinopril    Elemental Sulfur Unknown    Hydrochlorothiazide Unknown     Severe Hyponatremia less than 120  Severe Hyponatremia less than 120    Sulfa Antibiotics     Sulfatolamide     Sulfur Unknown        Past Medical History:   Diagnosis Date    ASCUS favor benign 09/2014    3 yr co-test    Hereditary hemochromatosis (H24)     HTN (hypertension)     Impaired renal function     Microscopic colitis     Osteoarthritis of first carpometacarpal joint     bilateral    Other specified acquired hypothyroidism     RA (rheumatoid arthritis) (H)     Seasonal allergic rhinitis     Spider veins     Symptomatic menopausal or female climacteric states     age 43, on HRT    Tricuspid regurgitation     +1-2 TR noted on 12/22/14 Echo        Past Surgical History:   Procedure Laterality Date    ABDOMEN SURGERY  01/30/1995    C section    BIOPSY BREAST      C/SECTION, LOW TRANSVERSE      twins    COLONOSCOPY  2013    nl, next one due in 5 years    CV CORONARY ANGIOGRAM N/A 06/30/2022    Procedure: Coronary Angiogram;  Surgeon: Jose Antonio Ferraro MD;  Location:  HEART CARDIAC CATH LAB    CV LEFT HEART CATH N/A 06/30/2022    Procedure: Left Heart Catheterization;  Surgeon: Jose Antonio Ferraro MD;  Location:  HEART CARDIAC CATH LAB    CV LEFT VENTRICULOGRAM N/A 06/30/2022    Procedure: Left Ventriculogram;  Surgeon: Jose Antonio Ferraro MD;  Location:  HEART CARDIAC CATH LAB    CV RIGHT HEART CATH MEASUREMENTS RECORDED N/A 06/30/2022    Procedure: Right Heart Catheterization;  Surgeon: Jose Antonio Ferraro MD;  Location:  HEART CARDIAC CATH LAB    LEEP TX, CERVICAL  1990s    normal since that time    MYRINGOTOMY, INSERT TUBE, COMBINED Left 04/2015    chronic NORM, ETD    ORTHOPEDIC SURGERY  Oct 2021    New right hip    REPLACEMENT TOTAL HIP W/  RESURFACING IMPLANTS Right     SOFT TISSUE SURGERY  Oct,  2021    Gluteus, medius or tendon repair       Family History   Problem Relation Age of Onset    Cancer - colorectal Father         age 50    Colon Cancer Father     Arthritis Mother     Cancer - colorectal Brother         age 50    Colon Cancer Brother     Hypertension Sister     Hypertension Brother     Colon Cancer Brother         John passed away in October 2022.    Prostate Cancer Brother     Kidney Cancer Brother     Prostate Cancer Brother     Arthritis Sister     Cancer Sister 53        Uterine    Cancer Sister 50        Uterine    Testicular cancer Nephew     Prostate Cancer Brother     Thyroid Disease No family hx of        Social History     Tobacco Use    Smoking status: Former     Packs/day: 0.26     Years: 10.00     Additional pack years: 0.00     Total pack years: 2.60     Types: Cigarettes     Quit date: 4/26/1987     Years since  quittin.7    Smokeless tobacco: Never    Tobacco comments:     Was a social smoker   Substance Use Topics    Alcohol use: Yes     Comment: A couple a day        Current Outpatient Medications   Medication    acetaminophen (TYLENOL) 500 MG tablet    amLODIPine (NORVASC) 5 MG tablet    Biotin 5000 MCG TABS    clonazePAM (KLONOPIN) 0.5 MG tablet    hydroxychloroquine (PLAQUENIL) 200 MG tablet    ipratropium (ATROVENT) 0.03 % nasal spray    levothyroxine (SYNTHROID/LEVOTHROID) 75 MCG tablet    liothyronine (CYTOMEL) 5 MCG tablet    loratadine (CLARITIN) 10 MG tablet    losartan (COZAAR) 100 MG tablet    metoprolol succinate ER (TOPROL XL) 100 MG 24 hr tablet    sodium chloride 1 GM tablet    benzonatate (TESSALON) 100 MG capsule     No current facility-administered medications for this visit.          Review of Systems  Constitutional, HEENT, cardiovascular, pulmonary, GI, , musculoskeletal, neuro, skin, endocrine and psych systems are negative, except as otherwise noted.      Objective           Vitals:  No vitals were obtained today due to virtual visit.    Physical Exam   GENERAL: alert and no distress  EYES: Eyes grossly normal to inspection.  No discharge or erythema, or obvious scleral/conjunctival abnormalities.  RESP: No audible wheeze, cough, or visible cyanosis.    SKIN: Visible skin clear. No significant rash, abnormal pigmentation or lesions.  NEURO: Cranial nerves grossly intact.  Mentation and speech appropriate for age.  PSYCH: Appropriate affect, tone, and pace of words      Video-Visit Details    Type of service:  Video Visit     Originating Location (pt. Location): Home    Distant Location (provider location):  On-site  Platform used for Video Visit: Pari  Signed Electronically by: Mercedez Britton MD     Additional Notes: -discussed importance of photoprotection\\n-rec spf 30+ and wide brim hats when outside Detail Level: Zone Render Risk Assessment In Note?: no

## 2025-03-25 ENCOUNTER — PATIENT OUTREACH (OUTPATIENT)
Dept: CARE COORDINATION | Facility: CLINIC | Age: 65
End: 2025-03-25
Payer: MEDICARE

## 2025-04-16 DIAGNOSIS — I10 ESSENTIAL HYPERTENSION: ICD-10-CM

## 2025-04-16 RX ORDER — LOSARTAN POTASSIUM 100 MG/1
100 TABLET ORAL DAILY
Qty: 90 TABLET | Refills: 2 | Status: SHIPPED | OUTPATIENT
Start: 2025-04-16

## 2025-04-22 ENCOUNTER — VIRTUAL VISIT (OUTPATIENT)
Dept: FAMILY MEDICINE | Facility: CLINIC | Age: 65
End: 2025-04-22
Payer: MEDICARE

## 2025-04-22 DIAGNOSIS — K21.9 GASTROESOPHAGEAL REFLUX DISEASE, UNSPECIFIED WHETHER ESOPHAGITIS PRESENT: ICD-10-CM

## 2025-04-22 DIAGNOSIS — F41.9 ANXIETY: ICD-10-CM

## 2025-04-22 DIAGNOSIS — F10.20 UNCOMPLICATED ALCOHOL DEPENDENCE (H): ICD-10-CM

## 2025-04-22 DIAGNOSIS — R13.10 DYSPHAGIA, UNSPECIFIED TYPE: Primary | ICD-10-CM

## 2025-04-22 DIAGNOSIS — F43.22 ADJUSTMENT DISORDER WITH ANXIOUS MOOD: ICD-10-CM

## 2025-04-22 PROCEDURE — 98006 SYNCH AUDIO-VIDEO EST MOD 30: CPT | Performed by: INTERNAL MEDICINE

## 2025-04-22 RX ORDER — OMEPRAZOLE 20 MG/1
20 CAPSULE, DELAYED RELEASE ORAL DAILY
Qty: 90 CAPSULE | Refills: 1 | Status: SHIPPED | OUTPATIENT
Start: 2025-04-22

## 2025-04-22 RX ORDER — CLONAZEPAM 0.5 MG/1
TABLET ORAL
Qty: 30 TABLET | Refills: 2 | Status: SHIPPED | OUTPATIENT
Start: 2025-04-22 | End: 2025-04-23

## 2025-04-22 NOTE — PROGRESS NOTES
Cherise is a 64 year old who is being evaluated via a billable video visit.    How would you like to obtain your AVS? MyChart  If the video visit is dropped, the invitation should be resent by: Text to cell phone: [phone]  Will anyone else be joining your video visit? No        Assessment and Plan    1. Dysphagia, unspecified type (Primary)  New problem which patient endorses that this symptom of Food sticking in the throat started from past 2 months. Which she states has been the same but no gradual worsening , happening only with solids. Associated Vomiting with gagging associated choking.  -Video exam negative for any thyromegaly though she has soft tissue swelling seen on the clavicular fossa which is chronic and was benign by previous imaging stent.  Shared decision to check for esophagogram as a barium swallow at this time before further recommendations.  Differential diagnosis as below.  - XR Esophagram; Future    2. Gastroesophageal reflux disease, unspecified whether esophagitis present  New problem added to patient from list which could be triggered by patient alcohol intake as mentioned below, emphasized on completely quitting alcohol as well as check for H. pylori before starting on omeprazole.  Will do further recommendations if no improvement in 3 months.  Patient understood and agrees with plan.  - Helicobacter pylori Antigen Stool; Future  - omeprazole (PRILOSEC) 20 MG DR capsule; Take 1 capsule (20 mg) by mouth daily.  Dispense: 90 capsule; Refill: 1    3. Adjustment disorder with anxious mood  4. Anxiety  Chronic problem, patient was recently traveling for which she was requesting 2 months at that time due to travel plans. >> with end date mentioned on the prescription for 2 months.  She is given the medication again as usual at this time.  - clonazePAM (KLONOPIN) 0.5 MG tablet; TAKE 1 TABLET BY MOUTH AT BEDTIME AS NEEDED FOR ANXIETY  Dispense: 30 tablet; Refill: 2    4. Uncomplicated alcohol  dependence (H)  Pt states that she has been on Alcohol 2-3 drinks/day on 3-4 times/week >>  which is mostly Wine , ocassional Vodka.  Discussed on possible need for quitting alcohol to avoid any kind of alcohol gastritis related to GERD complex as mentioned above.  Patient understood all the risks and complications.       The longitudinal plan of care for the diagnosis(es)/condition(s) as documented were addressed during this visit. Due to the added complexity in care, I will continue to support Peg in the subsequent management and with ongoing continuity of care.    Please note that this note consists of symbols derived from keyboarding, dictation and/or voice recognition software. As a result, there may be errors in the script that have gone undetected. Please consider this when interpreting information found in this chart.    Patient Instructions   As discussed we will go ahead and check for H. pylori for possible GERD causing your symptoms of food sticking to your throat.  Please drop off the H. pylori kit in the lab before you start the omeprazole.     Will check x-ray esophagogram for making sure there is no other concerns in your throat which could be causing this before further recommendations.  Please keep up the imaging appointment.  Return in about 3 months (around 7/22/2025), or if symptoms worsen or fail to improve, for anxiety, video visit.    Mercedez Britton MD  [facility] [place]        Subjective   Peg is a 64 year old, presenting for the following health issues:  Throat Problem        1/24/2025    11:08 AM   Additional Questions   Roomed by Meredith ANDERSON         History of Present Illness       Reason for visit:  Possible blockage in my throat  Symptom onset:  More than a month  Symptoms include:  Gaging and difficulty swallowing some foods and pills  Symptom intensity:  Moderate  Symptom progression:  Staying the same  Had these symptoms before:  No  What makes it worse:   Swallowing  What makes it better:  Not swallowing   She is taking medications regularly.             Allergies   Allergen Reactions    Codeine Nausea and Nausea and Vomiting    Nitrofurantoin Unknown and Other (See Comments)     Other reaction(s): Gastrointestinal      Ace Inhibitors Cough     lisinopril  lisinopril  lisinopril    Elemental Sulfur Unknown    Hydrochlorothiazide Unknown     Severe Hyponatremia less than 120  Severe Hyponatremia less than 120    Morphine And Codeine Nausea    Sulfa Antibiotics     Sulfatolamide     Sulfur Unknown        Past Medical History:   Diagnosis Date    ASCUS favor benign 09/2014    3 yr co-test    Hereditary hemochromatosis     HTN (hypertension)     Impaired renal function     Microscopic colitis     Osteoarthritis of first carpometacarpal joint     bilateral    Other specified acquired hypothyroidism     RA (rheumatoid arthritis) (H)     Seasonal allergic rhinitis     Spider veins     Symptomatic menopausal or female climacteric states     age 43, on HRT    Tricuspid regurgitation     +1-2 TR noted on 12/22/14 Echo       Past Surgical History:   Procedure Laterality Date    ABDOMEN SURGERY  01/30/1995    C section    BIOPSY BREAST      C/SECTION, LOW TRANSVERSE      twins    COLONOSCOPY  2013    nl, next one due in 5 years    CV CORONARY ANGIOGRAM N/A 06/30/2022    Procedure: Coronary Angiogram;  Surgeon: Jose Antonio Ferraro MD;  Location:  HEART CARDIAC CATH LAB    CV LEFT HEART CATH N/A 06/30/2022    Procedure: Left Heart Catheterization;  Surgeon: Jose Antonio Ferraro MD;  Location:  HEART CARDIAC CATH LAB    CV LEFT VENTRICULOGRAM N/A 06/30/2022    Procedure: Left Ventriculogram;  Surgeon: Jose Antonio Ferraro MD;  Location:  HEART CARDIAC CATH LAB    CV RIGHT HEART CATH MEASUREMENTS RECORDED N/A 06/30/2022    Procedure: Right Heart Catheterization;  Surgeon: Jose Antonio Ferraro MD;  Location:  HEART CARDIAC CATH LAB    IR LUMBAR PUNCTURE  11/7/2024    EZIO JACQUES,  CERVICAL  1990s    normal since that time    MYRINGOTOMY, INSERT TUBE, COMBINED Left 2015    chronic NORM, ETD    ORTHOPEDIC SURGERY  Oct 2021    New right hip    REPLACEMENT TOTAL HIP W/  RESURFACING IMPLANTS Right     SOFT TISSUE SURGERY  Oct,  2021    Gluteus, medius or tendon repair       Family History   Problem Relation Age of Onset    Cancer - colorectal Father         age 50    Colon Cancer Father     Arthritis Mother     Cancer - colorectal Brother         age 50    Colon Cancer Brother     Hypertension Sister     Hypertension Brother     Colon Cancer Brother         John passed away in 2022.    Prostate Cancer Brother     Kidney Cancer Brother     Prostate Cancer Brother     Arthritis Sister     Cancer Sister 53        Uterine    Cancer Sister 50        Uterine    Testicular cancer Nephew     Prostate Cancer Brother     Thyroid Disease No family hx of        Social History     Tobacco Use    Smoking status: Former     Current packs/day: 0.00     Average packs/day: 0.3 packs/day for 10.0 years (2.6 ttl pk-yrs)     Types: Cigarettes     Start date: 1977     Quit date: 1987     Years since quittin.0    Smokeless tobacco: Former    Tobacco comments:     Was a social smoker   Substance Use Topics    Alcohol use: Yes     Comment: A couple a day        Current Outpatient Medications   Medication Sig Dispense Refill    clonazePAM (KLONOPIN) 0.5 MG tablet TAKE 1 TABLET BY MOUTH AT BEDTIME AS NEEDED FOR ANXIETY 30 tablet 2    omeprazole (PRILOSEC) 20 MG DR capsule Take 1 capsule (20 mg) by mouth daily. 90 capsule 1    acetaminophen (TYLENOL) 500 MG tablet Take 1-2 tablets (500-1,000 mg) by mouth every 4 hours as needed for mild pain 30 tablet 0    albuterol (PROAIR HFA/PROVENTIL HFA/VENTOLIN HFA) 108 (90 Base) MCG/ACT inhaler Inhale 2 puffs into the lungs every 6 hours as needed for shortness of breath, wheezing or cough 18 g 0    amLODIPine (NORVASC) 5 MG tablet TAKE 1 TABLET(5 MG) BY MOUTH  DAILY 90 tablet 1    aspirin (ASA) 81 MG chewable tablet       Biotin 5000 MCG TABS Take 1 tablet by mouth daily       clonazePAM (KLONOPIN) 0.5 MG ODT Take 1 tablet (0.5 mg) by mouth nightly as needed for anxiety. 60 tablet 0    hydroxychloroquine (PLAQUENIL) 200 MG tablet Take 2 tablets (400 mg) by mouth daily 90 tablet 3    ipratropium (ATROVENT) 0.03 % nasal spray Spray 2 sprays into both nostrils daily       levothyroxine (SYNTHROID/LEVOTHROID) 75 MCG tablet Take 1 tablet (75 mcg) by mouth every morning (before breakfast). 90 tablet 2    liothyronine (CYTOMEL) 5 MCG tablet Take 2 tablets (10 mcg) by mouth daily. 180 tablet 3    loratadine (CLARITIN) 10 MG tablet Take 1 tablet (10 mg) by mouth daily 90 tablet 3    losartan (COZAAR) 100 MG tablet TAKE 1 TABLET(100 MG) BY MOUTH DAILY 90 tablet 2    metoprolol succinate ER (TOPROL XL) 100 MG 24 hr tablet Take 1 tablet (100 mg) by mouth daily. 90 tablet 3    minoxidil (LONITEN) 2.5 MG tablet Take by mouth.      sodium chloride 1 GM tablet TAKE 1 TABLET(1 GRAM) BY MOUTH DAILY 60 tablet 1    tiZANidine (ZANAFLEX) 2 MG tablet Take 2 mg by mouth.       No current facility-administered medications for this visit.          Review of Systems  Constitutional, HEENT, cardiovascular, pulmonary, GI, , musculoskeletal, neuro, skin, endocrine and psych systems are negative, except as otherwise noted.      Objective           Vitals:  No vitals were obtained today due to virtual visit.    Physical Exam   GENERAL: alert and no distress  EYES: Eyes grossly normal to inspection.  No discharge or erythema, or obvious scleral/conjunctival abnormalities.  RESP: No audible wheeze, cough, or visible cyanosis.    SKIN: Visible skin clear. No significant rash, abnormal pigmentation or lesions.  NEURO: Cranial nerves grossly intact.  Mentation and speech appropriate for age.  PSYCH: Appropriate affect, tone, and pace of words      Video-Visit Details    Type of service:  Video Visit    Originating Location (pt. Location): Home    Distant Location (provider location):  On-site  Platform used for Video Visit: Pari  Signed Electronically by: Mercedez Britton MD

## 2025-04-23 DIAGNOSIS — F41.9 ANXIETY: ICD-10-CM

## 2025-04-23 RX ORDER — CLONAZEPAM 0.5 MG/1
0.5 TABLET, ORALLY DISINTEGRATING ORAL
Qty: 30 TABLET | Refills: 2 | Status: SHIPPED | OUTPATIENT
Start: 2025-04-23

## 2025-04-23 NOTE — TELEPHONE ENCOUNTER
Pt is requesting Clonazepam ODT. She didn't fill regular Clonazepam script.   Pt prefers disintegrating tablets that she has used before. Please advice if Rx can be changed.    Routing refill request to provider for review/approval because:  Drug not on the [facility] refill protocol

## 2025-04-24 NOTE — TELEPHONE ENCOUNTER
[place] reviewed:  reviewed- no concerns    [place]  [email or web address]    Refill done as requested.  Mercedez Britton MD on 4/23/2025

## 2025-04-28 DIAGNOSIS — E66.3 OVERWEIGHT WITH BODY MASS INDEX (BMI) OF 26 TO 26.9 IN ADULT: ICD-10-CM

## 2025-04-28 RX ORDER — PHENTERMINE HYDROCHLORIDE 15 MG/1
15 CAPSULE ORAL EVERY MORNING
Qty: 30 CAPSULE | Refills: 2 | Status: SHIPPED | OUTPATIENT
Start: 2025-04-28

## 2025-04-29 ENCOUNTER — HOSPITAL ENCOUNTER (OUTPATIENT)
Dept: MAMMOGRAPHY | Facility: CLINIC | Age: 65
Discharge: HOME | End: 2025-04-29
Attending: INTERNAL MEDICINE
Payer: MEDICARE

## 2025-04-29 ENCOUNTER — HOSPITAL ENCOUNTER (OUTPATIENT)
Dept: GENERAL RADIOLOGY | Facility: CLINIC | Age: 65
Discharge: HOME | End: 2025-04-29
Attending: INTERNAL MEDICINE
Payer: MEDICARE

## 2025-04-29 ENCOUNTER — MYC MEDICAL ADVICE (OUTPATIENT)
Dept: FAMILY MEDICINE | Facility: CLINIC | Age: 65
End: 2025-04-29
Payer: MEDICARE

## 2025-04-29 DIAGNOSIS — Z12.31 VISIT FOR SCREENING MAMMOGRAM: ICD-10-CM

## 2025-04-29 DIAGNOSIS — R13.10 DYSPHAGIA, UNSPECIFIED TYPE: ICD-10-CM

## 2025-04-29 PROCEDURE — 77063 BREAST TOMOSYNTHESIS BI: CPT

## 2025-04-29 PROCEDURE — 74221 X-RAY XM ESOPHAGUS 2CNTRST: CPT

## 2025-04-29 PROCEDURE — 250N000013 HC RX MED GY IP 250 OP 250 PS 637: Performed by: INTERNAL MEDICINE

## 2025-04-29 RX ADMIN — ANTACID/ANTIFLATULENT 4 G: 380; 550; 10; 10 GRANULE, EFFERVESCENT ORAL at 13:05

## 2025-04-29 NOTE — TELEPHONE ENCOUNTER
[place] reviewed:  reviewed- no concerns    [place]:  [email or web address]    Refill done.    Refills done till follow up video visit of anxiety and weight due in 7/22/2025 > Please assist to schedule accordingly.       Mercedez Britton MD on 4/28/2025

## 2025-04-29 NOTE — DISCHARGE INSTRUCTIONS
After Your Barium Test  ________________________    Patient Name: Irina Hanks  Today's Date: April 29, 2025    You have just had barium as part of your exam. Barium is safe and harmless,   but it may cause constipation (hard, dry stools that are difficult to pass).     To avoid constipation:  Drink extra fluids today and tomorrow (six 8-ounce glasses per day), unless your doctor tells you not to.   You may take a mild laxative tonight, unless you have diarrhea   (loose stools) or your doctor tells you not to. For example, adults may   take a   ounce of milk of magnesia.   Call your doctor right away if you are constipated in the next couple   of days. Your doctor will tell you what to do.    [facility] at [phone]

## 2025-04-30 ENCOUNTER — LAB (OUTPATIENT)
Dept: LAB | Facility: CLINIC | Age: 65
End: 2025-04-30
Payer: MEDICARE

## 2025-04-30 DIAGNOSIS — E83.110 HEREDITARY HEMOCHROMATOSIS: ICD-10-CM

## 2025-04-30 DIAGNOSIS — K21.9 GASTROESOPHAGEAL REFLUX DISEASE, UNSPECIFIED WHETHER ESOPHAGITIS PRESENT: ICD-10-CM

## 2025-04-30 LAB
ERYTHROCYTE [DISTWIDTH] IN BLOOD BY AUTOMATED COUNT: 12.2 % (ref 10–15)
HCT VFR BLD AUTO: 38.7 % (ref 35–47)
HGB BLD-MCNC: 14 G/DL (ref 11.7–15.7)
MCH RBC QN AUTO: 32.7 PG (ref 26.5–33)
MCHC RBC AUTO-ENTMCNC: 36.2 G/DL (ref 31.5–36.5)
MCV RBC AUTO: 90 FL (ref 78–100)
PLATELET # BLD AUTO: 233 10E3/UL (ref 150–450)
RBC # BLD AUTO: 4.28 10E6/UL (ref 3.8–5.2)
WBC # BLD AUTO: 5.8 10E3/UL (ref 4–11)

## 2025-04-30 PROCEDURE — 83550 IRON BINDING TEST: CPT

## 2025-04-30 PROCEDURE — 82728 ASSAY OF FERRITIN: CPT

## 2025-04-30 PROCEDURE — 80076 HEPATIC FUNCTION PANEL: CPT

## 2025-04-30 PROCEDURE — 83540 ASSAY OF IRON: CPT

## 2025-04-30 PROCEDURE — 85027 COMPLETE CBC AUTOMATED: CPT

## 2025-04-30 PROCEDURE — 36415 COLL VENOUS BLD VENIPUNCTURE: CPT

## 2025-04-30 NOTE — TELEPHONE ENCOUNTER
Called spoke to pt inform rx has been refilled and scheduled VV with Pcp on 7/22/2025 @ 325pm  Urbano Youngblood CMA on 4/30/2025 at 11:47 AM

## 2025-05-01 LAB
ALBUMIN SERPL BCG-MCNC: 4.3 G/DL (ref 3.5–5.2)
ALP SERPL-CCNC: 119 U/L (ref 40–150)
ALT SERPL W P-5'-P-CCNC: 20 U/L (ref 0–50)
AST SERPL W P-5'-P-CCNC: 33 U/L (ref 0–45)
BILIRUB DIRECT SERPL-MCNC: 0.26 MG/DL (ref 0–0.3)
BILIRUB SERPL-MCNC: 0.6 MG/DL
FERRITIN SERPL-MCNC: 98 NG/ML (ref 11–328)
IRON BINDING CAPACITY (ROCHE): 276 UG/DL (ref 240–430)
IRON SATN MFR SERPL: 47 % (ref 15–46)
IRON SERPL-MCNC: 131 UG/DL (ref 37–145)
PROT SERPL-MCNC: 7.2 G/DL (ref 6.4–8.3)

## 2025-05-01 NOTE — RESULT ENCOUNTER NOTE
Dear Ms. Hanks,    I reviewed your labs.  -Ferritin is 98.  -Iron saturation index is elevated at 47%.    I would suggest phlebotomy because of elevated iron saturation index. Please, call our clinic and schedule phlebotomy.    Please, call me with any questions.    Eloisa Rossi MD

## 2025-05-02 ENCOUNTER — TELEPHONE (OUTPATIENT)
Dept: GASTROENTEROLOGY | Facility: CLINIC | Age: 65
End: 2025-05-02
Payer: MEDICARE

## 2025-05-02 NOTE — TELEPHONE ENCOUNTER
[facility] Call Center    Phone Message    May a detailed message be left on voicemail: Yes    Reason for Call: Other: Patient is currently scheduled on 6/23, as visit type New Esophageal Urgent. This is outside the expected timeline for this referral. Patient has been added to the waitlist.      Action Taken: Message routed to:  Other: GI REFERRAL TRIAGE POOL     Travel Screening: Not Applicable

## 2025-05-08 ENCOUNTER — INFUSION THERAPY VISIT (OUTPATIENT)
Dept: INFUSION THERAPY | Facility: CLINIC | Age: 65
End: 2025-05-08
Attending: INTERNAL MEDICINE
Payer: MEDICARE

## 2025-05-08 VITALS
TEMPERATURE: 97.5 F | OXYGEN SATURATION: 97 % | HEART RATE: 86 BPM | SYSTOLIC BLOOD PRESSURE: 116 MMHG | DIASTOLIC BLOOD PRESSURE: 78 MMHG | RESPIRATION RATE: 16 BRPM

## 2025-05-08 DIAGNOSIS — E83.19 IRON OVERLOAD: Primary | ICD-10-CM

## 2025-05-08 DIAGNOSIS — E83.110 HEREDITARY HEMOCHROMATOSIS: ICD-10-CM

## 2025-05-08 RX ORDER — HEPARIN SODIUM (PORCINE) LOCK FLUSH IV SOLN 100 UNIT/ML 100 UNIT/ML
5 SOLUTION INTRAVENOUS
OUTPATIENT
Start: 2025-05-08

## 2025-05-08 RX ORDER — HEPARIN SODIUM,PORCINE 10 UNIT/ML
5 VIAL (ML) INTRAVENOUS
OUTPATIENT
Start: 2025-05-08

## 2025-05-08 NOTE — PROGRESS NOTES
Infusion Nursing Note:  Irina Hanks presents today for phlebotomy.    Patient seen by provider today: No   present during visit today: Not Applicable.    Note: No new concerns as of late. Per Dr. Rossi, patient to get phlebotomy with latest lab results      Intravenous Access:  Phlebotomy site LAC, Needle type apheresis needle, Gauge 17.    Treatment Conditions:  Lab Results   Component Value Date    HGB 14.0 04/30/2025    WBC 5.8 04/30/2025    ANEU 6.9 06/18/2024     04/30/2025            Post Infusion Assessment:  Patient tolerated phlebotomy without incident.  Patient observed for 15 minutes post phlebotomy per protocol.  Blood return noted pre and post infusion.  Site patent and intact, free from redness, edema or discomfort.  No evidence of extravasations.  Access discontinued per protocol.       Discharge Plan:   AVS to patient via MYCHART.  Patient will return as [facility] for next appointment.   Patient discharged in stable condition accompanied by: self.  Departure Mode: Ambulatory.      Rory Rivera RN

## 2025-05-12 ENCOUNTER — APPOINTMENT (OUTPATIENT)
Dept: LAB | Facility: CLINIC | Age: 65
End: 2025-05-12
Payer: MEDICARE

## 2025-05-12 ENCOUNTER — MYC MEDICAL ADVICE (OUTPATIENT)
Dept: FAMILY MEDICINE | Facility: CLINIC | Age: 65
End: 2025-05-12

## 2025-05-13 LAB — H PYLORI AG STL QL IA: NEGATIVE

## 2025-05-14 NOTE — TELEPHONE ENCOUNTER
The patient called and would like to know if she needs to see the provider today. Informed her that she can see the same day or in person visit with any opening available if her pain cannot be tolerated.     Suggest that we see how things are today and will keep her tele visit with provider tomorrow at 4:30PM.

## 2025-05-15 ENCOUNTER — VIRTUAL VISIT (OUTPATIENT)
Dept: FAMILY MEDICINE | Facility: CLINIC | Age: 65
End: 2025-05-15
Payer: MEDICARE

## 2025-05-15 DIAGNOSIS — R20.2 PARESTHESIA: Primary | ICD-10-CM

## 2025-05-15 DIAGNOSIS — G62.1 NEUROPATHY, ALCOHOLIC: ICD-10-CM

## 2025-05-15 DIAGNOSIS — R13.10 DYSPHAGIA, UNSPECIFIED TYPE: ICD-10-CM

## 2025-05-15 DIAGNOSIS — K22.2 ESOPHAGEAL STRICTURE: ICD-10-CM

## 2025-05-15 DIAGNOSIS — K44.9 HIATAL HERNIA: ICD-10-CM

## 2025-05-15 RX ORDER — DESONIDE 0.5 MG/G
OINTMENT TOPICAL
COMMUNITY
Start: 2024-11-18

## 2025-05-15 RX ORDER — GABAPENTIN 300 MG/1
CAPSULE ORAL
COMMUNITY
Start: 2025-02-24

## 2025-05-15 RX ORDER — ACYCLOVIR 50 MG/G
OINTMENT TOPICAL
Qty: 15 G | Refills: 2 | Status: SHIPPED | OUTPATIENT
Start: 2025-05-15

## 2025-05-15 RX ORDER — CAPSAICIN 0.025 %
CREAM (GRAM) TOPICAL
Qty: 237 ML | Refills: 1 | Status: SHIPPED | OUTPATIENT
Start: 2025-05-15

## 2025-05-15 NOTE — PROGRESS NOTES
Cherise is a 65 year old who is being evaluated via a billable video visit.    How would you like to obtain your AVS? MyChart  If the video visit is dropped, the invitation should be resent by: Text to cell phone: [phone]  Will anyone else be joining your video visit? No        Assessment and Plan  1. Paresthesia (Primary)  New problem of ongoing burning sensation on the left dermatomal region on the rib cage as patient endorses on the video visit, no rash visible.  It is only the sensation that she has at this time, cannot exclude possible impending shingles coming up which is a clinical diagnosis.  -Recommend topical at this time, patient to update if she develops any rash so as to decide on oral shingles therapy at that time.  Patient understood and agreed the plan.  - acyclovir (ZOVIRAX) 5 % external ointment; To affected area  Dispense: 15 g; Refill: 2    2. Neuropathy, alcoholic  Ongoing problem, uncontrolled. Pt has previously given the history that she has been on Alcohol 2-3 drinks/day on 3-4 times/week >>  which is mostly Wine , ocassional Vodka.   -Does have ongoing musculoskeletal issues for which she is receiving gabapentin already, orthopedics, 300 mg 3 times daily which is also helping him of alcoholic neuropathy.  But given his new symptoms of the rib cage will give topical applicant capsaicin to apply on other timings when she is not using the above.  Patient understood the plan.  - capsaicin (ZOSTRIX) 0.025 % external cream; Apply 3 times daily on the affected area for  Dispense: 237 mL; Refill: 1       The longitudinal plan of care for the diagnosis(es)/condition(s) as documented were addressed during this visit. Due to the added complexity in care, I will continue to support Peg in the subsequent management and with ongoing continuity of care.      Please note that this note consists of symbols derived from keyboarding, dictation and/or voice recognition software. As a result, there may be  errors in the script that have gone undetected. Please consider this when interpreting information found in this chart.    Patient Instructions   As discussed your symptoms are appearing most likely paresthesia of versus alcoholic neuropathy but sometimes causes of symptoms but cannot exclude possible shingles as well but since you do not have a rash I am unable to give oral shingles therapy at this time.    Will go ahead and prescribe you topical applications of acyclovir once daily on the affected area and the meantime you can use the capsaicin external cream for the paresthesias.  I am unsure if your insurance will be able to cover these medications but I have already sent it to your pharmacy.    Return in about 2 months (around 7/22/2025), or if symptoms worsen or fail to improve, for video visit, anxiety, Obesity.    Mercedez Britton MD  [facility] ROMULO Luong is a 65 year old, presenting for the following health issues:  Musculoskeletal Problem        1/24/2025    11:08 AM   Additional Questions   Roomed by Meredith ANDERSON       Musculoskeletal Problem    History of Present Illness       Reason for visit:  Burning in painful sensation on my back upper left rib cage area.  Symptom onset:  3-7 days ago  Symptoms include:  Burning painful sensation  Symptom intensity:  Moderate  Symptom progression:  Staying the same  Had these symptoms before:  No  What makes it worse:  Wearing a bra  What makes it better:  Cold press   She is taking medications regularly.             Allergies   Allergen Reactions    Codeine Nausea and Nausea and Vomiting    Nitrofurantoin Unknown and Other (See Comments)     Other reaction(s): Gastrointestinal      Ace Inhibitors Cough     lisinopril  lisinopril  lisinopril    Elemental Sulfur Unknown    Hydrochlorothiazide Unknown     Severe Hyponatremia less than 120  Severe Hyponatremia less than 120    Morphine And Codeine Nausea    Sulfa Antibiotics      Sulfatolamide     Sulfur Unknown        Past Medical History:   Diagnosis Date    ASCUS favor benign 09/2014    3 yr co-test    Goiter     Hereditary hemochromatosis     HTN (hypertension)     Impaired renal function     Microscopic colitis     Osteoarthritis of first carpometacarpal joint     bilateral    Other specified acquired hypothyroidism     RA (rheumatoid arthritis) (H)     Seasonal allergic rhinitis     Spider veins     Symptomatic menopausal or female climacteric states     age 43, on HRT    Tricuspid regurgitation     +1-2 TR noted on 12/22/14 Echo       Past Surgical History:   Procedure Laterality Date    ABDOMEN SURGERY  01/30/1995    C section    BIOPSY BREAST      C/SECTION, LOW TRANSVERSE      twins    COLONOSCOPY  2013    nl, next one due in 5 years    CV CORONARY ANGIOGRAM N/A 06/30/2022    Procedure: Coronary Angiogram;  Surgeon: Jose Antonio Ferraro MD;  Location:  [facility]    CV LEFT HEART CATH N/A 06/30/2022    Procedure: Left Heart Catheterization;  Surgeon: Jose Antonio Ferraro MD;  Location:  [facility]    CV LEFT VENTRICULOGRAM N/A 06/30/2022    Procedure: Left Ventriculogram;  Surgeon: Jose Antonio Ferraro MD;  Location:  [facility]    CV RIGHT HEART CATH MEASUREMENTS RECORDED N/A 06/30/2022    Procedure: Right Heart Catheterization;  Surgeon: Jose Antonio Ferraro MD;  Location:  [facility]    IR LUMBAR PUNCTURE  11/7/2024    LEEP TX, CERVICAL  1990s    normal since that time    MYRINGOTOMY, INSERT TUBE, COMBINED Left 04/2015    chronic NORM, ETD    ORTHOPEDIC SURGERY  Oct 2021    New right hip    REPLACEMENT TOTAL HIP W/  RESURFACING IMPLANTS Right     SOFT TISSUE SURGERY  Oct,  2021    Gluteus, medius or tendon repair       Family History   Problem Relation Age of Onset    Cancer - colorectal Father         age 50    Colon Cancer Father     Arthritis Mother     Cancer - colorectal Brother         age 50    Colon Cancer Brother      Hypertension Sister     Hypertension Brother     Colon Cancer Brother         John passed away in 2022.    Prostate Cancer Brother     Kidney Cancer Brother     Prostate Cancer Brother     Arthritis Sister     Cancer Sister 53        Uterine    Cancer Sister 50        Uterine    Testicular cancer Nephew     Prostate Cancer Brother     Prostate Cancer Brother     Thyroid Disease No family hx of        Social History     Tobacco Use    Smoking status: Former     Current packs/day: 0.00     Average packs/day: 0.3 packs/day for 10.0 years (2.6 ttl pk-yrs)     Types: Cigarettes     Start date: 1977     Quit date: 1987     Years since quittin.0    Smokeless tobacco: Former    Tobacco comments:     Was a social smoker   Substance Use Topics    Alcohol use: Yes     Comment: A couple a day        Current Outpatient Medications   Medication Sig Dispense Refill    acetaminophen (TYLENOL) 500 MG tablet Take 1-2 tablets (500-1,000 mg) by mouth every 4 hours as needed for mild pain 30 tablet 0    acyclovir (ZOVIRAX) 5 % external ointment To affected area 15 g 2    amLODIPine (NORVASC) 5 MG tablet TAKE 1 TABLET(5 MG) BY MOUTH DAILY 90 tablet 1    aspirin (ASA) 81 MG chewable tablet       Biotin 5000 MCG TABS Take 1 tablet by mouth daily       capsaicin (ZOSTRIX) 0.025 % external cream Apply 3 times daily on the affected area for 237 mL 1    clonazePAM (KLONOPIN) 0.5 MG ODT Take 1 tablet (0.5 mg) by mouth nightly as needed for anxiety. 30 tablet 2    desonide (DESOWEN) 0.05 % external ointment       gabapentin (NEURONTIN) 300 MG capsule       hydroxychloroquine (PLAQUENIL) 200 MG tablet Take 2 tablets (400 mg) by mouth daily 90 tablet 3    ipratropium (ATROVENT) 0.03 % nasal spray Spray 2 sprays into both nostrils daily       levothyroxine (SYNTHROID/LEVOTHROID) 75 MCG tablet Take 1 tablet (75 mcg) by mouth every morning (before breakfast). 90 tablet 2    liothyronine (CYTOMEL) 5 MCG tablet Take 2 tablets (10  mcg) by mouth daily. 180 tablet 3    loratadine (CLARITIN) 10 MG tablet Take 1 tablet (10 mg) by mouth daily 90 tablet 3    losartan (COZAAR) 100 MG tablet TAKE 1 TABLET(100 MG) BY MOUTH DAILY 90 tablet 2    metoprolol succinate ER (TOPROL XL) 100 MG 24 hr tablet Take 1 tablet (100 mg) by mouth daily. 90 tablet 3    minoxidil (LONITEN) 2.5 MG tablet Take by mouth.      omeprazole (PRILOSEC) 20 MG DR capsule Take 1 capsule (20 mg) by mouth daily. 90 capsule 1    phentermine 15 MG capsule TAKE 1 CAPSULE(15 MG) BY MOUTH EVERY MORNING 30 capsule 2    sodium chloride 1 GM tablet TAKE 1 TABLET(1 GRAM) BY MOUTH DAILY 60 tablet 1    tiZANidine (ZANAFLEX) 2 MG tablet Take 2 mg by mouth.       No current facility-administered medications for this visit.          Review of Systems  Constitutional, HEENT, cardiovascular, pulmonary, GI, , musculoskeletal, neuro, skin, endocrine and psych systems are negative, except as otherwise noted.      Objective    Vitals - Patient Reported  Systolic (Patient Reported): 122  Diastolic (Patient Reported): 80  Pain Score: Severe Pain (7)        Physical Exam   GENERAL: alert and no distress  EYES: Eyes grossly normal to inspection.  No discharge or erythema, or obvious scleral/conjunctival abnormalities.  RESP: No audible wheeze, cough, or visible cyanosis.    SKIN: Visible skin clear. No significant rash, abnormal pigmentation or lesions.  NEURO: Cranial nerves grossly intact.  Mentation and speech appropriate for age.  PSYCH: Appropriate affect, tone, and pace of words      Video-Visit Details    Type of service:  Video Visit   Originating Location (pt. Location): Home    Distant Location (provider location):  On-site  Platform used for Video Visit: Narendra  Signed Electronically by: Mercedez Britton MD

## 2025-05-16 ENCOUNTER — TELEPHONE (OUTPATIENT)
Dept: FAMILY MEDICINE | Facility: CLINIC | Age: 65
End: 2025-05-16
Payer: MEDICARE

## 2025-05-16 NOTE — TELEPHONE ENCOUNTER
Prior Authorization Retail Medication Request    Medication/Dose: capsaicin (ZOSTRIX) 0.025 % external cream, rec. 5-15-25  Diagnosis and ICD code (if different than what is on RX):    New/renewal/insurance change PA/secondary ins. PA:  Previously Tried and Failed:    Rationale:      Insurance   Primary: Medicare  Insurance ID: [id number]     Secondary (if applicable):[facility]  Insurance ID: [id number]     Pharmacy Information (if different than what is on RX)  Name:    Phone:    Fax:    Clinic Information  Preferred routing pool for dept communication:

## 2025-05-20 NOTE — TELEPHONE ENCOUNTER
Note: Due to record-high volumes, our turn-around time is taking longer than usual . We are currently 3  business days behind in the pools.   We are working diligently to submit all requests in a timely manner and in the order they are received. Please only flag TRUE URGENT requests as high priority to the pool at this time.   If you have questions on status of PA's,  please send a note/message in the active PA encounter and send back to the [facility] PA pool [[phone]].    If you have questions about the turn-around time or about our process, please reach out to our supervisor Marina Tripp.   Thank you!   RPPA (Retail Pharmacy Prior Authorization) team    Retail Pharmacy Prior Authorization Team   Phone: [phone]    CMM KEY: (Key: [id number])    PA Initiation    Medication: CAPSAICIN 0.025 % EX CREA  Insurance Company: [employer] - Phone [phone] Fax [phone]  Pharmacy Filling the Rx: [id number] - [place] - [id number] AT [facility] [address]  Filling Pharmacy Phone: [phone]  Filling Pharmacy Fax: [phone]  Start Date: 5/20/2025

## 2025-05-21 NOTE — TELEPHONE ENCOUNTER
Patient transported to [address] [place], [place] [address]ANGELICA  01/07/19 3078 Retail Pharmacy Prior Authorization Team   Phone: [phone]      PRIOR AUTHORIZATION DENIED    Medication: CAPSAICIN 0.025 % EX CREA  Insurance Company: [employer] - Phone [phone] Fax [phone]  Denial Date: 5/21/2025  Denial Reason(s):           Appeal Information: Drug exclusions cannot be appealed.  This medication will not be covered by the prescription plan for any reason. The drug is not on formulary and there are no loopholes to gaining approval.    Patient Notified: No. The [facility] PA Team does not notify of the denial outcomes as the patient often will ask what their provider will prescribe in place of the denied medication, or additional information in regards to other therapies they can take in place of the denied medication.  This is not something we can advise on in our department.

## 2025-05-22 NOTE — TELEPHONE ENCOUNTER
Please let pt know that her insurance is not covering the capsaicin cream ordered by me for her chest wall pain, she will need to pay out-of-pocket to get this.  I do not have alternatives for this.    Thank you,  Mercedez Britton MD on 5/22/2025 at 8:24 AM

## 2025-06-03 DIAGNOSIS — E87.1 HYPONATREMIA: ICD-10-CM

## 2025-06-03 DIAGNOSIS — E22.2 SIADH (SYNDROME OF INAPPROPRIATE ADH PRODUCTION): ICD-10-CM

## 2025-06-03 DIAGNOSIS — Z00.00 ENCOUNTER FOR MEDICARE ANNUAL WELLNESS EXAM: ICD-10-CM

## 2025-06-03 RX ORDER — SODIUM CHLORIDE 1 G/1
TABLET ORAL
Qty: 60 TABLET | Refills: 1 | Status: SHIPPED | OUTPATIENT
Start: 2025-06-03

## 2025-06-04 NOTE — TELEPHONE ENCOUNTER
Refill done.    Pt to keep up scheduled visit on 7/22/25 for further evaluation    Thank you,  Mercedez Britton MD on 6/3/2025

## 2025-06-21 ENCOUNTER — LAB (OUTPATIENT)
Dept: LAB | Facility: CLINIC | Age: 65
End: 2025-06-21
Payer: MEDICARE

## 2025-06-21 DIAGNOSIS — E83.110 HEREDITARY HEMOCHROMATOSIS: ICD-10-CM

## 2025-06-21 DIAGNOSIS — R20.2 PARESTHESIA: ICD-10-CM

## 2025-06-21 LAB
ERYTHROCYTE [DISTWIDTH] IN BLOOD BY AUTOMATED COUNT: 12 % (ref 10–15)
HCT VFR BLD AUTO: 35.9 % (ref 35–47)
HGB BLD-MCNC: 12.7 G/DL (ref 11.7–15.7)
MCH RBC QN AUTO: 32.6 PG (ref 26.5–33)
MCHC RBC AUTO-ENTMCNC: 35.4 G/DL (ref 31.5–36.5)
MCV RBC AUTO: 92 FL (ref 78–100)
PLATELET # BLD AUTO: 264 10E3/UL (ref 150–450)
RBC # BLD AUTO: 3.9 10E6/UL (ref 3.8–5.2)
WBC # BLD AUTO: 6.2 10E3/UL (ref 4–11)

## 2025-06-21 PROCEDURE — 85027 COMPLETE CBC AUTOMATED: CPT

## 2025-06-21 PROCEDURE — 36415 COLL VENOUS BLD VENIPUNCTURE: CPT

## 2025-07-21 DIAGNOSIS — F41.9 ANXIETY: ICD-10-CM

## 2025-07-22 ENCOUNTER — VIRTUAL VISIT (OUTPATIENT)
Dept: FAMILY MEDICINE | Facility: CLINIC | Age: 65
End: 2025-07-22
Payer: MEDICARE

## 2025-07-22 DIAGNOSIS — I10 ESSENTIAL HYPERTENSION: Primary | ICD-10-CM

## 2025-07-22 DIAGNOSIS — M48.061 SPINAL STENOSIS OF LUMBAR REGION WITHOUT NEUROGENIC CLAUDICATION: ICD-10-CM

## 2025-07-22 DIAGNOSIS — E66.3 OVERWEIGHT WITH BODY MASS INDEX (BMI) OF 26 TO 26.9 IN ADULT: ICD-10-CM

## 2025-07-22 DIAGNOSIS — F41.9 ANXIETY: ICD-10-CM

## 2025-07-22 DIAGNOSIS — F10.20 UNCOMPLICATED ALCOHOL DEPENDENCE (H): ICD-10-CM

## 2025-07-22 DIAGNOSIS — Z79.899 CHRONIC PRESCRIPTION BENZODIAZEPINE USE: ICD-10-CM

## 2025-07-22 PROCEDURE — 98006 SYNCH AUDIO-VIDEO EST MOD 30: CPT | Performed by: INTERNAL MEDICINE

## 2025-07-22 RX ORDER — METOPROLOL SUCCINATE 100 MG/1
100 TABLET, EXTENDED RELEASE ORAL DAILY
Qty: 90 TABLET | Refills: 3 | Status: SHIPPED | OUTPATIENT
Start: 2025-07-22

## 2025-07-22 RX ORDER — CLONAZEPAM 0.5 MG/1
0.5 TABLET, ORALLY DISINTEGRATING ORAL
Qty: 30 TABLET | Refills: 2 | Status: SHIPPED | OUTPATIENT
Start: 2025-07-22

## 2025-07-22 RX ORDER — VALACYCLOVIR HYDROCHLORIDE 1 G/1
1 TABLET, FILM COATED ORAL
COMMUNITY
Start: 2025-05-19

## 2025-07-22 RX ORDER — FENTANYL CITRATE 50 UG/ML
25-100 INJECTION, SOLUTION INTRAMUSCULAR; INTRAVENOUS
COMMUNITY
Start: 2025-06-18

## 2025-07-22 RX ORDER — LOSARTAN POTASSIUM 100 MG/1
100 TABLET ORAL DAILY
Qty: 90 TABLET | Refills: 2 | Status: SHIPPED | OUTPATIENT
Start: 2025-07-22

## 2025-07-22 RX ORDER — MIDAZOLAM HYDROCHLORIDE 1 MG/ML
1-2 INJECTION, SOLUTION INTRAMUSCULAR; INTRAVENOUS
COMMUNITY
Start: 2025-06-18

## 2025-07-22 RX ORDER — PHENTERMINE HYDROCHLORIDE 15 MG/1
15 CAPSULE ORAL EVERY MORNING
Qty: 30 CAPSULE | Refills: 2 | Status: SHIPPED | OUTPATIENT
Start: 2025-08-10

## 2025-07-22 RX ORDER — CLONAZEPAM 0.5 MG/1
TABLET, ORALLY DISINTEGRATING ORAL
Qty: 30 TABLET | OUTPATIENT
Start: 2025-07-22

## 2025-07-22 ASSESSMENT — ENCOUNTER SYMPTOMS: NERVOUS/ANXIOUS: 1

## 2025-07-22 NOTE — PATIENT INSTRUCTIONS
As discussed we will consider keeping the current phentermine at the same dose which is helping your weight loss but will need to make sure you are not going too low on your in person visit.    Have discontinued amlodipine from your blood pressure medication list, continue current metoprolol and losartan to avoid too low blood pressures.  Please keep checking your blood pressure for next 10 days and send me the BP log on the 2 medications after stopping amlodipine.    Will refill your Klonopin at the same dose.

## 2025-07-22 NOTE — PROGRESS NOTES
Cherise is a 65 year old who is being evaluated via a billable video visit.    How would you like to obtain your AVS? MyChart  If the video visit is dropped, the invitation should be resent by: Text to cell phone: [phone]  Will anyone else be joining your video visit? No          Assessment and Plan    1. Essential hypertension (Primary)  Chronic problem, to low blood pressures as per the patient home BP log.   - Patient states that her blood pressure is going too low with current 3 antihypertensives she is on including amlodipine, metoprolol, losartan.  Blood pressure ranging between 107 over 70s.  Shared decision to cut down the amlodipine and continue current other 2 medications.  Patient to submit me a BP log for next 10 days if no improvement.  - losartan (COZAAR) 100 MG tablet; Take 1 tablet (100 mg) by mouth daily.  Dispense: 90 tablet; Refill: 2  - metoprolol succinate ER (TOPROL XL) 100 MG 24 hr tablet; Take 1 tablet (100 mg) by mouth daily.  Dispense: 90 tablet; Refill: 3    2. Overweight with body mass index (BMI) of 26 to 26.9 in adult  Chronic problem, improving.  Patient endorses that her current weight is 146 pounds compared to past 154 pounds which was checked in her annual physical in January 2025.  -Patient looking for her weight goal at 135 pounds and requesting to continue the same at this time.  Denies any side effects.  Making sure she should not go less than 20 BMI which she understands.  Patient to make an in clinic appointment for weight check which she understands.    - phentermine 15 MG capsule; Take 1 capsule (15 mg) by mouth every morning.  Dispense: 30 capsule; Refill: 2    3. Anxiety  4. Chronic prescription benzodiazepine use  Chronic stable, will continue current Klonopin at the same dose.  - clonazePAM (KLONOPIN) 0.5 MG ODT; Take 1 tablet (0.5 mg) by mouth nightly as needed for anxiety.  Dispense: 30 tablet; Refill: 2    5. Spinal stenosis of lumbar region without neurogenic  claudication  Chronic problem, ongoing radiculopathy pain which she endorses nerve pain as she states.  This has multiple factors including chronic low back pain which she is following orthopedics already, possible alcoholism in the past which could have affected the nerves, cannot exclude possible cerebellar symptoms before planning on neurology referral if needed which we discussed.  Will need in clinic appointment to assess neurologically before next steps.  Patient understands the plan.    6. Uncomplicated alcohol dependence (H)  Chronic problem, improving.  Patient endorses that she only drinks 3-4 beers every weekend and only a couple of beers per whole week.  Not opting for any naloxone offered for quitting alcohol given the nerve related issues which she endorses.  Will consider possible need of neurology or for in person visit.         The longitudinal plan of care for the diagnosis(es)/condition(s) as documented were addressed during this visit. Due to the added complexity in care, I will continue to support Peg in the subsequent management and with ongoing continuity of care.      Please note that this note consists of symbols derived from keyboarding, dictation and/or voice recognition software. As a result, there may be errors in the script that have gone undetected. Please consider this when interpreting information found in this chart.    Patient Instructions   As discussed we will consider keeping the current phentermine at the same dose which is helping your weight loss but will need to make sure you are not going too low on your in person visit.    Have discontinued amlodipine from your blood pressure medication list, continue current metoprolol and losartan to avoid too low blood pressures.  Please keep checking your blood pressure for next 10 days and send me the BP log on the 2 medications after stopping amlodipine.    Will refill your Klonopin at the same dose.        Return in about 3 months  (around 10/22/2025), or if symptoms worsen or fail to improve, for anxiety, video visit.    Mercedez Britton MD  [facility] ROMULO Luong is a 65 year old, presenting for the following health issues:  Weight Check and Anxiety        7/22/2025    10:38 AM   Additional Questions   Roomed by Urbano       Anxiety    History of Present Illness       Reason for visit:  General follow up    She eats 2-3 servings of fruits and vegetables daily.She consumes 0 sweetened beverage(s) daily.She exercises with enough effort to increase her heart rate 10 to 19 minutes per day.  She exercises with enough effort to increase her heart rate 3 or less days per week.   She is taking medications regularly.        Last seen patient in May 2025 for acute concerns of paresthesia, neuropathy at that time.  She is here for anxiety follow-up.  Does have other concerns as mentioned in the assessment plan.       Allergies   Allergen Reactions    Codeine Nausea and Nausea and Vomiting    Nitrofurantoin Unknown and Other (See Comments)     Other reaction(s): Gastrointestinal      Ace Inhibitors Cough     lisinopril  lisinopril  lisinopril    Elemental Sulfur Unknown    Hydrochlorothiazide Unknown     Severe Hyponatremia less than 120  Severe Hyponatremia less than 120    Morphine And Codeine Nausea    Sulfa Antibiotics     Sulfatolamide     Sulfur Unknown        Past Medical History:   Diagnosis Date    ASCUS favor benign 09/2014    3 yr co-test    Goiter     Hereditary hemochromatosis     HTN (hypertension)     Impaired renal function     Microscopic colitis     Osteoarthritis of first carpometacarpal joint     bilateral    Other specified acquired hypothyroidism     RA (rheumatoid arthritis) (H)     Seasonal allergic rhinitis     Spider veins     Symptomatic menopausal or female climacteric states     age 43, on HRT    Tricuspid regurgitation     +1-2 TR noted on 12/22/14 Echo       Past Surgical History:    Procedure Laterality Date    ABDOMEN SURGERY  01/30/1995    C section    BIOPSY BREAST      C/SECTION, LOW TRANSVERSE      twins    COLONOSCOPY  2013    nl, next one due in 5 years    CV CORONARY ANGIOGRAM N/A 06/30/2022    Procedure: Coronary Angiogram;  Surgeon: Jose Antonio Ferraro MD;  Location:  [facility]    CV LEFT HEART CATH N/A 06/30/2022    Procedure: Left Heart Catheterization;  Surgeon: Jose Antonio Ferraro MD;  Location:  [facility]    CV LEFT VENTRICULOGRAM N/A 06/30/2022    Procedure: Left Ventriculogram;  Surgeon: Jose Antonio Ferraro MD;  Location:  [facility]    CV RIGHT HEART CATH MEASUREMENTS RECORDED N/A 06/30/2022    Procedure: Right Heart Catheterization;  Surgeon: Jose Antonio Ferraro MD;  Location:  HE[facility]ATH [facility]    IR LUMBAR PUNCTURE  11/7/2024    LEEP TX, CERVICAL  1990s    normal since that time    MYRINGOTOMY, INSERT TUBE, COMBINED Left 04/2015    chronic NORM, ETD    ORTHOPEDIC SURGERY  Oct 2021    New right hip    REPLACEMENT TOTAL HIP W/  RESURFACING IMPLANTS Right     SOFT TISSUE SURGERY  Oct,  2021    Gluteus, medius or tendon repair       Family History   Problem Relation Age of Onset    Cancer - colorectal Father         age 50    Colon Cancer Father     Arthritis Mother     Cancer - colorectal Brother         age 50    Colon Cancer Brother     Hypertension Sister     Hypertension Brother     Colon Cancer Brother         John passed away in October 2022.    Prostate Cancer Brother     Kidney Cancer Brother     Prostate Cancer Brother     Arthritis Sister     Cancer Sister 53        Uterine    Cancer Sister 50        Uterine    Testicular cancer Nephew     Prostate Cancer Brother     Prostate Cancer Brother     Thyroid Disease No family hx of        Social History     Tobacco Use    Smoking status: Former     Current packs/day: 0.00     Average packs/day: 0.3 packs/day for 10.0 years (2.6 ttl pk-yrs)     Types: Cigarettes     Start date:  1977     Quit date: 1987     Years since quittin.2     Passive exposure: Never    Smokeless tobacco: Former    Tobacco comments:     Was a social smoker   Substance Use Topics    Alcohol use: Yes     Comment: A couple a day        Current Outpatient Medications   Medication Sig Dispense Refill    acetaminophen (TYLENOL) 500 MG tablet Take 1-2 tablets (500-1,000 mg) by mouth every 4 hours as needed for mild pain 30 tablet 0    aspirin (ASA) 81 MG chewable tablet       Biotin 5000 MCG TABS Take 1 tablet by mouth daily       clonazePAM (KLONOPIN) 0.5 MG ODT Take 1 tablet (0.5 mg) by mouth nightly as needed for anxiety. 30 tablet 2    fentaNYL, PF, (SUBLIMAZE) 100 MCG/2ML injection Inject  mcg into the vein.      hydroxychloroquine (PLAQUENIL) 200 MG tablet Take 2 tablets (400 mg) by mouth daily 90 tablet 3    ipratropium (ATROVENT) 0.03 % nasal spray Spray 2 sprays into both nostrils daily       levothyroxine (SYNTHROID/LEVOTHROID) 75 MCG tablet Take 1 tablet (75 mcg) by mouth every morning (before breakfast). 90 tablet 2    liothyronine (CYTOMEL) 5 MCG tablet Take 2 tablets (10 mcg) by mouth daily. 180 tablet 3    loratadine (CLARITIN) 10 MG tablet Take 1 tablet (10 mg) by mouth daily 90 tablet 3    losartan (COZAAR) 100 MG tablet Take 1 tablet (100 mg) by mouth daily. 90 tablet 2    metoprolol succinate ER (TOPROL XL) 100 MG 24 hr tablet Take 1 tablet (100 mg) by mouth daily. 90 tablet 3    midazolam (VERSED) 2 MG/2ML injection Inject 1-2 mg into the vein.      minoxidil (LONITEN) 2.5 MG tablet Take by mouth.      omeprazole (PRILOSEC) 20 MG DR capsule Take 1 capsule (20 mg) by mouth daily. 90 capsule 1    [START ON 8/10/2025] phentermine 15 MG capsule Take 1 capsule (15 mg) by mouth every morning. 30 capsule 2    sodium chloride 1 GM tablet TAKE 1 TABLET(1 GRAM) BY MOUTH DAILY 60 tablet 1    valACYclovir (VALTREX) 1000 mg tablet Take 1 tablet by mouth 3 times daily.       No current  facility-administered medications for this visit.            Review of Systems  Constitutional, HEENT, cardiovascular, pulmonary, GI, , musculoskeletal, neuro, skin, endocrine and psych systems are negative, except as otherwise noted.      Objective           Vitals:  No vitals were obtained today due to virtual visit.    Physical Exam   GENERAL: alert and no distress  EYES: Eyes grossly normal to inspection.  No discharge or erythema, or obvious scleral/conjunctival abnormalities.  RESP: No audible wheeze, cough, or visible cyanosis.    SKIN: Visible skin clear. No significant rash, abnormal pigmentation or lesions.  NEURO: Cranial nerves grossly intact.  Mentation and speech appropriate for age.  PSYCH: Appropriate affect, tone, and pace of words      Video-Visit Details    Type of service:  Video Visit   Originating Location (pt. Location): Home    Distant Location (provider location):  On-site  Platform used for Video Visit: Narendra  Signed Electronically by: Mercedez Britton MD

## 2025-07-25 ENCOUNTER — TELEPHONE (OUTPATIENT)
Dept: ONCOLOGY | Facility: CLINIC | Age: 65
End: 2025-07-25
Payer: MEDICARE

## 2025-07-25 NOTE — TELEPHONE ENCOUNTER
Oncology Nurse Triage    Situation:   Irina reporting the following symptoms: burning sensation on back    Background:   Treating Provider:   Dr. Rossi    Date of last office visit: 10/22/24    Recent Treatments: Surveillance    Assessment:     Onset: Patient developed a burning stinging sensation on her back. She was seen by Dermatology. Shingles was ruled out. Has tried Acyclovir, lidocaine cream, and cerave lotion with no relief. She wonders if sx could be related to her hemachromatosis?    Denies any rash, lesions or open sores, skin changes, redness, or swelling. No fever, chills, or signs of infection. No other sx noted.     Recommendations:     Routing to care team for review and recommendations.     Rosamaria Madrid RN, BSN, PHN, OCN  [facility]

## 2025-07-28 NOTE — TELEPHONE ENCOUNTER
Called patient, reviewed recommendations below, understanding verbalized. Patient will monitor sx and call back if no improvements noted.     Rosamaria Madrid, RN, BSN, PHN, OCN  [facility]    Eloisa Rossi MD to Me  Danielle Newman RN  (Selected Message)        7/28/25  3:40 PM  Please let her know that this is not related to hemochromatosis.  She should call us if her symptoms do not improve.  We may consider getting scans.     Eloisa Rossi MD

## 2025-08-25 DIAGNOSIS — I10 ESSENTIAL HYPERTENSION: ICD-10-CM

## 2025-08-25 RX ORDER — AMLODIPINE BESYLATE 5 MG/1
5 TABLET ORAL DAILY
Qty: 90 TABLET | Refills: 1 | OUTPATIENT
Start: 2025-08-25

## 2025-08-25 ASSESSMENT — ANXIETY QUESTIONNAIRES
3. WORRYING TOO MUCH ABOUT DIFFERENT THINGS: NOT AT ALL
5. BEING SO RESTLESS THAT IT IS HARD TO SIT STILL: NOT AT ALL
8. IF YOU CHECKED OFF ANY PROBLEMS, HOW DIFFICULT HAVE THESE MADE IT FOR YOU TO DO YOUR WORK, TAKE CARE OF THINGS AT HOME, OR GET ALONG WITH OTHER PEOPLE?: NOT DIFFICULT AT ALL
7. FEELING AFRAID AS IF SOMETHING AWFUL MIGHT HAPPEN: NOT AT ALL
GAD7 TOTAL SCORE: 0
GAD7 TOTAL SCORE: 0
IF YOU CHECKED OFF ANY PROBLEMS ON THIS QUESTIONNAIRE, HOW DIFFICULT HAVE THESE PROBLEMS MADE IT FOR YOU TO DO YOUR WORK, TAKE CARE OF THINGS AT HOME, OR GET ALONG WITH OTHER PEOPLE: NOT DIFFICULT AT ALL
2. NOT BEING ABLE TO STOP OR CONTROL WORRYING: NOT AT ALL
GAD7 TOTAL SCORE: 0
4. TROUBLE RELAXING: NOT AT ALL
7. FEELING AFRAID AS IF SOMETHING AWFUL MIGHT HAPPEN: NOT AT ALL
1. FEELING NERVOUS, ANXIOUS, OR ON EDGE: NOT AT ALL
6. BECOMING EASILY ANNOYED OR IRRITABLE: NOT AT ALL

## 2025-08-28 ENCOUNTER — VIRTUAL VISIT (OUTPATIENT)
Dept: FAMILY MEDICINE | Facility: CLINIC | Age: 65
End: 2025-08-28
Attending: INTERNAL MEDICINE
Payer: MEDICARE

## 2025-08-28 DIAGNOSIS — E66.3 OVERWEIGHT WITH BODY MASS INDEX (BMI) OF 26 TO 26.9 IN ADULT: ICD-10-CM

## 2025-08-28 DIAGNOSIS — E03.9 HYPOTHYROIDISM, UNSPECIFIED TYPE: ICD-10-CM

## 2025-08-28 DIAGNOSIS — S32.029D CLOSED FRACTURE OF SECOND LUMBAR VERTEBRA WITH ROUTINE HEALING, UNSPECIFIED FRACTURE MORPHOLOGY, SUBSEQUENT ENCOUNTER: Primary | ICD-10-CM

## 2025-08-28 DIAGNOSIS — I10 ESSENTIAL HYPERTENSION: ICD-10-CM

## 2025-08-28 RX ORDER — AMLODIPINE BESYLATE 5 MG/1
5 TABLET ORAL DAILY
Qty: 30 TABLET | Refills: 1 | Status: SHIPPED | OUTPATIENT
Start: 2025-08-28

## 2025-08-28 RX ORDER — PHENTERMINE HYDROCHLORIDE 30 MG/1
30 CAPSULE ORAL EVERY MORNING
Qty: 30 CAPSULE | Refills: 1 | Status: SHIPPED | OUTPATIENT
Start: 2025-08-28 | End: 2025-10-27

## 2025-08-28 RX ORDER — OXYCODONE HCL 5 MG/5 ML
5 SOLUTION, ORAL ORAL
COMMUNITY
Start: 2025-08-10

## 2025-08-30 DIAGNOSIS — I10 ESSENTIAL HYPERTENSION: ICD-10-CM

## 2025-09-02 RX ORDER — AMLODIPINE BESYLATE 5 MG/1
5 TABLET ORAL DAILY
Qty: 90 TABLET | Refills: 1 | Status: SHIPPED | OUTPATIENT
Start: 2025-09-02

## (undated) DEVICE — MANIFOLD KIT ANGIO AUTOMATED 014613

## (undated) DEVICE — CATH DIAG 4FR ANG PIG 538453S

## (undated) DEVICE — MANIFOLD IV 1 VLV MED PRSS OFFHNDL STRL 70058107

## (undated) DEVICE — NDL PERC ENTRY THINWALL 18GA 7.0" G00166

## (undated) DEVICE — TOTE ANGIO CORP PC15AT SAN32CC83O

## (undated) DEVICE — KIT HAND CONTROL ANGIOTOUCH ACIST 65CM AT-P65

## (undated) DEVICE — CATH DIAG 4FR JL 4.5 538417

## (undated) DEVICE — CATH ANGIO INFINITI 3DRC 4FRX100CM 538476

## (undated) DEVICE — LINE MONITOR NASAL SMART CAPNOLINE ADULT LONG 12463

## (undated) DEVICE — DEFIB PRO-PADZ LVP LQD GEL ADULT 8900-2105-01

## (undated) DEVICE — INTRO SHEATH 4FRX10CM PINNACLE RSS402

## (undated) RX ORDER — LIDOCAINE HYDROCHLORIDE 10 MG/ML
INJECTION, SOLUTION EPIDURAL; INFILTRATION; INTRACAUDAL; PERINEURAL
Status: DISPENSED
Start: 2022-06-30

## (undated) RX ORDER — REGADENOSON 0.08 MG/ML
INJECTION, SOLUTION INTRAVENOUS
Status: DISPENSED
Start: 2022-06-21

## (undated) RX ORDER — HEPARIN SODIUM 1000 [USP'U]/ML
INJECTION, SOLUTION INTRAVENOUS; SUBCUTANEOUS
Status: DISPENSED
Start: 2022-06-30

## (undated) RX ORDER — HEPARIN SODIUM 200 [USP'U]/100ML
INJECTION, SOLUTION INTRAVENOUS
Status: DISPENSED
Start: 2022-06-30

## (undated) RX ORDER — FENTANYL CITRATE 50 UG/ML
INJECTION, SOLUTION INTRAMUSCULAR; INTRAVENOUS
Status: DISPENSED
Start: 2022-06-30